# Patient Record
Sex: FEMALE | Race: WHITE | NOT HISPANIC OR LATINO | Employment: OTHER | URBAN - METROPOLITAN AREA
[De-identification: names, ages, dates, MRNs, and addresses within clinical notes are randomized per-mention and may not be internally consistent; named-entity substitution may affect disease eponyms.]

---

## 2017-01-23 ENCOUNTER — HOSPITAL ENCOUNTER (OUTPATIENT)
Dept: RADIOLOGY | Facility: HOSPITAL | Age: 72
Discharge: HOME | End: 2017-01-23
Attending: INTERNAL MEDICINE
Payer: MEDICARE

## 2017-01-23 ENCOUNTER — OFFICE VISIT (OUTPATIENT)
Dept: RHEUMATOLOGY | Facility: CLINIC | Age: 72
End: 2017-01-23
Payer: MEDICARE

## 2017-01-23 VITALS — DIASTOLIC BLOOD PRESSURE: 93 MMHG | SYSTOLIC BLOOD PRESSURE: 155 MMHG | HEART RATE: 106 BPM | HEIGHT: 70 IN

## 2017-01-23 DIAGNOSIS — M79.2 NEUROPATHIC PAIN OF FOOT, UNSPECIFIED LATERALITY: Chronic | ICD-10-CM

## 2017-01-23 DIAGNOSIS — R06.02 SHORTNESS OF BREATH: ICD-10-CM

## 2017-01-23 DIAGNOSIS — Z79.899 HIGH RISK MEDICATION USE: Chronic | ICD-10-CM

## 2017-01-23 DIAGNOSIS — M05.79 RHEUMATOID ARTHRITIS INVOLVING MULTIPLE SITES WITH POSITIVE RHEUMATOID FACTOR: Primary | ICD-10-CM

## 2017-01-23 DIAGNOSIS — L59.0 ERYTHEMA AB IGNE: Chronic | ICD-10-CM

## 2017-01-23 PROCEDURE — 71020 XR CHEST PA AND LATERAL: CPT | Mod: 26,,, | Performed by: RADIOLOGY

## 2017-01-23 PROCEDURE — 71020 XR CHEST PA AND LATERAL: CPT | Mod: TC

## 2017-01-23 PROCEDURE — 99999 PR PBB SHADOW E&M-EST. PATIENT-LVL III: CPT | Mod: PBBFAC,,, | Performed by: INTERNAL MEDICINE

## 2017-01-23 PROCEDURE — 99214 OFFICE O/P EST MOD 30 MIN: CPT | Mod: S$PBB,,, | Performed by: INTERNAL MEDICINE

## 2017-01-23 RX ORDER — OXYCODONE AND ACETAMINOPHEN 5; 325 MG/1; MG/1
1 TABLET ORAL EVERY 6 HOURS PRN
Qty: 60 TABLET | Refills: 0 | Status: SHIPPED | OUTPATIENT
Start: 2017-01-23 | End: 2017-04-17 | Stop reason: SDUPTHER

## 2017-01-23 ASSESSMENT — ROUTINE ASSESSMENT OF PATIENT INDEX DATA (RAPID3)
TOTAL RAPID3 SCORE: 2.67
PATIENT GLOBAL ASSESSMENT SCORE: 3
PSYCHOLOGICAL DISTRESS SCORE: 3.3
PAIN SCORE: 3
AM STIFFNESS SCORE: 0, NO
MDHAQ FUNCTION SCORE: .6
FATIGUE SCORE: 8

## 2017-01-23 NOTE — MR AVS SNAPSHOT
[facility] - Rheumatology  [address]  [facility] [address]  Phone: [phone]  Fax: [phone]                  Yana Orellana   2017 4:00 PM   Office Visit    Description:  Female : 1945   Provider:  Smooth Agrawal MD   Department:  [facility] - Rheumatology           Diagnoses this Visit        Comments    Shortness of breath    -  Primary     Erythema ab igne         High risk medication use         Rheumatoid arthritis involving multiple sites with positive rheumatoid factor         Neuropathic pain of foot, unspecified laterality                To Do List           Goals (5 Years of Data)     None      Follow-Up and Disposition     Return in about 3 months (around 2017).       These Medications        Disp Refills Start End    oxycodone-acetaminophen (PERCOCET) 5-325 mg per tablet 60 tablet 0 2017     Take 1 tablet by mouth every 6 (six) hours as needed for Pain. - Oral    Pharmacy: [facility] [address] [place] [address] [facility] #: [phone]         [facility] On Call     [facility] Nurse Care Line -  Assistance  Registered nurses in the [facility] Call Center provide clinical advisement, health education, appointment booking, and other advisory services.  Call for this free service at [phone].             Medications           Message regarding Medications     Verify the changes and/or additions to your medication regime listed below are the same as discussed with your clinician today.  If any of these changes or additions are incorrect, please notify your healthcare provider.             Verify that the below list of medications is an accurate representation of the medications you are currently taking.  If none reported, the list may be blank. If incorrect, please contact your healthcare provider. Carry this list with you in case of emergency.           Current Medications     calcium  "citrate-vitamin D (CITRACAL+D) 315-200 mg-unit per tablet Take 2 tablets by mouth 2 (two) times daily.     FLUZONE HIGH-DOSE 2015-16, PF, 180 mcg/0.5 mL Syrg     folic acid (FOLVITE) 1 MG tablet Take 1 tablet (1 mg total) by mouth once daily.    levothyroxine (SYNTHROID) 75 MCG tablet Take 1 tablet (75 mcg total) by mouth once daily.    liothyronine (CYTOMEL) 5 MCG Tab TAKE 1 TABLET BY MOUTH TWICE DAILY    meclizine (ANTIVERT) 25 mg tablet Take 1 tablet (25 mg total) by mouth 3 (three) times daily as needed.    methotrexate 2.5 MG Tab TAKE 8 TABLETS BY MOUTH EVERY 7 DAYS    multivitamin (ONE DAILY MULTIVITAMIN) per tablet Take 1 tablet by mouth once daily.    oxycodone-acetaminophen (PERCOCET) 5-325 mg per tablet Take 1 tablet by mouth every 6 (six) hours as needed for Pain.    vitamin D 185 MG Tab Take 185 mg by mouth once daily.      diazePAM (VALIUM) 5 MG tablet Take 1 tablet (5 mg total) by mouth every 8 (eight) hours as needed for Anxiety or Insomnia (and fibromyalgia).           Clinical Reference Information           Vital Signs - Last Recorded  Most recent update: 1/23/2017  4:13 PM by Winnie Craig MA    BP Pulse Ht             (!) 155/93 106 5' 10" (1.778 m)         Blood Pressure          Most Recent Value    BP  (!)  155/93      Allergies as of 1/23/2017     Demerol [Meperidine]    Gabapentin    Mellaril-s    Versed [Midazolam]    Vicodin [Hydrocodone-acetaminophen]    Xanax [Alprazolam]      Immunizations Administered on Date of Encounter - 1/23/2017     None      Orders Placed During Today's Visit     Future Labs/Procedures Expected by Expires    X-Ray Chest PA And Lateral  1/23/2017 1/23/2018      [facility] Sign-Up     Activating your [facility] account is as easy as 1-2-3!     1) Visit [email or web address], select Sign Up Now, enter this activation code and your date of birth, then select Next.  [email or web address]  Expires: 3/9/2017  4:43 PM      2) Create a username and password to use when you visit " [facility] in the future and select a security question in case you lose your password and select Next.    3) Enter your e-mail address and click Sign Up!    Additional Information  If you have questions, please e-mail [email or web address] or call [phone] to talk to our [facility] staff. Remember, [facility] is NOT to be used for urgent needs. For medical emergencies, dial 911.

## 2017-01-23 NOTE — PROGRESS NOTES
"Subjective:       Patient ID: Yana Orellana is a 71 y.o. female.    Chief Complaint: No chief complaint on file.    HPI   Mod pos CCP/RF RA since     MTX  - present      mtx 20 / week  Oxycodone 1 or 2 a day as needed for neuropathy or joint pain  Valium 90 tab every 6 mo ; due in April; uses it occasionally when she needs help managing her fibromyalgia    Last here in July  Her ex   in August    mtx still works ; good hand function  Oxycodone less than daily    Review of Systems   Constitutional: Positive for fatigue. Negative for fever and unexpected weight change.   HENT: Negative for mouth sores and trouble swallowing.         Not Dry mouth   Eyes: Negative for redness.        Dry eyes   Respiratory: Positive for shortness of breath. Negative for cough.    Cardiovascular: Negative for chest pain.   Gastrointestinal: Positive for diarrhea. Negative for abdominal pain and constipation.   Skin: Negative for rash.   Neurological: Negative for headaches.   Hematological: Negative for adenopathy. Does not bruise/bleed easily.   Psychiatric/Behavioral: Negative for sleep disturbance.         Objective:     Visit Vitals    BP (!) 155/93    Pulse 106    Ht 5' 10" (1.778 m)        Physical Exam   Constitutional: She is well-developed, well-nourished, and in no distress.   HENT:   Mouth/Throat: Oropharynx is clear and moist.   Eyes: Conjunctivae are normal.   Cardiovascular: Normal rate and regular rhythm.    No murmur heard.  Pulmonary/Chest: Breath sounds normal. She has no wheezes. She has no rales.   Lymphadenopathy:     She has no cervical adenopathy.   Neurological: She is alert.   Skin: No rash noted.     Musculoskeletal:   No hand or wrist synovitis           Assessment:       1. Rheumatoid arthritis involving multiple sites with positive rheumatoid factor    2. Erythema ab igne    3. Shortness of breath    4. High risk medication use    5. Neuropathic pain of foot, unspecified " laterality        Rheumatoid arthritis appears well controlled on current therapy  Shortness of breath of uncertain etiology and must consider methotrexate pulmonary toxicity  Chronic narcotics but no history to suggest abuse  Plan:     1 chest x-ray today  2.  Laboratory tests for methotrexate monitoring today  3.  If above negative consider cardiac workup  4.  If above negative repeat labs in 3 months with rheumatoid follow-up at that time  5.  Oxycodone No. 60 to last for the next 2-3 months    6.  I advised her to stop using the heating pad that is causing the back erythema ab igne

## 2017-01-24 ENCOUNTER — TELEPHONE (OUTPATIENT)
Dept: RHEUMATOLOGY | Facility: CLINIC | Age: 72
End: 2017-01-24

## 2017-01-24 DIAGNOSIS — M05.79 RHEUMATOID ARTHRITIS INVOLVING MULTIPLE SITES WITH POSITIVE RHEUMATOID FACTOR: Primary | ICD-10-CM

## 2017-01-24 DIAGNOSIS — Z79.899 HIGH RISK MEDICATION USE: Chronic | ICD-10-CM

## 2017-01-31 ENCOUNTER — TELEPHONE (OUTPATIENT)
Dept: RHEUMATOLOGY | Facility: CLINIC | Age: 72
End: 2017-01-31

## 2017-02-03 ENCOUNTER — TELEPHONE (OUTPATIENT)
Dept: RHEUMATOLOGY | Facility: CLINIC | Age: 72
End: 2017-02-03

## 2017-04-17 DIAGNOSIS — F41.9 ANXIETY: ICD-10-CM

## 2017-04-17 DIAGNOSIS — M79.2 NEUROPATHIC PAIN OF FOOT, UNSPECIFIED LATERALITY: Chronic | ICD-10-CM

## 2017-04-17 DIAGNOSIS — M79.7 FIBROMYALGIA: Chronic | ICD-10-CM

## 2017-04-17 DIAGNOSIS — M05.79 RHEUMATOID ARTHRITIS INVOLVING MULTIPLE SITES WITH POSITIVE RHEUMATOID FACTOR: ICD-10-CM

## 2017-04-18 RX ORDER — DIAZEPAM 5 MG/1
5 TABLET ORAL EVERY 8 HOURS PRN
Qty: 90 TABLET | Refills: 0 | Status: SHIPPED | OUTPATIENT
Start: 2017-04-18 | End: 2017-10-25 | Stop reason: SDUPTHER

## 2017-04-18 RX ORDER — OXYCODONE AND ACETAMINOPHEN 5; 325 MG/1; MG/1
1 TABLET ORAL EVERY 6 HOURS PRN
Qty: 60 TABLET | Refills: 0 | Status: SHIPPED | OUTPATIENT
Start: 2017-04-18 | End: 2017-07-24 | Stop reason: SDUPTHER

## 2017-04-24 ENCOUNTER — OFFICE VISIT (OUTPATIENT)
Dept: RHEUMATOLOGY | Facility: CLINIC | Age: 72
End: 2017-04-24
Payer: MEDICARE

## 2017-04-24 ENCOUNTER — LAB VISIT (OUTPATIENT)
Dept: LAB | Facility: HOSPITAL | Age: 72
End: 2017-04-24
Attending: INTERNAL MEDICINE
Payer: MEDICARE

## 2017-04-24 VITALS — HEIGHT: 70 IN

## 2017-04-24 DIAGNOSIS — K11.7 XEROSTOMIA: Chronic | ICD-10-CM

## 2017-04-24 DIAGNOSIS — M05.79 RHEUMATOID ARTHRITIS INVOLVING MULTIPLE SITES WITH POSITIVE RHEUMATOID FACTOR: Primary | ICD-10-CM

## 2017-04-24 DIAGNOSIS — M05.79 RHEUMATOID ARTHRITIS INVOLVING MULTIPLE SITES WITH POSITIVE RHEUMATOID FACTOR: ICD-10-CM

## 2017-04-24 DIAGNOSIS — Z79.899 HIGH RISK MEDICATION USE: Chronic | ICD-10-CM

## 2017-04-24 DIAGNOSIS — G14 POST-POLIO SYNDROME: ICD-10-CM

## 2017-04-24 DIAGNOSIS — M79.2 NEUROPATHIC PAIN OF FOOT, UNSPECIFIED LATERALITY: Chronic | ICD-10-CM

## 2017-04-24 LAB
ALBUMIN SERPL BCP-MCNC: 3.1 G/DL
ALP SERPL-CCNC: 97 U/L
ALT SERPL W/O P-5'-P-CCNC: 30 U/L
ANION GAP SERPL CALC-SCNC: 10 MMOL/L
AST SERPL-CCNC: 46 U/L
BASOPHILS # BLD AUTO: 0.06 K/UL
BASOPHILS NFR BLD: 0.9 %
BILIRUB SERPL-MCNC: 0.7 MG/DL
BUN SERPL-MCNC: 14 MG/DL
CALCIUM SERPL-MCNC: 8.5 MG/DL
CHLORIDE SERPL-SCNC: 109 MMOL/L
CO2 SERPL-SCNC: 20 MMOL/L
CREAT SERPL-MCNC: 0.7 MG/DL
CRP SERPL-MCNC: 10 MG/L
DIFFERENTIAL METHOD: ABNORMAL
EOSINOPHIL # BLD AUTO: 0.3 K/UL
EOSINOPHIL NFR BLD: 4.9 %
ERYTHROCYTE [DISTWIDTH] IN BLOOD BY AUTOMATED COUNT: 13.5 %
EST. GFR  (AFRICAN AMERICAN): >60 ML/MIN/1.73 M^2
EST. GFR  (NON AFRICAN AMERICAN): >60 ML/MIN/1.73 M^2
GLUCOSE SERPL-MCNC: 132 MG/DL
HCT VFR BLD AUTO: 44.3 %
HGB BLD-MCNC: 15.4 G/DL
LYMPHOCYTES # BLD AUTO: 2 K/UL
LYMPHOCYTES NFR BLD: 29.4 %
MCH RBC QN AUTO: 33.3 PG
MCHC RBC AUTO-ENTMCNC: 34.8 %
MCV RBC AUTO: 96 FL
MONOCYTES # BLD AUTO: 0.7 K/UL
MONOCYTES NFR BLD: 10 %
NEUTROPHILS # BLD AUTO: 3.6 K/UL
NEUTROPHILS NFR BLD: 54.4 %
PLATELET # BLD AUTO: 134 K/UL
PMV BLD AUTO: 11 FL
POTASSIUM SERPL-SCNC: 4.1 MMOL/L
PROT SERPL-MCNC: 7.5 G/DL
RBC # BLD AUTO: 4.62 M/UL
SODIUM SERPL-SCNC: 139 MMOL/L
WBC # BLD AUTO: 6.7 K/UL

## 2017-04-24 PROCEDURE — 99999 PR PBB SHADOW E&M-EST. PATIENT-LVL III: CPT | Mod: PBBFAC,,, | Performed by: INTERNAL MEDICINE

## 2017-04-24 PROCEDURE — 99213 OFFICE O/P EST LOW 20 MIN: CPT | Mod: PBBFAC | Performed by: INTERNAL MEDICINE

## 2017-04-24 PROCEDURE — 99214 OFFICE O/P EST MOD 30 MIN: CPT | Mod: S$PBB,,, | Performed by: INTERNAL MEDICINE

## 2017-04-24 RX ORDER — PREDNISONE 5 MG/1
5 TABLET ORAL DAILY PRN
Qty: 20 TABLET | Refills: 0 | Status: SHIPPED | OUTPATIENT
Start: 2017-04-24 | End: 2017-05-24

## 2017-04-24 RX ORDER — METHOTREXATE 2.5 MG/1
20 TABLET ORAL
Qty: 104 TABLET | Refills: 0 | Status: SHIPPED | OUTPATIENT
Start: 2017-04-24 | End: 2017-07-24 | Stop reason: SDUPTHER

## 2017-04-24 RX ORDER — TRAMADOL HYDROCHLORIDE 50 MG/1
50-100 TABLET ORAL EVERY 6 HOURS PRN
Qty: 30 TABLET | Refills: 2 | Status: SHIPPED | OUTPATIENT
Start: 2017-04-24 | End: 2018-02-28

## 2017-04-24 ASSESSMENT — ROUTINE ASSESSMENT OF PATIENT INDEX DATA (RAPID3)
MDHAQ FUNCTION SCORE: .4
PATIENT GLOBAL ASSESSMENT SCORE: 1.5
TOTAL RAPID3 SCORE: 4.28
PAIN SCORE: 10
AM STIFFNESS SCORE: 0, NO
PSYCHOLOGICAL DISTRESS SCORE: 3.3
FATIGUE SCORE: 7.5

## 2017-04-24 NOTE — PROGRESS NOTES
"Subjective:       Patient ID: Yana Orellana is a 71 y.o. female.    Chief Complaint: Disease Management            Associated symptoms include fatigue. Pertinent negatives include no fever, trouble swallowing or headaches.          Mod pos CCP/RF RA since     MTX  - present      Hx lung cancer dx 2009, treated with surgery     Current:  mtx 20 / week  Oxycodone 1 or 2 a day as needed for neuropathy or joint pain; tries to use less  Valium 90 tab every 6 mo ; due in April; uses it occasionally when she needs help managing her fibromyalgia; it helps with muscle pain and with anxiety and sometimes for sleep    Last here in July  Her ex   in August    mtx still works ; good hand function; L thumb sore at times  Took a prednisone for thumb pain     L thigh atrophy; LLE weaker than R; had polio age 18 neck and back; uses cane to compensate for LLE weakness  All over body tingling  Pain alternate shoulders that is very painful and limits her; she calls it bursitis; stretch and ice helps    Review of Systems   Constitutional: Positive for fatigue. Negative for fever and unexpected weight change.   HENT: Negative for mouth sores and trouble swallowing.         Not Dry mouth   Eyes: Negative for redness.        Dry eyes   Respiratory: Positive for shortness of breath. Negative for cough.    Cardiovascular: Positive for chest pain.   Gastrointestinal: Positive for diarrhea. Negative for abdominal pain and constipation.   Skin: Positive for rash.        Rash on groin   Neurological: Negative for headaches.   Hematological: Negative for adenopathy. Does not bruise/bleed easily.   Psychiatric/Behavioral: Negative for sleep disturbance.         Objective:     Ht 5' 10" (1.778 m)     Physical Exam   Constitutional: She is well-developed, well-nourished, and in no distress.   HENT:   Mouth/Throat: Oropharynx is clear and moist.   Eyes: Conjunctivae are normal.   Cardiovascular: Normal rate and regular " rhythm.    No murmur heard.  Pulmonary/Chest: Breath sounds normal. She has no wheezes. She has no rales.   Lymphadenopathy:     She has no cervical adenopathy.   Neurological: She is alert.   Skin: No rash noted.     Musculoskeletal:   No hand or wrist synovitis         cbc nl except platelet 134    Assessment:       No diagnosis found.    Rheumatoid arthritis appears well controlled on current therapy    Chronic narcotics but no history to suggest abuse    Plan:     oxycodone and diazepam were refilled recently    1. Cont mtx  2. Try tramadol as alternative to oxycodone  3. Lab in 3 m (July and Oct)  4. Refer to PT for shoulder pain and LE strengthening and gait  5. RTC Oct

## 2017-04-24 NOTE — MR AVS SNAPSHOT
[facility]  [address]  [facility] [address]  Phone: [phone]  Fax: [phone]                  Yana Orellana   2017 4:00 PM   Office Visit    Description:  Female : 1945   Provider:  Smooth Agrawal MD   Department:  [facility]           Reason for Visit     Disease Management           Diagnoses this Visit        Comments    Rheumatoid arthritis involving multiple sites with positive rheumatoid factor    -  Primary     High risk medication use         Xerostomia         Neuropathic pain of foot, unspecified laterality                To Do List           Goals (5 Years of Data)     None      Follow-Up and Disposition     Return in about 6 months (around 10/24/2017).       These Medications        Disp Refills Start End    methotrexate 2.5 MG Tab 104 tablet 0 2017     Take 8 tablets (20 mg total) by mouth every 7 days. - Oral    Pharmacy: [facility] [place] - [address] Ph #: [phone]       Prior authorization:  Waiting for payer response    tramadol (ULTRAM) 50 mg tablet 30 tablet 2 2017     Take 1-2 tablets ( mg total) by mouth every 6 (six) hours as needed for Pain. - Oral    Pharmacy: [facility] [place] - [address] Ph #: [phone]         [facility]     [facility] Nurse Care Line -  Assistance  Unless otherwise directed by your provider, please contact [facility], our nurse care line that is available for  assistance.     Registered nurses in the [facility] Center provide: appointment scheduling, clinical advisement, health education, and other advisory services.  Call: [phone] (toll free)               Medications           Message regarding Medications     Verify the changes and/or additions to your medication regime listed below are the same as discussed with your clinician today.  If any of these  changes or additions are incorrect, please notify your healthcare provider.        START taking these NEW medications        Refills    tramadol (ULTRAM) 50 mg tablet 2    Sig: Take 1-2 tablets ( mg total) by mouth every 6 (six) hours as needed for Pain.    Class: Print    Route: Oral      CHANGE how you are taking these medications     Start Taking Instead of    methotrexate 2.5 MG Tab methotrexate 2.5 MG Tab    Dosage:  Take 8 tablets (20 mg total) by mouth every 7 days. Dosage:  TAKE 8 TABLETS BY MOUTH EVERY 7 DAYS    Reason for Change:  Reorder            Verify that the below list of medications is an accurate representation of the medications you are currently taking.  If none reported, the list may be blank. If incorrect, please contact your healthcare provider. Carry this list with you in case of emergency.           Current Medications     calcium citrate-vitamin D (CITRACAL+D) 315-200 mg-unit per tablet Take 2 tablets by mouth 2 (two) times daily.     diazePAM (VALIUM) 5 MG tablet Take 1 tablet (5 mg total) by mouth every 8 (eight) hours as needed for Anxiety or Insomnia (and fibromyalgia).    FLUZONE HIGH-DOSE 2015-16, PF, 180 mcg/0.5 mL Syrg     folic acid (FOLVITE) 1 MG tablet Take 1 tablet (1 mg total) by mouth once daily.    levothyroxine (SYNTHROID) 75 MCG tablet Take 1 tablet (75 mcg total) by mouth once daily.    liothyronine (CYTOMEL) 5 MCG Tab TAKE 1 TABLET BY MOUTH TWICE DAILY    meclizine (ANTIVERT) 25 mg tablet Take 1 tablet (25 mg total) by mouth 3 (three) times daily as needed.    methotrexate 2.5 MG Tab Take 8 tablets (20 mg total) by mouth every 7 days.    multivitamin (ONE DAILY MULTIVITAMIN) per tablet Take 1 tablet by mouth once daily.    oxycodone-acetaminophen (PERCOCET) 5-325 mg per tablet Take 1 tablet by mouth every 6 (six) hours as needed for Pain.    vitamin D 185 MG Tab Take 185 mg by mouth once daily.      tramadol (ULTRAM) 50 mg tablet Take 1-2 tablets ( mg total)  "by mouth every 6 (six) hours as needed for Pain.           Clinical Reference Information           Your Vitals Were     Height                   5' 10" (1.778 m)           Allergies as of 4/24/2017     Demerol [Meperidine]    Gabapentin    Mellaril-s    Versed [Midazolam]    Vicodin [Hydrocodone-acetaminophen]    Xanax [Alprazolam]      Immunizations Administered on Date of Encounter - 4/24/2017     None      [facility] Sign-Up     Activating your [facility] account is as easy as 1-2-3!     1) Visit [email or web address], select Sign Up Now, enter this activation code and your date of birth, then select Next.  [email or web address]  Expires: 6/8/2017  5:05 PM      2) Create a username and password to use when you visit [facility] in the future and select a security question in case you lose your password and select Next.    3) Enter your e-mail address and click Sign Up!    Additional Information  If you have questions, please e-mail [email or web address] or call [phone] to talk to our [facility] staff. Remember, [facility] is NOT to be used for urgent needs. For medical emergencies, dial 911.         Language Assistance Services     ATTENTION: Language assistance services are available, free of charge. Please call [phone].      ATENCIÓN: Si habla español, tiene a mcgee disposición servicios gratuitos de asistencia lingüística. Llame al [phone].     CHÚ Ý: N?u b?n nói Ti?ng Vi?t, có các d?ch v? h? tr? ngôn ng? mi?n phí dành cho b?n. G?i s? [phone].         [facility] complies with applicable Federal civil rights laws and does not discriminate on the basis of race, color, national origin, age, disability, or sex.        "

## 2017-04-25 ENCOUNTER — TELEPHONE (OUTPATIENT)
Dept: PHARMACY | Facility: CLINIC | Age: 72
End: 2017-04-25

## 2017-07-24 DIAGNOSIS — M79.2 NEUROPATHIC PAIN OF FOOT, UNSPECIFIED LATERALITY: Chronic | ICD-10-CM

## 2017-07-24 DIAGNOSIS — M05.79 RHEUMATOID ARTHRITIS INVOLVING MULTIPLE SITES WITH POSITIVE RHEUMATOID FACTOR: ICD-10-CM

## 2017-07-25 ENCOUNTER — LAB VISIT (OUTPATIENT)
Dept: LAB | Facility: HOSPITAL | Age: 72
End: 2017-07-25
Attending: INTERNAL MEDICINE
Payer: MEDICARE

## 2017-07-25 DIAGNOSIS — Z79.899 HIGH RISK MEDICATION USE: Chronic | ICD-10-CM

## 2017-07-25 DIAGNOSIS — M05.79 RHEUMATOID ARTHRITIS INVOLVING MULTIPLE SITES WITH POSITIVE RHEUMATOID FACTOR: ICD-10-CM

## 2017-07-25 LAB
ALBUMIN SERPL BCP-MCNC: 3.1 G/DL
ALP SERPL-CCNC: 103 U/L
ALT SERPL W/O P-5'-P-CCNC: 27 U/L
ANION GAP SERPL CALC-SCNC: 8 MMOL/L
AST SERPL-CCNC: 54 U/L
BASOPHILS # BLD AUTO: 0.1 K/UL
BASOPHILS NFR BLD: 1.8 %
BILIRUB SERPL-MCNC: 0.7 MG/DL
BUN SERPL-MCNC: 11 MG/DL
CALCIUM SERPL-MCNC: 8.9 MG/DL
CHLORIDE SERPL-SCNC: 107 MMOL/L
CO2 SERPL-SCNC: 24 MMOL/L
CREAT SERPL-MCNC: 0.8 MG/DL
CRP SERPL-MCNC: 9.4 MG/L
DIFFERENTIAL METHOD: ABNORMAL
EOSINOPHIL # BLD AUTO: 0.3 K/UL
EOSINOPHIL NFR BLD: 5.3 %
ERYTHROCYTE [DISTWIDTH] IN BLOOD BY AUTOMATED COUNT: 13.3 %
EST. GFR  (AFRICAN AMERICAN): >60 ML/MIN/1.73 M^2
EST. GFR  (NON AFRICAN AMERICAN): >60 ML/MIN/1.73 M^2
GLUCOSE SERPL-MCNC: 158 MG/DL
HCT VFR BLD AUTO: 43.5 %
HGB BLD-MCNC: 15.1 G/DL
LYMPHOCYTES # BLD AUTO: 2.1 K/UL
LYMPHOCYTES NFR BLD: 37.2 %
MCH RBC QN AUTO: 32.9 PG
MCHC RBC AUTO-ENTMCNC: 34.7 G/DL
MCV RBC AUTO: 95 FL
MONOCYTES # BLD AUTO: 0.3 K/UL
MONOCYTES NFR BLD: 6 %
NEUTROPHILS # BLD AUTO: 2.7 K/UL
NEUTROPHILS NFR BLD: 49.3 %
PLATELET # BLD AUTO: 152 K/UL
PMV BLD AUTO: 9.9 FL
POTASSIUM SERPL-SCNC: 4.1 MMOL/L
PROT SERPL-MCNC: 7.4 G/DL
RBC # BLD AUTO: 4.59 M/UL
SODIUM SERPL-SCNC: 139 MMOL/L
WBC # BLD AUTO: 5.51 K/UL

## 2017-07-25 PROCEDURE — 36415 COLL VENOUS BLD VENIPUNCTURE: CPT

## 2017-07-25 PROCEDURE — 85025 COMPLETE CBC W/AUTO DIFF WBC: CPT

## 2017-07-25 PROCEDURE — 86140 C-REACTIVE PROTEIN: CPT

## 2017-07-25 PROCEDURE — 80053 COMPREHEN METABOLIC PANEL: CPT

## 2017-07-26 ENCOUNTER — TELEPHONE (OUTPATIENT)
Dept: RHEUMATOLOGY | Facility: CLINIC | Age: 72
End: 2017-07-26

## 2017-07-26 DIAGNOSIS — M79.2 NEUROPATHIC PAIN OF FOOT, UNSPECIFIED LATERALITY: Chronic | ICD-10-CM

## 2017-07-26 DIAGNOSIS — M05.79 RHEUMATOID ARTHRITIS INVOLVING MULTIPLE SITES WITH POSITIVE RHEUMATOID FACTOR: ICD-10-CM

## 2017-07-26 RX ORDER — OXYCODONE AND ACETAMINOPHEN 5; 325 MG/1; MG/1
1 TABLET ORAL EVERY 6 HOURS PRN
Qty: 60 TABLET | Refills: 0 | Status: SHIPPED | OUTPATIENT
Start: 2017-07-26 | End: 2017-07-26 | Stop reason: SDUPTHER

## 2017-07-26 RX ORDER — METHOTREXATE 2.5 MG/1
17.5 TABLET ORAL
Qty: 91 TABLET | Refills: 0 | Status: SHIPPED | OUTPATIENT
Start: 2017-07-26 | End: 2017-10-25 | Stop reason: SDUPTHER

## 2017-07-26 RX ORDER — OXYCODONE AND ACETAMINOPHEN 5; 325 MG/1; MG/1
1 TABLET ORAL EVERY 6 HOURS PRN
Qty: 60 TABLET | Refills: 0 | Status: SHIPPED | OUTPATIENT
Start: 2017-07-26 | End: 2017-10-25 | Stop reason: SDUPTHER

## 2017-07-26 NOTE — TELEPHONE ENCOUNTER
Informed patient to decrease methotrexate from 8 tablets a week to 7 tablets a week.  Verbalizes understanding.  Patient also stated she had been on PCN last week, and  Is asking if that could be the reason for the abnormal lab?

## 2017-07-26 NOTE — TELEPHONE ENCOUNTER
----- Message from Smooth Agrawal MD sent at 7/26/2017  8:41 AM CDT -----  Tell her that one of her liver enzymes is slightly elevated and I would like for her to reduce methotrexate from 8 tablets to 7 tablets every week  WD

## 2017-10-25 ENCOUNTER — OFFICE VISIT (OUTPATIENT)
Dept: RHEUMATOLOGY | Facility: CLINIC | Age: 72
End: 2017-10-25
Payer: MEDICARE

## 2017-10-25 VITALS — HEART RATE: 80 BPM | DIASTOLIC BLOOD PRESSURE: 79 MMHG | SYSTOLIC BLOOD PRESSURE: 135 MMHG | HEIGHT: 70 IN

## 2017-10-25 DIAGNOSIS — M79.2 NEUROPATHIC PAIN OF FOOT, UNSPECIFIED LATERALITY: Chronic | ICD-10-CM

## 2017-10-25 DIAGNOSIS — M05.79 RHEUMATOID ARTHRITIS INVOLVING MULTIPLE SITES WITH POSITIVE RHEUMATOID FACTOR: Primary | ICD-10-CM

## 2017-10-25 DIAGNOSIS — F41.9 ANXIETY: ICD-10-CM

## 2017-10-25 DIAGNOSIS — M79.7 FIBROMYALGIA: Chronic | ICD-10-CM

## 2017-10-25 DIAGNOSIS — Z79.899 HIGH RISK MEDICATION USE: Chronic | ICD-10-CM

## 2017-10-25 PROCEDURE — 99214 OFFICE O/P EST MOD 30 MIN: CPT | Mod: S$PBB,,, | Performed by: INTERNAL MEDICINE

## 2017-10-25 PROCEDURE — 99999 PR PBB SHADOW E&M-EST. PATIENT-LVL III: CPT | Mod: PBBFAC,,, | Performed by: INTERNAL MEDICINE

## 2017-10-25 PROCEDURE — 99213 OFFICE O/P EST LOW 20 MIN: CPT | Mod: PBBFAC | Performed by: INTERNAL MEDICINE

## 2017-10-25 RX ORDER — DIAZEPAM 5 MG/1
5 TABLET ORAL EVERY 8 HOURS PRN
Qty: 90 TABLET | Refills: 0 | Status: ON HOLD | OUTPATIENT
Start: 2017-10-25 | End: 2018-03-09 | Stop reason: HOSPADM

## 2017-10-25 RX ORDER — OXYCODONE AND ACETAMINOPHEN 5; 325 MG/1; MG/1
1 TABLET ORAL EVERY 6 HOURS PRN
Qty: 120 TABLET | Refills: 0 | Status: ON HOLD | OUTPATIENT
Start: 2017-10-25 | End: 2018-03-09 | Stop reason: HOSPADM

## 2017-10-25 RX ORDER — METHOTREXATE 2.5 MG/1
17.5 TABLET ORAL
Qty: 91 TABLET | Refills: 0 | Status: SHIPPED | OUTPATIENT
Start: 2017-10-25 | End: 2018-02-28 | Stop reason: SDUPTHER

## 2017-10-25 ASSESSMENT — ROUTINE ASSESSMENT OF PATIENT INDEX DATA (RAPID3)
TOTAL RAPID3 SCORE: 4.55
MDHAQ FUNCTION SCORE: 1.7
PAIN SCORE: 0
PSYCHOLOGICAL DISTRESS SCORE: .5
FATIGUE SCORE: 9
PATIENT GLOBAL ASSESSMENT SCORE: 8
AM STIFFNESS SCORE: 0, NO

## 2017-10-25 NOTE — PROGRESS NOTES
"Subjective:       Patient ID: Yana Orellana is a 72 y.o. female.    Chief Complaint: No chief complaint on file.    Mod pos CCP/RF RA since 2009    MTX 2011 - present      Hx lung cancer dx Nov 2009, treated with surgery      Current:  mtx 17.5 / week    Oxycodone 1/2- 1 twice a day as needed for neuropathy or joint pain; tries to use less  Valium 90 tab every 6 mo ; due in April; uses it occasionally when she needs help managing her fibromyalgia; it helps with muscle pain and with anxiety and sometimes for sleep      Fell and broke L shoulder; in a sling  Some RA joint pain R hand    Review of Systems   Constitutional: Positive for fever. Negative for fatigue and unexpected weight change.   HENT: Negative for mouth sores and trouble swallowing.         Dry mouth   Eyes: Negative for redness.        Dry eyes   Respiratory: Negative for cough and shortness of breath.    Cardiovascular: Negative for chest pain.   Gastrointestinal: Positive for diarrhea. Negative for abdominal pain and constipation.   Skin: Negative for rash.   Neurological: Negative for headaches.   Hematological: Negative for adenopathy. Does not bruise/bleed easily.   Psychiatric/Behavioral: Negative for sleep disturbance.         Objective:   /79 (BP Location: Right arm, Patient Position: Sitting, BP Method: Large (Automatic))   Pulse 80   Ht 5' 10" (1.778 m)      Physical Exam      Hands no synovitis  Left arm in a sling      Lab Results   Component Value Date    WBC 7.49 10/25/2017    HGB 14.6 10/25/2017    HCT 42.8 10/25/2017    MCV 94 10/25/2017     10/25/2017      Lab Results   Component Value Date    SEDRATE 47 (H) 01/23/2017      Lab Results   Component Value Date    CRP 10.2 (H) 10/25/2017      Lab Results   Component Value Date    ALT 34 10/25/2017      Assessment:       1. Rheumatoid arthritis involving multiple sites with positive rheumatoid factor    2. High risk medication use    3. Insomnia    4. Fibromyalgia  "   5. Neuropathic pain of foot, unspecified laterality        RA still ok on reduced dose of mtx  Chronic narcotic use with no hx of diversion  Fracture from fall ; hx high bone density DXA 2014  Elevated sedimentation rate and CRP likely related to fracture  Plan:     1. Continue mtx 17.5  2. Cont lab January; RTC Feb team patient  3. Plan repeat DXA next year

## 2017-10-30 ENCOUNTER — TELEPHONE (OUTPATIENT)
Dept: RHEUMATOLOGY | Facility: CLINIC | Age: 72
End: 2017-10-30

## 2017-10-30 NOTE — TELEPHONE ENCOUNTER
----- Message from Smooth Agrawal MD sent at 10/25/2017  6:27 PM CDT -----  Tell her that labs are stable and she can continue current dose of methotrexate

## 2018-02-28 ENCOUNTER — LAB VISIT (OUTPATIENT)
Dept: LAB | Facility: HOSPITAL | Age: 73
End: 2018-02-28
Attending: INTERNAL MEDICINE
Payer: MEDICARE

## 2018-02-28 ENCOUNTER — OFFICE VISIT (OUTPATIENT)
Dept: RHEUMATOLOGY | Facility: CLINIC | Age: 73
End: 2018-02-28
Payer: MEDICARE

## 2018-02-28 VITALS — SYSTOLIC BLOOD PRESSURE: 155 MMHG | HEART RATE: 96 BPM | HEIGHT: 70 IN | DIASTOLIC BLOOD PRESSURE: 74 MMHG

## 2018-02-28 DIAGNOSIS — Z79.52 ON CORTICOSTEROID THERAPY: ICD-10-CM

## 2018-02-28 DIAGNOSIS — Z79.899 HIGH RISK MEDICATION USE: Chronic | ICD-10-CM

## 2018-02-28 DIAGNOSIS — M05.79 RHEUMATOID ARTHRITIS INVOLVING MULTIPLE SITES WITH POSITIVE RHEUMATOID FACTOR: ICD-10-CM

## 2018-02-28 DIAGNOSIS — G14 POST-POLIO SYNDROME: ICD-10-CM

## 2018-02-28 DIAGNOSIS — M79.7 FIBROMYALGIA: Chronic | ICD-10-CM

## 2018-02-28 DIAGNOSIS — M05.79 RHEUMATOID ARTHRITIS INVOLVING MULTIPLE SITES WITH POSITIVE RHEUMATOID FACTOR: Primary | ICD-10-CM

## 2018-02-28 DIAGNOSIS — C34.32 MALIGNANT NEOPLASM OF LOWER LOBE OF LEFT LUNG: Chronic | ICD-10-CM

## 2018-02-28 LAB
ALBUMIN SERPL BCP-MCNC: 2.9 G/DL
ALP SERPL-CCNC: 97 U/L
ALT SERPL W/O P-5'-P-CCNC: 43 U/L
ANION GAP SERPL CALC-SCNC: 10 MMOL/L
AST SERPL-CCNC: 81 U/L
BASOPHILS # BLD AUTO: 0.08 K/UL
BASOPHILS NFR BLD: 1.3 %
BILIRUB SERPL-MCNC: 1.3 MG/DL
BUN SERPL-MCNC: 12 MG/DL
CALCIUM SERPL-MCNC: 8.8 MG/DL
CHLORIDE SERPL-SCNC: 108 MMOL/L
CO2 SERPL-SCNC: 21 MMOL/L
CREAT SERPL-MCNC: 0.7 MG/DL
CRP SERPL-MCNC: 15.7 MG/L
DIFFERENTIAL METHOD: ABNORMAL
EOSINOPHIL # BLD AUTO: 0.2 K/UL
EOSINOPHIL NFR BLD: 3.9 %
ERYTHROCYTE [DISTWIDTH] IN BLOOD BY AUTOMATED COUNT: 14 %
ERYTHROCYTE [SEDIMENTATION RATE] IN BLOOD BY WESTERGREN METHOD: 46 MM/HR
EST. GFR  (AFRICAN AMERICAN): >60 ML/MIN/1.73 M^2
EST. GFR  (NON AFRICAN AMERICAN): >60 ML/MIN/1.73 M^2
GLUCOSE SERPL-MCNC: 162 MG/DL
HCT VFR BLD AUTO: 43.2 %
HGB BLD-MCNC: 15.4 G/DL
IMM GRANULOCYTES # BLD AUTO: 0.01 K/UL
IMM GRANULOCYTES NFR BLD AUTO: 0.2 %
LYMPHOCYTES # BLD AUTO: 1.7 K/UL
LYMPHOCYTES NFR BLD: 27.4 %
MCH RBC QN AUTO: 34.5 PG
MCHC RBC AUTO-ENTMCNC: 35.6 G/DL
MCV RBC AUTO: 97 FL
MONOCYTES # BLD AUTO: 0.5 K/UL
MONOCYTES NFR BLD: 8.6 %
NEUTROPHILS # BLD AUTO: 3.6 K/UL
NEUTROPHILS NFR BLD: 58.6 %
NRBC BLD-RTO: 0 /100 WBC
PLATELET # BLD AUTO: 120 K/UL
PMV BLD AUTO: 10.5 FL
POTASSIUM SERPL-SCNC: 3.9 MMOL/L
PROT SERPL-MCNC: 7.5 G/DL
RBC # BLD AUTO: 4.46 M/UL
SODIUM SERPL-SCNC: 139 MMOL/L
WBC # BLD AUTO: 6.13 K/UL

## 2018-02-28 PROCEDURE — 80053 COMPREHEN METABOLIC PANEL: CPT

## 2018-02-28 PROCEDURE — 99213 OFFICE O/P EST LOW 20 MIN: CPT | Mod: PBBFAC | Performed by: INTERNAL MEDICINE

## 2018-02-28 PROCEDURE — 85025 COMPLETE CBC W/AUTO DIFF WBC: CPT

## 2018-02-28 PROCEDURE — 99214 OFFICE O/P EST MOD 30 MIN: CPT | Mod: S$PBB,,, | Performed by: INTERNAL MEDICINE

## 2018-02-28 PROCEDURE — 99999 PR PBB SHADOW E&M-EST. PATIENT-LVL III: CPT | Mod: PBBFAC,,, | Performed by: INTERNAL MEDICINE

## 2018-02-28 PROCEDURE — 86140 C-REACTIVE PROTEIN: CPT

## 2018-02-28 PROCEDURE — 36415 COLL VENOUS BLD VENIPUNCTURE: CPT

## 2018-02-28 PROCEDURE — 85651 RBC SED RATE NONAUTOMATED: CPT

## 2018-02-28 RX ORDER — METHOTREXATE 2.5 MG/1
17.5 TABLET ORAL
Qty: 91 TABLET | Refills: 0 | Status: SHIPPED | OUTPATIENT
Start: 2018-02-28 | End: 2018-06-19 | Stop reason: SDUPTHER

## 2018-02-28 RX ORDER — PREDNISONE 5 MG/1
TABLET ORAL
Qty: 20 TABLET | Refills: 1 | Status: SHIPPED | OUTPATIENT
Start: 2018-02-28 | End: 2018-07-11 | Stop reason: SDUPTHER

## 2018-02-28 ASSESSMENT — ROUTINE ASSESSMENT OF PATIENT INDEX DATA (RAPID3)
PAIN SCORE: 1
AM STIFFNESS SCORE: 0, NO
FATIGUE SCORE: 5
PSYCHOLOGICAL DISTRESS SCORE: 1.1
MDHAQ FUNCTION SCORE: 1
TOTAL RAPID3 SCORE: 1.94
PATIENT GLOBAL ASSESSMENT SCORE: 1.5

## 2018-02-28 NOTE — PROGRESS NOTES
"Subjective:       Patient ID: Yana Orellana is a 72 y.o. female.    Chief Complaint: Disease Management    Mod pos CCP/RF RA since 2009    MTX 2011 - present      Hx lung cancer dx Nov 2009, treated with surgery   Hx polio age 18; "mostly back and neck"     Current:  mtx 17.5 / week    Oxycodone much less now; takes 1/2 tab occasionally; last Rx of 120 was in October and still has pills left  Valium 90 tab every 6 mo ; due in April; uses it occasionally when she needs help managing her fibromyalgia; it helps with muscle pain and with anxiety and sometimes for sleep              Associated symptoms include fever. Pertinent negatives include no fatigue, trouble swallowing or headaches.       Niyah Mandujano had severe L shoulder pain and temp of 100; lasted a week or so; used pain patches, heat, vibrator; took some prednisone    occas R knee pain  Bilateral groin pain last week    Review of Systems   Constitutional: Positive for fever. Negative for fatigue and unexpected weight change.   HENT: Negative for mouth sores and trouble swallowing.         Dry mouth   Eyes: Negative for redness.        Dry eyes   Respiratory: Negative for cough and shortness of breath.    Cardiovascular: Negative for chest pain.   Gastrointestinal: Positive for diarrhea. Negative for abdominal pain and constipation.   Skin: Negative for rash.   Neurological: Negative for headaches.   Hematological: Negative for adenopathy. Does not bruise/bleed easily.   Psychiatric/Behavioral: Negative for sleep disturbance.         Objective:   BP (!) 155/74   Pulse 96   Ht 5' 10" (1.778 m)      Physical Exam   Constitutional: She is well-developed, well-nourished, and in no distress.   HENT:   Mouth/Throat: Oropharynx is clear and moist.   Eyes: Conjunctivae are normal.   Cardiovascular: Normal rate and regular rhythm.    Pulmonary/Chest: She has no wheezes. She has no rales.   Lymphadenopathy:     She has no cervical adenopathy.   Neurological: She " is alert.   Skin: No rash noted.         Physical Exam     VILLAFANA-28 tender joint count: 0  VILLAFANA-28 swollen joint count: 0  ESR (mm/hr): 46  Patient global assessment: 15  VILLAFANA-28 score: 2.89 (Low Disease Activity)          Lab Results   Component Value Date    WBC 6.13 02/28/2018    HGB 15.4 02/28/2018    HCT 43.2 02/28/2018    MCV 97 02/28/2018     (L) 02/28/2018      Lab Results   Component Value Date    SEDRATE 46 (H) 02/28/2018      Lab Results   Component Value Date    CRP 15.7 (H) 02/28/2018      Lab Results   Component Value Date    ALT 43 02/28/2018      Assessment:       1. Rheumatoid arthritis involving multiple sites with positive rheumatoid factor    2. Post-polio syndrome    3. Malignant neoplasm of lower lobe of left lung    4. Fibromyalgia    5. On corticosteroid therapy    6. High risk medication use          RA still ok on  Mtx; needs lab    Fracture from fall ; hx high bone density DXA 2014    Weakness that she attributes to post-polio syndrome    Plan:     1. Lab today  2. Continue mtx 17.5  3. Cont lab May and RTC June  4. DXA for bone density    #20 prednisone in case of rheumatoid emergencies

## 2018-03-06 ENCOUNTER — HOSPITAL ENCOUNTER (INPATIENT)
Facility: OTHER | Age: 73
LOS: 2 days | Discharge: HOME | DRG: 194 | End: 2018-03-09
Attending: EMERGENCY MEDICINE | Admitting: EMERGENCY MEDICINE
Payer: MEDICARE

## 2018-03-06 DIAGNOSIS — R06.09 DOE (DYSPNEA ON EXERTION): ICD-10-CM

## 2018-03-06 DIAGNOSIS — R07.9 CHEST PAIN: ICD-10-CM

## 2018-03-06 DIAGNOSIS — J98.4 PNEUMONITIS: Primary | ICD-10-CM

## 2018-03-06 LAB
BNP SERPL-MCNC: <10 PG/ML
D DIMER PPP IA.FEU-MCNC: 1.7 MG/L FEU
TROPONIN I SERPL DL<=0.01 NG/ML-MCNC: <0.006 NG/ML

## 2018-03-06 PROCEDURE — 83880 ASSAY OF NATRIURETIC PEPTIDE: CPT

## 2018-03-06 PROCEDURE — 93010 ELECTROCARDIOGRAM REPORT: CPT | Mod: ,,, | Performed by: INTERNAL MEDICINE

## 2018-03-06 PROCEDURE — 99285 EMERGENCY DEPT VISIT HI MDM: CPT | Mod: 25

## 2018-03-06 PROCEDURE — 80053 COMPREHEN METABOLIC PANEL: CPT

## 2018-03-06 PROCEDURE — 93005 ELECTROCARDIOGRAM TRACING: CPT

## 2018-03-06 PROCEDURE — 85379 FIBRIN DEGRADATION QUANT: CPT

## 2018-03-06 PROCEDURE — 84484 ASSAY OF TROPONIN QUANT: CPT

## 2018-03-06 NOTE — ED NOTES
For 2 months, states that there is a squeezing type sensation around bra line that causes some shortness of breath.

## 2018-03-06 NOTE — ED PROVIDER NOTES
"Encounter Date: 3/6/2018       History     Chief Complaint   Patient presents with    generalized weakness     with intermittent SOB upon exertion, low-grade fevers x 2-3 months. Pt also c/o pain costochondritis and fibromyaglia somewhat relieved from 1/2 percocet. Denies cough, vomiting.      Patient is 72-year-old female history of anemia, anxiety, thyroid disease, lung cancer, fibromyalgia, rheumatoid arthritis who presents with complaints of exertional chest pain and shortness of breath that has been constant for "a few months" however with worsening just prior to arrival.  She reports that symptoms have been constant for many months with progressive worsening in symptoms.  She also endorses low grade fevers 99.0-99.9 at home. She denies hemoptysis, fever, chills, URI symptoms.  She does report lots of stressors over the past three years and she sees her psychiatrist regularly and admits that she has no SI or HI.  She denies leg pain or swelling.   She was seen by her rheumatologist last week told her that everything was fine she admits that she would normally follow-up with a primary care doctor for her current symptoms however her primary care doctor retired and she has not been set up an appointment for a new doctor. She admits she is tired of her symptoms and that is why she is here today. She is currently unaccompanied in the ER.          Review of patient's allergies indicates:   Allergen Reactions    Demerol [meperidine]      Euphoria    Gabapentin Other (See Comments)     Hallucinations    Mellaril-s Nausea Only    Versed [midazolam]      Excessive talking, hyper    Vicodin [hydrocodone-acetaminophen]      Joint pain, muscle pain    Xanax [alprazolam]      fatigue     Past Medical History:   Diagnosis Date    Anemia     Anxiety     Cataract     Fibromyalgia 10/15/2012    Insomnia 3/28/2014    Lung cancer     Post-polio syndrome     Rheumatoid arthritis(714.0) 7/16/2012 2009 onset with " mod positive CCP and RF MTX 2011 - present     Thyroid disease      Past Surgical History:   Procedure Laterality Date    CHOLECYSTECTOMY      HYSTERECTOMY      LUNG REMOVAL, PARTIAL      left lower lobectomy    OVARIAN CYST REMOVAL      TONSILLECTOMY       Family History   Problem Relation Age of Onset    Lung cancer Father     Lung cancer Maternal Aunt     Diabetes Maternal Grandmother     Colon cancer Maternal Grandfather     Parkinsonism Paternal Grandmother     Arrhythmia Mother     Hypertension Neg Hx     Heart attack Neg Hx      Social History   Substance Use Topics    Smoking status: Former Smoker     Packs/day: 1.50     Years: 40.00     Types: Cigarettes     Quit date: 7/16/2009    Smokeless tobacco: Not on file    Alcohol use No     Review of Systems   Constitutional: Negative for fever.   HENT: Negative for sore throat.    Respiratory: Positive for shortness of breath.    Cardiovascular: Positive for chest pain.   Gastrointestinal: Negative for abdominal pain, diarrhea, nausea and vomiting.   Genitourinary: Negative for dysuria.   Musculoskeletal: Negative for back pain and neck stiffness.   Skin: Negative for rash.   Neurological: Negative for weakness.   Hematological: Does not bruise/bleed easily.       Physical Exam     Initial Vitals [03/06/18 1512]   BP Pulse Resp Temp SpO2   (!) 148/78 99 18 98 °F (36.7 °C) 96 %      MAP       101.33         Physical Exam    Nursing note and vitals reviewed.  Constitutional: She appears well-developed and well-nourished. She is not diaphoretic. No distress.   Morbidly obese female in no acute distress or apparent pain.  She sitting in wheelchair during interview and exam.  She makes good eye contact, speaks in clear full sentences and does not ambulate during my exam.   HENT:   Head: Normocephalic and atraumatic.   Eyes: Conjunctivae and EOM are normal. Pupils are equal, round, and reactive to light. Right eye exhibits no discharge. Left eye  exhibits no discharge. No scleral icterus.   Neck: Normal range of motion.   Cardiovascular: Normal rate, regular rhythm, normal heart sounds and intact distal pulses. Exam reveals no gallop and no friction rub.    No murmur heard.  Pulmonary/Chest: Breath sounds normal. She has no wheezes. She has no rhonchi. She has no rales.   Abdominal: Soft. Bowel sounds are normal. There is no tenderness. There is no rebound.   Musculoskeletal: Normal range of motion. She exhibits no edema or tenderness.   Lymphadenopathy:     She has no cervical adenopathy.   Neurological: She is alert and oriented to person, place, and time. She has normal strength. No cranial nerve deficit or sensory deficit.   Skin: Skin is warm. Capillary refill takes less than 2 seconds. No rash and no abscess noted. No erythema.   Psychiatric: She has a normal mood and affect. Her behavior is normal. Thought content normal.         ED Course   Procedures  Labs Reviewed - No data to display      Labs Reviewed   COMPREHENSIVE METABOLIC PANEL - Abnormal; Notable for the following:        Result Value    CO2 17 (*)     Glucose 112 (*)     Albumin 3.3 (*)     AST 75 (*)     All other components within normal limits   D DIMER, QUANTITATIVE - Abnormal; Notable for the following:     D-Dimer 1.70 (*)     All other components within normal limits   TROPONIN I    Narrative:     Recoll. [id number] by CLAUDETTE at 03/06/2018 18:41, reason: Specimen   hemolyzed NOTIFY Rufino Donald   B-TYPE NATRIURETIC PEPTIDE   CBC W/ AUTO DIFFERENTIAL            Medical Decision Making:   ED Management:  Urgent evaluation of 9-year-old female who presents with complaints of chest pain and shortness of breath that is been present for 3 months prior to arrival.  She is afebrile, nontoxic appearing, hemodynamically stable.  Physical exam outlined above and is unremarkable.  Screening labs pending as well as chest x-ray and EKG.  Reviewed labs from rheumatology visit 7 days prior which  unremarkable CBC and chemistry.  We'll continue to monitor.    9:39 PM delay of care because of hemolyzed blood samples, patient's O2 sat drops to 89% on ambulation with reported SOB and . Will obtain d-dimer, PE study and place patient on cardiac monitoring. Low threshold to admit.   11:00 PM significant delay in care due to multiple hemolyzed specimens, ultimately elevated d-dimer obtained with normal renal function. CT PE study obtained in consideration for PE as culprit for desaturation with LARA and CP. Vitals remain normal and she remains asx at rest.   1:05 AM discussed case with amelia who is amenable to admission given desaturation on ambulation. Once CT PE study results, she recommends that I place admission orders under Dr. Rod. Will turn over care now to CRISTY VELASQUEZ who will follow-up CT study and place admission orders.   Other:   I have discussed this case with another health care provider.       <> Summary of the Discussion: Jaswant                      Clinical Impression:   The primary encounter diagnosis was LARA (dyspnea on exertion). A diagnosis of Chest pain was also pertinent to this visit.                           Rola Carter PA-C  03/07/18 0110

## 2018-03-07 PROBLEM — R07.9 CHEST PAIN: Status: ACTIVE | Noted: 2018-03-07

## 2018-03-07 PROBLEM — J98.4 PNEUMONITIS: Status: ACTIVE | Noted: 2018-03-07

## 2018-03-07 PROBLEM — R06.09 DOE (DYSPNEA ON EXERTION): Status: ACTIVE | Noted: 2018-03-07

## 2018-03-07 LAB
ALBUMIN SERPL BCP-MCNC: 3.3 G/DL
ALP SERPL-CCNC: 104 U/L
ALT SERPL W/O P-5'-P-CCNC: 32 U/L
ANION GAP SERPL CALC-SCNC: 13 MMOL/L
ANION GAP SERPL CALC-SCNC: 9 MMOL/L
AST SERPL-CCNC: 75 U/L
BASOPHILS # BLD AUTO: 0.05 K/UL
BASOPHILS NFR BLD: 0.7 %
BILIRUB SERPL-MCNC: 0.9 MG/DL
BUN SERPL-MCNC: 11 MG/DL
BUN SERPL-MCNC: 13 MG/DL
CALCIUM SERPL-MCNC: 8.9 MG/DL
CALCIUM SERPL-MCNC: 9.4 MG/DL
CHLORIDE SERPL-SCNC: 107 MMOL/L
CHLORIDE SERPL-SCNC: 108 MMOL/L
CHOLEST SERPL-MCNC: 128 MG/DL
CHOLEST/HDLC SERPL: 3.9 {RATIO}
CK MB SERPL-MCNC: 0.8 NG/ML
CK MB SERPL-MCNC: 1 NG/ML
CK MB SERPL-MCNC: 1.2 NG/ML
CK MB SERPL-RTO: 2.3 %
CK MB SERPL-RTO: 2.5 %
CK MB SERPL-RTO: 2.5 %
CK SERPL-CCNC: 35 U/L
CK SERPL-CCNC: 35 U/L
CK SERPL-CCNC: 40 U/L
CK SERPL-CCNC: 40 U/L
CK SERPL-CCNC: 48 U/L
CK SERPL-CCNC: 48 U/L
CO2 SERPL-SCNC: 17 MMOL/L
CO2 SERPL-SCNC: 22 MMOL/L
CREAT SERPL-MCNC: 0.7 MG/DL
CREAT SERPL-MCNC: 0.7 MG/DL
DIFFERENTIAL METHOD: ABNORMAL
EOSINOPHIL # BLD AUTO: 0.3 K/UL
EOSINOPHIL NFR BLD: 4.5 %
ERYTHROCYTE [DISTWIDTH] IN BLOOD BY AUTOMATED COUNT: 14.3 %
EST. GFR  (AFRICAN AMERICAN): >60 ML/MIN/1.73 M^2
EST. GFR  (AFRICAN AMERICAN): >60 ML/MIN/1.73 M^2
EST. GFR  (NON AFRICAN AMERICAN): >60 ML/MIN/1.73 M^2
EST. GFR  (NON AFRICAN AMERICAN): >60 ML/MIN/1.73 M^2
ESTIMATED AVG GLUCOSE: 126 MG/DL
ESTIMATED AVG GLUCOSE: 126 MG/DL
GLUCOSE SERPL-MCNC: 112 MG/DL
GLUCOSE SERPL-MCNC: 154 MG/DL
HBA1C MFR BLD HPLC: 6 %
HBA1C MFR BLD HPLC: 6 %
HCT VFR BLD AUTO: 40.6 %
HDLC SERPL-MCNC: 33 MG/DL
HDLC SERPL: 25.8 %
HGB BLD-MCNC: 14 G/DL
LDLC SERPL CALC-MCNC: 75.8 MG/DL
LYMPHOCYTES # BLD AUTO: 1.9 K/UL
LYMPHOCYTES NFR BLD: 28.1 %
MAGNESIUM SERPL-MCNC: 1.7 MG/DL
MCH RBC QN AUTO: 33.9 PG
MCHC RBC AUTO-ENTMCNC: 34.5 G/DL
MCV RBC AUTO: 98 FL
MONOCYTES # BLD AUTO: 0.5 K/UL
MONOCYTES NFR BLD: 6.9 %
NEUTROPHILS # BLD AUTO: 4 K/UL
NEUTROPHILS NFR BLD: 59.5 %
NONHDLC SERPL-MCNC: 95 MG/DL
PHOSPHATE SERPL-MCNC: 3.3 MG/DL
PLATELET # BLD AUTO: 121 K/UL
PMV BLD AUTO: 9.4 FL
POTASSIUM SERPL-SCNC: 3.8 MMOL/L
POTASSIUM SERPL-SCNC: 4.4 MMOL/L
PROT SERPL-MCNC: 7.6 G/DL
RBC # BLD AUTO: 4.13 M/UL
SODIUM SERPL-SCNC: 138 MMOL/L
SODIUM SERPL-SCNC: 138 MMOL/L
TRIGL SERPL-MCNC: 96 MG/DL
TROPONIN I SERPL DL<=0.01 NG/ML-MCNC: 0.01 NG/ML
TROPONIN I SERPL DL<=0.01 NG/ML-MCNC: 0.01 NG/ML
WBC # BLD AUTO: 6.7 K/UL

## 2018-03-07 PROCEDURE — 85025 COMPLETE CBC W/AUTO DIFF WBC: CPT

## 2018-03-07 PROCEDURE — 25500020 PHARM REV CODE 255: Performed by: EMERGENCY MEDICINE

## 2018-03-07 PROCEDURE — 25000003 PHARM REV CODE 250: Performed by: PHYSICIAN ASSISTANT

## 2018-03-07 PROCEDURE — 99900035 HC TECH TIME PER 15 MIN (STAT)

## 2018-03-07 PROCEDURE — 93005 ELECTROCARDIOGRAM TRACING: CPT

## 2018-03-07 PROCEDURE — 99223 1ST HOSP IP/OBS HIGH 75: CPT | Mod: ,,, | Performed by: INTERNAL MEDICINE

## 2018-03-07 PROCEDURE — 11000001 HC ACUTE MED/SURG PRIVATE ROOM

## 2018-03-07 PROCEDURE — 84100 ASSAY OF PHOSPHORUS: CPT

## 2018-03-07 PROCEDURE — 83036 HEMOGLOBIN GLYCOSYLATED A1C: CPT

## 2018-03-07 PROCEDURE — 84484 ASSAY OF TROPONIN QUANT: CPT | Mod: 91

## 2018-03-07 PROCEDURE — 82553 CREATINE MB FRACTION: CPT | Mod: 91

## 2018-03-07 PROCEDURE — 80048 BASIC METABOLIC PNL TOTAL CA: CPT

## 2018-03-07 PROCEDURE — 63600175 PHARM REV CODE 636 W HCPCS: Performed by: PHYSICIAN ASSISTANT

## 2018-03-07 PROCEDURE — 93010 ELECTROCARDIOGRAM REPORT: CPT | Mod: ,,, | Performed by: INTERNAL MEDICINE

## 2018-03-07 PROCEDURE — 80061 LIPID PANEL: CPT

## 2018-03-07 PROCEDURE — 83735 ASSAY OF MAGNESIUM: CPT

## 2018-03-07 PROCEDURE — 36415 COLL VENOUS BLD VENIPUNCTURE: CPT

## 2018-03-07 RX ORDER — RAMELTEON 8 MG/1
8 TABLET ORAL NIGHTLY PRN
Status: DISCONTINUED | OUTPATIENT
Start: 2018-03-07 | End: 2018-03-09 | Stop reason: HOSPADM

## 2018-03-07 RX ORDER — LEVOTHYROXINE SODIUM 75 UG/1
75 TABLET ORAL
Status: DISCONTINUED | OUTPATIENT
Start: 2018-03-07 | End: 2018-03-09 | Stop reason: HOSPADM

## 2018-03-07 RX ORDER — ACETAMINOPHEN 325 MG/1
650 TABLET ORAL EVERY 8 HOURS PRN
Status: DISCONTINUED | OUTPATIENT
Start: 2018-03-07 | End: 2018-03-09 | Stop reason: HOSPADM

## 2018-03-07 RX ORDER — ENOXAPARIN SODIUM 100 MG/ML
40 INJECTION SUBCUTANEOUS EVERY 24 HOURS
Status: DISCONTINUED | OUTPATIENT
Start: 2018-03-07 | End: 2018-03-09 | Stop reason: HOSPADM

## 2018-03-07 RX ORDER — AZITHROMYCIN 250 MG/1
250 TABLET, FILM COATED ORAL DAILY
Status: DISCONTINUED | OUTPATIENT
Start: 2018-03-08 | End: 2018-03-09 | Stop reason: HOSPADM

## 2018-03-07 RX ORDER — ACETAMINOPHEN 325 MG/1
650 TABLET ORAL EVERY 4 HOURS PRN
Status: DISCONTINUED | OUTPATIENT
Start: 2018-03-07 | End: 2018-03-09 | Stop reason: HOSPADM

## 2018-03-07 RX ORDER — SODIUM CHLORIDE 0.9 % (FLUSH) 0.9 %
5 SYRINGE (ML) INJECTION
Status: DISCONTINUED | OUTPATIENT
Start: 2018-03-07 | End: 2018-03-09 | Stop reason: HOSPADM

## 2018-03-07 RX ORDER — ALBUTEROL SULFATE 0.83 MG/ML
2.5 SOLUTION RESPIRATORY (INHALATION) EVERY 4 HOURS PRN
Status: DISCONTINUED | OUTPATIENT
Start: 2018-03-08 | End: 2018-03-09 | Stop reason: HOSPADM

## 2018-03-07 RX ORDER — LIOTHYRONINE SODIUM 5 UG/1
5 TABLET ORAL 2 TIMES DAILY
Status: DISCONTINUED | OUTPATIENT
Start: 2018-03-07 | End: 2018-03-09 | Stop reason: HOSPADM

## 2018-03-07 RX ORDER — AZITHROMYCIN 250 MG/1
500 TABLET, FILM COATED ORAL DAILY
Status: COMPLETED | OUTPATIENT
Start: 2018-03-07 | End: 2018-03-07

## 2018-03-07 RX ORDER — METHOTREXATE 2.5 MG/1
17.5 TABLET ORAL
Status: DISCONTINUED | OUTPATIENT
Start: 2018-03-11 | End: 2018-03-09 | Stop reason: HOSPADM

## 2018-03-07 RX ORDER — CHOLECALCIFEROL (VITAMIN D3) 25 MCG
1000 TABLET ORAL DAILY
Status: DISCONTINUED | OUTPATIENT
Start: 2018-03-07 | End: 2018-03-09 | Stop reason: HOSPADM

## 2018-03-07 RX ADMIN — VITAMIN D, TAB 1000IU (100/BT) 1000 UNITS: 25 TAB at 08:03

## 2018-03-07 RX ADMIN — AZITHROMYCIN 500 MG: 250 TABLET, FILM COATED ORAL at 05:03

## 2018-03-07 RX ADMIN — LIOTHYRONINE SODIUM 5 MCG: 5 TABLET ORAL at 08:03

## 2018-03-07 RX ADMIN — ENOXAPARIN SODIUM 40 MG: 100 INJECTION SUBCUTANEOUS at 06:03

## 2018-03-07 RX ADMIN — IOHEXOL 100 ML: 350 INJECTION, SOLUTION INTRAVENOUS at 12:03

## 2018-03-07 RX ADMIN — LEVOTHYROXINE SODIUM 75 MCG: 75 TABLET ORAL at 05:03

## 2018-03-07 NOTE — ED NOTES
Lab states they need a recollect on chemistry because specimen is clotted. Aundrea, SYDNEY informed.

## 2018-03-07 NOTE — HPI
Ms. Yana Orellana is a 72 y.o. female, with PMH of RA (on methotrexate), left lung CA (s/p lobectomy), and hypothyroidism, who presented to [facility] ED on 3/6/18 2/2 exertional chest pain and SOB x months, worsening in the past day. Associated symptoms include temps of 99.0-99.9 at home.  She denies hemoptysis, fever, chills, URI symptoms.  She does report lots of stressors over the past three years and she sees her psychiatrist regularly and admits that she has no SI or HI.  She denies leg pain or swelling.      The patient was evaluated in the ED and noted to desaturate with ambulation into the upper 80's. A CTA showed pneumonitis, without pneumonia or other acute process. Labs showed elevated -D-dimer without PE. The patient was admitted for further evaluation and treatment.

## 2018-03-07 NOTE — ED NOTES
Spoke to Rosalino to verify chem recollect as CMP resulted while at bedside attempting to recollect chemistry. Per Rosalino, the lavender top collected by Ion at 2301 was clotted and in need of recollect. Rosalino verified there was no need for a chem recollect as previously indicated by the 2330 phone call from lab. MD and PA made aware, per MD proceed with PE study without CBC.

## 2018-03-07 NOTE — PLAN OF CARE
18 1400   Discharge Assessment   Assessment Type Discharge Planning Assessment   Confirmed/corrected address and phone number on facesheet? Yes   Assessment information obtained from? Patient;Caregiver;Medical Record   Prior to hospitilization cognitive status: Alert/Oriented   Prior to hospitalization functional status: Independent   Current cognitive status: Alert/Oriented   Current Functional Status: Assistive Equipment   Lives With alone  (son  three years ago)   Able to Return to Prior Arrangements yes   Is patient able to care for self after discharge? Yes   Patient currently being followed by outpatient case management? No   Patient currently receives any other outside agency services? No   Do you have any problems affording any of your prescribed medications? No   Is the patient taking medications as prescribed? yes   Does the patient have transportation home? Yes   Transportation Available taxi   Discharge Plan A Home Health   Patient/Family In Agreement With Plan yes

## 2018-03-07 NOTE — H&P
[facility] Hospital Medicine  History & Physical    Patient Name: Yana Orellana  MRN: [id number]  Admission Date: 3/6/2018  Attending Physician: Annalee Mccollum, *   Primary Care Provider: Jyoti Zuñiga MD         Patient information was obtained from patient, past medical records and ER records.     Subjective:     Principal Problem:Pneumonitis    Chief Complaint:   Chief Complaint   Patient presents with    generalized weakness     with intermittent SOB upon exertion, low-grade fevers x 2-3 months. Pt also c/o pain costochondritis and fibromyaglia somewhat relieved from 1/2 percocet. Denies cough, vomiting.         HPI: Ms. Yana Orellana is a 72 y.o. female, with PMH of RA (on methotrexate), left lung CA (s/p lobectomy), and hypothyroidism, who presented to [facility] ED on 3/6/18 2/2 exertional chest pain and SOB x months, worsening in the past day. Associated symptoms include temps of 99.0-99.9 at home.  She denies hemoptysis, fever, chills, URI symptoms.  She does report lots of stressors over the past three years and she sees her psychiatrist regularly and admits that she has no SI or HI.  She denies leg pain or swelling.      The patient was evaluated in the ED and noted to desaturate with ambulation into the upper 80's. A CTA showed pneumonitis, without pneumonia or other acute process. Labs showed elevated -D-dimer without PE. The patient was admitted for further evaluation and treatment.     Past Medical History:   Diagnosis Date    Anemia     Anxiety     Cataract     Fibromyalgia 10/15/2012    Insomnia 3/28/2014    Lung cancer     Post-polio syndrome     Rheumatoid arthritis(714.0) 7/16/2012    2009 onset with mod positive CCP and RF MTX 2011 - present     Thyroid disease        Past Surgical History:   Procedure Laterality Date    CHOLECYSTECTOMY      HYSTERECTOMY      LUNG REMOVAL, PARTIAL      left lower lobectomy    OVARIAN CYST REMOVAL       TONSILLECTOMY         Review of patient's allergies indicates:   Allergen Reactions    Demerol [meperidine]      Euphoria    Gabapentin Other (See Comments)     Hallucinations    Mellaril-s Nausea Only    Versed [midazolam]      Excessive talking, hyper    Vicodin [hydrocodone-acetaminophen]      Joint pain, muscle pain    Xanax [alprazolam]      fatigue       No current facility-administered medications on file prior to encounter.      Current Outpatient Prescriptions on File Prior to Encounter   Medication Sig    folic acid (FOLVITE) 1 MG tablet Take 1 tablet (1 mg total) by mouth once daily.    methotrexate 2.5 MG Tab Take 7 tablets (17.5 mg total) by mouth every 7 days.    multivitamin (ONE DAILY MULTIVITAMIN) per tablet Take 1 tablet by mouth once daily.    calcium citrate-vitamin D (CITRACAL+D) 315-200 mg-unit per tablet Take 2 tablets by mouth 2 (two) times daily.     diazePAM (VALIUM) 5 MG tablet Take 1 tablet (5 mg total) by mouth every 8 (eight) hours as needed for Anxiety or Insomnia (and fibromyalgia).    FLUZONE HIGH-DOSE 2017-18, PF, 180 mcg/0.5 mL vaccine ADM 0.5ML IM UTD    levothyroxine (SYNTHROID) 75 MCG tablet Take 1 tablet (75 mcg total) by mouth once daily.    liothyronine (CYTOMEL) 5 MCG Tab TAKE 1 TABLET BY MOUTH TWICE DAILY    meclizine (ANTIVERT) 25 mg tablet Take 1 tablet (25 mg total) by mouth 3 (three) times daily as needed.    oxyCODONE-acetaminophen (PERCOCET) 5-325 mg per tablet Take 1 tablet by mouth every 6 (six) hours as needed for Pain.    predniSONE (DELTASONE) 5 MG tablet Take as needed for flare of rheumatoid arthritis    vitamin D 185 MG Tab Take 185 mg by mouth once daily.       Family History     Problem Relation (Age of Onset)    Arrhythmia Mother    Colon cancer Maternal Grandfather    Diabetes Maternal Grandmother    Lung cancer Father, Maternal Aunt    Parkinsonism Paternal Grandmother        Social History Main Topics    Smoking status: Former  "Smoker     Packs/day: 1.50     Years: 40.00     Types: Cigarettes     Quit date: 7/16/2009    Smokeless tobacco: Not on file    Alcohol use No    Drug use: No    Sexual activity: Not Currently     Review of Systems   Constitutional: Positive for activity change (decreased), fatigue and fever ("low grade"). Negative for appetite change and chills.   HENT: Negative for congestion, ear pain, postnasal drip, rhinorrhea, sinus pressure, sore throat and trouble swallowing.    Respiratory: Positive for shortness of breath. Negative for cough, chest tightness and wheezing.    Cardiovascular: Positive for chest pain. Negative for palpitations and leg swelling.   Gastrointestinal: Positive for constipation. Negative for abdominal pain, diarrhea, nausea and vomiting.   Genitourinary: Negative for dysuria, flank pain, frequency, hematuria and urgency.   Musculoskeletal: Positive for myalgias. Negative for arthralgias, back pain, gait problem, joint swelling, neck pain and neck stiffness.   Skin: Negative for pallor.   Neurological: Negative for dizziness, syncope, weakness, light-headedness, numbness and headaches.     Objective:     Vital Signs (Most Recent):  Temp: 98.1 °F (36.7 °C) (03/07/18 0242)  Pulse: 84 (03/07/18 0242)  Resp: 18 (03/07/18 0242)  BP: (!) 154/70 (03/07/18 0242)  SpO2: 95 % (03/07/18 0242) Vital Signs (24h Range):  Temp:  [98 °F (36.7 °C)-98.2 °F (36.8 °C)] 98.1 °F (36.7 °C)  Pulse:  [79-99] 84  Resp:  [18] 18  SpO2:  [95 %-96 %] 95 %  BP: (148-154)/(70-81) 154/70     Weight: 129.3 kg (285 lb)  Body mass index is 40.89 kg/m².    Physical Exam   Constitutional: She is oriented to person, place, and time. She appears well-developed and well-nourished. No distress.   Obese female.   HENT:   Head: Normocephalic and atraumatic.   Eyes: Conjunctivae and EOM are normal. Pupils are equal, round, and reactive to light. No scleral icterus.   Neck: Normal range of motion. Neck supple. No JVD present. No tracheal " deviation present.   Cardiovascular: Normal rate, regular rhythm, normal heart sounds and intact distal pulses.  Exam reveals no gallop and no friction rub.    No murmur heard.  2+ distal radial/DT/PT pulses. No carotid bruits.     Pulmonary/Chest: Effort normal and breath sounds normal. No stridor. No respiratory distress. She has no wheezes. She has no rales. She exhibits no tenderness.   Abdominal: Soft. Bowel sounds are normal. She exhibits no distension. There is no tenderness. There is no rebound and no guarding.   Neurological: She is alert and oriented to person, place, and time.   Skin: Skin is warm and dry. Capillary refill takes less than 2 seconds. She is not diaphoretic.   Psychiatric: She has a normal mood and affect. Her behavior is normal. Judgment and thought content normal.   Nursing note and vitals reviewed.        CRANIAL NERVES     CN III, IV, VI   Pupils are equal, round, and reactive to light.  Extraocular motions are normal.        Significant Labs:   BMP:   Recent Labs  Lab 03/07/18  0542   *      K 3.8      CO2 22*   BUN 13   CREATININE 0.7   CALCIUM 8.9   MG 1.7     CBC:   Recent Labs  Lab 03/07/18  0542   WBC 6.70   HGB 14.0   HCT 40.6   *     CMP:   Recent Labs  Lab 03/06/18  2301 03/07/18  0542    138   K 4.4 3.8    107   CO2 17* 22*   * 154*   BUN 11 13   CREATININE 0.7 0.7   CALCIUM 9.4 8.9   PROT 7.6  --    ALBUMIN 3.3*  --    BILITOT 0.9  --    ALKPHOS 104  --    AST 75*  --    ALT 32  --    ANIONGAP 13 9   EGFRNONAA >60 >60     Cardiac Markers:   Recent Labs  Lab 03/06/18  1804   BNP <10     Troponin:   Recent Labs  Lab 03/06/18  1950   TROPONINI <0.006     All pertinent labs within the past 24 hours have been reviewed.    Significant Imaging: I have reviewed all pertinent imaging results/findings within the past 24 hours.   Imaging Results          CTA Chest Non-Coronary (PE Study) (Final result)  Result time 03/07/18 01:24:14    Final  result by Johnny Ugalde MD (03/07/18 01:24:14)                 Impression:       No evidence of pulmonary embolism.    Patchy multifocal groundglass airspace opacities.  The findings represent nonspecific pneumonitis.    Diffuse centrilobular emphysema.    Additional findings as above.              Electronically signed by: JOHNNY UGALDE MD  Date:     03/07/18  Time:    01:24              Narrative:    Exam: [id number]  03/07/18  00:06:00 [id number] ([facility]) : CTA CHEST NON CORONARY    Technique:     CT images of the chest obtained during pulmonary arterial phase after injection of 100 cc Omnipaque IV contrast. Coronal and Sagittal Reformats were obtained.     Comparison:    CT pulmonary embolism study dated 01/16/2016    Findings:      CTPA study:  There are no filling defects within the pulmonary tree to suggest pulmonary embolism.  The pulmonary trunk is nondilated.    CT chest examination:  The thyroid gland is unremarkable.  The base of the neck is within normal limits.  The supraclavicular regions are unremarkable.    The trachea and the central airways are within normal limits.  No endobronchial lesion is identified.  There is bronchiectasis in the bilateral lower lobes.    The heart is unremarkable.  There are no pericardial effusions.  There are scattered coronary artery calcifications.    There is minimal ectasia of the ascending thoracic aorta measuring 3.5 x 3.3 cm.  The descending thoracic aorta is unremarkable.  There is no intimal flap to suggest dissection.  Extensive calcifications of the great vessels arising from the aortic arch.    There is no evidence of lymphadenopathy in the chest.  The axillary regions are within normal limits.    There are no pleural effusions.  There is no evidence of a pneumothorax.  There is no evidence of pneumomediastinum.  There is diffuse centrilobular emphysema.  There are groundglass air space opacities in the right middle lobe and right lower lobe.  There also groundglass  airspace opacities in the inferior segment of the left upper lobe.  There are postoperative changes in the left lower lobe.    The esophagus is unremarkable.  The visualized upper abdominal structures are unremarkable.  There is no evidence of free air in the upper abdomen.    The chest wall is unremarkable.  There are degenerative changes in the osseous structures.                             X-Ray Chest PA And Lateral (Final result)  Result time 03/06/18 18:34:59    Final result by Johnny Ugalde MD (03/06/18 18:34:59)                 Impression:       No acute process.              Electronically signed by: JOHNNY UGALDE MD  Date:     03/06/18  Time:    18:34              Narrative:    Exam: [id number]  03/06/18  17:32:58 IMG36 ([facility]) : XR CHEST PA AND LATERAL    Technique:    Frontal and lateral chest x-ray.    Comparison:    01/23/2017    Findings:      The trachea is unremarkable.  There are calcifications of the aortic knob.  The cardiomediastinal silhouette is within normal limits.  The hemidiaphragms are unremarkable.  There is no evidence of free air beneath the hemidiaphragms.  There are no pleural effusions.  There is no evidence of a pneumothorax.  There is no evidence of pneumomediastinum.  No airspace opacities are identified.  Previous nodule in the right lung apex is poorly visualized on the current examination.  There are degenerative changes in the osseous structures. The subcutaneous tissues are within normal limits.                               Assessment/Plan:     * Pneumonitis    - started azithromycin  - symptomatic treatments PRN           LARA (dyspnea on exertion)    - likely 2/2 pneumonitis   - monitor after initiation of azithromycin           Chest pain    - troponin negative, trend   - trend cardiac enzymes  - EKG with troponin draws           Hx Lung cancer, lower lobe    - s/p partial lower left lobectomy & chemo         Hypothyroidism    - continue home meds          VTE Risk Mitigation          Ordered     enoxaparin injection 40 mg  Daily     Route:  Subcutaneous        03/07/18 0414     Medium Risk of VTE  Once      03/07/18 0414     Place DARBY hose  Until discontinued      03/07/18 0414     Place sequential compression device  Until discontinued      03/07/18 0414     Place DARBY hose  Until discontinued      03/07/18 0255     Place sequential compression device  Until discontinued      03/07/18 0255             Kayla Arzola PA-C  Department of Hospital Medicine   [facility]

## 2018-03-07 NOTE — ED NOTES
Upon ambulation patient became tachycardiac with heart rising to 119. She also c/o SOB while ambulating with oxygen sats at 89-90%. SYDNEY John notified of findings. Will continue to monitor for any changes.

## 2018-03-07 NOTE — ED NOTES
Pt has IV established, will attempt to draw back pending CBC, CMP, d- dimer on IV. Pt updated on POC with PE rule out.

## 2018-03-07 NOTE — SUBJECTIVE & OBJECTIVE
Past Medical History:   Diagnosis Date    Anemia     Anxiety     Cataract     Fibromyalgia 10/15/2012    Insomnia 3/28/2014    Lung cancer     Post-polio syndrome     Rheumatoid arthritis(714.0) 7/16/2012    2009 onset with mod positive CCP and RF MTX 2011 - present     Thyroid disease        Past Surgical History:   Procedure Laterality Date    CHOLECYSTECTOMY      HYSTERECTOMY      LUNG REMOVAL, PARTIAL      left lower lobectomy    OVARIAN CYST REMOVAL      TONSILLECTOMY         Review of patient's allergies indicates:   Allergen Reactions    Demerol [meperidine]      Euphoria    Gabapentin Other (See Comments)     Hallucinations    Mellaril-s Nausea Only    Versed [midazolam]      Excessive talking, hyper    Vicodin [hydrocodone-acetaminophen]      Joint pain, muscle pain    Xanax [alprazolam]      fatigue       No current facility-administered medications on file prior to encounter.      Current Outpatient Prescriptions on File Prior to Encounter   Medication Sig    folic acid (FOLVITE) 1 MG tablet Take 1 tablet (1 mg total) by mouth once daily.    methotrexate 2.5 MG Tab Take 7 tablets (17.5 mg total) by mouth every 7 days.    multivitamin (ONE DAILY MULTIVITAMIN) per tablet Take 1 tablet by mouth once daily.    calcium citrate-vitamin D (CITRACAL+D) 315-200 mg-unit per tablet Take 2 tablets by mouth 2 (two) times daily.     diazePAM (VALIUM) 5 MG tablet Take 1 tablet (5 mg total) by mouth every 8 (eight) hours as needed for Anxiety or Insomnia (and fibromyalgia).    FLUZONE HIGH-DOSE 2017-18, PF, 180 mcg/0.5 mL vaccine ADM 0.5ML IM UTD    levothyroxine (SYNTHROID) 75 MCG tablet Take 1 tablet (75 mcg total) by mouth once daily.    liothyronine (CYTOMEL) 5 MCG Tab TAKE 1 TABLET BY MOUTH TWICE DAILY    meclizine (ANTIVERT) 25 mg tablet Take 1 tablet (25 mg total) by mouth 3 (three) times daily as needed.    oxyCODONE-acetaminophen (PERCOCET) 5-325 mg per tablet Take 1 tablet by mouth  "every 6 (six) hours as needed for Pain.    predniSONE (DELTASONE) 5 MG tablet Take as needed for flare of rheumatoid arthritis    vitamin D 185 MG Tab Take 185 mg by mouth once daily.       Family History     Problem Relation (Age of Onset)    Arrhythmia Mother    Colon cancer Maternal Grandfather    Diabetes Maternal Grandmother    Lung cancer Father, Maternal Aunt    Parkinsonism Paternal Grandmother        Social History Main Topics    Smoking status: Former Smoker     Packs/day: 1.50     Years: 40.00     Types: Cigarettes     Quit date: 7/16/2009    Smokeless tobacco: Not on file    Alcohol use No    Drug use: No    Sexual activity: Not Currently     Review of Systems   Constitutional: Positive for activity change (decreased), fatigue and fever ("low grade"). Negative for appetite change and chills.   HENT: Negative for congestion, ear pain, postnasal drip, rhinorrhea, sinus pressure, sore throat and trouble swallowing.    Respiratory: Positive for shortness of breath. Negative for cough, chest tightness and wheezing.    Cardiovascular: Positive for chest pain. Negative for palpitations and leg swelling.   Gastrointestinal: Positive for constipation. Negative for abdominal pain, diarrhea, nausea and vomiting.   Genitourinary: Negative for dysuria, flank pain, frequency, hematuria and urgency.   Musculoskeletal: Positive for myalgias. Negative for arthralgias, back pain, gait problem, joint swelling, neck pain and neck stiffness.   Skin: Negative for pallor.   Neurological: Negative for dizziness, syncope, weakness, light-headedness, numbness and headaches.     Objective:     Vital Signs (Most Recent):  Temp: 98.1 °F (36.7 °C) (03/07/18 0242)  Pulse: 84 (03/07/18 0242)  Resp: 18 (03/07/18 0242)  BP: (!) 154/70 (03/07/18 0242)  SpO2: 95 % (03/07/18 0242) Vital Signs (24h Range):  Temp:  [98 °F (36.7 °C)-98.2 °F (36.8 °C)] 98.1 °F (36.7 °C)  Pulse:  [79-99] 84  Resp:  [18] 18  SpO2:  [95 %-96 %] 95 %  BP: " (148-154)/(70-81) 154/70     Weight: 129.3 kg (285 lb)  Body mass index is 40.89 kg/m².    Physical Exam   Constitutional: She is oriented to person, place, and time. She appears well-developed and well-nourished. No distress.   Obese female.   HENT:   Head: Normocephalic and atraumatic.   Eyes: Conjunctivae and EOM are normal. Pupils are equal, round, and reactive to light. No scleral icterus.   Neck: Normal range of motion. Neck supple. No JVD present. No tracheal deviation present.   Cardiovascular: Normal rate, regular rhythm, normal heart sounds and intact distal pulses.  Exam reveals no gallop and no friction rub.    No murmur heard.  2+ distal radial/DT/PT pulses. No carotid bruits.     Pulmonary/Chest: Effort normal and breath sounds normal. No stridor. No respiratory distress. She has no wheezes. She has no rales. She exhibits no tenderness.   Abdominal: Soft. Bowel sounds are normal. She exhibits no distension. There is no tenderness. There is no rebound and no guarding.   Neurological: She is alert and oriented to person, place, and time.   Skin: Skin is warm and dry. Capillary refill takes less than 2 seconds. She is not diaphoretic.   Psychiatric: She has a normal mood and affect. Her behavior is normal. Judgment and thought content normal.   Nursing note and vitals reviewed.        CRANIAL NERVES     CN III, IV, VI   Pupils are equal, round, and reactive to light.  Extraocular motions are normal.        Significant Labs:   BMP:   Recent Labs  Lab 03/07/18  0542   *      K 3.8      CO2 22*   BUN 13   CREATININE 0.7   CALCIUM 8.9   MG 1.7     CBC:   Recent Labs  Lab 03/07/18  0542   WBC 6.70   HGB 14.0   HCT 40.6   *     CMP:   Recent Labs  Lab 03/06/18  2301 03/07/18  0542    138   K 4.4 3.8    107   CO2 17* 22*   * 154*   BUN 11 13   CREATININE 0.7 0.7   CALCIUM 9.4 8.9   PROT 7.6  --    ALBUMIN 3.3*  --    BILITOT 0.9  --    ALKPHOS 104  --    AST 75*  --     ALT 32  --    ANIONGAP 13 9   EGFRNONAA >60 >60     Cardiac Markers:   Recent Labs  Lab 03/06/18  1804   BNP <10     Troponin:   Recent Labs  Lab 03/06/18  1950   TROPONINI <0.006     All pertinent labs within the past 24 hours have been reviewed.    Significant Imaging: I have reviewed all pertinent imaging results/findings within the past 24 hours.   Imaging Results          CTA Chest Non-Coronary (PE Study) (Final result)  Result time 03/07/18 01:24:14    Final result by Johnny Ugalde MD (03/07/18 01:24:14)                 Impression:       No evidence of pulmonary embolism.    Patchy multifocal groundglass airspace opacities.  The findings represent nonspecific pneumonitis.    Diffuse centrilobular emphysema.    Additional findings as above.              Electronically signed by: JOHNNY UGALDE MD  Date:     03/07/18  Time:    01:24              Narrative:    Exam: [id number]  03/07/18  00:06:00 CND359 ([facility]) : CTA CHEST NON CORONARY    Technique:     CT images of the chest obtained during pulmonary arterial phase after injection of 100 cc Omnipaque IV contrast. Coronal and Sagittal Reformats were obtained.     Comparison:    CT pulmonary embolism study dated 01/16/2016    Findings:      CTPA study:  There are no filling defects within the pulmonary tree to suggest pulmonary embolism.  The pulmonary trunk is nondilated.    CT chest examination:  The thyroid gland is unremarkable.  The base of the neck is within normal limits.  The supraclavicular regions are unremarkable.    The trachea and the central airways are within normal limits.  No endobronchial lesion is identified.  There is bronchiectasis in the bilateral lower lobes.    The heart is unremarkable.  There are no pericardial effusions.  There are scattered coronary artery calcifications.    There is minimal ectasia of the ascending thoracic aorta measuring 3.5 x 3.3 cm.  The descending thoracic aorta is unremarkable.  There is no intimal flap to suggest  dissection.  Extensive calcifications of the great vessels arising from the aortic arch.    There is no evidence of lymphadenopathy in the chest.  The axillary regions are within normal limits.    There are no pleural effusions.  There is no evidence of a pneumothorax.  There is no evidence of pneumomediastinum.  There is diffuse centrilobular emphysema.  There are groundglass air space opacities in the right middle lobe and right lower lobe.  There also groundglass airspace opacities in the inferior segment of the left upper lobe.  There are postoperative changes in the left lower lobe.    The esophagus is unremarkable.  The visualized upper abdominal structures are unremarkable.  There is no evidence of free air in the upper abdomen.    The chest wall is unremarkable.  There are degenerative changes in the osseous structures.                             X-Ray Chest PA And Lateral (Final result)  Result time 03/06/18 18:34:59    Final result by Johnny Ugalde MD (03/06/18 18:34:59)                 Impression:       No acute process.              Electronically signed by: JOHNNY UGALDE MD  Date:     03/06/18  Time:    18:34              Narrative:    Exam: [id number]  03/06/18  17:32:58 IMG36 ([facility]) : XR CHEST PA AND LATERAL    Technique:    Frontal and lateral chest x-ray.    Comparison:    01/23/2017    Findings:      The trachea is unremarkable.  There are calcifications of the aortic knob.  The cardiomediastinal silhouette is within normal limits.  The hemidiaphragms are unremarkable.  There is no evidence of free air beneath the hemidiaphragms.  There are no pleural effusions.  There is no evidence of a pneumothorax.  There is no evidence of pneumomediastinum.  No airspace opacities are identified.  Previous nodule in the right lung apex is poorly visualized on the current examination.  There are degenerative changes in the osseous structures. The subcutaneous tissues are within normal limits.

## 2018-03-07 NOTE — PLAN OF CARE
Problem: Patient Care Overview  Goal: Plan of Care Review  Outcome: Ongoing (interventions implemented as appropriate)   03/07/18 0630   Coping/Psychosocial   Plan Of Care Reviewed With patient       Problem: Pneumonia (Adult)  Goal: Signs and Symptoms of Listed Potential Problems Will be Absent, Minimized or Managed (Pneumonia)  Signs and symptoms of listed potential problems will be absent, minimized or managed by discharge/transition of care (reference Pneumonia (Adult) CPG).  Outcome: Ongoing (interventions implemented as appropriate)   03/07/18 0630   Pneumonia   Problems Assessed (Pneumonia) all;infection progression;respiratory compromise   Problems Present (Pneumonia) infection progression;respiratory compromise

## 2018-03-08 LAB
ANION GAP SERPL CALC-SCNC: 9 MMOL/L
BASOPHILS # BLD AUTO: 0.05 K/UL
BASOPHILS NFR BLD: 0.9 %
BUN SERPL-MCNC: 12 MG/DL
CALCIUM SERPL-MCNC: 8.4 MG/DL
CHLORIDE SERPL-SCNC: 107 MMOL/L
CK MB SERPL-MCNC: 1.1 NG/ML
CK MB SERPL-MCNC: 1.2 NG/ML
CK MB SERPL-MCNC: 1.3 NG/ML
CK MB SERPL-RTO: 1.9 %
CK MB SERPL-RTO: 2.3 %
CK MB SERPL-RTO: 2.4 %
CK SERPL-CCNC: 52 U/L
CK SERPL-CCNC: 52 U/L
CK SERPL-CCNC: 55 U/L
CK SERPL-CCNC: 55 U/L
CK SERPL-CCNC: 57 U/L
CK SERPL-CCNC: 57 U/L
CO2 SERPL-SCNC: 22 MMOL/L
CREAT SERPL-MCNC: 0.7 MG/DL
DIFFERENTIAL METHOD: ABNORMAL
EOSINOPHIL # BLD AUTO: 0.3 K/UL
EOSINOPHIL NFR BLD: 6.3 %
ERYTHROCYTE [DISTWIDTH] IN BLOOD BY AUTOMATED COUNT: 14 %
EST. GFR  (AFRICAN AMERICAN): >60 ML/MIN/1.73 M^2
EST. GFR  (NON AFRICAN AMERICAN): >60 ML/MIN/1.73 M^2
GLUCOSE SERPL-MCNC: 149 MG/DL
HCT VFR BLD AUTO: 40.5 %
HGB BLD-MCNC: 13.9 G/DL
LYMPHOCYTES # BLD AUTO: 1.8 K/UL
LYMPHOCYTES NFR BLD: 33.9 %
MAGNESIUM SERPL-MCNC: 1.9 MG/DL
MCH RBC QN AUTO: 34 PG
MCHC RBC AUTO-ENTMCNC: 34.3 G/DL
MCV RBC AUTO: 99 FL
MONOCYTES # BLD AUTO: 0.5 K/UL
MONOCYTES NFR BLD: 9.6 %
NEUTROPHILS # BLD AUTO: 2.7 K/UL
NEUTROPHILS NFR BLD: 49.1 %
PHOSPHATE SERPL-MCNC: 3.5 MG/DL
PLATELET # BLD AUTO: 119 K/UL
PMV BLD AUTO: 10.3 FL
POTASSIUM SERPL-SCNC: 4.1 MMOL/L
RBC # BLD AUTO: 4.09 M/UL
SODIUM SERPL-SCNC: 138 MMOL/L
WBC # BLD AUTO: 5.43 K/UL

## 2018-03-08 PROCEDURE — 99900035 HC TECH TIME PER 15 MIN (STAT)

## 2018-03-08 PROCEDURE — 94761 N-INVAS EAR/PLS OXIMETRY MLT: CPT

## 2018-03-08 PROCEDURE — 63600175 PHARM REV CODE 636 W HCPCS: Performed by: PHYSICIAN ASSISTANT

## 2018-03-08 PROCEDURE — 82553 CREATINE MB FRACTION: CPT | Mod: 91

## 2018-03-08 PROCEDURE — 84100 ASSAY OF PHOSPHORUS: CPT

## 2018-03-08 PROCEDURE — 85025 COMPLETE CBC W/AUTO DIFF WBC: CPT

## 2018-03-08 PROCEDURE — 80048 BASIC METABOLIC PNL TOTAL CA: CPT

## 2018-03-08 PROCEDURE — 25000003 PHARM REV CODE 250: Performed by: PHYSICIAN ASSISTANT

## 2018-03-08 PROCEDURE — 11000001 HC ACUTE MED/SURG PRIVATE ROOM

## 2018-03-08 PROCEDURE — 99233 SBSQ HOSP IP/OBS HIGH 50: CPT | Mod: ,,, | Performed by: INTERNAL MEDICINE

## 2018-03-08 PROCEDURE — 63600175 PHARM REV CODE 636 W HCPCS: Performed by: INTERNAL MEDICINE

## 2018-03-08 PROCEDURE — 36415 COLL VENOUS BLD VENIPUNCTURE: CPT

## 2018-03-08 PROCEDURE — 83735 ASSAY OF MAGNESIUM: CPT

## 2018-03-08 RX ORDER — METHYLPREDNISOLONE SOD SUCC 125 MG
125 VIAL (EA) INJECTION EVERY 6 HOURS
Status: DISCONTINUED | OUTPATIENT
Start: 2018-03-08 | End: 2018-03-09 | Stop reason: HOSPADM

## 2018-03-08 RX ORDER — METHYLPREDNISOLONE SOD SUCC 125 MG
VIAL (EA) INJECTION
Status: DISPENSED
Start: 2018-03-08 | End: 2018-03-09

## 2018-03-08 RX ADMIN — LEVOTHYROXINE SODIUM 75 MCG: 75 TABLET ORAL at 05:03

## 2018-03-08 RX ADMIN — AZITHROMYCIN 250 MG: 250 TABLET, FILM COATED ORAL at 08:03

## 2018-03-08 RX ADMIN — VITAMIN D, TAB 1000IU (100/BT) 1000 UNITS: 25 TAB at 08:03

## 2018-03-08 RX ADMIN — ENOXAPARIN SODIUM 40 MG: 100 INJECTION SUBCUTANEOUS at 05:03

## 2018-03-08 RX ADMIN — LIOTHYRONINE SODIUM 5 MCG: 5 TABLET ORAL at 08:03

## 2018-03-08 RX ADMIN — METHYLPREDNISOLONE SODIUM SUCCINATE 125 MG: 125 INJECTION, POWDER, FOR SOLUTION INTRAMUSCULAR; INTRAVENOUS at 01:03

## 2018-03-08 RX ADMIN — LIOTHYRONINE SODIUM 5 MCG: 5 TABLET ORAL at 10:03

## 2018-03-08 NOTE — PLAN OF CARE
Problem: Patient Care Overview  Goal: Individualization & Mutuality  Pt lying in bed. AAOx3. Denies pain. Able to make needs known. 1 person assist required due to weakness and SOB. POC followed and pt agrees with POC at this time. Nurse will continue to monitor pt and follow POC.

## 2018-03-08 NOTE — PLAN OF CARE
Problem: Patient Care Overview  Goal: Plan of Care Review  Outcome: Ongoing (interventions implemented as appropriate)  Plan of care reviewed with Pt. Purposeful hourly rounding performed. VSS. NAD during shift. Pt ambulates with stand-by assist. No c/o at this time. Bed locked and lowered. Call light in reach. Will continue to monitor.

## 2018-03-09 VITALS
HEART RATE: 84 BPM | TEMPERATURE: 98 F | RESPIRATION RATE: 16 BRPM | OXYGEN SATURATION: 94 % | HEIGHT: 70 IN | SYSTOLIC BLOOD PRESSURE: 133 MMHG | DIASTOLIC BLOOD PRESSURE: 71 MMHG | BODY MASS INDEX: 41 KG/M2 | WEIGHT: 286.38 LBS

## 2018-03-09 PROBLEM — R07.9 CHEST PAIN: Status: RESOLVED | Noted: 2018-03-07 | Resolved: 2018-03-09

## 2018-03-09 PROBLEM — R06.09 DOE (DYSPNEA ON EXERTION): Status: RESOLVED | Noted: 2018-03-07 | Resolved: 2018-03-09

## 2018-03-09 PROBLEM — J43.2 CENTRILOBULAR EMPHYSEMA: Status: ACTIVE | Noted: 2018-03-09

## 2018-03-09 LAB
ANION GAP SERPL CALC-SCNC: 12 MMOL/L
BASOPHILS # BLD AUTO: 0 K/UL
BASOPHILS NFR BLD: 0 %
BUN SERPL-MCNC: 13 MG/DL
CALCIUM SERPL-MCNC: 8.5 MG/DL
CHLORIDE SERPL-SCNC: 107 MMOL/L
CO2 SERPL-SCNC: 19 MMOL/L
CREAT SERPL-MCNC: 0.8 MG/DL
DIFFERENTIAL METHOD: ABNORMAL
EOSINOPHIL # BLD AUTO: 0 K/UL
EOSINOPHIL NFR BLD: 0 %
ERYTHROCYTE [DISTWIDTH] IN BLOOD BY AUTOMATED COUNT: 13.7 %
EST. GFR  (AFRICAN AMERICAN): >60 ML/MIN/1.73 M^2
EST. GFR  (NON AFRICAN AMERICAN): >60 ML/MIN/1.73 M^2
GLUCOSE SERPL-MCNC: 380 MG/DL
HCT VFR BLD AUTO: 40.8 %
HGB BLD-MCNC: 13.9 G/DL
LYMPHOCYTES # BLD AUTO: 0.5 K/UL
LYMPHOCYTES NFR BLD: 6.2 %
MAGNESIUM SERPL-MCNC: 1.9 MG/DL
MCH RBC QN AUTO: 33.7 PG
MCHC RBC AUTO-ENTMCNC: 34.1 G/DL
MCV RBC AUTO: 99 FL
MONOCYTES # BLD AUTO: 0.2 K/UL
MONOCYTES NFR BLD: 2.8 %
NEUTROPHILS # BLD AUTO: 7.1 K/UL
NEUTROPHILS NFR BLD: 90.9 %
PHOSPHATE SERPL-MCNC: 2.6 MG/DL
PLATELET # BLD AUTO: 105 K/UL
PMV BLD AUTO: 10.2 FL
POTASSIUM SERPL-SCNC: 4.4 MMOL/L
RBC # BLD AUTO: 4.12 M/UL
SODIUM SERPL-SCNC: 138 MMOL/L
WBC # BLD AUTO: 7.85 K/UL

## 2018-03-09 PROCEDURE — 63600175 PHARM REV CODE 636 W HCPCS: Performed by: INTERNAL MEDICINE

## 2018-03-09 PROCEDURE — 85025 COMPLETE CBC W/AUTO DIFF WBC: CPT

## 2018-03-09 PROCEDURE — 63600175 PHARM REV CODE 636 W HCPCS

## 2018-03-09 PROCEDURE — 80048 BASIC METABOLIC PNL TOTAL CA: CPT

## 2018-03-09 PROCEDURE — 84100 ASSAY OF PHOSPHORUS: CPT

## 2018-03-09 PROCEDURE — 83735 ASSAY OF MAGNESIUM: CPT

## 2018-03-09 PROCEDURE — 25000003 PHARM REV CODE 250: Performed by: PHYSICIAN ASSISTANT

## 2018-03-09 PROCEDURE — 99239 HOSP IP/OBS DSCHRG MGMT >30: CPT | Mod: ,,, | Performed by: INTERNAL MEDICINE

## 2018-03-09 PROCEDURE — 36415 COLL VENOUS BLD VENIPUNCTURE: CPT

## 2018-03-09 RX ORDER — METHYLPREDNISOLONE SOD SUCC 125 MG
VIAL (EA) INJECTION
Status: COMPLETED
Start: 2018-03-09 | End: 2018-03-09

## 2018-03-09 RX ORDER — METHYLPREDNISOLONE SOD SUCC 125 MG
VIAL (EA) INJECTION
Status: DISPENSED
Start: 2018-03-09 | End: 2018-03-09

## 2018-03-09 RX ORDER — FLUTICASONE PROPIONATE 44 UG/1
1 AEROSOL, METERED RESPIRATORY (INHALATION) EVERY 12 HOURS
Status: DISCONTINUED | OUTPATIENT
Start: 2018-03-09 | End: 2018-03-09

## 2018-03-09 RX ORDER — AZITHROMYCIN 250 MG/1
500 TABLET, FILM COATED ORAL DAILY
Qty: 4 TABLET | Refills: 0 | Status: SHIPPED | OUTPATIENT
Start: 2018-03-10 | End: 2018-03-20

## 2018-03-09 RX ORDER — FLUTICASONE PROPIONATE 44 UG/1
1 AEROSOL, METERED RESPIRATORY (INHALATION) EVERY 12 HOURS
Qty: 10.6 G | Refills: 0 | Status: SHIPPED | OUTPATIENT
Start: 2018-03-09 | End: 2018-04-20

## 2018-03-09 RX ORDER — FLUTICASONE PROPIONATE 44 UG/1
1 AEROSOL, METERED RESPIRATORY (INHALATION) EVERY 12 HOURS
Status: DISCONTINUED | OUTPATIENT
Start: 2018-03-09 | End: 2018-03-09 | Stop reason: HOSPADM

## 2018-03-09 RX ADMIN — METHYLPREDNISOLONE SODIUM SUCCINATE 125 MG: 125 INJECTION, POWDER, FOR SOLUTION INTRAMUSCULAR; INTRAVENOUS at 12:03

## 2018-03-09 RX ADMIN — METHYLPREDNISOLONE SODIUM SUCCINATE 125 MG: 125 INJECTION, POWDER, FOR SOLUTION INTRAMUSCULAR; INTRAVENOUS at 06:03

## 2018-03-09 RX ADMIN — VITAMIN D, TAB 1000IU (100/BT) 1000 UNITS: 25 TAB at 11:03

## 2018-03-09 RX ADMIN — AZITHROMYCIN 250 MG: 250 TABLET, FILM COATED ORAL at 11:03

## 2018-03-09 RX ADMIN — LEVOTHYROXINE SODIUM 75 MCG: 75 TABLET ORAL at 06:03

## 2018-03-09 NOTE — PLAN OF CARE
03/09/18 1118   Medicare Message   Important Message from Medicare regarding Discharge Appeal Rights Given to patient/caregiver;Explained to patient/caregiver;Signed/date by patient/caregiver   Date IMM was signed 03/09/18   Time IMM was signed 111

## 2018-03-09 NOTE — PLAN OF CARE
"Discharge Planning:  Patient admitted on 3-6-18  LOS-day 3  Chart reviewed  Care plan discussed  Discussed care plan with treatment team  Discussed care plan with the attending Dr Buckley  Current dispo - home today  IMM discussed and signed. She "waived" the four hour d/c window.  She is in agreement with the care plan and discharge  Oxygen to be delivered to room at 6:10 P today  All clinicals faxed via [facility]  Case management  to follow  Consults following are: case mgt    Mrs Orellana will discharge after delivery of the portable o-2 tank.    "

## 2018-03-09 NOTE — SUBJECTIVE & OBJECTIVE
"Interval History: Symptoms unchanged from previous.  Shortness of breath is mild.  Discussed diagnosis of emphysema and pneumonitis.  Possible discharge on tomorrow.  Patient in agreement.     Review of Systems   Constitutional: Positive for activity change (decreased) and fatigue. Negative for appetite change, chills and fever ("low grade").   HENT: Negative for congestion, ear pain, postnasal drip, rhinorrhea, sinus pressure, sore throat and trouble swallowing.    Respiratory: Positive for shortness of breath. Negative for cough, chest tightness and wheezing.    Cardiovascular: Negative for chest pain, palpitations and leg swelling.   Gastrointestinal: Negative for abdominal pain, constipation, diarrhea, nausea and vomiting.   Genitourinary: Negative for dysuria, flank pain, frequency, hematuria and urgency.   Musculoskeletal: Positive for myalgias. Negative for arthralgias, back pain, gait problem, joint swelling, neck pain and neck stiffness.   Skin: Negative for pallor.   Neurological: Negative for dizziness, syncope, weakness, light-headedness, numbness and headaches.     Objective:     Vital Signs (Most Recent):  Temp: 97 °F (36.1 °C) (03/08/18 2018)  Pulse: 88 (03/08/18 2018)  Resp: 16 (03/08/18 2018)  BP: 134/65 (03/08/18 2018)  SpO2: 98 % (03/08/18 2018) Vital Signs (24h Range):  Temp:  [97 °F (36.1 °C)-98.4 °F (36.9 °C)] 97 °F (36.1 °C)  Pulse:  [79-92] 88  Resp:  [16-20] 16  SpO2:  [93 %-98 %] 98 %  BP: (107-141)/(52-65) 134/65     Weight: 129.9 kg (286 lb 6 oz)  Body mass index is 41.09 kg/m².  No intake or output data in the 24 hours ending 03/08/18 5873   Physical Exam   Constitutional: She is oriented to person, place, and time. She appears well-developed and well-nourished. No distress.   Obese female.   HENT:   Head: Normocephalic and atraumatic.   Eyes: Conjunctivae and EOM are normal. Pupils are equal, round, and reactive to light. No scleral icterus.   Neck: Normal range of motion. Neck supple. " No JVD present. No tracheal deviation present.   Cardiovascular: Normal rate, regular rhythm, normal heart sounds and intact distal pulses.  Exam reveals no gallop and no friction rub.    No murmur heard.  2+ distal radial/DT/PT pulses. No carotid bruits.     Pulmonary/Chest: Effort normal and breath sounds normal. No stridor. No respiratory distress. She has no wheezes. She has no rales. She exhibits no tenderness.   Abdominal: Soft. Bowel sounds are normal. She exhibits no distension. There is no tenderness. There is no rebound and no guarding.   Neurological: She is alert and oriented to person, place, and time.   Skin: Skin is warm and dry. Capillary refill takes less than 2 seconds. She is not diaphoretic.   Psychiatric: She has a normal mood and affect. Her behavior is normal. Judgment and thought content normal.   Nursing note and vitals reviewed.      Significant Labs:   CBC:   Recent Labs  Lab 03/07/18  0542 03/08/18  0506   WBC 6.70 5.43   HGB 14.0 13.9   HCT 40.6 40.5   * 119*     CMP:   Recent Labs  Lab 03/07/18  0542 03/08/18  0506    138   K 3.8 4.1    107   CO2 22* 22*   * 149*   BUN 13 12   CREATININE 0.7 0.7   CALCIUM 8.9 8.4*   ANIONGAP 9 9   EGFRNONAA >60 >60     All pertinent labs within the past 24 hours have been reviewed.    Significant Imaging: I have reviewed all pertinent imaging results/findings within the past 24 hours.

## 2018-03-09 NOTE — PLAN OF CARE
ATTN: TEAM DC PLANNING   PER MD TEAM NEW  DX: EMPHYSEMA     PENDING HOME O2 WALKING TEST     NEEDS APPT PULM MD AND OR PULM REHAB APPT ALSO  PRIOR TO DC      IF ANY OTHER NEEDS AROSE PLEASE CONTACT RNCM SSW TEAM   THANK YOU     MAI ROSA ON CASE ALSO #[id number]   Nona Elliott RN  Case management 3/9/74116:50 AM  # [phone] (FAX) [phone]     03/09/18 1154   Discharge Assessment   Assessment Type Discharge Planning Reassessment

## 2018-03-09 NOTE — PROGRESS NOTES
"[facility] Hospital Medicine  Progress Note    Patient Name: Yana Orellana  MRN: [id number]  Patient Class: IP- Inpatient   Admission Date: 3/6/2018  Length of Stay: 1 days  Attending Physician: Annalee Mccollum, *  Primary Care Provider: Jyoti Zuñiga MD        Subjective:     Principal Problem:Pneumonitis    HPI:  Ms. Yana Orellana is a 72 y.o. female, with PMH of RA (on methotrexate), left lung CA (s/p lobectomy), and hypothyroidism, who presented to [facility] ED on 3/6/18 2/2 exertional chest pain and SOB x months, worsening in the past day. Associated symptoms include temps of 99.0-99.9 at home.  She denies hemoptysis, fever, chills, URI symptoms.  She does report lots of stressors over the past three years and she sees her psychiatrist regularly and admits that she has no SI or HI.  She denies leg pain or swelling.      The patient was evaluated in the ED and noted to desaturate with ambulation into the upper 80's. A CTA showed pneumonitis, without pneumonia or other acute process. Labs showed elevated -D-dimer without PE. The patient was admitted for further evaluation and treatment.     Hospital Course:  No notes on file    Interval History: Symptoms unchanged from previous.  Shortness of breath is mild.  Discussed diagnosis of emphysema and pneumonitis.  Possible discharge on tomorrow.  Patient in agreement.     Review of Systems   Constitutional: Positive for activity change (decreased) and fatigue. Negative for appetite change, chills and fever ("low grade").   HENT: Negative for congestion, ear pain, postnasal drip, rhinorrhea, sinus pressure, sore throat and trouble swallowing.    Respiratory: Positive for shortness of breath. Negative for cough, chest tightness and wheezing.    Cardiovascular: Negative for chest pain, palpitations and leg swelling.   Gastrointestinal: Negative for abdominal pain, constipation, diarrhea, nausea and vomiting. "   Genitourinary: Negative for dysuria, flank pain, frequency, hematuria and urgency.   Musculoskeletal: Positive for myalgias. Negative for arthralgias, back pain, gait problem, joint swelling, neck pain and neck stiffness.   Skin: Negative for pallor.   Neurological: Negative for dizziness, syncope, weakness, light-headedness, numbness and headaches.     Objective:     Vital Signs (Most Recent):  Temp: 97 °F (36.1 °C) (03/08/18 2018)  Pulse: 88 (03/08/18 2018)  Resp: 16 (03/08/18 2018)  BP: 134/65 (03/08/18 2018)  SpO2: 98 % (03/08/18 2018) Vital Signs (24h Range):  Temp:  [97 °F (36.1 °C)-98.4 °F (36.9 °C)] 97 °F (36.1 °C)  Pulse:  [79-92] 88  Resp:  [16-20] 16  SpO2:  [93 %-98 %] 98 %  BP: (107-141)/(52-65) 134/65     Weight: 129.9 kg (286 lb 6 oz)  Body mass index is 41.09 kg/m².  No intake or output data in the 24 hours ending 03/08/18 2317   Physical Exam   Constitutional: She is oriented to person, place, and time. She appears well-developed and well-nourished. No distress.   Obese female.   HENT:   Head: Normocephalic and atraumatic.   Eyes: Conjunctivae and EOM are normal. Pupils are equal, round, and reactive to light. No scleral icterus.   Neck: Normal range of motion. Neck supple. No JVD present. No tracheal deviation present.   Cardiovascular: Normal rate, regular rhythm, normal heart sounds and intact distal pulses.  Exam reveals no gallop and no friction rub.    No murmur heard.  2+ distal radial/DT/PT pulses. No carotid bruits.     Pulmonary/Chest: Effort normal and breath sounds normal. No stridor. No respiratory distress. She has no wheezes. She has no rales. She exhibits no tenderness.   Abdominal: Soft. Bowel sounds are normal. She exhibits no distension. There is no tenderness. There is no rebound and no guarding.   Neurological: She is alert and oriented to person, place, and time.   Skin: Skin is warm and dry. Capillary refill takes less than 2 seconds. She is not diaphoretic.   Psychiatric:  She has a normal mood and affect. Her behavior is normal. Judgment and thought content normal.   Nursing note and vitals reviewed.      Significant Labs:   CBC:   Recent Labs  Lab 03/07/18  0542 03/08/18  0506   WBC 6.70 5.43   HGB 14.0 13.9   HCT 40.6 40.5   * 119*     CMP:   Recent Labs  Lab 03/07/18  0542 03/08/18  0506    138   K 3.8 4.1    107   CO2 22* 22*   * 149*   BUN 13 12   CREATININE 0.7 0.7   CALCIUM 8.9 8.4*   ANIONGAP 9 9   EGFRNONAA >60 >60     All pertinent labs within the past 24 hours have been reviewed.    Significant Imaging: I have reviewed all pertinent imaging results/findings within the past 24 hours.    Assessment/Plan:      * Pneumonitis    - continue azithromycin  - duonebs prn  - clinically resolving  - Started solumedrol  - Will perform 6 minute walk in AM in preparation for discharge            Chest pain    - troponins negative  - no further intervention required          LARA (dyspnea on exertion)    - likely 2/2 pneumonitis   - monitor after initiation of azithromycin           Hypothyroidism    - continue home meds        Hx Lung cancer, lower lobe    - s/p partial lower left lobectomy & chemo           VTE Risk Mitigation         Ordered     enoxaparin injection 40 mg  Daily     Route:  Subcutaneous        03/07/18 0414     Medium Risk of VTE  Once      03/07/18 0414     Place DARBY hose  Until discontinued      03/07/18 0255     Place sequential compression device  Until discontinued      03/07/18 0255              Annalee Mccollum MD  Department of Hospital Medicine   [facility]

## 2018-03-09 NOTE — PLAN OF CARE
03/09/18 1638   Final Note   Assessment Type Final Discharge Note   Discharge Disposition Home   Hospital Follow Up  Appt(s) scheduled? Yes   Discharge plans and expectations educations in teach back method with documentation complete? Yes   Right Care Referral Info   Post Acute Recommendation No Care   Referral Type see AVS

## 2018-03-09 NOTE — PLAN OF CARE
03/09/18 1727   Medicare Message   Important Message from Medicare regarding Discharge Appeal Rights Given to patient/caregiver;Explained to patient/caregiver;Signed/date by patient/caregiver   Date IMM was signed 03/09/18   Time IMM was signed [id number]

## 2018-03-09 NOTE — PLAN OF CARE
Problem: Patient Care Overview  Goal: Plan of Care Review  Outcome: Ongoing (interventions implemented as appropriate)  O2 not in use, no requests noted for PRN rx.

## 2018-03-09 NOTE — ASSESSMENT & PLAN NOTE
- continue azithromycin  - duonebs prn  - clinically resolving  - Started solumedrol  - Will perform 6 minute walk in AM in preparation for discharge

## 2018-03-09 NOTE — PLAN OF CARE
03/09/18 1119   Discharge Reassessment   Assessment Type Discharge Planning Reassessment   Provided patient/caregiver education on the expected discharge date and the discharge plan Yes   Discharge Plan A Home   Discharge Plan B Home Health

## 2018-03-10 NOTE — NURSING
Vitals checked, WNL. Patient transported to  point assisted by PCT Malcolm, belongings and equipments with the patient.

## 2018-03-12 ENCOUNTER — PATIENT OUTREACH (OUTPATIENT)
Dept: ADMINISTRATIVE | Facility: CLINIC | Age: 73
End: 2018-03-12

## 2018-03-15 NOTE — PLAN OF CARE
Late entry  Addendum complete  Per Renan Gao,  and Darcy Laird RN        3/9/18 SpO2 RA @ rest 95%     3/9/18 SpO2  RA  with ambulation 87%     3/9/18 SpO2 2L NC 94% with ambulation      Electronically signed by Renan Gao and Darcy Laird RN.

## 2018-03-19 NOTE — PROGRESS NOTES
Subjective:       Patient ID: Yana Orellana is a 72 y.o. female with a PMH significant for RA, Post-polio Syndrome, Anxiety/Depression, Pneumonitis, PVCs, Anemia, History of Lung Cancer, Hypothyroidism, Obesity, Fibromyalgia who presents today to follow up a recent hospital discharge (3/6/2018-3/9/2018) for Pneumonitis - treated with Azithromycin, Duonebs, Solumedrol.    Chief Complaint: Establish Care    HPI Patient is overall feeling better since discharge.  No SOB at rest, but still fatigue and LARA.  Has been off thyroid medications for the past year - was restarted in the hospital, but not discharged on them.  Patient denies f/c, n/v/d.  No chest pain.  No abdominal pain or dysuria.  No headaches or change in vision.  No dizziness.  Remaining ROS negative.    Review of Systems   Constitutional: Positive for fatigue. Negative for appetite change, chills, diaphoresis, fever and unexpected weight change.   HENT: Negative for ear pain, hearing loss, sinus pain, tinnitus, trouble swallowing and voice change.    Eyes: Negative for photophobia, pain and visual disturbance.   Respiratory: Positive for shortness of breath (mostly LARA - improved since discharge). Negative for chest tightness and wheezing.    Cardiovascular: Negative for chest pain, palpitations and leg swelling.   Gastrointestinal: Negative for abdominal pain, blood in stool, constipation, diarrhea, nausea and vomiting.   Endocrine: Negative for cold intolerance, heat intolerance, polydipsia, polyphagia and polyuria.   Genitourinary: Negative for decreased urine volume, difficulty urinating, dysuria, flank pain, hematuria, pelvic pain, vaginal bleeding, vaginal discharge and vaginal pain.   Musculoskeletal: Negative for arthralgias, gait problem, joint swelling, myalgias and neck pain.   Skin: Negative for rash.   Neurological: Negative for dizziness, tremors, syncope, weakness, numbness and headaches.   Hematological: Does not bruise/bleed easily.    Psychiatric/Behavioral: Negative for agitation, confusion and sleep disturbance. The patient is not nervous/anxious.        Objective:      Physical Exam   Constitutional: She is oriented to person, place, and time. She appears well-nourished. No distress.   obese   HENT:   Head: Normocephalic and atraumatic.   Nose: Nose normal.   Mouth/Throat: Oropharynx is clear and moist.   Eyes: Conjunctivae and EOM are normal. Pupils are equal, round, and reactive to light. No scleral icterus.   Neck: Normal range of motion. Neck supple. No JVD present. No thyromegaly present.   Cardiovascular: Normal rate, regular rhythm and intact distal pulses.  Exam reveals no gallop and no friction rub.    No murmur heard.  Pulmonary/Chest: Effort normal and breath sounds normal. No respiratory distress. She has no wheezes. She has no rales.   Abdominal: Soft. Bowel sounds are normal. She exhibits no distension. There is no tenderness. There is no rebound and no guarding.   Musculoskeletal: Normal range of motion. She exhibits no edema.   Lymphadenopathy:     She has no cervical adenopathy.   Neurological: She is alert and oriented to person, place, and time. No cranial nerve deficit or sensory deficit.   Skin: Skin is warm and dry. No rash noted. No erythema.   Psychiatric: She has a normal mood and affect. Her behavior is normal. Thought content normal.       Assessment:       1. Hospital discharge follow-up    2. Rheumatoid arthritis involving multiple sites with positive rheumatoid factor    3. Hypothyroidism, unspecified type    4. Malignant neoplasm of lower lobe of left lung    5. Pneumonitis    6. Depression, unspecified depression type    7. Post-polio syndrome    8. Thrombocytopenia    9. Elevated AST (SGOT)    10. Elevated hemoglobin A1c    11. Hyperlipidemia, unspecified hyperlipidemia type        Plan:   -Hospital Discharge Follow up - completed antibiotics and feeling better overall.  Still with fatigue.  Has been off  thyroid medications - she has been dealing with a lot of illness and death in her family (stopped taking care of herself).  -Pulmonary - Pneumonitis and history of Lung Cancer - CTA Chest showed no Pe, patchy multifocal GGO and diffuse centrolobular emphysema.  S/p Azithromycin and Duonebs.  S/p Solumdrol.  For Lung Cancer (11/2009 - Y4lCdNs Stage IIA), S/p LLL lobectomy and completed 3/4 planned cycles of chemo.  Last seen by Oncology in 2/2015 (was to return in a year).  On Flovent.  Follow up with Pulmonary - has appointment 3/22/2018 with Dr. Ari Dumont.  -Rheum - RA (2009 - moderate positive CCP/RF) and Fibromyalgia - On Methotrexate since 2011.  On prn  Prednisone.  On Folic Acid.  Followed by Rheum and last seen 2/28/2018.  -Ortho - Broke Left Elbow in October 2017 and healed now.  -Cards - HLD - Lipids in 3/2018 with  (HDL low at 33).  Follow for now.  BP today stable.  -Endocrine - Hyothyroidism - off Levothyroxine and Liothyronine for about a year as above.  Check TSH, fT4 and T3.  Will resume medications today (was given the meds in the hospital, but did not discharge on them).  On Vitamin D - follow up level.  A1C was 6.0 in 3/2018.  On chronic low-dose prednisone which may be contributing, as well as off thyroid medications.  Will repeat once back on meds for 3 months.  -Heme - Thrombocytopenia - Plts 105 on 3/9/2018.  Was 134 in 4/2017. Will follow closely for now.  -Neuro - Post-polio Syndrome (polio at age 18) -  On Oxycodone (tapering down - 1/2 pill every few days)) and Valium prn.  Filled by Rheumatologist as well.  -GYN - S/p Hysterectomy.  S/p Ovarian Cyst Removal.  Last Mammo was.  We discussed DXA screening for osteoporosis - DXA in 12/2013 was normal.  Consider repeat.  -GI - Colon Polyps - last colonoscopy in 8/2012 - polyps in sigmoid, proximal ascending colon, ascending colon, and cecum.  Recommended repeat in 3 years - she does not want follow up at this time.  Elevated AST  of 75 in 3/6/2018 (normal ALT, Alk phos, and Bili).  Has been elevated since at least 1/2017.  Repeat CMP and check GGT and CPK.  -HCM - We discussed Flu (10/2017) and Tdap (last was 2004 and at this time doesn't want to get one now - has had whelps in the past) vaccinations.  We discussed Shingles (zostavax 1/2016) and Pneumococcal (?23 in 1/2016) vaccinations.    -Follow up in 4 weeks.    Add:  TSH, fT4, T3 normal despite being off Levo/Liothyronine for a year.  Called patient and advised she hold both for now until she sees Endocrine.

## 2018-03-20 ENCOUNTER — OFFICE VISIT (OUTPATIENT)
Dept: INTERNAL MEDICINE | Facility: CLINIC | Age: 73
End: 2018-03-20
Payer: MEDICARE

## 2018-03-20 ENCOUNTER — LAB VISIT (OUTPATIENT)
Dept: LAB | Facility: OTHER | Age: 73
End: 2018-03-20
Attending: INTERNAL MEDICINE
Payer: MEDICARE

## 2018-03-20 VITALS
HEIGHT: 70 IN | OXYGEN SATURATION: 97 % | HEART RATE: 80 BPM | SYSTOLIC BLOOD PRESSURE: 110 MMHG | DIASTOLIC BLOOD PRESSURE: 70 MMHG

## 2018-03-20 DIAGNOSIS — D69.6 THROMBOCYTOPENIA: ICD-10-CM

## 2018-03-20 DIAGNOSIS — J98.4 PNEUMONITIS: ICD-10-CM

## 2018-03-20 DIAGNOSIS — R74.01 ELEVATED AST (SGOT): ICD-10-CM

## 2018-03-20 DIAGNOSIS — R73.09 ELEVATED HEMOGLOBIN A1C: ICD-10-CM

## 2018-03-20 DIAGNOSIS — C34.32 MALIGNANT NEOPLASM OF LOWER LOBE OF LEFT LUNG: Chronic | ICD-10-CM

## 2018-03-20 DIAGNOSIS — Z09 HOSPITAL DISCHARGE FOLLOW-UP: Primary | ICD-10-CM

## 2018-03-20 DIAGNOSIS — E03.9 HYPOTHYROIDISM, UNSPECIFIED TYPE: ICD-10-CM

## 2018-03-20 DIAGNOSIS — E78.5 HYPERLIPIDEMIA, UNSPECIFIED HYPERLIPIDEMIA TYPE: ICD-10-CM

## 2018-03-20 DIAGNOSIS — F32.A DEPRESSION, UNSPECIFIED DEPRESSION TYPE: ICD-10-CM

## 2018-03-20 DIAGNOSIS — G14 POST-POLIO SYNDROME: ICD-10-CM

## 2018-03-20 DIAGNOSIS — M05.79 RHEUMATOID ARTHRITIS INVOLVING MULTIPLE SITES WITH POSITIVE RHEUMATOID FACTOR: ICD-10-CM

## 2018-03-20 LAB
ALBUMIN SERPL BCP-MCNC: 4.1 G/DL
ALP SERPL-CCNC: 68 U/L
ALT SERPL W/O P-5'-P-CCNC: 9 U/L
ANION GAP SERPL CALC-SCNC: 10 MMOL/L
AST SERPL-CCNC: 17 U/L
BASOPHILS # BLD AUTO: 0.02 K/UL
BASOPHILS NFR BLD: 0.3 %
BILIRUB SERPL-MCNC: 0.7 MG/DL
BUN SERPL-MCNC: 7 MG/DL
CALCIUM SERPL-MCNC: 9.5 MG/DL
CHLORIDE SERPL-SCNC: 102 MMOL/L
CK SERPL-CCNC: 51 U/L
CO2 SERPL-SCNC: 27 MMOL/L
CREAT SERPL-MCNC: 0.8 MG/DL
DIFFERENTIAL METHOD: ABNORMAL
EOSINOPHIL # BLD AUTO: 0.1 K/UL
EOSINOPHIL NFR BLD: 1.7 %
ERYTHROCYTE [DISTWIDTH] IN BLOOD BY AUTOMATED COUNT: 12.6 %
EST. GFR  (AFRICAN AMERICAN): >60 ML/MIN/1.73 M^2
EST. GFR  (NON AFRICAN AMERICAN): >60 ML/MIN/1.73 M^2
GGT SERPL-CCNC: 16 U/L
GLUCOSE SERPL-MCNC: 86 MG/DL
HCT VFR BLD AUTO: 39.5 %
HGB BLD-MCNC: 12.8 G/DL
LYMPHOCYTES # BLD AUTO: 2.3 K/UL
LYMPHOCYTES NFR BLD: 39.4 %
MCH RBC QN AUTO: 32.1 PG
MCHC RBC AUTO-ENTMCNC: 32.4 G/DL
MCV RBC AUTO: 99 FL
MONOCYTES # BLD AUTO: 0.4 K/UL
MONOCYTES NFR BLD: 7.3 %
NEUTROPHILS # BLD AUTO: 2.9 K/UL
NEUTROPHILS NFR BLD: 51.3 %
PLATELET # BLD AUTO: 234 K/UL
PMV BLD AUTO: 10.7 FL
POTASSIUM SERPL-SCNC: 4.1 MMOL/L
PROT SERPL-MCNC: 7.1 G/DL
RBC # BLD AUTO: 3.99 M/UL
SODIUM SERPL-SCNC: 139 MMOL/L
T3 SERPL-MCNC: 92 NG/DL
T4 FREE SERPL-MCNC: 0.82 NG/DL
TSH SERPL DL<=0.005 MIU/L-ACNC: 1.13 UIU/ML
WBC # BLD AUTO: 5.74 K/UL

## 2018-03-20 PROCEDURE — 99213 OFFICE O/P EST LOW 20 MIN: CPT | Mod: PBBFAC | Performed by: INTERNAL MEDICINE

## 2018-03-20 PROCEDURE — 36415 COLL VENOUS BLD VENIPUNCTURE: CPT

## 2018-03-20 PROCEDURE — 85025 COMPLETE CBC W/AUTO DIFF WBC: CPT

## 2018-03-20 PROCEDURE — 84480 ASSAY TRIIODOTHYRONINE (T3): CPT

## 2018-03-20 PROCEDURE — 84439 ASSAY OF FREE THYROXINE: CPT

## 2018-03-20 PROCEDURE — 80053 COMPREHEN METABOLIC PANEL: CPT

## 2018-03-20 PROCEDURE — 84443 ASSAY THYROID STIM HORMONE: CPT

## 2018-03-20 PROCEDURE — 99213 OFFICE O/P EST LOW 20 MIN: CPT | Mod: S$PBB,,, | Performed by: INTERNAL MEDICINE

## 2018-03-20 PROCEDURE — 82550 ASSAY OF CK (CPK): CPT

## 2018-03-20 PROCEDURE — 82977 ASSAY OF GGT: CPT

## 2018-03-20 PROCEDURE — 99999 PR PBB SHADOW E&M-EST. PATIENT-LVL III: CPT | Mod: PBBFAC,,, | Performed by: INTERNAL MEDICINE

## 2018-03-20 RX ORDER — LEVOTHYROXINE SODIUM 75 UG/1
75 TABLET ORAL DAILY
Qty: 90 TABLET | Refills: 1 | Status: SHIPPED | OUTPATIENT
Start: 2018-03-20 | End: 2018-04-20

## 2018-03-20 RX ORDER — LIOTHYRONINE SODIUM 5 UG/1
5 TABLET ORAL DAILY
Qty: 90 TABLET | Refills: 1 | Status: SHIPPED | OUTPATIENT
Start: 2018-03-20 | End: 2018-04-20

## 2018-03-20 NOTE — PATIENT INSTRUCTIONS
Your exam was overall normal today.  Your weight is elevated.  Try your best to change diet and start exercise.  Your blood pressure was good today.  I will restart your Thyroid medications today.  We will check your thyroid labs today.  Keep your follow up with Pulmonary on Thursday.  We will discuss your vaccinations more on follow up.  Return in 4 weeks - sooner if needed.  Please come at least 15-20 minutes before your scheduled appointment time.

## 2018-03-21 ENCOUNTER — TELEPHONE (OUTPATIENT)
Dept: INTERNAL MEDICINE | Facility: CLINIC | Age: 73
End: 2018-03-21

## 2018-03-21 DIAGNOSIS — E03.9 HYPOTHYROIDISM, UNSPECIFIED TYPE: Primary | ICD-10-CM

## 2018-03-22 ENCOUNTER — TELEPHONE (OUTPATIENT)
Dept: INTERNAL MEDICINE | Facility: CLINIC | Age: 73
End: 2018-03-22

## 2018-03-22 NOTE — TELEPHONE ENCOUNTER
----- Message from Jethro Ortiz MD sent at 3/21/2018  3:53 PM CDT -----  Patient with normal TSH/fT4/T3 despite being off thyroid medications for over a year.  I called patient and recommended her to hold both the Levo and Liothyroixine until she sees Endocrine.  Please schedule patient and Endocrine appointment.  Thank you.

## 2018-03-23 ENCOUNTER — TELEPHONE (OUTPATIENT)
Dept: INTERNAL MEDICINE | Facility: CLINIC | Age: 73
End: 2018-03-23

## 2018-03-27 NOTE — TELEPHONE ENCOUNTER
Endocrine appt. Scheduled, pt. Notified. Number given to pt to call if she has any questions, or would like to try to get an appt. Later in the day.

## 2018-04-12 NOTE — DISCHARGE SUMMARY
[facility] Hospital Medicine  Discharge Summary      Patient Name: Yana Orellana  MRN: [id number]  Admission Date: 3/6/2018  Hospital Length of Stay: 2 days  Discharge Date and Time: 3/9/2018  8:55 PM  Attending Physician: No att. providers found   Discharging Provider: Annalee Mccollum MD  Primary Care Provider: Jethro Ortiz MD      HPI:   Ms. Yana Orellana is a 72 y.o. female, with PMH of RA (on methotrexate), left lung CA (s/p lobectomy), and hypothyroidism, who presented to [facility] ED on 3/6/18 2/2 exertional chest pain and SOB x months, worsening in the past day. Associated symptoms include temps of 99.0-99.9 at home.  She denies hemoptysis, fever, chills, URI symptoms.  She does report lots of stressors over the past three years and she sees her psychiatrist regularly and admits that she has no SI or HI.  She denies leg pain or swelling.      The patient was evaluated in the ED and noted to desaturate with ambulation into the upper 80's. A CTA showed pneumonitis, without pneumonia or other acute process. Labs showed elevated -D-dimer without PE. The patient was admitted for further evaluation and treatment.       Hospital Course:   Patient treated with zithromycin and solumedrol for pneumonitis during hospital course.  Clinically improved over the course of her hospital stay but was still found to be hypoxic at 87% during ambulation.  Pneumonitis complicated by emphysema.  Patient reports that she was unaware that she had COPD but admitted that she smoke for a few decades. Home oxygen and pulmonary rehab arranged.  Patient will have close follow up with PCP and Pulmonary specialist as outpatient.  She is in agreement with plan at time of discharge.     Consults:     No new Assessment & Plan notes have been filed under this hospital service since the last note was generated.  Service: Hospital Medicine    Final Active Diagnoses:    Diagnosis Date Noted POA     "PRINCIPAL PROBLEM:  Pneumonitis [J18.9] 03/07/2018 Yes    Centrilobular emphysema [J43.2] 03/09/2018 Yes    Hypothyroidism [E03.9] 08/02/2013 Yes    Hx Lung cancer, lower lobe [C34.30] 07/16/2012 Yes     Chronic      Problems Resolved During this Admission:    Diagnosis Date Noted Date Resolved POA    LARA (dyspnea on exertion) [R06.09] 03/07/2018 03/09/2018 Yes    Chest pain [R07.9] 03/07/2018 03/09/2018 Yes       Discharged Condition: stable    Disposition: Home or Self Care    Follow Up:  Follow-up Information     [facility].    Specialty:  DME Provider  Why:  DME  Contact information:  [address]  [facility] [address]  [phone]             PULMONARY REHAB, [facility] In 3 days.    Why:  Outpatient Services-           Ari Dumont MD In 2 weeks.    Specialty:  Pulmonary Disease  Why:  See in 2 weeks  Contact information:  [address]  [address]  [facility] [address]  [phone]             Jethro Ortiz MD In 1 week.    Specialty:  Internal Medicine  Why:  See in 1 week  Contact information:  [address]  [address]  [facility] [id number] [phone]                 Patient Instructions:     OXYGEN FOR HOME USE   Order Specific Question Answer Comments   Liter Flow 2    Duration Continuous    Qualifying SpO2: 87%    Testing done at: Exercise/Activity    Device home concentrator with portable unit    Length of need (in months): 99 mos    Patient condition with qualifying saturation  EMPHYSEMA    Height: 5' 10" (1.778 m)    Weight: 129.9 kg (286 lb 6 oz)    Alternative treatment measures have been tried or considered and deemed clinically ineffective. Yes      Diet Adult Regular     Activity as tolerated     Notify your health care provider if you experience any of the following:  difficulty breathing or increased cough         Significant Diagnostic Studies: Labs: All labs within the past 24 hours have been reviewed    Pending Diagnostic Studies:  "    None         Medications:  Reconciled Home Medications:      Medication List      START taking these medications    fluticasone 44 mcg/actuation inhaler  Commonly known as:  FLOVENT HFA  Inhale 1 puff into the lungs every 12 (twelve) hours. Controller        CONTINUE taking these medications    calcium citrate-vitamin D3 315-200 mg 315-200 mg-unit per tablet  Commonly known as:  CITRACAL+D  Take 2 tablets by mouth 2 (two) times daily.     folic acid 1 MG tablet  Commonly known as:  FOLVITE  Take 1 tablet (1 mg total) by mouth once daily.     methotrexate 2.5 MG Tab  Take 7 tablets (17.5 mg total) by mouth every 7 days.     multivitamin per tablet  Commonly known as:  ONE DAILY MULTIVITAMIN  Take 1 tablet by mouth once daily.     predniSONE 5 MG tablet  Commonly known as:  DELTASONE  Take as needed for flare of rheumatoid arthritis     vitamin D 1000 units Tab  Take 185 mg by mouth once daily.        STOP taking these medications    diazePAM 5 MG tablet  Commonly known as:  VALIUM     meclizine 25 mg tablet  Commonly known as:  ANTIVERT     oxyCODONE-acetaminophen 5-325 mg per tablet  Commonly known as:  PERCOCET            Indwelling Lines/Drains at time of discharge:   Lines/Drains/Airways          No matching active lines, drains, or airways          Time spent on the discharge of patient: >30 minutes  Patient was seen and examined on the date of discharge and determined to be suitable for discharge.         Annalee Mccollum MD  Department of Hospital Medicine  [facility]

## 2018-04-18 NOTE — PROGRESS NOTES
Subjective:       Patient ID: Yana Orellana is a 72 y.o. female with a PMH significant for RA, Post-polio Syndrome, Anxiety/Depression, Pneumonitis, PVCs, Anemia, History of Lung Cancer, Hypothyroidism, Obesity, Fibromyalgia who presents today to follow up a recent hospital discharge (3/6/2018-3/9/2018) for Pneumonitis - treated with Azithromycin, Duonebs, Solumedrol.    Chief Complaint: Follow-up    HPI  Patient is still having fatigue.  Has not started PT - has many excuses as to why (needs dental implants, etc).  Breathing is better and off oxygen.  Patient denies f/c, n/v/d.  No chest pain.  No abdominal pain or dysuria.  No headaches or change in vision.  No dizziness.  Remaining ROS negative.    Review of Systems   Constitutional: Positive for fatigue. Negative for appetite change, chills, diaphoresis, fever and unexpected weight change.   HENT: Negative for ear pain, hearing loss, sinus pain, tinnitus, trouble swallowing and voice change.    Eyes: Negative for photophobia, pain and visual disturbance.   Respiratory: Positive for shortness of breath (mostly LARA - improved since discharge). Negative for chest tightness and wheezing.    Cardiovascular: Negative for chest pain, palpitations and leg swelling.   Gastrointestinal: Negative for abdominal pain, blood in stool, constipation, diarrhea, nausea and vomiting.   Endocrine: Negative for cold intolerance, heat intolerance, polydipsia, polyphagia and polyuria.   Genitourinary: Negative for decreased urine volume, difficulty urinating, dysuria, flank pain, hematuria, pelvic pain, vaginal bleeding, vaginal discharge and vaginal pain.   Musculoskeletal: Negative for arthralgias, gait problem, joint swelling, myalgias and neck pain.   Skin: Negative for rash.   Neurological: Negative for dizziness, tremors, syncope, weakness, numbness and headaches.   Hematological: Does not bruise/bleed easily.   Psychiatric/Behavioral: Negative for agitation, confusion and  sleep disturbance. The patient is not nervous/anxious.        Objective:      Physical Exam   Constitutional: She is oriented to person, place, and time. She appears well-nourished. No distress.   obese   HENT:   Head: Normocephalic and atraumatic.   Nose: Nose normal.   Mouth/Throat: Oropharynx is clear and moist.   Eyes: Conjunctivae and EOM are normal. Pupils are equal, round, and reactive to light. No scleral icterus.   Neck: Normal range of motion. Neck supple. No JVD present. No thyromegaly present.   Cardiovascular: Normal rate, regular rhythm and intact distal pulses.  Exam reveals no gallop and no friction rub.    No murmur heard.  Pulmonary/Chest: Effort normal and breath sounds normal. No respiratory distress. She has no wheezes. She has no rales.   Abdominal: Soft. Bowel sounds are normal. She exhibits no distension. There is no tenderness. There is no rebound and no guarding.   Musculoskeletal: Normal range of motion. She exhibits no edema.   Lymphadenopathy:     She has no cervical adenopathy.   Neurological: She is alert and oriented to person, place, and time. No cranial nerve deficit or sensory deficit.   Skin: Skin is warm and dry. No rash noted. No erythema.   Psychiatric: She has a normal mood and affect. Her behavior is normal. Thought content normal.       Assessment:       1. Depression, unspecified depression type    2. Hypothyroidism, unspecified type    3. Morbid obesity    4. Fibromyalgia    5. Rheumatoid arthritis involving multiple sites with positive rheumatoid factor    6. Fatigue, unspecified type    7. Malignant neoplasm of lower lobe of left lung        Plan:   -Today's Visit - BMI 41.76.  Discussed diet modification and exercise.  She thinks when her dentures/teeth are fixed, she can change her diet to help support weight loss.  We discussed Bariatric Medicine and she declines.   -Recent Hospital Admission - (3/6/2018-3/9/2018) for Pneumonitis - treated with Azithromycin, Duonebs,  Solumedrol.  -Pulmonary - Pneumonitis and history of Lung Cancer - CTA Chest showed no PE, patchy multifocal GGO and diffuse centrolobular emphysema.  S/p Azithromycin and Duonebs.  S/p Solumdrol.  For Lung Cancer (11/2009 - [id number] Stage IIA), S/p LLL lobectomy and completed 3/4 planned cycles of chemo.  Last seen by Oncology in 2/2015 (was to return in a year).   Followed up with Pulmonary - had appointment 3/22/2018 with Dr. Ari Dumont ([facility]).  Now off oxygen and Flovent stopped.  -Rheum - RA (2009 - moderate positive CCP/RF) and Fibromyalgia - On Methotrexate since 2011.  On prn  Prednisone.  On Folic Acid.  Followed by Rheum and last seen 2/28/2018.  Follow up recommended in 3 months.  -Endocrine - Hyothyroidism - off Levothyroxine and Liothyronine for about a year. TSH, fT4 and T3 all normal in 3/2018 - recommended to follow off medication for now.   She thinks maybe she was treated for subclinical hypothyroidism in past.  On Vitamin D - check vitamin D level.  A1C was 6.0 in 3/2018 - consider Metformin.  On chronic low-dose prednisone which may be contributing.  Has Endocrine follow up on 4/30/2018.  -Heme - Thrombocytopenia - Plts 105 on 3/9/2018.  Was 134 in 4/2017. Will follow closely for now.  -Neuro - Post-polio Syndrome (polio at age 18) -  On Oxycodone (tapering down - 1/2 pill every few days) and Valium prn.  Filled by Rheumatologist as well.  -Ortho - Broke Left Elbow in October 2017 and healed now.  -Cards - HLD - Lipids in 3/2018 with  (HDL low at 33).  Follow for now.  BP today stable.  -GYN - S/p Hysterectomy.  S/p Ovarian Cyst Removal.  Last Mammo was.  We discussed DXA screening for osteoporosis - DXA in 12/2013 was normal.  Consider repeat.  -GI - Colon Polyps - last colonoscopy in 8/2012 - polyps in sigmoid, proximal ascending colon, ascending colon, and cecum.  Recommended repeat in 3 years - she still does not want follow up at this time.  Elevated AST of 75 in 3/6/2018  (normal ALT, Alk phos, and Bili).  Has been elevated since at least 1/2017.  Repeat CMP with GGT and CPK all normal in 3/2018.  -HCM - We discussed Flu (10/2017) and Tdap (last was 2004 and at this time doesn't want to get one now - has had whelps in the past) vaccinations.  We discussed Shingles (did not have due to cost) and Pneumococcal (25 had twice and had Prevnar 13 in 2016) vaccinations.    -Follow up in 4 months

## 2018-04-20 ENCOUNTER — LAB VISIT (OUTPATIENT)
Dept: LAB | Facility: OTHER | Age: 73
End: 2018-04-20
Attending: INTERNAL MEDICINE
Payer: MEDICARE

## 2018-04-20 ENCOUNTER — OFFICE VISIT (OUTPATIENT)
Dept: INTERNAL MEDICINE | Facility: CLINIC | Age: 73
End: 2018-04-20
Payer: MEDICARE

## 2018-04-20 VITALS
HEART RATE: 80 BPM | WEIGHT: 291 LBS | HEIGHT: 70 IN | DIASTOLIC BLOOD PRESSURE: 78 MMHG | BODY MASS INDEX: 41.66 KG/M2 | TEMPERATURE: 99 F | OXYGEN SATURATION: 98 % | SYSTOLIC BLOOD PRESSURE: 120 MMHG

## 2018-04-20 DIAGNOSIS — E55.9 VITAMIN D DEFICIENCY: ICD-10-CM

## 2018-04-20 DIAGNOSIS — F32.A DEPRESSION, UNSPECIFIED DEPRESSION TYPE: Primary | ICD-10-CM

## 2018-04-20 DIAGNOSIS — R53.83 FATIGUE, UNSPECIFIED TYPE: ICD-10-CM

## 2018-04-20 DIAGNOSIS — M79.7 FIBROMYALGIA: Chronic | ICD-10-CM

## 2018-04-20 DIAGNOSIS — C34.32 MALIGNANT NEOPLASM OF LOWER LOBE OF LEFT LUNG: Chronic | ICD-10-CM

## 2018-04-20 DIAGNOSIS — M05.79 RHEUMATOID ARTHRITIS INVOLVING MULTIPLE SITES WITH POSITIVE RHEUMATOID FACTOR: ICD-10-CM

## 2018-04-20 DIAGNOSIS — E03.9 HYPOTHYROIDISM, UNSPECIFIED TYPE: ICD-10-CM

## 2018-04-20 DIAGNOSIS — E66.01 MORBID OBESITY: ICD-10-CM

## 2018-04-20 LAB — 25(OH)D3+25(OH)D2 SERPL-MCNC: 15 NG/ML

## 2018-04-20 PROCEDURE — 99214 OFFICE O/P EST MOD 30 MIN: CPT | Mod: S$PBB,,, | Performed by: INTERNAL MEDICINE

## 2018-04-20 PROCEDURE — 99213 OFFICE O/P EST LOW 20 MIN: CPT | Mod: PBBFAC | Performed by: INTERNAL MEDICINE

## 2018-04-20 PROCEDURE — 82306 VITAMIN D 25 HYDROXY: CPT

## 2018-04-20 PROCEDURE — 36415 COLL VENOUS BLD VENIPUNCTURE: CPT

## 2018-04-20 PROCEDURE — 99999 PR PBB SHADOW E&M-EST. PATIENT-LVL III: CPT | Mod: PBBFAC,,, | Performed by: INTERNAL MEDICINE

## 2018-04-20 NOTE — PATIENT INSTRUCTIONS
Your exam was overall normal today.  Continue with plans to change diet with goal of losing weight.  Your blood pressure was good.  I would like to check a vitamin D level.  Return in 4 months  - sooner if needed.  Please come at least 15-20 minutes before your scheduled appointment time.

## 2018-04-23 ENCOUNTER — TELEPHONE (OUTPATIENT)
Dept: INTERNAL MEDICINE | Facility: CLINIC | Age: 73
End: 2018-04-23

## 2018-04-23 NOTE — TELEPHONE ENCOUNTER
----- Message from Jethro Ortiz MD sent at 4/23/2018  6:36 AM CDT -----  Please let patient know the following:  Your vitamin D level was low.  I recommend taking over the counter Vitamin D2 or D3 at 2000 units once a day.  We will follow the levels periodically.  Thank you.

## 2018-04-23 NOTE — TELEPHONE ENCOUNTER
Spoke with pt and she had a verbal understanding to take OTC supplement as recommended by Dr. Ortiz. 4/23 BHUMI

## 2018-04-26 NOTE — PROGRESS NOTES
Subjective:      Patient ID: Yana Wise is a 72 y.o. female.    Chief Complaint:  Hypothyroidism    History of Present Illness  Yana Wise presents today for Evaluation & management of hypothyroidism. This is her first visit with me.     Diagnosed in June 1995. Always has been on lt4 or tirosant since that time. Had life stressors over the past year and stopped taking her medication and has had normal TFTs since.      Was told that she had hashimoto's disease.     With regards to her hypothyroidism:    Current medication:  She has been off of levothyroxine and Cytoml for about a year now.    Results for YANA WISE (MRN [id number]) as of 4/30/2018 08:18   Ref. Range 3/20/2018 14:50   TSH Latest Ref Range: 0.400 - 4.000 uIU/mL 1.130   T3, Total Latest Ref Range: 60 - 180 ng/dL 92   Free T4 Latest Ref Range: 0.71 - 1.51 ng/dL 0.82     current symptoms:   + weight gain  + Fatigue   Constipation   + Hair loss  Denies Brittle nails  Denies Mental fog   No cp, palpations or sob  Denies heat/cold intolerance    Does not take selenium     Last BMD dated 12/13/2013  Impression   Normal spine and hip BMD. Total hip and lumbar spine BMD unchanged since 2010. Glucocorticoids adversely affect both the quantity and quality of bone.   Recommendations:  1) Adequate calcium and Vitamin D therapy  2) Appropriate exercise  3) Consider repeat BMD in 2- 4 years.     Review of Systems   Constitutional: Positive for fatigue and unexpected weight change (gain).   Eyes: Negative for visual disturbance.   Respiratory: Negative for cough and shortness of breath.    Cardiovascular: Negative for chest pain.   Gastrointestinal: Negative for abdominal pain.   Endocrine: Negative for cold intolerance, heat intolerance, polydipsia, polyphagia and polyuria.   Musculoskeletal: Positive for gait problem (using a cane today). Negative for arthralgias.   Skin: Negative for wound.   Neurological: Negative for headaches.    Hematological: Does not bruise/bleed easily.   Psychiatric/Behavioral: Negative for sleep disturbance.     Objective:   Physical Exam   Constitutional: She appears well-developed.   HENT:   Right Ear: External ear normal.   Left Ear: External ear normal.   Nose: Nose normal.   Hearing Normal     Neck: No tracheal deviation present. No thyromegaly present.   Cardiovascular: Normal rate.    No murmur heard.  No edema present   Pulmonary/Chest: Effort normal and breath sounds normal.   Abdominal: Soft. She exhibits no mass. No hernia.   Neurological: She is alert. No cranial nerve deficit or sensory deficit.   Skin: No rash noted.   No nodules.   Psychiatric: She has a normal mood and affect. Judgment normal.   Vitals reviewed.    Body mass index is 41.48 kg/m².    Lab Review:   Lab Results   Component Value Date    HGBA1C 6.0 (H) 03/07/2018    HGBA1C 6.0 (H) 03/07/2018     Lab Results   Component Value Date    CHOL 128 03/07/2018    HDL 33 (L) 03/07/2018    LDLCALC 75.8 03/07/2018    TRIG 96 03/07/2018    CHOLHDL 25.8 03/07/2018     Lab Results   Component Value Date     03/20/2018    K 4.1 03/20/2018     03/20/2018    CO2 27 03/20/2018    GLU 86 03/20/2018    BUN 7 (L) 03/20/2018    CREATININE 0.8 03/20/2018    CALCIUM 9.5 03/20/2018    PROT 7.1 03/20/2018    ALBUMIN 4.1 03/20/2018    BILITOT 0.7 03/20/2018    ALKPHOS 68 03/20/2018    AST 17 03/20/2018    ALT 9 (L) 03/20/2018    ANIONGAP 10 03/20/2018    ESTGFRAFRICA >60 03/20/2018    EGFRNONAA >60 03/20/2018    TSH 1.130 03/20/2018       Assessment and Plan     1. Hypothyroidism, unspecified type     2. Rheumatoid arthritis involving multiple sites with positive rheumatoid factor         Hypothyroidism  -- Clinically hypothyroid and biochemically euthyroid  -- Goal is a normal TSH  -- Avoid exogenous hyperthyroidism as this can accelerate bone loss and increase risk of CV complications.  -- Reviewed that symptoms of hypothyroidism may not correlate  with tsh, and a normal TSH is the goal of therapy.....  symptoms are not a justification for over treatment   -- Discussed at length patient should not take lt4 to treat her fatigue.    Ok to take selenium 100 mcg bid to try and decrease thyroid antibodies         Rheumatoid arthritis involving multiple sites with positive rheumatoid factor  -- Continue following with rheumatologist  -- per patient he will order her BMD that is due this year.       Follow-up if symptoms worsen or fail to improve.  To alert me if her TSH rises and we can try lt4 again    Case discussed with Dr. Stacy  Recommendations were discussed with the patient in detail  The patient verbalized understanding and agrees with the plan outlined as above.

## 2018-04-30 ENCOUNTER — OFFICE VISIT (OUTPATIENT)
Dept: ENDOCRINOLOGY | Facility: CLINIC | Age: 73
End: 2018-04-30
Payer: MEDICARE

## 2018-04-30 ENCOUNTER — PATIENT OUTREACH (OUTPATIENT)
Dept: ADMINISTRATIVE | Facility: HOSPITAL | Age: 73
End: 2018-04-30

## 2018-04-30 VITALS
DIASTOLIC BLOOD PRESSURE: 68 MMHG | HEIGHT: 70 IN | HEART RATE: 62 BPM | WEIGHT: 289.13 LBS | BODY MASS INDEX: 41.39 KG/M2 | SYSTOLIC BLOOD PRESSURE: 128 MMHG

## 2018-04-30 DIAGNOSIS — M05.79 RHEUMATOID ARTHRITIS INVOLVING MULTIPLE SITES WITH POSITIVE RHEUMATOID FACTOR: ICD-10-CM

## 2018-04-30 DIAGNOSIS — E03.9 HYPOTHYROIDISM, UNSPECIFIED TYPE: Primary | ICD-10-CM

## 2018-04-30 DIAGNOSIS — Z12.39 BREAST CANCER SCREENING: ICD-10-CM

## 2018-04-30 PROCEDURE — 99999 PR PBB SHADOW E&M-EST. PATIENT-LVL III: CPT | Mod: PBBFAC,,, | Performed by: NURSE PRACTITIONER

## 2018-04-30 PROCEDURE — 99213 OFFICE O/P EST LOW 20 MIN: CPT | Mod: PBBFAC | Performed by: NURSE PRACTITIONER

## 2018-04-30 PROCEDURE — 99203 OFFICE O/P NEW LOW 30 MIN: CPT | Mod: S$PBB,,, | Performed by: NURSE PRACTITIONER

## 2018-04-30 NOTE — LETTER
April 30, 2018      Jethro Ortiz MD  [address]  [address]  [facility] [address]           [facility] - Endocrinology  [address] [facility] [address]  Phone: [phone]          Patient: Yana Orellana   MR Number: [id number]   YOB: 1945   Date of Visit: 4/30/2018       Dear Dr. Jethro Ortiz:    Thank you for referring Yana Orellana to me for evaluation. Attached you will find relevant portions of my assessment and plan of care.    If you have questions, please do not hesitate to call me. I look forward to following Yana Orellana along with you.    Sincerely,    Jil Hidalgo, NP    Enclosure  CC:  No Recipients    If you would like to receive this communication electronically, please contact [email or web address] or [phone] to request more information on [facility] [employer] access.    For providers and/or their staff who would like to refer a patient to [facility], please contact us through our one-stop-shop provider referral line, [facility], at [phone].    If you feel you have received this communication in error or would no longer like to receive these types of communications, please e-mail [email or web address]

## 2018-04-30 NOTE — ASSESSMENT & PLAN NOTE
-- Continue following with rheumatologist  -- per patient he will order her BMD that is due this year.

## 2018-04-30 NOTE — ASSESSMENT & PLAN NOTE
-- Clinically hypothyroid and biochemically euthyroid  -- Goal is a normal TSH  -- Avoid exogenous hyperthyroidism as this can accelerate bone loss and increase risk of CV complications.  -- Reviewed that symptoms of hypothyroidism may not correlate with tsh, and a normal TSH is the goal of therapy.....  symptoms are not a justification for over treatment   -- Discussed at length patient should not take lt4 to treat her fatigue.    Ok to take selenium 100 mcg bid to try and decrease thyroid antibodies

## 2018-05-11 ENCOUNTER — NURSE TRIAGE (OUTPATIENT)
Dept: ADMINISTRATIVE | Facility: CLINIC | Age: 73
End: 2018-05-11

## 2018-05-12 NOTE — TELEPHONE ENCOUNTER
"  Reason for Disposition   [1] Fever AND [2] no signs of serious infection or localizing symptoms (all other triage questions negative)    Answer Assessment - Initial Assessment Questions  1. TEMPERATURE: "What is the most recent temperature?"  "How was it measured?"       100.2  2. ONSET: "When did the fever start?"       Early evening  3. SYMPTOMS: "Do you have any other symptoms besides the fever?"  (e.g., colds, headache, sore throat, earache, cough, rash, diarrhea, vomiting, abdominal pain)      no  4. CAUSE: If there are no symptoms, ask: "What do you think is causing the fever?"       unsure  5. CONTACTS: "Does anyone else in the family have an infection?"      n/a  6. TREATMENT: "What have you done so far to treat this fever?" (e.g., medications)      no  7. IMMUNOCOMPROMISE: "Do you have of the following: diabetes, HIV positive, splenectomy, cancer chemotherapy, chronic steroid treatment, transplant patient, etc."      On  mexotrexate  8. PREGNANCY: "Is there any chance you are pregnant?" "When was your last menstrual period?"      hyst  9. TRAVEL: "Have you traveled out of the country in the last month?" (e.g., travel history, exposures)      no    Protocols used: ST FEVER-A-AH    "

## 2018-05-14 NOTE — TELEPHONE ENCOUNTER
Spoke with pt and she said she is now feeling better and didn't need to come. Pt also stated that something bit her, left a make and then it disappeared. 5/14 BHUMI

## 2018-05-19 ENCOUNTER — NURSE TRIAGE (OUTPATIENT)
Dept: ADMINISTRATIVE | Facility: CLINIC | Age: 73
End: 2018-05-19

## 2018-05-19 NOTE — TELEPHONE ENCOUNTER
"  Reason for Disposition   [1] Fever > 100.5 F (38.1 C) AND [2] diabetes mellitus or weak immune system (e.g., HIV positive, splenectomy, chronic steroids)    Answer Assessment - Initial Assessment Questions  1. TEMPERATURE: "What is the most recent temperature?"  "How was it measured?"       100.5  2. ONSET: "When did the fever start?"       tonight  3. SYMPTOMS: "Do you have any other symptoms besides the fever?"  (e.g., colds, headache, sore throat, earache, cough, rash, diarrhea, vomiting, abdominal pain)      Not really,sore and still  4. CAUSE: If there are no symptoms, ask: "What do you think is causing the fever?"       unsure  5. CONTACTS: "Does anyone else in the family have an infection?"      n/a  6. TREATMENT: "What have you done so far to treat this fever?" (e.g., medications)      Drinking   7. IMMUNOCOMPROMISE: "Do you have of the following: diabetes, HIV positive, splenectomy, cancer chemotherapy, chronic steroid treatment, transplant patient, etc."      Yes ,mexotrexate  8. PREGNANCY: "Is there any chance you are pregnant?" "When was your last menstrual period?"      hyst  9. TRAVEL: "Have you traveled out of the country in the last month?" (e.g., travel history, exposures)      no    Protocols used: ST FEVER-A-AH    "

## 2018-05-21 NOTE — TELEPHONE ENCOUNTER
Spoke with MsKadeem Reyna and she said she keeps getting a fever on Friday evenings and they go away on Monday mornings. She don't need a doctor's appointment just need to know why this keep happening. 5/21 BHUMI

## 2018-05-21 NOTE — TELEPHONE ENCOUNTER
Spoke to Ms. Orellana and she states that she's not having any of these symptoms, but she will measure her vital this week and staff will call pt Friday before leaving the office to check on her. 5/21 BHUMI

## 2018-05-28 ENCOUNTER — TELEPHONE (OUTPATIENT)
Dept: INTERNAL MEDICINE | Facility: CLINIC | Age: 73
End: 2018-05-28

## 2018-05-28 NOTE — TELEPHONE ENCOUNTER
----- Message from Jethro Ortiz MD sent at 5/28/2018  9:34 AM CDT -----  I would advise next time she has a fever to go into Urgent Care to have it documented and have cultures drawn.  ----- Message -----  From: Tanvir Padilla MA  Sent: 5/28/2018   9:32 AM  To: Jethro Ortiz MD    Spoke with pt and she had a fever of 101.9 and felt sick all weekend but she's feeling fine now.

## 2018-06-15 ENCOUNTER — TELEPHONE (OUTPATIENT)
Dept: RHEUMATOLOGY | Facility: CLINIC | Age: 73
End: 2018-06-15

## 2018-06-15 DIAGNOSIS — M06.9 RHEUMATOID ARTHRITIS, INVOLVING UNSPECIFIED SITE, UNSPECIFIED RHEUMATOID FACTOR PRESENCE: Primary | ICD-10-CM

## 2018-06-15 NOTE — TELEPHONE ENCOUNTER
----- Message from Laura Aquino MA sent at 6/15/2018  1:56 PM CDT -----  Contact: Pt      ----- Message -----  From: Thea Gonzalez  Sent: 6/15/2018   1:49 PM  To: Nghia [facility]ILLER Staff    Pt is needing a refill on her medication says she had a prescription for it but misplaced it    methotrexate 2.5 MG Tab    Pt can be reached at [phone]

## 2018-06-18 ENCOUNTER — LAB VISIT (OUTPATIENT)
Dept: LAB | Facility: OTHER | Age: 73
End: 2018-06-18
Payer: MEDICARE

## 2018-06-18 DIAGNOSIS — M06.9 RHEUMATOID ARTHRITIS, INVOLVING UNSPECIFIED SITE, UNSPECIFIED RHEUMATOID FACTOR PRESENCE: ICD-10-CM

## 2018-06-18 DIAGNOSIS — M05.79 RHEUMATOID ARTHRITIS INVOLVING MULTIPLE SITES WITH POSITIVE RHEUMATOID FACTOR: ICD-10-CM

## 2018-06-18 LAB
ALBUMIN SERPL BCP-MCNC: 2.9 G/DL
ALP SERPL-CCNC: 93 U/L
ALT SERPL W/O P-5'-P-CCNC: 24 U/L
ANION GAP SERPL CALC-SCNC: 10 MMOL/L
AST SERPL-CCNC: 44 U/L
BASOPHILS # BLD AUTO: 0.05 K/UL
BASOPHILS NFR BLD: 0.9 %
BILIRUB SERPL-MCNC: 1 MG/DL
BUN SERPL-MCNC: 9 MG/DL
CALCIUM SERPL-MCNC: 8.5 MG/DL
CHLORIDE SERPL-SCNC: 111 MMOL/L
CO2 SERPL-SCNC: 19 MMOL/L
CREAT SERPL-MCNC: 0.7 MG/DL
DIFFERENTIAL METHOD: ABNORMAL
EOSINOPHIL # BLD AUTO: 0.3 K/UL
EOSINOPHIL NFR BLD: 4.8 %
ERYTHROCYTE [DISTWIDTH] IN BLOOD BY AUTOMATED COUNT: 14.8 %
ERYTHROCYTE [SEDIMENTATION RATE] IN BLOOD: 31 MM/HR
EST. GFR  (AFRICAN AMERICAN): >60 ML/MIN/1.73 M^2
EST. GFR  (NON AFRICAN AMERICAN): >60 ML/MIN/1.73 M^2
GLUCOSE SERPL-MCNC: 242 MG/DL
HCT VFR BLD AUTO: 43.3 %
HGB BLD-MCNC: 14.8 G/DL
LYMPHOCYTES # BLD AUTO: 1.3 K/UL
LYMPHOCYTES NFR BLD: 23.3 %
MCH RBC QN AUTO: 33.7 PG
MCHC RBC AUTO-ENTMCNC: 34.2 G/DL
MCV RBC AUTO: 99 FL
MONOCYTES # BLD AUTO: 0.6 K/UL
MONOCYTES NFR BLD: 11.3 %
NEUTROPHILS # BLD AUTO: 3.3 K/UL
NEUTROPHILS NFR BLD: 59.5 %
PLATELET # BLD AUTO: 103 K/UL
PMV BLD AUTO: 9.9 FL
POTASSIUM SERPL-SCNC: 3.8 MMOL/L
PROT SERPL-MCNC: 6.9 G/DL
RBC # BLD AUTO: 4.39 M/UL
SODIUM SERPL-SCNC: 140 MMOL/L
WBC # BLD AUTO: 5.59 K/UL

## 2018-06-18 PROCEDURE — 85025 COMPLETE CBC W/AUTO DIFF WBC: CPT

## 2018-06-18 PROCEDURE — 80053 COMPREHEN METABOLIC PANEL: CPT

## 2018-06-18 PROCEDURE — 85651 RBC SED RATE NONAUTOMATED: CPT

## 2018-06-18 PROCEDURE — 36415 COLL VENOUS BLD VENIPUNCTURE: CPT

## 2018-06-19 ENCOUNTER — TELEPHONE (OUTPATIENT)
Dept: RHEUMATOLOGY | Facility: CLINIC | Age: 73
End: 2018-06-19

## 2018-06-19 DIAGNOSIS — M05.79 RHEUMATOID ARTHRITIS INVOLVING MULTIPLE SITES WITH POSITIVE RHEUMATOID FACTOR: ICD-10-CM

## 2018-06-19 RX ORDER — METHOTREXATE 2.5 MG/1
17.5 TABLET ORAL
Qty: 91 TABLET | Refills: 0 | Status: SHIPPED | OUTPATIENT
Start: 2018-06-19 | End: 2018-07-11 | Stop reason: SDUPTHER

## 2018-06-19 NOTE — TELEPHONE ENCOUNTER
Rx faxed to [facility] ([phone]). As ordered by Dr. Sharif: methotrexate 2.5 mg. 7 tabs by mouth every 7 days. Dispense 91.

## 2018-06-19 NOTE — TELEPHONE ENCOUNTER
Methotrexate refilled.  Patient has low platelets but has had this in the past.  Will give a 1 month supply. Will need to follow with Dr. Agrawal for additional medication.

## 2018-07-11 ENCOUNTER — OFFICE VISIT (OUTPATIENT)
Dept: RHEUMATOLOGY | Facility: CLINIC | Age: 73
End: 2018-07-11
Payer: MEDICARE

## 2018-07-11 VITALS — HEIGHT: 70 IN | BODY MASS INDEX: 41.37 KG/M2 | WEIGHT: 289 LBS

## 2018-07-11 DIAGNOSIS — F41.9 ANXIETY: ICD-10-CM

## 2018-07-11 DIAGNOSIS — Z79.899 HIGH RISK MEDICATION USE: Chronic | ICD-10-CM

## 2018-07-11 DIAGNOSIS — M05.79 RHEUMATOID ARTHRITIS INVOLVING MULTIPLE SITES WITH POSITIVE RHEUMATOID FACTOR: Primary | ICD-10-CM

## 2018-07-11 PROCEDURE — 99212 OFFICE O/P EST SF 10 MIN: CPT | Mod: PBBFAC | Performed by: INTERNAL MEDICINE

## 2018-07-11 PROCEDURE — 99214 OFFICE O/P EST MOD 30 MIN: CPT | Mod: S$PBB,,, | Performed by: INTERNAL MEDICINE

## 2018-07-11 PROCEDURE — 99999 PR PBB SHADOW E&M-EST. PATIENT-LVL II: CPT | Mod: PBBFAC,,, | Performed by: INTERNAL MEDICINE

## 2018-07-11 RX ORDER — METHOTREXATE 2.5 MG/1
15 TABLET ORAL
Qty: 30 TABLET | Refills: 2 | Status: SHIPPED | OUTPATIENT
Start: 2018-07-11 | End: 2018-10-17

## 2018-07-11 RX ORDER — DIAZEPAM 5 MG/1
5 TABLET ORAL EVERY 6 HOURS PRN
Qty: 30 TABLET | Refills: 0 | Status: SHIPPED | OUTPATIENT
Start: 2018-07-11 | End: 2019-03-18 | Stop reason: SDUPTHER

## 2018-07-11 RX ORDER — PREDNISONE 5 MG/1
TABLET ORAL
Qty: 20 TABLET | Refills: 1 | Status: SHIPPED | OUTPATIENT
Start: 2018-07-11 | End: 2019-04-17 | Stop reason: SDUPTHER

## 2018-07-11 ASSESSMENT — ROUTINE ASSESSMENT OF PATIENT INDEX DATA (RAPID3)
TOTAL RAPID3 SCORE: 2.28
MDHAQ FUNCTION SCORE: 1
PATIENT GLOBAL ASSESSMENT SCORE: 2
FATIGUE SCORE: 6
PAIN SCORE: 1.5
AM STIFFNESS SCORE: 0, NO

## 2018-07-11 NOTE — ASSESSMENT & PLAN NOTE
RA with only one swollen tender joint today and recent ESR 31  AST sl elevated  Reduce mtx to 15 mg/week  Plan repeat lab 3 mo and RTC 6m

## 2018-07-11 NOTE — PROGRESS NOTES
"Subjective:       Patient ID: Yana Orellana is a 73 y.o. female.    Chief Complaint: Disease Management    HPI   Mod pos CCP/RF RA since 2009    MTX 2011 - present      Hx lung cancer dx Nov 2009, treated with surgery   Hx polio age 18; "mostly back and neck"     Current:  mtx 17.5 / week    Joints ok; no recent flareups  Some L index MCP pain that is better after wrapping    Hospitalized March with pneumonia  Off of oxycodone since hospitalization  2 weeks ago fell; at home in bedroom, stood up from a stoop and turned and then lost balance and fell and pulled muscle in R calf ; no bruising; rested and now better though not back to baseline  Took some tylenol    Review of Systems   Constitutional: Positive for fever. Negative for unexpected weight change.        Gets fever every Friday   HENT: Negative for mouth sores and trouble swallowing.         No Dry mouth   Eyes: Negative for redness.        No Dry eyes   Respiratory: Negative for cough and shortness of breath.    Cardiovascular: Negative for chest pain.   Gastrointestinal: Positive for diarrhea. Negative for abdominal pain and constipation.   Genitourinary: Negative for dysuria and genital sores.   Skin: Negative for rash.   Neurological: Negative for headaches.   Hematological: Negative for adenopathy. Does not bruise/bleed easily.   Psychiatric/Behavioral: Negative for sleep disturbance.         Objective:   Ht 5' 10" (1.778 m)   Wt 131.1 kg (289 lb)   BMI 41.47 kg/m²      Physical Exam     Physical Exam     Tenderness:   Left hand: 2nd MCP    Swelling:   Left hand: 2nd MCP    VILLAFANA-28 tender joint count: 1  VILLAFANA-28 swollen joint count: 1  ESR (mm/hr): 31  Patient global assessment: 20  VILLAFANA-28 score: 3.52 (Moderate Disease Activity)       Lab Results   Component Value Date    SEDRATE 31 (H) 06/18/2018          Assessment:       1. Rheumatoid arthritis involving multiple sites with positive rheumatoid factor    2. High risk medication use      "       Plan:       Problem List Items Addressed This Visit     High risk medication use (Chronic)     AST sl increase and RA ok so will reduce to 15 mg/week  Cont lab q3m         Rheumatoid arthritis involving multiple sites with positive rheumatoid factor     RA with only one swollen tender joint today and recent ESR 31  AST sl elevated  Reduce mtx to 15 mg/week  Plan repeat lab 3 mo and RTC 6m

## 2018-08-15 NOTE — PROGRESS NOTES
Subjective:       Patient ID: Yana Orellana is a 73 y.o. female with a PMH significant for RA, Post-polio Syndrome, Anxiety/Depression, Pneumonitis, PVCs, Anemia, History of Lung Cancer, Hypothyroidism, Obesity, Fibromyalgia who presents who was seen initially by me on 3/20/2018 for a hospital discharge (3/6/2018-3/9/2018) for Pneumonitis - treated with Azithromycin, Duonebs, Solumedrol.  She was here for follow up on 4/20/2018.    Chief Complaint: Follow-up and Fever (patient states she gets fever every Friday evening)    HPI  Patient complains of weekly - every Friday nights (only) and been ongoing for months.  Sometimes gets chills. No associated cough, SOB, Has, body aches.  Tmax around 100.5.     Patient denies f/c, n/v/d.  No chest pain.  No abdominal pain or dysuria.  No headaches or change in vision.  No dizziness.  Remaining ROS negative.    Review of Systems   Constitutional: Negative for appetite change, chills, diaphoresis, fatigue, fever and unexpected weight change.   HENT: Negative for ear pain, hearing loss, sinus pain, tinnitus, trouble swallowing and voice change.    Eyes: Negative for photophobia, pain and visual disturbance.   Respiratory: Negative for chest tightness, shortness of breath and wheezing.    Cardiovascular: Negative for chest pain, palpitations and leg swelling.   Gastrointestinal: Negative for abdominal pain, blood in stool, constipation, diarrhea, nausea and vomiting.   Endocrine: Negative for cold intolerance, heat intolerance, polydipsia, polyphagia and polyuria.   Genitourinary: Negative for decreased urine volume, difficulty urinating, dysuria, flank pain, hematuria, pelvic pain, vaginal bleeding, vaginal discharge and vaginal pain.   Musculoskeletal: Negative for arthralgias, gait problem, joint swelling, myalgias and neck pain.   Skin: Negative for rash.   Neurological: Negative for dizziness, tremors, syncope, weakness, numbness and headaches.   Hematological: Does  not bruise/bleed easily.   Psychiatric/Behavioral: Negative for agitation, confusion and sleep disturbance. The patient is not nervous/anxious.        Objective:      Physical Exam   Constitutional: She is oriented to person, place, and time. She appears well-nourished. No distress.   obese   HENT:   Head: Normocephalic and atraumatic.   Nose: Nose normal.   Mouth/Throat: Oropharynx is clear and moist.   Eyes: Conjunctivae and EOM are normal. Pupils are equal, round, and reactive to light. No scleral icterus.   Neck: Normal range of motion. Neck supple. No JVD present. No thyromegaly present.   Cardiovascular: Normal rate, regular rhythm and intact distal pulses. Exam reveals no gallop and no friction rub.   No murmur heard.  Pulmonary/Chest: Effort normal and breath sounds normal. No respiratory distress. She has no wheezes. She has no rales.   Abdominal: Soft. Bowel sounds are normal. She exhibits no distension. There is no tenderness. There is no rebound and no guarding.   Musculoskeletal: Normal range of motion. She exhibits no edema.   Lymphadenopathy:     She has no cervical adenopathy.   Neurological: She is alert and oriented to person, place, and time. No cranial nerve deficit or sensory deficit.   Skin: Skin is warm and dry. No rash noted. No erythema.   Psychiatric: She has a normal mood and affect. Her behavior is normal. Thought content normal.       Assessment:       1. Fever, unspecified fever cause    2. Elevated liver enzymes    3. Morbid obesity    4. Hypothyroidism, unspecified type    5. Malignant neoplasm of lower lobe of left lung    6. Fibromyalgia    7. Rheumatoid arthritis involving multiple sites with positive rheumatoid factor        Plan:   -Today's Visit - BMI 41.76 in 4/2018.  Discussed diet modification and exercise.  She thinks when her dentures/teeth are fixed, she can change her diet to help support weight loss.  We discussed Bariatric Medicine and she declined. BMI today is  41.33.    -Recent Hospital Admission - (3/6/2018-3/9/2018) for Pneumonitis - treated with Azithromycin, Duonebs, Solumedrol.    -ID - Weekly Low-Grade Fevers - last on prednisone about 6 months ago.  No recent travels in the past year.  As above, no symptoms.  On MTX with reduced dose lately.  I will send to ID for evaluation given her immunosuppresion.    -Pulmonary - Pneumonitis and history of Lung Cancer - CTA Chest showed no PE, patchy multifocal GGO and diffuse centrolobular emphysema.  S/p Azithromycin and Duonebs.  S/p Solumdrol.  For Lung Cancer (11/2009 - [id number] Stage IIA), S/p LLL lobectomy and completed 3/4 planned cycles of chemo.  Last seen by Oncology in 2/2015 (was to return in a year).   Followed up with Pulmonary - had appointment 3/22/2018 with Dr. Ari Dumont ([facility]).  Now off oxygen and Flovent stopped.  Breathing stable.    -Rheum - RA (2009 - moderate positive CCP/RF) and Fibromyalgia - On Methotrexate since 2011.  On prn  Prednisone.  On Folic Acid.  Followed by Rheum and last seen 7/11/2018 - MTX reduced to 15mg/week given increased AST.  Follow up recommended in 3 months for labs and 6 month follow up visit.    -Endocrine - Hyothyroidism - off Levothyroxine and Liothyronine for about a year. TSH, fT4 and T3 all normal in 3/2018 - recommended to follow off medication for now.   Repeat TFTs today.  She thinks maybe she was treated for subclinical hypothyroidism in past.  Vitamin D Insufficient - Level was 15 in 4/2018 and recommended D3 at 2000 units daily.  Repeat level.  A1C was 6.0 in 3/2018 - consider Metformin.  Repeat A1C today.  On chronic low-dose prednisone which may be contributing.  Had Endocrine follow up on 4/30/2018 - recommended Selenium to try to decrease thyroid antibodies.    -Heme - Thrombocytopenia - Plts 105 on 3/9/2018.  Was 134 in 4/2017. Will follow closely for now.  Repeat CBC.    -Neuro - Post-polio Syndrome (polio at age 18) -  On Oxycodone (tapering down -  1/2 pill every few days) and Valium prn.  Followed by Rheumatologist as well.  She is concerned abouther walking, but wants to wait until after her dental work is done.    -Ortho - Broke Left Elbow in October 2017 and healed now.    -Cards - HLD - Lipids in 3/2018 with  (HDL low at 33).  Follow for now.  BP today stable today.  Repeat lipids.    -GYN - S/p Hysterectomy.  S/p Ovarian Cyst Removal.  Last Mammo negative in 9/2015 and was rescheduled in 4/2018, but not yet done - wishes to wait until after her dental work is done.  We discussed DXA screening for osteoporosis - DXA in 12/2013 was normal.  Consider repeat.    -GI - Colon Polyps - last colonoscopy in 8/2012 - polyps in sigmoid, proximal ascending colon, ascending colon, and cecum.  Recommended repeat in 3 years - she still does not want follow up at this time.  Elevated AST of 75 in 3/6/2018 (normal ALT, Alk phos, and Bili).  Has been elevated since at least 1/2017.  Check Hepatitis serologies - HCV Ab negative in 2009.  Repeat CMP with GGT and CPK all normal in 3/2018.    -Dental - plans to have full dental implants - going for consultation next week.    -HCM - We discussed Flu (10/2017) and Tdap (last was 2004 and at this time doesn't want to get one now - has had whelps in the past) vaccinations.  We discussed Shingles (did not have due to cost) and Pneumococcal (25 had twice and had Prevnar 13 in 2016) vaccinations.      -Follow up in 4 months

## 2018-08-17 ENCOUNTER — LAB VISIT (OUTPATIENT)
Dept: LAB | Facility: HOSPITAL | Age: 73
End: 2018-08-17
Attending: INTERNAL MEDICINE
Payer: MEDICARE

## 2018-08-17 ENCOUNTER — OFFICE VISIT (OUTPATIENT)
Dept: PRIMARY CARE CLINIC | Facility: CLINIC | Age: 73
End: 2018-08-17
Payer: MEDICARE

## 2018-08-17 VITALS
HEIGHT: 70 IN | SYSTOLIC BLOOD PRESSURE: 130 MMHG | BODY MASS INDEX: 41.24 KG/M2 | DIASTOLIC BLOOD PRESSURE: 60 MMHG | HEART RATE: 80 BPM | TEMPERATURE: 99 F | OXYGEN SATURATION: 96 % | WEIGHT: 288.06 LBS

## 2018-08-17 DIAGNOSIS — R73.03 PRE-DIABETES: ICD-10-CM

## 2018-08-17 DIAGNOSIS — M79.7 FIBROMYALGIA: Chronic | ICD-10-CM

## 2018-08-17 DIAGNOSIS — R50.9 FEVER, UNSPECIFIED FEVER CAUSE: Primary | ICD-10-CM

## 2018-08-17 DIAGNOSIS — E55.9 VITAMIN D INSUFFICIENCY: ICD-10-CM

## 2018-08-17 DIAGNOSIS — E66.01 MORBID OBESITY: ICD-10-CM

## 2018-08-17 DIAGNOSIS — E78.5 HYPERLIPIDEMIA, UNSPECIFIED HYPERLIPIDEMIA TYPE: ICD-10-CM

## 2018-08-17 DIAGNOSIS — R74.8 ELEVATED LIVER ENZYMES: ICD-10-CM

## 2018-08-17 DIAGNOSIS — C34.32 MALIGNANT NEOPLASM OF LOWER LOBE OF LEFT LUNG: Chronic | ICD-10-CM

## 2018-08-17 DIAGNOSIS — E03.9 HYPOTHYROIDISM, UNSPECIFIED TYPE: ICD-10-CM

## 2018-08-17 DIAGNOSIS — M05.79 RHEUMATOID ARTHRITIS INVOLVING MULTIPLE SITES WITH POSITIVE RHEUMATOID FACTOR: ICD-10-CM

## 2018-08-17 LAB
25(OH)D3+25(OH)D2 SERPL-MCNC: 12 NG/ML
CHOLEST SERPL-MCNC: 141 MG/DL
CHOLEST/HDLC SERPL: 3.8 {RATIO}
ESTIMATED AVG GLUCOSE: 117 MG/DL
HBA1C MFR BLD HPLC: 5.7 %
HDLC SERPL-MCNC: 37 MG/DL
HDLC SERPL: 26.2 %
LDLC SERPL CALC-MCNC: 83.4 MG/DL
NONHDLC SERPL-MCNC: 104 MG/DL
T3 SERPL-MCNC: 86 NG/DL
T4 FREE SERPL-MCNC: 0.85 NG/DL
TRIGL SERPL-MCNC: 103 MG/DL
TSH SERPL DL<=0.005 MIU/L-ACNC: 3.32 UIU/ML

## 2018-08-17 PROCEDURE — 99214 OFFICE O/P EST MOD 30 MIN: CPT | Mod: PBBFAC,PN | Performed by: INTERNAL MEDICINE

## 2018-08-17 PROCEDURE — 80061 LIPID PANEL: CPT

## 2018-08-17 PROCEDURE — 36415 COLL VENOUS BLD VENIPUNCTURE: CPT | Mod: PN

## 2018-08-17 PROCEDURE — 86706 HEP B SURFACE ANTIBODY: CPT

## 2018-08-17 PROCEDURE — 84443 ASSAY THYROID STIM HORMONE: CPT

## 2018-08-17 PROCEDURE — 84439 ASSAY OF FREE THYROXINE: CPT

## 2018-08-17 PROCEDURE — 84480 ASSAY TRIIODOTHYRONINE (T3): CPT

## 2018-08-17 PROCEDURE — 86704 HEP B CORE ANTIBODY TOTAL: CPT

## 2018-08-17 PROCEDURE — 87340 HEPATITIS B SURFACE AG IA: CPT

## 2018-08-17 PROCEDURE — 82306 VITAMIN D 25 HYDROXY: CPT

## 2018-08-17 PROCEDURE — 99214 OFFICE O/P EST MOD 30 MIN: CPT | Mod: S$PBB,,, | Performed by: INTERNAL MEDICINE

## 2018-08-17 PROCEDURE — 99999 PR PBB SHADOW E&M-EST. PATIENT-LVL IV: CPT | Mod: PBBFAC,,, | Performed by: INTERNAL MEDICINE

## 2018-08-17 PROCEDURE — 83036 HEMOGLOBIN GLYCOSYLATED A1C: CPT

## 2018-08-17 NOTE — PATIENT INSTRUCTIONS
Your exam was overall normal today.  Your blood pressure was good.  I will order routine fasting labs today - at least 6-8 hours of fasting.  For your recurrent fevers, I will refer you to Infectious Disease for further evaluation.  Return in 4 months - sooner if needed.  Please come at least 15-20 minutes before your scheduled appointment time.

## 2018-08-19 RX ORDER — ERGOCALCIFEROL 1.25 MG/1
50000 CAPSULE ORAL
Qty: 12 CAPSULE | Refills: 0 | Status: SHIPPED | OUTPATIENT
Start: 2018-08-19 | End: 2018-11-17

## 2018-08-20 ENCOUNTER — TELEPHONE (OUTPATIENT)
Dept: PRIMARY CARE CLINIC | Facility: CLINIC | Age: 73
End: 2018-08-20

## 2018-08-20 LAB
HBV CORE AB SERPL QL IA: NEGATIVE
HBV SURFACE AB SER-ACNC: NEGATIVE M[IU]/ML
HBV SURFACE AG SERPL QL IA: NEGATIVE

## 2018-08-20 NOTE — TELEPHONE ENCOUNTER
Spoke with patient and she had a verbal understanding her vitamin d level was low and that she is to start taking vitamin d2 at 50,000 units once a week for 12 weeks and to start vitamin d3 daily when that is finished. Pt had verbal understanding the rest of her labs looks ok.    ----- Message from Jehtro Ortiz MD sent at 8/19/2018  8:00 AM CDT -----  Please let patient know the following:  Your vitamin D level was low.  I will prescribe Vitamin D2 at 50,000 units to take once a week for 12 weeks, then change to over the counter Vitamin D3 at 2000 units once a day.  We will follow the levels periodically.  Your thyroid function was normal.  Your Hgb A1C (diabetic test) was better.  Your cholesterol panel was overall good.

## 2018-10-11 ENCOUNTER — LAB VISIT (OUTPATIENT)
Dept: LAB | Facility: OTHER | Age: 73
End: 2018-10-11
Payer: MEDICARE

## 2018-10-11 DIAGNOSIS — M05.79 RHEUMATOID ARTHRITIS INVOLVING MULTIPLE SITES WITH POSITIVE RHEUMATOID FACTOR: ICD-10-CM

## 2018-10-11 DIAGNOSIS — Z79.899 HIGH RISK MEDICATION USE: Chronic | ICD-10-CM

## 2018-10-11 LAB
ALBUMIN SERPL BCP-MCNC: 2.8 G/DL
ALP SERPL-CCNC: 92 U/L
ALT SERPL W/O P-5'-P-CCNC: 22 U/L
ANION GAP SERPL CALC-SCNC: 8 MMOL/L
AST SERPL-CCNC: 46 U/L
BASOPHILS # BLD AUTO: 0.08 K/UL
BASOPHILS NFR BLD: 1.8 %
BILIRUB SERPL-MCNC: 1.4 MG/DL
BUN SERPL-MCNC: 13 MG/DL
CALCIUM SERPL-MCNC: 8.5 MG/DL
CHLORIDE SERPL-SCNC: 109 MMOL/L
CO2 SERPL-SCNC: 23 MMOL/L
CREAT SERPL-MCNC: 0.6 MG/DL
DIFFERENTIAL METHOD: ABNORMAL
EOSINOPHIL # BLD AUTO: 0.2 K/UL
EOSINOPHIL NFR BLD: 4.4 %
ERYTHROCYTE [DISTWIDTH] IN BLOOD BY AUTOMATED COUNT: 15 %
ERYTHROCYTE [SEDIMENTATION RATE] IN BLOOD: 30 MM/HR
EST. GFR  (AFRICAN AMERICAN): >60 ML/MIN/1.73 M^2
EST. GFR  (NON AFRICAN AMERICAN): >60 ML/MIN/1.73 M^2
GLUCOSE SERPL-MCNC: 106 MG/DL
HCT VFR BLD AUTO: 38.5 %
HGB BLD-MCNC: 12.9 G/DL
LYMPHOCYTES # BLD AUTO: 1.7 K/UL
LYMPHOCYTES NFR BLD: 38.9 %
MCH RBC QN AUTO: 33.9 PG
MCHC RBC AUTO-ENTMCNC: 33.5 G/DL
MCV RBC AUTO: 101 FL
MONOCYTES # BLD AUTO: 0.5 K/UL
MONOCYTES NFR BLD: 11.1 %
NEUTROPHILS # BLD AUTO: 1.9 K/UL
NEUTROPHILS NFR BLD: 43.6 %
PLATELET # BLD AUTO: 107 K/UL
PMV BLD AUTO: 10.3 FL
POTASSIUM SERPL-SCNC: 4.1 MMOL/L
PROT SERPL-MCNC: 6.6 G/DL
RBC # BLD AUTO: 3.81 M/UL
SODIUM SERPL-SCNC: 140 MMOL/L
WBC # BLD AUTO: 4.34 K/UL

## 2018-10-11 PROCEDURE — 80053 COMPREHEN METABOLIC PANEL: CPT

## 2018-10-11 PROCEDURE — 85651 RBC SED RATE NONAUTOMATED: CPT

## 2018-10-11 PROCEDURE — 36415 COLL VENOUS BLD VENIPUNCTURE: CPT

## 2018-10-11 PROCEDURE — 85025 COMPLETE CBC W/AUTO DIFF WBC: CPT

## 2018-10-17 ENCOUNTER — OFFICE VISIT (OUTPATIENT)
Dept: RHEUMATOLOGY | Facility: CLINIC | Age: 73
End: 2018-10-17
Payer: MEDICARE

## 2018-10-17 VITALS — HEIGHT: 70 IN | BODY MASS INDEX: 41.33 KG/M2

## 2018-10-17 DIAGNOSIS — G14 POST-POLIO SYNDROME: ICD-10-CM

## 2018-10-17 DIAGNOSIS — E66.01 MORBID OBESITY: ICD-10-CM

## 2018-10-17 DIAGNOSIS — Z79.899 HIGH RISK MEDICATION USE: Chronic | ICD-10-CM

## 2018-10-17 DIAGNOSIS — M05.79 RHEUMATOID ARTHRITIS INVOLVING MULTIPLE SITES WITH POSITIVE RHEUMATOID FACTOR: Primary | ICD-10-CM

## 2018-10-17 PROCEDURE — 99213 OFFICE O/P EST LOW 20 MIN: CPT | Mod: PBBFAC | Performed by: INTERNAL MEDICINE

## 2018-10-17 PROCEDURE — 99999 PR PBB SHADOW E&M-EST. PATIENT-LVL III: CPT | Mod: PBBFAC,,, | Performed by: INTERNAL MEDICINE

## 2018-10-17 PROCEDURE — 99214 OFFICE O/P EST MOD 30 MIN: CPT | Mod: S$PBB,,, | Performed by: INTERNAL MEDICINE

## 2018-10-17 RX ORDER — OXYCODONE AND ACETAMINOPHEN 5; 325 MG/1; MG/1
TABLET ORAL
Refills: 0 | COMMUNITY
Start: 2018-10-02 | End: 2019-03-18

## 2018-10-17 RX ORDER — METHOTREXATE 2.5 MG/1
12.5 TABLET ORAL
Qty: 65 TABLET | Refills: 0 | Status: SHIPPED | OUTPATIENT
Start: 2018-10-17 | End: 2019-05-02 | Stop reason: SDUPTHER

## 2018-10-17 RX ORDER — AMOXICILLIN AND CLAVULANATE POTASSIUM 500; 125 MG/1; MG/1
TABLET, FILM COATED ORAL
Refills: 0 | COMMUNITY
Start: 2018-10-02 | End: 2019-03-18

## 2018-10-17 ASSESSMENT — ROUTINE ASSESSMENT OF PATIENT INDEX DATA (RAPID3)
PATIENT GLOBAL ASSESSMENT SCORE: 3
MDHAQ FUNCTION SCORE: .9
FATIGUE SCORE: 1
PAIN SCORE: 1
AM STIFFNESS SCORE: 0, NO
TOTAL RAPID3 SCORE: 2.33
PSYCHOLOGICAL DISTRESS SCORE: 0

## 2018-10-17 NOTE — PROGRESS NOTES
"Subjective:       Patient ID: Yana Orellana is a 73 y.o. female.    Chief Complaint: Disease Management    Mod pos CCP/RF RA since 2009    MTX 2011 - present      Hx lung cancer dx Nov 2009, treated with surgery   Hx polio age 18; "mostly back and neck"     Current:  mtx 15/week      Having dental work  Has dental infection  A lot of dental pain    RA has been fine  No interval prednisone    Review of Systems   Constitutional: Positive for fever. Negative for fatigue and unexpected weight change.   HENT: Negative for mouth sores and trouble swallowing.         Dry mouth   Eyes: Negative for redness.        Dry eyes   Respiratory: Negative for cough and shortness of breath.    Cardiovascular: Negative for chest pain.   Gastrointestinal: Negative for abdominal pain, constipation and diarrhea.   Skin: Negative for rash.   Neurological: Negative for headaches.   Hematological: Negative for adenopathy. Bruises/bleeds easily.   Psychiatric/Behavioral: Negative for sleep disturbance.         Objective:   Ht 5' 10" (1.778 m)   BMI 41.33 kg/m²      Physical Exam   HENT:   Has dental implants in progress   Neurological:   Walks with wide based gait and unsteady   Musculoskeletal:   No swelling or tenderness            Physical Exam     VILLAFANA-28 tender joint count: 0  VILLAFANA-28 swollen joint count: 0  ESR (mm/hr): 30  Patient global assessment: 30  VILLAFANA-28 score: 2.8 (Low Disease Activity)    Lab Results   Component Value Date    SEDRATE 30 (H) 10/11/2018     Lab Results   Component Value Date    WBC 4.34 10/11/2018    HGB 12.9 10/11/2018    HCT 38.5 10/11/2018     (H) 10/11/2018     (L) 10/11/2018      Lab Results   Component Value Date    ALT 22 10/11/2018       Assessment:       1. Rheumatoid arthritis involving multiple sites with positive rheumatoid factor    2. Morbid obesity    3. Post-polio syndrome    4. High risk medication use            Plan:       Problem List Items Addressed This Visit        " Active Problems    High risk medication use (Chronic)     Low platelets  Sl elevated AST but normal ALT  Will repeat lab after decreasing mtx dose         Morbid obesity    Post-polio syndrome     Has gait imbalance  Will order PT         Relevant Orders    Ambulatory Referral to Physical/Occupational Therapy    Rheumatoid arthritis involving multiple sites with positive rheumatoid factor - Primary     RA is in remission  Some thrombocytopenia on mtx  Will lower mtx          Relevant Medications    methotrexate 2.5 MG Tab

## 2018-11-02 ENCOUNTER — CLINICAL SUPPORT (OUTPATIENT)
Dept: REHABILITATION | Facility: OTHER | Age: 73
End: 2018-11-02
Payer: MEDICARE

## 2018-11-02 DIAGNOSIS — R29.898 WEAKNESS OF BOTH HIPS: ICD-10-CM

## 2018-11-02 DIAGNOSIS — R26.81 GAIT INSTABILITY: ICD-10-CM

## 2018-11-02 DIAGNOSIS — Z74.09 IMPAIRED FUNCTIONAL MOBILITY, BALANCE, GAIT, AND ENDURANCE: ICD-10-CM

## 2018-11-02 PROCEDURE — G8978 MOBILITY CURRENT STATUS: HCPCS | Mod: CK,PN

## 2018-11-02 PROCEDURE — 97162 PT EVAL MOD COMPLEX 30 MIN: CPT | Mod: PN

## 2018-11-02 PROCEDURE — G8979 MOBILITY GOAL STATUS: HCPCS | Mod: CK,PN

## 2018-11-05 PROBLEM — Z74.09 IMPAIRED FUNCTIONAL MOBILITY, BALANCE, GAIT, AND ENDURANCE: Status: ACTIVE | Noted: 2018-11-05

## 2018-11-05 PROBLEM — R26.81 GAIT INSTABILITY: Status: ACTIVE | Noted: 2018-11-05

## 2018-11-05 PROBLEM — R29.898 WEAKNESS OF BOTH HIPS: Status: ACTIVE | Noted: 2018-11-05

## 2018-11-05 NOTE — PLAN OF CARE
[facility]  Physical Therapy Initial Evaluation    Name: Yana Orellana  Clinic Number: [id number]    Therapy Diagnosis:   Encounter Diagnoses   Name Primary?    Gait instability     Impaired functional mobility, balance, gait, and endurance     Weakness of both hips      Physician: Smooth Agrawal, *    Physician Orders: PT Evaluate and treat gait imbalance  Medical Diagnosis: G14 (ICD-10-CM) - Post-polio syndrome  Evaluation Date: 11/2/2018  Authorization Period Expiration: 12/31/18  Plan of Care Certification Period: 12/28/18  Visit # / Visits authorized: 1/ 20    Time In: 10:17 am  Time Out: 10:56 am  Total Billable Time: 39 minutes    Precautions: BPPV, Hx of lung CA    Subjective   States she is having oral surgery the week on November 13th and not sure how much therapy she will be able to do that week.    History of current condition - Yana is a 74 yo F referred to OP PT secondary gait imbalance.       Past Medical History:   Diagnosis Date    Anemia     Anxiety     Cataract     Fibromyalgia 10/15/2012    Insomnia 3/28/2014    Lung cancer     Post-polio syndrome     Rheumatoid arthritis(714.0) 7/16/2012    2009 onset with mod positive CCP and RF MTX 2011 - present     Thyroid disease      Yana Orellana  has a past surgical history that includes Cholecystectomy; Tonsillectomy; Ovarian cyst removal; Hysterectomy; and Lung removal, partial.    Yana has a current medication list which includes the following prescription(s): amoxicillin-clavulanate 500-125mg, diazepam, ergocalciferol, methotrexate, oxycodone-acetaminophen, prednisone, and vitamin d.    Review of patient's allergies indicates:   Allergen Reactions    Demerol [meperidine]      Euphoria    Gabapentin Other (See Comments)     Hallucinations    Mellaril-s Nausea Only    Versed [midazolam]      Excessive talking, hyper    Vicodin [hydrocodone-acetaminophen]      Joint pain, muscle pain     Xanax [alprazolam]      fatigue        Prior Therapy: OP PT aquatic therapy  Social History:  lives alone. 5 steps to enter home. Uses handrails. Ramp out of kitchen area.  Occupation: retired  Prior Level of Function: I with amb and ADL  Current Level of Function: amb with quad cane in community. Takes showers    Pain:  Current 0/10, worst 0/10, best 0/10   Aggravating Factors: standing, bending, walking, getting out of chair and picking up object off the floor, and getting something out of low cabinet.      Pts goals: to get out of the chair better. Walk without leaning to one side. Walk more upright. Patient wishes to travel and needs to be able to I/mod I get on and off of a bus.    Objective     Functional assessment:   - walking: amb with quad cane.  - sit to stand: mod I with use of B UEs     Dynamic Gait Index: 15/24    AROM  LE MMT  R  L    Hip flexion  3-/5  3-/5    Hip abduction  3-/5  3-/5    Hip extension  3-/5  3-/5    Hip ER  4/5  4/5    Hip IR  4/5  4/5    Knee extension  4/5  4/5    Knee flexion  4/5  4/5    Ankle dorsiflexion  5/5  5/5    Ankle plantar flexion  5/5  5/5        Flexibility testing:  - hamstrings:        R: WNL, L: tight, L: -40 deg  - gastrocnemius:  B: tight, R: neutral, L: neutral  - piriformis:            B: tight, decreased 50%, L decreased 25%  - quadriceps:         B: WFL  - hip adductors:    B: WNL  - IT bands:            B: WNL      CMS Impairment/Limitation/Restriction for FOTO Survey    Therapist reviewed FOTO scores for Yana Orellana on 11/2/2018.   FOTO documents entered into [employer] - see Media section.    Limitation Score: 54%  Category: Mobility    Current : CK = at least 40% but < 60% impaired, limited or restricted  Goal: CK = at least 40% but < 60% impaired, limited or restricted       Home Exercises Provided and Patient Education Provided     Education provided:   - Discussed the role of the PTA on the Rehab Team. Also discussed the use of the [facility]  Portal for communication.      Assessment   Yana is a 73 y.o. female referred to outpatient Physical Therapy with a medical diagnosis of G14 (ICD-10-CM) - Post-polio syndrome. Pt presents with decreased B LE/hip strength contributing to impaired gait safety and performance. Will benefit from OP PT for B LE strengthening and balance training to maximize patient's [erformance and safety with ambulation.    Pt prognosis is Good.   Pt will benefit from skilled outpatient Physical Therapy to address the deficits stated above and in the chart below, provide pt/family education, and to maximize pt's level of independence.     Plan of care discussed with patient: Yes  Pt's spiritual, cultural and educational needs considered and patient is agreeable to the plan of care and goals as stated below:     Anticipated Barriers for therapy: Post-Polio Syndrome    Medical Necessity is demonstrated by the following  History  Co-morbidities and personal factors that may impact the plan of care Co-morbidities:   Post Polio, Hx of CA, BPPV    Personal Factors:   no deficits     moderate   Examination  Body Structures and Functions, activity limitations and participation restrictions that may impact the plan of care Body Regions:   lower extremities    Body Systems:    strength  gross coordinated movement  balance  gait  transfers    Participation Restrictions:   none    Activity limitations:   Learning and applying knowledge  no deficits    General Tasks and Commands  no deficits    Communication  no deficits    Mobility  walking    Self care  washing oneself (bathing, drying, washing hands)    Domestic Life  no deficits    Interactions/Relationships  no deficits    Life Areas  no deficits    Community and Social Life  no deficits         moderate   Clinical Presentation evolving clinical presentation with changing clinical characteristics moderate   Decision Making/ Complexity Score: moderate     Goals:  Short Term Goals: 4 weeks  "  1. Independent with HEP.  2. Increased MMT for B LE by 1 muscle grade for patient to perform tasks with mod I such as picking up object off the floor, and getting something out of low cabinet.     Long Term Goals: 8 weeks   3. Increased MMT for B LE to 5/5 muscle grade for patient to I perform tasks such as getting out of chair, picking up object off the floor, and getting something out of low cabinet.  4. Increased flexibility in L hamstrings to -20 deg with 90/90 test.  5. Mod I with negotiating 6" to 8" steps to enter a bus.   6. Increase B ankle plantarflexion to 10 deg to promote proper heel to toe gait.  7. Increased flexibility in B piriformis to promote proper pelvic stability to I perform tasks such as getting out of chair, picking up object off the floor, and getting something out of low cabinet.  8. Patient to achieve CK (at least 40% < 60% impaired, limited or restricted) level at 40% functional limitation on the FOTO Outcomes Measurement System.    Plan   Certification Period/Plan of care expiration: 11/2/2018 to 12/28/18.    Outpatient Physical Therapy 2 times weekly for 8 weeks to include the following interventions: Gait Training, Neuromuscular Re-ed, Patient Education and Therapeutic Exercise.     Felipe Rizo, PT      "

## 2018-11-20 ENCOUNTER — CLINICAL SUPPORT (OUTPATIENT)
Dept: REHABILITATION | Facility: OTHER | Age: 73
End: 2018-11-20
Payer: MEDICARE

## 2018-11-20 DIAGNOSIS — Z74.09 IMPAIRED FUNCTIONAL MOBILITY, BALANCE, GAIT, AND ENDURANCE: ICD-10-CM

## 2018-11-20 DIAGNOSIS — R29.898 WEAKNESS OF BOTH HIPS: ICD-10-CM

## 2018-11-20 DIAGNOSIS — R26.81 GAIT INSTABILITY: ICD-10-CM

## 2018-11-20 PROCEDURE — 97110 THERAPEUTIC EXERCISES: CPT | Mod: PN

## 2018-11-20 NOTE — PATIENT INSTRUCTIONS
Short Arc Quad        Place a large can or rolled towel under left leg. Straighten leg. Hold _5_ seconds.  Repeat _20_ times. Do _1-2__ sessions per day.     https://[employer].us/299     Copyright © I. All rights reserved.   Piriformis Stretch - Supine        Pull uninvolved knee across body toward opposite shoulder. Hold slight stretch for _30__ seconds. Repeat with involved leg. Repeat _3__ times. Do _2__ times per day.    Copyright © I. All rights reserved.     SUPINE HIP ABDUCTION - ELASTIC BAND CLAMS - [place]        Lie down on your back with your knees bent. Place an elastic band around your knees and then draw your knees apart. Perform one leg at a time. Perform  20  Reps, hold for  5  Seconds.    Copyright © 4750-1649 [employer]      Seated Hip Flexion/Marches        While seated in a chair,  your knee, set it down and then alternate to your other side. Perform  20  Reps, hold for  5  Seconds.    Copyright © 8897-4483 [employer]    KNEE: Extension, Long Arc Quads - Sitting        Raise leg until knee is straight. Perform  20  Reps, hold for  5  Seconds.    Copyright © [employer]. All rights reserved.

## 2018-11-20 NOTE — PROGRESS NOTES
"                            Physical Therapy Daily Treatment Note     Name: Yana Orellana  Clinic Number: [id number]    Therapy Diagnosis:   Encounter Diagnoses   Name Primary?    Gait instability     Impaired functional mobility, balance, gait, and endurance     Weakness of both hips      Physician: Smooth Agrawal, *    Visit Date: 11/20/2018  Physician Orders: PT Evaluate and treat gait imbalance  Medical Diagnosis: G14 (ICD-10-CM) - Post-polio syndrome  Evaluation Date: 11/2/2018  Authorization Period Expiration: 12/31/18  Plan of Care Certification Period: 12/28/18  Visit # / Visits authorized: 2/ 20      Time In: 1:01 pm  Time Out: 2:07 pm  Total Billable Time: 28 minutes    Precautions: BPPV, Hx of lung CA    Subjective   Pt reports: "I am goofy as heck." pt attributes this to medication. Also reports dizziness due to the medication    Response to previous treatment: no adverse effects  Functional change: has been limited in what she does due to oral surgery and the medication related to that. Hasn't functionally noted a change.    Pain: 0/10      Objective     Yana received therapeutic exercises to develop strength and flexibility for 66 minutes including:  Adductor ball squeeze 20 x 5"  bridging 2 x 5 reps  Piriformis stretch 3 x 30"  Calf stretch with strap 3 x 30"  Knee fallouts with orange band 2 x 10 reps  SAQ 20 x 5"  LAQ 20 x 5"  Seated marching x 20 reps    Home Exercises Provided and Patient Education Provided     Education provided:   SAQ  Piriformis stretch  LAQ  Seated hip flex  Knee fall out with band    Written Home Exercises Provided: yes.  Exercises were reviewed and Yana was able to demonstrate them prior to the end of the session.  Yana demonstrated good  understanding of the education provided.     See EMR under Patient Instructions for exercises provided 11/20/2018.    Assessment     Minimal clearance with seated marches. Initiated mat exercises today secondary pt " "reported being affected by medication following her oral surgery.    Yana is progressing well towards her goals.   Pt prognosis is Guarded.     Pt will continue to benefit from skilled outpatient physical therapy to address the deficits listed in the problem list box on initial evaluation, provide pt/family education and to maximize pt's level of independence in the home and community environment.     Pt's spiritual, cultural and educational needs considered and pt agreeable to plan of care and goals.    Anticipated barriers to physical therapy: Post-Polio Syndrome    Goals:   Short Term Goals: 4 weeks   1. Independent with HEP. (initiated today)  2. Increased MMT for B LE by 1 muscle grade for patient to perform tasks with mod I such as picking up object off the floor, and getting something out of low cabinet. (in progress)     Long Term Goals: 8 weeks         3.   Increased MMT for B LE to 5/5 muscle grade for patient to I perform tasks such as getting out of chair, picking up object off the floor, and getting something out of low cabinet.  4.   Increased flexibility in L hamstrings to -20 deg with 90/90 test.(in progress)  5.   Mod I with negotiating 6" to 8" steps to enter a bus. (in progress)  6.   Increase B ankle plantarflexion to 10 deg to promote proper heel to toe gait.(in progress)  7.   Increased flexibility in B piriformis to promote proper pelvic stability to I perform tasks such as getting out of chair, picking up object off the floor, and getting something out of       low cabinet. (in progress)  8. Patient to achieve CK (at least 40% < 60% impaired, limited or restricted) level at 40% functional limitation on the FOTO Outcomes Measurement System. (in progress)        Plan     Continue with ther ex and progress to standing exercises and gait/balance exercises as tolerated by patient.    Felipe Rizo, PT     "

## 2018-11-28 ENCOUNTER — TELEPHONE (OUTPATIENT)
Dept: RHEUMATOLOGY | Facility: CLINIC | Age: 73
End: 2018-11-28

## 2018-11-28 NOTE — TELEPHONE ENCOUNTER
Spoke to patient and confirmed that she can call PT to schedule after the first of the year, when she decides to go back. I told her that the referral is in, and she is fine to wait on it right now-as she states she has had dental sx and is feeling ill.  ----- Message from Elly Yeung sent at 11/28/2018  9:56 AM CST -----  Contact: Self/ [phone]  Patient would like a call back to speak with Dr. Agrawal or his nurse about her physical therapy.

## 2019-01-17 ENCOUNTER — LAB VISIT (OUTPATIENT)
Dept: LAB | Facility: OTHER | Age: 74
End: 2019-01-17
Attending: INTERNAL MEDICINE
Payer: MEDICARE

## 2019-01-17 DIAGNOSIS — M05.79 RHEUMATOID ARTHRITIS INVOLVING MULTIPLE SITES WITH POSITIVE RHEUMATOID FACTOR: ICD-10-CM

## 2019-01-17 DIAGNOSIS — Z79.899 HIGH RISK MEDICATION USE: Chronic | ICD-10-CM

## 2019-01-17 LAB
ALBUMIN SERPL BCP-MCNC: 2.9 G/DL
ALP SERPL-CCNC: 89 U/L
ALT SERPL W/O P-5'-P-CCNC: 20 U/L
ANION GAP SERPL CALC-SCNC: 7 MMOL/L
AST SERPL-CCNC: 41 U/L
BASOPHILS # BLD AUTO: 0.05 K/UL
BASOPHILS NFR BLD: 0.9 %
BILIRUB SERPL-MCNC: 2.2 MG/DL
BUN SERPL-MCNC: 11 MG/DL
CALCIUM SERPL-MCNC: 8.5 MG/DL
CHLORIDE SERPL-SCNC: 108 MMOL/L
CO2 SERPL-SCNC: 23 MMOL/L
CREAT SERPL-MCNC: 0.7 MG/DL
DIFFERENTIAL METHOD: ABNORMAL
EOSINOPHIL # BLD AUTO: 0.3 K/UL
EOSINOPHIL NFR BLD: 4.6 %
ERYTHROCYTE [DISTWIDTH] IN BLOOD BY AUTOMATED COUNT: 15.5 %
ERYTHROCYTE [SEDIMENTATION RATE] IN BLOOD: 27 MM/HR
EST. GFR  (AFRICAN AMERICAN): >60 ML/MIN/1.73 M^2
EST. GFR  (NON AFRICAN AMERICAN): >60 ML/MIN/1.73 M^2
GLUCOSE SERPL-MCNC: 173 MG/DL
HCT VFR BLD AUTO: 41.4 %
HGB BLD-MCNC: 14.1 G/DL
LYMPHOCYTES # BLD AUTO: 1.5 K/UL
LYMPHOCYTES NFR BLD: 27.3 %
MCH RBC QN AUTO: 33.7 PG
MCHC RBC AUTO-ENTMCNC: 34.1 G/DL
MCV RBC AUTO: 99 FL
MONOCYTES # BLD AUTO: 0.6 K/UL
MONOCYTES NFR BLD: 11.5 %
NEUTROPHILS # BLD AUTO: 3 K/UL
NEUTROPHILS NFR BLD: 55.5 %
PLATELET # BLD AUTO: 103 K/UL
PMV BLD AUTO: 10 FL
POTASSIUM SERPL-SCNC: 4.1 MMOL/L
PROT SERPL-MCNC: 6.9 G/DL
RBC # BLD AUTO: 4.18 M/UL
SODIUM SERPL-SCNC: 138 MMOL/L
WBC # BLD AUTO: 5.39 K/UL

## 2019-01-17 PROCEDURE — 36415 COLL VENOUS BLD VENIPUNCTURE: CPT

## 2019-01-17 PROCEDURE — 85651 RBC SED RATE NONAUTOMATED: CPT

## 2019-01-17 PROCEDURE — 80053 COMPREHEN METABOLIC PANEL: CPT

## 2019-01-17 PROCEDURE — 85025 COMPLETE CBC W/AUTO DIFF WBC: CPT

## 2019-03-18 ENCOUNTER — OFFICE VISIT (OUTPATIENT)
Dept: RHEUMATOLOGY | Facility: CLINIC | Age: 74
End: 2019-03-18
Payer: MEDICARE

## 2019-03-18 VITALS — HEIGHT: 70 IN | WEIGHT: 281.5 LBS | BODY MASS INDEX: 40.3 KG/M2

## 2019-03-18 DIAGNOSIS — F41.9 ANXIETY: ICD-10-CM

## 2019-03-18 DIAGNOSIS — M05.79 RHEUMATOID ARTHRITIS INVOLVING MULTIPLE SITES WITH POSITIVE RHEUMATOID FACTOR: Primary | ICD-10-CM

## 2019-03-18 DIAGNOSIS — Z79.899 HIGH RISK MEDICATION USE: Chronic | ICD-10-CM

## 2019-03-18 DIAGNOSIS — G14 POST-POLIO SYNDROME: ICD-10-CM

## 2019-03-18 DIAGNOSIS — M79.7 FIBROMYALGIA: Chronic | ICD-10-CM

## 2019-03-18 PROBLEM — J98.4 PNEUMONITIS: Status: RESOLVED | Noted: 2018-03-07 | Resolved: 2019-03-18

## 2019-03-18 PROCEDURE — 99213 OFFICE O/P EST LOW 20 MIN: CPT | Mod: PBBFAC | Performed by: INTERNAL MEDICINE

## 2019-03-18 PROCEDURE — 99999 PR PBB SHADOW E&M-EST. PATIENT-LVL III: ICD-10-PCS | Mod: PBBFAC,,, | Performed by: INTERNAL MEDICINE

## 2019-03-18 PROCEDURE — 99214 PR OFFICE/OUTPT VISIT, EST, LEVL IV, 30-39 MIN: ICD-10-PCS | Mod: S$PBB,,, | Performed by: INTERNAL MEDICINE

## 2019-03-18 PROCEDURE — 99999 PR PBB SHADOW E&M-EST. PATIENT-LVL III: CPT | Mod: PBBFAC,,, | Performed by: INTERNAL MEDICINE

## 2019-03-18 PROCEDURE — 99214 OFFICE O/P EST MOD 30 MIN: CPT | Mod: S$PBB,,, | Performed by: INTERNAL MEDICINE

## 2019-03-18 RX ORDER — DIAZEPAM 5 MG/1
5 TABLET ORAL EVERY 6 HOURS PRN
Qty: 30 TABLET | Refills: 0 | Status: SHIPPED | OUTPATIENT
Start: 2019-03-18 | End: 2020-02-03 | Stop reason: SDUPTHER

## 2019-03-18 RX ORDER — FLUCONAZOLE 100 MG/1
TABLET ORAL
Refills: 0 | COMMUNITY
Start: 2019-01-11 | End: 2019-07-19

## 2019-03-18 RX ORDER — FOLIC ACID 1 MG/1
2 TABLET ORAL DAILY
Qty: 180 TABLET | Refills: 3 | Status: SHIPPED | OUTPATIENT
Start: 2019-03-18 | End: 2019-10-04 | Stop reason: SDUPTHER

## 2019-03-18 ASSESSMENT — ROUTINE ASSESSMENT OF PATIENT INDEX DATA (RAPID3)
MDHAQ FUNCTION SCORE: 1.1
FATIGUE SCORE: 2
PATIENT GLOBAL ASSESSMENT SCORE: 1
PAIN SCORE: .5
TOTAL RAPID3 SCORE: 1.72
PSYCHOLOGICAL DISTRESS SCORE: 0
AM STIFFNESS SCORE: 0, NO

## 2019-03-18 NOTE — ASSESSMENT & PLAN NOTE
Still thrombopenic with elevated MCV on MTX  Says she is taking folic acid  Will cont lab monitoring and folic acid

## 2019-03-18 NOTE — PROGRESS NOTES
"Subjective:       Patient ID: Yana Orellana is a 73 y.o. female.    Chief Complaint: Disease Management    Mod pos CCP/RF RA since 2009    MTX 2011 - present      Hx lung cancer dx Nov 2009, treated with surgery   Hx polio age 18; "mostly back and neck"     Current:  mtx 15/week       C/O weakness thighs; hard to get on bus  Unable to complete PT due to conflicting appts; it helped a little  PT noted LLE was weaker than R  Had polio in neck and back as a child    RA doing well; no flare ups ; has used no prednisone  No problems after lowering mtx to 5 tab/week    No FMS flareups    Review of Systems   Constitutional: Positive for unexpected weight change. Negative for fatigue and fever.   HENT: Negative for mouth sores and trouble swallowing.         Dry mouth   Eyes: Negative for redness.        Dry eyes   Respiratory: Negative for cough and shortness of breath.    Cardiovascular: Negative for chest pain.   Gastrointestinal: Negative for abdominal pain, constipation and diarrhea.   Skin: Negative for rash.   Neurological: Negative for headaches.   Hematological: Negative for adenopathy. Bruises/bleeds easily.   Psychiatric/Behavioral: Negative for sleep disturbance.         Objective:   Ht 5' 10" (1.778 m)   Wt 127.7 kg (281 lb 8.4 oz)   BMI 40.39 kg/m²      Physical Exam     Physical Exam     VILLAFANA-28 tender joint count: 0  VILLAFANA-28 swollen joint count: 0  ESR (mm/hr): 27  Patient global assessment: 10  VILLAFANA-28 score: 2.45 (Remission)       Lab Results   Component Value Date    SEDRATE 27 (H) 01/17/2019     Lab Results   Component Value Date    CRP 15.7 (H) 02/28/2018      Assessment:       1. Rheumatoid arthritis involving multiple sites with positive rheumatoid factor    2. High risk medication use    3. Fibromyalgia    4. Post-polio syndrome            Plan:       Problem List Items Addressed This Visit        Active Problems    Rheumatoid arthritis involving multiple sites with positive rheumatoid factor - " Primary     Symptomatically improved  Stable on MTX monotherapy  Cont same         Relevant Orders    Ambulatory Referral to Physiatry    High risk medication use (Chronic)     Still thrombopenic with elevated MCV on MTX  Says she is taking folic acid  Will cont lab monitoring and folic acid         Relevant Medications    folic acid (FOLVITE) 1 MG tablet    Fibromyalgia (Chronic)     Currently not symptomatic         Post-polio syndrome     This may contributue to gait disorder  I will refer to PMR for help with gait disorder         Relevant Orders    Ambulatory Referral to Physiatry      cont mtx  Increase folic acid to 2 mg/d  Cont lab q3m  RTC 6 mo

## 2019-04-17 ENCOUNTER — TELEPHONE (OUTPATIENT)
Dept: RHEUMATOLOGY | Facility: CLINIC | Age: 74
End: 2019-04-17

## 2019-04-17 DIAGNOSIS — M05.79 RHEUMATOID ARTHRITIS INVOLVING MULTIPLE SITES WITH POSITIVE RHEUMATOID FACTOR: ICD-10-CM

## 2019-04-17 RX ORDER — PREDNISONE 5 MG/1
TABLET ORAL
Qty: 20 TABLET | Refills: 1 | Status: SHIPPED | OUTPATIENT
Start: 2019-04-17 | End: 2019-09-26 | Stop reason: SDUPTHER

## 2019-04-17 NOTE — TELEPHONE ENCOUNTER
----- Message from Nancy Pruitt sent at 4/17/2019  3:46 PM CDT -----  Contact: Self  Pt is having a flare up and is wanting to know if she can get some medication some Prednisone   Pt is also wanting to speak to mrs wong pt can be reached @[phone]

## 2019-04-17 NOTE — TELEPHONE ENCOUNTER
Patient of Dr. Elizondo-stating she is having a flare and is requesting prednisone.  Please send prescription to pharmacy in chart.

## 2019-04-29 DIAGNOSIS — M05.79 RHEUMATOID ARTHRITIS INVOLVING MULTIPLE SITES WITH POSITIVE RHEUMATOID FACTOR: ICD-10-CM

## 2019-04-29 RX ORDER — METHOTREXATE 2.5 MG/1
12.5 TABLET ORAL
Qty: 65 TABLET | Refills: 0 | OUTPATIENT
Start: 2019-04-29

## 2019-04-30 ENCOUNTER — TELEPHONE (OUTPATIENT)
Dept: PHYSICAL MEDICINE AND REHAB | Facility: CLINIC | Age: 74
End: 2019-04-30

## 2019-04-30 NOTE — TELEPHONE ENCOUNTER
Called pt to ask if she would be interested in a sooner appt with a ata VELASQUEZ, pt said yes, scheduled with Dr Alvarez for 05/02/2019 at 2pm.  Gave pt check in instructions.

## 2019-05-01 ENCOUNTER — LAB VISIT (OUTPATIENT)
Dept: LAB | Facility: OTHER | Age: 74
End: 2019-05-01
Payer: MEDICARE

## 2019-05-01 DIAGNOSIS — Z79.899 HIGH RISK MEDICATION USE: Chronic | ICD-10-CM

## 2019-05-01 DIAGNOSIS — M05.79 RHEUMATOID ARTHRITIS INVOLVING MULTIPLE SITES WITH POSITIVE RHEUMATOID FACTOR: ICD-10-CM

## 2019-05-01 LAB
ALBUMIN SERPL BCP-MCNC: 2.7 G/DL (ref 3.5–5.2)
ALP SERPL-CCNC: 89 U/L (ref 55–135)
ALT SERPL W/O P-5'-P-CCNC: 26 U/L (ref 10–44)
ANION GAP SERPL CALC-SCNC: 8 MMOL/L (ref 8–16)
AST SERPL-CCNC: 51 U/L (ref 10–40)
BASOPHILS # BLD AUTO: 0.04 K/UL (ref 0–0.2)
BASOPHILS NFR BLD: 0.9 % (ref 0–1.9)
BILIRUB SERPL-MCNC: 2.3 MG/DL (ref 0.1–1)
BUN SERPL-MCNC: 10 MG/DL (ref 8–23)
CALCIUM SERPL-MCNC: 8.5 MG/DL (ref 8.7–10.5)
CHLORIDE SERPL-SCNC: 109 MMOL/L (ref 95–110)
CO2 SERPL-SCNC: 22 MMOL/L (ref 23–29)
CREAT SERPL-MCNC: 0.7 MG/DL (ref 0.5–1.4)
DIFFERENTIAL METHOD: ABNORMAL
EOSINOPHIL # BLD AUTO: 0.2 K/UL (ref 0–0.5)
EOSINOPHIL NFR BLD: 3.8 % (ref 0–8)
ERYTHROCYTE [DISTWIDTH] IN BLOOD BY AUTOMATED COUNT: 15.2 % (ref 11.5–14.5)
ERYTHROCYTE [SEDIMENTATION RATE] IN BLOOD: 30 MM/HR (ref 0–20)
EST. GFR  (AFRICAN AMERICAN): >60 ML/MIN/1.73 M^2
EST. GFR  (NON AFRICAN AMERICAN): >60 ML/MIN/1.73 M^2
GLUCOSE SERPL-MCNC: 149 MG/DL (ref 70–110)
HCT VFR BLD AUTO: 40 % (ref 37–48.5)
HGB BLD-MCNC: 14 G/DL (ref 12–16)
LYMPHOCYTES # BLD AUTO: 1.4 K/UL (ref 1–4.8)
LYMPHOCYTES NFR BLD: 33.2 % (ref 18–48)
MCH RBC QN AUTO: 34.1 PG (ref 27–31)
MCHC RBC AUTO-ENTMCNC: 35 G/DL (ref 32–36)
MCV RBC AUTO: 97 FL (ref 82–98)
MONOCYTES # BLD AUTO: 0.5 K/UL (ref 0.3–1)
MONOCYTES NFR BLD: 11.8 % (ref 4–15)
NEUTROPHILS # BLD AUTO: 2.1 K/UL (ref 1.8–7.7)
NEUTROPHILS NFR BLD: 50.1 % (ref 38–73)
PLATELET # BLD AUTO: 91 K/UL (ref 150–350)
PMV BLD AUTO: 10.3 FL (ref 9.2–12.9)
POTASSIUM SERPL-SCNC: 3.8 MMOL/L (ref 3.5–5.1)
PROT SERPL-MCNC: 6.9 G/DL (ref 6–8.4)
RBC # BLD AUTO: 4.11 M/UL (ref 4–5.4)
SODIUM SERPL-SCNC: 139 MMOL/L (ref 136–145)
WBC # BLD AUTO: 4.25 K/UL (ref 3.9–12.7)

## 2019-05-01 PROCEDURE — 36415 COLL VENOUS BLD VENIPUNCTURE: CPT

## 2019-05-01 PROCEDURE — 85025 COMPLETE CBC W/AUTO DIFF WBC: CPT

## 2019-05-01 PROCEDURE — 85651 RBC SED RATE NONAUTOMATED: CPT

## 2019-05-01 PROCEDURE — 80053 COMPREHEN METABOLIC PANEL: CPT

## 2019-05-02 ENCOUNTER — TELEPHONE (OUTPATIENT)
Dept: RHEUMATOLOGY | Facility: CLINIC | Age: 74
End: 2019-05-02

## 2019-05-02 ENCOUNTER — HOSPITAL ENCOUNTER (OUTPATIENT)
Dept: RADIOLOGY | Facility: HOSPITAL | Age: 74
Discharge: HOME | End: 2019-05-02
Attending: PHYSICAL MEDICINE & REHABILITATION
Payer: MEDICARE

## 2019-05-02 ENCOUNTER — INITIAL CONSULT (OUTPATIENT)
Dept: PHYSICAL MEDICINE AND REHAB | Facility: CLINIC | Age: 74
End: 2019-05-02
Payer: MEDICARE

## 2019-05-02 VITALS — WEIGHT: 273 LBS | BODY MASS INDEX: 39.08 KG/M2 | HEIGHT: 70 IN

## 2019-05-02 DIAGNOSIS — M05.79 RHEUMATOID ARTHRITIS INVOLVING MULTIPLE SITES WITH POSITIVE RHEUMATOID FACTOR: ICD-10-CM

## 2019-05-02 DIAGNOSIS — G14 POST-POLIO SYNDROME: ICD-10-CM

## 2019-05-02 DIAGNOSIS — R29.898 WEAKNESS OF LEFT HIP: ICD-10-CM

## 2019-05-02 DIAGNOSIS — R26.9 GAIT ABNORMALITY: ICD-10-CM

## 2019-05-02 DIAGNOSIS — R29.898 WEAKNESS OF LEFT HIP: Primary | ICD-10-CM

## 2019-05-02 PROCEDURE — 99999 PR PBB SHADOW E&M-EST. PATIENT-LVL III: CPT | Mod: PBBFAC,,, | Performed by: PHYSICAL MEDICINE & REHABILITATION

## 2019-05-02 PROCEDURE — 73502 X-RAY EXAM HIP UNI 2-3 VIEWS: CPT | Mod: 26,LT,, | Performed by: RADIOLOGY

## 2019-05-02 PROCEDURE — 99213 OFFICE O/P EST LOW 20 MIN: CPT | Mod: PBBFAC,25,PO | Performed by: PHYSICAL MEDICINE & REHABILITATION

## 2019-05-02 PROCEDURE — 99999 PR PBB SHADOW E&M-EST. PATIENT-LVL III: ICD-10-PCS | Mod: PBBFAC,,, | Performed by: PHYSICAL MEDICINE & REHABILITATION

## 2019-05-02 PROCEDURE — 73502 XR HIP 2 VIEW LEFT: ICD-10-PCS | Mod: 26,LT,, | Performed by: RADIOLOGY

## 2019-05-02 PROCEDURE — 73502 X-RAY EXAM HIP UNI 2-3 VIEWS: CPT | Mod: TC,FY,PO,LT

## 2019-05-02 PROCEDURE — 99204 PR OFFICE/OUTPT VISIT, NEW, LEVL IV, 45-59 MIN: ICD-10-PCS | Mod: S$PBB,,, | Performed by: PHYSICAL MEDICINE & REHABILITATION

## 2019-05-02 PROCEDURE — 99204 OFFICE O/P NEW MOD 45 MIN: CPT | Mod: S$PBB,,, | Performed by: PHYSICAL MEDICINE & REHABILITATION

## 2019-05-02 NOTE — TELEPHONE ENCOUNTER
I called the patient and told her to Reduce methotrexate to 10 mg/d on account of low platelet of 91K

## 2019-05-02 NOTE — LETTER
May 2, 2019      Smooth Agrawal MD  [address]  [facility] [address]           [facility]  [address]  [facility] [address]  Phone: [phone]          Patient: Yana Orellana   MR Number: [id number]   YOB: 1945   Date of Visit: 5/2/2019       Dear Dr. Smooth Agrawal:    Thank you for referring Yana Orellana to me for evaluation. Attached you will find relevant portions of my assessment and plan of care.    If you have questions, please do not hesitate to call me. I look forward to following Yana Orellana along with you.    Sincerely,    Jose Alvarez, DO    Enclosure  CC:  No Recipients    If you would like to receive this communication electronically, please contact [email or web address] or [phone] to request more information on [employer] access.    For providers and/or their staff who would like to refer a patient to [facility], please contact us through our one-stop-shop provider referral line, [facility], at [phone].    If you feel you have received this communication in error or would no longer like to receive these types of communications, please e-mail [email or web address]

## 2019-05-02 NOTE — PROGRESS NOTES
[facility] Initial Visit Note    Chief Complaint: Left leg weakness    HPI: Yana Orellana is a 73 y.o. female who presents for evaluation of left leg weakness.  This is proximal weakness, mostly in the hip.  She denies any weakness distally in the foot or ankle.  She denies any significant low back pain.  She states she has the most difficulty with getting up from a seated position.  She uses a quad cane for ambulation in the community, no assistive device at home.  She also has grab bars in her bathroom to help safely rise from the toilet seat.  She denies any numbness or tingling in the legs.  She states she did have post-chemotherapy neuropathy (chemotherapy for lung Ca), but that resolved.  This weakness has been present for 20-25 years, and is negatively impacting her quality of life and her ability to travel.  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  In terms of workup, she has had a few EMGs (last in 2010).  She also had an MRI of her L-spine done in Feb 2015, which showed mild spondylosis with no significant foraminal or central canal stenosis.  In terms of treatment, she had 2 sessions of PT, but was unable to attend further sessions due to scheduling conflicts in the midst of other medical issues at the time.  She does not currently do HEP for hips.                 PMH:   Past Medical History:   Diagnosis Date    Anemia     Anxiety     Cataract     Fibromyalgia 10/15/2012    Insomnia 3/28/2014    Lung cancer     Post-polio syndrome     Rheumatoid arthritis(714.0) 7/16/2012 2009 onset with mod positive CCP and RF MTX 2011 - present     Thyroid disease                  Past Surgical History:   Procedure Laterality Date    CHOLECYSTECTOMY      HYSTERECTOMY      LUNG REMOVAL, PARTIAL      left lower lobectomy    OVARIAN CYST REMOVAL       TONSILLECTOMY         Family Hx:   Family History   Problem Relation Age of Onset    Lung cancer Father     Lung cancer Maternal Aunt     Diabetes Maternal Grandmother     Colon cancer Maternal Grandfather     Parkinsonism Paternal Grandmother     Arrhythmia Mother     Hypertension Neg Hx     Heart attack Neg Hx         Social History:   Social History     Socioeconomic History    Marital status:      Spouse name: Not on file    Number of children: Not on file    Years of education: Not on file    Highest education level: Not on file   Occupational History    Occupation: Writer and    Social Needs    Financial resource strain: Not on file    Food insecurity:     Worry: Not on file     Inability: Not on file    Transportation needs:     Medical: Not on file     Non-medical: Not on file   Tobacco Use    Smoking status: Former Smoker     Packs/day: 1.50     Years: 40.00     Pack years: 60.00     Types: Cigarettes     Last attempt to quit: 2009     Years since quittin.8    Smokeless tobacco: Never Used   Substance and Sexual Activity    Alcohol use: No    Drug use: No    Sexual activity: Not Currently   Lifestyle    Physical activity:     Days per week: Not on file     Minutes per session: Not on file    Stress: Not on file   Relationships    Social connections:     Talks on phone: Not on file     Gets together: Not on file     Attends Anabaptist service: Not on file     Active member of club or organization: Not on file     Attends meetings of clubs or organizations: Not on file     Relationship status: Not on file   Other Topics Concern    Not on file   Social History Narrative    Not on file       Allergies:   Review of patient's allergies indicates:   Allergen Reactions    Demerol [meperidine]      Euphoria    Gabapentin Other (See Comments)     Hallucinations    Mellaril-s Nausea Only    Versed [midazolam]      Excessive talking, hyper    Vicodin  [hydrocodone-acetaminophen]      Joint pain, muscle pain    Xanax [alprazolam]      fatigue       Current Meds:   Current Outpatient Medications   Medication Sig Dispense Refill    folic acid (FOLVITE) 1 MG tablet Take 2 tablets (2 mg total) by mouth once daily. 180 tablet 3    methotrexate 2.5 MG Tab Take 5 tablets (12.5 mg total) by mouth every 7 days. 65 tablet 0    predniSONE (DELTASONE) 5 MG tablet Take as needed for flare of rheumatoid arthritis 20 tablet 1    vitamin D 185 MG Tab Take 185 mg by mouth once daily.        diazePAM (VALIUM) 5 MG tablet Take 1 tablet (5 mg total) by mouth every 6 (six) hours as needed for Anxiety. 30 tablet 0    fluconazole (DIFLUCAN) 100 MG tablet TK 1 T PO TODAY THEN TK PRN  0     No current facility-administered medications for this visit.        Review of Systems: Review of Systems   Constitutional: Negative.    HENT: Negative.    Eyes: Negative.    Respiratory: Negative.    Cardiovascular: Negative.    Gastrointestinal: Negative.    Genitourinary: Negative.    Musculoskeletal: Negative.  Negative for falls.   Skin: Negative.    Neurological: Positive for weakness. Negative for tingling.   Endo/Heme/Allergies: Negative.    Psychiatric/Behavioral: Negative.    All other systems reviewed and are negative.         Physical Exam:  General: Awake, in no acute distress  Psych: Normal affect  CV: Warm, well perfused extremities.  DP pulses present and symmetrical  Resp: Normal rate, unlabored breathing  Skin: No erythema or rash on exposed skin  Lymph: No lympedema  MSK: The left proximal anterior thigh muscles (quads) are smaller on the left than the right.  There is no hip pain on palpation.  Hip ROM is excellent bilaterally.  SLR is negative.  FABERs negative.  Gaenslen's is negative.  The left hip flexors are weak as below.    Neuro exam:  DTRs: symmetrical and 2+ patellar  Motor: Motor exam showed weakness of left hip flexors.  Otherwise, strength was full in the  bilateral lower extremities.    Sensation: Sensation to light touch is abnormal in the following areas:  stocking pattern in the bilateral lower extremities  Gait: Wide based gait, with left leg externally rotated throughout stance and swing phases.          Impression: Yana Orellana is a 73 y.o. female who presents with Left hip weakness for the past 20-25 years.  On examination this appears that the hip flexor and hip internal rotator weakness primarily.  On sensory examination she has a stocking pattern sensory abnormality that is symmetrical, however she does not have any subjective symptoms of a peripheral polyneuropathy.  This hip weakness is likely multifactorial, due to post-polio syndrome, rheumatoid arthritis, chemotherapy associated peripheral neuropathy, and deconditioning.    Plan:   1.  Physical therapy-start physical therapy program with a focus on strengthening both the hip flexors and hip internal rotators, gait training, and balance training.  She prefers to start with aqua therapy, which is certainly reasonable.  Goal will be to progress to land-based therapy, and ultimately home exercise program (HEP).  Because of her post-polio syndrome and rheumatoid arthritis, anticipated that her therapy needs will be longer than usual.  She has requested to go to [facility] for physical therapy.  2.  X-ray of the left hip to evaluate for underlying hip osteoarthritis  3.  We discussed ordering an updated nerve conduction study/EMG if her strength is not begin to improve with physical therapy.  4.  Follow-up with me in 3 months    Jose Alvarez DO

## 2019-05-03 ENCOUNTER — TELEPHONE (OUTPATIENT)
Dept: PHYSICAL MEDICINE AND REHAB | Facility: CLINIC | Age: 74
End: 2019-05-03

## 2019-05-03 RX ORDER — METHOTREXATE 2.5 MG/1
12.5 TABLET ORAL
Qty: 65 TABLET | Refills: 0 | Status: SHIPPED | OUTPATIENT
Start: 2019-05-03 | End: 2019-10-04

## 2019-05-03 NOTE — TELEPHONE ENCOUNTER
It looks like her Rheumatologist ordered those labs on 5/1, so I am not certain.  Please advise her to reach out to her Rheumatologist.  Thanks

## 2019-05-03 NOTE — TELEPHONE ENCOUNTER
Called and informed pt that Dr Agrawal reordered her labs, pt verbalized understanding and said she knew the reason then.

## 2019-05-03 NOTE — TELEPHONE ENCOUNTER
Called and informed pt that her Xray was normal.    Pt is asking about her lab orders for 06/18/2019, wondering why her CBC, CMP and Sedimentation rate are going to be redone when they were completed on 05/01/2019.  No information in MD's note, informed pt I would find out and get back to her.    ----- Message from Jose Alvarez, DO sent at 5/3/2019  8:58 AM CDT -----  Please call patient and let her know that her left hip Xray was normal.  Thank you

## 2019-06-14 ENCOUNTER — HOSPITAL ENCOUNTER (EMERGENCY)
Facility: OTHER | Age: 74
Discharge: HOME | End: 2019-06-14
Attending: EMERGENCY MEDICINE
Payer: MEDICARE

## 2019-06-14 VITALS
HEART RATE: 80 BPM | TEMPERATURE: 97 F | RESPIRATION RATE: 18 BRPM | DIASTOLIC BLOOD PRESSURE: 70 MMHG | SYSTOLIC BLOOD PRESSURE: 148 MMHG | BODY MASS INDEX: 39.17 KG/M2 | HEIGHT: 70 IN | OXYGEN SATURATION: 95 %

## 2019-06-14 DIAGNOSIS — R73.9 HYPERGLYCEMIA: ICD-10-CM

## 2019-06-14 DIAGNOSIS — R07.9 CHEST PAIN: ICD-10-CM

## 2019-06-14 DIAGNOSIS — R06.02 SHORTNESS OF BREATH: ICD-10-CM

## 2019-06-14 DIAGNOSIS — R91.1 PULMONARY NODULE: ICD-10-CM

## 2019-06-14 DIAGNOSIS — J43.2 CENTRILOBULAR EMPHYSEMA: Primary | ICD-10-CM

## 2019-06-14 LAB
ALBUMIN SERPL BCP-MCNC: 3 G/DL (ref 3.5–5.2)
ALP SERPL-CCNC: 100 U/L (ref 55–135)
ALT SERPL W/O P-5'-P-CCNC: 23 U/L (ref 10–44)
ANION GAP SERPL CALC-SCNC: 9 MMOL/L (ref 8–16)
AST SERPL-CCNC: 48 U/L (ref 10–40)
BASOPHILS # BLD AUTO: 0.07 K/UL (ref 0–0.2)
BASOPHILS NFR BLD: 1.5 % (ref 0–1.9)
BILIRUB SERPL-MCNC: 1.6 MG/DL (ref 0.1–1)
BILIRUB UR QL STRIP: NEGATIVE
BNP SERPL-MCNC: 23 PG/ML (ref 0–99)
BUN SERPL-MCNC: 11 MG/DL (ref 8–23)
CALCIUM SERPL-MCNC: 8.2 MG/DL (ref 8.7–10.5)
CHLORIDE SERPL-SCNC: 109 MMOL/L (ref 95–110)
CLARITY UR: CLEAR
CO2 SERPL-SCNC: 21 MMOL/L (ref 23–29)
COLOR UR: YELLOW
CREAT SERPL-MCNC: 0.7 MG/DL (ref 0.5–1.4)
DIFFERENTIAL METHOD: ABNORMAL
EOSINOPHIL # BLD AUTO: 0.3 K/UL (ref 0–0.5)
EOSINOPHIL NFR BLD: 5.8 % (ref 0–8)
ERYTHROCYTE [DISTWIDTH] IN BLOOD BY AUTOMATED COUNT: 15.1 % (ref 11.5–14.5)
EST. GFR  (AFRICAN AMERICAN): >60 ML/MIN/1.73 M^2
EST. GFR  (NON AFRICAN AMERICAN): >60 ML/MIN/1.73 M^2
GLUCOSE SERPL-MCNC: 216 MG/DL (ref 70–110)
GLUCOSE UR QL STRIP: ABNORMAL
HCT VFR BLD AUTO: 41.8 % (ref 37–48.5)
HGB BLD-MCNC: 14.6 G/DL (ref 12–16)
HGB UR QL STRIP: NEGATIVE
KETONES UR QL STRIP: NEGATIVE
LEUKOCYTE ESTERASE UR QL STRIP: NEGATIVE
LYMPHOCYTES # BLD AUTO: 1.5 K/UL (ref 1–4.8)
LYMPHOCYTES NFR BLD: 32 % (ref 18–48)
MCH RBC QN AUTO: 34.1 PG (ref 27–31)
MCHC RBC AUTO-ENTMCNC: 34.9 G/DL (ref 32–36)
MCV RBC AUTO: 98 FL (ref 82–98)
MONOCYTES # BLD AUTO: 0.6 K/UL (ref 0.3–1)
MONOCYTES NFR BLD: 12.2 % (ref 4–15)
NEUTROPHILS # BLD AUTO: 2.3 K/UL (ref 1.8–7.7)
NEUTROPHILS NFR BLD: 48.1 % (ref 38–73)
NITRITE UR QL STRIP: NEGATIVE
PH UR STRIP: 6 [PH] (ref 5–8)
PLATELET # BLD AUTO: 89 K/UL (ref 150–350)
PMV BLD AUTO: 10.5 FL (ref 9.2–12.9)
POTASSIUM SERPL-SCNC: 4.3 MMOL/L (ref 3.5–5.1)
PROT SERPL-MCNC: 7.4 G/DL (ref 6–8.4)
PROT UR QL STRIP: NEGATIVE
RBC # BLD AUTO: 4.28 M/UL (ref 4–5.4)
SODIUM SERPL-SCNC: 139 MMOL/L (ref 136–145)
SP GR UR STRIP: 1.01 (ref 1–1.03)
TROPONIN I SERPL DL<=0.01 NG/ML-MCNC: <0.006 NG/ML (ref 0–0.03)
TSH SERPL DL<=0.005 MIU/L-ACNC: 3.38 UIU/ML (ref 0.4–4)
URN SPEC COLLECT METH UR: ABNORMAL
UROBILINOGEN UR STRIP-ACNC: 1 EU/DL
WBC # BLD AUTO: 4.69 K/UL (ref 3.9–12.7)

## 2019-06-14 PROCEDURE — 84484 ASSAY OF TROPONIN QUANT: CPT

## 2019-06-14 PROCEDURE — 84443 ASSAY THYROID STIM HORMONE: CPT

## 2019-06-14 PROCEDURE — 25500020 PHARM REV CODE 255: Performed by: EMERGENCY MEDICINE

## 2019-06-14 PROCEDURE — 93010 EKG 12-LEAD: ICD-10-PCS | Mod: ,,, | Performed by: INTERNAL MEDICINE

## 2019-06-14 PROCEDURE — 93005 ELECTROCARDIOGRAM TRACING: CPT

## 2019-06-14 PROCEDURE — 25000242 PHARM REV CODE 250 ALT 637 W/ HCPCS: Performed by: PHYSICIAN ASSISTANT

## 2019-06-14 PROCEDURE — 94640 AIRWAY INHALATION TREATMENT: CPT

## 2019-06-14 PROCEDURE — 83880 ASSAY OF NATRIURETIC PEPTIDE: CPT

## 2019-06-14 PROCEDURE — 93010 ELECTROCARDIOGRAM REPORT: CPT | Mod: ,,, | Performed by: INTERNAL MEDICINE

## 2019-06-14 PROCEDURE — 85025 COMPLETE CBC W/AUTO DIFF WBC: CPT

## 2019-06-14 PROCEDURE — 99284 EMERGENCY DEPT VISIT MOD MDM: CPT | Mod: 25

## 2019-06-14 PROCEDURE — 80053 COMPREHEN METABOLIC PANEL: CPT

## 2019-06-14 PROCEDURE — 81003 URINALYSIS AUTO W/O SCOPE: CPT

## 2019-06-14 PROCEDURE — 94761 N-INVAS EAR/PLS OXIMETRY MLT: CPT

## 2019-06-14 RX ORDER — PREDNISONE 20 MG/1
60 TABLET ORAL
Status: DISCONTINUED | OUTPATIENT
Start: 2019-06-14 | End: 2019-06-14 | Stop reason: HOSPADM

## 2019-06-14 RX ORDER — PREDNISONE 20 MG/1
40 TABLET ORAL DAILY
Qty: 10 TABLET | Refills: 0 | Status: SHIPPED | OUTPATIENT
Start: 2019-06-14 | End: 2019-06-19

## 2019-06-14 RX ORDER — IPRATROPIUM BROMIDE AND ALBUTEROL SULFATE 2.5; .5 MG/3ML; MG/3ML
3 SOLUTION RESPIRATORY (INHALATION)
Status: COMPLETED | OUTPATIENT
Start: 2019-06-14 | End: 2019-06-14

## 2019-06-14 RX ADMIN — IPRATROPIUM BROMIDE AND ALBUTEROL SULFATE 3 ML: .5; 3 SOLUTION RESPIRATORY (INHALATION) at 02:06

## 2019-06-14 RX ADMIN — IOHEXOL 100 ML: 350 INJECTION, SOLUTION INTRAVENOUS at 03:06

## 2019-06-14 NOTE — ED NOTES
"Ambulated pt with portable spo2 monitoring, pt spo2 at 95% on RA with ambulation. Highest HR noted was 102 bpm. Pt reporting she "kind of feels SOB" with ambulation. Ambulated with slow, steady gait. SYDNEY notified.   "

## 2019-06-14 NOTE — ED NOTES
Pt to ED reporting SOB x 1 month, with mid sternal CP at nighttime. Pt also reporting low grade fever over the last week. Denies productive cough. Pt AAOx4 and appropriate at this time. Respirations even and unlabored. No acute distress noted.Awaiting further orders. Pt updated on POC. Bed is locked and in lowest position with side rails up x2. Call bell within reach and pt oriented to use of call bell. Pt placed on continuous cardiac monitoring, continuous pulse ox, and continuous BP cuff. Will continue to monitor.

## 2019-06-14 NOTE — ED NOTES
Assuming care of patient, received report from CARMELO Gupta. Patient not in room at this time. Awaiting return.

## 2019-06-14 NOTE — ED NOTES
Appearance: Pt awake, alert & oriented to person, place & time. Pt in no acute distress at present time. Pt is clean and well groomed with clothes appropriately fastened.   Skin: Skin warm, dry & intact. Color consistent with ethnicity. Mucous membranes moist. No breakdown or brusing noted.   Musculoskeletal: Patient moving all extremities well, no obvious swelling or deformities noted.   Respiratory: Respirations spontaneous, even, and non-labored. Visible chest rise noted. Airway is open and patent. No accessory muscle use noted. Anterior and posterior lung sounds are clear.   Neurologic: Sensation is intact. Speech is clear and appropriate. Eyes open spontaneously, behavior appropriate to situation, follows commands,  purposeful motor response noted.   Cardiac:  No Bilateral lower extremity edema. Cap refill is <3 seconds. Pt denies dizziness, blurred vision, weakness or fatigue at this time. + reports of SOB x 1 month with nonproductive cough and mid sternal cp that is worse at night.   Abdomen: Pt denies active abd pains, cramping or discomfort, No N/V/D at this time.   : Pt reports no dysuria or hematuria.

## 2019-06-14 NOTE — ED NOTES
Pt aware of the need for urine sample. Pt call light within reach. Pt to notify ed staff when needing to urinate. Pt verbalized understanding.

## 2019-06-14 NOTE — ED NOTES
Informed by Thad in lab that D-Dimer test has been sent to [facility] due to lab being unable to run D-Dimer at this time. Informed by Thad in Lab that lab should result within next 15-25 minutes. Gamal GRIMES, made aware.

## 2019-06-14 NOTE — ED PROVIDER NOTES
Encounter Date: 6/14/2019       History     Chief Complaint   Patient presents with    Shortness of Breath     +SOB, dizziness and chest pain x 1 month. pt denies fever, chills, n/v     Patient is a 74-year-old female with RA (on methotrexate), left lung CA (in remission greater than 10 years, s/p lobectomy), hypothyroidism, fibromyalgia and history of polio syndrome presents to the ED with dizziness and shortness of breath.  Patient main chief complaint is that she has been dizzy for the past month.  She states this is worse with movement, sitting to standing and performing daily ADLs (loading , ironing, etc.).  Patient states at rest, the symptoms improved.  She states she feels like she is spinning.  She states yesterday she had 3 episodes where she felt like she was going to pass out.  She denies syncope.  She states this feels different than BPPV.  She also reports 1 month history of exertional shortness of breath and associated chest tightness.  She states she has history of costochondritis and the pain feels similar.  She denies cough.  She reports subjective fever over the past month.    The history is provided by the patient.     Review of patient's allergies indicates:   Allergen Reactions    Demerol [meperidine]      Euphoria    Gabapentin Other (See Comments)     Hallucinations    Mellaril-s Nausea Only    Versed [midazolam]      Excessive talking, hyper    Vicodin [hydrocodone-acetaminophen]      Joint pain, muscle pain    Xanax [alprazolam]      fatigue     Past Medical History:   Diagnosis Date    Anemia     Anxiety     Cataract     Fibromyalgia 10/15/2012    Insomnia 3/28/2014    Lung cancer     Post-polio syndrome     Rheumatoid arthritis(714.0) 7/16/2012 2009 onset with mod positive CCP and RF MTX 2011 - present     Thyroid disease      Past Surgical History:   Procedure Laterality Date    CHOLECYSTECTOMY      HYSTERECTOMY      LUNG REMOVAL, PARTIAL      left lower  lobectomy    OVARIAN CYST REMOVAL      TONSILLECTOMY       Family History   Problem Relation Age of Onset    Lung cancer Father     Lung cancer Maternal Aunt     Diabetes Maternal Grandmother     Colon cancer Maternal Grandfather     Parkinsonism Paternal Grandmother     Arrhythmia Mother     Hypertension Neg Hx     Heart attack Neg Hx      Social History     Tobacco Use    Smoking status: Former Smoker     Packs/day: 1.50     Years: 40.00     Pack years: 60.00     Types: Cigarettes     Last attempt to quit: 2009     Years since quittin.9    Smokeless tobacco: Never Used   Substance Use Topics    Alcohol use: No    Drug use: No     Review of Systems   Constitutional: Positive for fever (Subjective). Negative for appetite change and chills.   HENT: Negative for congestion and sore throat.    Eyes: Negative for pain.   Respiratory: Positive for chest tightness and shortness of breath. Negative for cough.    Cardiovascular: Positive for chest pain. Negative for leg swelling.   Gastrointestinal: Negative for abdominal pain, diarrhea, nausea and vomiting.   Genitourinary: Negative for dysuria.   Musculoskeletal: Negative for arthralgias.   Skin: Negative for rash.   Neurological: Positive for dizziness. Negative for headaches.       Physical Exam     Initial Vitals [19 1304]   BP Pulse Resp Temp SpO2   137/73 (!) 113 (!) 22 97.9 °F (36.6 °C) 97 %      MAP       --         Physical Exam    Vitals reviewed.  Constitutional: She is Obese . She is cooperative.   HENT:   Head: Normocephalic and atraumatic.   Eyes: EOM are normal. Pupils are equal, round, and reactive to light.   No nystagmus.   Neck: Normal range of motion. Neck supple.   Cardiovascular: Normal rate, regular rhythm and intact distal pulses.   Pulmonary/Chest: No respiratory distress. She has no wheezes. She has no rhonchi. She has no rales.   Mildly tachypneic.  Mild decreased movement throughout all lung bases   Abdominal: Soft.  Bowel sounds are normal. There is no tenderness.   Musculoskeletal: Normal range of motion. She exhibits no edema.   Neurological: GCS eye subscore is 4. GCS verbal subscore is 5. GCS motor subscore is 6.   AAOx4. CN II-XII were intact. Good finger-to-nose task ability. Yanick intact. Strength 5/5 in all extremities. Negative pronator drift.   Skin: Skin is warm and dry. No rash noted.         ED Course   Procedures  Labs Reviewed   COMPREHENSIVE METABOLIC PANEL   CBC W/ AUTO DIFFERENTIAL   B-TYPE NATRIURETIC PEPTIDE   TSH   TROPONIN I   D DIMER, QUANTITATIVE     EKG Readings: (Independently Interpreted)   Normal sinus rhythm at a rate of 72 beats per minute. No STEMI.  No ischemic changes.  Possible left axis deviation.  No change from EKG from March 2018.       Imaging Results          CTA Chest Non-Coronary (PE Study) (Final result)  Result time 06/14/19 18:14:31    Final result by Bere Lara MD (06/14/19 18:14:31)                 Impression:      No pulmonary embolism found.    Emphysema.    4 mm new nodule right lower lobe, optional follow-up CT scan at 12 months recommended per Fleischner 2017.    Three-vessel coronary disease.      Electronically signed by: Bere Lara  Date:    06/14/2019  Time:    18:14             Narrative:    EXAMINATION:  CTA CHEST NON CORONARY    CLINICAL HISTORY:  Chest pain, acute, PE suspected, high pretest prob;    TECHNIQUE:  Low dose axial images, sagittal and coronal reformations were obtained from the thoracic inlet to the lung bases following the IV administration of 100 mL of Omnipaque 350.  Contrast timing was optimized to evaluate the pulmonary arteries.  MIP images were performed.    COMPARISON:  02/03/2015    FINDINGS:  Mediastinum: The heart is normal size.  No pericardial effusion.  The thoracic aorta the visualized abdominal aorta normal in caliber.  The esophagus appears grossly normal.  No hilar mediastinal or axillary or supraclavicular adenopathy  found.    Pulmonary arteries: Main pulmonary artery measures 2.5 cm and appears normal.  The bilateral pulmonary artery trunks and lobar pulmonary arteries appear normal.  The segmental pulmonary arteries are probably normal.  The subsegmental pulmonary arteries are not well demonstrated.    Abdomen: The visualized liver, spleen, pancreas, adrenal glands and kidneys appear normal.  No abdominal ascites.    Lungs: There is centrilobular and paraseptal emphysema with upper lung predominance.  No honeycombing or bronchiectasis or interstitial edema.  No gross endobronchial debris or fluid.  MIP images are reviewed.  There is a 3 mm nodule right mid lung axial image 183 unchanged from 02/03/2015.  There is a 4 mm subpleural nodule right lower lobe axial image 208 which appears to be new from 02/03/2015.  Follow-up CT scan is optional at 12 months per Nat 2017.    Skeleton: No acute rib fracture found.  No compression deformity or subluxation of the visualized spine.  Sternum appears intact.                               X-Ray Chest PA And Lateral (Final result)  Result time 06/14/19 14:01:54    Final result by Alejandro Davis MD (06/14/19 14:01:54)                 Impression:      Mild interval increase in the degree of accentuation of the interstitial markings bilaterally.      Electronically signed by: Alejandro Davis MD  Date:    06/14/2019  Time:    14:01             Narrative:    EXAMINATION:  XR CHEST PA AND LATERAL    CLINICAL HISTORY:  Shortness of breath    TECHNIQUE:  PA and lateral views of the chest were performed.    COMPARISON:  03/08/2018    FINDINGS:  Heart size and pulmonary vascularity are within normal limits.  Lungs are satisfactorily expanded.  Interstitial markings appear somewhat more accentuated on today's study is compared to the previous examination of 2018.  This may be partly due to technique.  No definite airspace consolidation or pleural fluid.  No pneumothorax.  Mild degenerative changes  involving the thoracic spine.                                 Medical Decision Making:   History:   Old Medical Records: I decided to obtain old medical records.  Initial Assessment:   Urgent evaluation of a 74 y.o. female presenting to the emergency department complaining of dizziness and exertional shortness of breath with associated chest tightness x1 month. Patient is afebrile, nontoxic appearing and hemodynamically stable.  Triage vital signs reveal tachycardia with mild tachypnea.  My exam, patient is not tachycardic but she is mildly tachypneic.  Auscultation of lungs reveal no wheezes, rales or rhonchi with slightly decreased air movement throughout.  Medical records reviewed, pt's recent admission on March 2018 for similar complaints. Admitted for pneumonitis and hypoxia with ambulation.   ED Management:  CBC reveals no leukocytosis.  Chemistry reveals hyperglycemia of 216.  BNP is normal. TSH is normal. Troponin is less than 0.006.  Urinalysis reveals trace sugar.  Patient educated on this finding and states she just ate a high carbohydrate, sugary male prior to arrival.  CTA obtain reveals no PE.  Emphysema noted. There is a new, 4 mm right lower lobe nodule.  Repeat scan recommended at 12 months.  Patient educated on this finding.  Patient was given breathing treatment with some improvement here in the ED.  When she ambulated, her oxygen ranged from 95-97%.  She is asymptomatic at this time.  She told the nurse I do not feel short of breath, people just tell me I look short of breath.  Patient will be sent home with short steroid burst.  She was offered an albuterol inhaler but states that this does not work for her.  She is encouraged to follow up with pulmonology or return here for new worsening symptoms.  Other:   I have discussed this case with another health care provider.                      Clinical Impression:     1. Centrilobular emphysema    2. Chest pain    3. Shortness of breath    4.  Hyperglycemia    5. Pulmonary nodule                               Norman Yeung PA-C  06/14/19 2002

## 2019-06-14 NOTE — ED NOTES
Handout printed and given to patient explaining use of Prednisone because pt hesitant to take, states she has never taken more than 10mg in a day. She is not in any pain and takes methotrexate to control her RA. Told patient I will give her a few minutes to review handout and will come back to answer any more questions she may have. VSS. Call light within reach. Will continue to monitor.

## 2019-06-14 NOTE — ED NOTES
Pt transferred to restroom via wheelchair to provide urine sample. Pt questing RN why she was prescribed such a high dose of prednisone. Pt concerned that she will have to be running to restroom every hour. Educated patient in reason for prescribed prednisone. Gamal GRIMES aware, okay to hold prednisone until CT result.

## 2019-06-17 ENCOUNTER — TELEPHONE (OUTPATIENT)
Dept: RHEUMATOLOGY | Facility: CLINIC | Age: 74
End: 2019-06-17

## 2019-06-17 RX ORDER — CHLORHEXIDINE GLUCONATE ORAL RINSE 1.2 MG/ML
SOLUTION DENTAL
Refills: 4 | COMMUNITY
Start: 2019-05-29 | End: 2021-03-26

## 2019-06-17 NOTE — TELEPHONE ENCOUNTER
----- Message from Elly Yeung sent at 6/17/2019 10:20 AM CDT -----  Contact: Self/ [phone]  Needs Advice    Reason for call: Patient would like a call back to speak with a nurse about her going to the ER on Friday 6/14/19 and also the medication they gave her in the hospital. Patient medication is  predniSONE (DELTASONE) 20 MG.         Communication Preference: [phone].

## 2019-06-17 NOTE — TELEPHONE ENCOUNTER
Called and informed patient, as per Dr. Agrawal, she does not need to taper, continue med as prescribed by ER,  And resume regular prednisone dose as previous, if needed, and to fu with her pcp concerning ER visit.  Patient verbalizes understanding.

## 2019-06-17 NOTE — TELEPHONE ENCOUNTER
Tell her that I reviewed ER record and I do not see a need for her to taper, can take prednisone as prescribed and resume previous predniosne when she finishes that    Tell her that She should contact her PCP about the ER visit to arrange f/u

## 2019-06-17 NOTE — TELEPHONE ENCOUNTER
Went to [facility] ER on last Friday for problems breathing and vertigo.  Was started on prednisone 40mg/day x 5 days.   Asking if she needs to taper before stopping?.  Asking if you can look at the ER notes.

## 2019-06-18 ENCOUNTER — LAB VISIT (OUTPATIENT)
Dept: LAB | Facility: OTHER | Age: 74
End: 2019-06-18
Attending: INTERNAL MEDICINE
Payer: MEDICARE

## 2019-06-18 DIAGNOSIS — M05.79 RHEUMATOID ARTHRITIS INVOLVING MULTIPLE SITES WITH POSITIVE RHEUMATOID FACTOR: ICD-10-CM

## 2019-06-18 DIAGNOSIS — Z79.899 HIGH RISK MEDICATION USE: Chronic | ICD-10-CM

## 2019-06-18 LAB
ALBUMIN SERPL BCP-MCNC: 3.1 G/DL (ref 3.5–5.2)
ALP SERPL-CCNC: 97 U/L (ref 55–135)
ALT SERPL W/O P-5'-P-CCNC: 23 U/L (ref 10–44)
ANION GAP SERPL CALC-SCNC: 10 MMOL/L (ref 8–16)
AST SERPL-CCNC: 44 U/L (ref 10–40)
BASOPHILS # BLD AUTO: 0.04 K/UL (ref 0–0.2)
BASOPHILS NFR BLD: 0.7 % (ref 0–1.9)
BILIRUB SERPL-MCNC: 1.7 MG/DL (ref 0.1–1)
BUN SERPL-MCNC: 18 MG/DL (ref 8–23)
CALCIUM SERPL-MCNC: 8.8 MG/DL (ref 8.7–10.5)
CHLORIDE SERPL-SCNC: 108 MMOL/L (ref 95–110)
CO2 SERPL-SCNC: 22 MMOL/L (ref 23–29)
CREAT SERPL-MCNC: 0.7 MG/DL (ref 0.5–1.4)
DIFFERENTIAL METHOD: ABNORMAL
EOSINOPHIL # BLD AUTO: 0.1 K/UL (ref 0–0.5)
EOSINOPHIL NFR BLD: 2.1 % (ref 0–8)
ERYTHROCYTE [DISTWIDTH] IN BLOOD BY AUTOMATED COUNT: 15.4 % (ref 11.5–14.5)
ERYTHROCYTE [SEDIMENTATION RATE] IN BLOOD: 33 MM/HR (ref 0–20)
EST. GFR  (AFRICAN AMERICAN): >60 ML/MIN/1.73 M^2
EST. GFR  (NON AFRICAN AMERICAN): >60 ML/MIN/1.73 M^2
GLUCOSE SERPL-MCNC: 171 MG/DL (ref 70–110)
HCT VFR BLD AUTO: 41.5 % (ref 37–48.5)
HGB BLD-MCNC: 14.4 G/DL (ref 12–16)
LYMPHOCYTES # BLD AUTO: 1 K/UL (ref 1–4.8)
LYMPHOCYTES NFR BLD: 17.5 % (ref 18–48)
MCH RBC QN AUTO: 34 PG (ref 27–31)
MCHC RBC AUTO-ENTMCNC: 34.7 G/DL (ref 32–36)
MCV RBC AUTO: 98 FL (ref 82–98)
MONOCYTES # BLD AUTO: 0.3 K/UL (ref 0.3–1)
MONOCYTES NFR BLD: 4.5 % (ref 4–15)
NEUTROPHILS # BLD AUTO: 4.2 K/UL (ref 1.8–7.7)
NEUTROPHILS NFR BLD: 75 % (ref 38–73)
PLATELET # BLD AUTO: 102 K/UL (ref 150–350)
PMV BLD AUTO: 10.4 FL (ref 9.2–12.9)
POTASSIUM SERPL-SCNC: 4.1 MMOL/L (ref 3.5–5.1)
PROT SERPL-MCNC: 7.3 G/DL (ref 6–8.4)
RBC # BLD AUTO: 4.23 M/UL (ref 4–5.4)
SODIUM SERPL-SCNC: 140 MMOL/L (ref 136–145)
WBC # BLD AUTO: 5.61 K/UL (ref 3.9–12.7)

## 2019-06-18 PROCEDURE — 85651 RBC SED RATE NONAUTOMATED: CPT

## 2019-06-18 PROCEDURE — 85025 COMPLETE CBC W/AUTO DIFF WBC: CPT

## 2019-06-18 PROCEDURE — 36415 COLL VENOUS BLD VENIPUNCTURE: CPT

## 2019-06-18 PROCEDURE — 80053 COMPREHEN METABOLIC PANEL: CPT

## 2019-06-21 ENCOUNTER — NURSE TRIAGE (OUTPATIENT)
Dept: ADMINISTRATIVE | Facility: CLINIC | Age: 74
End: 2019-06-21

## 2019-06-22 NOTE — TELEPHONE ENCOUNTER
Reason for Disposition   Patient sounds very sick or weak to the triager  (Exception: mild weakness and hasn't taken fever medicine)    Protocols used: FEVER-A-  ED 6/14  Fever this evening. T100.5 or. pt with CP due to COPD. No pain with deep breath. pt has a nerve damage on chest wall. Hx post polio syndrome. 'Wobbly all week. Pt crazy dizzy'. Pt fine when laying down. CP rated 4-5- high pain tolerance. +Nausea, loose stools 4-6 in 24 hours. no blood stool, no abd pain. rec ED due to multiple complaints. Call back with questions.

## 2019-06-24 ENCOUNTER — TELEPHONE (OUTPATIENT)
Dept: PRIMARY CARE CLINIC | Facility: CLINIC | Age: 74
End: 2019-06-24

## 2019-06-24 NOTE — TELEPHONE ENCOUNTER
Patient sounds very sick or weak to the triager  (Exception: mild weakness and hasn't taken fever medicine)    Protocols used: FEVER-A-  ED 6/14  Fever this evening. T100.5 or. pt with CP due to COPD. No pain with deep breath. pt has a nerve damage on chest wall. Hx post polio syndrome. 'Wobbly all week. Pt crazy dizzy'. Pt fine when laying down. CP rated 4-5- high pain tolerance. +Nausea, loose stools 4-6 in 24 hours. no blood stool, no abd pain. rec ED due to multiple complaints. Call back with questions.     Please advise!    Lashae Copeland MA

## 2019-07-05 DIAGNOSIS — Z12.39 BREAST CANCER SCREENING: ICD-10-CM

## 2019-07-18 ENCOUNTER — HOSPITAL ENCOUNTER (OUTPATIENT)
Facility: HOSPITAL | Age: 74
Discharge: HOME | End: 2019-07-21
Attending: EMERGENCY MEDICINE | Admitting: EMERGENCY MEDICINE
Payer: MEDICARE

## 2019-07-18 DIAGNOSIS — R29.898 WEAKNESS OF BOTH HIPS: ICD-10-CM

## 2019-07-18 DIAGNOSIS — J18.9 PNEUMONIA DUE TO INFECTIOUS ORGANISM, UNSPECIFIED LATERALITY, UNSPECIFIED PART OF LUNG: ICD-10-CM

## 2019-07-18 DIAGNOSIS — R26.81 GAIT INSTABILITY: ICD-10-CM

## 2019-07-18 DIAGNOSIS — R06.02 SOB (SHORTNESS OF BREATH): ICD-10-CM

## 2019-07-18 DIAGNOSIS — N30.00 ACUTE CYSTITIS WITHOUT HEMATURIA: Primary | ICD-10-CM

## 2019-07-18 PROCEDURE — 96366 THER/PROPH/DIAG IV INF ADDON: CPT

## 2019-07-18 PROCEDURE — 99285 EMERGENCY DEPT VISIT HI MDM: CPT

## 2019-07-18 PROCEDURE — 99285 PR EMERGENCY DEPT VISIT,LEVEL V: ICD-10-PCS | Mod: ,,, | Performed by: EMERGENCY MEDICINE

## 2019-07-18 PROCEDURE — 99285 EMERGENCY DEPT VISIT HI MDM: CPT | Mod: ,,, | Performed by: EMERGENCY MEDICINE

## 2019-07-18 PROCEDURE — 96365 THER/PROPH/DIAG IV INF INIT: CPT

## 2019-07-18 PROCEDURE — 96375 TX/PRO/DX INJ NEW DRUG ADDON: CPT

## 2019-07-19 PROBLEM — N30.00 ACUTE CYSTITIS WITHOUT HEMATURIA: Status: ACTIVE | Noted: 2019-07-19

## 2019-07-19 PROBLEM — J18.9 PNEUMONIA DUE TO INFECTIOUS ORGANISM: Status: ACTIVE | Noted: 2019-07-19

## 2019-07-19 PROBLEM — E86.0 DEHYDRATION: Status: ACTIVE | Noted: 2019-07-19

## 2019-07-19 PROBLEM — R51.9 LEFT TEMPORAL HEADACHE: Status: ACTIVE | Noted: 2019-07-19

## 2019-07-19 LAB
ALBUMIN SERPL BCP-MCNC: 2.7 G/DL (ref 3.5–5.2)
ALP SERPL-CCNC: 97 U/L (ref 55–135)
ALT SERPL W/O P-5'-P-CCNC: 24 U/L (ref 10–44)
ANION GAP SERPL CALC-SCNC: 9 MMOL/L (ref 8–16)
AST SERPL-CCNC: 64 U/L (ref 10–40)
BACTERIA #/AREA URNS AUTO: ABNORMAL /HPF
BASOPHILS # BLD AUTO: 0.1 K/UL (ref 0–0.2)
BASOPHILS NFR BLD: 1.7 % (ref 0–1.9)
BILIRUB SERPL-MCNC: 1.5 MG/DL (ref 0.1–1)
BILIRUB UR QL STRIP: NEGATIVE
BNP SERPL-MCNC: 18 PG/ML (ref 0–99)
BUN SERPL-MCNC: 11 MG/DL (ref 8–23)
CALCIUM SERPL-MCNC: 8.2 MG/DL (ref 8.7–10.5)
CHLORIDE SERPL-SCNC: 111 MMOL/L (ref 95–110)
CLARITY UR REFRACT.AUTO: ABNORMAL
CO2 SERPL-SCNC: 20 MMOL/L (ref 23–29)
COLOR UR AUTO: ABNORMAL
CREAT SERPL-MCNC: 0.7 MG/DL (ref 0.5–1.4)
CRP SERPL-MCNC: 7.8 MG/L (ref 0–8.2)
DIFFERENTIAL METHOD: ABNORMAL
EOSINOPHIL # BLD AUTO: 0.3 K/UL (ref 0–0.5)
EOSINOPHIL NFR BLD: 5.9 % (ref 0–8)
ERYTHROCYTE [DISTWIDTH] IN BLOOD BY AUTOMATED COUNT: 14.8 % (ref 11.5–14.5)
ERYTHROCYTE [SEDIMENTATION RATE] IN BLOOD BY WESTERGREN METHOD: 31 MM/HR (ref 0–36)
EST. GFR  (AFRICAN AMERICAN): >60 ML/MIN/1.73 M^2
EST. GFR  (NON AFRICAN AMERICAN): >60 ML/MIN/1.73 M^2
GLUCOSE SERPL-MCNC: 128 MG/DL (ref 70–110)
GLUCOSE UR QL STRIP: NEGATIVE
HCT VFR BLD AUTO: 42 % (ref 37–48.5)
HGB BLD-MCNC: 14.5 G/DL (ref 12–16)
HGB UR QL STRIP: NEGATIVE
IMM GRANULOCYTES # BLD AUTO: 0.03 K/UL (ref 0–0.04)
IMM GRANULOCYTES NFR BLD AUTO: 0.5 % (ref 0–0.5)
INR PPP: 1.2 (ref 0.8–1.2)
KETONES UR QL STRIP: NEGATIVE
LEUKOCYTE ESTERASE UR QL STRIP: ABNORMAL
LYMPHOCYTES # BLD AUTO: 1.8 K/UL (ref 1–4.8)
LYMPHOCYTES NFR BLD: 30.7 % (ref 18–48)
MCH RBC QN AUTO: 34.9 PG (ref 27–31)
MCHC RBC AUTO-ENTMCNC: 34.5 G/DL (ref 32–36)
MCV RBC AUTO: 101 FL (ref 82–98)
MICROSCOPIC COMMENT: ABNORMAL
MONOCYTES # BLD AUTO: 0.8 K/UL (ref 0.3–1)
MONOCYTES NFR BLD: 14.1 % (ref 4–15)
NEUTROPHILS # BLD AUTO: 2.7 K/UL (ref 1.8–7.7)
NEUTROPHILS NFR BLD: 47.1 % (ref 38–73)
NITRITE UR QL STRIP: NEGATIVE
NON-SQ EPI CELLS #/AREA URNS AUTO: 1 /HPF
NRBC BLD-RTO: 0 /100 WBC
PH UR STRIP: 5 [PH] (ref 5–8)
PLATELET # BLD AUTO: 77 K/UL (ref 150–350)
PMV BLD AUTO: 10.6 FL (ref 9.2–12.9)
POTASSIUM SERPL-SCNC: 4.4 MMOL/L (ref 3.5–5.1)
PROT SERPL-MCNC: 6.9 G/DL (ref 6–8.4)
PROT UR QL STRIP: NEGATIVE
PROTHROMBIN TIME: 12.4 SEC (ref 9–12.5)
RBC # BLD AUTO: 4.15 M/UL (ref 4–5.4)
RBC #/AREA URNS AUTO: 2 /HPF (ref 0–4)
SODIUM SERPL-SCNC: 140 MMOL/L (ref 136–145)
SP GR UR STRIP: 1.02 (ref 1–1.03)
SQUAMOUS #/AREA URNS AUTO: 6 /HPF
TROPONIN I SERPL DL<=0.01 NG/ML-MCNC: 0.02 NG/ML (ref 0–0.03)
TSH SERPL DL<=0.005 MIU/L-ACNC: 2.76 UIU/ML (ref 0.4–4)
URN SPEC COLLECT METH UR: ABNORMAL
WBC # BLD AUTO: 5.73 K/UL (ref 3.9–12.7)
WBC #/AREA URNS AUTO: 50 /HPF (ref 0–5)

## 2019-07-19 PROCEDURE — 80053 COMPREHEN METABOLIC PANEL: CPT

## 2019-07-19 PROCEDURE — 25000003 PHARM REV CODE 250: Performed by: EMERGENCY MEDICINE

## 2019-07-19 PROCEDURE — 85025 COMPLETE CBC W/AUTO DIFF WBC: CPT

## 2019-07-19 PROCEDURE — G0378 HOSPITAL OBSERVATION PER HR: HCPCS

## 2019-07-19 PROCEDURE — 81001 URINALYSIS AUTO W/SCOPE: CPT

## 2019-07-19 PROCEDURE — 87086 URINE CULTURE/COLONY COUNT: CPT

## 2019-07-19 PROCEDURE — 99220 PR INITIAL OBSERVATION CARE,LEVL III: ICD-10-PCS | Mod: ,,, | Performed by: HOSPITALIST

## 2019-07-19 PROCEDURE — 63600175 PHARM REV CODE 636 W HCPCS: Performed by: HOSPITALIST

## 2019-07-19 PROCEDURE — 83880 ASSAY OF NATRIURETIC PEPTIDE: CPT

## 2019-07-19 PROCEDURE — 63600175 PHARM REV CODE 636 W HCPCS: Performed by: EMERGENCY MEDICINE

## 2019-07-19 PROCEDURE — 84443 ASSAY THYROID STIM HORMONE: CPT

## 2019-07-19 PROCEDURE — 25000003 PHARM REV CODE 250: Performed by: HOSPITALIST

## 2019-07-19 PROCEDURE — 99220 PR INITIAL OBSERVATION CARE,LEVL III: CPT | Mod: ,,, | Performed by: HOSPITALIST

## 2019-07-19 PROCEDURE — 85610 PROTHROMBIN TIME: CPT

## 2019-07-19 PROCEDURE — 86140 C-REACTIVE PROTEIN: CPT

## 2019-07-19 PROCEDURE — 84484 ASSAY OF TROPONIN QUANT: CPT

## 2019-07-19 PROCEDURE — 85652 RBC SED RATE AUTOMATED: CPT

## 2019-07-19 RX ORDER — FOLIC ACID 1 MG/1
2 TABLET ORAL DAILY
Status: DISCONTINUED | OUTPATIENT
Start: 2019-07-19 | End: 2019-07-21 | Stop reason: HOSPADM

## 2019-07-19 RX ORDER — SODIUM CHLORIDE 0.9 % (FLUSH) 0.9 %
10 SYRINGE (ML) INJECTION
Status: DISCONTINUED | OUTPATIENT
Start: 2019-07-19 | End: 2019-07-21 | Stop reason: HOSPADM

## 2019-07-19 RX ORDER — ACETAMINOPHEN 325 MG/1
650 TABLET ORAL EVERY 6 HOURS PRN
Status: DISCONTINUED | OUTPATIENT
Start: 2019-07-19 | End: 2019-07-21 | Stop reason: HOSPADM

## 2019-07-19 RX ORDER — CHOLECALCIFEROL (VITAMIN D3) 25 MCG
1000 TABLET ORAL DAILY
Status: DISCONTINUED | OUTPATIENT
Start: 2019-07-19 | End: 2019-07-21 | Stop reason: HOSPADM

## 2019-07-19 RX ORDER — ENOXAPARIN SODIUM 100 MG/ML
40 INJECTION SUBCUTANEOUS EVERY 12 HOURS
Status: DISCONTINUED | OUTPATIENT
Start: 2019-07-19 | End: 2019-07-19

## 2019-07-19 RX ORDER — CEFTRIAXONE 1 G/1
1 INJECTION, POWDER, FOR SOLUTION INTRAMUSCULAR; INTRAVENOUS
Status: DISCONTINUED | OUTPATIENT
Start: 2019-07-20 | End: 2019-07-21

## 2019-07-19 RX ORDER — SODIUM CHLORIDE, SODIUM LACTATE, POTASSIUM CHLORIDE, CALCIUM CHLORIDE 600; 310; 30; 20 MG/100ML; MG/100ML; MG/100ML; MG/100ML
INJECTION, SOLUTION INTRAVENOUS CONTINUOUS
Status: DISCONTINUED | OUTPATIENT
Start: 2019-07-19 | End: 2019-07-20

## 2019-07-19 RX ADMIN — SODIUM CHLORIDE, SODIUM LACTATE, POTASSIUM CHLORIDE, AND CALCIUM CHLORIDE: .6; .31; .03; .02 INJECTION, SOLUTION INTRAVENOUS at 10:07

## 2019-07-19 RX ADMIN — SODIUM CHLORIDE 1000 ML: 0.9 INJECTION, SOLUTION INTRAVENOUS at 04:07

## 2019-07-19 RX ADMIN — CEFTRIAXONE 2 G: 2 INJECTION, POWDER, FOR SOLUTION INTRAMUSCULAR; INTRAVENOUS at 04:07

## 2019-07-19 RX ADMIN — SODIUM CHLORIDE 1000 ML: 0.9 INJECTION, SOLUTION INTRAVENOUS at 08:07

## 2019-07-19 RX ADMIN — AZITHROMYCIN MONOHYDRATE 500 MG: 500 INJECTION, POWDER, LYOPHILIZED, FOR SOLUTION INTRAVENOUS at 02:07

## 2019-07-19 RX ADMIN — ENOXAPARIN SODIUM 40 MG: 100 INJECTION SUBCUTANEOUS at 10:07

## 2019-07-19 RX ADMIN — ACETAMINOPHEN 325 MG: 325 TABLET ORAL at 06:07

## 2019-07-19 RX ADMIN — SODIUM CHLORIDE, SODIUM LACTATE, POTASSIUM CHLORIDE, AND CALCIUM CHLORIDE: .6; .31; .03; .02 INJECTION, SOLUTION INTRAVENOUS at 05:07

## 2019-07-19 RX ADMIN — FOLIC ACID 2 MG: 1 TABLET ORAL at 10:07

## 2019-07-19 RX ADMIN — VITAMIN D, TAB 1000IU (100/BT) 1000 UNITS: 25 TAB at 10:07

## 2019-07-19 NOTE — SUBJECTIVE & OBJECTIVE
Interval History: No reported events from overnight. Pt seen at bedside this AM in NAD. Zeyad, showing photos of her family album. She says that she was given lovenox once while hospitalized and it made her bleed and it had to be stopped. Will d/c lovenox and encourage early ambulation. PTOT consulted. Await UCx. Will follow optho recs.     Review of Systems   Constitutional: Positive for activity change, appetite change, chills and fever (subjective).   HENT: Positive for ear pain (left). Negative for congestion.    Eyes: Positive for pain and visual disturbance (blurred). Negative for photophobia.   Respiratory: Negative for shortness of breath.    Cardiovascular: Negative for chest pain.   Gastrointestinal: Positive for diarrhea (chronic). Negative for nausea and vomiting.   Endocrine: Negative for polydipsia and polyuria.   Genitourinary: Negative for dysuria.   Musculoskeletal: Positive for gait problem and neck pain. Negative for myalgias.   Skin: Negative for color change and rash.   Neurological: Positive for dizziness (improvingh), light-headedness and headaches.   Psychiatric/Behavioral: Negative for confusion.     Objective:     Vital Signs (Most Recent):  Temp: 98.5 °F (36.9 °C) (07/19/19 1114)  Pulse: 84 (07/19/19 1114)  Resp: 17 (07/19/19 1114)  BP: (!) 147/62 (07/19/19 1114)  SpO2: 95 % (07/19/19 1114) Vital Signs (24h Range):  Temp:  [97.8 °F (36.6 °C)-98.5 °F (36.9 °C)] 98.5 °F (36.9 °C)  Pulse:  [] 84  Resp:  [17-18] 17  SpO2:  [95 %-97 %] 95 %  BP: (131-158)/(61-77) 147/62     Weight: 125.7 kg (277 lb 1.9 oz)  Body mass index is 39.76 kg/m².    Intake/Output Summary (Last 24 hours) at 7/19/2019 1330  Last data filed at 7/19/2019 0600  Gross per 24 hour   Intake 1300 ml   Output --   Net 1300 ml      Physical Exam   Constitutional: She is oriented to person, place, and time. No distress.   HENT:   Head: Normocephalic and atraumatic.   Temporal arteries symmetric with palpable pulses; no  appreciable enlargement or thickening.  No scalp tenderness   Eyes: Conjunctivae and EOM are normal.   Neck: Normal range of motion. Neck supple.   Cardiovascular: Normal rate, regular rhythm and normal heart sounds.   Pulmonary/Chest: Effort normal. No respiratory distress.   Abdominal: Soft. Bowel sounds are normal.   Musculoskeletal: She exhibits no edema or tenderness.   Neurological: She is alert and oriented to person, place, and time. No cranial nerve deficit.   Skin: Skin is warm and dry. She is not diaphoretic.   Fingers cold to the touch, but not cyanotic   Psychiatric: She has a normal mood and affect. Her behavior is normal.   Nursing note and vitals reviewed.      Significant Labs: All pertinent labs within the past 24 hours have been reviewed.    Significant Imaging: I have reviewed all pertinent imaging results/findings within the past 24 hours.

## 2019-07-19 NOTE — ASSESSMENT & PLAN NOTE
Patient trying to lose weight and requests nutritionist consult to discuss dietary supplementation products since her appetite has been poor; will place order.

## 2019-07-19 NOTE — ASSESSMENT & PLAN NOTE
Patient has no symptoms suggestive of pneumonia and her history of left lower lobectomy can account for CXR findings.  Her symptoms can be better attributed to UTI with the objective findings on UA.  Stop azithromycin.

## 2019-07-19 NOTE — ED TRIAGE NOTES
"Yana Orellana, a 74 y.o. female presents to the ED w/ complaint of lightheadedness. Pt states "when I bend over I get lightheaded and it seems like strobe lights are going off". Pt states symptoms presented several weeks ago. States symptoms get worse later in the day. Pt states she has a hx of vertigo but "this feels different". Pt states she is afraid of being at home alone, she does not feel safe at home due to falling.     Triage note:  Chief Complaint   Patient presents with    Lightheadness     pt complains of falling 2 weeks ago from bending over and is having some visual changes. "hx of vertigo but it feels different". Denies weakness/numbness. Denies bloodthinners. Denies cp     Review of patient's allergies indicates:   Allergen Reactions    Demerol [meperidine]      Euphoria    Gabapentin Other (See Comments)     Hallucinations    Mellaril-s Nausea Only    Versed [midazolam]      Excessive talking, hyper    Vicodin [hydrocodone-acetaminophen]      Joint pain, muscle pain    Xanax [alprazolam]      fatigue     Past Medical History:   Diagnosis Date    Anemia     Anxiety     Cataract     Fibromyalgia 10/15/2012    Insomnia 3/28/2014    Lung cancer     Post-polio syndrome     Rheumatoid arthritis(714.0) 7/16/2012 2009 onset with mod positive CCP and RF MTX 2011 - present     Thyroid disease          Adult Physical Assessment  LOC: Yana Orellana, 74 y.o. female verified via two identifiers.  The patient is awake, alert, oriented and speaking appropriately at this time.  APPEARANCE: Patient resting comfortably and appears to be in no acute distress at this time. Patient is clean and well groomed, patient's clothing is properly fastened.  SKIN:The skin is warm and dry, color consistent with ethnicity, patient has normal skin turgor and moist mucus membranes, skin intact, no breakdown or brusing noted.  MUSCULOSKELETAL: Patient moving all extremities well, no obvious swelling or " "deformities noted.  RESPIRATORY: Airway is open and patent, respirations are spontaneous, patient has a normal effort and rate, no accessory muscle use noted.   CARDIAC: No periphreal edema noted in any extremity, capillary refill < 3 seconds in all extremities. No c/o CP.  ABDOMEN: Soft and non tender to palpation, no abdominal distention noted. Bowel sounds present in all four quadrants.  NEUROLOGIC: Eyes open spontaneously, behavior appropriate to situation, follows commands, facial expression symmetrical, bilateral hand grasp equal and even, purposeful motor response noted, normal sensation in all extremities when touched with a finger. Pt reports being lightheaded when bending over and getting a "dizzy sensation". States she has hx of vertigo and this is not it. Reports vision changes states "my eyes just hurt and they might be blurry". Intermittent L ear congestion.   "

## 2019-07-19 NOTE — H&P
"[facility] Hospital Medicine  History & Physical    Patient Name: Yana Orellana  MRN: [id number]  Admission Date: 7/18/2019  Attending Physician: Melissa Osei MD   Primary Care Provider: Jethro Ortiz MD    [facility] Medicine Team: Networked reference to record PCT  All Elias MD     Patient information was obtained from patient, past medical records and ER records.     Subjective:     Principal Problem:Acute cystitis without hematuria    Chief Complaint:   Chief Complaint   Patient presents with    Lightheadness     pt complains of falling 2 weeks ago from bending over and is having some visual changes. "hx of vertigo but it feels different". Denies weakness/numbness. Denies bloodthinners. Denies cp        HPI: 74F presents with c/o increased gait instability and loss of balance, having fallen 2 weeks ago, and recently getting lightheaded when bending over or standing up, along with her vision partially blacking out.  She has been afraid of falling more because of this and has primarily confined herself to her bedroom and not cooked, living off of dietary supplement shakes like Slim Fast.  She has noted some neck soreness lately but this is not unusual for her as she has fibromyalgia.  She has had a left-sided temporal headache which has been unusual, along with jaw pain most pronounced with using the jaw like with chewing.  She reports some tenderness over the temple but not to the extent that she is avoiding using a comb or brush.  She also has some pain in both eyes and feels that her vision has decreased/gotten blurrier.  She denies photophobia.  She denies any respiratory symptoms such as shortness of breath or cough.  She has felt some fevers at night, and always feels cold.  She has not noticed any dysuria or frequency.    Past Medical History:   Diagnosis Date    Anemia     Anxiety     Cataract     Fibromyalgia 10/15/2012    Insomnia 3/28/2014    Lung cancer  "    Pneumonia due to infectious organism 7/19/2019    Post-polio syndrome     Rheumatoid arthritis(714.0) 7/16/2012    2009 onset with mod positive CCP and RF MTX 2011 - present     Thyroid disease        Past Surgical History:   Procedure Laterality Date    CHOLECYSTECTOMY      HYSTERECTOMY      LUNG REMOVAL, PARTIAL      left lower lobectomy    OVARIAN CYST REMOVAL      TONSILLECTOMY         Review of patient's allergies indicates:   Allergen Reactions    Demerol [meperidine]      Euphoria    Gabapentin Other (See Comments)     Hallucinations    Mellaril-s Nausea Only    Versed [midazolam]      Excessive talking, hyper    Vicodin [hydrocodone-acetaminophen]      Joint pain, muscle pain    Xanax [alprazolam]      fatigue       No current facility-administered medications on file prior to encounter.      Current Outpatient Medications on File Prior to Encounter   Medication Sig    chlorhexidine (PERIDEX) 0.12 % solution RINSE WITH 15 ML PO BID FOR 7 DAYS    methotrexate 2.5 MG Tab Take 5 tablets (12.5 mg total) by mouth every 7 days.    predniSONE (DELTASONE) 5 MG tablet Take as needed for flare of rheumatoid arthritis    vitamin D 185 MG Tab Take 185 mg by mouth once daily.      diazePAM (VALIUM) 5 MG tablet Take 1 tablet (5 mg total) by mouth every 6 (six) hours as needed for Anxiety.    folic acid (FOLVITE) 1 MG tablet Take 2 tablets (2 mg total) by mouth once daily.    [DISCONTINUED] fluconazole (DIFLUCAN) 100 MG tablet TK 1 T PO TODAY THEN TK PRN     Family History     Problem Relation (Age of Onset)    Arrhythmia Mother    Colon cancer Maternal Grandfather    Diabetes Maternal Grandmother    Lung cancer Father, Maternal Aunt    Parkinsonism Paternal Grandmother        Tobacco Use    Smoking status: Former Smoker     Packs/day: 1.50     Years: 40.00     Pack years: 60.00     Types: Cigarettes     Last attempt to quit: 7/16/2009     Years since quitting: 10.0    Smokeless tobacco: Never  Used   Substance and Sexual Activity    Alcohol use: No    Drug use: No    Sexual activity: Not Currently     Review of Systems   Constitutional: Positive for activity change, appetite change, chills and fever.   HENT: Positive for ear pain (left). Negative for congestion.    Eyes: Positive for pain and visual disturbance. Negative for photophobia and redness.   Respiratory: Negative for cough and shortness of breath.    Cardiovascular: Negative for chest pain and leg swelling.   Gastrointestinal: Positive for diarrhea (chronic). Negative for abdominal pain, nausea and vomiting.   Endocrine: Negative for polydipsia and polyuria.   Genitourinary: Negative for dysuria, frequency, hematuria and urgency.   Musculoskeletal: Positive for gait problem and neck pain. Negative for arthralgias and myalgias.   Skin: Negative for color change and rash.   Neurological: Positive for dizziness, light-headedness and headaches.     Objective:     Vital Signs (Most Recent):  Temp: 97.8 °F (36.6 °C) (07/19/19 0748)  Pulse: 70 (07/19/19 0748)  Resp: 18 (07/19/19 0748)  BP: 131/61 (07/19/19 0748)  SpO2: 97 % (07/19/19 0748) Vital Signs (24h Range):  Temp:  [97.8 °F (36.6 °C)-98.3 °F (36.8 °C)] 97.8 °F (36.6 °C)  Pulse:  [] 70  Resp:  [18] 18  SpO2:  [96 %-97 %] 97 %  BP: (131-158)/(61-77) 131/61     Weight: 125.7 kg (277 lb 1.9 oz)  Body mass index is 39.76 kg/m².    Physical Exam   Constitutional: She is oriented to person, place, and time. She appears well-developed. No distress.   HENT:   Head: Normocephalic and atraumatic.   Mouth/Throat: Oropharynx is clear and moist.   Temporal arteries symmetric with palpable pulses; no appreciable enlargement or thickening.  No scalp tenderness   Eyes: Pupils are equal, round, and reactive to light. Conjunctivae and EOM are normal. No scleral icterus.   Neck: Normal range of motion. Neck supple. No JVD present.   Cardiovascular: Normal rate, regular rhythm, normal heart sounds and  intact distal pulses.   Pulmonary/Chest: Effort normal and breath sounds normal.   Abdominal: Soft. Bowel sounds are normal. She exhibits no distension. There is no tenderness. There is no guarding.   Musculoskeletal: Normal range of motion. She exhibits no edema or tenderness.   Lymphadenopathy:     She has no cervical adenopathy.   Neurological: She is alert and oriented to person, place, and time. No cranial nerve deficit.   Skin: Skin is dry. No rash noted.   Fingers cold to the touch, but not cyanotic   Nursing note and vitals reviewed.        CRANIAL NERVES     CN III, IV, VI   Pupils are equal, round, and reactive to light.  Extraocular motions are normal.        Significant Labs:   CBC:   Recent Labs   Lab 07/19/19  0110   WBC 5.73   HGB 14.5   HCT 42.0   PLT 77*     CMP:   Recent Labs   Lab 07/19/19  0110      K 4.4   *   CO2 20*   *   BUN 11   CREATININE 0.7   CALCIUM 8.2*   PROT 6.9   ALBUMIN 2.7*   BILITOT 1.5*   ALKPHOS 97   AST 64*   ALT 24   ANIONGAP 9   EGFRNONAA >60.0     Urine Studies:   Recent Labs   Lab 07/19/19  0243   COLORU Yue   APPEARANCEUA Hazy*   PHUR 5.0   SPECGRAV 1.025   PROTEINUA Negative   GLUCUA Negative   KETONESU Negative   BILIRUBINUA Negative   OCCULTUA Negative   NITRITE Negative   LEUKOCYTESUR 2+*   RBCUA 2   WBCUA 50*   BACTERIA Few*   SQUAMEPITHEL 6       Significant Imaging: CXR: I have reviewed all pertinent results/findings within the past 24 hours and my personal findings are:  fibrotic changes consistent with history of left lower lobectomy present    Assessment/Plan:     * Acute cystitis without hematuria  UA with sufficient inflammatory markers to suggest UTI as underlying cause of her present ailment.  Continue ceftriaxone for now, and once feeling sufficiently well enough to go home can transition to oral antibiotics; or if she has gotten 3 doses by then she can likely be considered completed.  F/U Cx.      Left temporal headache  The unilateral  headache with possible jaw claudication raises suspicion for Giant Cell Arteritis, but her normal CRP makes this less likely.  However, her report of decreased visual acuity and eye pain along with this warrants additional evaluation.  Consult Ophthalmology.      Dehydration  Likely as a result of increased insensible losses in context of UTI and decreased mobility.  Will give another 1L and then MIVF afterwards; can repeat orthostatics to allay her fears of continued orthostatic symptoms.      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  She is likely deconditioned some with her acute illness and would likely benefit from PT evaluation.      Morbid obesity  Patient trying to lose weight and requests nutritionist consult to discuss dietary supplementation products since her appetite has been poor; will place order.      Hx Lung cancer, lower lobe  Patient has no symptoms suggestive of pneumonia and her history of left lower lobectomy can account for CXR findings.  Her symptoms can be better attributed to UTI with the objective findings on UA.  Stop azithromycin.          VTE Risk Mitigation (From admission, onward)        Ordered     enoxaparin injection 40 mg  Every 12 hours      07/19/19 0728     IP VTE HIGH RISK PATIENT  Once      07/19/19 0728     Place DARBY hose  Until discontinued      07/19/19 0728     IP VTE HIGH RISK PATIENT  Once      07/19/19 0728             All Elias MD  Department of Hospital Medicine   [facility]

## 2019-07-19 NOTE — ASSESSMENT & PLAN NOTE
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  She is likely deconditioned some with her acute illness and would likely benefit from PT evaluation.

## 2019-07-19 NOTE — HPI
Yana Orellana is a 74 F w/ pmh of RA, obesity, fibromyalgia, admitted with UTI/cystitis. Ophtho consulted for blurry vision in setting of temporal headache and possible jaw claudication, concern for GCA. Pt states that she has been having increasing blurry vision in both eyes for the past 1-2 months, L>R. Also been having occasional episodes of stabbing eye pain during this time. Has spots in her vision and loss of vision in periphery sometimes when she leans very far forward, and feels a little faint during these times. Complains of vertical diplopia when laying on her left side. Diplopia not associated with any particular time of day. She has also been having a L temporal headache for the past 3-4 weeks which comes and goes; no pain with touch or with head against pillow.    PMH - RA, obesity, polio, fibromyalgia  POH - cataract sx both eyes 2012; astigmatism; wears reading glasses  Family - no history of loss of vision

## 2019-07-19 NOTE — ED PROVIDER NOTES
"Encounter Date: 7/18/2019       History     Chief Complaint   Patient presents with    Lightheadness     pt complains of falling 2 weeks ago from bending over and is having some visual changes. "hx of vertigo but it feels different". Denies weakness/numbness. Denies bloodthinners. Denies cp     Pt with h/o RA, chronic LE weakness 2/2 remote polio, c/o lightheadedness x 1-2 months, progressive, with standing/exertion, not at rest.  Feels like she sees flashes of light when she stands and that she's going to pass out, limits activity.  Also with SOB/LARA.  In addition she has had worsening left sided temporal HA, now radiating into jaw for past week.  No vision loss.  Fevers in the evening 2-3 weeks ago but not now    The history is provided by the patient.     Review of patient's allergies indicates:   Allergen Reactions    Demerol [meperidine]      Euphoria    Gabapentin Other (See Comments)     Hallucinations    Mellaril-s Nausea Only    Versed [midazolam]      Excessive talking, hyper    Vicodin [hydrocodone-acetaminophen]      Joint pain, muscle pain    Xanax [alprazolam]      fatigue     Past Medical History:   Diagnosis Date    Anemia     Anxiety     Cataract     Fibromyalgia 10/15/2012    Insomnia 3/28/2014    Lung cancer     Pneumonia due to infectious organism 7/19/2019    Post-polio syndrome     Rheumatoid arthritis(714.0) 7/16/2012 2009 onset with mod positive CCP and RF MTX 2011 - present     Thyroid disease      Past Surgical History:   Procedure Laterality Date    CHOLECYSTECTOMY      HYSTERECTOMY      LUNG REMOVAL, PARTIAL      left lower lobectomy    OVARIAN CYST REMOVAL      TONSILLECTOMY       Family History   Problem Relation Age of Onset    Lung cancer Father     Lung cancer Maternal Aunt     Diabetes Maternal Grandmother     Colon cancer Maternal Grandfather     Parkinsonism Paternal Grandmother     Arrhythmia Mother     Hypertension Neg Hx     Heart attack Neg Hx  "     Social History     Tobacco Use    Smoking status: Former Smoker     Packs/day: 1.50     Years: 40.00     Pack years: 60.00     Types: Cigarettes     Last attempt to quit: 7/16/2009     Years since quitting: 10.0    Smokeless tobacco: Never Used   Substance Use Topics    Alcohol use: No    Drug use: No     Review of Systems   Constitutional: Positive for fatigue and fever.   Respiratory: Positive for shortness of breath. Negative for cough.    Cardiovascular: Negative for chest pain and leg swelling.   Genitourinary: Negative for difficulty urinating.   Neurological: Positive for dizziness, light-headedness and headaches. Negative for seizures, syncope and numbness.   All other systems reviewed and are negative.      Physical Exam     Initial Vitals [07/18/19 2232]   BP Pulse Resp Temp SpO2   133/61 86 18 98.3 °F (36.8 °C) 96 %      MAP       --         Physical Exam    Nursing note and vitals reviewed.  Constitutional: She is not diaphoretic. No distress.   HENT:   Head: Normocephalic and atraumatic.   Mouth/Throat: Oropharynx is clear and moist.   Eyes: Conjunctivae and EOM are normal. Pupils are equal, round, and reactive to light.   Neck: Neck supple.   Cardiovascular: Normal rate and regular rhythm.   Abdominal: She exhibits no distension. There is no tenderness.   Musculoskeletal: She exhibits no edema.   Neurological: She is alert and oriented to person, place, and time. She has normal strength. She displays no atrophy and no tremor. No cranial nerve deficit or sensory deficit. Coordination normal. GCS eye subscore is 4. GCS verbal subscore is 5. GCS motor subscore is 6.   Unable ambulate pt 2/2 weakness/dizziness   Skin: Skin is warm. There is pallor.   Psychiatric: She has a normal mood and affect. Thought content normal.         ED Course   Procedures  Labs Reviewed   CBC W/ AUTO DIFFERENTIAL - Abnormal; Notable for the following components:       Result Value    Mean Corpuscular Volume 101 (*)      Mean Corpuscular Hemoglobin 34.9 (*)     RDW 14.8 (*)     Platelets 77 (*)     All other components within normal limits   COMPREHENSIVE METABOLIC PANEL - Abnormal; Notable for the following components:    Chloride 111 (*)     CO2 20 (*)     Glucose 128 (*)     Calcium 8.2 (*)     Albumin 2.7 (*)     Total Bilirubin 1.5 (*)     AST 64 (*)     All other components within normal limits   URINALYSIS, REFLEX TO URINE CULTURE - Abnormal; Notable for the following components:    Appearance, UA Hazy (*)     Leukocytes, UA 2+ (*)     All other components within normal limits    Narrative:     Preferred Collection Type->Urine, Clean Catch   URINALYSIS MICROSCOPIC - Abnormal; Notable for the following components:    WBC, UA 50 (*)     Bacteria Few (*)     Non-Squam Epith 1 (*)     All other components within normal limits    Narrative:     Preferred Collection Type->Urine, Clean Catch   B-TYPE NATRIURETIC PEPTIDE   PROTIME-INR   TROPONIN I   TSH   SEDIMENTATION RATE   C-REACTIVE PROTEIN          Imaging Results          CT Head Without Contrast (Final result)  Result time 07/19/19 02:23:28    Final result by All Sepulveda MD (07/19/19 02:23:28)                 Impression:      No CT evidence of acute intracranial abnormality. Clinical correlation and further evaluation as warranted.      Electronically signed by: All Sepulveda MD  Date:    07/19/2019  Time:    02:23             Narrative:    EXAMINATION:  CT HEAD WITHOUT CONTRAST    CLINICAL HISTORY:  Headache, acute, norm neuro exam;    TECHNIQUE:  Low dose axial images were obtained through the head.  Coronal and sagittal reformations were also performed. Contrast was not administered.    COMPARISON:  None    FINDINGS:  There is mild age-appropriate generalized cerebral volume loss.  There is no evidence of acute intracranial hemorrhage, midline shift, or hydrocephalus.  No extra-axial fluid collections identified.  Gray-white matter differentiation appears  maintained. The basal cisterns are patent. The mastoid air cells and paranasal sinuses are clear of acute process. The visualized bones of the calvarium demonstrate no acute osseous abnormality.                                X-Ray Chest PA And Lateral (Final result)  Result time 07/19/19 01:37:39    Final result by Jerad Nava MD (07/19/19 01:37:39)                 Impression:      Interval development of rounded masslike consolidation at the left base posteriorly since the June 14, 2019 examination suspicious for left lower lobe posterior segmental pneumonia.  Given the rounded masslike appearance, recommend follow-up to ensure complete resolution.    This report was flagged in Epic as abnormal.      Electronically signed by: Jerad Nava MD  Date:    07/19/2019  Time:    01:37             Narrative:    EXAMINATION:  XR CHEST PA AND LATERAL    CLINICAL HISTORY:  Shortness of breath    TECHNIQUE:  PA and lateral views of the chest were performed.    COMPARISON:  June 14, 2019.    FINDINGS:  Interval development of rounded masslike consolidation at the left base posteriorly.    Right lung is clear.  No pleural effusion or pneumothorax.    Cardiomediastinal silhouette is unchanged.    Regional osseous structures are unchanged.                                 Medical Decision Making:   History:   Old Medical Records: I decided to obtain old medical records.  Old Records Summarized: records from clinic visits and records from previous admission(s).  Initial Assessment:   75 yo f, here with multiple complaints, primarily feeling lightheaded and SOB w walking.  Similar presentation 1 month ago at [facility]  - had labs which were stable, CTA to r/o PE which showed emphysema but no PE.  Sx have progressed since that time, now with new left sided HA    On exam, obese, VSS  Neuro exam CN intact, UE strength 5/5, no limb ataxia, no abnormal nystagmus  Lungs CTA  Differential Diagnosis:   Temporal  arteritis  Anemia  CHF  Pneumonia  ACS    Clinical Tests:   Lab Tests: Ordered and Reviewed  Radiological Study: Ordered and Reviewed  Medical Tests: Ordered and Reviewed  ED Management:  Labs, including ESR/CRP  CXR  CT head  Likely admit    1:51 AM  New LLL pneumonia on CXR.  Ceftriaxone/axithro ordered for CAP    3:48 AM  Labs largely unremarkable  + orthostatics - 1 L NS bolus ordered  Pt states that she cannot complete her ADLs 2/2 sx, does not feel safe at home  Will admit for antibiotics, PT/OT, likely SNF                        Clinical Impression:       ICD-10-CM ICD-9-CM   1. Pneumonia due to infectious organism, unspecified laterality, unspecified part of lung J18.9 136.9     484.8   2. SOB (shortness of breath) R06.02 786.05                                Melia Quintanilla MD  07/20/19 0719

## 2019-07-19 NOTE — CONSULTS
Food & Nutrition  Education    Diet Education: Wt loss  Learners: Pt      Nutrition Education provided with handouts: Weight Loss Tips      Comments: Pt reports that she is no longer interested in food or cooking. States that she has no appetite. Reports eating foods including chicken, salmon, vegetables, fruits, oatmeal, grits, cereal, protein drinks. She states that she does not like fast food, does not cook with butter, and does not use salt. She states that she likes more vegetarian foods.     Dicussed wt loss tips including healthy food options, protein sources, bulk cooking, and mindful eating. Encouraged pt to eat fresh, unprocessed foods. Encouraged pt eat small, frequent meals 2/2 no appetite and getting nauseated if she does not eat. Pt verbalized understanding.      All questions and concerns answered.      Follow-Up: Yes    Please re-consult as needed.

## 2019-07-19 NOTE — PLAN OF CARE
07/19/19 1220   Discharge Assessment   Assessment Type Discharge Planning Assessment   Confirmed/corrected address and phone number on facesheet? Yes   Assessment information obtained from? Patient   Expected Length of Stay (days) 2   Communicated expected length of stay with patient/caregiver yes   Prior to hospitilization cognitive status: Alert/Oriented   Prior to hospitalization functional status: Assistive Equipment   Current cognitive status: Alert/Oriented   Current Functional Status: Needs Assistance   Lives With alone   Able to Return to Prior Arrangements other (see comments)  (unsure)   Is patient able to care for self after discharge? Unable to determine at this time (comments)   Patient's perception of discharge disposition home health  (pt received [facility] in the past)   Readmission Within the Last 30 Days no previous admission in last 30 days   Patient currently being followed by outpatient case management? No   Patient currently receives any other outside agency services? No   Equipment Currently Used at Home cane, quad   Do you have any problems affording any of your prescribed medications? No   Is the patient taking medications as prescribed? yes   Does the patient have transportation home? Yes   Transportation Anticipated public transportation  (pt will pay for a cab)   Does the patient receive services at the [facility]? No   Discharge Plan A Home Health   Discharge Plan B Skilled Nursing Facility   DME Needed Upon Discharge    (TBD)   Patient/Family in Agreement with Plan yes     Dx: acute cystitis without hematuria  Consult: ophth, dty, & PT/OT  Pharm: [facility] f/u appt: Dr. Jethro Ortiz (PCP) on 8/8/19 at 1300

## 2019-07-19 NOTE — ASSESSMENT & PLAN NOTE
UA with sufficient inflammatory markers to suggest UTI as underlying cause of her present ailment.  Continue ceftriaxone for now, and once feeling sufficiently well enough to go home can transition to oral antibiotics; or if she has gotten 3 doses by then she can likely be considered completed.  F/U Cx.

## 2019-07-19 NOTE — SUBJECTIVE & OBJECTIVE
Past Medical History:   Diagnosis Date    Anemia     Anxiety     Cataract     Fibromyalgia 10/15/2012    Insomnia 3/28/2014    Lung cancer     Pneumonia due to infectious organism 7/19/2019    Post-polio syndrome     Rheumatoid arthritis(714.0) 7/16/2012    2009 onset with mod positive CCP and RF MTX 2011 - present     Thyroid disease        Past Surgical History:   Procedure Laterality Date    CHOLECYSTECTOMY      HYSTERECTOMY      LUNG REMOVAL, PARTIAL      left lower lobectomy    OVARIAN CYST REMOVAL      TONSILLECTOMY         Review of patient's allergies indicates:   Allergen Reactions    Demerol [meperidine]      Euphoria    Gabapentin Other (See Comments)     Hallucinations    Mellaril-s Nausea Only    Versed [midazolam]      Excessive talking, hyper    Vicodin [hydrocodone-acetaminophen]      Joint pain, muscle pain    Xanax [alprazolam]      fatigue       No current facility-administered medications on file prior to encounter.      Current Outpatient Medications on File Prior to Encounter   Medication Sig    chlorhexidine (PERIDEX) 0.12 % solution RINSE WITH 15 ML PO BID FOR 7 DAYS    methotrexate 2.5 MG Tab Take 5 tablets (12.5 mg total) by mouth every 7 days.    predniSONE (DELTASONE) 5 MG tablet Take as needed for flare of rheumatoid arthritis    vitamin D 185 MG Tab Take 185 mg by mouth once daily.      diazePAM (VALIUM) 5 MG tablet Take 1 tablet (5 mg total) by mouth every 6 (six) hours as needed for Anxiety.    folic acid (FOLVITE) 1 MG tablet Take 2 tablets (2 mg total) by mouth once daily.    [DISCONTINUED] fluconazole (DIFLUCAN) 100 MG tablet TK 1 T PO TODAY THEN TK PRN     Family History     Problem Relation (Age of Onset)    Arrhythmia Mother    Colon cancer Maternal Grandfather    Diabetes Maternal Grandmother    Lung cancer Father, Maternal Aunt    Parkinsonism Paternal Grandmother        Tobacco Use    Smoking status: Former Smoker     Packs/day: 1.50     Years:  40.00     Pack years: 60.00     Types: Cigarettes     Last attempt to quit: 7/16/2009     Years since quitting: 10.0    Smokeless tobacco: Never Used   Substance and Sexual Activity    Alcohol use: No    Drug use: No    Sexual activity: Not Currently     Review of Systems   Constitutional: Positive for activity change, appetite change, chills and fever.   HENT: Positive for ear pain (left). Negative for congestion.    Eyes: Positive for pain and visual disturbance. Negative for photophobia and redness.   Respiratory: Negative for cough and shortness of breath.    Cardiovascular: Negative for chest pain and leg swelling.   Gastrointestinal: Positive for diarrhea (chronic). Negative for abdominal pain, nausea and vomiting.   Endocrine: Negative for polydipsia and polyuria.   Genitourinary: Negative for dysuria, frequency, hematuria and urgency.   Musculoskeletal: Positive for gait problem and neck pain. Negative for arthralgias and myalgias.   Skin: Negative for color change and rash.   Neurological: Positive for dizziness, light-headedness and headaches.     Objective:     Vital Signs (Most Recent):  Temp: 97.8 °F (36.6 °C) (07/19/19 0748)  Pulse: 70 (07/19/19 0748)  Resp: 18 (07/19/19 0748)  BP: 131/61 (07/19/19 0748)  SpO2: 97 % (07/19/19 0748) Vital Signs (24h Range):  Temp:  [97.8 °F (36.6 °C)-98.3 °F (36.8 °C)] 97.8 °F (36.6 °C)  Pulse:  [] 70  Resp:  [18] 18  SpO2:  [96 %-97 %] 97 %  BP: (131-158)/(61-77) 131/61     Weight: 125.7 kg (277 lb 1.9 oz)  Body mass index is 39.76 kg/m².    Physical Exam   Constitutional: She is oriented to person, place, and time. She appears well-developed. No distress.   HENT:   Head: Normocephalic and atraumatic.   Mouth/Throat: Oropharynx is clear and moist.   Temporal arteries symmetric with palpable pulses; no appreciable enlargement or thickening.  No scalp tenderness   Eyes: Pupils are equal, round, and reactive to light. Conjunctivae and EOM are normal. No scleral  icterus.   Neck: Normal range of motion. Neck supple. No JVD present.   Cardiovascular: Normal rate, regular rhythm, normal heart sounds and intact distal pulses.   Pulmonary/Chest: Effort normal and breath sounds normal.   Abdominal: Soft. Bowel sounds are normal. She exhibits no distension. There is no tenderness. There is no guarding.   Musculoskeletal: Normal range of motion. She exhibits no edema or tenderness.   Lymphadenopathy:     She has no cervical adenopathy.   Neurological: She is alert and oriented to person, place, and time. No cranial nerve deficit.   Skin: Skin is dry. No rash noted.   Fingers cold to the touch, but not cyanotic   Nursing note and vitals reviewed.        CRANIAL NERVES     CN III, IV, VI   Pupils are equal, round, and reactive to light.  Extraocular motions are normal.        Significant Labs:   CBC:   Recent Labs   Lab 07/19/19  0110   WBC 5.73   HGB 14.5   HCT 42.0   PLT 77*     CMP:   Recent Labs   Lab 07/19/19  0110      K 4.4   *   CO2 20*   *   BUN 11   CREATININE 0.7   CALCIUM 8.2*   PROT 6.9   ALBUMIN 2.7*   BILITOT 1.5*   ALKPHOS 97   AST 64*   ALT 24   ANIONGAP 9   EGFRNONAA >60.0     Urine Studies:   Recent Labs   Lab 07/19/19  0243   COLORU Yue   APPEARANCEUA Hazy*   PHUR 5.0   SPECGRAV 1.025   PROTEINUA Negative   GLUCUA Negative   KETONESU Negative   BILIRUBINUA Negative   OCCULTUA Negative   NITRITE Negative   LEUKOCYTESUR 2+*   RBCUA 2   WBCUA 50*   BACTERIA Few*   SQUAMEPITHEL 6       Significant Imaging: CXR: I have reviewed all pertinent results/findings within the past 24 hours and my personal findings are:  fibrotic changes consistent with history of left lower lobectomy present

## 2019-07-19 NOTE — ASSESSMENT & PLAN NOTE
Likely as a result of increased insensible losses in context of UTI and decreased mobility.  Will give another 1L and then MIVF afterwards; can repeat orthostatics to allay her fears of continued orthostatic symptoms.

## 2019-07-19 NOTE — PLAN OF CARE
07/19/19 1301   Post-Acute Status   Post-Acute Authorization Placement   Post-Acute Placement Status Awaiting Therapy Documentation

## 2019-07-19 NOTE — ASSESSMENT & PLAN NOTE
The unilateral headache with possible jaw claudication raises suspicion for Giant Cell Arteritis, but her normal CRP makes this less likely.  However, her report of decreased visual acuity and eye pain along with this warrants additional evaluation.  Consult Ophthalmology.

## 2019-07-19 NOTE — PROGRESS NOTES
[facility] Hospital Medicine  Progress Note    Patient Name: Yana Orellana  MRN: [id number]  Patient Class: OP- Observation   Admission Date: 7/18/2019  Length of Stay: 0 days  Attending Physician: Melissa Osei MD  Primary Care Provider: Jethro Ortiz MD    [facility] Medicine Team: Networked reference to record PCT  Clarice Diaz PA-C    Subjective:     Principal Problem:Acute cystitis without hematuria      HPI:  74F presents with c/o increased gait instability and loss of balance, having fallen 2 weeks ago, and recently getting lightheaded when bending over or standing up, along with her vision partially blacking out.  She has been afraid of falling more because of this and has primarily confined herself to her bedroom and not cooked, living off of dietary supplement shakes like Slim Fast.  She has noted some neck soreness lately but this is not unusual for her as she has fibromyalgia.  She has had a left-sided temporal headache which has been unusual, along with jaw pain most pronounced with using the jaw like with chewing.  She reports some tenderness over the temple but not to the extent that she is avoiding using a comb or brush.  She also has some pain in both eyes and feels that her vision has decreased/gotten blurrier.  She denies photophobia.  She denies any respiratory symptoms such as shortness of breath or cough.  She has felt some fevers at night, and always feels cold.  She has not noticed any dysuria or frequency.    Overview/Hospital Course:  Patient admitted for lightheadedness/generalized weakness. Found to have UTI. 0/4SIRS. UCx in process. Continue CTX for now. Additionally, she complained of L sided temporal headache associated with some jaw pain and eye pain. ESR/CRP WNL, optho was consulted for further assistance. PTOT consulted.     Interval History: No reported events from overnight. Pt seen at bedside this AM in NAD. Pleasant, showing photos of her  family album. She says that she was given lovenox once while hospitalized and it made her bleed and it had to be stopped. Will d/c lovenox and encourage early ambulation. PTOT consulted. Await UCx. Will follow optho recs.     Review of Systems   Constitutional: Positive for activity change, appetite change, chills and fever (subjective).   HENT: Positive for ear pain (left). Negative for congestion.    Eyes: Positive for pain and visual disturbance (blurred). Negative for photophobia.   Respiratory: Negative for shortness of breath.    Cardiovascular: Negative for chest pain.   Gastrointestinal: Positive for diarrhea (chronic). Negative for nausea and vomiting.   Endocrine: Negative for polydipsia and polyuria.   Genitourinary: Negative for dysuria.   Musculoskeletal: Positive for gait problem and neck pain. Negative for myalgias.   Skin: Negative for color change and rash.   Neurological: Positive for dizziness (improvingh), light-headedness and headaches.   Psychiatric/Behavioral: Negative for confusion.     Objective:     Vital Signs (Most Recent):  Temp: 98.5 °F (36.9 °C) (07/19/19 1114)  Pulse: 84 (07/19/19 1114)  Resp: 17 (07/19/19 1114)  BP: (!) 147/62 (07/19/19 1114)  SpO2: 95 % (07/19/19 1114) Vital Signs (24h Range):  Temp:  [97.8 °F (36.6 °C)-98.5 °F (36.9 °C)] 98.5 °F (36.9 °C)  Pulse:  [] 84  Resp:  [17-18] 17  SpO2:  [95 %-97 %] 95 %  BP: (131-158)/(61-77) 147/62     Weight: 125.7 kg (277 lb 1.9 oz)  Body mass index is 39.76 kg/m².    Intake/Output Summary (Last 24 hours) at 7/19/2019 1330  Last data filed at 7/19/2019 0600  Gross per 24 hour   Intake 1300 ml   Output --   Net 1300 ml      Physical Exam   Constitutional: She is oriented to person, place, and time. No distress.   HENT:   Head: Normocephalic and atraumatic.   Temporal arteries symmetric with palpable pulses; no appreciable enlargement or thickening.  No scalp tenderness   Eyes: Conjunctivae and EOM are normal.   Neck: Normal range  of motion. Neck supple.   Cardiovascular: Normal rate, regular rhythm and normal heart sounds.   Pulmonary/Chest: Effort normal. No respiratory distress.   Abdominal: Soft. Bowel sounds are normal.   Musculoskeletal: She exhibits no edema or tenderness.   Neurological: She is alert and oriented to person, place, and time. No cranial nerve deficit.   Skin: Skin is warm and dry. She is not diaphoretic.   Fingers cold to the touch, but not cyanotic   Psychiatric: She has a normal mood and affect. Her behavior is normal.   Nursing note and vitals reviewed.      Significant Labs: All pertinent labs within the past 24 hours have been reviewed.    Significant Imaging: I have reviewed all pertinent imaging results/findings within the past 24 hours.      Assessment/Plan:      * Acute cystitis without hematuria  UA with sufficient inflammatory markers to suggest UTI as underlying cause of her present ailment. 0/4 SIRS. Continue ceftriaxone for now, and once feeling sufficiently well enough to go home can transition to oral antibiotics; or if she has gotten 3 doses by then she can likely be considered completed.  F/U Cx.      Left temporal headache  The unilateral headache with possible jaw claudication raises suspicion for Giant Cell Arteritis, but her normal CRP makes this less likely.  However, her report of decreased visual acuity and eye pain along with this warrants additional evaluation.  Consult Ophthalmology.      Dehydration  Likely as a result of increased insensible losses in context of UTI and decreased mobility.  Will give another 1L and then MIVF afterwards; can repeat orthostatics to allay her fears of continued orthostatic symptoms.      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  She is likely deconditioned some with her acute illness and would likely benefit from PT evaluation.      Morbid  obesity  Patient trying to lose weight and requests nutritionist consult to discuss dietary supplementation products since her appetite has been poor; will place order.      Hx Lung cancer, lower lobe  Patient has no symptoms suggestive of pneumonia and her history of left lower lobectomy can account for CXR findings.  Her symptoms can be better attributed to UTI with the objective findings on UA.  Stop azithromycin.          VTE Risk Mitigation (From admission, onward)        Ordered     IP VTE HIGH RISK PATIENT  Once      07/19/19 0728     Place DARBY hose  Until discontinued      07/19/19 0728     IP VTE HIGH RISK PATIENT  Once      07/19/19 0728              Discussed with staff  Clarice Diaz PA-C  Department of Hospital Medicine   [facility]

## 2019-07-19 NOTE — HOSPITAL COURSE
Patient admitted for lightheadedness/generalized weakness. Found to have UTI. 0/4SIRS. UCx shows multiple organisms, none in predominance. Completed 3day course CTX in house. Additionally, she complained of L sided temporal headache associated with some jaw pain and eye pain. ESR/CRP WNL, optho was consulted for further assistance. Optho evaluated patient, no signs of temporal arteritis, follow up outpatient. PTOT consulted, recommend HHC. Stable for d/c with PCP follow up. Return precautions discussed.

## 2019-07-19 NOTE — HPI
74F presents with c/o increased gait instability and loss of balance, having fallen 2 weeks ago, and recently getting lightheaded when bending over or standing up, along with her vision partially blacking out.  She has been afraid of falling more because of this and has primarily confined herself to her bedroom and not cooked, living off of dietary supplement shakes like Slim Fast.  She has noted some neck soreness lately but this is not unusual for her as she has fibromyalgia.  She has had a left-sided temporal headache which has been unusual, along with jaw pain most pronounced with using the jaw like with chewing.  She reports some tenderness over the temple but not to the extent that she is avoiding using a comb or brush.  She also has some pain in both eyes and feels that her vision has decreased/gotten blurrier.  She denies photophobia.  She denies any respiratory symptoms such as shortness of breath or cough.  She has felt some fevers at night, and always feels cold.  She has not noticed any dysuria or frequency.

## 2019-07-19 NOTE — ASSESSMENT & PLAN NOTE
UA with sufficient inflammatory markers to suggest UTI as underlying cause of her present ailment. 0/4 SIRS. Continue ceftriaxone for now, and once feeling sufficiently well enough to go home can transition to oral antibiotics; or if she has gotten 3 doses by then she can likely be considered completed.  F/U Cx.

## 2019-07-20 LAB
ANION GAP SERPL CALC-SCNC: 7 MMOL/L (ref 8–16)
BACTERIA UR CULT: NORMAL
BACTERIA UR CULT: NORMAL
BASOPHILS # BLD AUTO: 0.05 K/UL (ref 0–0.2)
BASOPHILS NFR BLD: 1.4 % (ref 0–1.9)
BUN SERPL-MCNC: 8 MG/DL (ref 8–23)
CALCIUM SERPL-MCNC: 7.6 MG/DL (ref 8.7–10.5)
CHLORIDE SERPL-SCNC: 114 MMOL/L (ref 95–110)
CO2 SERPL-SCNC: 20 MMOL/L (ref 23–29)
CREAT SERPL-MCNC: 0.6 MG/DL (ref 0.5–1.4)
DIFFERENTIAL METHOD: ABNORMAL
EOSINOPHIL # BLD AUTO: 0.3 K/UL (ref 0–0.5)
EOSINOPHIL NFR BLD: 7.7 % (ref 0–8)
ERYTHROCYTE [DISTWIDTH] IN BLOOD BY AUTOMATED COUNT: 15.2 % (ref 11.5–14.5)
EST. GFR  (AFRICAN AMERICAN): >60 ML/MIN/1.73 M^2
EST. GFR  (NON AFRICAN AMERICAN): >60 ML/MIN/1.73 M^2
GLUCOSE SERPL-MCNC: 101 MG/DL (ref 70–110)
HCT VFR BLD AUTO: 35.3 % (ref 37–48.5)
HGB BLD-MCNC: 12 G/DL (ref 12–16)
IMM GRANULOCYTES # BLD AUTO: 0.01 K/UL (ref 0–0.04)
IMM GRANULOCYTES NFR BLD AUTO: 0.3 % (ref 0–0.5)
LYMPHOCYTES # BLD AUTO: 1.2 K/UL (ref 1–4.8)
LYMPHOCYTES NFR BLD: 33.7 % (ref 18–48)
MCH RBC QN AUTO: 34.1 PG (ref 27–31)
MCHC RBC AUTO-ENTMCNC: 34 G/DL (ref 32–36)
MCV RBC AUTO: 100 FL (ref 82–98)
MONOCYTES # BLD AUTO: 0.5 K/UL (ref 0.3–1)
MONOCYTES NFR BLD: 14.6 % (ref 4–15)
NEUTROPHILS # BLD AUTO: 1.5 K/UL (ref 1.8–7.7)
NEUTROPHILS NFR BLD: 42.3 % (ref 38–73)
NRBC BLD-RTO: 0 /100 WBC
PLATELET # BLD AUTO: 57 K/UL (ref 150–350)
PMV BLD AUTO: 10.5 FL (ref 9.2–12.9)
POTASSIUM SERPL-SCNC: 3.8 MMOL/L (ref 3.5–5.1)
RBC # BLD AUTO: 3.52 M/UL (ref 4–5.4)
SODIUM SERPL-SCNC: 141 MMOL/L (ref 136–145)
WBC # BLD AUTO: 3.5 K/UL (ref 3.9–12.7)

## 2019-07-20 PROCEDURE — G0378 HOSPITAL OBSERVATION PER HR: HCPCS

## 2019-07-20 PROCEDURE — 63600175 PHARM REV CODE 636 W HCPCS: Performed by: HOSPITALIST

## 2019-07-20 PROCEDURE — 85025 COMPLETE CBC W/AUTO DIFF WBC: CPT

## 2019-07-20 PROCEDURE — 25000003 PHARM REV CODE 250: Performed by: HOSPITALIST

## 2019-07-20 PROCEDURE — 99224 PR SUBSEQUENT OBSERVATION CARE,LEVEL I: CPT | Mod: ,,, | Performed by: PHYSICIAN ASSISTANT

## 2019-07-20 PROCEDURE — 36415 COLL VENOUS BLD VENIPUNCTURE: CPT

## 2019-07-20 PROCEDURE — 99224 PR SUBSEQUENT OBSERVATION CARE,LEVEL I: ICD-10-PCS | Mod: ,,, | Performed by: PHYSICIAN ASSISTANT

## 2019-07-20 PROCEDURE — 80048 BASIC METABOLIC PNL TOTAL CA: CPT

## 2019-07-20 RX ADMIN — ACETAMINOPHEN 650 MG: 325 TABLET ORAL at 04:07

## 2019-07-20 RX ADMIN — VITAMIN D, TAB 1000IU (100/BT) 1000 UNITS: 25 TAB at 09:07

## 2019-07-20 RX ADMIN — FOLIC ACID 2 MG: 1 TABLET ORAL at 09:07

## 2019-07-20 RX ADMIN — CEFTRIAXONE SODIUM 1 G: 1 INJECTION, POWDER, FOR SOLUTION INTRAMUSCULAR; INTRAVENOUS at 02:07

## 2019-07-20 NOTE — CONSULTS
[facility]  Ophthalmology  Consult Note    Patient Name: Yana Orellana  MRN: [id number]  Admission Date: 7/18/2019  Hospital Length of Stay: 0 days  Attending Provider: Melissa Osei MD   Primary Care Physician: Jethro Ortiz MD  Principal Problem:Acute cystitis without hematuria    Inpatient consult to Ophthalmology  Consult performed by: Litzy Ortega MD  Consult ordered by: All Elias MD  Reason for consult: chronic worsening blurry vision        Subjective:     Chief Complaint:  Chronic worsening blurry vision    HPI:   Yana Orellana is a 74 F w/ pmh of RA, obesity, fibromyalgia, admitted with UTI/cystitis. Ophtho consulted for blurry vision in setting of temporal headache and possible jaw claudication, concern for GCA. Pt states that she has been having increasing blurry vision in both eyes for the past 1-2 months, L>R. Also been having occasional episodes of stabbing eye pain during this time. Has spots in her vision and loss of vision in periphery sometimes when she leans very far forward, and feels a little faint during these times. Complains of vertical diplopia when laying on her left side. Diplopia not associated with any particular time of day. She has also been having a L temporal headache for the past 3-4 weeks which comes and goes; no pain with touch or with head against pillow.    PMH - RA, obesity, polio, fibromyalgia  POH - cataract sx both eyes 2012; astigmatism; wears reading glasses  Family - no history of loss of vision    No new subjective & objective note has been filed under this hospital service since the last note was generated.      Base Eye Exam     Visual Acuity (near w/ pt's readers)       Right Left    Near cc 20/25 20/25          Tonometry (Tonopen, 3:35 PM)       Right Left    Pressure 6 6          Pupils       Pupils Dark Light Shape React APD    Right PERRL 5 2.5 Round Brisk RAPD    Left PERRL 5 2 Round Brisk None          Visual Peng        Right Left     Full Full          Extraocular Movement       Right Left     Full Full          Neuro/Psych     Oriented x3:  Yes            Slit Lamp and Fundus Exam     External Exam       Right Left    External Normal           Pen Light Exam       Right Left    Lids/Lashes Normal Normal    Conjunctiva/Sclera White and quiet White and quiet    Cornea Clear Clear    Anterior Chamber Deep and quiet Deep and quiet    Iris Round and reactive Round and reactive          Fundus Exam       Right Left    Disc Normal Normal    Macula pigmented lesion near superior arcade Normal    Vessels Normal Normal    Periphery dark pigment superonasal dark pigment superonasal              Assessment and Plan:     #Chronic worsening blurry vision in both eyes, still with good visual acuity, likely related to changing refractive error, possibly dry eye given occasional pain symptoms  -highly unlikely GCA given normal ESR and CRP; also no scalp tenderness and +jaw tenderness make it less characteristic; also fundus exam does not correlate  -f/u with optometrist/ophtho as outpatient    #Pigmented lesion along superior arcade in R eye  -likely benign finding, but would recommend f/u with primary ophthalmologist for surveillance    #RAPD od    Litzy Ortega MD, PGY2  Ophthalmology  [facility]

## 2019-07-20 NOTE — SUBJECTIVE & OBJECTIVE
Interval History: No reported events overnight. Patient seen at bedside today, resting in NAD. Discussed awaiting PTOT evaluation.     Review of Systems   Constitutional: Positive for activity change, chills and fever (subjective).   HENT: Positive for ear pain (left). Negative for congestion.    Eyes: Positive for pain and visual disturbance (blurred).   Respiratory: Negative for shortness of breath.    Cardiovascular: Negative for chest pain.   Gastrointestinal: Positive for diarrhea (chronic). Negative for vomiting.   Genitourinary: Negative for dysuria and flank pain.   Musculoskeletal: Positive for gait problem and neck pain. Negative for myalgias.   Skin: Negative for color change and rash.   Neurological: Positive for dizziness (improvingh), light-headedness and headaches.   Psychiatric/Behavioral: Negative for confusion.     Objective:     Vital Signs (Most Recent):  Temp: 98.1 °F (36.7 °C) (07/20/19 1222)  Pulse: 77 (07/20/19 1222)  Resp: 18 (07/20/19 1222)  BP: 129/79 (07/20/19 1222)  SpO2: (!) 94 % (07/20/19 1222) Vital Signs (24h Range):  Temp:  [98.1 °F (36.7 °C)-98.7 °F (37.1 °C)] 98.1 °F (36.7 °C)  Pulse:  [74-87] 77  Resp:  [17-20] 18  SpO2:  [94 %-96 %] 94 %  BP: (129-164)/(62-79) 129/79     Weight: 125.7 kg (277 lb 1.9 oz)  Body mass index is 39.76 kg/m².    Intake/Output Summary (Last 24 hours) at 7/20/2019 1503  Last data filed at 7/19/2019 1901  Gross per 24 hour   Intake 480 ml   Output --   Net 480 ml      Physical Exam   Constitutional: She is oriented to person, place, and time. No distress.   HENT:   Head: Normocephalic and atraumatic.   Temporal arteries symmetric with palpable pulses; no appreciable enlargement or thickening.  No scalp tenderness   Eyes: EOM are normal. Right eye exhibits no discharge. Left eye exhibits no discharge.   Neck: Normal range of motion.   Cardiovascular: Normal rate and regular rhythm.   Pulmonary/Chest: Effort normal. No respiratory distress.   Abdominal:  Soft. She exhibits no distension.   Obese   Musculoskeletal: She exhibits no tenderness.   Neurological: She is alert and oriented to person, place, and time.   Skin: Skin is warm and dry. She is not diaphoretic.   Psychiatric: She has a normal mood and affect. Her behavior is normal. Thought content normal.   Nursing note and vitals reviewed.      Significant Labs: All pertinent labs within the past 24 hours have been reviewed.    Significant Imaging: I have reviewed all pertinent imaging results/findings within the past 24 hours.

## 2019-07-20 NOTE — PLAN OF CARE
Problem: Adult Inpatient Plan of Care  Goal: Plan of Care Review  Outcome: Ongoing (interventions implemented as appropriate)  Pt safety maintained. NADN. C/o pain 3/10 to back. IV ABX continues, no adverse reactions noted. Encouraged to call for assistance as needed. Bed in low position. Call bell in reach. Side rails up x2. Will continue to monitor.

## 2019-07-20 NOTE — PLAN OF CARE
Problem: Adult Inpatient Plan of Care  Goal: Plan of Care Review  Outcome: Ongoing (interventions implemented as appropriate)  Pt with no falls or injuries this shift. Pt reports pain level 3/10. Pt continues on iv antibiotics for UTI.

## 2019-07-20 NOTE — NURSING
Change in care r/t census.  Patient awake in bed w/easy resp effort.  No needs verbalized at this time

## 2019-07-20 NOTE — ASSESSMENT & PLAN NOTE
The unilateral headache with possible jaw claudication raises suspicion for Giant Cell Arteritis, but her normal CRP makes this less likely.  However, her report of decreased visual acuity and eye pain along with this warrants additional evaluation.  Consult Ophthalmology, who agrees highly unlikely for giant cell arteritis. Follow up outpatient with ophto. Possibly dry eyes causing sxs

## 2019-07-20 NOTE — PROGRESS NOTES
[facility] Hospital Medicine  Progress Note    Patient Name: Yana Orellana  MRN: [id number]  Patient Class: OP- Observation   Admission Date: 7/18/2019  Length of Stay: 0 days  Attending Physician: Melissa Osei MD  Primary Care Provider: Jethro Ortiz MD    [facility] Team: [facility] MED E Clarice Diaz PA-C    Subjective:     Principal Problem:Acute cystitis without hematuria      HPI:  74F presents with c/o increased gait instability and loss of balance, having fallen 2 weeks ago, and recently getting lightheaded when bending over or standing up, along with her vision partially blacking out.  She has been afraid of falling more because of this and has primarily confined herself to her bedroom and not cooked, living off of dietary supplement shakes like Slim Fast.  She has noted some neck soreness lately but this is not unusual for her as she has fibromyalgia.  She has had a left-sided temporal headache which has been unusual, along with jaw pain most pronounced with using the jaw like with chewing.  She reports some tenderness over the temple but not to the extent that she is avoiding using a comb or brush.  She also has some pain in both eyes and feels that her vision has decreased/gotten blurrier.  She denies photophobia.  She denies any respiratory symptoms such as shortness of breath or cough.  She has felt some fevers at night, and always feels cold.  She has not noticed any dysuria or frequency.    Overview/Hospital Course:  Patient admitted for lightheadedness/generalized weakness. Found to have UTI. 0/4SIRS. UCx shows multiple organisms, none in predominance. Continue CTX for now to complete 3 day course. Additionally, she complained of L sided temporal headache associated with some jaw pain and eye pain. ESR/CRP WNL, optho was consulted for further assistance. Optho evaluated patient, recommending follow up outpatient. No signs of temporal arteritis. PTOT  consulted.     Interval History: No reported events overnight. Patient seen at bedside today, resting in NAD. Discussed awaiting PTOT evaluation.     Review of Systems   Constitutional: Positive for activity change, chills and fever (subjective).   HENT: Positive for ear pain (left). Negative for congestion.    Eyes: Positive for pain and visual disturbance (blurred).   Respiratory: Negative for shortness of breath.    Cardiovascular: Negative for chest pain.   Gastrointestinal: Positive for diarrhea (chronic). Negative for vomiting.   Genitourinary: Negative for dysuria and flank pain.   Musculoskeletal: Positive for gait problem and neck pain. Negative for myalgias.   Skin: Negative for color change and rash.   Neurological: Positive for dizziness (improvingh), light-headedness and headaches.   Psychiatric/Behavioral: Negative for confusion.     Objective:     Vital Signs (Most Recent):  Temp: 98.1 °F (36.7 °C) (07/20/19 1222)  Pulse: 77 (07/20/19 1222)  Resp: 18 (07/20/19 1222)  BP: 129/79 (07/20/19 1222)  SpO2: (!) 94 % (07/20/19 1222) Vital Signs (24h Range):  Temp:  [98.1 °F (36.7 °C)-98.7 °F (37.1 °C)] 98.1 °F (36.7 °C)  Pulse:  [74-87] 77  Resp:  [17-20] 18  SpO2:  [94 %-96 %] 94 %  BP: (129-164)/(62-79) 129/79     Weight: 125.7 kg (277 lb 1.9 oz)  Body mass index is 39.76 kg/m².    Intake/Output Summary (Last 24 hours) at 7/20/2019 1503  Last data filed at 7/19/2019 1901  Gross per 24 hour   Intake 480 ml   Output --   Net 480 ml      Physical Exam   Constitutional: She is oriented to person, place, and time. No distress.   HENT:   Head: Normocephalic and atraumatic.   Temporal arteries symmetric with palpable pulses; no appreciable enlargement or thickening.  No scalp tenderness   Eyes: EOM are normal. Right eye exhibits no discharge. Left eye exhibits no discharge.   Neck: Normal range of motion.   Cardiovascular: Normal rate and regular rhythm.   Pulmonary/Chest: Effort normal. No respiratory distress.    Abdominal: Soft. She exhibits no distension.   Obese   Musculoskeletal: She exhibits no tenderness.   Neurological: She is alert and oriented to person, place, and time.   Skin: Skin is warm and dry. She is not diaphoretic.   Psychiatric: She has a normal mood and affect. Her behavior is normal. Thought content normal.   Nursing note and vitals reviewed.      Significant Labs: All pertinent labs within the past 24 hours have been reviewed.    Significant Imaging: I have reviewed all pertinent imaging results/findings within the past 24 hours.      Assessment/Plan:      * Acute cystitis without hematuria  UA with sufficient inflammatory markers to suggest UTI as underlying cause of her present ailment. 0/4 SIRS. Continue ceftriaxone for now, and once feeling sufficiently well enough to go home can transition to oral antibiotics; or if she has gotten 3 doses by then she can likely be considered completed.   - UCx shows multiple organisms, none in predominance       Left temporal headache  The unilateral headache with possible jaw claudication raises suspicion for Giant Cell Arteritis, but her normal CRP makes this less likely.  However, her report of decreased visual acuity and eye pain along with this warrants additional evaluation.  Consult Ophthalmology, who agrees highly unlikely for giant cell arteritis. Follow up outpatient with ophto. Possibly dry eyes causing sxs      Dehydration  Likely as a result of increased insensible losses in context of UTI and decreased mobility.  Will give another 1L and then MIVF afterwards; can repeat orthostatics to allay her fears of continued orthostatic symptoms.      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  She is likely deconditioned some with her acute illness and would likely benefit from PT evaluation.      Morbid obesity  Patient trying to lose weight and requests  nutritionist consult to discuss dietary supplementation products since her appetite has been poor; will place order.      Hx Lung cancer, lower lobe  Patient has no symptoms suggestive of pneumonia and her history of left lower lobectomy can account for CXR findings.  Her symptoms can be better attributed to UTI with the objective findings on UA.  Stop azithromycin.          VTE Risk Mitigation (From admission, onward)        Ordered     IP VTE HIGH RISK PATIENT  Once      07/19/19 0728     Place DARBY hose  Until discontinued      07/19/19 0728     IP VTE HIGH RISK PATIENT  Once      07/19/19 0728              Discussed with staff  Clarice Diaz PA-C  Department of Hospital Medicine   [facility]

## 2019-07-20 NOTE — ASSESSMENT & PLAN NOTE
UA with sufficient inflammatory markers to suggest UTI as underlying cause of her present ailment. 0/4 SIRS. Continue ceftriaxone for now, and once feeling sufficiently well enough to go home can transition to oral antibiotics; or if she has gotten 3 doses by then she can likely be considered completed.   - UCx shows multiple organisms, none in predominance

## 2019-07-21 VITALS
TEMPERATURE: 98 F | SYSTOLIC BLOOD PRESSURE: 143 MMHG | OXYGEN SATURATION: 94 % | BODY MASS INDEX: 39.67 KG/M2 | WEIGHT: 277.13 LBS | RESPIRATION RATE: 20 BRPM | HEART RATE: 87 BPM | DIASTOLIC BLOOD PRESSURE: 65 MMHG | HEIGHT: 70 IN

## 2019-07-21 LAB
ANION GAP SERPL CALC-SCNC: 6 MMOL/L (ref 8–16)
BASOPHILS # BLD AUTO: 0.05 K/UL (ref 0–0.2)
BASOPHILS NFR BLD: 1.2 % (ref 0–1.9)
BUN SERPL-MCNC: 8 MG/DL (ref 8–23)
CALCIUM SERPL-MCNC: 8.2 MG/DL (ref 8.7–10.5)
CHLORIDE SERPL-SCNC: 111 MMOL/L (ref 95–110)
CO2 SERPL-SCNC: 22 MMOL/L (ref 23–29)
CREAT SERPL-MCNC: 0.5 MG/DL (ref 0.5–1.4)
DIFFERENTIAL METHOD: ABNORMAL
EOSINOPHIL # BLD AUTO: 0.3 K/UL (ref 0–0.5)
EOSINOPHIL NFR BLD: 8.5 % (ref 0–8)
ERYTHROCYTE [DISTWIDTH] IN BLOOD BY AUTOMATED COUNT: 14.9 % (ref 11.5–14.5)
EST. GFR  (AFRICAN AMERICAN): >60 ML/MIN/1.73 M^2
EST. GFR  (NON AFRICAN AMERICAN): >60 ML/MIN/1.73 M^2
GLUCOSE SERPL-MCNC: 101 MG/DL (ref 70–110)
HCT VFR BLD AUTO: 37.2 % (ref 37–48.5)
HGB BLD-MCNC: 12.7 G/DL (ref 12–16)
IMM GRANULOCYTES # BLD AUTO: 0.02 K/UL (ref 0–0.04)
IMM GRANULOCYTES NFR BLD AUTO: 0.5 % (ref 0–0.5)
LYMPHOCYTES # BLD AUTO: 1.3 K/UL (ref 1–4.8)
LYMPHOCYTES NFR BLD: 32.1 % (ref 18–48)
MCH RBC QN AUTO: 34.6 PG (ref 27–31)
MCHC RBC AUTO-ENTMCNC: 34.1 G/DL (ref 32–36)
MCV RBC AUTO: 101 FL (ref 82–98)
MONOCYTES # BLD AUTO: 0.6 K/UL (ref 0.3–1)
MONOCYTES NFR BLD: 13.7 % (ref 4–15)
NEUTROPHILS # BLD AUTO: 1.8 K/UL (ref 1.8–7.7)
NEUTROPHILS NFR BLD: 44 % (ref 38–73)
NRBC BLD-RTO: 0 /100 WBC
PLATELET # BLD AUTO: 62 K/UL (ref 150–350)
PMV BLD AUTO: 10.7 FL (ref 9.2–12.9)
POTASSIUM SERPL-SCNC: 3.7 MMOL/L (ref 3.5–5.1)
RBC # BLD AUTO: 3.67 M/UL (ref 4–5.4)
SODIUM SERPL-SCNC: 139 MMOL/L (ref 136–145)
WBC # BLD AUTO: 4.02 K/UL (ref 3.9–12.7)

## 2019-07-21 PROCEDURE — 63600175 PHARM REV CODE 636 W HCPCS: Performed by: HOSPITALIST

## 2019-07-21 PROCEDURE — 99217 PR OBSERVATION CARE DISCHARGE: ICD-10-PCS | Mod: ,,, | Performed by: PHYSICIAN ASSISTANT

## 2019-07-21 PROCEDURE — 36415 COLL VENOUS BLD VENIPUNCTURE: CPT

## 2019-07-21 PROCEDURE — 99217 PR OBSERVATION CARE DISCHARGE: CPT | Mod: ,,, | Performed by: PHYSICIAN ASSISTANT

## 2019-07-21 PROCEDURE — G0378 HOSPITAL OBSERVATION PER HR: HCPCS

## 2019-07-21 PROCEDURE — 97162 PT EVAL MOD COMPLEX 30 MIN: CPT

## 2019-07-21 PROCEDURE — 97530 THERAPEUTIC ACTIVITIES: CPT

## 2019-07-21 PROCEDURE — 25000003 PHARM REV CODE 250: Performed by: HOSPITALIST

## 2019-07-21 PROCEDURE — 25000003 PHARM REV CODE 250: Performed by: PHYSICIAN ASSISTANT

## 2019-07-21 PROCEDURE — 85025 COMPLETE CBC W/AUTO DIFF WBC: CPT

## 2019-07-21 PROCEDURE — 80048 BASIC METABOLIC PNL TOTAL CA: CPT

## 2019-07-21 RX ORDER — HYDRALAZINE HYDROCHLORIDE 25 MG/1
50 TABLET, FILM COATED ORAL EVERY 6 HOURS PRN
Status: DISCONTINUED | OUTPATIENT
Start: 2019-07-21 | End: 2019-07-21 | Stop reason: HOSPADM

## 2019-07-21 RX ORDER — HYDROXYZINE HYDROCHLORIDE 25 MG/1
50 TABLET, FILM COATED ORAL ONCE AS NEEDED
Status: COMPLETED | OUTPATIENT
Start: 2019-07-21 | End: 2019-07-21

## 2019-07-21 RX ORDER — LIDOCAINE 50 MG/G
1 PATCH TOPICAL
Status: DISCONTINUED | OUTPATIENT
Start: 2019-07-21 | End: 2019-07-21 | Stop reason: HOSPADM

## 2019-07-21 RX ORDER — CYCLOBENZAPRINE HCL 5 MG
5 TABLET ORAL 3 TIMES DAILY PRN
Status: DISCONTINUED | OUTPATIENT
Start: 2019-07-21 | End: 2019-07-21 | Stop reason: HOSPADM

## 2019-07-21 RX ORDER — DIAZEPAM 5 MG/1
5 TABLET ORAL EVERY 6 HOURS PRN
Status: DISCONTINUED | OUTPATIENT
Start: 2019-07-21 | End: 2019-07-21 | Stop reason: HOSPADM

## 2019-07-21 RX ORDER — KETOROLAC TROMETHAMINE 30 MG/ML
15 INJECTION, SOLUTION INTRAMUSCULAR; INTRAVENOUS ONCE AS NEEDED
Status: DISCONTINUED | OUTPATIENT
Start: 2019-07-21 | End: 2019-07-21 | Stop reason: HOSPADM

## 2019-07-21 RX ORDER — HYDROXYZINE HYDROCHLORIDE 25 MG/1
25 TABLET, FILM COATED ORAL ONCE AS NEEDED
Status: DISCONTINUED | OUTPATIENT
Start: 2019-07-21 | End: 2019-07-21

## 2019-07-21 RX ORDER — ACETAMINOPHEN 500 MG
1000 TABLET ORAL ONCE AS NEEDED
Status: DISCONTINUED | OUTPATIENT
Start: 2019-07-21 | End: 2019-07-21 | Stop reason: HOSPADM

## 2019-07-21 RX ADMIN — HYDRALAZINE HYDROCHLORIDE 50 MG: 25 TABLET, FILM COATED ORAL at 05:07

## 2019-07-21 RX ADMIN — LIDOCAINE 1 PATCH: 50 PATCH TOPICAL at 02:07

## 2019-07-21 RX ADMIN — VITAMIN D, TAB 1000IU (100/BT) 1000 UNITS: 25 TAB at 10:07

## 2019-07-21 RX ADMIN — CYCLOBENZAPRINE HYDROCHLORIDE 5 MG: 5 TABLET, FILM COATED ORAL at 05:07

## 2019-07-21 RX ADMIN — CEFTRIAXONE SODIUM 1 G: 1 INJECTION, POWDER, FOR SOLUTION INTRAMUSCULAR; INTRAVENOUS at 02:07

## 2019-07-21 RX ADMIN — FOLIC ACID 2 MG: 1 TABLET ORAL at 10:07

## 2019-07-21 RX ADMIN — HYDROXYZINE HYDROCHLORIDE 50 MG: 25 TABLET, FILM COATED ORAL at 06:07

## 2019-07-21 RX ADMIN — ACETAMINOPHEN 650 MG: 325 TABLET ORAL at 02:07

## 2019-07-21 NOTE — PROGRESS NOTES
[facility] Hospital Medicine  Progress Note    Patient Name: Yana Orellana  MRN: [id number]  Patient Class: OP- Observation   Admission Date: 7/18/2019  Length of Stay: 0 days  Attending Physician: Melissa Osei MD  Primary Care Provider: Jethro Ortiz MD    [facility] Medicine Team: [facility] E Clarice Diaz PA-C    Subjective:     Principal Problem:Acute cystitis without hematuria      HPI:  74F presents with c/o increased gait instability and loss of balance, having fallen 2 weeks ago, and recently getting lightheaded when bending over or standing up, along with her vision partially blacking out.  She has been afraid of falling more because of this and has primarily confined herself to her bedroom and not cooked, living off of dietary supplement shakes like [employer].  She has noted some neck soreness lately but this is not unusual for her as she has fibromyalgia.  She has had a left-sided temporal headache which has been unusual, along with jaw pain most pronounced with using the jaw like with chewing.  She reports some tenderness over the temple but not to the extent that she is avoiding using a comb or brush.  She also has some pain in both eyes and feels that her vision has decreased/gotten blurrier.  She denies photophobia.  She denies any respiratory symptoms such as shortness of breath or cough.  She has felt some fevers at night, and always feels cold.  She has not noticed any dysuria or frequency.    Overview/Hospital Course:  Patient admitted for lightheadedness/generalized weakness. Found to have UTI. 0/4SIRS. UCx shows multiple organisms, none in predominance. Continue CTX for now to complete 3 day course. Additionally, she complained of L sided temporal headache associated with some jaw pain and eye pain. ESR/CRP WNL, optho was consulted for further assistance. Optho evaluated patient, recommending follow up outpatient. No signs of temporal arteritis. PTOT  consulted.     Interval History: Patient reportedly was anxious and SOB last night. VSS. Pt seen at bedside this AM, informed her that I put in her valium PRN, but she says she has not taken this in some time and does not want to take it now. She denies anxiety/sob at this time. She is anxiously awaiting PTOT recs.     Review of Systems   Constitutional: Positive for activity change, chills and fever (subjective).   HENT: Positive for ear pain (left).    Eyes: Positive for pain and visual disturbance (blurred).   Respiratory: Positive for shortness of breath (resolved).    Cardiovascular: Negative for chest pain.   Gastrointestinal: Positive for diarrhea (chronic). Negative for vomiting.   Genitourinary: Negative for dysuria.   Musculoskeletal: Positive for gait problem and neck pain. Negative for myalgias.   Skin: Negative for color change and rash.   Neurological: Positive for dizziness (improvingh), light-headedness and headaches.   Psychiatric/Behavioral: Positive for sleep disturbance. Negative for confusion. The patient is nervous/anxious.      Objective:     Vital Signs (Most Recent):  Temp: 98 °F (36.7 °C) (07/20/19 2051)  Pulse: 93 (07/21/19 0756)  Resp: 20 (07/21/19 0535)  BP: (!) 118/56 (07/21/19 0756)  SpO2: (!) 94 % (07/21/19 0756) Vital Signs (24h Range):  Temp:  [98 °F (36.7 °C)-98.6 °F (37 °C)] 98 °F (36.7 °C)  Pulse:  [55-96] 93  Resp:  [16-20] 20  SpO2:  [93 %-98 %] 94 %  BP: ()/(49-81) 118/56     Weight: 125.7 kg (277 lb 1.9 oz)  Body mass index is 39.76 kg/m².  No intake or output data in the 24 hours ending 07/21/19 1130   Physical Exam   Constitutional: She is oriented to person, place, and time. No distress.   HENT:   Head: Normocephalic and atraumatic.   Eyes: EOM are normal. Right eye exhibits no discharge. Left eye exhibits no discharge.   Neck: Normal range of motion.   Cardiovascular: Normal rate, regular rhythm and normal heart sounds.   Pulmonary/Chest: Effort normal. No  respiratory distress. She has no wheezes.   Abdominal: Soft. She exhibits no distension.   Obese   Musculoskeletal: She exhibits no tenderness.   Neurological: She is alert and oriented to person, place, and time.   Skin: Skin is warm and dry. She is not diaphoretic.   Psychiatric: She has a normal mood and affect. Her behavior is normal.   Nursing note and vitals reviewed.      Significant Labs: All pertinent labs within the past 24 hours have been reviewed.    Significant Imaging: I have reviewed all pertinent imaging results/findings within the past 24 hours.      Assessment/Plan:      * Acute cystitis without hematuria  UA with sufficient inflammatory markers to suggest UTI as underlying cause of her present ailment. 0/4 SIRS. Continue ceftriaxone for now, and once feeling sufficiently well enough to go home can transition to oral antibiotics; or if she has gotten 3 doses by then she can likely be considered completed.   - UCx shows multiple organisms, none in predominance   -completed 3 day course CTX      Left temporal headache  The unilateral headache with possible jaw claudication raises suspicion for Giant Cell Arteritis, but her normal CRP makes this less likely.  However, her report of decreased visual acuity and eye pain along with this warrants additional evaluation.  Consult Ophthalmology, who agrees highly unlikely for giant cell arteritis. Follow up outpatient with ophto. Possibly dry eyes causing sxs      Dehydration  Likely as a result of increased insensible losses in context of UTI and decreased mobility.  Will give another 1L and then MIVF afterwards; can repeat orthostatics to allay her fears of continued orthostatic symptoms.      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  She is likely deconditioned some with her acute illness and would likely benefit from PT evaluation.      Morbid  obesity  Patient trying to lose weight and requests nutritionist consult to discuss dietary supplementation products since her appetite has been poor; will place order.      Hx Lung cancer, lower lobe  Patient has no symptoms suggestive of pneumonia and her history of left lower lobectomy can account for CXR findings.  Her symptoms can be better attributed to UTI with the objective findings on UA.  Stop azithromycin.          VTE Risk Mitigation (From admission, onward)        Ordered     IP VTE HIGH RISK PATIENT  Once      07/19/19 0728     Place DARBY hose  Until discontinued      07/19/19 0728     IP VTE HIGH RISK PATIENT  Once      07/19/19 0728              Discussed with staff  Clarice Diaz PA-C  Department of Hospital Medicine   [facility]

## 2019-07-21 NOTE — PLAN OF CARE
Problem: UTI (Urinary Tract Infection)  Goal: Improved Infection Symptoms  Outcome: Outcome(s) achieved Date Met: 07/21/19  Review of Plan Of Care in preparation for discharge to home with outcomes noted managed

## 2019-07-21 NOTE — NURSING
Assumed care of patient at this time.  Patient noted asleep yet easily aroused.  Will continue to monitor

## 2019-07-21 NOTE — PLAN OF CARE
Problem: Adult Inpatient Plan of Care  Goal: Plan of Care Review  Outcome: Ongoing (interventions implemented as appropriate)  I have reviewed the plan of care with the patient. Patient has been AAOx4 throughout the shift. She has complained of pain all shift, but refused offered medications until now. She stated that she has neck pain that is a six on a scale of one to ten, but refused the ordered toradol and decided to take tylenol. I have educated her on the pain scale and the importance of calling for medication before her pain becomes intolerable. Bed down in lowest position, call light within reach, side rails up x2.

## 2019-07-21 NOTE — ASSESSMENT & PLAN NOTE
UA with sufficient inflammatory markers to suggest UTI as underlying cause of her present ailment. 0/4 SIRS. Continue ceftriaxone for now, and once feeling sufficiently well enough to go home can transition to oral antibiotics; or if she has gotten 3 doses by then she can likely be considered completed.   - UCx shows multiple organisms, none in predominance   -completed 3 day course CTX

## 2019-07-21 NOTE — PLAN OF CARE
Problem: Physical Therapy Goal  Goal: Physical Therapy Goal  Outcome: Ongoing (interventions implemented as appropriate)  Initial eval completed.  Results, POC, and therapy recommendations discussed with patient.   Complete evaluation documentation to follow.    Mobility Recommendations: walk with 1 person assist  D/c recommendations: HH PT and HH OT  DME needs: bariatric rollator hever Pa, PT  7/21/2019  [phone] (pager)

## 2019-07-21 NOTE — PLAN OF CARE
[facility]    HOME HEALTH ORDERS  FACE TO FACE ENCOUNTER    Patient Name: Yana Orellana  YOB: 1945    PCP: Jethro Ortiz MD   PCP Address: [address] [place] [address] / [facility] [address]  PCP Phone Number: [phone]  PCP Fax: [phone]    Encounter Date: 07/21/2019    Admit to Home Health    Diagnoses:  Active Hospital Problems    Diagnosis  POA    *Acute cystitis without hematuria [N30.00]  Yes     Priority: 1 - High    Left temporal headache [R51]  Yes     Priority: 2     Dehydration [E86.0]  Yes     Priority: 3     Gait instability [R26.81]  Yes     Priority: 4     Morbid obesity [E66.01]  Yes     Priority: 5     Hx Lung cancer, lower lobe [C34.30]  Yes     Priority: 6      Chronic     2009; lower L lobectomy , chemo        Resolved Hospital Problems   No resolved problems to display.       Future Appointments   Date Time Provider Department Center   8/8/2019  1:00 PM Jethro Ortiz MD [facility]     Follow-up Information     Jethro Ortiz MD On 8/8/2019.    Specialty:  Internal Medicine  Why:  at 1:00pm; hospital follow up appointment  Contact information:  [address] [place] [address] [id number] [phone]                     I have seen and examined this patient face to face today. My clinical findings that support the need for the home health skilled services and home bound status are the following:  Weakness/numbness causing balance and gait disturbance due to Weakness/Debility and Dehydration making it taxing to leave home.    Allergies:  Review of patient's allergies indicates:   Allergen Reactions    Demerol [meperidine]      Euphoria    Gabapentin Other (See Comments)     Hallucinations    Mellaril-s Nausea Only    Versed [midazolam]      Excessive talking, hyper    Vicodin [hydrocodone-acetaminophen]      Joint pain, muscle pain    Xanax [alprazolam]      fatigue       Diet: regular diet    Activities:  activity as tolerated    Nursing:   SN to complete comprehensive assessment including routine vital signs. Instruct on disease process and s/s of complications to report to MD. Review/verify medication list sent home with the patient at time of discharge  and instruct patient/caregiver as needed. Frequency may be adjusted depending on start of care date.    Notify MD if SBP > 160 or < 90; DBP > 90 or < 50; HR > 120 or < 50; Temp > 101      CONSULTS:    Physical Therapy to evaluate and treat. Evaluate for home safety and equipment needs; Establish/upgrade home exercise program. Perform / instruct on therapeutic exercises, gait training, transfer training, and Range of Motion.  Occupational Therapy to evaluate and treat. Evaluate home environment for safety and equipment needs. Perform/Instruct on transfers, ADL training, ROM, and therapeutic exercises.    MISCELLANEOUS CARE:  N/A    WOUND CARE ORDERS  n/a      Medications: Review discharge medications with patient and family and provide education.      Current Discharge Medication List      CONTINUE these medications which have NOT CHANGED    Details   chlorhexidine (PERIDEX) 0.12 % solution RINSE WITH 15 ML PO BID FOR 7 DAYS  Refills: 4      methotrexate 2.5 MG Tab Take 5 tablets (12.5 mg total) by mouth every 7 days.  Qty: 65 tablet, Refills: 0    Associated Diagnoses: Rheumatoid arthritis involving multiple sites with positive rheumatoid factor      predniSONE (DELTASONE) 5 MG tablet Take as needed for flare of rheumatoid arthritis  Qty: 20 tablet, Refills: 1    Associated Diagnoses: Rheumatoid arthritis involving multiple sites with positive rheumatoid factor      vitamin D 185 MG Tab Take 185 mg by mouth once daily.        diazePAM (VALIUM) 5 MG tablet Take 1 tablet (5 mg total) by mouth every 6 (six) hours as needed for Anxiety.  Qty: 30 tablet, Refills: 0    Associated Diagnoses: Anxiety      folic acid (FOLVITE) 1 MG tablet Take 2 tablets (2 mg total) by  mouth once daily.  Qty: 180 tablet, Refills: 3    Associated Diagnoses: High risk medication use             I certify that this patient is confined to her home and needs physical therapy and occupational therapy.

## 2019-07-21 NOTE — PT/OT/SLP EVAL
Physical Therapy Evaluation     Patient Name: Yana Orellana  MRN: [id number]   Diagnosis: Acute cystitis without hematuria    Recommendations:   Discharge Recommendations:  home health OT, home health PT   Discharge Equipment Recommendations: (bariatric rollator (4 wheel) walker)   Barriers to Discharge: none    Assessment:   Yana Orellana is a 74 y.o. female admitted with a medical diagnosis of Acute cystitis without hematuria.  Prior to admit she was a household ambulator using a quad cane.  she now presents with the following impairments/functional limitations: weakness, impaired functional mobilty, impaired endurance, gait instability, impaired balance, decreased upper extremity function, decreased lower extremity function, impaired self care skills.  Her history was concerning for orthostatic hypotension given her description of s/s preceding falls.  Vitals were monitored during mobility with mild orthostatic hypotension in standing which did not reach clinical significance.  PT educated patient on s/s orthostatic hypotension, LE active exercises to facilitate venous return, and increasing time between position changes.  She is deconditioned but is close to her prior level of function.  Recommend rollator walker for home use (bariatric) to improve her ability to access her entire home.    She is safe to return home when medically appropriate. Recommend follow up with  PT and HH OT.      Problem List:  weakness, impaired functional mobilty, impaired endurance, gait instability, impaired balance, decreased upper extremity function, decreased lower extremity function, impaired self care skills  Rehab Prognosis:  good.  The patient would benefit from acute skilled PT services to address these deficits and maximize their functional independence.    History:     Past Medical History:   Diagnosis Date    Anemia     Anxiety     Cataract     Fibromyalgia 10/15/2012    Insomnia 3/28/2014    Lung cancer   "   Pneumonia due to infectious organism 7/19/2019    Post-polio syndrome     Rheumatoid arthritis(714.0) 7/16/2012    2009 onset with mod positive CCP and RF MTX 2011 - present     Thyroid disease        Past Surgical History:   Procedure Laterality Date    CHOLECYSTECTOMY      HYSTERECTOMY      LUNG REMOVAL, PARTIAL      left lower lobectomy    OVARIAN CYST REMOVAL      TONSILLECTOMY         Subjective   Patient comments/goals: "I can't get my laundry from the dryer, do my dishes, or cook because I can't stand that long, or lean over without feeling I am going to fall. "  Pain/Comfort:  ·  Pain Rating 1: 0/10  · Pain Rating Post-Intervention 1: 0/10     Recent Vital Signs: (Last documentation)  Pulse: 92 (07/21/19 1159)  BP: (!) 130/58 (07/21/19 1159)  SpO2: (!) 94 % (07/21/19 1159)     Living Environment:  Home: The patient lives in a 1 story home with 4 steps and bilateral rails to enter; elevated toilet seat with grab bars; walk in shower and tub/shower combo   PLOF:  Independent to modified independent with household mobility using a quad cane as needed; hx of falls preceded by complaints of dizziness and "seeing black spots/spoches all of a sudden"; does not drive, uses cab for transport; R handed, reading glasses; bed baths  DME owned: (quad cane, elevated toilet seat, grab bars)    Assistance Available: Upon discharge, patient will not have assistance    Objective:   General Precautions: fall  Recent Surgery: * No surgery found *    Recent Vital Signs:   Pulse: 92 (07/21/19 1159)  BP: (!) 130/58 (07/21/19 1159)  SpO2: (!) 94 % (07/21/19 1159)    Physical Examination:   The patient was found supine in bed. She agreed to therapy.      Cognitive Function:  - Oriented to: person, place, time and situation  - Level of Alertness: awake and alert  - Follows Commands/attention: Follows multistep  commands  - Communication: clear/fluent  - Safety awareness/insight to disability: intact  Musculoskeletal " System  Lower Extremities:  PROM: WFL; limited by body habitus  Strength: reports post-polio in LLE  Muscle Group R LE L LE Comments   Hip ext NT/5 NT/5    Hip flexion  5/5 4/5       Knee flexion  5/5 4/5       Knee ext. 5/5 4/5    Ankle DF 5/5 4/5    Ankle PF 5/5 4/5    Cardiopulmonary System:   Orthostatics   BP MAP HR Position Comments   132/62 (89) 87 supine    131/68 (93) 96 sitting    116/66 (86) 107 standing C/o nausea   124/66 (90) 117 Standing x 2 minutes    *not clinically significant for orthostatic hypotension. Good HR response/compensation to decline in BP with standing.   *SPO2 93% on room air    Neuromuscular System:  · Sensation: decreased LT in RLE (different than the L)  · Coordination:    Finger to thumb opposition: intact    Finger to nose: intact    Heel to shin: NT  Posture and gross symmetry: morbid obesity     BALANCE:  Sitting: independent   Standing: supervision with quad cane x 2 minutes with complaints of nausea    FUNCTIONAL MOBILITY ASSESSMENT:  Bed Mobility: performed with HOB flat  · Rolling/Turning R: NT  · Rolling/Turning L: NT  · Supine > sit: SBA using bed rail  · Sit > supine: modified independent  · Scooting EOB: independent    Transfers:  · Sit <> stand transfer: supervision using quad cane    · Bed <> chair transfer: supervision using quad cane    Gait:   Gait x 60 feet with supervision using quad cane; SOB and endurance limiting gait distance; SPO2 93% on room air after gait  · Patient demonstrated excessive lateral weight shifting consistent with body habitus  · PT facilitated: supervision for hypotension/dizziness    Therapeutic Activities, Education, or Exercises:  Therapist educated the patient on   · role of PT, POC, progress with mobility, goals, discharge planning, and level of assistance with transfers;   · Importance of progressive mobilization, out of bed positioning, and participation in therapy.    · S/s orthostatic hypotension   · DME  recommendations  · Compensatory strategies for orthostatic hypotension   · Mobility goals to improve overall health    The therapist discussed the patients current mobility status, deficits, and level of assistance with RN. Time was also provided for active listening,  therapeutic counseling and discussion of health disposition. The therapist answered questions to patient/familys satisfaction within scope of practice.   White board updated to reflect current level of assistance.     FUNCTIONAL OUTCOME MEASURES:  [facility]  Turning over in bed (including adjusting bedclothes, sheets and blankets)?: 4  Sitting down on and standing up from a chair with arms (e.g., wheelchair, bedside commode, etc.): 4  Moving from lying on back to sitting on the side of the bed?: 4  Moving to and from a bed to a chair (including a wheelchair)?: 4  Need to walk in hospital room?: 3  Climbing 3-5 steps with a railing?: 3  Basic Mobility Total Score: 22    Goals:     Multidisciplinary Problems     Physical Therapy Goals        Problem: Physical Therapy Goal    Goal Priority Disciplines Outcome Goal Variances Interventions   Physical Therapy Goal     PT, PT/OT Ongoing (interventions implemented as appropriate)                     Plan:   During this hospitalization, patient will be seen 3 x/week for gait training, therapeutic activities, therapeutic exercises, neuromuscular re-education to address impairments and functional mobility deficits.   · Plan of Care Expires: 08/19/19   Plan of Care Reviewed with: patient    This plan of care has been discussed with the patient and/or family who were involved in its development and are in agreement with the identified goals and treatment plan.     Clinical Decision Making:   COMPLEXITY OF PT EXAMINATION:  HISTORY  - Comorbidities that affect the PT plan of care or the patient's ability to participate in/progress with therapy (see above)  - Personal Factors: Time since onset of injury, illness,  diagnosis, or exacerbation, Multiple medical problems, Patient's age, Prior level of function and Home environment and family support  EXAMINATION  - Body Systems: Cognition: a gross assessment of communication ability, orientation, level of consciousness, awareness of deficits, language, assessment of ability to make needs known, expected emotional or behavioral responses, learning style or preferences, Neuromuscular System: a general assessment of gross coordinated movement (ie. coordination, gait, balance, transitions, transfers), motor control, motor learning, or visual-perceptual skills , Musculoskeletal System: the assessment of gross symmetry/posture, ROM, strength, spasticity, height, or weight and Cardiovascular System: the assessment of heart rate, blood pressure, respiratory rate, SPO2, endurance, or edema  - Activity or participation limitations: Status of current condition   CLINICAL PRESENTATION: Evolving/changing characteristics  - Vital sign response to activity or stimulation  LEVEL OF COMPLEXITY: Moderate Complexity: (1 or more apply) the patient has at least 1-2 personal factors or comorbidities that impact the plan of care; the examination addressed at least 3 body structures, functions, activity limitations, and/or participation restrictions; and the clinical presentation had evolving or changing characteristics    Time Tracking:   PT Received On:  07/21/19  PT Start Time:   1431    PT Stop Time:  1510  PT Total Time (min): 39 min      Billable Minutes: Evaluation 20 and Therapeutic Activity 19    Eduarda Pa, PT  7/21/2019  [phone] (pager)

## 2019-07-21 NOTE — PLAN OF CARE
Pt being discharged home, with [facility].  Referral sent thru [facility].  Pt also, will need a rollator before discharge, which has been ordered thru [facility].  Pt will be ready to discharge home when rollator is delivered to her room.

## 2019-07-21 NOTE — SUBJECTIVE & OBJECTIVE
Interval History: Patient reportedly was anxious and SOB last night. VSS. Pt seen at bedside this AM, informed her that I put in her valium PRN, but she says she has not taken this in some time and does not want to take it now. She denies anxiety/sob at this time. She is anxiously awaiting PTOT recs.     Review of Systems   Constitutional: Positive for activity change, chills and fever (subjective).   HENT: Positive for ear pain (left).    Eyes: Positive for pain and visual disturbance (blurred).   Respiratory: Positive for shortness of breath (resolved).    Cardiovascular: Negative for chest pain.   Gastrointestinal: Positive for diarrhea (chronic). Negative for vomiting.   Genitourinary: Negative for dysuria.   Musculoskeletal: Positive for gait problem and neck pain. Negative for myalgias.   Skin: Negative for color change and rash.   Neurological: Positive for dizziness (improvingh), light-headedness and headaches.   Psychiatric/Behavioral: Positive for sleep disturbance. Negative for confusion. The patient is nervous/anxious.      Objective:     Vital Signs (Most Recent):  Temp: 98 °F (36.7 °C) (07/20/19 2051)  Pulse: 93 (07/21/19 0756)  Resp: 20 (07/21/19 0535)  BP: (!) 118/56 (07/21/19 0756)  SpO2: (!) 94 % (07/21/19 0756) Vital Signs (24h Range):  Temp:  [98 °F (36.7 °C)-98.6 °F (37 °C)] 98 °F (36.7 °C)  Pulse:  [55-96] 93  Resp:  [16-20] 20  SpO2:  [93 %-98 %] 94 %  BP: ()/(49-81) 118/56     Weight: 125.7 kg (277 lb 1.9 oz)  Body mass index is 39.76 kg/m².  No intake or output data in the 24 hours ending 07/21/19 1130   Physical Exam   Constitutional: She is oriented to person, place, and time. No distress.   HENT:   Head: Normocephalic and atraumatic.   Eyes: EOM are normal. Right eye exhibits no discharge. Left eye exhibits no discharge.   Neck: Normal range of motion.   Cardiovascular: Normal rate, regular rhythm and normal heart sounds.   Pulmonary/Chest: Effort normal. No respiratory distress.  She has no wheezes.   Abdominal: Soft. She exhibits no distension.   Obese   Musculoskeletal: She exhibits no tenderness.   Neurological: She is alert and oriented to person, place, and time.   Skin: Skin is warm and dry. She is not diaphoretic.   Psychiatric: She has a normal mood and affect. Her behavior is normal.   Nursing note and vitals reviewed.      Significant Labs: All pertinent labs within the past 24 hours have been reviewed.    Significant Imaging: I have reviewed all pertinent imaging results/findings within the past 24 hours.

## 2019-07-21 NOTE — NURSING
DME delivered to bedside.  Discharge instructions, appointments and medications reviewed with patient with understanding verbalized.  Home Health arranged per Case Management.  Awaiting patient to arrange family

## 2019-07-21 NOTE — PLAN OF CARE
Problem: Adult Inpatient Plan of Care  Goal: Optimal Comfort and Wellbeing  Outcome: Ongoing (interventions implemented as appropriate)  Reviewof Plan of Care with patient at this time inclusive of Fall Prevention Plan and Pain Management with understanding verbalized.  Reinforced teaching planned

## 2019-07-21 NOTE — NURSING
"It was reported to me that the patient felt short of breath and that she stated (per [Nunu]) that she "couldn't breathe." I went in to check on the patient and she was lying in bed, in no distress. The thermostat is set on 90 degrees and it is very stuffy in the patient's room. Respirations were 20/minute, and SpO2 95% on room air. Patient is anxious, and has been so all shift. I asked her when was the last time she took her valium, and she stated, "I took myself off of that three months ago." Will continue to monitor the patient's condition.  "

## 2019-07-22 PROCEDURE — G0180 PR HOME HEALTH MD CERTIFICATION: ICD-10-PCS | Mod: ,,, | Performed by: INTERNAL MEDICINE

## 2019-07-22 PROCEDURE — G0180 MD CERTIFICATION HHA PATIENT: HCPCS | Mod: ,,, | Performed by: INTERNAL MEDICINE

## 2019-07-22 NOTE — PLAN OF CARE
07/22/19 0734   Final Note   Assessment Type Final Discharge Note     Patient discharged home with [facility]  on 7/21/19.

## 2019-07-22 NOTE — NURSING
Patient remains at bedside awaiting transport.  No needs verbalized / identified.  Report to charge RN

## 2019-07-23 NOTE — ASSESSMENT & PLAN NOTE
Likely as a result of increased insensible losses in context of UTI and decreased mobility.  Will give another 1L and then MIVF afterward

## 2019-07-23 NOTE — ASSESSMENT & PLAN NOTE
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  She is likely deconditioned some with her acute illness and would likely benefit from PT evaluation. PT recommedning [facility]. Orders done.  Did offer NH placement for patient if she felt she could no longer take care of herself, she declined at this time.

## 2019-07-23 NOTE — DISCHARGE SUMMARY
[facility] Hospital Medicine  Discharge Summary      Patient Name: Yana Orellana  MRN: [id number]  Admission Date: 7/18/2019  Hospital Length of Stay: 0 days  Discharge Date and Time: 7/21/2019  8:07 PM  Attending Physician: Elida att. providers found   Discharging Provider: Clarice Diaz PA-C  Primary Care Provider: Jethro Ortiz MD  [facility] Medicine Team: [facility] HOSP MED E Clarice Diaz PA-C    HPI:   74F presents with c/o increased gait instability and loss of balance, having fallen 2 weeks ago, and recently getting lightheaded when bending over or standing up, along with her vision partially blacking out.  She has been afraid of falling more because of this and has primarily confined herself to her bedroom and not cooked, living off of dietary supplement shakes like [employer].  She has noted some neck soreness lately but this is not unusual for her as she has fibromyalgia.  She has had a left-sided temporal headache which has been unusual, along with jaw pain most pronounced with using the jaw like with chewing.  She reports some tenderness over the temple but not to the extent that she is avoiding using a comb or brush.  She also has some pain in both eyes and feels that her vision has decreased/gotten blurrier.  She denies photophobia.  She denies any respiratory symptoms such as shortness of breath or cough.  She has felt some fevers at night, and always feels cold.  She has not noticed any dysuria or frequency.    * No surgery found *      Hospital Course:   Patient admitted for lightheadedness/generalized weakness. Found to have UTI. 0/4SIRS. UCx shows multiple organisms, none in predominance. Completed 3day course CTX in house. Additionally, she complained of L sided temporal headache associated with some jaw pain and eye pain. ESR/CRP WNL, optho was consulted for further assistance. Optho evaluated patient, no signs of temporal arteritis, follow up outpatient. PTOT consulted,  recommend [facility]. Stable for d/c with PCP follow up. Return precautions discussed.     Consults:   Consults (From admission, onward)        Status Ordering Provider     Inpatient consult to Ophthalmology  Once     Provider:  (Not yet assigned)    Completed CONOR VOGT     Inpatient consult to Registered Dietitian/Nutritionist  Once     Provider:  (Not yet assigned)    Completed CONOR VOGT          * Acute cystitis without hematuria  UA with sufficient inflammatory markers to suggest UTI as underlying cause of her present ailment. 0/4 SIRS. Continue ceftriaxone for now, and once feeling sufficiently well enough to go home can transition to oral antibiotics; or if she has gotten 3 doses by then she can likely be considered completed.   - UCx shows multiple organisms, none in predominance   -completed 3 day course CTX      Left temporal headache  The unilateral headache with possible jaw claudication raises suspicion for Giant Cell Arteritis, but her normal CRP makes this less likely.  However, her report of decreased visual acuity and eye pain along with this warrants additional evaluation.  Consult Ophthalmology, who agrees highly unlikely for giant cell arteritis. Follow up outpatient with ophto. Possibly dry eyes causing sxs      Dehydration  Likely as a result of increased insensible losses in context of UTI and decreased mobility.  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  She is likely deconditioned some with her acute illness and would likely benefit from PT evaluation. PT recommedning [facility]. Orders done.  Did offer NH placement for patient if she felt she could no longer take care of herself, she declined at this time.      Morbid obesity  Patient trying to lose weight and requests nutritionist consult to discuss dietary supplementation  "products since her appetite has been poor; dietician met with patient in house      Hx Lung cancer, lower lobe  Patient has no symptoms suggestive of pneumonia and her history of left lower lobectomy can account for CXR findings.  Her symptoms can be better attributed to UTI with the objective findings on UA.  Stop azithromycin.          Final Active Diagnoses:    Diagnosis Date Noted POA    PRINCIPAL PROBLEM:  Acute cystitis without hematuria [N30.00] 07/19/2019 Yes    Left temporal headache [R51] 07/19/2019 Yes    Dehydration [E86.0] 07/19/2019 Yes    Gait instability [R26.81] 11/05/2018 Yes    Morbid obesity [E66.01] 07/16/2012 Yes    Hx Lung cancer, lower lobe [C34.30] 07/16/2012 Yes     Chronic      Problems Resolved During this Admission:       Discharged Condition: stable    Disposition: Home or Self Care    Follow Up:  Follow-up Information     Jethro Ortiz MD On 8/8/2019.    Specialty:  Internal Medicine  Why:  at 1:00pm; hospital follow up appointment  Contact information:  [address] [id number] [phone]             [facility].    Specialties:  Home Health Services, Physical Therapy, Occupational Therapy  Why:  HH will be out to see patient in am  Contact information:  [address] [id number] [phone]                 Patient Instructions:      WALKER FOR HOME USE     Order Specific Question Answer Comments   Type of Walker: Rollator    With wheels? Yes    Height: 5' 10" (1.778 m)    Weight: 125.7 kg (277 lb 1.9 oz)    Length of need (1-99 months): 99    Please check all that apply: Patient's condition impairs ambulation.    Vendor: [facility]    Expected Date of Delivery: 7/21/2019      Notify your health care provider if you experience any of the following:  temperature >100.4     Notify your health care provider if you experience any of the following:  redness, tenderness, or signs of infection (pain, " swelling, redness, odor or green/yellow discharge around incision site)     Notify your health care provider if you experience any of the following:  worsening rash     Notify your health care provider if you experience any of the following:  increased confusion or weakness     Activity as tolerated       Significant Diagnostic Studies: CT head: no acute intracranial abnormality    Pending Diagnostic Studies:     None         Medications:  Reconciled Home Medications:      Medication List      CONTINUE taking these medications    chlorhexidine 0.12 % solution  Commonly known as:  PERIDEX  RINSE WITH 15 ML PO BID FOR 7 DAYS     diazePAM 5 MG tablet  Commonly known as:  VALIUM  Take 1 tablet (5 mg total) by mouth every 6 (six) hours as needed for Anxiety.     folic acid 1 MG tablet  Commonly known as:  FOLVITE  Take 2 tablets (2 mg total) by mouth once daily.     methotrexate 2.5 MG Tab  Take 5 tablets (12.5 mg total) by mouth every 7 days.     predniSONE 5 MG tablet  Commonly known as:  DELTASONE  Take as needed for flare of rheumatoid arthritis     vitamin D 1000 units Tab  Commonly known as:  VITAMIN D3  Take 185 mg by mouth once daily.            Indwelling Lines/Drains at time of discharge:   Lines/Drains/Airways          None          Time spent on the discharge of patient: >35 minutes  Patient was seen and examined on the date of discharge and determined to be suitable for discharge.       Discussed with staff  Clarice Diaz PA-C  Department of Hospital Medicine  [facility]

## 2019-07-23 NOTE — ASSESSMENT & PLAN NOTE
Patient trying to lose weight and requests nutritionist consult to discuss dietary supplementation products since her appetite has been poor; dietician met with patient in house

## 2019-07-25 ENCOUNTER — PATIENT OUTREACH (OUTPATIENT)
Dept: ADMINISTRATIVE | Facility: HOSPITAL | Age: 74
End: 2019-07-25

## 2019-07-25 DIAGNOSIS — M89.9 DISORDER OF BONE AND CARTILAGE: Primary | ICD-10-CM

## 2019-07-25 DIAGNOSIS — M94.9 DISORDER OF BONE AND CARTILAGE: Primary | ICD-10-CM

## 2019-08-06 NOTE — PROGRESS NOTES
"Subjective:       Patient ID: Yana Orellana is a 74 y.o. female with a PMH significant for RA, Post-polio Syndrome, Anxiety/Depression, Pneumonitis, PVCs, Anemia, History of Lung Cancer, Hypothyroidism, Obesity, Fibromyalgia who presents who was seen initially by me on 3/20/2018 for a hospital discharge (3/6/2018-3/9/2018) for Pneumonitis - treated with Azithromycin, Duonebs, Solumedrol.  She was here for follow up on 4/20/2018 and 8/17/2018.    Chief Complaint: Hospital Follow Up and Dizziness    HPI  Patient overall improved.  Having dizziness - not vertigo symptoms - if she moves or bends down, she gets a weird sensation that she is going to fall.  Gets "strobe lights".  Was walking around like a drunk.  CTH was negative for acute intracranial abnormality.  Fevers now resolved from past complaints.  Had a lot of dental work since her last visit.    Patient denies f/c, n/v/d.  No chest pain.  No abdominal pain or dysuria.  No headaches or change in vision.   Remaining ROS negative.    Review of Systems   Constitutional: Negative for appetite change, chills, diaphoresis, fatigue, fever and unexpected weight change.   HENT: Negative for ear pain, hearing loss, sinus pain, tinnitus, trouble swallowing and voice change.    Eyes: Negative for photophobia, pain and visual disturbance.   Respiratory: Negative for chest tightness, shortness of breath and wheezing.    Cardiovascular: Negative for chest pain, palpitations and leg swelling.   Gastrointestinal: Negative for abdominal pain, blood in stool, constipation, diarrhea, nausea and vomiting.   Endocrine: Negative for cold intolerance, heat intolerance, polydipsia, polyphagia and polyuria.   Genitourinary: Negative for decreased urine volume, difficulty urinating, dysuria, flank pain, hematuria, pelvic pain, vaginal bleeding, vaginal discharge and vaginal pain.   Musculoskeletal: Negative for arthralgias, gait problem, joint swelling, myalgias and neck pain. " "  Skin: Negative for rash.   Neurological: Positive for dizziness. Negative for tremors, syncope, weakness, numbness and headaches.   Hematological: Does not bruise/bleed easily.   Psychiatric/Behavioral: Negative for agitation, confusion and sleep disturbance. The patient is not nervous/anxious.        Objective:      Physical Exam   Constitutional: She is oriented to person, place, and time. She appears well-nourished. No distress.   obese   HENT:   Head: Normocephalic and atraumatic.   Nose: Nose normal.   Mouth/Throat: Oropharynx is clear and moist.   Eyes: Pupils are equal, round, and reactive to light. Conjunctivae and EOM are normal. No scleral icterus.   Neck: Normal range of motion. Neck supple. No JVD present. No thyromegaly present.   Cardiovascular: Normal rate, regular rhythm and intact distal pulses. Exam reveals no gallop and no friction rub.   No murmur heard.  Pulmonary/Chest: Effort normal and breath sounds normal. No respiratory distress. She has no wheezes. She has no rales.   Abdominal: Soft. Bowel sounds are normal. She exhibits no distension. There is no tenderness. There is no rebound and no guarding.   Musculoskeletal: Normal range of motion. She exhibits no edema.   Lymphadenopathy:     She has no cervical adenopathy.   Neurological: She is alert and oriented to person, place, and time. No cranial nerve deficit or sensory deficit.   Skin: Skin is warm and dry. No rash noted. No erythema.   Psychiatric: She has a normal mood and affect. Her behavior is normal. Thought content normal.       Assessment:       1. Vertigo    2. Dizziness        Plan:   -Hospital Discharge Follow up - [place] [facility] [place]7/21/2019 (Observation) - "Patient admitted for lightheadedness/generalized weakness. Found to have UTI. 0/4SIRS. UCx shows multiple organisms, none in predominance. Completed 3day course CTX in house. Additionally, she complained of L sided temporal headache associated " "with some jaw pain and eye pain. ESR/CRP WNL, optho was consulted for further assistance. Optho evaluated patient, no signs of temporal arteritis, follow up outpatient. PTOT consulted, recommend HHC. Stable for d/c with PCP follow up. Return precautions discussed".     No urinary symptoms today.      -Neuro - Having dizziness - she does not vertigo symptoms - if she moves or bends down, she gets a weird sensation that she is going to fall.   Gets "strobe lights".  This seems more vertigo to me.   Unable to perform Todd-Hallpike maneuver in the office.  Orthostatic negative.  Will refer for vestibular rehab and if does not improve, the Neuro.  As above, recent CTH negative.    -Follow up in 4 weeks for follow up  ----------------------------------------------------------  -Recent Hospital ED Visit - Patient was seen in the ED on 6/14/2019 at [facility] - "Urgent evaluation of a 74 y.o. female presenting to the emergency department complaining of dizziness and exertional shortness of breath with associated chest tightness x1 month. Patient is afebrile, nontoxic appearing and hemodynamically stable.  Triage vital signs reveal tachycardia with mild tachypnea.  My exam, patient is not tachycardic but she is mildly tachypneic.  Auscultation of lungs reveal no wheezes, rales or rhonchi with slightly decreased air movement throughout.  Medical records reviewed, pt's recent admission on March 2018 for similar complaints. Admitted for pneumonitis and hypoxia with ambulation.   ED Management:  CBC reveals no leukocytosis.  Chemistry reveals hyperglycemia of 216.  BNP is normal. TSH is normal. Troponin is less than 0.006.  Urinalysis reveals trace sugar.  Patient educated on this finding and states she just ate a high carbohydrate, sugary male prior to arrival.  CTA obtain reveals no PE.  Emphysema noted. There is a new, 4 mm right lower lobe nodule.  Repeat scan recommended at 12 months.  Patient educated on this finding.  Patient " "was given breathing treatment with some improvement here in the ED.  When she ambulated, her oxygen ranged from 95-97%.  She is asymptomatic at this time.  She told the nurse I do not feel short of breath, people just tell me I look short of breath.  Patient will be sent home with short steroid burst.  She was offered an albuterol inhaler but states that this does not work for her.  She is encouraged to follow up with pulmonology or return here for new worsening symptoms".  --------------------------------------------------------  -Recent Hospital Admission - (3/6/2018-3/9/2018) for Pneumonitis - treated with Azithromycin, Duonebs, Solumedrol.  -------------------------------------------------------  -Nutrition - Obesity - BMI 41.76 in 4/2018.  Discussed diet modification and exercise.  She thinks when her dentures/teeth are fixed, she can change her diet to help support weight loss.  We discussed Bariatric Medicine and she declined. BMI in 8/2018 in 41.33.    -Neuro - Post-polio Syndrome (polio at age 18) -  On Oxycodone (tapering down - 1/2 pill every few days) and Valium prn.  Followed by Rheumatologist as well.  She is concerned abouther walking, but wants to wait until after her dental work is done.    -ID - Weekly Low-Grade Fevers - last on prednisone about 6 months ago.  No recent travels in the past year.  As above, no symptoms.  On MTX with reduced dose lately.  I will send to ID for evaluation given her immunosuppression - not seen.  Resolved today.  See above admission.    -Pulmonary - Pneumonitis and history of Lung Cancer - CTA Chest showed no PE, patchy multifocal GGO and diffuse centrolobular emphysema.  S/p Azithromycin and Duonebs.  S/p Solumdrol.  For Lung Cancer (11/2009 - [id number] Stage IIA), S/p LLL lobectomy and completed 3/4 planned cycles of chemo.  Last seen by Oncology in 2/2015 (was to return in a year).   Followed up with Pulmonary - had appointment 3/22/2018 with Dr. Ari Dumont " ([place]).  Now off oxygen and Flovent stopped.  Breathing stable.    -Rheum - RA (2009 - moderate positive CCP/RF) and Fibromyalgia - On Methotrexate since 2011.  On prn  Prednisone.  On Folic Acid.  Followed by Rheum and seen 7/11/2018 - MTX reduced to 15mg/week given increased AST.    Last seen by Rheum on 3/18/2019 and 5/1/2019.    -Endocrine - Hyothyroidism - off Levothyroxine and Liothyronine for about a year. TSH, fT4 and T3 all normal in 3/2018 - recommended to follow off medication for now.   Repeat TFTs in 8/2018 normal.  She thinks maybe she was treated for subclinical hypothyroidism in past.  TSH normal in 7/2019.    Vitamin D Insufficient - Level was 15 in 4/2018 and recommended D3 at 2000 units daily.  Repeat level was 12 in 8/2018.      Pre-Diabetes - A1C was 6.0 in 3/2018 - consider Metformin.  Repeat A1C in 8/2018 was 5.7.  On chronic low-dose prednisone which may be contributing.  Had Endocrine follow up on 4/30/2018 - recommended Selenium to try to decrease thyroid antibodies.    -Heme - Thrombocytopenia - Plts 105 on 3/9/2018.  Was 134 in 4/2017. Will follow closely for now.  Repeat CBC in 10/2018 with Plts of 107.  Last CBC in 7/2019 with Plts of 62.    -Ortho - Broke Left Elbow in October 2017 and healed now.    -Cards - HLD - Lipids in 3/2018 with  (HDL low at 33).  Follow for now.  BP today stable today.  Repeat lipids.    -GYN - S/p Hysterectomy.  S/p Ovarian Cyst Removal.  Last Mammo negative in 9/2015 and was rescheduled in 4/2018, but not yet done - wishes to wait until after her dental work is done.  We discussed DXA screening for osteoporosis - DXA in 12/2013 was normal.  Consider repeat.    -GI - Colon Polyps - last colonoscopy in 8/2012 - polyps in sigmoid, proximal ascending colon, ascending colon, and cecum.  Recommended repeat in 3 years - she still does not want follow up at this time.      Elevated AST of 75 in 3/6/2018 (normal ALT, Alk phos, and Bili).  Has been elevated  since at least 1/2017.  Check Hepatitis serologies - HCV Ab negative in 2009 and Hep B cAb/sAb/sAg negative in 8/2018.  GGT and CPK all normal in 3/2018.  CMP in 7/2019 with AST 64.  Consider imaging.    -Dental - plans to have full dental implants - going for consultation next week.    -HCM - We discussed Flu (10/2017) and Tdap (last was 2004 and at this time doesn't want to get one now - has had whelps in the past) vaccinations.  We discussed Shingles (did not have due to cost) and Pneumococcal (25 had twice and had Prevnar 13 in 2016) vaccinations.

## 2019-08-08 ENCOUNTER — OFFICE VISIT (OUTPATIENT)
Dept: PRIMARY CARE CLINIC | Facility: CLINIC | Age: 74
End: 2019-08-08
Payer: MEDICARE

## 2019-08-08 VITALS
OXYGEN SATURATION: 96 % | BODY MASS INDEX: 38.58 KG/M2 | SYSTOLIC BLOOD PRESSURE: 139 MMHG | HEIGHT: 70 IN | HEART RATE: 111 BPM | WEIGHT: 269.5 LBS | DIASTOLIC BLOOD PRESSURE: 72 MMHG

## 2019-08-08 DIAGNOSIS — R42 DIZZINESS: ICD-10-CM

## 2019-08-08 DIAGNOSIS — R42 VERTIGO: Primary | ICD-10-CM

## 2019-08-08 PROCEDURE — 99214 PR OFFICE/OUTPT VISIT, EST, LEVL IV, 30-39 MIN: ICD-10-PCS | Mod: S$PBB,,, | Performed by: INTERNAL MEDICINE

## 2019-08-08 PROCEDURE — 99999 PR PBB SHADOW E&M-EST. PATIENT-LVL V: ICD-10-PCS | Mod: PBBFAC,,, | Performed by: INTERNAL MEDICINE

## 2019-08-08 PROCEDURE — 99999 PR PBB SHADOW E&M-EST. PATIENT-LVL V: CPT | Mod: PBBFAC,,, | Performed by: INTERNAL MEDICINE

## 2019-08-08 PROCEDURE — 99214 OFFICE O/P EST MOD 30 MIN: CPT | Mod: S$PBB,,, | Performed by: INTERNAL MEDICINE

## 2019-08-08 PROCEDURE — 99215 OFFICE O/P EST HI 40 MIN: CPT | Mod: PBBFAC,PN | Performed by: INTERNAL MEDICINE

## 2019-08-08 NOTE — PATIENT INSTRUCTIONS
Your exam was overall normal today.    Your blood pressure was good.    For your Dizziness, I will refer you to Neurology and Physical Therapy.    Return in 1 months - sooner if needed.  Please come at least 15-20 minutes before your scheduled appointment time.

## 2019-08-12 ENCOUNTER — CLINICAL SUPPORT (OUTPATIENT)
Dept: REHABILITATION | Facility: HOSPITAL | Age: 74
End: 2019-08-12
Attending: INTERNAL MEDICINE
Payer: MEDICARE

## 2019-08-12 ENCOUNTER — EXTERNAL HOME HEALTH (OUTPATIENT)
Dept: HOME HEALTH SERVICES | Facility: HOSPITAL | Age: 74
End: 2019-08-12
Payer: MEDICARE

## 2019-08-12 DIAGNOSIS — R26.81 GAIT INSTABILITY: Primary | ICD-10-CM

## 2019-08-12 DIAGNOSIS — R42 DIZZINESS: ICD-10-CM

## 2019-08-12 DIAGNOSIS — R42 VERTIGO: ICD-10-CM

## 2019-08-12 DIAGNOSIS — R29.898 WEAKNESS OF BOTH HIPS: ICD-10-CM

## 2019-08-12 DIAGNOSIS — Z74.09 IMPAIRED FUNCTIONAL MOBILITY, BALANCE, GAIT, AND ENDURANCE: ICD-10-CM

## 2019-08-12 PROCEDURE — 97530 THERAPEUTIC ACTIVITIES: CPT | Mod: PO

## 2019-08-12 PROCEDURE — 97163 PT EVAL HIGH COMPLEX 45 MIN: CPT | Mod: PO

## 2019-08-12 PROCEDURE — G8979 MOBILITY GOAL STATUS: HCPCS | Mod: CJ,PO

## 2019-08-12 PROCEDURE — 97112 NEUROMUSCULAR REEDUCATION: CPT | Mod: PO

## 2019-08-12 PROCEDURE — G8978 MOBILITY CURRENT STATUS: HCPCS | Mod: CL,PO

## 2019-08-12 NOTE — PLAN OF CARE
Name: Yana Orellana   [facility] Number: [id number]     Yana is a 74 y.o. female evaluated on 08/12/2019     Physician: Jethro Ortiz, Primary Care    Physician Orders: PT Eval and Treat vestibular rehab  Medical Diagnosis from Referral: R42 (ICD-10-CM) - Dizziness and giddiness  Evaluation Date: 8/12/19  Authorization Period Expiration: 8/7/2020  Plan of Care Expiration: 11/4/19  Visit # / Visits authorized: 1/ pending authorization    Time In: 130p  Time Out: 245p  Total Billable Time: 75 minutes    Precautions: Standard, Fall and recent hospitalization with UTI, dehydration, post polio    Diagnosis:    Encounter Diagnoses   Name Primary?    Vertigo     Dizziness     Gait instability Yes    Impaired functional mobility, balance, gait, and endurance     Weakness of both hips       Physician:  Jethro Ortiz MD     Past Medical History:   Diagnosis Date    Anemia     Anxiety     Cataract     Fibromyalgia 10/15/2012    Insomnia 3/28/2014    Lung cancer     Pneumonia due to infectious organism 7/19/2019    Post-polio syndrome     Rheumatoid arthritis(714.0) 7/16/2012 2009 onset with mod positive CCP and RF MTX 2011 - present     Thyroid disease         Current Outpatient Medications   Medication Sig    chlorhexidine (PERIDEX) 0.12 % solution RINSE WITH 15 ML PO BID FOR 7 DAYS    diazePAM (VALIUM) 5 MG tablet Take 1 tablet (5 mg total) by mouth every 6 (six) hours as needed for Anxiety.    folic acid (FOLVITE) 1 MG tablet Take 2 tablets (2 mg total) by mouth once daily.    methotrexate 2.5 MG Tab Take 5 tablets (12.5 mg total) by mouth every 7 days. (Patient taking differently: Take 12.5 mg by mouth every 7 days. )    predniSONE (DELTASONE) 5 MG tablet Take as needed for flare of rheumatoid arthritis    vitamin D 185 MG Tab Take 185 mg by mouth once daily.       No current facility-administered medications for this visit.         Review of patient's allergies indicates:   Allergen  "Reactions    Demerol [meperidine]      Euphoria    Gabapentin Other (See Comments)     Hallucinations    Mellaril-s Nausea Only    Versed [midazolam]      Excessive talking, hyper    Vicodin [hydrocodone-acetaminophen]      Joint pain, muscle pain    Xanax [alprazolam]      fatigue       Precautions :Fall  Occupation:   retired    SocHx:   Patient lives alone  in a single family home Yana has  5 steps at entrance with B railings.      Patient has level home and wide doors  Uses taxi as transportation    Pt has DME  as follows:[employer], 4-wheeled rollator and taking sponge bath - needs equipment and remodel of bathroom - maybe walk in shower  Previous Level of Function:  Limited due to L leg weakness and past polio issues- now dizziness and L sh complaints    SUBJECTIVE:   Onset: Pt reports hospitalization for dizziness ans weakness - could barely walk.  Had fall earlier that month. ED admit 7/18-7/21/19 and found to have dehydration and UTI - antibiotics and lots of fluids.   Also questioned pneumonia and addressed eye issues. Discharged to home alone.   Today arrives via cab and needed physical assistance to assume standing while in waiting room- has quad cane.   Over last year has had a lot of dental work - typical chair and use of neck collar pillow    Dizziness started May of 2019- just came on.  In past BPPV and was treated in past - 'this is not the same thing" - had canalith repositioning    Now NOT spinning, feels different now more of a tunnel focus, strobe lights and waves of motion.     Fell to ground and needed fire dept to get off floor.     Call safety button -wears around neck.     Feels well aware of BPPV problems and confirms this feels different - now has feeling of fullness in L ear - comes and goes.  Eye pain and questions pigment in R eye - addressed some concerns while in hospital. - did not address seeing lights or black/white spots- states not a detached retina    Yana Carmen Orellana's " symptoms are sudden in onset, are still present and are intermittent episodes.      Since onset:  worsening then seems to stabilized  Can't bend over due to symptoms     Type : motion provoked and positional- stand up and bend over but adamantly denies issues with reading and looking as in past with BPPV    Description of symptoms : dizziness - waves - tunnel feeling-   Not spinning  Worse with fatigue and later in the day      Duration: seconds to minutes    Associated symptoms : headaches while in hospital along L side  With check of Orthostatic in clinic did have nausea afterwards  Full feeling of L ear  No recent changes in memory or thinking    Denies prior CVA -   CT scan of head and chest- unremarkable    Prior Episodes: BPPV x 2 episodes maybe 2005 and 2012 - saw ENT and had PT at [facility] plus testing   Epley Maneuvers were successful     CT  Impression       No CT evidence of acute intracranial abnormality. Clinical correlation and further evaluation as warranted.  Electronically signed by: All Sepulveda MD  Date: 07/19/2019         Falls: Yes July 2019-  June 2018 twisted knee  Oct 2017 elbow fx  2011 fall    Most weakness of L LE due to muscle atrophy with polio and post polio syndrome  Uses quad cane mostly - some times not needed in house  States c/o L shoulder bursitis    Intensity of sympotoms: Patient rates symptoms to be  2 on a scale of 1-10.(where 0 = asymptomatic and 10 = extremely severe and disabling symptoms)    Patient Goals:  Want dizziness to stop to allow regular therapy and exercise - wants to get into pool for exercise  Pt relates several medical complaints - shoulder, post polio, back - cueing to remain focused on referral for vestibular rehab    OBJECTIVE:    Mental status: very talkative, clear intact- follows commands  Cueing to remain on task of evaluation    Posture Alignment :forward head, slouched posture, obesity with noted muscle wasting L thigh - large full calf regions  with obesity  Stands with small based quad cane in R UE - leans left    Sensation: Light Touch: Intact           Cervical Screen  Sharp Edin- intact  Alar Ligament- intact    AROM: Cervical limited for ROM in ext, sbing and rotation  Notes symptoms with cervical flex to chest     ROM:  UPPER EXTREMITY--AROM  Not formally assessed - c/o L sh pain         RANGE OF MOTION--LOWER EXTREMITIES  B LE limited as follows: limited evaluation due to dizziness with change in position and bed mobility as well as patient focus      Strength- gross assessment in sitting due to positional complaints    Sit to stand - requires UE support - unable to perform 30 sec test  Sit to stand mod A in waiting room - cueing and instruction in technique and method  Sit to stand from std chair with arms MOD I after instruction    Modified testing in sitting only  Right LE  Left LE    Knee extension: 4+/5 Knee extension: 3+/5   Knee flexion: 4/5 Knee flexion: 3+/5   Hip flexion: 4+/5 Hip flexion: 4/5   Hip extension:  nt Hip extension: nt   Hip abduction: nt Hip abduction: nt   Ankle dorsiflexion:   4+/5 Ankle dorsiflexion:   4/5   Ankle plantarflexion: 4/5 Ankle plantarflexion: 4/5     COORDINATION:   HEEL TO SHIN  B LE:  Able to perform    PEGGY B LE:  Able to perform    PEGGY B UE:  Able to perform    Finger to finger:  Able to perform    Eye Head Coordination:  Ocular Exam:  Smooth Pursuit: does not elicit symptoms  Saccadic eye movements:  does not elicit symptoms  Convergence/divergence: Intact  Vestibular Ocular Reflex: Intact  Alignment, fixation, and ROM (presence of nystagmus):    Tracking with Head movements:    Head movements/object stationary: does not elicit symptoms  Head movement/moving object in phase:does not elicit symptoms  Head movement/moving object out of phase:nt  Head and object stationary, body rotates nt      SENSORY ORGANIZATION (Greater than 30 seconds)   Firm Surface Eyes Open:Yes- pt chooses not to complete standing  balance assessment - fatigued and legs feel week - wants to address dizziness  Notes some fatigue related to lung  Firm Surface Eyes Closed: nt  Foam Surface Eyes Open: nt  Foam Surface Eyes Closed: nt  Romberg FTEO: nt  Romberg FTEC: nt  Tandem Romberg EO: nt  Tandem Romberg EC: nt  Single Limb Support EO: nt  Pt is able to tolerate minimal perturbations:     CORBIN Assessment- not formally completed due to tolerance and BPPV assessment and treatment    1. Sitting to Standing   1 - needs minimal aid to stand or stabilize  2. Standing Unsupported   2 - able to stand 30 seconds unsupported  3. Sitting Unsupported   4 - able to sit safely and securely 2 minutes  4. Standing to Sitting   3 - controls descent by using hands  5. Pivot Transfer   3 - able to transfer safely with definite use of hands  6. Standing with Eyes Closed   nt  7. Standing with Feet Together   nt  8. Reaching Forward with Outstretched Arm   nt  9. Retrieving Object from Floor   nt  10. Turning to Look Behind   nt  11. Turning 360 Degrees   nt  12. Placing Alternate Foot on Step   nt  13. Standing with One Foot in Front   nt  14. Standing on One Foot   nt                             Total:   Partial testing - limited and needs quad cane for mobility and UE support - pt advised of fall prevention and safety       Maximum = 56    Motion Provoked Symptoms  ( Intensity: 0-10 scale  0=no Sx, 10=severe Sx)     Intensity Duration   Baseline symptoms 1-2/10    Sitting - Supine     Supine - L side     Supine - R side     Supine - Sitting     Left Hallpike Todd Hallpike testing with assistance and slow motions + nystagmus and + symptoms- proceeded through Epley maneuver - once nystagmus stopped + 30 sec, head roll R mild complaints, body roll R less complaints, sit up with head down - no continued complalints Advised of Canalith Repositioning and mobility    Pt denied symptoms sitting and with basic mobility.    Pt choose to reschedule 8/16/19 to repeat Todd  Hallpike and Epley as needed.   Left Hallpike - Sitting      Right Hallpike     Right Hallpike - Sitting     Sitting - Nose to Left Knee     Sitting - Erect Left     Sitting - Nose to Right Knee     Sitting - Erect Left     Sitting - Head Rotation     Sitting - Head flx/ext     Standing - Turn to Right     Standing - Turn to Left       Gait on level surfaces: amb with quad cane    Gait Deviations: Yana displays occasional unsteady gait.  Gait with changing speeds: amb slowly with quad cane  Gait with horizontal head turns: head motion is slow and limited pace  Gait with vertical head turns: notes mild complaints looking down  Gait with pivot turns: gait remains stable  and moves slowly and carefully due to LE support  Tandem ambulation: nt    Elevations: nt  Dynamic Gait Index:  Not completed      TREATMENT   Treatment Time In: 200p  Treatment Time Out: 235p  Total Treatment time separate from Evaluation: 35 minutes    Treatment:   Education in Vestibular System and Balance Rehab  Discussed fall prevention and safety recommendations.   PT Evaluation Completed?: Yes- portions of balance and bed mobility to be completed - LE limitations are present and pre existing per report  Discussed plan of care with patient?: Yes    Yana received 20 minutes of neuromuscular re-education.     Including Canalith Repositioning maneuvers for her L posterior canal and education in vestibular rehab and management    Written Home Exercises Provided: information on vestibular rehab and PT for dizziness  Instructed in and performed neuromuscular reeducation and vestibular rehab     There act of transfer training 15 - sit to stand with cueing and performance without assistance  Choose higher surfaces , chairs with arms, foot alignment and weight shift forward - cueing and performance    Gait safety  BPPV and management and balance rehab progression    Pt demonstrated fair understanding of the education provided. Yana zavaleta  return demo of skill of exercise.    CMS Impairment/Limitation/Restriction for FOTO Vertigo Survey  Status Limitation G-Code CMS Severity Modifier  Intake 36% 64% Current Status CL - At least 60 percent but less than 80 percent  Predicted 67% 33% Goal Status+ CJ - At least 20 percent but less than 40 percent  D/C Status CL **only report if this is a one time visi    Therapist reviewed FOTO scores    FOTO documents entered into [employer] - see Media section.     Assessment     This is a 74 y.o. female with a medical diagnosis of   Encounter Diagnoses   Name Primary?    Vertigo     Dizziness     Gait instability Yes    Impaired functional mobility, balance, gait, and endurance     Weakness of both hips    . Patient presents with dizziness and motion provoked symptoms with limited functional mobility related to post polio, lung issues, RA and other preexisting conditions.  She was positive for nystagmus and symptoms with L [place] Hallpike and treated with Epley maneuver- this may need to be repeated and patient may also benefit from vestibular rehab and general conditioning to improve function. Yana will benefit from skilled physical therapy intervention to address the above impairments and functional limitations.   Expect fair response to intervention due to     Medical Necessity is demonstrated by the following  History  Co-morbidities and personal factors that may impact the plan of care Co-morbidities:   advanced age, COPD/asthma, high BMI, history of lung cancer and polio, RA, FM  Past BPPV    Personal Factors:   age  coping style     high   Examination  Body Structures and Functions, activity limitations and participation restrictions that may impact the plan of care Body Regions:   head  neck  lower extremities    Body Systems:    gross symmetry  ROM  strength  gross coordinated movement  balance  gait  transitions  motor control  balance    Participation Restrictions:   Multiple conditions- past therapy,  transporation    Activity limitations:   Learning and applying knowledge  no deficits    General Tasks and Commands  no deficits    Communication  no deficits    Mobility  lifting and carrying objects  walking  moving around using equipment (WC)  using transportation (bus, train, plane, car)  driving (bike, car, motorcycle)    Self care  washing oneself (bathing, drying, washing hands)  dressing  looking after one's health    Domestic Life  shopping  cooking  doing house work (cleaning house, washing dishes, laundry)  assisting others    Interactions/Relationships  no deficits    Life Areas  no deficits    Community and Social Life  recreation and leisure         high   Clinical Presentation evolving clinical presentation with changing clinical characteristics moderate   Decision Making/ Complexity Score: moderate           The following goals were discussed with the patient and patient is in agreement with them as to be addressed in the treatment plan.     STG'S: 6 weeks  1. Patient independent in HEP of balance, habituation, head-eye coordination, gaze stabilization and repositioning.  Exercises to be done Daily.  2. Decrease patient's subjective rating of dizziness to a 1 on 0-10 scale) with bed mobility and transfers.   3.  Decrease patient's subjective rating of dizziness to a  1 (on 0-10 scale) or less as baseline.  4. Pt to amb with quad cane in household MOD I - no significant LOB to allow function within home setting.     LTG'S : 12 weeks  1. Patient able to ambulate in community safely with assistive device, on varied terrainwith head movement, without LOB or c/o dizziness.  DGI improvement by 1-3 points.  2. Patient's subjective rating of dizziness will decreased to 0-2 on 0-10 scale  3. Patient able to perform down to up, sit to stand, reach to ground from sitting without LOB or c/o dizziness  4.  Reduce risk of falls with Devine Balance gains by 3-5 points.  5. Pt to be I with self management of condition  and progression vestibular program for maintenance.      PLAN:    Yana will be seen 1-2 times per weeks for the next 12 weeks for vestibular rehabilitation, neuromuscular education, balance and coordinatoin training, therapeutic exercise, gait training, gaze stabilization exercise, otoligh stimulation, postural education, dynamic standing balance exercises, somato-sensory exercises, sensory organization activities, visual tracking activities, eye head coordination exercises  and and other therapeutic interventions as deemed necessary to address above goals.. Pt may be seen by a PTA as part of the Rehab Team.     I certify the need for these services furnished under this plan of treatment and while under my care.       ____________________________________  Physician/Referring Practitioner    _______________  Date of Signature

## 2019-08-16 ENCOUNTER — CLINICAL SUPPORT (OUTPATIENT)
Dept: REHABILITATION | Facility: HOSPITAL | Age: 74
End: 2019-08-16
Attending: INTERNAL MEDICINE
Payer: MEDICARE

## 2019-08-16 DIAGNOSIS — R26.81 GAIT INSTABILITY: ICD-10-CM

## 2019-08-16 DIAGNOSIS — R42 DIZZINESS: ICD-10-CM

## 2019-08-16 DIAGNOSIS — Z74.09 IMPAIRED FUNCTIONAL MOBILITY, BALANCE, GAIT, AND ENDURANCE: ICD-10-CM

## 2019-08-16 DIAGNOSIS — R29.898 WEAKNESS OF BOTH HIPS: ICD-10-CM

## 2019-08-16 DIAGNOSIS — R42 VERTIGO: ICD-10-CM

## 2019-08-16 PROCEDURE — 97112 NEUROMUSCULAR REEDUCATION: CPT | Mod: PO

## 2019-08-16 NOTE — PROGRESS NOTES
Physical Therapy Daily Treatment Note     Name: Yana Orellana  Clinic Number: [id number]    Therapy Diagnosis:   Encounter Diagnoses   Name Primary?    Vertigo     Dizziness     Gait instability     Impaired functional mobility, balance, gait, and endurance     Weakness of both hips      Physician: Jethro Ortiz MD    Visit Date: 8/16/2019     Physician Orders: PT Eval and Treat vestibular rehab  Medical Diagnosis from Referral: R42 (ICD-10-CM) - Dizziness and giddiness  Evaluation Date: 8/12/19  Authorization Period Expiration:12/31/19  Plan of Care Expiration: 11/4/19  Visit # / Visits authorized: 2/ 19     Time In: 130p  Time Out: 230p  Total Billable Time: 60 minutes     Precautions: Standard, Fall and recent hospitalization with UTI, dehydration, post polio     Subjective     Pt reports: feeling better overall but still will get dizzy feeling with down to up motions..  She was compliant with home exercise program regarding information on BPPV which she is familiar with..  Pt notes double vision while lying on side watching TV - will look away and it is gone- advice on oculomotor and may need consult  Response to previous treatment: less symptomatic on additional testing  Functional change: still has feeling of head fullness    Pt admits she does not want ongoing Vestibular Rehab beyond treatment for BPPV - pt has high cost to get here and wants to address function and return to aquatic exercise and dancing.   Pt was sore after evaluation in general - her L sh feels better, today wearing braces on both wrists.    She has dentist appt Monday- she will have help at office and at home - advised on head position and alignment.     Pain: 2/10  Location: sh, knees, wrists     Objective     Yana received therapeutic exercises to develop posture for 5 minutes including:  Cueing for alignment and upright posture  Pt declines therex for hips / ankles and core as was recommended for balance, hip/ankle  "strategies and transitions    Yana participated in neuromuscular re-education activities to improve: Balance, Coordination, Kinesthetic, Sense, Proprioception, Posture and vestibular rehab and canalith repositioning for 60 minutes. The following activities were included:    Performed L [place] Hallpike - delayed onset symptoms and nystagmus brief ~ 5-8 seconds - notes much less than on eval, head turn, side turn, and with to sit some feeling of "woozy full head and mild dizziness" this settled while in 4th position  Educated on vestibular rehab, performed oculomotor   2nd trial L [place] Hallpike very brief onset ~ 1-3 sec- proceeded through Epley with only complaint of dizziness moving from sidelying to sit  Instructed to move slowly for all bed mobility and transitions    Encouraged to participate in Vestibular rehab- pt chooses follow up in 2-3 weeks regarding BPPV at this time.     Pt was instructed in and performed neuromuscular reeducation for vestibular rehab:    -VOR with object on a plain wall, sitting with UE support - maintain eye focus on object with head shake side to side "no" and up/down "yes" for 30 seconds each  Performed with cueing and discussion on progression    -Saccades with hands in front- keep head still and jump eyes side to side, up/down, and diagonals for 30 seconds each  Performed with cueing and discussion on progression    Sitting pencil push ups for convergence / divergence x 2 min.    Repeat above as HEP.    Pt declined balance progression or ongoing vestibular rehab beyond Epley and education.     Pt should continue as HEP as instructed.  Provided written/illustrated HEP.  Pt voiced understanding of performance.        Yana participated in dynamic functional therapeutic activities to improve functional performance for 5  minutes, including:  Sit to stand technique training.     Home Exercises Provided and Patient Education Provided     Education provided:   - oculomotor of VOR, " saccades, pencil push ups  BPPV info  Need for vestibular rehab and LE / core strengthening and balance training    Written Home Exercises Provided: yes.  Exercises were reviewed and Yana was able to demonstrate them prior to the end of the session.  Yana demonstrated fair  understanding of the education provided.     See EMR under Patient Instructions for exercises provided 8/16/2019.    Assessment     Patient presents with dizziness and motion provoked symptoms with limited functional mobility related to post polio, lung issues, RA and other preexisting conditions.  She was positive for nystagmus and symptoms with L [place] Hallpike and treated with Epley maneuver- this may need to be repeated and patient may also benefit from vestibular rehab and general conditioning to improve function. Yana will benefit from skilled physical therapy intervention to address the above impairments and functional limitations.   Expect fair response to intervention due to    Medical Necessity is demonstrated by the following  History  Co-morbidities and personal factors that may impact the plan of care Co-morbidities:   advanced age, COPD/asthma, high BMI, history of lung cancer and polio, RA, FM  Past BPPV     Pt was positive for L [place] Hallpike but less symptomatic with shorter time frame after 1st and even better after 2nd performance today.  Pt does have mild dizziness with side to sit - more of full head feeling.  Due to general conditioning and overall weakness with balance impairment and difficulty with transitions feel regular vestibular rehab is warranted however pt is choosing to address only  BPPV and may seek other care closer to her home.   She will return in 2- 3weeks for repeat testing and additional training.   Pt needs much cueing and guidance to remain on task - she relates multiple conditions, talkative and pleasant.     Yana is progressing well towards her goals. - regarding dizziness/vertigo from BPPV but  choosing to decline additional rehab.  Pt prognosis is Fair.     Pt will continue to benefit from skilled outpatient physical therapy to address the deficits listed in the problem list box on initial evaluation, provide pt/family education and to maximize pt's level of independence in the home and community environment.     Pt's spiritual, cultural and educational needs considered and pt agreeable to plan of care and goals.     Anticipated barriers to physical therapy: participation- uses taxi for transportation, cost, declines vestibular rehab and balance training, obesity, polio, RA, FM    Goals: The following goals were discussed with the patient and patient is in agreement with them as to be addressed in the treatment plan.      STG'S: 6 weeks  1. Patient independent in HEP of balance, habituation, head-eye coordination, gaze stabilization and repositioning.  Exercises to be done Daily.  2. Decrease patient's subjective rating of dizziness to a 1 on 0-10 scale) with bed mobility and transfers.   3.  Decrease patient's subjective rating of dizziness to a  1 (on 0-10 scale) or less as baseline.  4. Pt to amb with quad cane in household MOD I - no significant LOB to allow function within home setting.      LTG'S  : 12 weeks  1. Patient able to ambulate in community safely with assistive device, on varied terrainwith head movement, without LOB or c/o dizziness.  DGI improvement by 1-3 points.  2. Patient's subjective rating of dizziness will decreased to 0-2 on 0-10 scale  3. Patient able to perform down to up, sit to stand, reach to ground from sitting without LOB or c/o dizziness  4.  Reduce risk of falls with Devine Balance gains by 3-5 points.  5. Pt to be I with self management of condition and progression vestibular program for maintenance.          Plan     Vestibular rehab recommended 1 x weekly - pt choosing to return in 2-3 weeks - repeat Todd Luna, PT

## 2019-09-04 ENCOUNTER — CLINICAL SUPPORT (OUTPATIENT)
Dept: REHABILITATION | Facility: HOSPITAL | Age: 74
End: 2019-09-04
Attending: INTERNAL MEDICINE
Payer: MEDICARE

## 2019-09-04 DIAGNOSIS — R42 DIZZINESS: ICD-10-CM

## 2019-09-04 DIAGNOSIS — R26.81 GAIT INSTABILITY: ICD-10-CM

## 2019-09-04 DIAGNOSIS — Z74.09 IMPAIRED FUNCTIONAL MOBILITY, BALANCE, GAIT, AND ENDURANCE: ICD-10-CM

## 2019-09-04 DIAGNOSIS — R42 VERTIGO: ICD-10-CM

## 2019-09-04 DIAGNOSIS — R29.898 WEAKNESS OF BOTH HIPS: ICD-10-CM

## 2019-09-04 PROCEDURE — 97112 NEUROMUSCULAR REEDUCATION: CPT | Mod: PO

## 2019-09-04 NOTE — PROGRESS NOTES
Physical Therapy Daily Treatment Note     Name: Yana Orellana  Clinic Number: [id number]    Therapy Diagnosis:   Encounter Diagnoses   Name Primary?    Vertigo     Dizziness     Gait instability     Impaired functional mobility, balance, gait, and endurance     Weakness of both hips      Physician: Jethro Ortiz MD    Visit Date: 9/4/2019     Physician Orders: PT Eval and Treat vestibular rehab  Medical Diagnosis from Referral: R42 (ICD-10-CM) - Dizziness and giddiness  Evaluation Date: 8/12/19  Authorization Period Expiration:12/31/19  Plan of Care Expiration: 11/4/19  Visit # / Visits authorized3/ 19     Time In: 345p  Time Out: 445p  Total Billable Time: 60 minutes     Precautions: Standard, Fall and recent hospitalization with UTI, dehydration, post polio     Subjective     Pt reports: being late due to schedule and taxi.  Pt continues to have some issues leaning forward or looking down.  She was not compliant with home exercise program- she did not perform oculomotor program and she is not sure why not.  regarding information on BPPV which she is familiar with..  Pt notes double vision up and down while looking at certain things - may happen regularly- she wants to seek eye exam.    Response to previous treatment: less symptomatic with second trial of repositioning  Functional change: pt has full calendar of visits.  Pt wants to get back in pool for exercise.  Pt reports she is not interested in full vestibular rehab just wants dizziness to stop and repositioning techniques.  This location is difficult for her- she will see her PCP next Thursday and discuss additional consults.  Pt declined PT follow up until after MD visit.   Pt admits she does not want ongoing Vestibular Rehab beyond treatment for BPPV - pt has high cost to get here and wants to address function and return to aquatic exercise and dancing. [place] location would be more convenient.    Pain: 2/10  Location: sh, knees, wrists   Pt  has pain patches on her R hand.     Objective     Yana received therapeutic exercises to develop posture for 5 minutes including:  Cueing for alignment and upright posture  Pt declines therex for hips / ankles and core as was recommended for balance, hip/ankle strategies and transitions    Yana participated in neuromuscular re-education activities to improve: Balance, Coordination, Kinesthetic, Sense, Proprioception, Posture and vestibular rehab and canalith repositioning for 55 minutes. The following activities were included:    Assessment of occular motor function  Pt is talkative and freely moving her head with conversation.  Smooth pursuit - intact and does not provoke symptoms  Saccades - slowed performance - does not provoke symptoms  Eyes stationary with head movement- very slow to perform with cueing-  No nystagmus noted, pt states she doesn't like to move her head    Discussed canalith repositioning and TOM[place] Hallpike to Epley and also may need to assess horizontal canals to BBQ roll- pt does not feel she could physically roll onto stomach assume quad and then move off table  States legs are heavy    Pt wishes to proceed with [place] Hallpike with assistance of mobility and wants to return to MD prior to additional management  Pt is very talkative about past testing and treatment  Pt moves her head and eyes with conversation.    Pt amb to gym section for larger treatment table with quad cane- slow pace    Performed L [place] Hallpike - delayed onset symptoms and rotary nystagmus brief ~ 5 seconds - notes less than prior performance, head turn, side turn, and with return to sitting looking down - c/o dizziness moving to sit- needed mod A to assume sitting- maintained position until symptoms resolved in 4th position  Educated on vestibular rehab, performed oculomotor exercise review  Discussed her choice of Eye exam and may consider  ENT or neuro in vestibular issues continue  2nd trial L [place] Hallpike no onset of  "symptoms or nystagmus proceeded through Epley with only mild complaint of dizziness moving from sidelying to sit with min/mod A to assume  Instructed to move slowly for all bed mobility and transitions    Encouraged to participate in Vestibular rehab- pt chooses follow up with MD and she will contact PT if choosing to return.      Pt was instructed in and performed neuromuscular reeducation for vestibular rehab:  Exercises reviewed and has written / illustrated program.    -VOR with object on a plain wall, sitting with UE support - maintain eye focus on object with head shake side to side "no" and up/down "yes" for 30 seconds each  Performed with cueing and discussion on progression    -Saccades with hands in front- keep head still and jump eyes side to side, up/down, and diagonals for 30 seconds each  Performed with cueing and discussion on progression    Sitting pencil push ups for convergence / divergence x 2 min.    Repeat above as HEP.    Pt declined balance progression or ongoing vestibular rehab beyond Epley and education.     Pt should continue as HEP as instructed.  Provided written/illustrated HEP.  Pt voiced understanding of performance.        Yana participated in dynamic functional therapeutic activities to improve functional performance for 5  minutes, including: bed mobility - rolling technique - supine to side to sit technique.  Sit to stand technique training.     Home Exercises Provided and Patient Education Provided     Education provided:   - oculomotor of VOR, saccades, pencil push ups  BPPV info  Need for vestibular rehab and LE / core strengthening and balance training    Written Home Exercises Provided: yes.  Exercises were reviewed and Yana was able to demonstrate them prior to the end of the session.  Yana demonstrated fair  understanding of the education provided.     See EMR under Patient Instructions for exercises provided 8/16/2019.    Assessment     Patient presents with " dizziness and motion provoked symptoms with limited functional mobility related to post polio, lung issues, RA and other preexisting conditions.  She returns with BPPV of L posterior canal that was positive on first trial today but negative on second trial.  Feel she may need additional assessment for other canals as well as could benefit from vestibular rehab.  She continues to decline follow up for vestibular rehab and has not performed HEP as recommended.   Pt may need ENT consult and formal clinical assessment of her vestibular system function with VNG testing.   Each session nystagmus and symptoms with L [place] Hallpike that lessen as treated with Epley maneuver.  Yana will benefit from skilled physical therapy intervention to address the above impairments and functional limitations.   Expect fair response to intervention due to compliance, transportation, multiple medical   Medical Necessity is demonstrated by the following  History  Co-morbidities and personal factors that may impact the plan of care Co-morbidities:   advanced age, COPD/asthma, high BMI, history of lung cancer and polio, RA, FM  Past BPPV     Pt was positive for L [place] Hallpike but less symptomatic with shorter time frame after 1st and even better after 2nd performance today.  Pt does have mild dizziness with side to sit - more of full head feeling.  Due to general conditioning and overall weakness with balance impairment and difficulty with transitions feel regular vestibular rehab is warranted however pt is choosing to address only  BPPV and may seek other care closer to her home.   She will return in 2- 3weeks for repeat testing and additional training.   Pt needs much cueing and guidance to remain on task - she relates multiple conditions, talkative and pleasant.     Yana is progressing well towards her goals. - regarding dizziness/vertigo from BPPV but choosing to decline additional rehab.  Pt prognosis is Fair.     Pt will continue to  benefit from skilled outpatient physical therapy to address the deficits listed in the problem list box on initial evaluation, provide pt/family education and to maximize pt's level of independence in the home and community environment.     Pt's spiritual, cultural and educational needs considered and pt agreeable to plan of care and goals.     Anticipated barriers to physical therapy: participation- uses taxi for transportation, cost, declines vestibular rehab and balance training, obesity, polio, RA, FM    Goals: The following goals were discussed with the patient and patient is in agreement with them as to be addressed in the treatment plan.      STG'S: 6 weeks  1. Patient independent in HEP of balance, habituation, head-eye coordination, gaze stabilization and repositioning.  Exercises to be done Daily.  2. Decrease patient's subjective rating of dizziness to a 1 on 0-10 scale) with bed mobility and transfers.   3.  Decrease patient's subjective rating of dizziness to a  1 (on 0-10 scale) or less as baseline.  4. Pt to amb with quad cane in household MOD I - no significant LOB to allow function within home setting.      LTG'S  : 12 weeks  1. Patient able to ambulate in community safely with assistive device, on varied terrainwith head movement, without LOB or c/o dizziness.  DGI improvement by 1-3 points.  2. Patient's subjective rating of dizziness will decreased to 0-2 on 0-10 scale  3. Patient able to perform down to up, sit to stand, reach to ground from sitting without LOB or c/o dizziness  4.  Reduce risk of falls with Devine Balance gains by 3-5 points.  5. Pt to be I with self management of condition and progression vestibular program for maintenance.          Plan     Vestibular rehab recommended 1 x weekly - pt choosing to follow with MD and will contact PT if and when to resume   repeat [place] Hallpike L as needed    Eduarda Luna, PT

## 2019-09-06 ENCOUNTER — TELEPHONE (OUTPATIENT)
Dept: RHEUMATOLOGY | Facility: CLINIC | Age: 74
End: 2019-09-06

## 2019-09-06 DIAGNOSIS — Z79.899 HIGH RISK MEDICATION USE: Chronic | ICD-10-CM

## 2019-09-06 DIAGNOSIS — M05.79 RHEUMATOID ARTHRITIS INVOLVING MULTIPLE SITES WITH POSITIVE RHEUMATOID FACTOR: Primary | ICD-10-CM

## 2019-09-06 NOTE — TELEPHONE ENCOUNTER
----- Message from Snow Aldridge MA sent at 9/6/2019  2:28 PM CDT -----  Contact: Self      ----- Message -----  From: Claudia Jackson  Sent: 9/6/2019   2:22 PM  To: Nghia MILLER Staff    Pt stated that she is having a bad flare up and would like a call back on next steps @ [phone]

## 2019-09-06 NOTE — TELEPHONE ENCOUNTER
For 6 weeks more pain  Bursitis in shoulders  Pain in hands  Feet get stiff  Still on MTX 4 tab / week  Hospitalized in July with vertigo; now in PT for Epley maneuver    She will get lab to f/u on low platelets and elevated AST/ALT to determine which way we can go with methotrexate    Meantime she will stay on 10 mg/week MTX and pred 5 mg if needed    Please schedule lab after appt with Dr Ortiz this next Thursday  Also needs a follow up with me scheduled when possible

## 2019-09-09 ENCOUNTER — PATIENT OUTREACH (OUTPATIENT)
Dept: ADMINISTRATIVE | Facility: HOSPITAL | Age: 74
End: 2019-09-09

## 2019-09-10 NOTE — PROGRESS NOTES
Subjective:       Patient ID: Yana Orellana is a 74 y.o. female with a PMH significant for RA, Post-polio Syndrome, Anxiety/Depression, Pneumonitis, PVCs, Anemia, History of Lung Cancer, Hypothyroidism, Obesity, Fibromyalgia who presents who was seen initially by me on 3/20/2018 for a hospital discharge (3/6/2018-3/9/2018) for Pneumonitis - treated with Azithromycin, Duonebs, Solumedrol.  She was here for follow up on 4/20/2018, 8/17/2018, and 8/8/2019.    Chief Complaint: Follow-up (1 month follow up )    HPI  Patient overall feeling a little better today.  She was awakened early by a smoke alarm this morning, but otherwise ok.  Vertigo improving with PT/Vestibular rehab.    Patient denies f/c, n/v/d.  No chest pain.  No abdominal pain or dysuria.  No headaches or change in vision.   Remaining ROS negative.    Review of Systems   Constitutional: Negative for appetite change, chills, diaphoresis, fatigue, fever and unexpected weight change.   HENT: Negative for ear pain, hearing loss, sinus pain, tinnitus, trouble swallowing and voice change.    Eyes: Negative for photophobia, pain and visual disturbance.   Respiratory: Negative for chest tightness, shortness of breath and wheezing.    Cardiovascular: Negative for chest pain, palpitations and leg swelling.   Gastrointestinal: Negative for abdominal pain, blood in stool, constipation, diarrhea, nausea and vomiting.   Endocrine: Negative for cold intolerance, heat intolerance, polydipsia, polyphagia and polyuria.   Genitourinary: Negative for decreased urine volume, difficulty urinating, dysuria, flank pain, hematuria, pelvic pain, vaginal bleeding, vaginal discharge and vaginal pain.   Musculoskeletal: Negative for arthralgias, gait problem, joint swelling, myalgias and neck pain.   Skin: Negative for rash.   Neurological: Negative for dizziness, tremors, syncope, weakness, numbness and headaches.   Hematological: Does not bruise/bleed easily.    Psychiatric/Behavioral: Negative for agitation, confusion and sleep disturbance. The patient is not nervous/anxious.        Objective:      Physical Exam   Constitutional: She is oriented to person, place, and time. She appears well-nourished. No distress.   obese   HENT:   Head: Normocephalic and atraumatic.   Nose: Nose normal.   Mouth/Throat: Oropharynx is clear and moist.   Eyes: Pupils are equal, round, and reactive to light. Conjunctivae and EOM are normal. No scleral icterus.   Neck: Normal range of motion. Neck supple. No JVD present. No thyromegaly present.   Cardiovascular: Normal rate, regular rhythm and intact distal pulses. Exam reveals no gallop and no friction rub.   No murmur heard.  Pulmonary/Chest: Effort normal and breath sounds normal. No respiratory distress. She has no wheezes. She has no rales.   Abdominal: Soft. Bowel sounds are normal. She exhibits no distension. There is no tenderness. There is no rebound and no guarding.   Musculoskeletal: Normal range of motion. She exhibits no edema.   Lymphadenopathy:     She has no cervical adenopathy.   Neurological: She is alert and oriented to person, place, and time. No cranial nerve deficit or sensory deficit.   Skin: Skin is warm and dry. No rash noted. No erythema.   Psychiatric: She has a normal mood and affect. Her behavior is normal. Thought content normal.       Assessment:       1. Rheumatoid arthritis involving multiple sites with positive rheumatoid factor    2. Fibromyalgia    3. Morbid obesity    4. Hypothyroidism, unspecified type    5. Post-polio syndrome    6. Depression, unspecified depression type    7. Centrilobular emphysema    8. Malignant neoplasm of lower lobe of left lung    9. Gait instability    10. Thrombocytopenia    11. Pre-diabetes    12. Vitamin D insufficiency    13. Low HDL (under 40)    14. Elevated LFTs    15. Post-menopausal    16. Breast cancer screening    17. Ear pressure, left    18. Colon cancer screening   "  19. Rectal ulcer    20. Need for influenza vaccination        Plan:   -Today's Visit - Having left ear pressure and wants to see ENT.  Having improvement in Vertigo with PT.  Has a left buttock ulceration - no vesicles.  Clean based.  Will give topical Bactroban and check HSV 1/2 serology for now.  ------------------------------------------  -Hospital Discharge Follow up - Patient was [place] at [facility] [place]18[place]-7/21/2019 (Observation) - "Patient admitted for lightheadedness/generalized weakness. Found to have UTI. 0/4SIRS. UCx shows multiple organisms, none in predominance. Completed 3day course CTX in house. Additionally, she complained of L sided temporal headache associated with some jaw pain and eye pain. ESR/CRP WNL, optho was consulted for further assistance. Optho evaluated patient, no signs of temporal arteritis, follow up outpatient. PTOT consulted, recommend [facility]. Stable for d/c with PCP follow up. Return precautions discussed".     No urinary symptoms today.      -Neuro - Having dizziness - she does not vertigo symptoms - if she moves or bends down, she gets a weird sensation that she is going to fall.   Gets "strobe lights".  This seems more vertigo to me.   Unable to perform Todd-Hallpike maneuver in the office.  Orthostatic negative.  Will refer for vestibular rehab and if does not improve, the Neuro.  As above, recent CTH negative.    -Follow up in 4 weeks for follow up  --------------------------------------------------------  -Recent Hospital ED Visit - Patient was seen in the ED on 6/14/2019 at [facility], [place], operated by [facility] - "Urgent evaluation of a 74 y.o. female presenting to the emergency department complaining of dizziness and exertional shortness of breath with associated chest tightness x1 month. Patient is afebrile, nontoxic appearing and hemodynamically stable.  Triage vital signs reveal tachycardia with mild tachypnea.  My exam, patient is not tachycardic but she is mildly tachypneic.  Auscultation of lungs " "reveal no wheezes, rales or rhonchi with slightly decreased air movement throughout.  Medical records reviewed, pt's recent admission on March 2018 for similar complaints. Admitted for pneumonitis and hypoxia with ambulation.   ED Management:  CBC reveals no leukocytosis.  Chemistry reveals hyperglycemia of 216.  BNP is normal. TSH is normal. Troponin is less than 0.006.  Urinalysis reveals trace sugar.  Patient educated on this finding and states she just ate a high carbohydrate, sugary male prior to arrival.  CTA obtain reveals no PE.  Emphysema noted. There is a new, 4 mm right lower lobe nodule.  Repeat scan recommended at 12 months.  Patient educated on this finding.  Patient was given breathing treatment with some improvement here in the ED.  When she ambulated, her oxygen ranged from 95-97%.  She is asymptomatic at this time.  She told the nurse I do not feel short of breath, people just tell me I look short of breath.  Patient will be sent home with short steroid burst.  She was offered an albuterol inhaler but states that this does not work for her.  She is encouraged to follow up with pulmonology or return here for new worsening symptoms".  --------------------------------------------------------  -Recent Hospital Admission - (3/6/2018-3/9/2018) for Pneumonitis - treated with Azithromycin, Duonebs, Solumedrol.  -------------------------------------------------------  -Nutrition - Obesity - BMI 41.76 in 4/2018.  Discussed diet modification and exercise.  She thinks when her dentures/teeth are fixed, she can change her diet to help support weight loss.  We discussed Bariatric Medicine and she declined. BMI in 8/2018 in 41.33.  BMI was 38.67 in 8/2019. BMI today is 38.67.    -Neuro - Post-polio Syndrome (polio at age 18) -  On Oxycodone (tapering down - 1/2 pill every few days) and Valium prn.  Followed by Rheumatologist as well.  She is concerned about her walking, but wants to wait until after her dental " work is done.    -ID - Weekly Low-Grade Fevers in 8/2018 - last on prednisone about 6 months ago.  No recent travels in the past year.  As above, no symptoms.  On MTX with reduced dose lately.  I will send to ID for evaluation given her immunosuppression - not seen.  Resolved .  See above admission.    -Pulmonary - Pneumonitis in 3/2018 and history of Lung Cancer - CTA Chest in 3/2018 showed no PE, patchy multifocal GGO and diffuse centrolobular emphysema.  S/p Azithromycin and Duonebs.  S/p Solumdrol.      For Lung Cancer (11/2009 - S2rLdRb Stage IIA), S/p LLL lobectomy and completed 3/4 planned cycles of chemo.  Last seen by Oncology in 2/2015 (was to return in a year).       Followed up with Pulmonary - had appointment 3/22/2018 with Dr. Ari Dumont ([facility]).  Now off oxygen and Flovent stopped.  Breathing stable.    -Rheum - RA (2009 - moderate positive CCP/RF) and Fibromyalgia - On Methotrexate since 2011.  On prn  Prednisone.  On Folic Acid.  Followed by Rheum and seen 7/11/2018 - MTX reduced to 15mg/week given increased AST.    Last seen by Rheum on 3/18/2019.  May change off MTX.    Followed by Physical Medicine and last seen in 5/2019.    -Endocrine - Hyothyroidism - off Levothyroxine and Liothyronine for about a year. TSH, fT4 and T3 all normal in 3/2018 - recommended to follow off medication for now.   Repeat TFTs in 8/2018 normal.  She thinks maybe she was treated for subclinical hypothyroidism in past.  TSH normal in 7/2019.    Vitamin D Insufficient - Level was 15 in 4/2018 and recommended D3 at 2000 units daily.  Repeat level was 12 in 8/2018.      Pre-Diabetes - A1C was 6.0 in 3/2018 - consider Metformin.  Repeat A1C in 8/2018 was 5.7.  On chronic low-dose prednisone which may be contributing.     Had Endocrine follow up on 4/30/2018 - recommended Selenium to try to decrease thyroid antibodies.    -Heme - Thrombocytopenia - Plts 105 on 3/9/2018.  Was 134 in 4/2017. Will follow closely for now.   Repeat CBC in 10/2018 with Plts of 107.  Last CBC in 7/2019 with Plts of 62.  Repeat CBC.    -Ortho - Broke Left Elbow in October 2017 and healed now.    -Cards - HLD - Lipids in 3/2018 with  (HDL low at 33).  Follow for now.  BP last visit stable.  Repeat lipids.    BP today is stable at 130/78.    -GYN - S/p Hysterectomy.  S/p Ovarian Cyst Removal.      Last Mammo negative in 9/2015 and was rescheduled in 4/2018, but not yet done - wishes to wait until after her dental work is done.  Order today.     We discussed DXA screening for osteoporosis - DXA in 12/2013 was normal. Will repeat.    -GI - Colon Polyps - last colonoscopy in 8/2012 - polyps in sigmoid, proximal ascending colon, ascending colon, and cecum.  Recommended repeat in 3 years - she still does not want follow up at this time.    Agrees to FIT today.    Elevated AST of 75 in 3/6/2018 (normal ALT, Alk phos, and Bili).  Has been elevated since at least 1/2017.  Check Hepatitis serologies - HCV Ab negative in 2009 and Hep B cAb/sAb/sAg negative in 8/2018.  GGT and CPK all normal in 3/2018.  CMP in 7/2019 with AST 64.  Consider imaging.    -Dental - plans to have full dental implants - impressions done and almost finished.    -HCM - We discussed Flu (HD today) and Tdap (last was 2004 and at this time doesn't want to get one now - has had whelps in the past) vaccinations.  We discussed Shingles (did not have due to cost) and Pneumococcal (25 had twice and had Prevnar 13 in 5/2016) vaccinations.      -Follow up in 3 months

## 2019-09-12 ENCOUNTER — LAB VISIT (OUTPATIENT)
Dept: LAB | Facility: HOSPITAL | Age: 74
End: 2019-09-12
Attending: INTERNAL MEDICINE
Payer: MEDICARE

## 2019-09-12 ENCOUNTER — OFFICE VISIT (OUTPATIENT)
Dept: PRIMARY CARE CLINIC | Facility: CLINIC | Age: 74
End: 2019-09-12
Payer: MEDICARE

## 2019-09-12 VITALS
HEART RATE: 75 BPM | HEIGHT: 70 IN | DIASTOLIC BLOOD PRESSURE: 78 MMHG | SYSTOLIC BLOOD PRESSURE: 130 MMHG | BODY MASS INDEX: 38.67 KG/M2 | OXYGEN SATURATION: 96 %

## 2019-09-12 DIAGNOSIS — D69.6 THROMBOCYTOPENIA: ICD-10-CM

## 2019-09-12 DIAGNOSIS — J43.2 CENTRILOBULAR EMPHYSEMA: ICD-10-CM

## 2019-09-12 DIAGNOSIS — E66.01 MORBID OBESITY: ICD-10-CM

## 2019-09-12 DIAGNOSIS — M05.79 RHEUMATOID ARTHRITIS INVOLVING MULTIPLE SITES WITH POSITIVE RHEUMATOID FACTOR: Primary | ICD-10-CM

## 2019-09-12 DIAGNOSIS — E55.9 VITAMIN D INSUFFICIENCY: ICD-10-CM

## 2019-09-12 DIAGNOSIS — H93.8X2 EAR PRESSURE, LEFT: ICD-10-CM

## 2019-09-12 DIAGNOSIS — Z12.39 BREAST CANCER SCREENING: ICD-10-CM

## 2019-09-12 DIAGNOSIS — G14 POST-POLIO SYNDROME: ICD-10-CM

## 2019-09-12 DIAGNOSIS — Z23 NEED FOR INFLUENZA VACCINATION: ICD-10-CM

## 2019-09-12 DIAGNOSIS — R73.03 PRE-DIABETES: ICD-10-CM

## 2019-09-12 DIAGNOSIS — R79.89 ELEVATED LFTS: ICD-10-CM

## 2019-09-12 DIAGNOSIS — E03.9 HYPOTHYROIDISM, UNSPECIFIED TYPE: ICD-10-CM

## 2019-09-12 DIAGNOSIS — F32.A DEPRESSION, UNSPECIFIED DEPRESSION TYPE: ICD-10-CM

## 2019-09-12 DIAGNOSIS — Z79.899 HIGH RISK MEDICATION USE: Chronic | ICD-10-CM

## 2019-09-12 DIAGNOSIS — R26.81 GAIT INSTABILITY: ICD-10-CM

## 2019-09-12 DIAGNOSIS — C34.32 MALIGNANT NEOPLASM OF LOWER LOBE OF LEFT LUNG: Chronic | ICD-10-CM

## 2019-09-12 DIAGNOSIS — Z12.11 COLON CANCER SCREENING: ICD-10-CM

## 2019-09-12 DIAGNOSIS — Z78.0 POST-MENOPAUSAL: ICD-10-CM

## 2019-09-12 DIAGNOSIS — M79.7 FIBROMYALGIA: Chronic | ICD-10-CM

## 2019-09-12 DIAGNOSIS — K62.6 RECTAL ULCER: ICD-10-CM

## 2019-09-12 DIAGNOSIS — E78.6 LOW HDL (UNDER 40): ICD-10-CM

## 2019-09-12 DIAGNOSIS — M05.79 RHEUMATOID ARTHRITIS INVOLVING MULTIPLE SITES WITH POSITIVE RHEUMATOID FACTOR: ICD-10-CM

## 2019-09-12 LAB
25(OH)D3+25(OH)D2 SERPL-MCNC: 13 NG/ML (ref 30–96)
ALBUMIN SERPL BCP-MCNC: 2.7 G/DL (ref 3.5–5.2)
ALP SERPL-CCNC: 94 U/L (ref 55–135)
ALT SERPL W/O P-5'-P-CCNC: 21 U/L (ref 10–44)
ANION GAP SERPL CALC-SCNC: 9 MMOL/L (ref 8–16)
AST SERPL-CCNC: 49 U/L (ref 10–40)
BASOPHILS # BLD AUTO: 0.06 K/UL (ref 0–0.2)
BASOPHILS NFR BLD: 1.4 % (ref 0–1.9)
BILIRUB SERPL-MCNC: 2.5 MG/DL (ref 0.1–1)
BUN SERPL-MCNC: 12 MG/DL (ref 8–23)
CALCIUM SERPL-MCNC: 8.8 MG/DL (ref 8.7–10.5)
CHLORIDE SERPL-SCNC: 110 MMOL/L (ref 95–110)
CHOLEST SERPL-MCNC: 142 MG/DL (ref 120–199)
CHOLEST/HDLC SERPL: 4.1 {RATIO} (ref 2–5)
CO2 SERPL-SCNC: 20 MMOL/L (ref 23–29)
CREAT SERPL-MCNC: 0.6 MG/DL (ref 0.5–1.4)
CRP SERPL-MCNC: 9.3 MG/L (ref 0–8.2)
DIFFERENTIAL METHOD: ABNORMAL
EOSINOPHIL # BLD AUTO: 0.2 K/UL (ref 0–0.5)
EOSINOPHIL NFR BLD: 5.2 % (ref 0–8)
ERYTHROCYTE [DISTWIDTH] IN BLOOD BY AUTOMATED COUNT: 14.6 % (ref 11.5–14.5)
ERYTHROCYTE [SEDIMENTATION RATE] IN BLOOD BY WESTERGREN METHOD: 37 MM/HR (ref 0–36)
EST. GFR  (AFRICAN AMERICAN): >60 ML/MIN/1.73 M^2
EST. GFR  (NON AFRICAN AMERICAN): >60 ML/MIN/1.73 M^2
ESTIMATED AVG GLUCOSE: 108 MG/DL (ref 68–131)
GLUCOSE SERPL-MCNC: 139 MG/DL (ref 70–110)
HBA1C MFR BLD HPLC: 5.4 % (ref 4–5.6)
HCT VFR BLD AUTO: 40.6 % (ref 37–48.5)
HDLC SERPL-MCNC: 35 MG/DL (ref 40–75)
HDLC SERPL: 24.6 % (ref 20–50)
HGB BLD-MCNC: 14.2 G/DL (ref 12–16)
IMM GRANULOCYTES # BLD AUTO: 0.06 K/UL (ref 0–0.04)
IMM GRANULOCYTES NFR BLD AUTO: 1.4 % (ref 0–0.5)
LDLC SERPL CALC-MCNC: 85.6 MG/DL (ref 63–159)
LYMPHOCYTES # BLD AUTO: 1.2 K/UL (ref 1–4.8)
LYMPHOCYTES NFR BLD: 27.1 % (ref 18–48)
MCH RBC QN AUTO: 35.4 PG (ref 27–31)
MCHC RBC AUTO-ENTMCNC: 35 G/DL (ref 32–36)
MCV RBC AUTO: 101 FL (ref 82–98)
MONOCYTES # BLD AUTO: 0.5 K/UL (ref 0.3–1)
MONOCYTES NFR BLD: 11.8 % (ref 4–15)
NEUTROPHILS # BLD AUTO: 2.3 K/UL (ref 1.8–7.7)
NEUTROPHILS NFR BLD: 53.1 % (ref 38–73)
NONHDLC SERPL-MCNC: 107 MG/DL
NRBC BLD-RTO: 0 /100 WBC
PLATELET # BLD AUTO: 80 K/UL (ref 150–350)
PMV BLD AUTO: 11.6 FL (ref 9.2–12.9)
POTASSIUM SERPL-SCNC: 4 MMOL/L (ref 3.5–5.1)
PROT SERPL-MCNC: 7.1 G/DL (ref 6–8.4)
RBC # BLD AUTO: 4.01 M/UL (ref 4–5.4)
SODIUM SERPL-SCNC: 139 MMOL/L (ref 136–145)
TRIGL SERPL-MCNC: 107 MG/DL (ref 30–150)
WBC # BLD AUTO: 4.24 K/UL (ref 3.9–12.7)

## 2019-09-12 PROCEDURE — 82306 VITAMIN D 25 HYDROXY: CPT

## 2019-09-12 PROCEDURE — 86140 C-REACTIVE PROTEIN: CPT

## 2019-09-12 PROCEDURE — 99999 PR PBB SHADOW E&M-EST. PATIENT-LVL IV: CPT | Mod: PBBFAC,,, | Performed by: INTERNAL MEDICINE

## 2019-09-12 PROCEDURE — 80053 COMPREHEN METABOLIC PANEL: CPT

## 2019-09-12 PROCEDURE — 85025 COMPLETE CBC W/AUTO DIFF WBC: CPT

## 2019-09-12 PROCEDURE — 99214 PR OFFICE/OUTPT VISIT, EST, LEVL IV, 30-39 MIN: ICD-10-PCS | Mod: S$PBB,,, | Performed by: INTERNAL MEDICINE

## 2019-09-12 PROCEDURE — 99999 PR PBB SHADOW E&M-EST. PATIENT-LVL IV: ICD-10-PCS | Mod: PBBFAC,,, | Performed by: INTERNAL MEDICINE

## 2019-09-12 PROCEDURE — 83036 HEMOGLOBIN GLYCOSYLATED A1C: CPT

## 2019-09-12 PROCEDURE — 86696 HERPES SIMPLEX TYPE 2 TEST: CPT

## 2019-09-12 PROCEDURE — 99214 OFFICE O/P EST MOD 30 MIN: CPT | Mod: PBBFAC,PN | Performed by: INTERNAL MEDICINE

## 2019-09-12 PROCEDURE — 90662 IIV NO PRSV INCREASED AG IM: CPT | Mod: PBBFAC,PN

## 2019-09-12 PROCEDURE — 99214 OFFICE O/P EST MOD 30 MIN: CPT | Mod: S$PBB,,, | Performed by: INTERNAL MEDICINE

## 2019-09-12 PROCEDURE — 36415 COLL VENOUS BLD VENIPUNCTURE: CPT | Mod: PN

## 2019-09-12 PROCEDURE — 80061 LIPID PANEL: CPT

## 2019-09-12 PROCEDURE — 85652 RBC SED RATE AUTOMATED: CPT

## 2019-09-12 RX ORDER — ERGOCALCIFEROL 1.25 MG/1
50000 CAPSULE ORAL
Qty: 12 CAPSULE | Refills: 1 | Status: SHIPPED | OUTPATIENT
Start: 2019-09-12 | End: 2020-06-28 | Stop reason: SDUPTHER

## 2019-09-12 RX ORDER — MUPIROCIN 20 MG/G
OINTMENT TOPICAL 3 TIMES DAILY
Qty: 15 G | Refills: 1 | Status: SHIPPED | OUTPATIENT
Start: 2019-09-12 | End: 2021-03-26

## 2019-09-12 NOTE — PATIENT INSTRUCTIONS
Your weight and BMI are elevated today.  Target BMI is less than 25.  Please start or countinue diet changes and exercise.     Your blood pressure was good.    I will order routine fasting labs today - at least 9 hours of fasting.    We will give you the Flu Vaccine today.  Reconsider the Tdap vaccine.  I recommend getting on a waiting list at your local pharmacy for the Shingles vaccine (Shingrix) is interested.    We will send you home with the FIT stool test.     I will give you Bactroban to use three times a day.  I will check Herpes labs.  Let me know if it is not improving.    Return in 3 month- sooner if needed.  Please come at least 15-20 minutes before your scheduled appointment time.

## 2019-09-12 NOTE — PROGRESS NOTES
"Patient was given vaccine information sheet for the Flu Vaccine. The area of injection was palpated using the acromion process as a landmark. This area was cleaned with alcohol. Using a 25g 1" safety needle, 0.5mL of the vaccine was placed into the left muscle. The injection site was dressed with a bandage. Patient experienced no complications and was discharged in stable condition. Fluzone High Dose vaccine Lot: [id number] Exp: 02/18/2020.  Laisha Buckley LPN      "

## 2019-09-13 ENCOUNTER — TELEPHONE (OUTPATIENT)
Dept: PRIMARY CARE CLINIC | Facility: CLINIC | Age: 74
End: 2019-09-13

## 2019-09-13 LAB
HSV1 IGG SERPL QL IA: NEGATIVE
HSV2 IGG SERPL QL IA: POSITIVE

## 2019-09-13 NOTE — TELEPHONE ENCOUNTER
----- Message from Jethro Ortiz MD sent at 9/12/2019  7:28 PM CDT -----  Please let patient know that the following:  I have reviewed your recent labs and have the following recommendations:    Your Metabolic Panel (kidney function and liver function) still shows a very mild elevation in one of you liver numbers.  I would like to discuss further evaluation of this when you return for follow up.    Your Blood Counts show normal white blood count and hemoglobin level.  Your Platelet count is still low, but stable.    Your vitamin D level was low. I will prescribe Vitamin D2 at 50,000 units to take once a week for 8 weeks, then change to over the counter Vitamin D3 at 2000 units once a day. We will follow the levels periodically.    Your Diabetic test (A1C) was normal at 5.4.    Your Cholesterol panel was overall good - your Total cholesterol is normal, your HDL (good cholesterol) is a little too low and LDL (bad cholesterol) are normal.    Please let me know if you have any questions or concerns.

## 2019-09-13 NOTE — TELEPHONE ENCOUNTER
Pt verbalized understanding that her Metabolic Panel (kidney function and liver function) still shows a very mild elevation in one of you liver numbers.  I would like to discuss further evaluation of this when you return for follow up.     Your Blood Counts show normal white blood count and hemoglobin level.  Your Platelet count is still low, but stable.     Your vitamin D level was low. I will prescribe Vitamin D2 at 50,000 units to take once a week for 8 weeks, then change to over the counter Vitamin D3 at 2000 units once a day. We will follow the levels periodically.     Your Diabetic test (A1C) was normal at 5.4.     Your Cholesterol panel was overall good - your Total cholesterol is normal, your HDL (good cholesterol) is a little too low and LDL (bad cholesterol) are normal.     Please let me know if you have any questions or concerns. 9/13/19 [email or web address]

## 2019-09-14 LAB — HSV AB, IGM BY EIA: NEGATIVE

## 2019-09-16 ENCOUNTER — TELEPHONE (OUTPATIENT)
Dept: PRIMARY CARE CLINIC | Facility: CLINIC | Age: 74
End: 2019-09-16

## 2019-09-16 NOTE — TELEPHONE ENCOUNTER
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  If she is still having the ulcer on her buttock area, I would like to treat this with Valtrex.  Please call patient and let me know.  Thanks.

## 2019-09-17 ENCOUNTER — LAB VISIT (OUTPATIENT)
Dept: LAB | Facility: HOSPITAL | Age: 74
End: 2019-09-17
Attending: INTERNAL MEDICINE
Payer: MEDICARE

## 2019-09-17 ENCOUNTER — PATIENT OUTREACH (OUTPATIENT)
Dept: ADMINISTRATIVE | Facility: HOSPITAL | Age: 74
End: 2019-09-17

## 2019-09-17 DIAGNOSIS — Z12.11 COLON CANCER SCREENING: ICD-10-CM

## 2019-09-17 PROCEDURE — 82274 ASSAY TEST FOR BLOOD FECAL: CPT

## 2019-09-18 ENCOUNTER — TELEPHONE (OUTPATIENT)
Dept: PRIMARY CARE CLINIC | Facility: CLINIC | Age: 74
End: 2019-09-18

## 2019-09-18 ENCOUNTER — TELEPHONE (OUTPATIENT)
Dept: RHEUMATOLOGY | Facility: CLINIC | Age: 74
End: 2019-09-18

## 2019-09-18 LAB — HEMOCCULT STL QL IA: NEGATIVE

## 2019-09-18 RX ORDER — VALACYCLOVIR HYDROCHLORIDE 1 G/1
1000 TABLET, FILM COATED ORAL EVERY 12 HOURS
Qty: 14 TABLET | Refills: 0 | Status: SHIPPED | OUTPATIENT
Start: 2019-09-18 | End: 2019-12-12

## 2019-09-18 NOTE — TELEPHONE ENCOUNTER
----- Message from Lizzy Adolfo sent at 9/17/2019  4:35 PM CDT -----  Contact: SARAY WISE   Name of Who is Calling: SARAY WISE       What is the request in detail: Patient is requesting a call back. She states that she would like to discuss her test results.       Can the clinic reply by [facility]: No      What Number to Call Back if not in [facility]:  [phone]

## 2019-09-18 NOTE — TELEPHONE ENCOUNTER
----- Message from Rosy Mike sent at 9/18/2019  4:34 PM CDT -----  Contact: Patient   Patient called in regards to speaking with Dr. Agrawal about lab results ,          Patient call back [phone]

## 2019-09-18 NOTE — TELEPHONE ENCOUNTER
Pt states she haven't had sex in over 20 years, not sure how she can get herpes simplex 2. Please advise 9/18/19 doyle

## 2019-09-18 NOTE — TELEPHONE ENCOUNTER
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  If she is still having the ulcer on her buttock area, I would like to treat this with Valtrex.  Please call patient and let me know.  Thanks. 9/18/19 doyle

## 2019-09-18 NOTE — TELEPHONE ENCOUNTER
----- Message from Jethro Ortiz MD sent at 9/18/2019  9:39 AM CDT -----  Please let patient know that her FIT stool test is negative.

## 2019-09-19 ENCOUNTER — PATIENT OUTREACH (OUTPATIENT)
Dept: ADMINISTRATIVE | Facility: HOSPITAL | Age: 74
End: 2019-09-19

## 2019-09-19 NOTE — TELEPHONE ENCOUNTER
Still a lot of joint pain; hands, elbows, knees    Has not seen me in 6 months  Needs to come in to discuss treatment options  Can come on Friday Oct 4 at 1 pm ; please book appt

## 2019-09-24 ENCOUNTER — TELEPHONE (OUTPATIENT)
Dept: PRIMARY CARE CLINIC | Facility: CLINIC | Age: 74
End: 2019-09-24

## 2019-09-24 ENCOUNTER — PATIENT OUTREACH (OUTPATIENT)
Dept: ADMINISTRATIVE | Facility: HOSPITAL | Age: 74
End: 2019-09-24

## 2019-09-24 NOTE — TELEPHONE ENCOUNTER
----- Message from Kaveh Stein sent at 9/24/2019 10:48 AM CDT -----  Contact: SARAY WISE [[id number]]  Name of Who is Calling: SARAY WISE [[id number]]      What is the request in detail: Would like to inform Dr. Ortiz that she cannot get Valtrex over the counter and would need another prescription. If he things she needs it... Please advise      Can the clinic reply by [facility]: no      What Number to Call Back if not in [facility]: [phone]

## 2019-09-24 NOTE — TELEPHONE ENCOUNTER
Pt prescription was called into  her local pharmacy on 9/18/19.  Pt stated that she will pick it up tomorrow 9/24/19 doyle

## 2019-09-26 DIAGNOSIS — M05.79 RHEUMATOID ARTHRITIS INVOLVING MULTIPLE SITES WITH POSITIVE RHEUMATOID FACTOR: ICD-10-CM

## 2019-09-26 RX ORDER — PREDNISONE 5 MG/1
TABLET ORAL
Qty: 20 TABLET | Refills: 1 | Status: SHIPPED | OUTPATIENT
Start: 2019-09-26 | End: 2020-07-17 | Stop reason: SDUPTHER

## 2019-10-01 ENCOUNTER — PATIENT OUTREACH (OUTPATIENT)
Dept: ADMINISTRATIVE | Facility: OTHER | Age: 74
End: 2019-10-01

## 2019-10-04 ENCOUNTER — OFFICE VISIT (OUTPATIENT)
Dept: RHEUMATOLOGY | Facility: CLINIC | Age: 74
End: 2019-10-04
Payer: MEDICARE

## 2019-10-04 VITALS
SYSTOLIC BLOOD PRESSURE: 150 MMHG | WEIGHT: 281.06 LBS | HEART RATE: 84 BPM | HEIGHT: 70 IN | DIASTOLIC BLOOD PRESSURE: 73 MMHG | BODY MASS INDEX: 40.24 KG/M2

## 2019-10-04 DIAGNOSIS — C34.32 MALIGNANT NEOPLASM OF LOWER LOBE OF LEFT LUNG: Chronic | ICD-10-CM

## 2019-10-04 DIAGNOSIS — M05.79 RHEUMATOID ARTHRITIS INVOLVING MULTIPLE SITES WITH POSITIVE RHEUMATOID FACTOR: Primary | ICD-10-CM

## 2019-10-04 DIAGNOSIS — M79.7 FIBROMYALGIA: Chronic | ICD-10-CM

## 2019-10-04 DIAGNOSIS — Z79.899 HIGH RISK MEDICATION USE: Chronic | ICD-10-CM

## 2019-10-04 PROBLEM — R51.9 LEFT TEMPORAL HEADACHE: Status: RESOLVED | Noted: 2019-07-19 | Resolved: 2019-10-04

## 2019-10-04 PROBLEM — E86.0 DEHYDRATION: Status: RESOLVED | Noted: 2019-07-19 | Resolved: 2019-10-04

## 2019-10-04 PROCEDURE — 99999 PR PBB SHADOW E&M-EST. PATIENT-LVL III: ICD-10-PCS | Mod: PBBFAC,,, | Performed by: INTERNAL MEDICINE

## 2019-10-04 PROCEDURE — 99213 OFFICE O/P EST LOW 20 MIN: CPT | Mod: PBBFAC | Performed by: INTERNAL MEDICINE

## 2019-10-04 PROCEDURE — 99214 OFFICE O/P EST MOD 30 MIN: CPT | Mod: S$PBB,,, | Performed by: INTERNAL MEDICINE

## 2019-10-04 PROCEDURE — 99999 PR PBB SHADOW E&M-EST. PATIENT-LVL III: CPT | Mod: PBBFAC,,, | Performed by: INTERNAL MEDICINE

## 2019-10-04 PROCEDURE — 99214 PR OFFICE/OUTPT VISIT, EST, LEVL IV, 30-39 MIN: ICD-10-PCS | Mod: S$PBB,,, | Performed by: INTERNAL MEDICINE

## 2019-10-04 RX ORDER — FOLIC ACID 1 MG/1
2 TABLET ORAL DAILY
Qty: 180 TABLET | Refills: 3 | Status: SHIPPED | OUTPATIENT
Start: 2019-10-04 | End: 2021-02-02

## 2019-10-04 RX ORDER — METHOTREXATE 2.5 MG/1
12.5 TABLET ORAL
Qty: 65 TABLET | Refills: 0 | Status: SHIPPED | OUTPATIENT
Start: 2019-10-04 | End: 2019-12-31

## 2019-10-04 ASSESSMENT — ROUTINE ASSESSMENT OF PATIENT INDEX DATA (RAPID3)
MDHAQ FUNCTION SCORE: 1.5
PATIENT GLOBAL ASSESSMENT SCORE: 8
AM STIFFNESS SCORE: 0, NO
FATIGUE SCORE: 4
PSYCHOLOGICAL DISTRESS SCORE: 0
PAIN SCORE: 10
TOTAL RAPID3 SCORE: 7.66

## 2019-10-04 ASSESSMENT — CLINICAL DISEASE ACTIVITY INDEX (CDAI)
TOTAL_SCORE: 12
PATIENT_ASSESSMENT: 8
TENDER_JOINTS_COUNT: 2
PHYSICIAN_ASSESSMENT: 2
SWOLLEN_JOINTS_COUNT: 0

## 2019-10-04 NOTE — PROGRESS NOTES
"Subjective:       Patient ID: Yana Orellana is a 74 y.o. female.    Chief Complaint: Disease Management    HPI   Mod pos CCP/RF RA since 2009    MTX 2011 - present      Hx lung cancer dx Nov 2009, treated with surgery   Hx polio age 18; "mostly back and neck"    MTX back at 12.5 mg/d  Prednisone 5 mg many days  Folic acid 1 daily    A lot of pain in hands and shoulders; cannot reach over to night stand  Mostly hands and shoulders  Feet feel stiff in ankles and feet  R index is only finger that swells  Pain across MCP and wrists    Walking worse  Fell in July; problems with dizziness; PT on [facility] did Epley maneuvers that helped a little    Has a new great grandchild      Review of Systems   Constitutional: Negative for fatigue, fever and unexpected weight change.   HENT: Negative for mouth sores and trouble swallowing.         Dry mouth   Eyes: Negative for redness.        Dry eyes   Respiratory: Negative for cough and shortness of breath.    Cardiovascular: Negative for chest pain.   Gastrointestinal: Positive for diarrhea. Negative for abdominal pain and constipation.   Skin: Negative for rash.   Neurological: Negative for headaches.   Hematological: Negative for adenopathy. Does not bruise/bleed easily.   Psychiatric/Behavioral: Negative for sleep disturbance.         Objective:   BP (!) 150/73   Pulse 84   Ht 5' 10" (1.778 m)   Wt 127.5 kg (281 lb 1.4 oz)   LMP  (LMP Unknown)   BMI 40.33 kg/m²      Physical Exam   Constitutional: She is well-developed, well-nourished, and in no distress.   HENT:   Mouth/Throat: Oropharynx is clear and moist.   Eyes: Conjunctivae are normal.   Cardiovascular: Normal rate and regular rhythm.    Pulmonary/Chest: She has no wheezes. She has no rales.   Lymphadenopathy:     She has no cervical adenopathy.   Skin: No rash noted.     Musculoskeletal:   No synovitis  Tender shoulders           10/4/2019   Tender (VILLAFANA-28) 2 / 28    Swollen (VILLAFANA-28) 0 / 28    Provider Global " Assessment 20 (mm)   Patient Global Assessment 80 (mm)   ESR 37 (mm/hr)   CRP 9.3 (mg/L)   VILLAFANA-28 (ESR) 4.44 (Moderate disease activity)   VILLAFANA-28 (CRP) 3.71 (Moderate disease activity)     Lab Results   Component Value Date    SEDRATE 37 (H) 09/12/2019     Assessment:       1. Rheumatoid arthritis involving multiple sites with positive rheumatoid factor    2. High risk medication use    3. Malignant neoplasm of lower lobe of left lung    4. Fibromyalgia            Plan:       Problem List Items Addressed This Visit        Active Problems    Rheumatoid arthritis involving multiple sites with positive rheumatoid factor - Primary     She has moderate disease activity but no objective synovitis on exam on MTX monotherapy    I do not see enough synovitis to justify a biologic or JAKinib    Will cont MTX for now with careful monitoring         Relevant Medications    methotrexate 2.5 MG Tab    High risk medication use (Chronic)     Thrombocytopenia is stable  Trivial AST elevation         Relevant Medications    folic acid (FOLVITE) 1 MG tablet    Fibromyalgia (Chronic)     Intermittent pain          Hx Lung cancer, lower lobe (Chronic)     No evidence of disease           Lab in Dec   RTC me in Jan but not at 8 am

## 2019-10-04 NOTE — ASSESSMENT & PLAN NOTE
She has moderate disease activity but no objective synovitis on exam on MTX monotherapy    I do not see enough synovitis to justify a biologic or JAKinib    Will cont MTX for now with careful monitoring

## 2019-10-15 ENCOUNTER — PATIENT OUTREACH (OUTPATIENT)
Dept: ADMINISTRATIVE | Facility: OTHER | Age: 74
End: 2019-10-15

## 2019-10-15 ENCOUNTER — TELEPHONE (OUTPATIENT)
Dept: RHEUMATOLOGY | Facility: CLINIC | Age: 74
End: 2019-10-15

## 2019-10-15 DIAGNOSIS — M79.7 FIBROMYALGIA: Primary | Chronic | ICD-10-CM

## 2019-10-15 RX ORDER — DULOXETIN HYDROCHLORIDE 30 MG/1
30 CAPSULE, DELAYED RELEASE ORAL DAILY
Qty: 30 CAPSULE | Refills: 2 | Status: SHIPPED | OUTPATIENT
Start: 2019-10-15 | End: 2019-12-12

## 2019-10-15 NOTE — TELEPHONE ENCOUNTER
----- Message from Fatmata Vega sent at 10/15/2019  2:53 PM CDT -----  Contact: Pt. [phone]  The patient would like to speak to someone regarding the pain in her shoulders. Her left hand is also swollen. Please contact the patient to discuss further.

## 2019-10-15 NOTE — TELEPHONE ENCOUNTER
Phone  A lot of pain  Shoulder, mcp, r hand vs L hand  Taking 5 mg prednisone    Discussed option of Celebrex vs Cymbalta  She is afraid of celebrex because ibuprofen bothers her stomach a lot  She is willing to try cymbalta ; will start 30 mg/d

## 2019-10-15 NOTE — TELEPHONE ENCOUNTER
"Patient states she has been having arm pain and swollen pain for a couple of months, states the pain and swelling moves around.  Afraid to take prednisone she has because she feels "in a fog", asking to speak to you concerning this matter.  "

## 2019-10-16 ENCOUNTER — OFFICE VISIT (OUTPATIENT)
Dept: OTOLARYNGOLOGY | Facility: CLINIC | Age: 74
End: 2019-10-16
Payer: MEDICARE

## 2019-10-16 DIAGNOSIS — R42 VERTIGO: Primary | ICD-10-CM

## 2019-10-16 PROCEDURE — 99212 OFFICE O/P EST SF 10 MIN: CPT | Mod: PBBFAC | Performed by: NURSE PRACTITIONER

## 2019-10-16 PROCEDURE — 99213 PR OFFICE/OUTPT VISIT, EST, LEVL III, 20-29 MIN: ICD-10-PCS | Mod: S$PBB,ICN,, | Performed by: NURSE PRACTITIONER

## 2019-10-16 PROCEDURE — 99213 OFFICE O/P EST LOW 20 MIN: CPT | Mod: S$PBB,ICN,, | Performed by: NURSE PRACTITIONER

## 2019-10-16 PROCEDURE — 99999 PR PBB SHADOW E&M-EST. PATIENT-LVL II: CPT | Mod: PBBFAC,,, | Performed by: NURSE PRACTITIONER

## 2019-10-16 PROCEDURE — 99999 PR PBB SHADOW E&M-EST. PATIENT-LVL II: ICD-10-PCS | Mod: PBBFAC,,, | Performed by: NURSE PRACTITIONER

## 2019-10-16 NOTE — LETTER
October 16, 2019      Jethro Ortiz MD  [address] [address]           [facility] - Otorhinolaryngology  [address]  [place] [address]  Phone: [phone]  Fax: [phone]          Patient: Yana Orellana   MR Number: [id number]   YOB: 1945   Date of Visit: 10/16/2019       Dear Dr. Jethro Ortiz:    Thank you for referring Yana Orellana to me for evaluation. Attached you will find relevant portions of my assessment and plan of care.    If you have questions, please do not hesitate to call me. I look forward to following Yana Orellana along with you.    Sincerely,    Meenu Frank, NP    Enclosure  CC:  No Recipients    If you would like to receive this communication electronically, please contact [email or web address] or [phone] to request more information on [employer] access.    For providers and/or their staff who would like to refer a patient to [facility], please contact us through our one-stop-shop provider referral line, [facility], at [phone].    If you feel you have received this communication in error or would no longer like to receive these types of communications, please e-mail [email or web address]

## 2019-10-16 NOTE — PROGRESS NOTES
Subjective:      Yana Orellana is a 74 y.o. female who was referred to me by Dr. Jethro Ortiz in consultation for dizziness.    Ms. Orellana present today with vertigo that she describes as spinning. She states she was told she had BPPV by her PT who recommended she be evaluated. She states she had done epley maneuvers with PT with some benefit.  She states the dizziness is worse when she turns her head to the left.  She denies any change in hearing or vision. She denies tinnitus.    Past Medical History  She has a past medical history of Anemia, Anxiety, Cataract, Colon polyps, Fibromyalgia, Insomnia, Lung cancer, Pneumonia due to infectious organism, Post-polio syndrome, Rheumatoid arthritis(714.0), and Thyroid disease.    Past Surgical History  She has a past surgical history that includes Cholecystectomy; Tonsillectomy; Ovarian cyst removal; Hysterectomy; and Lung removal, partial.    Family History  Her family history includes Arrhythmia in her mother; Colon cancer in her maternal grandfather; Diabetes in her maternal grandmother; Lung cancer in her father and maternal aunt; Parkinsonism in her paternal grandmother.    Social History  She reports that she quit smoking about 10 years ago. Her smoking use included cigarettes. She has a 60.00 pack-year smoking history. She has never used smokeless tobacco. She reports that she does not drink alcohol or use drugs.    Allergies  She is allergic to demerol [meperidine]; gabapentin; mellaril-s; versed [midazolam]; vicodin [hydrocodone-acetaminophen]; and xanax [alprazolam].    Medications  She has a current medication list which includes the following prescription(s): chlorhexidine, duloxetine, ergocalciferol, folic acid, methotrexate, mupirocin, prednisone, diazepam, and valacyclovir.    Review of Systems   Constitutional: Negative for chills, fever and unexpected weight change.   HENT: Positive for hearing loss. Negative for congestion, ear discharge, ear  pain, facial swelling, postnasal drip, sinus pressure, sore throat, tinnitus and trouble swallowing.    Eyes: Negative for pain and visual disturbance.   Respiratory: Negative for apnea and shortness of breath.    Cardiovascular: Negative for chest pain and palpitations.   Gastrointestinal: Negative for abdominal pain and nausea.   Endocrine: Negative for cold intolerance and heat intolerance.   Musculoskeletal: Negative for joint swelling and neck stiffness.   Skin: Negative for color change and rash.   Neurological: Positive for dizziness. Negative for facial asymmetry and headaches.   Hematological: Negative for adenopathy. Does not bruise/bleed easily.   Psychiatric/Behavioral: Negative for agitation. The patient is not nervous/anxious.           Objective:     LMP  (LMP Unknown)      Constitutional:   Vital signs are normal. She appears well-developed and well-nourished.     Head:  Normocephalic and atraumatic.     Ears:    Right Ear: No lacerations. No drainage, swelling or tenderness. No foreign bodies. No mastoid tenderness. Tympanic membrane is not injected, not scarred, not perforated, not erythematous, not retracted and not bulging. Tympanic membrane mobility is normal. No middle ear effusion. No hemotympanum.   Left Ear: No lacerations. No drainage, swelling or tenderness. No foreign bodies. No mastoid tenderness. Tympanic membrane is not injected, not scarred, not perforated, not erythematous, not retracted and not bulging. Tympanic membrane mobility is normal.  No middle ear effusion. No hemotympanum.   Todd-hallpike test left: negative  [place]-hallpike test right: negative      Nose:  Nose normal including turbinates, nasal mucosa, sinuses and nasal septum.     Neck:  Neck normal without thyromegaly masses, asymmetry, normal tracheal structure, crepitus, and tenderness and no adenopathy.     Psychiatric:   She has a normal mood and affect.       Procedure    None        Data Reviewed    WBC (K/uL)   Date  Value   09/12/2019 4.24     Platelets (K/uL)   Date Value   09/12/2019 80 (L)      Creatinine (mg/dL)   Date Value   09/12/2019 0.6     TSH (uIU/mL)   Date Value   07/19/2019 2.764     Glucose (mg/dL)   Date Value   09/12/2019 139 (H)     Hemoglobin A1C (%)   Date Value   09/12/2019 5.4     CT head (7/19/19): negative for any significant findings.     Assessment:     1. Vertigo         Plan:     I had a long discussion with the patient regarding her condition and the further workup and management options.    She refused audiogram and VNG.  [place]-hallpike is negative but considering she has already started the left sided epley maneuvers, she may be improving.  I recommend she continue the left sided epley maneuvers 2-3 times daily for 2 weeks.  I recommend she follow up with me if not improved.    Follow up if symptoms worsen or fail to improve.

## 2019-10-21 ENCOUNTER — DOCUMENTATION ONLY (OUTPATIENT)
Dept: REHABILITATION | Facility: HOSPITAL | Age: 74
End: 2019-10-21

## 2019-10-21 DIAGNOSIS — R42 VERTIGO: ICD-10-CM

## 2019-10-21 DIAGNOSIS — R26.81 GAIT INSTABILITY: ICD-10-CM

## 2019-10-21 DIAGNOSIS — R29.898 WEAKNESS OF BOTH HIPS: ICD-10-CM

## 2019-10-21 DIAGNOSIS — R42 DIZZINESS: ICD-10-CM

## 2019-10-21 DIAGNOSIS — Z74.09 IMPAIRED FUNCTIONAL MOBILITY, BALANCE, GAIT, AND ENDURANCE: ICD-10-CM

## 2019-10-21 NOTE — PROGRESS NOTES
Outpatient Therapy Discharge Summary     Name: Yana Orellana  [facility] Number: [id number]    Therapy Diagnosis:   Encounter Diagnoses   Name Primary?    Vertigo     Dizziness     Gait instability     Impaired functional mobility, balance, gait, and endurance     Weakness of both hips      Physician:Jethro Ortiz MD     Physician Orders: PT Eval and Treat vestibular rehab  Medical Diagnosis from Referral: R42 (ICD-10-CM) - Dizziness and giddiness  Evaluation Date: 8/12/19  Authorization Period Expiration:12/31/19  Plan of Care Expiration: 11/4/19  Visit # / Visits authorized3/ 19    Date of Last visit: 9/4/19  Total Visits Received: 3  Cancelled Visits: choose not to be followed past last visit  No Show Visits: 0    Assessment    Goals: Pt was positive for L Todd Hallpike but less symptomatic with shorter time frame after 1st and even better after 2nd performance today.  Pt does have mild dizziness with side to sit - more of full head feeling.  Due to general conditioning and overall weakness with balance impairment and difficulty with transitions feel regular vestibular rehab is warranted however pt is choosing to address only  BPPV and may seek other care closer to her home    Goals not fully achieved - pt choose not to be followed for additional vestibular rehab and was to seek ENT referral    Discharge reason: Patient has not attended therapy since 9/4/19 and Patient requested discharge    Plan   This patient is discharged from Physical Therapy

## 2019-12-10 ENCOUNTER — PATIENT OUTREACH (OUTPATIENT)
Dept: ADMINISTRATIVE | Facility: HOSPITAL | Age: 74
End: 2019-12-10

## 2019-12-10 NOTE — PROGRESS NOTES
Subjective:       Patient ID: Yana Orellana is a 74 y.o. female with a PMH significant for RA, Post-polio Syndrome, Anxiety/Depression, Pneumonitis, PVCs, Anemia, History of Lung Cancer, Hypothyroidism, Obesity, Fibromyalgia who presents who was seen initially by me on 3/20/2018 for a hospital discharge (3/6/2018-3/9/2018) for Pneumonitis - treated with Azithromycin, Duonebs, Solumedrol.  She was here for follow up on 4/20/2018, 8/17/2018, 8/8/2019, and 9/12/2019.    Chief Complaint: Pre-diabetes and Hypothyroidism    HPI  Patient overall feeling better.  Going to PT and working on her balance.  Had her teeth done.  Still with dizziness.    Patient denies f/c, n/v/d.  No chest pain.  No abdominal pain or dysuria.  No headaches or change in vision.   Remaining ROS negative.    Review of Systems   Constitutional: Negative for appetite change, chills, diaphoresis, fatigue, fever and unexpected weight change.   HENT: Negative for ear pain, hearing loss, sinus pain, tinnitus, trouble swallowing and voice change.    Eyes: Negative for photophobia, pain and visual disturbance.   Respiratory: Negative for chest tightness, shortness of breath and wheezing.    Cardiovascular: Negative for chest pain, palpitations and leg swelling.   Gastrointestinal: Negative for abdominal pain, blood in stool, constipation, diarrhea, nausea and vomiting.   Endocrine: Negative for cold intolerance, heat intolerance, polydipsia, polyphagia and polyuria.   Genitourinary: Negative for decreased urine volume, difficulty urinating, dysuria, flank pain, hematuria, pelvic pain, vaginal bleeding, vaginal discharge and vaginal pain.   Musculoskeletal: Negative for arthralgias, gait problem, joint swelling, myalgias and neck pain.   Skin: Negative for rash.   Neurological: Negative for dizziness, tremors, syncope, weakness, numbness and headaches.   Hematological: Does not bruise/bleed easily.   Psychiatric/Behavioral: Negative for agitation,  confusion and sleep disturbance. The patient is not nervous/anxious.        Objective:      Physical Exam   Constitutional: She is oriented to person, place, and time. She appears well-nourished. No distress.   obese   HENT:   Head: Normocephalic and atraumatic.   Nose: Nose normal.   Mouth/Throat: Oropharynx is clear and moist.   Eyes: Pupils are equal, round, and reactive to light. Conjunctivae and EOM are normal. No scleral icterus.   Neck: Normal range of motion. Neck supple. No JVD present. No thyromegaly present.   Cardiovascular: Normal rate, regular rhythm and intact distal pulses. Exam reveals no gallop and no friction rub.   No murmur heard.  Pulmonary/Chest: Effort normal and breath sounds normal. No respiratory distress. She has no wheezes. She has no rales.   Abdominal: Soft. Bowel sounds are normal. She exhibits no distension. There is no tenderness. There is no rebound and no guarding.   Musculoskeletal: Normal range of motion. She exhibits no edema.   Lymphadenopathy:     She has no cervical adenopathy.   Neurological: She is alert and oriented to person, place, and time. No cranial nerve deficit or sensory deficit.   Skin: Skin is warm and dry. No rash noted. No erythema.   Psychiatric: She has a normal mood and affect. Her behavior is normal. Thought content normal.       Assessment:       1. Hypothyroidism, unspecified type    2. Morbid obesity    3. Pre-diabetes    4. Fibromyalgia    5. Rheumatoid arthritis involving multiple sites with positive rheumatoid factor    6. Xerostomia    7. Insomnia, unspecified type    8. Vitamin D insufficiency    9. Malignant neoplasm of lower lobe of left lung    10. Depression, unspecified depression type    11. Post-polio syndrome    12. Neuropathic pain of foot, unspecified laterality    13. Elevated LFTs        Plan:   -Today's Visit - patient is awake and alert.    ================================  -Last Visit in 9/2019 - Having left ear pressure and wants to  "see ENT.  Having improvement in Vertigo with PT.      Has a left buttock ulceration - no vesicles.  Clean based.  Will give topical Bactroban and check HSV 1/2 serology for now.    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  ------------------------------------------  -Hospital Discharge Follow up - Patient was admitted at [facility] from 7/18/2019-7/21/2019 (Observation) - "Patient admitted for lightheadedness/generalized weakness. Found to have UTI. 0/4SIRS. UCx shows multiple organisms, none in predominance. Completed 3day course CTX in house. Additionally, she complained of L sided temporal headache associated with some jaw pain and eye pain. ESR/CRP WNL, optho was consulted for further assistance. Optho evaluated patient, no signs of temporal arteritis, follow up outpatient. PTOT consulted, recommend [facility]. Stable for d/c with PCP follow up. Return precautions discussed".     No urinary symptoms today.      -Neuro - Having dizziness - she does not vertigo symptoms - if she moves or bends down, she gets a weird sensation that she is going to fall.   Gets "strobe lights".  This seems more vertigo to me.   Unable to perform [place]-Hallpike maneuver in the office.  Orthostatic negative.  Will refer for vestibular rehab and if does not improve, the Neuro.  As above, recent CTH negative.    Has Neuro appointment in 2/2020.    -Follow up in 4 weeks for follow up  --------------------------------------------------------  -Recent Hospital ED Visit - Patient was seen in the ED on 6/14/2019 at [facility] - "Urgent evaluation of a 74 y.o. female presenting to the emergency department complaining of dizziness and exertional shortness of breath with associated chest tightness x1 month. Patient is afebrile, nontoxic appearing and hemodynamically stable.  Triage vital signs reveal tachycardia with mild tachypnea.  My exam, patient is not tachycardic but she is mildly tachypneic.  Auscultation of lungs reveal no wheezes, rales or " "rhonchi with slightly decreased air movement throughout.  Medical records reviewed, pt's recent admission on March 2018 for similar complaints. Admitted for pneumonitis and hypoxia with ambulation.   ED Management:  CBC reveals no leukocytosis.  Chemistry reveals hyperglycemia of 216.  BNP is normal. TSH is normal. Troponin is less than 0.006.  Urinalysis reveals trace sugar.  Patient educated on this finding and states she just ate a high carbohydrate, sugary male prior to arrival.  CTA obtain reveals no PE.  Emphysema noted. There is a new, 4 mm right lower lobe nodule.  Repeat scan recommended at 12 months.  Patient educated on this finding.  Patient was given breathing treatment with some improvement here in the ED.  When she ambulated, her oxygen ranged from 95-97%.  She is asymptomatic at this time.  She told the nurse I do not feel short of breath, people just tell me I look short of breath.  Patient will be sent home with short steroid burst.  She was offered an albuterol inhaler but states that this does not work for her.  She is encouraged to follow up with pulmonology or return here for new worsening symptoms".  --------------------------------------------------------  -Recent Hospital Admission - (3/6/2018-3/9/2018) for Pneumonitis - treated with Azithromycin, Duonebs, Solumedrol.  -------------------------------------------------------  -Nutrition - Obesity - BMI 41.76 in 4/2018.  Discussed diet modification and exercise.  She thinks when her dentures/teeth are fixed, she can change her diet to help support weight loss.  We discussed Bariatric Medicine and she declined. BMI in 8/2018 in 41.33.  BMI was 38.67 in 8/2019. BMI in 9/2019 was 38.67.  BMI is 40.89 today.    -Neuro - Post-polio Syndrome (polio at age 18) -  On Oxycodone (tapering down - 1/2 pill every few days) and Valium prn.  Followed by Rheumatologist as well.  She is concerned about her walking.    Has Neuro appointment on 2/6/2020.    -ID " "- Weekly Low-Grade Fevers in 8/2018 - last on prednisone about 6 months ago.  No recent travels in the past year.  As above, no symptoms.  On MTX with reduced dose lately.  I will send to ID for evaluation given her immunosuppression - not seen.  Resolved .  See above admission.    -Pulmonary - Pneumonitis in 3/2018 and history of Lung Cancer - CTA Chest in 3/2018 showed no PE, patchy multifocal GGO and diffuse centrolobular emphysema.  S/p Azithromycin and Duonebs.  S/p Solumdrol.      For Lung Cancer (11/2009 - [id number] Stage IIA), S/p LLL lobectomy and completed 3/4 planned cycles of chemo.  Last seen by Oncology in 2/2015 (was to return in a year).   I will reschedule.      Followed up with Pulmonary - had appointment 3/22/2018 with Dr. Ari Dumont ([facility]).  Now off oxygen and Flovent stopped.  Breathing stable.    -Rheum - RA (2009 - moderate positive CCP/RF) and Fibromyalgia - On Methotrexate since 2011.  On prn  Prednisone.  On Folic Acid.  Followed by Rheum and seen 7/11/2018 - MTX reduced to 15mg/week given increased AST.      Seen by Rheum on 3/18/2019.  May change off MTX.  Last seen 10/4/2019 - "She has moderate disease activity but no objective synovitis on exam on MTX monotherapy  I do not see enough synovitis to justify a biologic or JAKinib  Will cont MTX for now with careful monitoring".    Has follow up on 2/3/2020.    Followed by Physical Medicine and last seen in 5/2019.    -Endocrine - Hyothyroidism - off Levothyroxine and Liothyronine for about a year. TSH, fT4 and T3 all normal in 3/2018 - recommended to follow off medication for now.   Repeat TFTs in 8/2018 normal.  She thinks maybe she was treated for subclinical hypothyroidism in past.  TSH normal in 7/2019.    Had Endocrine follow up on 4/30/2018 - recommended Selenium to try to decrease thyroid antibodies.    Vitamin D Insufficient - Level was 15 in 4/2018 and recommended D3 at 2000 units daily.  Repeat level was 12 in 8/2018 and 13 " in 9/2019 - prescribed high dose weekly D2.    Pre-Diabetes - A1C was 6.0 in 3/2018 - consider Metformin.  Repeat A1C in 8/2018 was 5.7.  On chronic low-dose prednisone which may be contributing.   A1C in 9/2019 was normal.    -Heme - Thrombocytopenia - Plts 105 on 3/9/2018.  Was 134 in 4/2017. Will follow closely for now.  Repeat CBC in 10/2018 with Plts of 107.  Last CBC in 7/2019 with Plts of 62.  Repeat CBC in 9/2019 with Plts 80.    -Ortho - Fracture of Left Elbow in October 2017 and healed now.    -Cards - HLD - Lipids in 3/2018 with  (HDL low at 33).  Follow for now.  BP last visit stable.  Repeat lipids in 9/2019 with , , HDL 35, LDL 85.6.  10 year CHD risk is 11.8% - discussed statin.    BP in 9/2019 was stable at 130/78.  BP today is normal.    -GYN - S/p Hysterectomy.  S/p Ovarian Cyst Removal.      Last Mammo negative in 9/2015 and was rescheduled in 4/2018, but not yet done - wishes to wait until after her dental work is done.  Order in 9/2019 and not yet done.     We discussed DXA screening for osteoporosis - DXA in 12/2013 was normal. Will repeat - ordered in 9/2019 and not yet done.    -GI - Colon Polyps - last colonoscopy in 8/2012 - polyps in sigmoid, proximal ascending colon, ascending colon, and cecum.  Recommended repeat in 3 years - she still does not want follow up at this time.    Agrees to FIT - negative in 9/2019.    Elevated AST of 75 in 3/6/2018 (normal ALT, Alk phos, and Bili).  Has been elevated since at least 1/2017.  Check Hepatitis serologies - HCV Ab negative in 2009 and Hep B cAb/sAb/sAg negative in 8/2018.  GGT and CPK all normal in 3/2018.  CMP in 7/2019 with AST 64.  Consider imaging - order today.      -Dental - plans to have full dental implants - done    -HCM - We discussed Flu (HD 9/12/2019) and Tdap (last was 2004 and at this time doesn't want to get one now - has had whelps in the past) vaccinations.  We discussed Shingles (did not have due to cost) and  Pneumococcal (25 had twice and had Prevnar 13 in 5/2016) vaccinations.      -Follow up in 6 months

## 2019-12-12 ENCOUNTER — OFFICE VISIT (OUTPATIENT)
Dept: PRIMARY CARE CLINIC | Facility: CLINIC | Age: 74
End: 2019-12-12
Payer: MEDICARE

## 2019-12-12 VITALS
SYSTOLIC BLOOD PRESSURE: 120 MMHG | HEIGHT: 70 IN | DIASTOLIC BLOOD PRESSURE: 80 MMHG | HEART RATE: 77 BPM | BODY MASS INDEX: 40.79 KG/M2 | WEIGHT: 284.94 LBS

## 2019-12-12 DIAGNOSIS — K11.7 XEROSTOMIA: Chronic | ICD-10-CM

## 2019-12-12 DIAGNOSIS — R79.89 ELEVATED LFTS: ICD-10-CM

## 2019-12-12 DIAGNOSIS — F32.A DEPRESSION, UNSPECIFIED DEPRESSION TYPE: ICD-10-CM

## 2019-12-12 DIAGNOSIS — R73.03 PRE-DIABETES: ICD-10-CM

## 2019-12-12 DIAGNOSIS — M05.79 RHEUMATOID ARTHRITIS INVOLVING MULTIPLE SITES WITH POSITIVE RHEUMATOID FACTOR: ICD-10-CM

## 2019-12-12 DIAGNOSIS — G14 POST-POLIO SYNDROME: ICD-10-CM

## 2019-12-12 DIAGNOSIS — G47.00 INSOMNIA, UNSPECIFIED TYPE: ICD-10-CM

## 2019-12-12 DIAGNOSIS — M79.2 NEUROPATHIC PAIN OF FOOT, UNSPECIFIED LATERALITY: Chronic | ICD-10-CM

## 2019-12-12 DIAGNOSIS — E66.01 MORBID OBESITY: ICD-10-CM

## 2019-12-12 DIAGNOSIS — E55.9 VITAMIN D INSUFFICIENCY: ICD-10-CM

## 2019-12-12 DIAGNOSIS — E03.9 HYPOTHYROIDISM, UNSPECIFIED TYPE: Primary | ICD-10-CM

## 2019-12-12 DIAGNOSIS — C34.32 MALIGNANT NEOPLASM OF LOWER LOBE OF LEFT LUNG: Chronic | ICD-10-CM

## 2019-12-12 DIAGNOSIS — M79.7 FIBROMYALGIA: Chronic | ICD-10-CM

## 2019-12-12 PROCEDURE — 99214 OFFICE O/P EST MOD 30 MIN: CPT | Mod: PBBFAC,PN | Performed by: INTERNAL MEDICINE

## 2019-12-12 PROCEDURE — 99999 PR PBB SHADOW E&M-EST. PATIENT-LVL IV: CPT | Mod: PBBFAC,,, | Performed by: INTERNAL MEDICINE

## 2019-12-12 PROCEDURE — 1159F PR MEDICATION LIST DOCUMENTED IN MEDICAL RECORD: ICD-10-PCS | Mod: ,,, | Performed by: INTERNAL MEDICINE

## 2019-12-12 PROCEDURE — 99214 OFFICE O/P EST MOD 30 MIN: CPT | Mod: S$PBB,,, | Performed by: INTERNAL MEDICINE

## 2019-12-12 PROCEDURE — 1159F MED LIST DOCD IN RCRD: CPT | Mod: ,,, | Performed by: INTERNAL MEDICINE

## 2019-12-12 PROCEDURE — 99214 PR OFFICE/OUTPT VISIT, EST, LEVL IV, 30-39 MIN: ICD-10-PCS | Mod: S$PBB,,, | Performed by: INTERNAL MEDICINE

## 2019-12-12 PROCEDURE — 1126F AMNT PAIN NOTED NONE PRSNT: CPT | Mod: ,,, | Performed by: INTERNAL MEDICINE

## 2019-12-12 PROCEDURE — 99999 PR PBB SHADOW E&M-EST. PATIENT-LVL IV: ICD-10-PCS | Mod: PBBFAC,,, | Performed by: INTERNAL MEDICINE

## 2019-12-12 PROCEDURE — 1126F PR PAIN SEVERITY QUANTIFIED, NO PAIN PRESENT: ICD-10-PCS | Mod: ,,, | Performed by: INTERNAL MEDICINE

## 2019-12-12 NOTE — PATIENT INSTRUCTIONS
Your exam was overall normal today.    Your blood pressure was good.    I will order an Ultrasound to evaluate your liver further.    I will refer you to Oncology.    I will order repeat Thyroid and Diabetes labs (TSH and A1C).    Return in 6 months - sooner if needed.  Please come at least 15-20 minutes before your scheduled appointment time.

## 2019-12-13 ENCOUNTER — TELEPHONE (OUTPATIENT)
Dept: HEMATOLOGY/ONCOLOGY | Facility: CLINIC | Age: 74
End: 2019-12-13

## 2019-12-13 DIAGNOSIS — C34.32 MALIGNANT NEOPLASM OF LOWER LOBE OF LEFT LUNG: Primary | Chronic | ICD-10-CM

## 2019-12-13 NOTE — NURSING
Received referral for pt to see Oncologist.  Pt has a history of lung cancer and was previously seen by Dr. Ahuja.  Spoke with pt and would prefer to have appointments scheduled after the first of the year.  Agreeable with scheduling with Dr. Jarrett on 1/6.  Requesting to have scheduled labs for 1/7 moved to 1/6.  Informed pt that additional test/imaging maybe needed before seeing Dr. Jarrett.  Requesting any appointments to be scheduled aft 12noon.

## 2019-12-30 DIAGNOSIS — M05.79 RHEUMATOID ARTHRITIS INVOLVING MULTIPLE SITES WITH POSITIVE RHEUMATOID FACTOR: ICD-10-CM

## 2019-12-31 RX ORDER — METHOTREXATE 2.5 MG/1
TABLET ORAL
Qty: 65 TABLET | Refills: 0 | Status: SHIPPED | OUTPATIENT
Start: 2019-12-31 | End: 2020-03-12 | Stop reason: SDUPTHER

## 2020-01-02 DIAGNOSIS — M05.79 RHEUMATOID ARTHRITIS INVOLVING MULTIPLE SITES WITH POSITIVE RHEUMATOID FACTOR: ICD-10-CM

## 2020-01-02 RX ORDER — METHOTREXATE 2.5 MG/1
TABLET ORAL
Qty: 65 TABLET | Refills: 0 | OUTPATIENT
Start: 2020-01-02

## 2020-01-06 ENCOUNTER — HOSPITAL ENCOUNTER (OUTPATIENT)
Dept: RADIOLOGY | Facility: HOSPITAL | Age: 75
Discharge: HOME | End: 2020-01-06
Attending: STUDENT IN AN ORGANIZED HEALTH CARE EDUCATION/TRAINING PROGRAM
Payer: MEDICARE

## 2020-01-06 ENCOUNTER — INITIAL CONSULT (OUTPATIENT)
Dept: HEMATOLOGY/ONCOLOGY | Facility: CLINIC | Age: 75
End: 2020-01-06
Payer: MEDICARE

## 2020-01-06 VITALS
TEMPERATURE: 98 F | SYSTOLIC BLOOD PRESSURE: 145 MMHG | RESPIRATION RATE: 20 BRPM | HEART RATE: 79 BPM | OXYGEN SATURATION: 95 % | DIASTOLIC BLOOD PRESSURE: 65 MMHG

## 2020-01-06 DIAGNOSIS — C34.32 MALIGNANT NEOPLASM OF LOWER LOBE OF LEFT LUNG: ICD-10-CM

## 2020-01-06 DIAGNOSIS — C34.32 MALIGNANT NEOPLASM OF LOWER LOBE OF LEFT LUNG: Primary | Chronic | ICD-10-CM

## 2020-01-06 PROCEDURE — 99213 OFFICE O/P EST LOW 20 MIN: CPT | Mod: PBBFAC,25 | Performed by: STUDENT IN AN ORGANIZED HEALTH CARE EDUCATION/TRAINING PROGRAM

## 2020-01-06 PROCEDURE — 71250 CT CHEST WITHOUT CONTRAST: ICD-10-PCS | Mod: 26,,, | Performed by: RADIOLOGY

## 2020-01-06 PROCEDURE — 71250 CT THORAX DX C-: CPT | Mod: 26,,, | Performed by: RADIOLOGY

## 2020-01-06 PROCEDURE — 99999 PR PBB SHADOW E&M-EST. PATIENT-LVL III: ICD-10-PCS | Mod: PBBFAC,GC,, | Performed by: STUDENT IN AN ORGANIZED HEALTH CARE EDUCATION/TRAINING PROGRAM

## 2020-01-06 PROCEDURE — 99203 OFFICE O/P NEW LOW 30 MIN: CPT | Mod: S$PBB,GC,, | Performed by: STUDENT IN AN ORGANIZED HEALTH CARE EDUCATION/TRAINING PROGRAM

## 2020-01-06 PROCEDURE — 1125F PR PAIN SEVERITY QUANTIFIED, PAIN PRESENT: ICD-10-PCS | Mod: GC,,, | Performed by: STUDENT IN AN ORGANIZED HEALTH CARE EDUCATION/TRAINING PROGRAM

## 2020-01-06 PROCEDURE — 1125F AMNT PAIN NOTED PAIN PRSNT: CPT | Mod: GC,,, | Performed by: STUDENT IN AN ORGANIZED HEALTH CARE EDUCATION/TRAINING PROGRAM

## 2020-01-06 PROCEDURE — 99999 PR PBB SHADOW E&M-EST. PATIENT-LVL III: CPT | Mod: PBBFAC,GC,, | Performed by: STUDENT IN AN ORGANIZED HEALTH CARE EDUCATION/TRAINING PROGRAM

## 2020-01-06 PROCEDURE — 99203 PR OFFICE/OUTPT VISIT, NEW, LEVL III, 30-44 MIN: ICD-10-PCS | Mod: S$PBB,GC,, | Performed by: STUDENT IN AN ORGANIZED HEALTH CARE EDUCATION/TRAINING PROGRAM

## 2020-01-06 PROCEDURE — 1159F PR MEDICATION LIST DOCUMENTED IN MEDICAL RECORD: ICD-10-PCS | Mod: GC,,, | Performed by: STUDENT IN AN ORGANIZED HEALTH CARE EDUCATION/TRAINING PROGRAM

## 2020-01-06 PROCEDURE — 1159F MED LIST DOCD IN RCRD: CPT | Mod: GC,,, | Performed by: STUDENT IN AN ORGANIZED HEALTH CARE EDUCATION/TRAINING PROGRAM

## 2020-01-06 PROCEDURE — 71250 CT THORAX DX C-: CPT | Mod: TC

## 2020-01-06 NOTE — PROGRESS NOTES
Consult Note    Consults  SUBJECTIVE:     History of Present Illness:  Ms. Orellana is a 68-year-old female with  RA (on methotrexate and breakhrough pred), left lung CA (in remission greater than 10 years, T2b N0 M0 stage IIA, status post lobectomy in December 2009 at [facility] and 3/4 cycles of gem/cis at [facility]), hypothyroidism, fibromyalgia and history of polio syndrome (intially dx at age 18 and mostly in back/neck region) who is being seen today for followup due to prior lung cancer history. She currently denies any dyspnea or coughing; pt states that these symptoms did not occur during her first diagnosis. The pt has quit smoking in 2009.     Pt went to ER in 06/2019 due to dizziness, which pt describes more of as unsteadiness, for the past month that occurring with sitting to standing and perfoming daily ADLs. Denies any headaches or nausea/vomiting. Pt was also having exertional shortenss of breath and chest tightness during that ER visit.  However, the chest pain and dyspnea have resolved; she still has some dizziness but is working with PT/rehab currently. CT scan of brain completed in July did not reveal any abnormality at that time.          Review of patient's allergies indicates:   Allergen Reactions    Ibuprofen Nausea Only    Demerol [meperidine]      Euphoria    Gabapentin Other (See Comments)     Hallucinations    Mellaril-s Nausea Only    Versed [midazolam]      Excessive talking, hyper    Vicodin [hydrocodone-acetaminophen]      Joint pain, muscle pain    Xanax [alprazolam]      fatigue     Past Medical History:   Diagnosis Date    Anemia     Anxiety     Cataract     Colon polyps 2012    Fibromyalgia 10/15/2012    Insomnia 3/28/2014    Lung cancer     Pneumonia due to infectious organism 7/19/2019    Post-polio syndrome     Rheumatoid arthritis(714.0) 7/16/2012    2009 onset with mod positive CCP and RF MTX 2011 - present     Thyroid disease      Past Surgical History:    Procedure Laterality Date    CHOLECYSTECTOMY      HYSTERECTOMY      LUNG REMOVAL, PARTIAL      left lower lobectomy    OVARIAN CYST REMOVAL      TONSILLECTOMY       Family History   Problem Relation Age of Onset    Lung cancer Father     Lung cancer Maternal Aunt     Diabetes Maternal Grandmother     Colon cancer Maternal Grandfather     Parkinsonism Paternal Grandmother     Arrhythmia Mother     Hypertension Neg Hx     Heart attack Neg Hx      Social History     Tobacco Use    Smoking status: Former Smoker     Packs/day: 1.50     Years: 40.00     Pack years: 60.00     Types: Cigarettes     Last attempt to quit: 7/16/2009     Years since quitting: 10.4    Smokeless tobacco: Never Used   Substance Use Topics    Alcohol use: Not Currently    Drug use: No     Review of Systems   Constitutional: Negative for diaphoresis, fever and weight loss.   HENT: Negative for sore throat.    Eyes: Negative for double vision.   Respiratory: Negative for cough and shortness of breath.    Cardiovascular: Negative for chest pain and leg swelling.   Gastrointestinal: Negative for abdominal pain, constipation, diarrhea, nausea and vomiting.   Genitourinary: Negative for dysuria.   Musculoskeletal: Positive for joint pain.   Skin: Negative for rash.   Neurological: Negative for focal weakness, loss of consciousness and headaches.     OBJECTIVE:     Vital Signs:       Physical Exam   Constitutional: She is oriented to person, place, and time. She appears well-developed. No distress.   Obese female   HENT:   Head: Normocephalic and atraumatic.   Mouth/Throat: No oropharyngeal exudate.   Eyes: Pupils are equal, round, and reactive to light. EOM are normal. No scleral icterus.   Neck: Neck supple.   Cardiovascular: Normal rate, regular rhythm and normal heart sounds. Exam reveals no gallop and no friction rub.   No murmur heard.  Pulmonary/Chest: Effort normal and breath sounds normal. No respiratory distress. She has no  wheezes. She has no rales.   Abdominal: Soft. Bowel sounds are normal. She exhibits no distension. There is no tenderness. There is no guarding.   Musculoskeletal: Normal range of motion. She exhibits no edema.   Lymphadenopathy:     She has no cervical adenopathy.   Neurological: She is alert and oriented to person, place, and time. No cranial nerve deficit.   Skin: Skin is warm and dry. No rash noted. She is not diaphoretic.     Laboratory:    No labs completed during this visit       Microbiology Results (last 7 days)     ** No results found for the last 168 hours. **        Specimen (12h ago, onward)    None            Diagnostic Results:    EXAMINATION:  CT CHEST WITHOUT CONTRAST    CLINICAL HISTORY:  f/u of lung nodule; Malignant neoplasm of lower lobe, left bronchus or lung    TECHNIQUE:  Low dose axial images, sagittal and coronal reformations were obtained from the thoracic inlet to the lung bases. Contrast was not administered.    COMPARISON:  Chest radiograph 07/19/2019    CTA chest 06/14/2019    CT chest without contrast 02/03/2015    FINDINGS:  Base of Neck: No significant abnormality.    Thoracic soft tissues: Normal.    Aorta: Left-sided aortic arch with normal three-vessel branching pattern.  Aorta maintains normal caliber, contour, and course.  Calcific atherosclerosis of the aortic arch extending into the arch branch vessels.  Calcifications the aortic annulus.  Calcific atherosclerosis present within the left circumflex and right coronary arteries.    Heart: Normal size. No pericardial fluid.    Pulmonary vasculature: Pulmonary arteries distribute normally.  There are four pulmonary veins.    Bertha/Mediastinum: No pathologic sandra enlargement.    Airways: Central airways are patent.    Lungs/Pleura: Lungs are symmetrically well expanded.  Extensive centrilobular and paraseptal emphysematous changes.  Stable lower lung zone predominant peripheral reticulations without evidence of honeycombing or  bronchiectasis.  Mild peripheral ground-glass attenuation associated with the reticulations.  This findings can be seen in the setting of NSIP, however this could be an unusual presentation of UIP.    Previously identified 3 mm nodule in the right upper lobe and 4 mm nodule in the right lower lobe are not identified on today's examination.    Esophagus: Normal.    Upper Abdomen: No abnormality of the partially imaged upper abdomen.    Bones: No acute fracture. No suspicious lytic or sclerotic lesions.  Degenerative changes of the spine are noted.      Impression       Centrilobular and paraseptal emphysema.    Reticulations are present bilaterally with peripheral and lower zone predominance.  We do not identify honeycombing or bronchiectasis.  Differential diagnosis includes NSIP and an unusual presentation of UIP.    Previously identified subcentimeter nodules are not evident on today's examination.    Electronically signed by resident: Franco Brower  Date: 01/06/2020  Time: 15:03    Electronically signed by: Una Baer MD  Date: 01/06/2020  Time: 15:51               CTA Chest Non-Coronary (PE Study) (Final result)  Result time 06/14/19 18:14:31               Final result by Bere Lara MD (06/14/19 18:14:31)                               Impression:        No pulmonary embolism found.     Emphysema.     4 mm new nodule right lower lobe, optional follow-up CT scan at 12 months recommended per Fleischner 2017.     Three-vessel coronary disease.        Electronically signed by:       Bere Lara  Date:                                                06/14/2019  Time:                                                18:14                         Narrative:     EXAMINATION:  CTA CHEST NON CORONARY     CLINICAL HISTORY:  Chest pain, acute, PE suspected, high pretest prob;     TECHNIQUE:  Low dose axial images, sagittal and coronal reformations were obtained from the thoracic inlet to the lung bases  following the IV administration of 100 mL of Omnipaque 350.  Contrast timing was optimized to evaluate the pulmonary arteries.  MIP images were performed.     COMPARISON:  02/03/2015     FINDINGS:  Mediastinum: The heart is normal size.  No pericardial effusion.  The thoracic aorta the visualized abdominal aorta normal in caliber.  The esophagus appears grossly normal.  No hilar mediastinal or axillary or supraclavicular adenopathy found.     Pulmonary arteries: Main pulmonary artery measures 2.5 cm and appears normal.  The bilateral pulmonary artery trunks and lobar pulmonary arteries appear normal.  The segmental pulmonary arteries are probably normal.  The subsegmental pulmonary arteries are not well demonstrated.     Abdomen: The visualized liver, spleen, pancreas, adrenal glands and kidneys appear normal.  No abdominal ascites.     Lungs: There is centrilobular and paraseptal emphysema with upper lung predominance.  No honeycombing or bronchiectasis or interstitial edema.  No gross endobronchial debris or fluid.  MIP images are reviewed.  There is a 3 mm nodule right mid lung axial image 183 unchanged from 02/03/2015.  There is a 4 mm subpleural nodule right lower lobe axial image 208 which appears to be new from 02/03/2015.  Follow-up CT scan is optional at 12 months per Jonellener 2017.     Skeleton: No acute rib fracture found.  No compression deformity or subluxation of the visualized spine.  Sternum appears intact.                     ASSESSMENT/PLAN:     Plan:     Hx of Lung Cancer   - staging is T2b N0 M0 stage IIA, status post lobectomy in December 2009 at [facility]  - completed 3/4 planned cycles of chemotherapy, gem + cis at [facility]  - repeat CT Chest on 1/6/20 reveals emphysema and reticulation bilaterally with peripheral and lower zone predominance; with differential of NSIP and unusual presentation of UIP  - no clinical sign of PNA and so would not tx at this time  - pt has no signs of lung  cancer and without current nodules, she does not need any CT chest in future unless pulmonary symptoms occur at that time    Discussed with pt to continue following up with PCP at this time.     Discussed with Dr. Ahuja.    Lan Jarrett MD  Hematology/Oncology Fellow, PGY V  [facility]    STAFF NOTE:  I have personally taken the history and examined this patient and agree with Dr. Jarrett's Note as stated above.

## 2020-01-10 ENCOUNTER — HOSPITAL ENCOUNTER (OUTPATIENT)
Dept: RADIOLOGY | Facility: OTHER | Age: 75
Discharge: HOME | End: 2020-01-10
Attending: INTERNAL MEDICINE
Payer: MEDICARE

## 2020-01-10 DIAGNOSIS — R79.89 ELEVATED LFTS: ICD-10-CM

## 2020-01-10 PROCEDURE — 76705 ECHO EXAM OF ABDOMEN: CPT | Mod: TC

## 2020-01-10 PROCEDURE — 76705 US ABDOMEN LIMITED: ICD-10-PCS | Mod: 26,,, | Performed by: INTERNAL MEDICINE

## 2020-01-10 PROCEDURE — 76705 ECHO EXAM OF ABDOMEN: CPT | Mod: 26,,, | Performed by: INTERNAL MEDICINE

## 2020-01-14 ENCOUNTER — TELEPHONE (OUTPATIENT)
Dept: INTERNAL MEDICINE | Facility: CLINIC | Age: 75
End: 2020-01-14

## 2020-01-14 NOTE — TELEPHONE ENCOUNTER
Unable to lvm     Unable to leave voice mail and pt stated on voice mail to do not leave a message because hse will not call back

## 2020-01-14 NOTE — TELEPHONE ENCOUNTER
This is a former Dr. Ortiz patient. Please call patient and inform that her ultrasound shows that she has fatty liver.  Her liver enzymes have been relatively stable and this does not appear to be a new problem.  Fatty liver is mainly treated with weight loss, healthy diet and exercise.  If she drinks alcohol, cutting back on alcohol use can also help.  I recommend that she follow-up with her new primary care doctor for further management.  If she would like she can see Dr. Graf sooner than June.    thanks

## 2020-01-14 NOTE — TELEPHONE ENCOUNTER
----- Message from Laura Pruitt sent at 1/14/2020 11:07 AM CST -----  Contact: SARAY WISE [[id number]]  Name of Who is Calling:SARAY WISE [[id number]]       What is the request in detail: Pt is requesting a call back from clinical team in regard to  US test  Results. Please contact to further discuss and advise.          Can the clinic reply by [facility]:       What Number to Call Back if not in [facility]: [phone]

## 2020-01-15 ENCOUNTER — TELEPHONE (OUTPATIENT)
Dept: PRIMARY CARE CLINIC | Facility: CLINIC | Age: 75
End: 2020-01-15

## 2020-01-15 NOTE — TELEPHONE ENCOUNTER
Ms. Orellana states that she was diagnosed with underactive thyroid disease 19yrs ago then 2 years ago she was in the hospital with pneumonia and Dr. Ortiz told her that she did not need the medication bc she did not have thyroid problems anymore.

## 2020-01-15 NOTE — TELEPHONE ENCOUNTER
----- Message from Jacoby Hennessy, Patient Care Assistant sent at 1/15/2020  9:44 AM CST -----  Contact: SARAY WISE [[id number]]  Type:  Patient Returning Call    Who Called: SARAY WISE [[id number]]    Who Left Message for Patient: Alisha Vega    Does the patient know what this is regarding?:Yes    Best Call Back Number:[phone]    Additional Information: None

## 2020-01-15 NOTE — TELEPHONE ENCOUNTER
Pt states she knows she has a fatty liver  States she has a hx of RA   & takes methotrexate and does not drink alcohol   She knows she has fatty liver  And is dealing with post polio and is in physical therapy  Eats fairly healthy and cholesterol is good   Eat very little meat, eats healthy  Drinks some protein 20g and 1 g sugar     States she does not have thyroid problems and is not taking anything, it went away- hypothyroidism    Pt does not want to come in earlier than already scheduled June appt with Dr. Graf    Pt asked for test results  And U/S copy  Also mailing appt reminder bc she asked for info on future PCP

## 2020-01-15 NOTE — TELEPHONE ENCOUNTER
Please call patient and inform that her thyroid is slightly underactive. Has she ever taken thyroid medication in the past?  We could consider repeating the labs in a few months because many times it resolves or we can consider treatment if she has a history of hypothyroid.    thanks

## 2020-01-16 ENCOUNTER — TELEPHONE (OUTPATIENT)
Dept: INTERNAL MEDICINE | Facility: CLINIC | Age: 75
End: 2020-01-16

## 2020-01-16 NOTE — TELEPHONE ENCOUNTER
This is a prior patient of Dr. Ortiz.  Please let the patient know their lab results from 1/6/20 have been reviewed.  Her blood counts do not show any anemia, and her kidney function is normal.  Her liver and gall bladder test results are mildly abnormal, as well as her thyroid function testing.  I would recommend to discuss her thyroid results in more detail in clinic as a new medication may be necessary.  Otherwise, her diabetes screening test is negative.  Her ultrasound is showing fatty liver changes.  Please help the patient to schedule a visit with a new provider to establish care and to discuss these results in more detail.  Thank you.

## 2020-01-31 ENCOUNTER — PATIENT OUTREACH (OUTPATIENT)
Dept: ADMINISTRATIVE | Facility: OTHER | Age: 75
End: 2020-01-31

## 2020-02-03 ENCOUNTER — OFFICE VISIT (OUTPATIENT)
Dept: RHEUMATOLOGY | Facility: CLINIC | Age: 75
End: 2020-02-03
Payer: MEDICARE

## 2020-02-03 VITALS
HEIGHT: 70 IN | WEIGHT: 283.75 LBS | HEART RATE: 81 BPM | BODY MASS INDEX: 40.62 KG/M2 | SYSTOLIC BLOOD PRESSURE: 159 MMHG | DIASTOLIC BLOOD PRESSURE: 85 MMHG

## 2020-02-03 DIAGNOSIS — F41.9 ANXIETY: ICD-10-CM

## 2020-02-03 DIAGNOSIS — M79.7 FIBROMYALGIA: Chronic | ICD-10-CM

## 2020-02-03 DIAGNOSIS — M05.79 RHEUMATOID ARTHRITIS INVOLVING MULTIPLE SITES WITH POSITIVE RHEUMATOID FACTOR: Primary | ICD-10-CM

## 2020-02-03 DIAGNOSIS — D69.6 THROMBOCYTOPENIA: ICD-10-CM

## 2020-02-03 PROCEDURE — 99214 PR OFFICE/OUTPT VISIT, EST, LEVL IV, 30-39 MIN: ICD-10-PCS | Mod: S$PBB,,, | Performed by: INTERNAL MEDICINE

## 2020-02-03 PROCEDURE — 99999 PR PBB SHADOW E&M-EST. PATIENT-LVL III: CPT | Mod: PBBFAC,,, | Performed by: INTERNAL MEDICINE

## 2020-02-03 PROCEDURE — 99213 OFFICE O/P EST LOW 20 MIN: CPT | Mod: PBBFAC | Performed by: INTERNAL MEDICINE

## 2020-02-03 PROCEDURE — 99214 OFFICE O/P EST MOD 30 MIN: CPT | Mod: S$PBB,,, | Performed by: INTERNAL MEDICINE

## 2020-02-03 PROCEDURE — 99999 PR PBB SHADOW E&M-EST. PATIENT-LVL III: ICD-10-PCS | Mod: PBBFAC,,, | Performed by: INTERNAL MEDICINE

## 2020-02-03 RX ORDER — DIAZEPAM 5 MG/1
5 TABLET ORAL NIGHTLY PRN
Qty: 30 TABLET | Refills: 0 | Status: SHIPPED | OUTPATIENT
Start: 2020-02-03 | End: 2020-08-31 | Stop reason: SDUPTHER

## 2020-02-03 RX ORDER — OXYCODONE AND ACETAMINOPHEN 5; 325 MG/1; MG/1
1 TABLET ORAL
Qty: 20 TABLET | Refills: 0 | Status: SHIPPED | OUTPATIENT
Start: 2020-02-03 | End: 2020-05-03

## 2020-02-03 ASSESSMENT — ROUTINE ASSESSMENT OF PATIENT INDEX DATA (RAPID3)
MDHAQ FUNCTION SCORE: 1.3
PSYCHOLOGICAL DISTRESS SCORE: 0
TOTAL RAPID3 SCORE: 4.11
FATIGUE SCORE: 10
PATIENT GLOBAL ASSESSMENT SCORE: 5
PAIN SCORE: 3
AM STIFFNESS SCORE: 0, NO

## 2020-02-03 NOTE — ASSESSMENT & PLAN NOTE
Only one swollen MCP; she has pain of uncertain etiology and mild elevated ESR and CRP    Thrombocytopenia and fatty liver limits how much methotrexate she can take  Cannot tolerate NSAID    Will cont mtx for now; I would like to consider biologic but she is in-decisive

## 2020-02-03 NOTE — PROGRESS NOTES
"Subjective:       Patient ID: Yana Orellana is a 74 y.o. female.    Chief Complaint: Disease Management    HPI   Mod pos CCP/RF RA since 2009    MTX 2011 - present      Hx lung cancer dx Nov 2009, treated with surgery   Hx polio age 18; "mostly back and neck"     MTX back at 12.5 mg/d  Prednisone 5 mg many days  Folic acid 1 daily    Intermittent joint pain  Swelling hands and feet  L shoulder pain and neck ache    C/o fatigue, sleepiness, and unsteadiness  Hx normal sleep test in past  No fall since July      Review of Systems   Constitutional: Positive for fatigue. Negative for fever and unexpected weight change.   HENT: Negative for mouth sores and trouble swallowing.         Dry mouth   Eyes: Negative for redness.        Dry eyes   Respiratory: Negative for cough and shortness of breath.    Cardiovascular: Negative for chest pain.   Gastrointestinal: Positive for diarrhea. Negative for abdominal pain and constipation.   Skin: Negative for rash.   Neurological: Negative for headaches.   Hematological: Negative for adenopathy. Bruises/bleeds easily.   Psychiatric/Behavioral: Negative for sleep disturbance.         Objective:   BP (!) 159/85   Pulse 81   Ht 5' 10" (1.778 m)   Wt 128.7 kg (283 lb 11.7 oz)   LMP  (LMP Unknown)   BMI 40.71 kg/m²      Physical Exam   Constitutional: She is well-developed, well-nourished, and in no distress.   HENT:   Mouth/Throat: Oropharynx is clear and moist.   Eyes: Conjunctivae are normal.   Cardiovascular: Normal rate and regular rhythm.    Murmur heard.  Pulmonary/Chest: She has no wheezes. She has no rales.   Lymphadenopathy:     She has no cervical adenopathy.   Skin: No rash noted.     Musculoskeletal:   Tender neck            10/4/2019 2/3/2020   Tender (VILLAFANA-28) 2 / 28  0 / 28    Swollen (VILLAFANA-28) 0 / 28 1 / 28    Provider Global Assessment 20 (mm) 25 (mm)   Patient Global Assessment 80 (mm) 50 (mm)   ESR 37 (mm/hr) 43 (mm/hr)   CRP 9.3 (mg/L) 8.2 (mg/L)   VILLAFANA-28 " (ESR) 4.44 (Moderate disease activity) 3.61 (Moderate disease activity)   VILLAFANA-28 (CRP) 3.71 (Moderate disease activity) 2.74 (Low disease activity)     Lab Results   Component Value Date    SEDRATE 43 (H) 01/06/2020     Lab Results   Component Value Date    PLT 78 (L) 01/06/2020          Assessment:       1. Rheumatoid arthritis involving multiple sites with positive rheumatoid factor    2. Fibromyalgia    3. Thrombocytopenia    4. Anxiety            Plan:       Problem List Items Addressed This Visit        Active Problems    Fibromyalgia (Chronic)     Takes rare diazepam for muscle tension; 30 tab lasted 6 mo  Overall stable fibromyalgia         Rheumatoid arthritis involving multiple sites with positive rheumatoid factor - Primary     Only one swollen MCP; she has pain of uncertain etiology and mild elevated ESR and CRP    Thrombocytopenia and fatty liver limits how much methotrexate she can take  Cannot tolerate NSAID    Will cont mtx for now; I would like to consider biologic but she is in-decisive             Relevant Medications    oxyCODONE-acetaminophen (PERCOCET) 5-325 mg per tablet    Thrombocytopenia     Possibly due to MTX; stable level           Other Visit Diagnoses     Anxiety        Relevant Medications    diazePAM (VALIUM) 5 MG tablet

## 2020-02-06 ENCOUNTER — PATIENT OUTREACH (OUTPATIENT)
Dept: ADMINISTRATIVE | Facility: OTHER | Age: 75
End: 2020-02-06

## 2020-02-07 ENCOUNTER — OFFICE VISIT (OUTPATIENT)
Dept: NEUROLOGY | Facility: CLINIC | Age: 75
End: 2020-02-07
Payer: MEDICARE

## 2020-02-07 VITALS
HEART RATE: 68 BPM | HEIGHT: 70 IN | SYSTOLIC BLOOD PRESSURE: 132 MMHG | BODY MASS INDEX: 40.52 KG/M2 | WEIGHT: 283 LBS | DIASTOLIC BLOOD PRESSURE: 78 MMHG

## 2020-02-07 DIAGNOSIS — E66.01 MORBID OBESITY: ICD-10-CM

## 2020-02-07 DIAGNOSIS — M62.89 PROXIMAL LIMB WEAKNESS: Primary | ICD-10-CM

## 2020-02-07 DIAGNOSIS — R42 LIGHTHEADEDNESS: ICD-10-CM

## 2020-02-07 DIAGNOSIS — H53.2 DIPLOPIA: ICD-10-CM

## 2020-02-07 PROCEDURE — 99999 PR PBB SHADOW E&M-EST. PATIENT-LVL IV: ICD-10-PCS | Mod: PBBFAC,,, | Performed by: PSYCHIATRY & NEUROLOGY

## 2020-02-07 PROCEDURE — 99999 PR PBB SHADOW E&M-EST. PATIENT-LVL IV: CPT | Mod: PBBFAC,,, | Performed by: PSYCHIATRY & NEUROLOGY

## 2020-02-07 PROCEDURE — 99214 OFFICE O/P EST MOD 30 MIN: CPT | Mod: PBBFAC | Performed by: PSYCHIATRY & NEUROLOGY

## 2020-02-07 PROCEDURE — 99205 OFFICE O/P NEW HI 60 MIN: CPT | Mod: S$PBB,,, | Performed by: PSYCHIATRY & NEUROLOGY

## 2020-02-07 PROCEDURE — 99205 PR OFFICE/OUTPT VISIT, NEW, LEVL V, 60-74 MIN: ICD-10-PCS | Mod: S$PBB,,, | Performed by: PSYCHIATRY & NEUROLOGY

## 2020-02-07 NOTE — LETTER
February 7, 2020      Jethro Ortiz MD  [address]  [facility] [address]           [facility]  [facility]  [place] [address]  Phone: [phone]          Patient: Yana Orellana   MR Number: [id number]   YOB: 1945   Date of Visit: 2/7/2020       Dear Dr. Jethro Ortiz:    Thank you for referring Yana Orellana to me for evaluation. Attached you will find relevant portions of my assessment and plan of care.    If you have questions, please do not hesitate to call me. I look forward to following Yana Orellana along with you.    Sincerely,    Thee Maza MD    Enclosure  CC:  No Recipients    If you would like to receive this communication electronically, please contact [email or web address] or [phone] to request more information on [facility] Link access.    For providers and/or their staff who would like to refer a patient to [facility], please contact us through our one-stop-shop provider referral line, [facility], at [phone].    If you feel you have received this communication in error or would no longer like to receive these types of communications, please e-mail [email or web address]

## 2020-02-07 NOTE — PROGRESS NOTES
"Outpatient Neurology Consultation    Requesting physician:  Dr. Ortiz    Impression:  1. Proximal weakness: pattern is most c/w myopathy but will image neck to r/o cervical myelopathy given neck pain  2. Lightheadedness of uncertain etiology: anxiety in DDx  3. 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 Fibromyalgia  5. Hx polio    Plan:  1. MRI brain and MRI c-spine  2. EMG/NCV  3. RTC 2 months    Problem List Items Addressed This Visit        1 - High    Proximal limb weakness - Primary    Relevant Orders    MRI Cervical Spine Without Contrast    EMG W/ ULTRASOUND AND NERVE CONDUCTION TEST 3 Extremities       2     Lightheadedness    Relevant Orders    MRI Brain Without Contrast       3     Morbid obesity      Other Visit Diagnoses     Diplopia        Relevant Orders    MRI Brain Without Contrast          CC:  dizziness    HPI: 73 y/o WF referred for evaluation of "dizziness" and weakness.   She has lightheadedness that is constant but worse when bending over or reaching up.  Late in the day symptoms are worse.  No association with medications.  Symptoms started in May '19.  Has remote history of BPPV but these symptoms are different.   Has had negative orthostatics x 2.     She has a "gait" issue diagnosed as post-polio.  Also diagnosed with ataxia.  No brain MRI in over 10 yrs.  Has post-chemo PN.  Hx lung CA '09.    +dry eyes; no perspiration  No constipation; bladder ok; no bloating    CT head 7/19/19 neg    Past Medical History:   Diagnosis Date    Anemia     Anxiety     Cataract     Colon polyps 2012    Fibromyalgia 10/15/2012    Lung cancer     Pneumonia due to infectious organism 7/19/2019    Post-polio syndrome     Rheumatoid arthritis(714.0) 7/16/2012 2009 onset with mod positive CCP and RF MTX 2011 - present     Thyroid disease     Vertigo     postural vertigo      Past Surgical History:   Procedure Laterality Date    BLADDER SURGERY "  1959    CHOLECYSTECTOMY      HYSTERECTOMY  1988    LUNG REMOVAL, PARTIAL  2009    left lower lobectomy    OVARIAN CYST REMOVAL  1970    TONSILLECTOMY  1952      Outpatient Medications Marked as Taking for the 2/7/20 encounter (Office Visit) with Thee Maza MD   Medication Sig Dispense Refill    chlorhexidine (PERIDEX) 0.12 % solution RINSE WITH 15 ML PO BID FOR 7 DAYS  4    diazePAM (VALIUM) 5 MG tablet Take 1 tablet (5 mg total) by mouth nightly as needed (fibromyalgia). 30 tablet 0    ergocalciferol (ERGOCALCIFEROL) 50,000 unit Cap Take 1 capsule (50,000 Units total) by mouth every 7 days. 12 capsule 1    folic acid (FOLVITE) 1 MG tablet Take 2 tablets (2 mg total) by mouth once daily. 180 tablet 3    methotrexate 2.5 MG Tab TAKE 5 TABLETS(12.5 MG) BY MOUTH EVERY 7 DAYS 65 tablet 0    mupirocin (BACTROBAN) 2 % ointment Apply topically 3 (three) times daily. 15 g 1    oxyCODONE-acetaminophen (PERCOCET) 5-325 mg per tablet Take 1 tablet by mouth every 24 hours as needed for Pain. 20 tablet 0    predniSONE (DELTASONE) 5 MG tablet Take as needed for flare of rheumatoid arthritis 20 tablet 1      Review of patient's allergies indicates:   Allergen Reactions    Ibuprofen Nausea Only    Demerol [meperidine]      Euphoria    Gabapentin Other (See Comments)     Hallucinations    Mellaril-s Nausea Only    Versed [midazolam]      Excessive talking, hyper    Vicodin [hydrocodone-acetaminophen]      Joint pain, muscle pain    Xanax [alprazolam]      fatigue      Family History   Problem Relation Age of Onset    Lung cancer Father     Lung cancer Maternal Aunt     Diabetes Maternal Grandmother     Colon cancer Maternal Grandfather     Parkinsonism Paternal Grandmother     Arrhythmia Mother     Lung cancer Maternal Uncle     Hypertension Neg Hx     Heart attack Neg Hx       Social History     Socioeconomic History    Marital status:      Spouse name: Not on file    Number of  "children: Not on file    Years of education: Not on file    Highest education level: Not on file   Occupational History    Occupation: Writer and    Social Needs    Financial resource strain: Not on file    Food insecurity:     Worry: Not on file     Inability: Not on file    Transportation needs:     Medical: Not on file     Non-medical: Not on file   Tobacco Use    Smoking status: Former Smoker     Packs/day: 1.50     Years: 40.00     Pack years: 60.00     Types: Cigarettes     Last attempt to quit: 7/16/2009     Years since quitting: 10.5    Smokeless tobacco: Never Used   Substance and Sexual Activity    Alcohol use: Not Currently    Drug use: No    Sexual activity: Not Currently   Lifestyle    Physical activity:     Days per week: Not on file     Minutes per session: Not on file    Stress: Not on file   Relationships    Social connections:     Talks on phone: Not on file     Gets together: Not on file     Attends Mu-ism service: Not on file     Active member of club or organization: Not on file     Attends meetings of clubs or organizations: Not on file     Relationship status: Not on file   Other Topics Concern    Not on file   Social History Narrative    Not on file     Review Of Systems:  General: Negative for fever   HENT: Negative for tinnitus, nose bleeds, neck stiffness   Cardiac Negative for palpitations   Vascular: Negative for easy bruising   Pulmonary: Negative for cough   Gastrointestinal: Negative for constipation   Urinary: Negative for incomplete bladder emptying   Musculoskeletal: Negative for muscle aches   Neurological: As above. Otherwise negative for abnormal movements.  Intermittent vertical diplopia   Psychiatric:  anxiety     /78   Pulse 68   Ht 5' 10" (1.778 m)   Wt 128.4 kg (283 lb)   LMP  (LMP Unknown)   BMI 40.61 kg/m²    Overweight female  Extremities: no edema    Mental status:   Awake, alert and appropriately oriented   Normal recent " and remote memory   Normal attention and concentration   Normal speech and language   Normal fund of knowledge   No extinction  Cranial nerves:   Normal funduscopic - discs sharp   PERRLA   EOMF without nystagmus   VFF   Normal facial sensation   Normal facial movements   Intact hearing bilaterally   Palate elevates symmetrically   Normal SCM and trapezius strength   Tongue midline  Motor:   No pronator drift   Normal FF movements bilaterally   Normal muscle tone, bulk except mild L IH weakness   Proximal weakness and neck flexor involvement   No abnormal movements  Sensory   Intact to LT, position   Glove-stocking decrease PP, temperature  DTRs   1+ and symmetric in UEs; absent LEs   Plantar responses are flexor bilaterally  Coordination   Intact to FNF and HTS  Gait   Needs hands to stand   Mild wide-based waddle gait    Data Reviewed:  Lab Results   Component Value Date    WBC 4.71 01/06/2020    HGB 13.0 01/06/2020    HCT 39.5 01/06/2020     (H) 01/06/2020    PLT 78 (L) 01/06/2020       CMP  Sodium   Date Value Ref Range Status   01/06/2020 139 136 - 145 mmol/L Final     Potassium   Date Value Ref Range Status   01/06/2020 4.0 3.5 - 5.1 mmol/L Final     Chloride   Date Value Ref Range Status   01/06/2020 109 95 - 110 mmol/L Final     CO2   Date Value Ref Range Status   01/06/2020 24 23 - 29 mmol/L Final     Glucose   Date Value Ref Range Status   01/06/2020 124 (H) 70 - 110 mg/dL Final     BUN, Bld   Date Value Ref Range Status   01/06/2020 10 8 - 23 mg/dL Final     Creatinine   Date Value Ref Range Status   01/06/2020 0.7 0.5 - 1.4 mg/dL Final     Calcium   Date Value Ref Range Status   01/06/2020 8.5 (L) 8.7 - 10.5 mg/dL Final     Total Protein   Date Value Ref Range Status   01/06/2020 6.7 6.0 - 8.4 g/dL Final     Albumin   Date Value Ref Range Status   01/06/2020 2.6 (L) 3.5 - 5.2 g/dL Final     Total Bilirubin   Date Value Ref Range Status   01/06/2020 1.8 (H) 0.1 - 1.0 mg/dL Final     Comment:      For infants and newborns, interpretation of results should be based  on gestational age, weight and in agreement with clinical  observations.  Premature Infant recommended reference ranges:  Up to 24 hours.............<8.0 mg/dL  Up to 48 hours............<12.0 mg/dL  3-5 days..................<15.0 mg/dL  6-29 days.................<15.0 mg/dL       Alkaline Phosphatase   Date Value Ref Range Status   01/06/2020 95 55 - 135 U/L Final     AST   Date Value Ref Range Status   01/06/2020 41 (H) 10 - 40 U/L Final     ALT   Date Value Ref Range Status   01/06/2020 19 10 - 44 U/L Final     Anion Gap   Date Value Ref Range Status   01/06/2020 6 (L) 8 - 16 mmol/L Final     eGFR if    Date Value Ref Range Status   01/06/2020 >60.0 >60 mL/min/1.73 m^2 Final     eGFR if non    Date Value Ref Range Status   01/06/2020 >60.0 >60 mL/min/1.73 m^2 Final     Comment:     Calculation used to obtain the estimated glomerular filtration  rate (eGFR) is the CKD-EPI equation.          Lab Results   Component Value Date    SEDRATE 43 (H) 01/06/2020     Lab Results   Component Value Date    HGBA1C 5.4 01/06/2020     Lab Results   Component Value Date    [id number] 634 05/24/2011     Lab Results   Component Value Date    TSH 5.279 (H) 01/06/2020     Thee Maza MD

## 2020-02-12 NOTE — PATIENT INSTRUCTIONS
Vitamin D3 2000 iu daily    Quality 111:Pneumonia Vaccination Status For Older Adults: Pneumococcal Vaccination Previously Received Quality 130: Documentation Of Current Medications In The Medical Record: Current Medications Documented Quality 47: Advance Care Plan: Advance Care Planning discussed and documented; advance care plan or surrogate decision maker documented in the medical record. Detail Level: Detailed Quality 226: Preventive Care And Screening: Tobacco Use: Screening And Cessation Intervention: Patient screened for tobacco use and is an ex/non-smoker Quality 431: Preventive Care And Screening: Unhealthy Alcohol Use - Screening: Patient screened for unhealthy alcohol use using a single question and scores less than 2 times per year

## 2020-03-03 ENCOUNTER — PROCEDURE VISIT (OUTPATIENT)
Dept: NEUROLOGY | Facility: CLINIC | Age: 75
End: 2020-03-03
Payer: MEDICARE

## 2020-03-03 DIAGNOSIS — M62.89 PROXIMAL LIMB WEAKNESS: ICD-10-CM

## 2020-03-03 PROCEDURE — 95913 NRV CNDJ TEST 13/> STUDIES: CPT | Mod: PBBFAC | Performed by: PSYCHIATRY & NEUROLOGY

## 2020-03-03 PROCEDURE — 95886 PR EMG COMPLETE, W/ NERVE CONDUCTION STUDIES, 5+ MUSCLES: ICD-10-PCS | Mod: 26,S$PBB,, | Performed by: PSYCHIATRY & NEUROLOGY

## 2020-03-03 PROCEDURE — 95913 NRV CNDJ TEST 13/> STUDIES: CPT | Mod: 26,S$PBB,, | Performed by: PSYCHIATRY & NEUROLOGY

## 2020-03-03 PROCEDURE — 95886 MUSC TEST DONE W/N TEST COMP: CPT | Mod: 26,S$PBB,, | Performed by: PSYCHIATRY & NEUROLOGY

## 2020-03-03 PROCEDURE — 95886 MUSC TEST DONE W/N TEST COMP: CPT | Mod: PBBFAC | Performed by: PSYCHIATRY & NEUROLOGY

## 2020-03-03 PROCEDURE — 95913 PR NERVE CONDUCTION STUDY; 13 OR MORE STUDIES: ICD-10-PCS | Mod: 26,S$PBB,, | Performed by: PSYCHIATRY & NEUROLOGY

## 2020-03-05 ENCOUNTER — TELEPHONE (OUTPATIENT)
Dept: NEUROLOGY | Facility: CLINIC | Age: 75
End: 2020-03-05

## 2020-03-05 NOTE — TELEPHONE ENCOUNTER
Christa,  Pls let her know the EMG shows a neuropathy (which we knew) but does not likely explain her weakness.      I don't see that the MRI brain/c-spine have been done or are scheduled.  Pls facilitate and let her know we will be back in touch after I see them.  Thanks,  MILY

## 2020-03-06 ENCOUNTER — HOSPITAL ENCOUNTER (OUTPATIENT)
Dept: RADIOLOGY | Facility: HOSPITAL | Age: 75
Discharge: HOME | End: 2020-03-06
Attending: PSYCHIATRY & NEUROLOGY
Payer: MEDICARE

## 2020-03-06 DIAGNOSIS — R42 LIGHTHEADEDNESS: ICD-10-CM

## 2020-03-06 DIAGNOSIS — M62.89 PROXIMAL LIMB WEAKNESS: ICD-10-CM

## 2020-03-06 DIAGNOSIS — H53.2 DIPLOPIA: ICD-10-CM

## 2020-03-06 PROCEDURE — 72141 MRI NECK SPINE W/O DYE: CPT | Mod: 26,,, | Performed by: RADIOLOGY

## 2020-03-06 PROCEDURE — 70551 MRI BRAIN STEM W/O DYE: CPT | Mod: 26,,, | Performed by: RADIOLOGY

## 2020-03-06 PROCEDURE — 70551 MRI BRAIN STEM W/O DYE: CPT | Mod: TC

## 2020-03-06 PROCEDURE — 72141 MRI NECK SPINE W/O DYE: CPT | Mod: TC

## 2020-03-06 PROCEDURE — 72141 MRI CERVICAL SPINE WITHOUT CONTRAST: ICD-10-PCS | Mod: 26,,, | Performed by: RADIOLOGY

## 2020-03-06 PROCEDURE — 70551 MRI BRAIN WITHOUT CONTRAST: ICD-10-PCS | Mod: 26,,, | Performed by: RADIOLOGY

## 2020-03-09 ENCOUNTER — TELEPHONE (OUTPATIENT)
Dept: NEUROLOGY | Facility: HOSPITAL | Age: 75
End: 2020-03-09

## 2020-03-09 NOTE — TELEPHONE ENCOUNTER
Christa,  Pls let pt know there is nothing on the MRIs (brain and c-spine) to explain her lightheadedness or weakness. If she is still having symptoms, let's schedule a f/u visit.  Thanks,  RCRISTOBAL

## 2020-03-09 NOTE — TELEPHONE ENCOUNTER
Patient advised/Still lightheaded & dizzy     Offered 1st available appt 03/24/2020 @ 8:20 am, but that was too early. Appt booked 04/02/2020 @ 3 pm

## 2020-03-12 DIAGNOSIS — M05.79 RHEUMATOID ARTHRITIS INVOLVING MULTIPLE SITES WITH POSITIVE RHEUMATOID FACTOR: ICD-10-CM

## 2020-03-12 RX ORDER — METHOTREXATE 2.5 MG/1
15 TABLET ORAL
Qty: 65 TABLET | Refills: 0 | Status: SHIPPED | OUTPATIENT
Start: 2020-03-12 | End: 2020-03-12

## 2020-03-12 RX ORDER — METHOTREXATE 2.5 MG/1
12.5 TABLET ORAL
Qty: 65 TABLET | Refills: 0 | Status: SHIPPED | OUTPATIENT
Start: 2020-03-12 | End: 2020-06-05 | Stop reason: SINTOL

## 2020-04-01 ENCOUNTER — PATIENT OUTREACH (OUTPATIENT)
Dept: ADMINISTRATIVE | Facility: OTHER | Age: 75
End: 2020-04-01

## 2020-04-01 ENCOUNTER — TELEPHONE (OUTPATIENT)
Dept: NEUROLOGY | Facility: CLINIC | Age: 75
End: 2020-04-01

## 2020-04-02 ENCOUNTER — OFFICE VISIT (OUTPATIENT)
Dept: NEUROLOGY | Facility: CLINIC | Age: 75
End: 2020-04-02
Payer: MEDICARE

## 2020-04-02 DIAGNOSIS — E66.01 MORBID OBESITY: ICD-10-CM

## 2020-04-02 DIAGNOSIS — M62.89 PROXIMAL LIMB WEAKNESS: Primary | ICD-10-CM

## 2020-04-02 DIAGNOSIS — R73.9 HYPERGLYCEMIA: ICD-10-CM

## 2020-04-02 DIAGNOSIS — R73.03 PRE-DIABETES: ICD-10-CM

## 2020-04-02 DIAGNOSIS — E03.9 HYPOTHYROIDISM, UNSPECIFIED TYPE: ICD-10-CM

## 2020-04-02 DIAGNOSIS — R42 LIGHTHEADEDNESS: ICD-10-CM

## 2020-04-02 DIAGNOSIS — G62.9 POLYNEUROPATHY: ICD-10-CM

## 2020-04-02 DIAGNOSIS — G60.3 IDIOPATHIC PROGRESSIVE NEUROPATHY: ICD-10-CM

## 2020-04-02 PROCEDURE — 99443 PR PHYSICIAN TELEPHONE EVALUATION 21-30 MIN: ICD-10-PCS | Mod: S$PBB,95,, | Performed by: PSYCHIATRY & NEUROLOGY

## 2020-04-02 PROCEDURE — 99443 PR PHYSICIAN TELEPHONE EVALUATION 21-30 MIN: CPT | Mod: S$PBB,95,, | Performed by: PSYCHIATRY & NEUROLOGY

## 2020-04-02 NOTE — PROGRESS NOTES
Neurology Clinic Telephonic Follow-Up Note    Impression:  1. Proximal weakness of uncertain etiology (no EMG evidence of myopathy and no significant cervical stenosis):  Subjectively improved  2. Lightheadedness of uncertain etiology: improved; anxiety in DDx  3. Polyneuropathy (possible chemo-related +/- arthritis) with possible autonomic/small fiber involvement: will look for other etiologies  4. Fibromyalgia  5. Mild-mod cervical spondylosis/stenosis with chronic R C6/7 radiculopathy by EMG, asymptomatic  6. Mild B CTS by EMG  7. Hx polio  8. Vit D deficiency    Plan:  1. TSH, free T4, HgA1c, B12, serum immunofixation, urine immunofixation after pandemic  2. Resume PT after pandemic  3. Encouraged vitamin D supplementation  4. RTC for examination after COVID-19 pandemic      Problem List Items Addressed This Visit        1 - High    Proximal limb weakness - Primary       2     Lightheadedness       3     Morbid obesity          I spoke with patient by phone for f/u visit.  She did not come to the office due to COVID-19 and was unable to set up a video visit.     She was going to PT which was helping some.   Dizziness has improved although she had an episode of vertigo this AM.    Having 1 year of waxing and waning fatigue and daytime somnolence.  Sleeping fine. No snoring.  No change in medications recently.      Denies depression and anxiety controlled    MRI brain - mild WM changes  MRI c-spine - spondylosis with mild stenosis  EMG/NCV:    1. Mild bilateral carpal tunnel syndrome, comparatively worse on the right;    2. A sensory neuropathy in the lower extremities that cannot be better characterised since there were absent responses. Of note, absent sensory responses in the lower extremities is not uncommon in this patients age group and is not necessarily to be considered pathological;    3. Chronic denervation in the right C6 and C7 myotomes suggestive of radiculopathy at those levels.     From prior  "note:  CC:  dizziness    HPI: 73 y/o WF referred for evaluation of "dizziness" and weakness.   She has lightheadedness that is constant but worse when bending over or reaching up.  Late in the day symptoms are worse.  No association with medications.  Symptoms started in May '19.  Has remote history of BPPV but these symptoms are different.   Has had negative orthostatics x 2.     She has a "gait" issue diagnosed as post-polio.  Also diagnosed with ataxia.  No brain MRI in over 10 yrs.  Has post-chemo PN.  Hx lung CA '09.    +dry eyes; no perspiration  No constipation; bladder ok; no bloating    CT head 7/19/19 neg      No outpatient medications have been marked as taking for the 4/2/20 encounter (Appointment) with Thee Maza MD.       LMP  (LMP Unknown)   Well-developed, well nourished.  Awake, alert and oriented.  Language is normal.    22 mins telephonic consultation     Thee Maza MD  "

## 2020-05-06 ENCOUNTER — PATIENT OUTREACH (OUTPATIENT)
Dept: ADMINISTRATIVE | Facility: HOSPITAL | Age: 75
End: 2020-05-06

## 2020-06-05 ENCOUNTER — LAB VISIT (OUTPATIENT)
Dept: LAB | Facility: OTHER | Age: 75
End: 2020-06-05
Attending: INTERNAL MEDICINE
Payer: MEDICARE

## 2020-06-05 ENCOUNTER — TELEPHONE (OUTPATIENT)
Dept: RHEUMATOLOGY | Facility: CLINIC | Age: 75
End: 2020-06-05

## 2020-06-05 DIAGNOSIS — Z79.899 HIGH RISK MEDICATION USE: Chronic | ICD-10-CM

## 2020-06-05 DIAGNOSIS — M05.79 RHEUMATOID ARTHRITIS INVOLVING MULTIPLE SITES WITH POSITIVE RHEUMATOID FACTOR: ICD-10-CM

## 2020-06-05 LAB
ALBUMIN SERPL BCP-MCNC: 2.6 G/DL (ref 3.5–5.2)
ALP SERPL-CCNC: 99 U/L (ref 55–135)
ALT SERPL W/O P-5'-P-CCNC: 23 U/L (ref 10–44)
ANION GAP SERPL CALC-SCNC: 11 MMOL/L (ref 8–16)
AST SERPL-CCNC: 49 U/L (ref 10–40)
BASOPHILS # BLD AUTO: 0.04 K/UL (ref 0–0.2)
BASOPHILS NFR BLD: 1.1 % (ref 0–1.9)
BILIRUB SERPL-MCNC: 2 MG/DL (ref 0.1–1)
BUN SERPL-MCNC: 11 MG/DL (ref 8–23)
CALCIUM SERPL-MCNC: 8.2 MG/DL (ref 8.7–10.5)
CHLORIDE SERPL-SCNC: 110 MMOL/L (ref 95–110)
CO2 SERPL-SCNC: 17 MMOL/L (ref 23–29)
CREAT SERPL-MCNC: 0.8 MG/DL (ref 0.5–1.4)
CRP SERPL-MCNC: 6.3 MG/L (ref 0–8.2)
DIFFERENTIAL METHOD: ABNORMAL
EOSINOPHIL # BLD AUTO: 0.1 K/UL (ref 0–0.5)
EOSINOPHIL NFR BLD: 2.6 % (ref 0–8)
ERYTHROCYTE [DISTWIDTH] IN BLOOD BY AUTOMATED COUNT: 14.6 % (ref 11.5–14.5)
ERYTHROCYTE [SEDIMENTATION RATE] IN BLOOD: 25 MM/HR (ref 0–20)
EST. GFR  (AFRICAN AMERICAN): >60 ML/MIN/1.73 M^2
EST. GFR  (NON AFRICAN AMERICAN): >60 ML/MIN/1.73 M^2
GLUCOSE SERPL-MCNC: 278 MG/DL (ref 70–110)
HCT VFR BLD AUTO: 39 % (ref 37–48.5)
HGB BLD-MCNC: 14.1 G/DL (ref 12–16)
IMM GRANULOCYTES # BLD AUTO: 0.02 K/UL (ref 0–0.04)
IMM GRANULOCYTES NFR BLD AUTO: 0.6 % (ref 0–0.5)
LYMPHOCYTES # BLD AUTO: 0.9 K/UL (ref 1–4.8)
LYMPHOCYTES NFR BLD: 24.9 % (ref 18–48)
MCH RBC QN AUTO: 35.5 PG (ref 27–31)
MCHC RBC AUTO-ENTMCNC: 36.2 G/DL (ref 32–36)
MCV RBC AUTO: 98 FL (ref 82–98)
MONOCYTES # BLD AUTO: 0.3 K/UL (ref 0.3–1)
MONOCYTES NFR BLD: 7.4 % (ref 4–15)
NEUTROPHILS # BLD AUTO: 2.2 K/UL (ref 1.8–7.7)
NEUTROPHILS NFR BLD: 63.4 % (ref 38–73)
NRBC BLD-RTO: 0 /100 WBC
PLATELET # BLD AUTO: 94 K/UL (ref 150–350)
PLATELET BLD QL SMEAR: ABNORMAL
PMV BLD AUTO: 14.4 FL (ref 9.2–12.9)
POTASSIUM SERPL-SCNC: 4.1 MMOL/L (ref 3.5–5.1)
PROT SERPL-MCNC: 7 G/DL (ref 6–8.4)
RBC # BLD AUTO: 3.97 M/UL (ref 4–5.4)
SODIUM SERPL-SCNC: 138 MMOL/L (ref 136–145)
WBC # BLD AUTO: 3.5 K/UL (ref 3.9–12.7)

## 2020-06-05 PROCEDURE — 36415 COLL VENOUS BLD VENIPUNCTURE: CPT

## 2020-06-05 PROCEDURE — 80053 COMPREHEN METABOLIC PANEL: CPT

## 2020-06-05 PROCEDURE — 86140 C-REACTIVE PROTEIN: CPT

## 2020-06-05 PROCEDURE — 85025 COMPLETE CBC W/AUTO DIFF WBC: CPT

## 2020-06-05 PROCEDURE — 85651 RBC SED RATE NONAUTOMATED: CPT

## 2020-06-05 NOTE — TELEPHONE ENCOUNTER
Prelim on CBC shows leukopenia and platelets are pending  I called her to see if anything acute going on but no answer to her phone  She should hold methotrexate due to leukopenia    I will try again later but forward note to Dr Graf who is scheduled to see her next week    WD

## 2020-06-05 NOTE — TELEPHONE ENCOUNTER
I was able to reach her and tell her to stop methotrexate.    She also has elevated glucose and has appt with new PCP Dr Graf    She has unexplained polyneuropathy    Dr Ahuja is her oncologist.     I have advised her to stop methotrexate, continue folic acid  Repeat CBC in 1-2 weeks  Take tylenol as needed    F/u with me after Dr Graf

## 2020-06-12 ENCOUNTER — OFFICE VISIT (OUTPATIENT)
Dept: INTERNAL MEDICINE | Facility: CLINIC | Age: 75
End: 2020-06-12
Payer: MEDICARE

## 2020-06-12 VITALS
HEART RATE: 74 BPM | BODY MASS INDEX: 39.08 KG/M2 | WEIGHT: 273 LBS | HEIGHT: 70 IN | SYSTOLIC BLOOD PRESSURE: 150 MMHG | OXYGEN SATURATION: 95 % | DIASTOLIC BLOOD PRESSURE: 83 MMHG

## 2020-06-12 DIAGNOSIS — D72.819 LEUKOPENIA, UNSPECIFIED TYPE: ICD-10-CM

## 2020-06-12 DIAGNOSIS — Z12.39 BREAST CANCER SCREENING: ICD-10-CM

## 2020-06-12 DIAGNOSIS — M79.2 NEUROPATHIC PAIN OF FOOT, UNSPECIFIED LATERALITY: ICD-10-CM

## 2020-06-12 DIAGNOSIS — R03.0 ELEVATED BLOOD PRESSURE READING WITHOUT DIAGNOSIS OF HYPERTENSION: ICD-10-CM

## 2020-06-12 DIAGNOSIS — E03.9 HYPOTHYROIDISM, UNSPECIFIED TYPE: ICD-10-CM

## 2020-06-12 DIAGNOSIS — R10.2 SUPRAPUBIC PAIN: Primary | ICD-10-CM

## 2020-06-12 DIAGNOSIS — Z12.11 COLON CANCER SCREENING: ICD-10-CM

## 2020-06-12 DIAGNOSIS — E80.6 HYPERBILIRUBINEMIA: ICD-10-CM

## 2020-06-12 DIAGNOSIS — R01.1 HEART MURMUR: ICD-10-CM

## 2020-06-12 DIAGNOSIS — E55.9 VITAMIN D INSUFFICIENCY: ICD-10-CM

## 2020-06-12 DIAGNOSIS — E66.01 MORBID OBESITY: ICD-10-CM

## 2020-06-12 DIAGNOSIS — I70.0 AORTIC ATHEROSCLEROSIS: ICD-10-CM

## 2020-06-12 DIAGNOSIS — R73.03 PRE-DIABETES: ICD-10-CM

## 2020-06-12 PROBLEM — R74.01 TRANSAMINITIS: Status: ACTIVE | Noted: 2020-06-12

## 2020-06-12 PROBLEM — R73.9 HYPERGLYCEMIA: Status: RESOLVED | Noted: 2020-04-02 | Resolved: 2020-06-12

## 2020-06-12 PROBLEM — N30.00 ACUTE CYSTITIS WITHOUT HEMATURIA: Status: RESOLVED | Noted: 2019-07-19 | Resolved: 2020-06-12

## 2020-06-12 PROBLEM — K63.5 COLON POLYP: Status: ACTIVE | Noted: 2020-06-12

## 2020-06-12 PROCEDURE — 99213 OFFICE O/P EST LOW 20 MIN: CPT | Mod: PBBFAC | Performed by: INTERNAL MEDICINE

## 2020-06-12 PROCEDURE — 99215 OFFICE O/P EST HI 40 MIN: CPT | Mod: S$PBB,,, | Performed by: INTERNAL MEDICINE

## 2020-06-12 PROCEDURE — 99999 PR PBB SHADOW E&M-EST. PATIENT-LVL III: CPT | Mod: PBBFAC,,, | Performed by: INTERNAL MEDICINE

## 2020-06-12 PROCEDURE — 99999 PR PBB SHADOW E&M-EST. PATIENT-LVL III: ICD-10-PCS | Mod: PBBFAC,,, | Performed by: INTERNAL MEDICINE

## 2020-06-12 PROCEDURE — 99215 PR OFFICE/OUTPT VISIT, EST, LEVL V, 40-54 MIN: ICD-10-PCS | Mod: S$PBB,,, | Performed by: INTERNAL MEDICINE

## 2020-06-12 NOTE — PROGRESS NOTES
Subjective:       Patient ID: Yana Orellana is a 75 y.o. female who  has a past medical history of Anemia, Anxiety, Cataract, Colon polyps (2012), Fibromyalgia (10/15/2012), Lung cancer, Pneumonia due to infectious organism (7/19/2019), Post-polio syndrome, Rheumatoid arthritis(714.0) (7/16/2012), Thyroid disease, and Vertigo.    Chief Complaint: Urinary Tract Infection     History was obtained from the patient and supplemented through chart review  She was previously seen by Dr. Jethro Ortiz 12/2019 for hypothyroidism.  -discussed her care with Rheumatology.    Has caretaker, Inessa who helps with walking outside the home.  Difficulty walking up stairs, incline.  Is unsteady, but walks at home without assistance.    Formerly [facility], [facility] background.  Has been to [place].  Enjoys traveling.  Goal to travel to each continent but not [place].    HPI    Possible UTI:  Suprapubic pressure.  Dark urine. Urine smells strong.  Denies dysuria, hematuria, cloudy appearing urine, urinary urgency or frequency, fever.  Chronic decreased appetite.  No N/V, abd pain.    Hypothyroidism:    Diagnosed in 1995.  Has been off Synthroid, Liothyronine. Saw endocrine in 2018.  Goal is normal TSH.  Lab Results   Component Value Date    TSH 5.279 (H) 01/06/2020    [id number] 86 08/17/2018    FREET4 0.70 (L) 01/06/2020     Pre diabetes:  Is not on medications.  On prednisone p.r.n., takes 5-6 pills a year.  Doing fairly well on MTX.  Hemoglobin A1C   Date Value Ref Range Status   01/06/2020 5.4 4.0 - 5.6 % Final     Comment:     ADA Screening Guidelines:  5.7-6.4%  Consistent with prediabetes  >or=6.5%  Consistent with diabetes  High levels of fetal hemoglobin interfere with the HbA1C  assay. Heterozygous hemoglobin variants (HbS, HgC, etc)do  not significantly interfere with this assay.   However, presence of multiple variants may affect accuracy.     09/12/2019 5.4 4.0 - 5.6 % Final     Comment:     ADA Screening Guidelines:  5.7-6.4%  Consistent  with prediabetes  >or=6.5%  Consistent with diabetes  High levels of fetal hemoglobin interfere with the HbA1C  assay. Heterozygous hemoglobin variants (HbS, HgC, etc)do  not significantly interfere with this assay.   However, presence of multiple variants may affect accuracy.     08/17/2018 5.7 (H) 4.0 - 5.6 % Final     Comment:     ADA Screening Guidelines:  5.7-6.4%  Consistent with prediabetes  >or=6.5%  Consistent with diabetes  High levels of fetal hemoglobin interfere with the HbA1C  assay. Heterozygous hemoglobin variants (HbS, HgC, etc)do  not significantly interfere with this assay.   However, presence of multiple variants may affect accuracy.       Vitamin D deficiency:   DEXA in 2013 WNL.  Has not repeated DEXA yet.  Is taking supplements, ergocalciferol  Lab Results   Component Value Date    KCNCKOAL58RA 13 (L) 09/12/2019    OGOMEHTL53EH 12 (L) 08/17/2018    JZOANVSC15KM 15 (L) 04/20/2018     Transaminitis, hyperbilirubinemia:  Isolated elevated AST since at least .  Hepatitis panel negative.  GGT, CPK WNL.  Ultrasound with fatty liver.  No CBD dilation; history of cholecystectomy.    Aortic atherosclerosis:  Seen on imaging.  FLP at goal.    Polyneuropathy, proximal weakness, mild to moderate cervical stenosis:    Unclear etiology.  History of chemo for lung cancer. H/o polio (/neck region) at age 18.  Is on folic acid with methotrexate.  Has seen neurology.  EMG without myopathy, but with bilateral carpal tunnel syndrome.  No significant cervical stenosis.  Planning to continue PT after COVID.  Lab Results   Component Value Date    HGBA1C 5.4 01/06/2020     Lab Results   Component Value Date    NDJIHVUY74 634 05/24/2011     Thrombocytopenia, leukopenia:  Thrombocytopenia is chronic, but leukopenia is new. Has chronic ecchymosis on her arms.  Has occasional epistasis after blowing her nose.  Noted on labs by Rheumatology.  Hold MTX until f/u to consider alternatives. Dr. Ahuja is her Onc.   Planning to repeat CBC in 2 wks.    Obesity:  Body mass index is 39.17 kg/m².   Reports decreased appetite.  Drinks Boost shakes.  Was on weight loss med in the past, but caused valvular dysfunction.  She is hesitant to take medications or bariatric sx.  Has decreased mobility as above.    Murmur:  Reports valvular dysfunction after taking weight loss drug in the past. TTE 2013 with mildly sclerotic AV, mild AV., trival regurg.  Denies CP, SOB.    Colon polyp:  C scope in 2012.  Recommending in 3 years, but she declined d/t decreased mobility.  Is now doing fit kit annually.          Not addressed today.  History of lightheadedness:  Thought to be due to anxiety.    History of lung cancer:    Diagnosed . S/p LLL lobectomy.  Completed 3-4 cycles of chemo.  In remission.  Quit smoking in 2009.  CT chest  during admission for pneumonitis with diffuse central lobar emphysema.  Following with oncology, Pulm (Dr. Dumont) at [facility].  Is off O2 and Flovent.  Does not need repeat chest imaging unless pulmonary symptoms recur.    RA, fibromyalgia:  Following with rheumatology.  Has done well on MTX, but held as above.  On folic acid.  On prednisone p.r.n. for flares.  On diazepam sparingly for muscle tension, prescribed by Rheumatology.    Xerostomia:  Has chlorhexidine PRN.    Review of Systems   Constitutional: Negative for fever and unexpected weight change.   HENT: Negative for rhinorrhea and sneezing.    Eyes: Negative for redness and itching.   Respiratory: Negative for shortness of breath and wheezing.    Cardiovascular: Negative for chest pain and palpitations.   Gastrointestinal: Negative for abdominal pain and nausea.   Genitourinary: Negative for dysuria, frequency and hematuria.   Musculoskeletal: Positive for arthralgias and gait problem.   Skin: Negative for color change and rash.   Neurological: Negative for dizziness and light-headedness.   Hematological: Negative for adenopathy.    Psychiatric/Behavioral: Negative for confusion. The patient is not nervous/anxious.          Past Medical History:   Diagnosis Date    Anemia     Anxiety     Cataract     Colon polyps 2012    Fibromyalgia 10/15/2012    Lung cancer     Pneumonia due to infectious organism 7/19/2019    Post-polio syndrome     Rheumatoid arthritis(714.0) 7/16/2012    2009 onset with mod positive CCP and RF MTX 2011 - present     Thyroid disease     Vertigo     postural vertigo     Past Surgical History:   Procedure Laterality Date    BLADDER SURGERY  1959    CHOLECYSTECTOMY      HYSTERECTOMY  1988    LUNG REMOVAL, PARTIAL  2009    left lower lobectomy    OVARIAN CYST REMOVAL  1970    TONSILLECTOMY  1952     Family History   Problem Relation Age of Onset    Lung cancer Father     Lung cancer Maternal Aunt     Diabetes Maternal Grandmother     Colon cancer Maternal Grandfather     Parkinsonism Paternal Grandmother     Arrhythmia Mother     Lung cancer Maternal Uncle     Hypertension Neg Hx     Heart attack Neg Hx      Social History     Socioeconomic History    Marital status:      Spouse name: Not on file    Number of children: Not on file    Years of education: Not on file    Highest education level: Not on file   Occupational History    Occupation: Writer and    Social Needs    Financial resource strain: Not on file    Food insecurity:     Worry: Not on file     Inability: Not on file    Transportation needs:     Medical: Not on file     Non-medical: Not on file   Tobacco Use    Smoking status: Former Smoker     Packs/day: 1.50     Years: 40.00     Pack years: 60.00     Types: Cigarettes     Last attempt to quit: 7/16/2009     Years since quitting: 10.9    Smokeless tobacco: Never Used   Substance and Sexual Activity    Alcohol use: Not Currently    Drug use: No    Sexual activity: Not Currently   Lifestyle    Physical activity:     Days per week: Not on file      "Minutes per session: Not on file    Stress: Not on file   Relationships    Social connections:     Talks on phone: Not on file     Gets together: Not on file     Attends Yazidi service: Not on file     Active member of club or organization: Not on file     Attends meetings of clubs or organizations: Not on file     Relationship status: Not on file   Other Topics Concern    Not on file   Social History Narrative    Not on file     Objective:      Vitals:    06/12/20 1412   BP: (!) 150/83   BP Location: Right arm   Patient Position: Sitting   BP Method: Large (Automatic)   Pulse: 74   SpO2: 95%   Weight: 123.8 kg (273 lb)   Height: 5' 10" (1.778 m)      Physical Exam   Constitutional: She appears well-developed and well-nourished. No distress.   HENT:   Head: Normocephalic and atraumatic.   Nose: Nose normal.   Mouth/Throat: Oropharynx is clear and moist. No oropharyngeal exudate.   Eyes: EOM are normal. Right eye exhibits no discharge. Left eye exhibits no discharge. No scleral icterus.   Neck: Neck supple. No tracheal deviation present. No thyromegaly present.   Cardiovascular: Normal rate, regular rhythm and intact distal pulses.   Murmur heard.  III/VI Holosystolic murmur heard at the base   Pulmonary/Chest: Effort normal and breath sounds normal. No respiratory distress. She has no wheezes.   Abdominal: Soft. Bowel sounds are normal. She exhibits no distension. There is no tenderness. There is no CVA tenderness.   Musculoskeletal: She exhibits no edema or deformity.   Lymphadenopathy:     She has no cervical adenopathy.   Neurological: She is alert. No cranial nerve deficit. Gait abnormal.   In wheelchair   Skin: Skin is warm and dry. She is not diaphoretic. No erythema.   Psychiatric: She has a normal mood and affect. Her behavior is normal.         Lab Results   Component Value Date    WBC 3.50 (L) 06/05/2020    HGB 14.1 06/05/2020    HCT 39.0 06/05/2020    PLT 94 (L) 06/05/2020    CHOL 142 09/12/2019    " TRIG 107 09/12/2019    HDL 35 (L) 09/12/2019    ALT 23 06/05/2020    AST 49 (H) 06/05/2020     06/05/2020    K 4.1 06/05/2020     06/05/2020    CREATININE 0.8 06/05/2020    BUN 11 06/05/2020    CO2 17 (L) 06/05/2020    TSH 5.279 (H) 01/06/2020    INR 1.2 07/19/2019    HGBA1C 5.4 01/06/2020       The 10-year ASCVD risk score (Anjelangelique ROSARIO JrKadeem, et al., 2013) is: 16.9%    Values used to calculate the score:      Age: 75 years      Sex: Female      Is Non- : No      Diabetic: No      Tobacco smoker: No      Systolic Blood Pressure: 150 mmHg      Is BP treated: No      HDL Cholesterol: 35 mg/dL      Total Cholesterol: 142 mg/dL    (Imaging have been independently reviewed)  MRI spine with mild cervical stenosis    Assessment:       1. Suprapubic pain    2. Hypothyroidism, unspecified type    3. Pre-diabetes    4. Vitamin D insufficiency    5. Hyperbilirubinemia    6. Aortic atherosclerosis    7. Neuropathic pain of foot, unspecified laterality    8. Leukopenia, unspecified type    9. Morbid obesity    10. Heart murmur    11. Elevated blood pressure reading without diagnosis of hypertension    12. Breast cancer screening    13. Colon cancer screening          Plan:       Yana was seen today for urinary tract infection.    Diagnoses and all orders for this visit:    Suprapubic pain  Comments:  Check UCx (she elects to do this next week). No s/sx pyleo. If neg, will refer to gyn for pelvic exam.  Orders:  -     Urine culture; Future  -     Urinalysis Microscopic; Future  -     Urinalysis; Future    Hypothyroidism, unspecified type  Comments:  Repeat TFTs. Not on thyroid  Orders:  -     TSH; Future  -     T4, free; Future    Pre-diabetes  Comments:  Diet controlled.  On steroids p.r.n. sparingly.  Check A1c. Consider starting Metformin or Trulicity d/t weight.  Orders:  -     Hemoglobin A1C; Future    Vitamin D insufficiency  Comments:  Low. Check DXA, Vit D. On Ergocalciferol.  Orders:  -      DXA Bone Density Spine And Hip; Future  -     Vitamin D; Future    Hyperbilirubinemia  Comments:  Stable. Check bili. U/S with fatty liver, no CBD dilation. Hep panel NR.  Orders:  -     Bilirubin, direct; Future    Aortic atherosclerosis  Comments:  Seen on imaging.  FLP at goal.  Will monitor.    Neuropathic pain of foot, unspecified laterality  Comments:  Pre diabetes controlled.  Recheck B12, A1C.  Following with Neurology, PT.    Leukopenia, unspecified type  Comments:  Repeat CBC next week off MTX. Following with Rheum, Onc.  Orders:  -     CBC auto differential; Future    Morbid obesity  Comments:  Decreased mobility.  Working with PT.  Refer bariatric med; caution given h/o valvular dysfunction. Check TTE. Consider metformin, Trulicity.  Orders:  -     Echo Color Flow Doppler? Yes; Future  -     Ambulatory referral/consult to Bariatric Medicine; Future    Heart murmur  Comments:  Asymptomatic.  Repeat TTE to monitor and in case she starts weight loss medications.  Orders:  -     Echo Color Flow Doppler? Yes; Future    Elevated blood pressure reading without diagnosis of hypertension  Comments:  BP has varied in the past and difficult to check BP.  Nurse visit for BP check    Breast cancer screening  -     Mammo Digital Screening Bilat w/ Jerrell; Future    Colon cancer screening  Comments:  Decreased mobility, so elected for fit kit annually.  Orders:  -     Fecal Immunochemical Test (iFOBT); Future    Other orders  -     Cancel: TSH; Future  -     Cancel: T4, free; Future  -     Cancel: Vitamin B12; Future         Side effects of medication(s) were discussed in detail and patient voiced understanding.  Patient will call back for any issues or complications.     RTC in 4 month(s) or sooner PRN for elevated BP, obesity, coordination of care.  Needs to be the last visit of the day. Nurse visit for BP check in 1-2 weeks.

## 2020-06-26 ENCOUNTER — HOSPITAL ENCOUNTER (OUTPATIENT)
Dept: CARDIOLOGY | Facility: OTHER | Age: 75
Discharge: HOME | End: 2020-06-26
Attending: INTERNAL MEDICINE
Payer: MEDICARE

## 2020-06-26 ENCOUNTER — HOSPITAL ENCOUNTER (OUTPATIENT)
Dept: RADIOLOGY | Facility: OTHER | Age: 75
Discharge: HOME | End: 2020-06-26
Attending: INTERNAL MEDICINE
Payer: MEDICARE

## 2020-06-26 ENCOUNTER — CLINICAL SUPPORT (OUTPATIENT)
Dept: INTERNAL MEDICINE | Facility: CLINIC | Age: 75
End: 2020-06-26
Payer: MEDICARE

## 2020-06-26 VITALS — SYSTOLIC BLOOD PRESSURE: 130 MMHG | OXYGEN SATURATION: 99 % | DIASTOLIC BLOOD PRESSURE: 80 MMHG | HEART RATE: 80 BPM

## 2020-06-26 VITALS
HEIGHT: 70 IN | WEIGHT: 273 LBS | DIASTOLIC BLOOD PRESSURE: 83 MMHG | BODY MASS INDEX: 39.08 KG/M2 | SYSTOLIC BLOOD PRESSURE: 150 MMHG

## 2020-06-26 VITALS — HEIGHT: 70 IN | WEIGHT: 272.94 LBS | BODY MASS INDEX: 39.07 KG/M2

## 2020-06-26 DIAGNOSIS — R01.1 HEART MURMUR: ICD-10-CM

## 2020-06-26 DIAGNOSIS — E55.9 VITAMIN D INSUFFICIENCY: ICD-10-CM

## 2020-06-26 DIAGNOSIS — E66.01 MORBID OBESITY: ICD-10-CM

## 2020-06-26 DIAGNOSIS — Z12.31 BREAST CANCER SCREENING BY MAMMOGRAM: ICD-10-CM

## 2020-06-26 DIAGNOSIS — Z12.39 BREAST CANCER SCREENING: ICD-10-CM

## 2020-06-26 DIAGNOSIS — M85.80 OSTEOPENIA, UNSPECIFIED LOCATION: Primary | ICD-10-CM

## 2020-06-26 LAB
ASCENDING AORTA: 2.81 CM
AV INDEX (PROSTH): 0.8
AV MEAN GRADIENT: 8 MMHG
AV PEAK GRADIENT: 15 MMHG
AV REGURGITATION PRESSURE HALF TIME: 549 MS
AV VALVE AREA: 2.28 CM2
AV VELOCITY RATIO: 0.67
BSA FOR ECHO PROCEDURE: 2.47 M2
CV ECHO LV RWT: 0.6 CM
DOP CALC AO PEAK VEL: 1.96 M/S
DOP CALC AO VTI: 37.88 CM
DOP CALC LVOT AREA: 2.8 CM2
DOP CALC LVOT DIAMETER: 1.9 CM
DOP CALC LVOT PEAK VEL: 1.32 M/S
DOP CALC LVOT STROKE VOLUME: 86.26 CM3
DOP CALCLVOT PEAK VEL VTI: 30.44 CM
E WAVE DECELERATION TIME: 277.95 MSEC
E/A RATIO: 0.71
E/E' RATIO: 16.83 M/S
ECHO LV POSTERIOR WALL: 1.15 CM (ref 0.6–1.1)
FRACTIONAL SHORTENING: 37 % (ref 28–44)
INTERVENTRICULAR SEPTUM: 1.1 CM (ref 0.6–1.1)
IVRT: 85.03 MSEC
LA MAJOR: 5.66 CM
LA MINOR: 5.13 CM
LA WIDTH: 5.19 CM
LEFT ATRIUM SIZE: 4.29 CM
LEFT ATRIUM VOLUME INDEX MOD: 24.3 ML/M2
LEFT ATRIUM VOLUME INDEX: 42.8 ML/M2
LEFT ATRIUM VOLUME MOD: 58 CM3
LEFT ATRIUM VOLUME: 101.86 CM3
LEFT INTERNAL DIMENSION IN SYSTOLE: 2.41 CM (ref 2.1–4)
LEFT VENTRICLE DIASTOLIC VOLUME INDEX: 26.2 ML/M2
LEFT VENTRICLE DIASTOLIC VOLUME: 62.42 ML
LEFT VENTRICLE MASS INDEX: 59 G/M2
LEFT VENTRICLE SYSTOLIC VOLUME INDEX: 8.5 ML/M2
LEFT VENTRICLE SYSTOLIC VOLUME: 20.35 ML
LEFT VENTRICULAR INTERNAL DIMENSION IN DIASTOLE: 3.81 CM (ref 3.5–6)
LEFT VENTRICULAR MASS: 139.74 G
LV LATERAL E/E' RATIO: 14.43 M/S
LV SEPTAL E/E' RATIO: 20.2 M/S
MV PEAK A VEL: 1.43 M/S
MV PEAK E VEL: 1.01 M/S
MV STENOSIS PRESSURE HALF TIME: 80.61 MS
MV VALVE AREA P 1/2 METHOD: 2.73 CM2
PISA TR MAX VEL: 2.71 M/S
PV PEAK VELOCITY: 1.57 CM/S
RA MAJOR: 4.53 CM
RA PRESSURE: 3 MMHG
RA WIDTH: 3.66 CM
RIGHT VENTRICULAR END-DIASTOLIC DIMENSION: 4.03 CM
RV TISSUE DOPPLER FREE WALL SYSTOLIC VELOCITY 1 (APICAL 4 CHAMBER VIEW): 16.96 CM/S
SINUS: 2.83 CM
STJ: 2.54 CM
TDI LATERAL: 0.07 M/S
TDI SEPTAL: 0.05 M/S
TDI: 0.06 M/S
TR MAX PG: 29 MMHG
TRICUSPID ANNULAR PLANE SYSTOLIC EXCURSION: 3.67 CM
TV REST PULMONARY ARTERY PRESSURE: 32 MMHG

## 2020-06-26 PROCEDURE — 77080 DXA BONE DENSITY AXIAL: CPT | Mod: 26,,, | Performed by: RADIOLOGY

## 2020-06-26 PROCEDURE — 99999 PR PBB SHADOW E&M-EST. PATIENT-LVL III: CPT | Mod: PBBFAC,,,

## 2020-06-26 PROCEDURE — 77063 MAMMO DIGITAL SCREENING BILAT WITH TOMOSYNTHESIS_CAD: ICD-10-PCS | Mod: 26,,, | Performed by: RADIOLOGY

## 2020-06-26 PROCEDURE — 99213 OFFICE O/P EST LOW 20 MIN: CPT | Mod: PBBFAC,25

## 2020-06-26 PROCEDURE — 77080 DXA BONE DENSITY AXIAL: CPT | Mod: TC

## 2020-06-26 PROCEDURE — 77080 DEXA BONE DENSITY SPINE HIP: ICD-10-PCS | Mod: 26,,, | Performed by: RADIOLOGY

## 2020-06-26 PROCEDURE — 93306 ECHO (CUPID ONLY): ICD-10-PCS | Mod: 26,,, | Performed by: INTERNAL MEDICINE

## 2020-06-26 PROCEDURE — 77063 BREAST TOMOSYNTHESIS BI: CPT | Mod: 26,,, | Performed by: RADIOLOGY

## 2020-06-26 PROCEDURE — 99999 PR PBB SHADOW E&M-EST. PATIENT-LVL III: ICD-10-PCS | Mod: PBBFAC,,,

## 2020-06-26 PROCEDURE — 77067 MAMMO DIGITAL SCREENING BILAT WITH TOMOSYNTHESIS_CAD: ICD-10-PCS | Mod: 26,,, | Performed by: RADIOLOGY

## 2020-06-26 PROCEDURE — 77067 SCR MAMMO BI INCL CAD: CPT | Mod: TC

## 2020-06-26 PROCEDURE — 77067 SCR MAMMO BI INCL CAD: CPT | Mod: 26,,, | Performed by: RADIOLOGY

## 2020-06-26 PROCEDURE — 93306 TTE W/DOPPLER COMPLETE: CPT | Mod: 26,,, | Performed by: INTERNAL MEDICINE

## 2020-06-26 PROCEDURE — 93306 TTE W/DOPPLER COMPLETE: CPT

## 2020-06-26 RX ORDER — ALENDRONATE SODIUM 70 MG/1
70 TABLET ORAL
Qty: 12 TABLET | Refills: 3 | Status: SHIPPED | OUTPATIENT
Start: 2020-06-26 | End: 2021-03-26

## 2020-06-26 NOTE — PROGRESS NOTES
Yana Orellana 75 y.o. female is here today for Blood Pressure check.   History of HTN no.    Review of patient's allergies indicates:   Allergen Reactions    Ibuprofen Nausea Only    Demerol [meperidine]      Euphoria    Gabapentin Other (See Comments)     Hallucinations    Mellaril-s Nausea Only    Versed [midazolam]      Excessive talking, hyper    Vicodin [hydrocodone-acetaminophen]      Joint pain, muscle pain    Xanax [alprazolam]      fatigue     Creatinine   Date Value Ref Range Status   06/05/2020 0.8 0.5 - 1.4 mg/dL Final     Sodium   Date Value Ref Range Status   06/05/2020 138 136 - 145 mmol/L Final     Potassium   Date Value Ref Range Status   06/05/2020 4.1 3.5 - 5.1 mmol/L Final   ]  Patient denies taking blood pressure medications on a regular bases at the same time of the day.     Current Outpatient Medications:     chlorhexidine (PERIDEX) 0.12 % solution, RINSE WITH 15 ML PO BID FOR 7 DAYS, Disp: , Rfl: 4    diazePAM (VALIUM) 5 MG tablet, Take 1 tablet (5 mg total) by mouth nightly as needed (fibromyalgia)., Disp: 30 tablet, Rfl: 0    ergocalciferol (ERGOCALCIFEROL) 50,000 unit Cap, Take 1 capsule (50,000 Units total) by mouth every 7 days., Disp: 12 capsule, Rfl: 1    folic acid (FOLVITE) 1 MG tablet, Take 2 tablets (2 mg total) by mouth once daily., Disp: 180 tablet, Rfl: 3    mupirocin (BACTROBAN) 2 % ointment, Apply topically 3 (three) times daily. (Patient not taking: Reported on 6/12/2020), Disp: 15 g, Rfl: 1    predniSONE (DELTASONE) 5 MG tablet, Take as needed for flare of rheumatoid arthritis, Disp: 20 tablet, Rfl: 1  Does patient have record of home blood pressure readings no.   Patient is asymptomatic.     BP: 130/80 , Pulse: 80.    Blood pressure reading after 15 minutes was 130/80, Pulse 80.  Dr. Graf notified.

## 2020-06-26 NOTE — Clinical Note
Does patient have record of home blood pressure readings no.   Patient is asymptomatic.     BP: 130/80 , Pulse: 80.    Blood pressure reading after 15 minutes was 130/80, Pulse 80.  Dr. Graf notified.

## 2020-06-28 DIAGNOSIS — R10.2 SUPRAPUBIC PAIN: Primary | ICD-10-CM

## 2020-06-28 DIAGNOSIS — E55.9 VITAMIN D INSUFFICIENCY: Primary | ICD-10-CM

## 2020-06-28 RX ORDER — ERGOCALCIFEROL 1.25 MG/1
50000 CAPSULE ORAL
Qty: 8 CAPSULE | Refills: 0 | Status: SHIPPED | OUTPATIENT
Start: 2020-06-28 | End: 2020-08-17

## 2020-07-01 ENCOUNTER — TELEPHONE (OUTPATIENT)
Dept: NEUROLOGY | Facility: CLINIC | Age: 75
End: 2020-07-01

## 2020-07-01 NOTE — TELEPHONE ENCOUNTER
----- Message from Sandra Bear sent at 7/1/2020  3:32 PM CDT -----  Regarding: patient advice  Contact: pt @ [phone]  Pt asking to speak with someone in Dr. Maza's office regarding her getting a triangle to put above her bed, says she needs help. Please call.

## 2020-07-01 NOTE — TELEPHONE ENCOUNTER
Christa,  There is no trapeze order in Epic.  Please as her to have a medical supply store (store of her choice) call us for instructions on how to order.    Also, please ask her to schedule a f/u appointment with me for a repeat examination.  MILY

## 2020-07-01 NOTE — TELEPHONE ENCOUNTER
Spoke with Ms. Orellana-    She's requesting a bed trapeze for her bedroom, she states that she's having a hard time getting in and out bed and would like a order sent to [facility]. Ms. Orellana is also asking if Dr. Maza has any recommendations at this time since she's been suffering with leg pain. Not currently in physical therapy because of the COVID 19 pandemic and what like to know what else she can do.

## 2020-07-02 ENCOUNTER — TELEPHONE (OUTPATIENT)
Dept: NEUROLOGY | Facility: CLINIC | Age: 75
End: 2020-07-02

## 2020-07-06 ENCOUNTER — PATIENT OUTREACH (OUTPATIENT)
Dept: ADMINISTRATIVE | Facility: OTHER | Age: 75
End: 2020-07-06

## 2020-07-07 ENCOUNTER — TELEPHONE (OUTPATIENT)
Dept: NEUROLOGY | Facility: CLINIC | Age: 75
End: 2020-07-07

## 2020-07-07 ENCOUNTER — OFFICE VISIT (OUTPATIENT)
Dept: NEUROLOGY | Facility: CLINIC | Age: 75
End: 2020-07-07
Payer: MEDICARE

## 2020-07-07 DIAGNOSIS — R42 LIGHTHEADEDNESS: ICD-10-CM

## 2020-07-07 DIAGNOSIS — R26.9 GAIT DISORDER: ICD-10-CM

## 2020-07-07 DIAGNOSIS — M62.89 PROXIMAL LIMB WEAKNESS: ICD-10-CM

## 2020-07-07 DIAGNOSIS — E66.01 MORBID OBESITY: ICD-10-CM

## 2020-07-07 DIAGNOSIS — R26.9 GAIT ABNORMALITY: ICD-10-CM

## 2020-07-07 DIAGNOSIS — G62.9 POLYNEUROPATHY: Primary | ICD-10-CM

## 2020-07-07 DIAGNOSIS — G25.89 OTHER SPECIFIED EXTRAPYRAMIDAL AND MOVEMENT DISORDERS: ICD-10-CM

## 2020-07-07 PROCEDURE — 99999 PR PBB SHADOW E&M-EST. PATIENT-LVL III: ICD-10-PCS | Mod: PBBFAC,,, | Performed by: PSYCHIATRY & NEUROLOGY

## 2020-07-07 PROCEDURE — 99214 OFFICE O/P EST MOD 30 MIN: CPT | Mod: S$PBB,,, | Performed by: PSYCHIATRY & NEUROLOGY

## 2020-07-07 PROCEDURE — 99999 PR PBB SHADOW E&M-EST. PATIENT-LVL III: CPT | Mod: PBBFAC,,, | Performed by: PSYCHIATRY & NEUROLOGY

## 2020-07-07 PROCEDURE — 99213 OFFICE O/P EST LOW 20 MIN: CPT | Mod: PBBFAC | Performed by: PSYCHIATRY & NEUROLOGY

## 2020-07-07 PROCEDURE — 99214 PR OFFICE/OUTPT VISIT, EST, LEVL IV, 30-39 MIN: ICD-10-PCS | Mod: S$PBB,,, | Performed by: PSYCHIATRY & NEUROLOGY

## 2020-07-07 NOTE — TELEPHONE ENCOUNTER
Spoke with Ms. Orellana, she was informed referring provider wants her to be seen in person, appt was rescheduled for 8/27/2020 at 10 am. Ms. Orellana verbalized understanding accepting appt.

## 2020-07-07 NOTE — PROGRESS NOTES
"Neurology Clinic Telephonic Follow-Up Note    Impression:  1. Gait disorder: this is chronic with recent exacerbation.  I was concerned about proximal weakness on prior exam but today's exam is more suggestive of give-way weakness.  EMG shows no myopathy and c-spine MRI negative for myelopathic etiology.  History and exam are not typical for degenerative ataxia and I am not convinced this is post-polio syndrome.  Functional etiology in DDx.  2. Lightheadedness of uncertain etiology: improved; anxiety in DDx  3. Polyneuropathy (arthritis +/- chemo-related) with probable autonomic/small fiber involvement  4. Fibromyalgia  5. Mild-mod cervical spondylosis/stenosis with chronic R C6/7 radiculopathy by EMG, asymptomatic  6. Mild B CTS by EMG  7. Hx polio  8. Vit D deficiency    Plan:  1. B12, MMA  2. Home PT/OT   3. Encouraged vitamin D supplementation  4. Refer to Movement Disorders for an opinion regarding her gait (per request)      Problem List Items Addressed This Visit        1 - High    RESOLVED: Proximal limb weakness    Gait disorder       2     Lightheadedness       3     Morbid obesity       Unprioritized    Polyneuropathy - Primary    Relevant Orders    Vitamin B12    METHYLMALONIC ACID, SERUM      Other Visit Diagnoses     Other specified extrapyramidal and movement disorders         Relevant Orders    Vitamin B12    Gait abnormality        Relevant Orders    Ambulatory referral/consult to Neurology        Patient returns for f/u of above.  Gait has worsened since our video visit.  She describes feeling "wobbly" when walking.  This has been slowly progressive since the 1980s but worse recently.  She feels the instability is weakness related.     She has not restarted PT due to COVID-19.    Dizziness has improved.    Denies depression and anxiety controlled    MRI brain - mild WM changes  MRI c-spine - spondylosis with mild stenosis  EMG/NCV:    1. Mild bilateral carpal tunnel syndrome, comparatively worse on " "the right;    2. A sensory neuropathy in the lower extremities that cannot be better characterised since there were absent responses. Of note, absent sensory responses in the lower extremities is not uncommon in this patients age group and is not necessarily to be considered pathological;    3. Chronic denervation in the right C6 and C7 myotomes suggestive of radiculopathy at those levels.     From prior note:  CC:  dizziness    HPI: 75 y/o WF referred for evaluation of "dizziness" and weakness.   She has lightheadedness that is constant but worse when bending over or reaching up.  Late in the day symptoms are worse.  No association with medications.  Symptoms started in May '19.  Has remote history of BPPV but these symptoms are different.   Has had negative orthostatics x 2.     She has a "gait" issue diagnosed as post-polio.  Also diagnosed with ataxia.  No brain MRI in over 10 yrs.  Has post-chemo PN.  Hx lung CA '09.    +dry eyes; no perspiration  No constipation; bladder ok; no bloating    CT head 7/19/19 neg      Outpatient Medications Marked as Taking for the 7/7/20 encounter (Office Visit) with Thee Maza MD   Medication Sig Dispense Refill    alendronate (FOSAMAX) 70 MG tablet Take 1 tablet (70 mg total) by mouth every 7 days. 12 tablet 3    chlorhexidine (PERIDEX) 0.12 % solution RINSE WITH 15 ML PO BID FOR 7 DAYS  4    ergocalciferol (ERGOCALCIFEROL) 50,000 unit Cap Take 1 capsule (50,000 Units total) by mouth every 7 days. for 8 doses 8 capsule 0    folic acid (FOLVITE) 1 MG tablet Take 2 tablets (2 mg total) by mouth once daily. 180 tablet 3    mupirocin (BACTROBAN) 2 % ointment Apply topically 3 (three) times daily. 15 g 1    predniSONE (DELTASONE) 5 MG tablet Take as needed for flare of rheumatoid arthritis 20 tablet 1     LMP  (LMP Unknown)     Overweight.  Awake, alert and oriented.  Language is normal. EOMF.  Power is normal with encouragement; complicated by give-way.  FNF " intact.  LE DTRs absent.  Ambulates slowly on her own; mild waddle; good pivot.    Thee Maza MD

## 2020-07-07 NOTE — LETTER
July 7, 2020      Caitlyn Graf MD  [address]  [address]  [facility] [address]           [facility]  [address]  [place] [address]  Phone: [phone]          Patient: Yana Orellana   MR Number: [id number]   YOB: 1945   Date of Visit: 7/7/2020       Dear Dr. Caitlyn Graf:    Thank you for referring Yana Orellana to me for evaluation. Attached you will find relevant portions of my assessment and plan of care.    If you have questions, please do not hesitate to call me. I look forward to following Yana Orellana along with you.    Sincerely,    Thee Maza MD    Enclosure  CC:  No Recipients    If you would like to receive this communication electronically, please contact [email or web address] or [phone] to request more information on [employer] access.    For providers and/or their staff who would like to refer a patient to [facility], please contact us through our one-stop-shop provider referral line, [facility] [place], at [phone].    If you feel you have received this communication in error or would no longer like to receive these types of communications, please e-mail [email or web address]

## 2020-07-07 NOTE — PROGRESS NOTES
Requested updates within Care Everywhere.  Patient's chart was reviewed for overdue RICHARD topics.  Immunizations reconciled.

## 2020-07-17 ENCOUNTER — TELEPHONE (OUTPATIENT)
Dept: INTERNAL MEDICINE | Facility: CLINIC | Age: 75
End: 2020-07-17

## 2020-07-17 ENCOUNTER — TELEPHONE (OUTPATIENT)
Dept: RHEUMATOLOGY | Facility: CLINIC | Age: 75
End: 2020-07-17

## 2020-07-17 DIAGNOSIS — M05.79 RHEUMATOID ARTHRITIS INVOLVING MULTIPLE SITES WITH POSITIVE RHEUMATOID FACTOR: ICD-10-CM

## 2020-07-17 RX ORDER — PREDNISONE 5 MG/1
TABLET ORAL
Qty: 20 TABLET | Refills: 1 | Status: SHIPPED | OUTPATIENT
Start: 2020-07-17 | End: 2021-03-26 | Stop reason: SDUPTHER

## 2020-07-17 NOTE — TELEPHONE ENCOUNTER
Spoke to Ms. Orellana, and informed her that Yes she can try cortisone cream and a good cream like Eucerin or  Aquafor. If it doesn't improve or if they become painful and she feels unwell she should probably be seen.  Patient states understanding with no further questions or concerns

## 2020-07-17 NOTE — TELEPHONE ENCOUNTER
----- Message from Snow Aldridge MA sent at 7/17/2020  3:48 PM CDT -----  Regarding: FW: ADVICE  Contact: Self    ----- Message -----  From: Willie Henderson  Sent: 7/17/2020  11:52 AM CDT  To: [facility] Staff  Subject: ADVICE                                           Pt states she is having pain, swelling and stiffness in both hand worse in thumbs Pt ask for a call      Contact info [phone] (home)

## 2020-07-17 NOTE — TELEPHONE ENCOUNTER
Ms. Orellana, states that she has little red dots on her bilateral legs that is round and crusty.  Not hurting, burning, or itching.  Patient refused to come in for office visit.  Patient would like to know if cortisone cream okay to use

## 2020-07-17 NOTE — TELEPHONE ENCOUNTER
----- Message from Ruma Sandoval sent at 7/17/2020 11:57 AM CDT -----  Contact: Patient [phone]  Type: Patient Call Back    Who called:Patient     What is the request in detail: Pt states that she has sores all over her legs. Have a lot of little red dots all over her legs. Sores look round and crusty. Not hurting, burning, or itching. States it's just there and looks terrible. Would like to speak to nurse to find out what she can put on it. Please call.      Would the patient rather a call back or a response via [facility]? Call back    Best call back number: [phone]

## 2020-07-21 ENCOUNTER — PATIENT OUTREACH (OUTPATIENT)
Dept: ADMINISTRATIVE | Facility: OTHER | Age: 75
End: 2020-07-21

## 2020-07-21 NOTE — PROGRESS NOTES
Care Everywhere:   Immunization: updated  Health Maintenance: updated  Media Review: reviewed for outside colon cancer report  Legacy Review:   Order placed:   Upcoming appts:  Fecal immunochemical test ordered by Dr. Graf

## 2020-07-23 ENCOUNTER — OFFICE VISIT (OUTPATIENT)
Dept: OBSTETRICS AND GYNECOLOGY | Facility: CLINIC | Age: 75
End: 2020-07-23
Attending: OBSTETRICS & GYNECOLOGY
Payer: MEDICARE

## 2020-07-23 VITALS — BODY MASS INDEX: 38.45 KG/M2 | SYSTOLIC BLOOD PRESSURE: 124 MMHG | WEIGHT: 268 LBS | DIASTOLIC BLOOD PRESSURE: 82 MMHG

## 2020-07-23 DIAGNOSIS — Z78.0 POST-MENOPAUSAL: ICD-10-CM

## 2020-07-23 DIAGNOSIS — R10.2 SUPRAPUBIC PAIN: Primary | ICD-10-CM

## 2020-07-23 DIAGNOSIS — N95.2 POSTMENOPAUSAL ATROPHIC VAGINITIS: ICD-10-CM

## 2020-07-23 PROCEDURE — 99999 PR PBB SHADOW E&M-EST. PATIENT-LVL III: CPT | Mod: PBBFAC,,, | Performed by: OBSTETRICS & GYNECOLOGY

## 2020-07-23 PROCEDURE — 99999 PR PBB SHADOW E&M-EST. PATIENT-LVL III: ICD-10-PCS | Mod: PBBFAC,,, | Performed by: OBSTETRICS & GYNECOLOGY

## 2020-07-23 PROCEDURE — 99213 OFFICE O/P EST LOW 20 MIN: CPT | Mod: PBBFAC | Performed by: OBSTETRICS & GYNECOLOGY

## 2020-07-23 PROCEDURE — 99213 PR OFFICE/OUTPT VISIT, EST, LEVL III, 20-29 MIN: ICD-10-PCS | Mod: S$PBB,,, | Performed by: OBSTETRICS & GYNECOLOGY

## 2020-07-23 PROCEDURE — 99213 OFFICE O/P EST LOW 20 MIN: CPT | Mod: S$PBB,,, | Performed by: OBSTETRICS & GYNECOLOGY

## 2020-07-23 NOTE — LETTER
July 23, 2020      Caitlyn Graf MD  [address]  [address]  [facility] [address]           [facility] [address]  [address] [facility] [address]  Phone: [phone]          Patient: Yana Orellana   MR Number: [id number]   YOB: 1945   Date of Visit: 7/23/2020       Dear Dr. Caitlyn Graf:    Thank you for referring Yana Orellana to me for evaluation. Attached you will find relevant portions of my assessment and plan of care.    If you have questions, please do not hesitate to call me. I look forward to following Yana Orellana along with you.    Sincerely,    Neymar Aquino III, MD    Enclosure  CC:  No Recipients    If you would like to receive this communication electronically, please contact [email or web address] or [phone] to request more information on [employer] access.    For providers and/or their staff who would like to refer a patient to [facility], please contact us through our one-stop-shop provider referral line, [facility], at [phone].    If you feel you have received this communication in error or would no longer like to receive these types of communications, please e-mail [email or web address]

## 2020-07-23 NOTE — PROGRESS NOTES
Subjective:     Patient ID: Yana Orellana is a 75 y.o. female.     Chief Complaint: Pelvic Pain     History of Present Illness: This patient is a 75 y.o. female, who presents to the GYN clinic for evaluation of pelvic pain.  Patient states that she had experienced pelvic discomfort few weeks ago but the discomfort is no longer present.    No LMP recorded (lmp unknown). Patient has had a hysterectomy.    Review of Systems    GENERAL: No fever, chills, fatigability or weightchange  SKIN: No rashes, itching or changes in color or texture of skin.  HEAD: No headaches or recent head trauma.  EYES: Visual acuity fine. No photophobia,r diplopia.  EARS: Denies earache or vertigo  NOSE: No loss of smell, no epistaxis or postnasal drip.  MOUTH & THROAT: No hoarseness or change in voice.   NODES: Denies swollen glands.  CHEST: Denies LARA, cyanosis, wheezing, cough and sputum production.  CARDIOVASCULAR: Denies chest pain, PND, orthopnea or reduced exercise tolerance.  ABDOMEN: Appetite fine. No weight loss. bloating, Denies diarrhea, abdominal pain, hematemesis or blood in stool.  URINARY: No flank pain, dysuria or hematuria.  PERIPHERAL VASCULAR: No claudication or cyanosis.Varicosities  MUSCULOSKELETAL: No joint stiffness or swelling. Denies back pain.muscle aches  NEUROLOGIC: No history of seizures, paralysis, alteration of gait or coordination.       Objective:       Physical Exam     APPEARANCE: Well nourished, well developed, in no acute distress.    GENITOURINARY:  Vulva: No lesions. Normal female genital architecture.   Urethral Meatus: Normal size and location, no lesions, no prolapse.  Urethra: No masses, tenderness, prolapse or scarring.  Vagina: thin, atrophic and with decreased rugae, no discharge, no significant cystocele or rectocele.  Cervix: Absent.  Uterus: Absent  Adnexa: No masses, tenderness or CDS nodularity.  Anus Perineum: No lesions, no relaxation, no external hemorrhoids.  Abdomen: No masses,  tenderness, hernia or ascites, no hepatasplenomegaly  Skin: No lesions, ulcers, acne, hirsutism.  Peripheral/lower extremities: No edema, erythema or tenderness.  Rash noted  Lymphatic: no groin nodes palp.  Mental Status: Alert, oriented x 3, normal affect and mood.          @PROCEDURE:@         Assessment:      1. Suprapubic pain    2. Post-menopausal    3. Postmenopausal atrophic vaginitis               Plan:  1.  The patient's pelvic exam was benign there was no evidence of any significant pelvic pathology on exam.  2.  If the pain reoccurs would consider ordering a pelvic ultrasound.  3.  Return to clinic p.r.n..                  No orders of the defined types were placed in this encounter.

## 2020-07-24 ENCOUNTER — NURSE TRIAGE (OUTPATIENT)
Dept: ADMINISTRATIVE | Facility: CLINIC | Age: 75
End: 2020-07-24

## 2020-07-25 NOTE — TELEPHONE ENCOUNTER
Reason for Disposition   Urinating more frequently than usual (i.e., frequency)    Additional Information   Negative: Shock suspected (e.g., cold/pale/clammy skin, too weak to stand, low BP, rapid pulse)   Negative: Sounds like a life-threatening emergency to the triager   Negative: [1] Unable to urinate (or only a few drops) > 4 hours AND     [2] bladder feels very full (e.g., palpable bladder or strong urge to urinate)   Negative: Fever > 100.5 F (38.1 C)   Negative: Side (flank) or lower back pain present   Negative: [1] Can't control passage of urine (i.e., urinary incontinence) AND [2] new onset (< 2 weeks) or worsening    Protocols used: ST URINARY SYMPTOMS-A-AH    Patient reports urinary frequency and burning today. She had a gyn exam yesterday and thinks that may have contributed to it. No fever, urine is clear. She is drinking coca cola and I recommended she drink water instead. Patient offered [employer] and she accepted.     I called [facility] and spoke with the dispatcher Nicole; she states she will get the call out right away.     Mrs. Orellana advised to wait by the phone for their call and she verbalized understanding.       Mrs. Orellana called back to report that she didn't see anyone outside of her home. Called [employer] to make sure they had the correct address and they confirmed the medic is there. Went back to the patient's call and she states she sees someone now donning gloves and ppe and waved at her.

## 2020-07-28 NOTE — TELEPHONE ENCOUNTER
"Called pt, she states she is currently on "pyridium and a sulfa medication" states feeling better today  "

## 2020-08-04 ENCOUNTER — OFFICE VISIT (OUTPATIENT)
Dept: INTERNAL MEDICINE | Facility: CLINIC | Age: 75
End: 2020-08-04
Payer: MEDICARE

## 2020-08-04 DIAGNOSIS — M85.80 OSTEOPENIA, UNSPECIFIED LOCATION: ICD-10-CM

## 2020-08-04 DIAGNOSIS — E03.9 HYPOTHYROIDISM, UNSPECIFIED TYPE: ICD-10-CM

## 2020-08-04 DIAGNOSIS — R73.03 PRE-DIABETES: ICD-10-CM

## 2020-08-04 DIAGNOSIS — L98.9 SKIN LESION: Primary | ICD-10-CM

## 2020-08-04 DIAGNOSIS — R10.2 PELVIC PAIN: ICD-10-CM

## 2020-08-04 PROCEDURE — 99443 PR PHYSICIAN TELEPHONE EVALUATION 21-30 MIN: CPT | Mod: 95,,, | Performed by: INTERNAL MEDICINE

## 2020-08-04 PROCEDURE — 99443 PR PHYSICIAN TELEPHONE EVALUATION 21-30 MIN: ICD-10-PCS | Mod: 95,,, | Performed by: INTERNAL MEDICINE

## 2020-08-26 ENCOUNTER — TELEPHONE (OUTPATIENT)
Dept: NEUROLOGY | Facility: CLINIC | Age: 75
End: 2020-08-26

## 2020-08-26 NOTE — TELEPHONE ENCOUNTER
Spoke with Ms. Orellana, she was informed due to the Inclement weather Thursday 8/27 appt are being converted to virtual visits, there are no in person appts. Ms. Orellana verbalized understanding rescheduling the appt for 11/12/2020 at 1:20. Appt letter mailed.

## 2020-08-28 ENCOUNTER — PATIENT OUTREACH (OUTPATIENT)
Dept: ADMINISTRATIVE | Facility: OTHER | Age: 75
End: 2020-08-28

## 2020-08-28 NOTE — PROGRESS NOTES
Care Everywhere:   Immunization: updated  Health Maintenance: updated  Media Review: review for outside colon cancer report   Legacy Review:   Order placed:   Upcoming appts:

## 2020-08-31 ENCOUNTER — OFFICE VISIT (OUTPATIENT)
Dept: RHEUMATOLOGY | Facility: CLINIC | Age: 75
End: 2020-08-31
Payer: MEDICARE

## 2020-08-31 VITALS
HEIGHT: 70 IN | DIASTOLIC BLOOD PRESSURE: 60 MMHG | HEART RATE: 70 BPM | BODY MASS INDEX: 38.45 KG/M2 | SYSTOLIC BLOOD PRESSURE: 118 MMHG

## 2020-08-31 DIAGNOSIS — D69.6 THROMBOCYTOPENIA: ICD-10-CM

## 2020-08-31 DIAGNOSIS — M85.80 OSTEOPENIA, UNSPECIFIED LOCATION: ICD-10-CM

## 2020-08-31 DIAGNOSIS — M79.7 FIBROMYALGIA: Chronic | ICD-10-CM

## 2020-08-31 DIAGNOSIS — M05.79 RHEUMATOID ARTHRITIS INVOLVING MULTIPLE SITES WITH POSITIVE RHEUMATOID FACTOR: Primary | ICD-10-CM

## 2020-08-31 PROCEDURE — 99999 PR PBB SHADOW E&M-EST. PATIENT-LVL III: CPT | Mod: PBBFAC,,, | Performed by: INTERNAL MEDICINE

## 2020-08-31 PROCEDURE — 99214 OFFICE O/P EST MOD 30 MIN: CPT | Mod: S$PBB,,, | Performed by: INTERNAL MEDICINE

## 2020-08-31 PROCEDURE — 99999 PR PBB SHADOW E&M-EST. PATIENT-LVL III: ICD-10-PCS | Mod: PBBFAC,,, | Performed by: INTERNAL MEDICINE

## 2020-08-31 PROCEDURE — 99214 PR OFFICE/OUTPT VISIT, EST, LEVL IV, 30-39 MIN: ICD-10-PCS | Mod: S$PBB,,, | Performed by: INTERNAL MEDICINE

## 2020-08-31 PROCEDURE — 99213 OFFICE O/P EST LOW 20 MIN: CPT | Mod: PBBFAC | Performed by: INTERNAL MEDICINE

## 2020-08-31 RX ORDER — OXYCODONE AND ACETAMINOPHEN 5; 325 MG/1; MG/1
1 TABLET ORAL EVERY 4 HOURS PRN
Qty: 20 TABLET | Refills: 0 | Status: SHIPPED | OUTPATIENT
Start: 2020-08-31 | End: 2021-03-26

## 2020-08-31 RX ORDER — DIAZEPAM 5 MG/1
5 TABLET ORAL NIGHTLY PRN
Qty: 30 TABLET | Refills: 0 | Status: ON HOLD | OUTPATIENT
Start: 2020-08-31 | End: 2021-06-01 | Stop reason: HOSPADM

## 2020-08-31 ASSESSMENT — ROUTINE ASSESSMENT OF PATIENT INDEX DATA (RAPID3)
MDHAQ FUNCTION SCORE: 1.3
FATIGUE SCORE: 0
PAIN SCORE: 2
WHEN YOU AWAKENED IN THE MORNING OVER THE LAST WEEK, PLEASE INDICATE THE AMOUNT OF TIME IT TAKES UNTIL YOU ARE AS LIMBER AS YOU WILL BE FOR THE DAY: 10 MINUTES
PATIENT GLOBAL ASSESSMENT SCORE: 1.5
AM STIFFNESS SCORE: 1, YES
TOTAL RAPID3 SCORE: 2.61
PSYCHOLOGICAL DISTRESS SCORE: 0

## 2020-08-31 NOTE — ASSESSMENT & PLAN NOTE
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

## 2020-08-31 NOTE — PROGRESS NOTES
"Subjective:       Patient ID: Yana Orellana is a 75 y.o. female.    Chief Complaint: Disease Management    Mod pos CCP/RF RA since 2009    MTX 2011 - March 2020; MTX stopped due to thrombocytopenia      Hx lung cancer dx Nov 2009, treated with surgery   Hx polio age 18; "mostly back and neck"; maybe some post-polio syndrome       Has had very few flareups of RA/palindromic since stopped methotrexate in March  When has used prednisone has been able to stop after 3-4 days    Physically limited at home; walks holding furniture; sits a lot  Limited bed mobility   4 mo of PT did not help her much    Review of Systems   Constitutional: Negative for fatigue, fever and unexpected weight change.   HENT: Negative for mouth sores and trouble swallowing.         Dry mouth   Eyes: Negative for redness.        Dry eyes   Respiratory: Negative for cough and shortness of breath.    Cardiovascular: Negative for chest pain.   Gastrointestinal: Positive for diarrhea. Negative for abdominal pain and constipation.   Genitourinary: Positive for dysuria. Negative for genital sores.   Skin: Positive for rash. Negative for color change.   Neurological: Positive for headaches.   Hematological: Negative for adenopathy. Bruises/bleeds easily.   Psychiatric/Behavioral: Negative for sleep disturbance.         Objective:   /60   Pulse 70   Ht 5' 10" (1.778 m)   LMP  (LMP Unknown)   BMI 38.45 kg/m²      Physical Exam   Musculoskeletal:      Comments: No synovitis of hands or UE  Knees not tender           10/4/2019 2/3/2020 8/31/2020   VILLAFANA-28 (ESR) 4.44 (Moderate disease activity) 3.61 (Moderate disease activity) --   VILLAFANA-28 (CRP) 3.71 (Moderate disease activity) 2.74 (Low disease activity) --   Tender (VILLAFANA-28) 2 / 28  0 / 28  0 / 28    Swollen (VILLAAFNA-28) 0 / 28 1 / 28  0 / 28    Provider Global 20 mm 25 mm --   Patient Global 80 mm 50 mm --   ESR 37 mm/hr 43 mm/hr --   CRP 9.3 mg/L 8.2 mg/L --     Lab Results   Component Value Date "    SEDRATE 25 (H) 06/05/2020     Lab Results   Component Value Date    WBC 4.38 06/26/2020    HGB 13.3 06/26/2020    HCT 38.9 06/26/2020    MCV 99 (H) 06/26/2020    PLT 81 (L) 06/26/2020           Assessment:       1. Rheumatoid arthritis involving multiple sites with positive rheumatoid factor    2. Fibromyalgia    3. Thrombocytopenia    4. Osteopenia, unspecified location          Plan:       Problem List Items Addressed This Visit        Active Problems    Osteopenia    Rheumatoid arthritis involving multiple sites with positive rheumatoid factor - Primary     RA seems to be in remission; No active synovitis since stopped MTX over 3 mo ago    Back to occasional palindromic rheumatism relieved by 3-4 days prednisone    Will leave her off of DMARD and see how she progresses over next 6 months; will consider alternative DMARDs if she develops active RA     RTC me 6 mo         Relevant Medications    oxyCODONE-acetaminophen (PERCOCET) 5-325 mg per tablet    Fibromyalgia (Chronic)     Pain not currently as previously         Relevant Medications    diazePAM (VALIUM) 5 MG tablet    Thrombocytopenia     Etiology uncertain ; now off of methotrexate

## 2020-09-01 ENCOUNTER — TELEPHONE (OUTPATIENT)
Dept: BARIATRICS | Facility: CLINIC | Age: 75
End: 2020-09-01

## 2020-09-01 NOTE — TELEPHONE ENCOUNTER
Phoned pt in regards to questions about upcoming consult. Pt stated has concerns about Inbody scale due to not able to stand without support for a long period of time. Informed pt if she is unable to stand for more than 1 minute we will not suggest her to stand on In body scale. Pt states she uses family support to walk to far places.

## 2020-09-01 NOTE — TELEPHONE ENCOUNTER
----- Message from Felipe Mchugh sent at 9/1/2020 11:38 AM CDT -----  Patient called to speak w/ someone regarding the details of her upcoming appt, requesting callback [phone]

## 2020-09-09 ENCOUNTER — INITIAL CONSULT (OUTPATIENT)
Dept: BARIATRICS | Facility: CLINIC | Age: 75
End: 2020-09-09
Payer: MEDICARE

## 2020-09-09 VITALS
OXYGEN SATURATION: 98 % | HEIGHT: 70 IN | SYSTOLIC BLOOD PRESSURE: 118 MMHG | HEART RATE: 74 BPM | WEIGHT: 278.88 LBS | DIASTOLIC BLOOD PRESSURE: 72 MMHG | BODY MASS INDEX: 39.93 KG/M2

## 2020-09-09 DIAGNOSIS — E66.01 CLASS 3 SEVERE OBESITY DUE TO EXCESS CALORIES WITH SERIOUS COMORBIDITY AND BODY MASS INDEX (BMI) OF 40.0 TO 44.9 IN ADULT: Primary | ICD-10-CM

## 2020-09-09 PROCEDURE — 99999 PR PBB SHADOW E&M-EST. PATIENT-LVL IV: CPT | Mod: PBBFAC,,, | Performed by: STUDENT IN AN ORGANIZED HEALTH CARE EDUCATION/TRAINING PROGRAM

## 2020-09-09 PROCEDURE — 99999 PR PBB SHADOW E&M-EST. PATIENT-LVL IV: ICD-10-PCS | Mod: PBBFAC,,, | Performed by: STUDENT IN AN ORGANIZED HEALTH CARE EDUCATION/TRAINING PROGRAM

## 2020-09-09 PROCEDURE — 99215 OFFICE O/P EST HI 40 MIN: CPT | Mod: S$PBB,,, | Performed by: STUDENT IN AN ORGANIZED HEALTH CARE EDUCATION/TRAINING PROGRAM

## 2020-09-09 PROCEDURE — 99215 PR OFFICE/OUTPT VISIT, EST, LEVL V, 40-54 MIN: ICD-10-PCS | Mod: S$PBB,,, | Performed by: STUDENT IN AN ORGANIZED HEALTH CARE EDUCATION/TRAINING PROGRAM

## 2020-09-09 PROCEDURE — 99214 OFFICE O/P EST MOD 30 MIN: CPT | Mod: PBBFAC | Performed by: STUDENT IN AN ORGANIZED HEALTH CARE EDUCATION/TRAINING PROGRAM

## 2020-09-09 NOTE — PROGRESS NOTES
Subjective:       Patient ID: Yana Orellana is a 75 y.o. female.    Chief Complaint: weight gain    Patient presents for treatment of obesity. Weight has increased with age and increasingly sedentary lifestyle. Problems with mobility, has needed a wheel chair for the past year. Unable to cook for herself.    Co-morbidities:   Firbromyalgia, RA, Polyneuropathy  Vitamin D Insufficiency  Lung Cancer  Diastolic Dysfunction, mitral and tricuspid regurg, PVC    History of Weight Loss Efforts  Used phen phen many years ago    Current Physical Activity  Yoga    Current Eating Habits  Breakfast - malt-o-meal, scrambled eggs  Lunch - often skips, high protein slim fast, yogurt  Dinner - meat and vegetables, fish, has gotten difficult since can no longer cook  Snacks  Beverages - coke, water, sparkling ice    Alcohol: no    Tobacco: no    Sleep: 8-9 hours/night      Review of Systems   Constitutional: Negative for chills and fever.   HENT: Negative for sore throat and trouble swallowing.    Eyes: Negative for visual disturbance.        Last eye exam in June; negative for glaucoma   Respiratory: Negative for cough and shortness of breath.    Cardiovascular: Negative for chest pain, palpitations and leg swelling.   Gastrointestinal: Negative for abdominal pain, constipation, diarrhea, nausea, vomiting and reflux.   Endocrine: Negative for cold intolerance and heat intolerance.        Negative for thyroid cancer   Genitourinary: Negative for difficulty urinating.        Negative for kidney stones   Musculoskeletal: Positive for gait problem. Negative for arthralgias and back pain.   Integumentary:  Negative for rash.   Allergic/Immunologic: Negative for immunocompromised state.   Neurological: Positive for vertigo, disturbances in coordination and coordination difficulties. Negative for dizziness, seizures, light-headedness and headaches.   Hematological: Does not bruise/bleed easily.   Psychiatric/Behavioral: The patient  is not nervous/anxious.          Objective:      Ref. Range 1/6/2020 13:20   Sodium Latest Ref Range: 136 - 145 mmol/L 139   Potassium Latest Ref Range: 3.5 - 5.1 mmol/L 4.0   Chloride Latest Ref Range: 95 - 110 mmol/L 109   CO2 Latest Ref Range: 23 - 29 mmol/L 24   Anion Gap Latest Ref Range: 8 - 16 mmol/L 6 (L)   BUN, Bld Latest Ref Range: 8 - 23 mg/dL 10   Creatinine Latest Ref Range: 0.5 - 1.4 mg/dL 0.7   eGFR if non African American Latest Ref Range: >60 mL/min/1.73 m^2 >60.0   eGFR if African American Latest Ref Range: >60 mL/min/1.73 m^2 >60.0   Glucose Latest Ref Range: 70 - 110 mg/dL 124 (H)   Calcium Latest Ref Range: 8.7 - 10.5 mg/dL 8.5 (L)   Alkaline Phosphatase Latest Ref Range: 55 - 135 U/L 95   PROTEIN TOTAL Latest Ref Range: 6.0 - 8.4 g/dL 6.7   Albumin Latest Ref Range: 3.5 - 5.2 g/dL 2.6 (L)   BILIRUBIN TOTAL Latest Ref Range: 0.1 - 1.0 mg/dL 1.8 (H)   AST Latest Ref Range: 10 - 40 U/L 41 (H)   ALT Latest Ref Range: 10 - 44 U/L 19   CRP Latest Ref Range: 0.0 - 8.2 mg/L 8.2      Ref. Range 9/12/2019 14:06   Cholesterol Latest Ref Range: 120 - 199 mg/dL 142   HDL Latest Ref Range: 40 - 75 mg/dL 35 (L)   Hdl/Cholesterol Ratio Latest Ref Range: 20.0 - 50.0 % 24.6   LDL Cholesterol External Latest Ref Range: 63.0 - 159.0 mg/dL 85.6   Non-HDL Cholesterol Latest Units: mg/dL 107   Total Cholesterol/HDL Ratio Latest Ref Range: 2.0 - 5.0  4.1   Triglycerides Latest Ref Range: 30 - 150 mg/dL 107      Ref. Range 6/26/2020 12:40   Vit D, 25-Hydroxy Latest Ref Range: 30 - 96 ng/mL 12 (L)   Hemoglobin A1C External Latest Ref Range: 4.0 - 5.6 % 5.0   Estimated Avg Glucose Latest Ref Range: 68 - 131 mg/dL 97   TSH Latest Ref Range: 0.400 - 4.000 uIU/mL 3.283   Free T4 Latest Ref Range: 0.71 - 1.51 ng/dL 0.73      Ref. Range 7/7/2020 10:04   Vitamin B-12 Latest Ref Range: 210 - 950 pg/mL 1322 (H)     EKG (6/2019):  Normal sinus rhythm   Left axis deviation   Minimal voltage criteria for LVH, may be normal variant    Abnormal ECG     Echo (6/2020):  · Concentric left ventricular remodeling.  · Normal central venous pressure (3 mmHg).  · The estimated PA systolic pressure is 32 mmHg.  · Normal left ventricular systolic function. The estimated ejection fraction is 67%.  · Grade I (mild) left ventricular diastolic dysfunction consistent with impaired relaxation.  · No wall motion abnormalities.  · Normal right ventricular systolic function.  · Moderate left atrial enlargement.  · Mild aortic regurgitation.  · Mild tricuspid regurgitation.    Unable to stand on InBody  Weight 278 lb 14.1 oz  BMI 40.02    Vitals:    09/09/20 1456   BP: 118/72   Pulse: 74       Physical Exam  Vitals signs reviewed.   Constitutional:       General: She is not in acute distress.     Appearance: Normal appearance. She is obese. She is not ill-appearing, toxic-appearing or diaphoretic.   HENT:      Head: Normocephalic and atraumatic.   Eyes:      Extraocular Movements: Extraocular movements intact.   Neck:      Musculoskeletal: Normal range of motion.   Cardiovascular:      Rate and Rhythm: Normal rate.   Pulmonary:      Effort: Pulmonary effort is normal. No respiratory distress.   Abdominal:      Palpations: Abdomen is soft.      Tenderness: There is no abdominal tenderness.   Musculoskeletal:      Right lower leg: No edema.      Left lower leg: No edema.      Comments: In wheelchair   Skin:     General: Skin is warm and dry.   Neurological:      General: No focal deficit present.      Mental Status: She is alert and oriented to person, place, and time.      Gait: Gait normal.   Psychiatric:         Mood and Affect: Mood normal.         Behavior: Behavior normal.         Thought Content: Thought content normal.         Judgment: Judgment normal.         Assessment:       1. Class 3 severe obesity due to excess calories with serious comorbidity and body mass index (BMI) of 40.0 to 44.9 in adult        Plan:   - Discussed Metformin and GLP1 agonists.  Patient opted against medication at this time.    - Recommended logging all food and beverage intake with a daily calorie goal of 1200 calories    - Seated resistance band exercises given in AVS    - Return to clinic in 3 months

## 2020-09-09 NOTE — PATIENT INSTRUCTIONS
Food and Beverage Log:  Keep a log of all food and beverages that you consume.  Keeping track of calories will help you lose weight, because monitoring will help you become more aware of what you are eating and recognize your eating patterns.  Be mindful of your hunger level before eating and your satiety level after eating.  Just keeping records will help you make healthy changes in your diet and eat fewer calories.    Tips for keeping a calorie count:     ? Each day, aim for the daily calorie goal.     ? Record your food intake immediately after eating. If possible, look up calories before or immediately after eating.     ? Download an shirley like My Fitness Pal, Lose It!, or [employer] (Code: 65035) to keep track of your calories.    ? Measuring foods (using measuring cups or spoons) will help you be more accurate with counting calories.     ? Keep a running calorie count throughout the day.     ? Count daily calories toward a weekly calorie total. If you eat too many calories one day, cut back slightly over the next few days to meet your weekly goal.       Meal Planning & Grocery Shopping    Meal planning builds the foundation for healthy eating. When you have structured ideas for healthy meals and foods available at home to prepare those meals, weight control becomes easier.  If only healthy foods are available at home, then you will be much more likely to eat healthy foods. And you will be less likely to go to a restaurant or  a fast food meal, which tend to be unhealthy and higher in calories than meals prepared at home.      Take 5-10 minutes each week to plan meals for the next 7 days.  Make a grocery list based on the meal plan.    Grocery Shopping Tips:  ? Shop on a full stomach.  ? Schedule your shopping for times when you are most motivated and able to be disciplined about your purchases. For example, after a stressful day at work it may be difficult to make the healthiest choices. Shopping at other  times, such as early in the morning or after dinner, may be easier.  ? Focus your shopping on the outside aisles of the store, which tend to contain more fresh foods and lower calorie foods. The inside aisles tend to have more processed foods.  ? Stick to your list. Avoid buying unhealthy items just because they are on sale.   ? Compare nutrition labels to check the number of calories and percentage of fat.      What to buy:    Vegetables  - Fresh vegetables  - Frozen vegetables with no sauce or added salt  - Canned vegetables with no sauce or added salt    Protein  - Lean meats, such as chicken and turkey  - Limit red meats, such as beef to no more than 1x/week  - Limit processed meats, such as cold cuts, jefferson, sausage, and hot dogs. Look for brands that have no nitrites and are minimally processed. Consider turkey sausage or turkey jefferson.  - Fish and Shellfish  - Eggs  - Dried beans  - Canned beans (reduced sodium)    Fat  - Use healthy oils, such as olive oil or canola oil, for cooking, salad dressings, etc.  - Unflavored nuts and seeds  - Nut butters (no added sugar)    Dairy  - Yogurt (no sugar added)  - Cheese  - Low-fat milk  - Unsweetened nondairy milks (almond milk, soy milk, etc)    Fruit  - Fresh Fruit  - Frozen fruit with no added sugar  - Canned fruit with no added sugar  - Dried fruit with no added sugar  - 100% fruit juice    Whole Grains  - Single ingredient grains, such as oats, quinoa, brown rice  - Whole-wheat pasta  - Sprouted whole-grain bread    What to avoid:  - Avoid fried foods  - Avoid foods with added sugar  - Avoid sugar-sweetened beverages  - Avoid ultra-processed foods      SEATED RESISTANCE BAND EXERCISES    If you do not have a resistance band, or do not feel comfortable using a resistance band, these exercises can also be done holding a light hand weight or water bottle.  If you are just starting to exercise, you may want to go through the motions without any weights or resistance  till you become comfortable with the movements.    Do each of the movements shown 10 times (10 repetitions). You can repeat the exercises a second or  third time as well for greater benefit. The amount of tension on the resistance bands should be adjusted so  you can complete one set of 10 repetitions with effort. Increase the tension every few weeks. Do these  exercises 2 or 3 times a week.    * If an exercise hurts your back or joints, stop doing that particular exercise, but keep doing all the others *      CHEST EXERCISE        Start Position:   Sit tall, with feet shoulder width apart and feet in front of knees.   Belly pulled in.   Place the band around your upper back, grasp band in each hand, knuckles (rings) facing front. Arms  should be bent so that knuckles are in front of elbows   Slide shoulder blades down your back and slightly together (as if making a V).   Relax your neck.    To Perform This Exercise:   Press hands forward to lengthen arms using chest muscles (not arms!).   Dont arch your back!)   Return to start position and repeat 10 times.   Breathe!        BACK EXERCISE        Start Position:   Sit tall, with feet shoulder width apart and feet in front of knees.   Belly pulled in.   Grasp band in each hand and raise overhead. Arms should be slightly wider than shoulder width and no  slack in the band.   Slide shoulder blades down your back and slightly together (as if making a V).   Relax your neck.    To Perform This Exercise:   Open arms pulling down towards chest using upper back muscles (not arms!).   Squeeze through shoulder blades at the bottom of the movement (dont arch your back!).   Return to start position and repeat 10 times.   Breathe!        SHOULDER EXERCISE        Start Position:   Sit tall, with feet shoulder width apart and feet in front of knees.   Belly pulled in.   Sit on band so that you can grasp band in one hand with tension on the band, but not  so much tension that  you cannot straighten the arm. It may take a few tries to find the right amount of tension.   Slide shoulder blades down your back and slightly together (as if making a V).   Relax your neck.    To Perform This Exercise:   Press fist to the ceiling, slightly in front of the body.   SLOWLY return to start position and repeat 10 times.   Switch sides and repeat on the other side.   Breathe!        TRICEPS EXERCISE        Start Position:   Sit tall, with feet shoulder width apart and feet in front of knees.   Belly pulled in.   Sit on one end of the band. Grasp other end of band in one hand.   Point elbow directly toward the ceiling (if this is difficult, you may support the upper arm with the opposite  hand)   Be sure there is no slack in the band in the starting position.   Slide shoulder blades down your back and slightly together (as if making a V).   Relax your neck.    To Perform This Exercise:   Extend your arm up to the ceiling, as shown.   Squeeze through shoulder blades at the bottom of the movement (dont arch your back!).   SLOWLY return to start position and repeat 10 times.   Repeat on the other side.   Breathe!        BICEP EXERCISE        Start Position:   Sit tall, with feet shoulder width apart and feet in front of knees.   Belly pulled in.   Place center of exercise band under one foot and step the end of the band under the other foot.   Grasp each end of the band with one hand. Make sure there is no slack between your foot and the hand  that holds the band.   Hold your elbows to your sides.   Pull abdominals in, lift chest, press shoulders down and back.    To Perform This Exercise:   As you curl up, keep your wrist from changing position in relation to your forearm and your arm stable  from the shoulder to the elbow.   Bend and straighten your elbow in a slow and controlled movement. Repeat this motion 10 times.   Repeat on the other side.    Breathe!

## 2020-09-09 NOTE — LETTER
September 10, 2020      Caitlyn Graf MD  [address]  [address]  [facility] [address]           [facility] - Bariatric Surg 2nd Fl  [address]  [facility] [address]  Phone: [phone]  Fax: [phone]          Patient: Yana Orellana   MR Number: [id number]   YOB: 1945   Date of Visit: 9/9/2020       Dear Dr. Caitlyn Graf:    Thank you for referring Yana Orellana to me for evaluation. Attached you will find relevant portions of my assessment and plan of care.    If you have questions, please do not hesitate to call me. I look forward to following Yana Orellana along with you.    Sincerely,    Jacki Song MD    Enclosure  CC:  No Recipients    If you would like to receive this communication electronically, please contact [email or web address] or [phone] to request more information on [employer] access.    For providers and/or their staff who would like to refer a patient to [facility], please contact us through our one-stop-shop provider referral line, [facility], at [phone].    If you feel you have received this communication in error or would no longer like to receive these types of communications, please e-mail [email or web address]

## 2020-10-12 ENCOUNTER — OFFICE VISIT (OUTPATIENT)
Dept: INTERNAL MEDICINE | Facility: CLINIC | Age: 75
End: 2020-10-12
Payer: MEDICARE

## 2020-10-12 VITALS
DIASTOLIC BLOOD PRESSURE: 78 MMHG | SYSTOLIC BLOOD PRESSURE: 126 MMHG | HEIGHT: 70 IN | BODY MASS INDEX: 38.94 KG/M2 | WEIGHT: 272 LBS | OXYGEN SATURATION: 97 % | HEART RATE: 79 BPM

## 2020-10-12 DIAGNOSIS — M85.80 OSTEOPENIA, UNSPECIFIED LOCATION: ICD-10-CM

## 2020-10-12 DIAGNOSIS — E66.01 CLASS 3 SEVERE OBESITY DUE TO EXCESS CALORIES WITH SERIOUS COMORBIDITY AND BODY MASS INDEX (BMI) OF 40.0 TO 44.9 IN ADULT: ICD-10-CM

## 2020-10-12 DIAGNOSIS — R73.03 PRE-DIABETES: ICD-10-CM

## 2020-10-12 DIAGNOSIS — R03.0 ELEVATED BLOOD PRESSURE READING WITHOUT DIAGNOSIS OF HYPERTENSION: Primary | ICD-10-CM

## 2020-10-12 PROCEDURE — 99999 PR PBB SHADOW E&M-EST. PATIENT-LVL IV: ICD-10-PCS | Mod: PBBFAC,,, | Performed by: INTERNAL MEDICINE

## 2020-10-12 PROCEDURE — 99215 OFFICE O/P EST HI 40 MIN: CPT | Mod: S$PBB,,, | Performed by: INTERNAL MEDICINE

## 2020-10-12 PROCEDURE — 99215 PR OFFICE/OUTPT VISIT, EST, LEVL V, 40-54 MIN: ICD-10-PCS | Mod: S$PBB,,, | Performed by: INTERNAL MEDICINE

## 2020-10-12 PROCEDURE — 99214 OFFICE O/P EST MOD 30 MIN: CPT | Mod: PBBFAC | Performed by: INTERNAL MEDICINE

## 2020-10-12 PROCEDURE — 99999 PR PBB SHADOW E&M-EST. PATIENT-LVL IV: CPT | Mod: PBBFAC,,, | Performed by: INTERNAL MEDICINE

## 2020-10-12 NOTE — PROGRESS NOTES
Subjective:       Patient ID: Yana Orellana is a 75 y.o. female who  has a past medical history of Anemia, Anxiety, Cataract, Colon polyps (2012), Fibromyalgia (10/15/2012), Lung cancer, Pneumonia due to infectious organism (7/19/2019), Post-polio syndrome, Rheumatoid arthritis(714.0) (7/16/2012), Thyroid disease, and Vertigo.    Chief Complaint: Hypertension     History was obtained from the patient and supplemented through chart review  -saw bariatrics for obesity    Has caretaker, Inessa who helps with walking outside the home.  Difficulty walking up stairs, incline.  Is unsteady, but walks at home without assistance.  Uses a scooter at the grocery store. [place] background.  Has been to [place].  Enjoys traveling.  Goal to travel to each continent but not [place].    Hypertension  Pertinent negatives include no chest pain, palpitations or shortness of breath.     Elevated BP:    It is difficult to check her BP since she becomes uncomfortable when the BP cuff is inflated over 100 mmHg.  Is amenable to wrist BP measurement.   Diet, exercise as below.  Avoid salt.  Doesn't eat deli meat. Eats canned soup not often.    -Nurse /80, Pulse 80    Obesity: BMI 39.   Was on Phen Phen in the past, but caused valvular dysfunction.  Repeat TTE wnl. She is hesitant to take medications or bariatric sx.  Saw Bariatric Medicine, but she decided against medications such as metformin, GLP1.     Exercise: Has decreased mobility limited d/t FMA. Will see movement specialist. Did PT, but she doesn't want to continue at this time due to fear of COVID. Enjoys swimming, but none d/t COVID.  Uses a scooter at the grocery store. Walks at home.  Bariatrics provided home exercises.    Diet: Eats fruits, veggies. Not much meat, carbs.  Hard to cook. Doesn't eat regularly d/t decreased appetite.  Drinks Boost/Slim Fast. Denies depression.  No fast food.  Breakfast: cereal  Lunch: yogurt  Snacks: not grazing    Pre diabetes:     Is not on medications.  On prednisone p.r.n., takes 5-6 pills a year.  Initially high sugar during COVID; improved.  Hemoglobin A1C   Date Value Ref Range Status   06/26/2020 5.0 4.0 - 5.6 % Final     Comment:     ADA Screening Guidelines:  5.7-6.4%  Consistent with prediabetes  >or=6.5%  Consistent with diabetes  High levels of fetal hemoglobin interfere with the HbA1C  assay. Heterozygous hemoglobin variants (HbS, HgC, etc)do  not significantly interfere with this assay.   However, presence of multiple variants may affect accuracy.     01/06/2020 5.4 4.0 - 5.6 % Final     Comment:     ADA Screening Guidelines:  5.7-6.4%  Consistent with prediabetes  >or=6.5%  Consistent with diabetes  High levels of fetal hemoglobin interfere with the HbA1C  assay. Heterozygous hemoglobin variants (HbS, HgC, etc)do  not significantly interfere with this assay.   However, presence of multiple variants may affect accuracy.     09/12/2019 5.4 4.0 - 5.6 % Final     Comment:     ADA Screening Guidelines:  5.7-6.4%  Consistent with prediabetes  >or=6.5%  Consistent with diabetes  High levels of fetal hemoglobin interfere with the HbA1C  assay. Heterozygous hemoglobin variants (HbS, HgC, etc)do  not significantly interfere with this assay.   However, presence of multiple variants may affect accuracy.       Osteopenia, Vitamin D deficiency:  DXA 6/2020 with elevated FRAX score. Started alendronate 6/2020, Ca/Vit D supplement.  Exercise:  Limited as above.  Lab Results   Component Value Date    LDQSOFBD98CW 12 (L) 06/26/2020    JBGQNFGN29LH 13 (L) 09/12/2019    BEBAGWFD30EK 12 (L) 08/17/2018               Not addressed today.  Hypothyroidism:    Diagnosed in 1995.  Has been off Synthroid, Liothyronine. Saw endocrine in 2018.  Goal is normal TSH.  Not on thyroid meds.  TFTs WNL.  Monitor annually.  Lab Results   Component Value Date    TSH 3.283 06/26/2020    S5BSVWJ 86 08/17/2018    FREET4 0.73 06/26/2020     Transaminitis,  hyperbilirubinemia:  Isolated elevated AST since at least .  Hepatitis panel negative.  GGT, CPK WNL.  Ultrasound with fatty liver.  No CBD dilation; history of cholecystectomy.   Stable. Normal LFTs, alk-phos.  Direct hyperbilirubinemia, but does not suggest hepatic or biliary origin. 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  Due to benign nature, no further workup needed.     Aortic atherosclerosis:   Seen on imaging.  FLP at goal.  Will monitor.  Lab Results   Component Value Date    LDLCALC 85.6 09/12/2019     The 10-year ASCVD risk score (Anjelangelique ROSARIO Jr., et al., 2013) is: 12.3%    Values used to calculate the score:      Age: 75 years      Sex: Female      Is Non- : No      Diabetic: No      Tobacco smoker: No      Systolic Blood Pressure: 126 mmHg      Is BP treated: No      HDL Cholesterol: 35 mg/dL      Total Cholesterol: 142 mg/dL    Polyneuropathy, proximal weakness, mild to moderate cervical stenosis:    Unclear etiology.  History of chemo for lung cancer. H/o polio (neck region) at age 18.  EMG without myopathy, but with bilateral carpal tunnel syndrome.  No significant cervical stenosis.  Saw Neuro. Likely functional, anxiety. Not c/w post polio syndrome. B12, MMA wnl. Cont Vit D supplement.  Referred to movement d/o per her request.   PT/OT. Planning to continue PT after COVID.  Lab Results   Component Value Date    HGBA1C 5.0 06/26/2020     Lab Results   Component Value Date    FRPFVXOQ60 1322 (H) 07/07/2020     Thrombocytopenia, leukopenia:  Thrombocytopenia is chronic, but leukopenia is new. Has chronic ecchymosis on her arms.  Has occasional epistasis after blowing her nose.  Noted on labs by Rheumatology.  Held MTX with improvement. Dr. Ahuja is her Onc.    Improved off MTX. Following with Rheum, Onc.    Colon polyp:  C scope in 2012.  Recommending in 3 years, but she declined d/t decreased mobility.  Is now doing fit kit annually.    History of lung cancer:   "  Diagnosed . S/p LLL lobectomy.  Completed 3-4 cycles of chemo.  In remission.  Quit smoking in 2009.  CT chest  during admission for pneumonitis with diffuse central lobar emphysema.  Following with oncology, Pulm (Dr. Dumont) at [facility].  Is off O2 and Flovent.  Does not need repeat chest imaging unless pulmonary symptoms recur.    RA, fibromyalgia:  Following with rheumatology.  Stopped MTX d/t leukopenia as above.  On prednisone p.r.n. for flares.  On Percocet, diazepam sparingly for muscle tension, prescribed by Rheumatology.    History of lightheadedness:  Thought to be due to anxiety.    Murmur:  Reports valvular dysfunction after taking weight loss drug in the past.  Asymptomatic.  Denies CP, SOB.  TTE 6/2020 with normal EF.  Grade 1 diastolic dysfunction.  No WMA.  Mild aortic and tricuspid regurg.   Asymptomatic.  No further workup needed at this time.    Xerostomia:  Has chlorhexidine PRN.    Review of Systems   Constitutional: Negative for fever and unexpected weight change.   HENT: Negative for rhinorrhea and sneezing.    Eyes: Negative for redness and itching.   Respiratory: Negative for shortness of breath and wheezing.    Cardiovascular: Negative for chest pain and palpitations.   Gastrointestinal: Negative for abdominal pain and nausea.   Genitourinary: Negative for dysuria, frequency and hematuria.   Musculoskeletal: Positive for arthralgias and gait problem.   Skin: Negative for color change and rash.   Neurological: Negative for dizziness and light-headedness.   Hematological: Negative for adenopathy.   Psychiatric/Behavioral: Negative for confusion. The patient is not nervous/anxious.        I personally reviewed Past Medical History, Past Surgical History, Social History, and Family History.    Objective:      Vitals:    10/12/20 1451 10/12/20 1532   BP: (!) 144/80 126/78   Pulse: 79    SpO2: 97%    Weight: 123.4 kg (272 lb)    Height: 5' 10" (1.778 m)       Physical " Exam  Constitutional:       General: She is not in acute distress.     Appearance: She is well-developed. She is not diaphoretic.   HENT:      Head: Normocephalic and atraumatic.      Nose: Nose normal.      Mouth/Throat:      Pharynx: No oropharyngeal exudate.   Eyes:      General: No scleral icterus.        Right eye: No discharge.         Left eye: No discharge.   Neck:      Musculoskeletal: Neck supple.      Thyroid: No thyromegaly.      Trachea: No tracheal deviation.   Cardiovascular:      Rate and Rhythm: Normal rate and regular rhythm.      Heart sounds: Murmur present.   Pulmonary:      Effort: Pulmonary effort is normal. No respiratory distress.      Breath sounds: Normal breath sounds. No wheezing.   Abdominal:      General: Bowel sounds are normal. There is no distension.      Palpations: Abdomen is soft.      Tenderness: There is no abdominal tenderness.   Musculoskeletal:         General: No deformity.      Right lower leg: No edema.      Left lower leg: No edema.   Lymphadenopathy:      Cervical: No cervical adenopathy.   Skin:     General: Skin is warm and dry.      Findings: No erythema.   Neurological:      Mental Status: She is alert.      Cranial Nerves: No cranial nerve deficit.      Gait: Gait abnormal (wheelchair).   Psychiatric:         Behavior: Behavior normal.           Lab Results   Component Value Date    WBC 4.38 06/26/2020    HGB 13.3 06/26/2020    HCT 38.9 06/26/2020    PLT 81 (L) 06/26/2020    CHOL 142 09/12/2019    TRIG 107 09/12/2019    HDL 35 (L) 09/12/2019    ALT 23 06/05/2020    AST 49 (H) 06/05/2020     06/05/2020    K 4.1 06/05/2020     06/05/2020    CREATININE 0.8 06/05/2020    BUN 11 06/05/2020    CO2 17 (L) 06/05/2020    TSH 3.283 06/26/2020    INR 1.2 07/19/2019    HGBA1C 5.0 06/26/2020       The 10-year ASCVD risk score (Anjel ABRAHAM Jr., et al., 2013) is: 12.3%    Values used to calculate the score:      Age: 75 years      Sex: Female      Is Non-   American: No      Diabetic: No      Tobacco smoker: No      Systolic Blood Pressure: 126 mmHg      Is BP treated: No      HDL Cholesterol: 35 mg/dL      Total Cholesterol: 142 mg/dL    (Imaging have been independently reviewed)  6/2020 Mammogram without evidence of malignancy, BI-RADS 1, TC score low.    Assessment:       1. Elevated blood pressure reading without diagnosis of hypertension    2. Class 3 severe obesity due to excess calories with serious comorbidity and body mass index (BMI) of 40.0 to 44.9 in adult    3. Pre-diabetes    4. Osteopenia, unspecified location          Plan:       Yana was seen today for hypertension.    Diagnoses and all orders for this visit:    Elevated blood pressure reading without diagnosis of hypertension  Comments:  Difficulty checking BP due to discomfort, but improved on wrist BP.  Discussed diet, exercise as tolerated.  Continue to monitor.    Class 3 severe obesity due to excess calories with serious comorbidity and body mass index (BMI) of 40.0 to 44.9 in adult  Comments:  Stable.  Decreased mobility; exercise as tolerated. She declined metformin, Trulicity.  F/u with bariatric med.      Pre-diabetes  Comments:  Diet controlled.  On steroids p.r.n. sparingly.  She is hesitant to start meds. Will check A1C annually.    Osteopenia, unspecified location  Comments:  Stable.  At risk due to limited mobility.  Cont alendronate, calcium, vitamin-D supplement. Weight bearing exercises as tolerated.         Side effects of medication(s) were discussed in detail and patient voiced understanding.  Patient will call back for any issues or complications.     RTC in 6 month(s) or sooner PRN for elevated BP, obesity.

## 2020-10-13 ENCOUNTER — PATIENT OUTREACH (OUTPATIENT)
Dept: FAMILY MEDICINE | Facility: CLINIC | Age: 75
End: 2020-10-13

## 2020-10-18 ENCOUNTER — NURSE TRIAGE (OUTPATIENT)
Dept: ADMINISTRATIVE | Facility: CLINIC | Age: 75
End: 2020-10-18

## 2020-10-19 NOTE — TELEPHONE ENCOUNTER
Reason for Disposition   [1] Drinking very little AND [2] dehydration suspected (e.g., no urine > 12 hours, very dry mouth, very lightheaded)    Additional Information   Negative: Shock suspected (e.g., cold/pale/clammy skin, too weak to stand, low BP, rapid pulse)   Negative: Difficult to awaken or acting confused (e.g., disoriented, slurred speech)   Negative: [1] Difficulty breathing AND [2] bluish lips, tongue or face   Negative: New onset rash with multiple purple (or blood-colored) spots or dots   Negative: Sounds like a life-threatening emergency to the triager   Negative: Fever in a cancer patient who is currently (or recently) receiving chemotherapy or radiation therapy, or cancer patient who has metastatic or end-stage cancer and is receiving palliative care   Negative: Pregnant   Negative: Postpartum (from 0 to 6 weeks after delivery)   Negative: Fever onset within 24 hours of receiving vaccine   Negative: [1] Fever AND [2] within 14 days of COVID-19 Exposure   Negative: Other symptom is present, see that guideline  (e.g., symptoms of cough, runny nose, sore throat, earache, abdominal pain, diarrhea, vomiting)   Negative: [1] Headache AND [2] stiff neck (can't touch chin to chest)   Negative: Difficulty breathing   Negative: IV drug abuse    Protocols used: FEVER-A-AH  Pt called re having fever all week low grade  T99.7 max or. Usually 97. Tired. no ST, no cough. No SOB. alittle dizzy and nausea. no CP. no arm or jaw pains. When fever spikes gets dizzy and nausea. Pt admits to being mostly in bed today. hx post polio, hx poly neuropathy , had chemo 11 years ago. Pt states only risk is obesity. Had flu shot and pna shot on 9/25 . loose stools today 6 a day. sticky ad copious. Rec ED. Pt states that she will think about it. call back with questions.

## 2020-11-11 ENCOUNTER — PATIENT OUTREACH (OUTPATIENT)
Dept: ADMINISTRATIVE | Facility: OTHER | Age: 75
End: 2020-11-11

## 2020-11-11 NOTE — PROGRESS NOTES
Care Everywhere:   Immunization: updated, links delay   Health Maintenance: updated  Media Review: review for outside colon cancer report  Legacy Review:   Order placed:   Upcoming appts:   Fecal immunochemical test ordered by Dr. Graf

## 2020-11-12 ENCOUNTER — OFFICE VISIT (OUTPATIENT)
Dept: NEUROLOGY | Facility: CLINIC | Age: 75
End: 2020-11-12
Payer: MEDICARE

## 2020-11-12 VITALS
HEIGHT: 70 IN | SYSTOLIC BLOOD PRESSURE: 169 MMHG | DIASTOLIC BLOOD PRESSURE: 76 MMHG | HEART RATE: 98 BPM | WEIGHT: 272.06 LBS | BODY MASS INDEX: 38.95 KG/M2

## 2020-11-12 DIAGNOSIS — G62.9 POLYNEUROPATHY: ICD-10-CM

## 2020-11-12 DIAGNOSIS — R26.9 GAIT ABNORMALITY: Primary | ICD-10-CM

## 2020-11-12 DIAGNOSIS — R26.9 GAIT DISORDER: ICD-10-CM

## 2020-11-12 PROCEDURE — 99215 OFFICE O/P EST HI 40 MIN: CPT | Mod: PBBFAC | Performed by: PSYCHIATRY & NEUROLOGY

## 2020-11-12 PROCEDURE — 99215 PR OFFICE/OUTPT VISIT, EST, LEVL V, 40-54 MIN: ICD-10-PCS | Mod: S$PBB,,, | Performed by: PSYCHIATRY & NEUROLOGY

## 2020-11-12 PROCEDURE — 99999 PR PBB SHADOW E&M-EST. PATIENT-LVL V: CPT | Mod: PBBFAC,,, | Performed by: PSYCHIATRY & NEUROLOGY

## 2020-11-12 PROCEDURE — 99999 PR PBB SHADOW E&M-EST. PATIENT-LVL V: ICD-10-PCS | Mod: PBBFAC,,, | Performed by: PSYCHIATRY & NEUROLOGY

## 2020-11-12 PROCEDURE — 99215 OFFICE O/P EST HI 40 MIN: CPT | Mod: S$PBB,,, | Performed by: PSYCHIATRY & NEUROLOGY

## 2020-11-12 NOTE — ASSESSMENT & PLAN NOTE
Proximal weakness b/l Legs, subtle. Appears out of proportion to gait abilities. Suggested CK, neuromuscular consult. Lspine MRI due to reported bowl incontinence.

## 2020-11-12 NOTE — ASSESSMENT & PLAN NOTE
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 As she has a hx cisplatin use this may the the causative agent. Will f/u reversible neuropathy labs.  Suggested  PT

## 2020-11-12 NOTE — LETTER
November 12, 2020      Thee Maza MD  [address]  [facility] [address]           [facility]  [address]  [facility] [address]  Phone: [phone]  Fax: [phone]          Patient: Yana Orellana   MR Number: [id number]   YOB: 1945   Date of Visit: 11/12/2020       Dear Dr. Thee Maza:    Thank you for referring Yana Orellana to me for evaluation. Attached you will find relevant portions of my assessment and plan of care.    If you have questions, please do not hesitate to call me. I look forward to following Yana Orellana along with you.    Sincerely,    Maite Block MD    Enclosure  CC:  No Recipients    If you would like to receive this communication electronically, please contact [email or web address] or [phone] to request more information on [employer] access.    For providers and/or their staff who would like to refer a patient to [facility], please contact us through our one-stop-shop provider referral line, [facility], at [phone].    If you feel you have received this communication in error or would no longer like to receive these types of communications, please e-mail [email or web address]

## 2020-11-16 ENCOUNTER — TELEPHONE (OUTPATIENT)
Dept: NEUROLOGY | Facility: CLINIC | Age: 75
End: 2020-11-16

## 2020-11-17 PROCEDURE — G0180 PR HOME HEALTH MD CERTIFICATION: ICD-10-PCS | Mod: ,,, | Performed by: PSYCHIATRY & NEUROLOGY

## 2020-11-17 PROCEDURE — G0180 MD CERTIFICATION HHA PATIENT: HCPCS | Mod: ,,, | Performed by: PSYCHIATRY & NEUROLOGY

## 2020-11-19 ENCOUNTER — HOSPITAL ENCOUNTER (OUTPATIENT)
Dept: RADIOLOGY | Facility: HOSPITAL | Age: 75
Discharge: HOME | End: 2020-11-19
Attending: PSYCHIATRY & NEUROLOGY
Payer: MEDICARE

## 2020-11-19 DIAGNOSIS — R26.9 GAIT ABNORMALITY: ICD-10-CM

## 2020-11-19 PROCEDURE — 72148 MRI LUMBAR SPINE WITHOUT CONTRAST: ICD-10-PCS | Mod: 26,,, | Performed by: RADIOLOGY

## 2020-11-19 PROCEDURE — 72148 MRI LUMBAR SPINE W/O DYE: CPT | Mod: 26,,, | Performed by: RADIOLOGY

## 2020-11-19 PROCEDURE — 72148 MRI LUMBAR SPINE W/O DYE: CPT | Mod: TC

## 2020-11-20 ENCOUNTER — PATIENT OUTREACH (OUTPATIENT)
Dept: FAMILY MEDICINE | Facility: CLINIC | Age: 75
End: 2020-11-20

## 2020-11-20 ENCOUNTER — DOCUMENTATION ONLY (OUTPATIENT)
Dept: NEUROLOGY | Facility: CLINIC | Age: 75
End: 2020-11-20

## 2020-11-20 NOTE — PROGRESS NOTES
MRI Lspine shows mod R foraminal and mild L foraminal stenosis  It appears her gait issues are out of proportion to this imaging

## 2020-11-27 ENCOUNTER — EXTERNAL HOME HEALTH (OUTPATIENT)
Dept: HOME HEALTH SERVICES | Facility: HOSPITAL | Age: 75
End: 2020-11-27
Payer: MEDICARE

## 2021-01-01 ENCOUNTER — PATIENT OUTREACH (OUTPATIENT)
Dept: ADMINISTRATIVE | Facility: OTHER | Age: 76
End: 2021-01-01
Payer: MEDICARE

## 2021-01-01 ENCOUNTER — TELEPHONE (OUTPATIENT)
Dept: RHEUMATOLOGY | Facility: CLINIC | Age: 76
End: 2021-01-01

## 2021-01-01 ENCOUNTER — PES CALL (OUTPATIENT)
Dept: ADMINISTRATIVE | Facility: CLINIC | Age: 76
End: 2021-01-01

## 2021-01-01 ENCOUNTER — LAB VISIT (OUTPATIENT)
Dept: LAB | Facility: OTHER | Age: 76
End: 2021-01-01
Attending: PSYCHIATRY & NEUROLOGY
Payer: MEDICARE

## 2021-01-01 ENCOUNTER — OFFICE VISIT (OUTPATIENT)
Dept: ENDOCRINOLOGY | Facility: CLINIC | Age: 76
End: 2021-01-01
Payer: MEDICARE

## 2021-01-01 ENCOUNTER — OFFICE VISIT (OUTPATIENT)
Dept: NEUROLOGY | Facility: CLINIC | Age: 76
End: 2021-01-01
Payer: MEDICARE

## 2021-01-01 ENCOUNTER — PATIENT MESSAGE (OUTPATIENT)
Dept: NEUROLOGY | Facility: CLINIC | Age: 76
End: 2021-01-01
Payer: MEDICARE

## 2021-01-01 VITALS — DIASTOLIC BLOOD PRESSURE: 80 MMHG | BODY MASS INDEX: 37.59 KG/M2 | HEIGHT: 70 IN | SYSTOLIC BLOOD PRESSURE: 132 MMHG

## 2021-01-01 VITALS
HEART RATE: 84 BPM | HEIGHT: 70 IN | SYSTOLIC BLOOD PRESSURE: 140 MMHG | DIASTOLIC BLOOD PRESSURE: 80 MMHG | WEIGHT: 262 LBS | BODY MASS INDEX: 37.51 KG/M2

## 2021-01-01 DIAGNOSIS — I10 ESSENTIAL HYPERTENSION: ICD-10-CM

## 2021-01-01 DIAGNOSIS — E21.3 HYPERPARATHYROIDISM: Primary | ICD-10-CM

## 2021-01-01 DIAGNOSIS — E55.9 VITAMIN D DEFICIENCY: ICD-10-CM

## 2021-01-01 DIAGNOSIS — M05.79 RHEUMATOID ARTHRITIS INVOLVING MULTIPLE SITES WITH POSITIVE RHEUMATOID FACTOR: Primary | ICD-10-CM

## 2021-01-01 DIAGNOSIS — M85.80 OSTEOPENIA, UNSPECIFIED LOCATION: ICD-10-CM

## 2021-01-01 DIAGNOSIS — R29.898 PROXIMAL LEG WEAKNESS: Primary | ICD-10-CM

## 2021-01-01 DIAGNOSIS — M05.79 RHEUMATOID ARTHRITIS INVOLVING MULTIPLE SITES WITH POSITIVE RHEUMATOID FACTOR: ICD-10-CM

## 2021-01-01 DIAGNOSIS — R29.898 PROXIMAL LEG WEAKNESS: ICD-10-CM

## 2021-01-01 LAB
KAPPA LC SER QL IA: 7.83 MG/DL (ref 0.33–1.94)
KAPPA LC/LAMBDA SER IA: 1.5 (ref 0.26–1.65)
LAMBDA LC SER QL IA: 5.23 MG/DL (ref 0.57–2.63)

## 2021-01-01 PROCEDURE — 99215 PR OFFICE/OUTPT VISIT, EST, LEVL V, 40-54 MIN: ICD-10-PCS | Mod: S$PBB,,, | Performed by: PSYCHIATRY & NEUROLOGY

## 2021-01-01 PROCEDURE — 99215 OFFICE O/P EST HI 40 MIN: CPT | Mod: S$PBB,,, | Performed by: PSYCHIATRY & NEUROLOGY

## 2021-01-01 PROCEDURE — 99213 OFFICE O/P EST LOW 20 MIN: CPT | Mod: PBBFAC | Performed by: STUDENT IN AN ORGANIZED HEALTH CARE EDUCATION/TRAINING PROGRAM

## 2021-01-01 PROCEDURE — 83520 IMMUNOASSAY QUANT NOS NONAB: CPT | Performed by: PSYCHIATRY & NEUROLOGY

## 2021-01-01 PROCEDURE — 99204 PR OFFICE/OUTPT VISIT, NEW, LEVL IV, 45-59 MIN: ICD-10-PCS | Mod: S$PBB,GC,, | Performed by: STUDENT IN AN ORGANIZED HEALTH CARE EDUCATION/TRAINING PROGRAM

## 2021-01-01 PROCEDURE — 99999 PR PBB SHADOW E&M-EST. PATIENT-LVL III: CPT | Mod: PBBFAC,,, | Performed by: PSYCHIATRY & NEUROLOGY

## 2021-01-01 PROCEDURE — 99999 PR PBB SHADOW E&M-EST. PATIENT-LVL III: ICD-10-PCS | Mod: PBBFAC,GC,, | Performed by: STUDENT IN AN ORGANIZED HEALTH CARE EDUCATION/TRAINING PROGRAM

## 2021-01-01 PROCEDURE — 36415 COLL VENOUS BLD VENIPUNCTURE: CPT | Performed by: PSYCHIATRY & NEUROLOGY

## 2021-01-01 PROCEDURE — 99999 PR PBB SHADOW E&M-EST. PATIENT-LVL III: ICD-10-PCS | Mod: PBBFAC,,, | Performed by: PSYCHIATRY & NEUROLOGY

## 2021-01-01 PROCEDURE — 99999 PR PBB SHADOW E&M-EST. PATIENT-LVL III: CPT | Mod: PBBFAC,GC,, | Performed by: STUDENT IN AN ORGANIZED HEALTH CARE EDUCATION/TRAINING PROGRAM

## 2021-01-01 PROCEDURE — 99204 OFFICE O/P NEW MOD 45 MIN: CPT | Mod: S$PBB,GC,, | Performed by: STUDENT IN AN ORGANIZED HEALTH CARE EDUCATION/TRAINING PROGRAM

## 2021-01-01 PROCEDURE — 99213 OFFICE O/P EST LOW 20 MIN: CPT | Mod: PBBFAC | Performed by: PSYCHIATRY & NEUROLOGY

## 2021-01-01 RX ORDER — VIT C/E/ZN/COPPR/LUTEIN/ZEAXAN 250MG-90MG
2000 CAPSULE ORAL DAILY
Qty: 90 CAPSULE | Refills: 6 | Status: SHIPPED | OUTPATIENT
Start: 2021-01-01 | End: 2022-01-01 | Stop reason: SDUPTHER

## 2021-01-01 RX ORDER — ERGOCALCIFEROL 1.25 MG/1
50000 CAPSULE ORAL DAILY
Qty: 7 CAPSULE | Refills: 0 | Status: SHIPPED | OUTPATIENT
Start: 2021-01-01 | End: 2021-01-01

## 2021-01-01 RX ORDER — OXYCODONE AND ACETAMINOPHEN 5; 325 MG/1; MG/1
1 TABLET ORAL EVERY 8 HOURS PRN
Qty: 21 TABLET | Refills: 0 | Status: ON HOLD | OUTPATIENT
Start: 2021-01-01 | End: 2022-01-01 | Stop reason: HOSPADM

## 2021-01-01 RX ORDER — VIT C/E/ZN/COPPR/LUTEIN/ZEAXAN 250MG-90MG
2000 CAPSULE ORAL DAILY
Qty: 90 CAPSULE | Refills: 6 | Status: SHIPPED | OUTPATIENT
Start: 2021-01-01 | End: 2021-01-01 | Stop reason: SDUPTHER

## 2021-01-01 RX ORDER — PREDNISONE 5 MG/1
5-10 TABLET ORAL 2 TIMES DAILY PRN
Qty: 30 TABLET | Refills: 1 | Status: SHIPPED | OUTPATIENT
Start: 2021-01-01 | End: 2021-01-01 | Stop reason: SDUPTHER

## 2021-01-01 RX ORDER — PREDNISONE 5 MG/1
5-10 TABLET ORAL 2 TIMES DAILY PRN
Qty: 30 TABLET | Refills: 1 | Status: ON HOLD | OUTPATIENT
Start: 2021-01-01 | End: 2022-01-01

## 2021-01-25 ENCOUNTER — TELEPHONE (OUTPATIENT)
Dept: NEUROLOGY | Facility: CLINIC | Age: 76
End: 2021-01-25

## 2021-01-27 ENCOUNTER — TELEPHONE (OUTPATIENT)
Dept: RHEUMATOLOGY | Facility: HOSPITAL | Age: 76
End: 2021-01-27

## 2021-01-27 DIAGNOSIS — M65.9 TENOSYNOVITIS OF FINGER: Primary | ICD-10-CM

## 2021-02-01 ENCOUNTER — PATIENT OUTREACH (OUTPATIENT)
Dept: ADMINISTRATIVE | Facility: OTHER | Age: 76
End: 2021-02-01

## 2021-02-02 ENCOUNTER — OFFICE VISIT (OUTPATIENT)
Dept: NEUROLOGY | Facility: CLINIC | Age: 76
End: 2021-02-02
Payer: MEDICARE

## 2021-02-02 ENCOUNTER — LAB VISIT (OUTPATIENT)
Dept: LAB | Facility: OTHER | Age: 76
End: 2021-02-02
Attending: PSYCHIATRY & NEUROLOGY
Payer: MEDICARE

## 2021-02-02 VITALS — HEART RATE: 83 BPM | SYSTOLIC BLOOD PRESSURE: 182 MMHG | DIASTOLIC BLOOD PRESSURE: 77 MMHG

## 2021-02-02 DIAGNOSIS — R29.898 WEAKNESS OF BOTH HIPS: ICD-10-CM

## 2021-02-02 DIAGNOSIS — R29.898 WEAKNESS OF BOTH HIPS: Primary | ICD-10-CM

## 2021-02-02 DIAGNOSIS — R26.9 GAIT ABNORMALITY: ICD-10-CM

## 2021-02-02 PROCEDURE — 86235 NUCLEAR ANTIGEN ANTIBODY: CPT | Mod: 59

## 2021-02-02 PROCEDURE — 99215 OFFICE O/P EST HI 40 MIN: CPT | Mod: S$PBB,,, | Performed by: PSYCHIATRY & NEUROLOGY

## 2021-02-02 PROCEDURE — 36415 COLL VENOUS BLD VENIPUNCTURE: CPT

## 2021-02-02 PROCEDURE — 83516 IMMUNOASSAY NONANTIBODY: CPT | Mod: 59

## 2021-02-02 PROCEDURE — 99215 PR OFFICE/OUTPT VISIT, EST, LEVL V, 40-54 MIN: ICD-10-PCS | Mod: S$PBB,,, | Performed by: PSYCHIATRY & NEUROLOGY

## 2021-02-02 PROCEDURE — 99999 PR PBB SHADOW E&M-EST. PATIENT-LVL II: CPT | Mod: PBBFAC,,, | Performed by: PSYCHIATRY & NEUROLOGY

## 2021-02-02 PROCEDURE — 99999 PR PBB SHADOW E&M-EST. PATIENT-LVL II: ICD-10-PCS | Mod: PBBFAC,,, | Performed by: PSYCHIATRY & NEUROLOGY

## 2021-02-02 PROCEDURE — 99212 OFFICE O/P EST SF 10 MIN: CPT | Mod: PBBFAC | Performed by: PSYCHIATRY & NEUROLOGY

## 2021-02-08 ENCOUNTER — TELEPHONE (OUTPATIENT)
Dept: NEUROLOGY | Facility: CLINIC | Age: 76
End: 2021-02-08

## 2021-02-08 DIAGNOSIS — R26.9 GAIT DISORDER: ICD-10-CM

## 2021-02-08 DIAGNOSIS — E66.01 CLASS 3 SEVERE OBESITY DUE TO EXCESS CALORIES WITH SERIOUS COMORBIDITY AND BODY MASS INDEX (BMI) OF 40.0 TO 44.9 IN ADULT: ICD-10-CM

## 2021-02-08 DIAGNOSIS — M79.2 NEUROPATHIC PAIN OF FOOT, UNSPECIFIED LATERALITY: Primary | ICD-10-CM

## 2021-02-09 ENCOUNTER — HOSPITAL ENCOUNTER (OUTPATIENT)
Dept: RADIOLOGY | Facility: HOSPITAL | Age: 76
Discharge: HOME | End: 2021-02-09
Attending: PSYCHIATRY & NEUROLOGY
Payer: MEDICARE

## 2021-02-09 DIAGNOSIS — R29.898 WEAKNESS OF BOTH HIPS: ICD-10-CM

## 2021-02-09 PROCEDURE — 73718 MRI LOWER EXTREMITY W/O DYE: CPT | Mod: TC,LT

## 2021-02-09 PROCEDURE — 73718 MRI LOWER EXTREMITY W/O DYE: CPT | Mod: 26,LT,, | Performed by: RADIOLOGY

## 2021-02-09 PROCEDURE — 73718 MRI FEMUR WITHOUT CONTRAST LEFT: ICD-10-PCS | Mod: 26,LT,, | Performed by: RADIOLOGY

## 2021-02-10 ENCOUNTER — TELEPHONE (OUTPATIENT)
Dept: ORTHOPEDICS | Facility: CLINIC | Age: 76
End: 2021-02-10

## 2021-02-10 DIAGNOSIS — R52 PAIN: Primary | ICD-10-CM

## 2021-02-11 ENCOUNTER — OFFICE VISIT (OUTPATIENT)
Dept: ORTHOPEDICS | Facility: CLINIC | Age: 76
End: 2021-02-11
Payer: MEDICARE

## 2021-02-11 ENCOUNTER — HOSPITAL ENCOUNTER (OUTPATIENT)
Dept: RADIOLOGY | Facility: OTHER | Age: 76
Discharge: HOME | End: 2021-02-11
Attending: ORTHOPAEDIC SURGERY
Payer: MEDICARE

## 2021-02-11 VITALS — HEIGHT: 70 IN | BODY MASS INDEX: 38.94 KG/M2 | WEIGHT: 272 LBS

## 2021-02-11 DIAGNOSIS — M65.30 TRIGGER FINGER OF LEFT HAND, UNSPECIFIED FINGER: Primary | ICD-10-CM

## 2021-02-11 DIAGNOSIS — M65.9 TENOSYNOVITIS OF FINGER: ICD-10-CM

## 2021-02-11 DIAGNOSIS — R52 PAIN: ICD-10-CM

## 2021-02-11 PROCEDURE — 73130 X-RAY EXAM OF HAND: CPT | Mod: TC,FY,LT

## 2021-02-11 PROCEDURE — 20550 NJX 1 TENDON SHEATH/LIGAMENT: CPT | Mod: PBBFAC,LT | Performed by: ORTHOPAEDIC SURGERY

## 2021-02-11 PROCEDURE — 99999 PR PBB SHADOW E&M-EST. PATIENT-LVL III: ICD-10-PCS | Mod: PBBFAC,,, | Performed by: ORTHOPAEDIC SURGERY

## 2021-02-11 PROCEDURE — 99203 PR OFFICE/OUTPT VISIT, NEW, LEVL III, 30-44 MIN: ICD-10-PCS | Mod: 25,S$PBB,, | Performed by: ORTHOPAEDIC SURGERY

## 2021-02-11 PROCEDURE — 99999 PR PBB SHADOW E&M-EST. PATIENT-LVL III: CPT | Mod: PBBFAC,,, | Performed by: ORTHOPAEDIC SURGERY

## 2021-02-11 PROCEDURE — 20550 TENDON SHEATH: ICD-10-PCS | Mod: S$PBB,F3,, | Performed by: ORTHOPAEDIC SURGERY

## 2021-02-11 PROCEDURE — 99203 OFFICE O/P NEW LOW 30 MIN: CPT | Mod: 25,S$PBB,, | Performed by: ORTHOPAEDIC SURGERY

## 2021-02-11 PROCEDURE — 73130 X-RAY EXAM OF HAND: CPT | Mod: 26,LT,, | Performed by: RADIOLOGY

## 2021-02-11 PROCEDURE — 99213 OFFICE O/P EST LOW 20 MIN: CPT | Mod: PBBFAC,25 | Performed by: ORTHOPAEDIC SURGERY

## 2021-02-11 PROCEDURE — 73130 XR HAND COMPLETE 3 VIEW LEFT: ICD-10-PCS | Mod: 26,LT,, | Performed by: RADIOLOGY

## 2021-02-11 RX ORDER — DEXAMETHASONE SODIUM PHOSPHATE 4 MG/ML
4 INJECTION, SOLUTION INTRA-ARTICULAR; INTRALESIONAL; INTRAMUSCULAR; INTRAVENOUS; SOFT TISSUE
Status: DISCONTINUED | OUTPATIENT
Start: 2021-02-11 | End: 2021-02-11 | Stop reason: HOSPADM

## 2021-02-11 RX ADMIN — DEXAMETHASONE SODIUM PHOSPHATE 4 MG: 4 INJECTION INTRA-ARTICULAR; INTRALESIONAL; INTRAMUSCULAR; INTRAVENOUS; SOFT TISSUE at 11:02

## 2021-02-15 DIAGNOSIS — R29.898 WEAKNESS OF HIP, UNSPECIFIED LATERALITY: Primary | ICD-10-CM

## 2021-02-20 LAB
EJ AB SER QL: NOT DETECTED
ENA JO1 AB SER IA-ACNC: NORMAL AI
KU AB SER QL: NOT DETECTED
MI2 AB SER QL: NOT DETECTED
OJ AB SER QL: NOT DETECTED
PL12 AB SER QL: NOT DETECTED
PL7 AB SER QL: NOT DETECTED
SRP AB SERPL QL: NOT DETECTED

## 2021-02-22 ENCOUNTER — PES CALL (OUTPATIENT)
Dept: ADMINISTRATIVE | Facility: CLINIC | Age: 76
End: 2021-02-22

## 2021-02-23 ENCOUNTER — PATIENT MESSAGE (OUTPATIENT)
Dept: RHEUMATOLOGY | Facility: CLINIC | Age: 76
End: 2021-02-23

## 2021-03-03 ENCOUNTER — TELEPHONE (OUTPATIENT)
Dept: NEUROLOGY | Facility: CLINIC | Age: 76
End: 2021-03-03

## 2021-03-10 ENCOUNTER — TELEPHONE (OUTPATIENT)
Dept: NEUROLOGY | Facility: CLINIC | Age: 76
End: 2021-03-10

## 2021-03-23 ENCOUNTER — TELEPHONE (OUTPATIENT)
Dept: RHEUMATOLOGY | Facility: CLINIC | Age: 76
End: 2021-03-23

## 2021-03-23 DIAGNOSIS — M25.512 LEFT SHOULDER PAIN, UNSPECIFIED CHRONICITY: Primary | ICD-10-CM

## 2021-03-25 ENCOUNTER — TELEPHONE (OUTPATIENT)
Dept: RHEUMATOLOGY | Facility: CLINIC | Age: 76
End: 2021-03-25

## 2021-03-25 DIAGNOSIS — M05.79 RHEUMATOID ARTHRITIS INVOLVING MULTIPLE SITES WITH POSITIVE RHEUMATOID FACTOR: Primary | ICD-10-CM

## 2021-03-25 DIAGNOSIS — D69.6 THROMBOCYTOPENIA: ICD-10-CM

## 2021-03-26 ENCOUNTER — OFFICE VISIT (OUTPATIENT)
Dept: RHEUMATOLOGY | Facility: CLINIC | Age: 76
End: 2021-03-26
Payer: MEDICARE

## 2021-03-26 ENCOUNTER — PATIENT MESSAGE (OUTPATIENT)
Dept: RESEARCH | Facility: HOSPITAL | Age: 76
End: 2021-03-26

## 2021-03-26 VITALS
DIASTOLIC BLOOD PRESSURE: 80 MMHG | BODY MASS INDEX: 39.03 KG/M2 | SYSTOLIC BLOOD PRESSURE: 155 MMHG | HEIGHT: 70 IN | HEART RATE: 82 BPM

## 2021-03-26 DIAGNOSIS — M77.8 SHOULDER TENDINITIS, LEFT: Primary | ICD-10-CM

## 2021-03-26 DIAGNOSIS — M05.79 RHEUMATOID ARTHRITIS INVOLVING MULTIPLE SITES WITH POSITIVE RHEUMATOID FACTOR: ICD-10-CM

## 2021-03-26 PROCEDURE — 99212 OFFICE O/P EST SF 10 MIN: CPT | Mod: PBBFAC | Performed by: INTERNAL MEDICINE

## 2021-03-26 PROCEDURE — 99499 NO LOS: ICD-10-PCS | Mod: S$PBB,,, | Performed by: INTERNAL MEDICINE

## 2021-03-26 PROCEDURE — 99999 PR PBB SHADOW E&M-EST. PATIENT-LVL II: CPT | Mod: PBBFAC,,, | Performed by: INTERNAL MEDICINE

## 2021-03-26 PROCEDURE — 99499 UNLISTED E&M SERVICE: CPT | Mod: S$PBB,,, | Performed by: INTERNAL MEDICINE

## 2021-03-26 PROCEDURE — 20610 LARGE JOINT ASPIRATION/INJECTION: L SUBACROMIAL BURSA: ICD-10-PCS | Mod: S$PBB,LT,, | Performed by: INTERNAL MEDICINE

## 2021-03-26 PROCEDURE — 99999 PR PBB SHADOW E&M-EST. PATIENT-LVL II: ICD-10-PCS | Mod: PBBFAC,,, | Performed by: INTERNAL MEDICINE

## 2021-03-26 PROCEDURE — 20610 DRAIN/INJ JOINT/BURSA W/O US: CPT | Mod: PBBFAC | Performed by: INTERNAL MEDICINE

## 2021-03-26 RX ORDER — LIDOCAINE HYDROCHLORIDE 10 MG/ML
1 INJECTION, SOLUTION EPIDURAL; INFILTRATION; INTRACAUDAL; PERINEURAL
Status: DISCONTINUED | OUTPATIENT
Start: 2021-03-26 | End: 2021-03-26 | Stop reason: HOSPADM

## 2021-03-26 RX ORDER — TRIAMCINOLONE ACETONIDE 40 MG/ML
40 INJECTION, SUSPENSION INTRA-ARTICULAR; INTRAMUSCULAR
Status: DISCONTINUED | OUTPATIENT
Start: 2021-03-26 | End: 2021-03-26 | Stop reason: HOSPADM

## 2021-03-26 RX ORDER — PREDNISONE 5 MG/1
TABLET ORAL
Qty: 20 TABLET | Refills: 1 | Status: ON HOLD | OUTPATIENT
Start: 2021-03-26 | End: 2021-06-01 | Stop reason: HOSPADM

## 2021-03-26 RX ADMIN — LIDOCAINE HYDROCHLORIDE 1 ML: 10 INJECTION, SOLUTION EPIDURAL; INFILTRATION; INTRACAUDAL; PERINEURAL at 02:03

## 2021-03-26 RX ADMIN — TRIAMCINOLONE ACETONIDE 40 MG: 40 INJECTION, SUSPENSION INTRA-ARTICULAR; INTRAMUSCULAR at 02:03

## 2021-03-26 ASSESSMENT — ROUTINE ASSESSMENT OF PATIENT INDEX DATA (RAPID3)
PATIENT GLOBAL ASSESSMENT SCORE: 1.5
PAIN SCORE: 8.5
FATIGUE SCORE: 5
MDHAQ FUNCTION SCORE: 1.3
PSYCHOLOGICAL DISTRESS SCORE: 0
TOTAL RAPID3 SCORE: 4.78

## 2021-04-06 ENCOUNTER — TELEPHONE (OUTPATIENT)
Dept: NEUROLOGY | Facility: CLINIC | Age: 76
End: 2021-04-06

## 2021-04-06 ENCOUNTER — TELEPHONE (OUTPATIENT)
Dept: RHEUMATOLOGY | Facility: CLINIC | Age: 76
End: 2021-04-06

## 2021-04-13 ENCOUNTER — HOSPITAL ENCOUNTER (OUTPATIENT)
Dept: RADIOLOGY | Facility: OTHER | Age: 76
Discharge: HOME | End: 2021-04-13
Attending: INTERNAL MEDICINE
Payer: MEDICARE

## 2021-04-13 DIAGNOSIS — M25.512 LEFT SHOULDER PAIN, UNSPECIFIED CHRONICITY: ICD-10-CM

## 2021-04-13 PROCEDURE — 73030 X-RAY EXAM OF SHOULDER: CPT | Mod: TC,FY,LT

## 2021-04-13 PROCEDURE — 73030 X-RAY EXAM OF SHOULDER: CPT | Mod: 26,LT,, | Performed by: RADIOLOGY

## 2021-04-13 PROCEDURE — 73030 XR SHOULDER COMPLETE 2 OR MORE VIEWS LEFT: ICD-10-PCS | Mod: 26,LT,, | Performed by: RADIOLOGY

## 2021-04-14 ENCOUNTER — TELEPHONE (OUTPATIENT)
Dept: RHEUMATOLOGY | Facility: CLINIC | Age: 76
End: 2021-04-14

## 2021-04-25 ENCOUNTER — NURSE TRIAGE (OUTPATIENT)
Dept: ADMINISTRATIVE | Facility: CLINIC | Age: 76
End: 2021-04-25

## 2021-04-26 ENCOUNTER — TELEPHONE (OUTPATIENT)
Dept: INTERNAL MEDICINE | Facility: CLINIC | Age: 76
End: 2021-04-26

## 2021-04-29 ENCOUNTER — PROCEDURE VISIT (OUTPATIENT)
Dept: NEUROLOGY | Facility: CLINIC | Age: 76
End: 2021-04-29
Payer: MEDICARE

## 2021-04-29 DIAGNOSIS — M79.89 LEG SWELLING: Primary | ICD-10-CM

## 2021-04-29 DIAGNOSIS — R60.9 EDEMA, UNSPECIFIED TYPE: ICD-10-CM

## 2021-04-29 DIAGNOSIS — R29.898 WEAKNESS OF HIP, UNSPECIFIED LATERALITY: ICD-10-CM

## 2021-04-29 PROCEDURE — 95910 PR NERVE CONDUCTION STUDY; 7-8 STUDIES: ICD-10-PCS | Mod: 26,S$PBB,, | Performed by: PSYCHIATRY & NEUROLOGY

## 2021-04-29 PROCEDURE — 95886 MUSC TEST DONE W/N TEST COMP: CPT | Mod: 26,S$PBB,, | Performed by: PSYCHIATRY & NEUROLOGY

## 2021-04-29 PROCEDURE — 95861 NEEDLE EMG 2 EXTREMITIES: CPT | Mod: PBBFAC | Performed by: PSYCHIATRY & NEUROLOGY

## 2021-04-29 PROCEDURE — 95910 NRV CNDJ TEST 7-8 STUDIES: CPT | Mod: 26,S$PBB,, | Performed by: PSYCHIATRY & NEUROLOGY

## 2021-04-29 PROCEDURE — 95910 NRV CNDJ TEST 7-8 STUDIES: CPT | Mod: PBBFAC | Performed by: PSYCHIATRY & NEUROLOGY

## 2021-04-29 PROCEDURE — 95886 PR EMG COMPLETE, W/ NERVE CONDUCTION STUDIES, 5+ MUSCLES: ICD-10-PCS | Mod: 26,S$PBB,, | Performed by: PSYCHIATRY & NEUROLOGY

## 2021-05-03 ENCOUNTER — PES CALL (OUTPATIENT)
Dept: ADMINISTRATIVE | Facility: CLINIC | Age: 76
End: 2021-05-03

## 2021-05-03 ENCOUNTER — NURSE TRIAGE (OUTPATIENT)
Dept: ADMINISTRATIVE | Facility: CLINIC | Age: 76
End: 2021-05-03

## 2021-05-06 ENCOUNTER — PATIENT OUTREACH (OUTPATIENT)
Dept: ADMINISTRATIVE | Facility: OTHER | Age: 76
End: 2021-05-06

## 2021-05-06 ENCOUNTER — OFFICE VISIT (OUTPATIENT)
Dept: NEUROLOGY | Facility: CLINIC | Age: 76
End: 2021-05-06
Payer: MEDICARE

## 2021-05-06 VITALS
BODY MASS INDEX: 39.03 KG/M2 | DIASTOLIC BLOOD PRESSURE: 65 MMHG | SYSTOLIC BLOOD PRESSURE: 145 MMHG | HEIGHT: 70 IN | HEART RATE: 76 BPM

## 2021-05-06 DIAGNOSIS — R06.09 DOE (DYSPNEA ON EXERTION): ICD-10-CM

## 2021-05-06 DIAGNOSIS — R15.9 FULL INCONTINENCE OF FECES: ICD-10-CM

## 2021-05-06 DIAGNOSIS — H53.2 DIPLOPIA: ICD-10-CM

## 2021-05-06 DIAGNOSIS — R13.10 DYSPHAGIA, UNSPECIFIED TYPE: ICD-10-CM

## 2021-05-06 DIAGNOSIS — G62.0 CHEMOTHERAPY-INDUCED PERIPHERAL NEUROPATHY: ICD-10-CM

## 2021-05-06 DIAGNOSIS — T45.1X5A CHEMOTHERAPY-INDUCED PERIPHERAL NEUROPATHY: ICD-10-CM

## 2021-05-06 DIAGNOSIS — G60.3 IDIOPATHIC PROGRESSIVE POLYNEUROPATHY: ICD-10-CM

## 2021-05-06 DIAGNOSIS — E66.01 MORBID OBESITY: ICD-10-CM

## 2021-05-06 DIAGNOSIS — R60.0 PEDAL EDEMA: ICD-10-CM

## 2021-05-06 DIAGNOSIS — Z85.118 H/O: LUNG CANCER: ICD-10-CM

## 2021-05-06 DIAGNOSIS — R29.898 WEAKNESS OF HIP, UNSPECIFIED LATERALITY: Primary | ICD-10-CM

## 2021-05-06 PROCEDURE — 99999 PR PBB SHADOW E&M-EST. PATIENT-LVL III: ICD-10-PCS | Mod: PBBFAC,,, | Performed by: PSYCHIATRY & NEUROLOGY

## 2021-05-06 PROCEDURE — 99999 PR PBB SHADOW E&M-EST. PATIENT-LVL III: CPT | Mod: PBBFAC,,, | Performed by: PSYCHIATRY & NEUROLOGY

## 2021-05-06 PROCEDURE — 99215 PR OFFICE/OUTPT VISIT, EST, LEVL V, 40-54 MIN: ICD-10-PCS | Mod: S$PBB,,, | Performed by: PSYCHIATRY & NEUROLOGY

## 2021-05-06 PROCEDURE — 99215 OFFICE O/P EST HI 40 MIN: CPT | Mod: S$PBB,,, | Performed by: PSYCHIATRY & NEUROLOGY

## 2021-05-06 PROCEDURE — 99213 OFFICE O/P EST LOW 20 MIN: CPT | Mod: PBBFAC | Performed by: PSYCHIATRY & NEUROLOGY

## 2021-05-10 DIAGNOSIS — Z01.818 PRE-OP TESTING: ICD-10-CM

## 2021-05-12 ENCOUNTER — TELEPHONE (OUTPATIENT)
Dept: NEUROLOGY | Facility: CLINIC | Age: 76
End: 2021-05-12

## 2021-05-13 ENCOUNTER — TELEPHONE (OUTPATIENT)
Dept: NEUROLOGY | Facility: CLINIC | Age: 76
End: 2021-05-13

## 2021-05-13 DIAGNOSIS — R15.9 INCONTINENCE OF FECES, UNSPECIFIED FECAL INCONTINENCE TYPE: Primary | ICD-10-CM

## 2021-05-17 ENCOUNTER — PATIENT MESSAGE (OUTPATIENT)
Dept: NEUROLOGY | Facility: CLINIC | Age: 76
End: 2021-05-17

## 2021-05-17 ENCOUNTER — HOSPITAL ENCOUNTER (OUTPATIENT)
Dept: CARDIOLOGY | Facility: HOSPITAL | Age: 76
Discharge: HOME | End: 2021-05-17
Attending: PSYCHIATRY & NEUROLOGY
Payer: MEDICARE

## 2021-05-17 DIAGNOSIS — R60.9 EDEMA, UNSPECIFIED TYPE: ICD-10-CM

## 2021-05-17 DIAGNOSIS — M79.89 LEG SWELLING: ICD-10-CM

## 2021-05-17 PROCEDURE — 93970 CV US DOPPLER VENOUS LEGS BILATERAL (CUPID ONLY): ICD-10-PCS | Mod: 26,,, | Performed by: INTERNAL MEDICINE

## 2021-05-17 PROCEDURE — 93970 EXTREMITY STUDY: CPT | Mod: 26,,, | Performed by: INTERNAL MEDICINE

## 2021-05-17 PROCEDURE — 93970 EXTREMITY STUDY: CPT | Mod: 50

## 2021-05-18 ENCOUNTER — TELEPHONE (OUTPATIENT)
Dept: NEUROLOGY | Facility: CLINIC | Age: 76
End: 2021-05-18

## 2021-05-19 ENCOUNTER — DOCUMENTATION ONLY (OUTPATIENT)
Dept: NEUROLOGY | Facility: CLINIC | Age: 76
End: 2021-05-19
Payer: MEDICARE

## 2021-05-19 ENCOUNTER — TELEPHONE (OUTPATIENT)
Dept: NEUROLOGY | Facility: CLINIC | Age: 76
End: 2021-05-19

## 2021-05-24 ENCOUNTER — TELEPHONE (OUTPATIENT)
Dept: NEUROLOGY | Facility: CLINIC | Age: 76
End: 2021-05-24

## 2021-05-24 DIAGNOSIS — M54.16 LUMBAR RADICULOPATHY: Primary | ICD-10-CM

## 2021-05-26 ENCOUNTER — HOSPITAL ENCOUNTER (INPATIENT)
Facility: OTHER | Age: 76
LOS: 7 days | Discharge: NURSING, REHAB OR LONG-TERM CARE | DRG: 433 | End: 2021-06-03
Attending: EMERGENCY MEDICINE | Admitting: HOSPITALIST
Payer: MEDICARE

## 2021-05-26 DIAGNOSIS — E66.01 MORBID OBESITY: ICD-10-CM

## 2021-05-26 DIAGNOSIS — R06.02 SHORTNESS OF BREATH: ICD-10-CM

## 2021-05-26 DIAGNOSIS — R60.1 ANASARCA: Primary | ICD-10-CM

## 2021-05-26 DIAGNOSIS — R01.1 SYSTOLIC MURMUR: ICD-10-CM

## 2021-05-26 DIAGNOSIS — R18.8 OTHER ASCITES: ICD-10-CM

## 2021-05-26 DIAGNOSIS — M79.89 PAIN AND SWELLING OF LOWER EXTREMITY: ICD-10-CM

## 2021-05-26 DIAGNOSIS — I10 ESSENTIAL HYPERTENSION: ICD-10-CM

## 2021-05-26 DIAGNOSIS — K74.69 OTHER CIRRHOSIS OF LIVER: ICD-10-CM

## 2021-05-26 DIAGNOSIS — G62.9 POLYNEUROPATHY: ICD-10-CM

## 2021-05-26 DIAGNOSIS — R06.02 SOB (SHORTNESS OF BREATH): ICD-10-CM

## 2021-05-26 DIAGNOSIS — M79.606 PAIN AND SWELLING OF LOWER EXTREMITY: ICD-10-CM

## 2021-05-26 DIAGNOSIS — M05.79 RHEUMATOID ARTHRITIS INVOLVING MULTIPLE SITES WITH POSITIVE RHEUMATOID FACTOR: ICD-10-CM

## 2021-05-26 DIAGNOSIS — D69.6 THROMBOCYTOPENIA: ICD-10-CM

## 2021-05-26 DIAGNOSIS — R53.81 PHYSICAL DECONDITIONING: ICD-10-CM

## 2021-05-26 DIAGNOSIS — E80.6 HYPERBILIRUBINEMIA: ICD-10-CM

## 2021-05-26 LAB
ALBUMIN SERPL BCP-MCNC: 2.5 G/DL (ref 3.5–5.2)
ALLENS TEST: NORMAL
ALP SERPL-CCNC: 105 U/L (ref 55–135)
ALT SERPL W/O P-5'-P-CCNC: 26 U/L (ref 10–44)
ANION GAP SERPL CALC-SCNC: 10 MMOL/L (ref 8–16)
AST SERPL-CCNC: 52 U/L (ref 10–40)
BASOPHILS # BLD AUTO: 0.07 K/UL (ref 0–0.2)
BASOPHILS NFR BLD: 1.5 % (ref 0–1.9)
BILIRUB SERPL-MCNC: 3.4 MG/DL (ref 0.1–1)
BNP SERPL-MCNC: 60 PG/ML (ref 0–99)
BUN SERPL-MCNC: 7 MG/DL (ref 8–23)
CALCIUM SERPL-MCNC: 7.8 MG/DL (ref 8.7–10.5)
CHLORIDE SERPL-SCNC: 111 MMOL/L (ref 95–110)
CO2 SERPL-SCNC: 20 MMOL/L (ref 23–29)
CREAT SERPL-MCNC: 0.5 MG/DL (ref 0.5–1.4)
CREAT SERPL-MCNC: 0.7 MG/DL (ref 0.5–1.4)
CTP QC/QA: YES
D DIMER PPP IA.FEU-MCNC: 5.17 MG/L FEU
DELSYS: NORMAL
DIFFERENTIAL METHOD: ABNORMAL
EOSINOPHIL # BLD AUTO: 0.2 K/UL (ref 0–0.5)
EOSINOPHIL NFR BLD: 3.5 % (ref 0–8)
ERYTHROCYTE [DISTWIDTH] IN BLOOD BY AUTOMATED COUNT: 14.7 % (ref 11.5–14.5)
EST. GFR  (AFRICAN AMERICAN): >60 ML/MIN/1.73 M^2
EST. GFR  (NON AFRICAN AMERICAN): >60 ML/MIN/1.73 M^2
GLUCOSE SERPL-MCNC: 156 MG/DL (ref 70–110)
HCT VFR BLD AUTO: 39.7 % (ref 37–48.5)
HCT VFR BLD CALC: 39 %PCV (ref 36–54)
HGB BLD-MCNC: 13 G/DL
HGB BLD-MCNC: 13.2 G/DL (ref 12–16)
IMM GRANULOCYTES # BLD AUTO: 0.01 K/UL (ref 0–0.04)
IMM GRANULOCYTES NFR BLD AUTO: 0.2 % (ref 0–0.5)
LYMPHOCYTES # BLD AUTO: 1.4 K/UL (ref 1–4.8)
LYMPHOCYTES NFR BLD: 30.5 % (ref 18–48)
MCH RBC QN AUTO: 33.7 PG (ref 27–31)
MCHC RBC AUTO-ENTMCNC: 33.2 G/DL (ref 32–36)
MCV RBC AUTO: 101 FL (ref 82–98)
MODE: NORMAL
MONOCYTES # BLD AUTO: 0.6 K/UL (ref 0.3–1)
MONOCYTES NFR BLD: 13.8 % (ref 4–15)
NEUTROPHILS # BLD AUTO: 2.3 K/UL (ref 1.8–7.7)
NEUTROPHILS NFR BLD: 50.5 % (ref 38–73)
NRBC BLD-RTO: 0 /100 WBC
PLATELET # BLD AUTO: 62 K/UL (ref 150–450)
PMV BLD AUTO: 10.1 FL (ref 9.2–12.9)
POC IONIZED CALCIUM: 1.16 MMOL/L (ref 1.06–1.42)
POC PTINR: 1.1 (ref 0.9–1.2)
POC PTWBT: 12.8 SEC (ref 9.7–14.3)
POTASSIUM BLD-SCNC: 3.9 MMOL/L (ref 3.5–5.1)
POTASSIUM SERPL-SCNC: 4 MMOL/L (ref 3.5–5.1)
PROT SERPL-MCNC: 6.7 G/DL (ref 6–8.4)
RBC # BLD AUTO: 3.92 M/UL (ref 4–5.4)
SAMPLE: NORMAL
SARS-COV-2 RDRP RESP QL NAA+PROBE: NEGATIVE
SITE: NORMAL
SODIUM BLD-SCNC: 144 MMOL/L (ref 136–145)
SODIUM SERPL-SCNC: 141 MMOL/L (ref 136–145)
TROPONIN I SERPL DL<=0.01 NG/ML-MCNC: 0.01 NG/ML (ref 0–0.03)
TSH SERPL DL<=0.005 MIU/L-ACNC: 2.52 UIU/ML (ref 0.4–4)
WBC # BLD AUTO: 4.63 K/UL (ref 3.9–12.7)

## 2021-05-26 PROCEDURE — 94761 N-INVAS EAR/PLS OXIMETRY MLT: CPT

## 2021-05-26 PROCEDURE — 99285 EMERGENCY DEPT VISIT HI MDM: CPT | Mod: 25

## 2021-05-26 PROCEDURE — 85610 PROTHROMBIN TIME: CPT

## 2021-05-26 PROCEDURE — 85610 PROTHROMBIN TIME: CPT | Performed by: EMERGENCY MEDICINE

## 2021-05-26 PROCEDURE — U0002 COVID-19 LAB TEST NON-CDC: HCPCS | Performed by: EMERGENCY MEDICINE

## 2021-05-26 PROCEDURE — 84132 ASSAY OF SERUM POTASSIUM: CPT

## 2021-05-26 PROCEDURE — 85025 COMPLETE CBC W/AUTO DIFF WBC: CPT | Performed by: EMERGENCY MEDICINE

## 2021-05-26 PROCEDURE — 93010 ELECTROCARDIOGRAM REPORT: CPT | Mod: ,,, | Performed by: INTERNAL MEDICINE

## 2021-05-26 PROCEDURE — 80053 COMPREHEN METABOLIC PANEL: CPT | Performed by: EMERGENCY MEDICINE

## 2021-05-26 PROCEDURE — 83880 ASSAY OF NATRIURETIC PEPTIDE: CPT | Performed by: EMERGENCY MEDICINE

## 2021-05-26 PROCEDURE — 96376 TX/PRO/DX INJ SAME DRUG ADON: CPT

## 2021-05-26 PROCEDURE — 25500020 PHARM REV CODE 255: Performed by: EMERGENCY MEDICINE

## 2021-05-26 PROCEDURE — 80074 ACUTE HEPATITIS PANEL: CPT | Performed by: EMERGENCY MEDICINE

## 2021-05-26 PROCEDURE — 84484 ASSAY OF TROPONIN QUANT: CPT | Performed by: EMERGENCY MEDICINE

## 2021-05-26 PROCEDURE — 85379 FIBRIN DEGRADATION QUANT: CPT | Performed by: EMERGENCY MEDICINE

## 2021-05-26 PROCEDURE — 82565 ASSAY OF CREATININE: CPT

## 2021-05-26 PROCEDURE — 82248 BILIRUBIN DIRECT: CPT | Performed by: EMERGENCY MEDICINE

## 2021-05-26 PROCEDURE — 96375 TX/PRO/DX INJ NEW DRUG ADDON: CPT

## 2021-05-26 PROCEDURE — 82330 ASSAY OF CALCIUM: CPT

## 2021-05-26 PROCEDURE — 85014 HEMATOCRIT: CPT

## 2021-05-26 PROCEDURE — 84443 ASSAY THYROID STIM HORMONE: CPT | Performed by: EMERGENCY MEDICINE

## 2021-05-26 PROCEDURE — 93010 EKG 12-LEAD: ICD-10-PCS | Mod: ,,, | Performed by: INTERNAL MEDICINE

## 2021-05-26 PROCEDURE — G0378 HOSPITAL OBSERVATION PER HR: HCPCS

## 2021-05-26 PROCEDURE — 94799 UNLISTED PULMONARY SVC/PX: CPT

## 2021-05-26 PROCEDURE — 83605 ASSAY OF LACTIC ACID: CPT

## 2021-05-26 PROCEDURE — 99900035 HC TECH TIME PER 15 MIN (STAT)

## 2021-05-26 PROCEDURE — 93005 ELECTROCARDIOGRAM TRACING: CPT

## 2021-05-26 PROCEDURE — 96374 THER/PROPH/DIAG INJ IV PUSH: CPT

## 2021-05-26 PROCEDURE — 84295 ASSAY OF SERUM SODIUM: CPT

## 2021-05-26 RX ADMIN — IOHEXOL 100 ML: 350 INJECTION, SOLUTION INTRAVENOUS at 09:05

## 2021-05-27 PROBLEM — K74.60 CIRRHOSIS OF LIVER WITH ASCITES: Status: ACTIVE | Noted: 2021-05-27

## 2021-05-27 PROBLEM — R18.8 CIRRHOSIS OF LIVER WITH ASCITES: Status: ACTIVE | Noted: 2021-05-27

## 2021-05-27 PROBLEM — R60.1 ANASARCA: Status: ACTIVE | Noted: 2021-05-27

## 2021-05-27 LAB
ANION GAP SERPL CALC-SCNC: 8 MMOL/L (ref 8–16)
ASCENDING AORTA: 2.47 CM
AV INDEX (PROSTH): 0.73
AV MEAN GRADIENT: 12 MMHG
AV PEAK GRADIENT: 20 MMHG
AV REGURGITATION PRESSURE HALF TIME: 806 MS
AV VALVE AREA: 2.29 CM2
AV VELOCITY RATIO: 0.73
BASOPHILS # BLD AUTO: 0.06 K/UL (ref 0–0.2)
BASOPHILS NFR BLD: 1.4 % (ref 0–1.9)
BILIRUB DIRECT SERPL-MCNC: 1.3 MG/DL (ref 0.1–0.3)
BILIRUB UR QL STRIP: NEGATIVE
BSA FOR ECHO PROCEDURE: 2.47 M2
BUN SERPL-MCNC: 7 MG/DL (ref 8–23)
CALCIUM SERPL-MCNC: 7.9 MG/DL (ref 8.7–10.5)
CHLORIDE SERPL-SCNC: 111 MMOL/L (ref 95–110)
CLARITY UR: CLEAR
CO2 SERPL-SCNC: 21 MMOL/L (ref 23–29)
COLOR UR: YELLOW
CREAT SERPL-MCNC: 0.6 MG/DL (ref 0.5–1.4)
CV ECHO LV RWT: 0.49 CM
DIFFERENTIAL METHOD: ABNORMAL
DOP CALC AO PEAK VEL: 2.26 M/S
DOP CALC AO VTI: 46.87 CM
DOP CALC LVOT AREA: 3.1 CM2
DOP CALC LVOT DIAMETER: 2 CM
DOP CALC LVOT PEAK VEL: 1.65 M/S
DOP CALC LVOT STROKE VOLUME: 107.45 CM3
DOP CALCLVOT PEAK VEL VTI: 34.22 CM
E WAVE DECELERATION TIME: 208.1 MSEC
E/A RATIO: 0.68
E/E' RATIO: 17.29 M/S
ECHO LV POSTERIOR WALL: 1.07 CM (ref 0.6–1.1)
EJECTION FRACTION: 65 %
EOSINOPHIL # BLD AUTO: 0.1 K/UL (ref 0–0.5)
EOSINOPHIL NFR BLD: 3.1 % (ref 0–8)
ERYTHROCYTE [DISTWIDTH] IN BLOOD BY AUTOMATED COUNT: 14.9 % (ref 11.5–14.5)
EST. GFR  (AFRICAN AMERICAN): >60 ML/MIN/1.73 M^2
EST. GFR  (NON AFRICAN AMERICAN): >60 ML/MIN/1.73 M^2
FRACTIONAL SHORTENING: 39 % (ref 28–44)
GLUCOSE SERPL-MCNC: 140 MG/DL (ref 70–110)
GLUCOSE UR QL STRIP: NEGATIVE
HAV IGM SERPL QL IA: NEGATIVE
HBV CORE IGM SERPL QL IA: NEGATIVE
HBV SURFACE AG SERPL QL IA: NEGATIVE
HCT VFR BLD AUTO: 40.6 % (ref 37–48.5)
HCV AB SERPL QL IA: NEGATIVE
HGB BLD-MCNC: 12.9 G/DL (ref 12–16)
HGB UR QL STRIP: NEGATIVE
IMM GRANULOCYTES # BLD AUTO: 0.01 K/UL (ref 0–0.04)
IMM GRANULOCYTES NFR BLD AUTO: 0.2 % (ref 0–0.5)
INR PPP: 1.3 (ref 0.8–1.2)
INTERVENTRICULAR SEPTUM: 1.1 CM (ref 0.6–1.1)
IVRT: 53.28 MSEC
KETONES UR QL STRIP: NEGATIVE
LA MAJOR: 4.67 CM
LA MINOR: 5.45 CM
LA WIDTH: 3.71 CM
LEFT ATRIUM SIZE: 3.71 CM
LEFT ATRIUM VOLUME INDEX MOD: 25.2 ML/M2
LEFT ATRIUM VOLUME INDEX: 24.7 ML/M2
LEFT ATRIUM VOLUME MOD: 60 CM3
LEFT ATRIUM VOLUME: 58.85 CM3
LEFT INTERNAL DIMENSION IN SYSTOLE: 2.69 CM (ref 2.1–4)
LEFT VENTRICLE DIASTOLIC VOLUME INDEX: 36.46 ML/M2
LEFT VENTRICLE DIASTOLIC VOLUME: 86.78 ML
LEFT VENTRICLE MASS INDEX: 69 G/M2
LEFT VENTRICLE SYSTOLIC VOLUME INDEX: 11.2 ML/M2
LEFT VENTRICLE SYSTOLIC VOLUME: 26.7 ML
LEFT VENTRICULAR INTERNAL DIMENSION IN DIASTOLE: 4.38 CM (ref 3.5–6)
LEFT VENTRICULAR MASS: 164.49 G
LEUKOCYTE ESTERASE UR QL STRIP: NEGATIVE
LV LATERAL E/E' RATIO: 20.17 M/S
LV SEPTAL E/E' RATIO: 15.13 M/S
LYMPHOCYTES # BLD AUTO: 1.5 K/UL (ref 1–4.8)
LYMPHOCYTES NFR BLD: 34.7 % (ref 18–48)
MCH RBC QN AUTO: 33.5 PG (ref 27–31)
MCHC RBC AUTO-ENTMCNC: 31.8 G/DL (ref 32–36)
MCV RBC AUTO: 106 FL (ref 82–98)
MONOCYTES # BLD AUTO: 0.6 K/UL (ref 0.3–1)
MONOCYTES NFR BLD: 13.5 % (ref 4–15)
MV A" WAVE DURATION": 12.56 MSEC
MV PEAK A VEL: 1.79 M/S
MV PEAK E VEL: 1.21 M/S
MV STENOSIS PRESSURE HALF TIME: 60.35 MS
MV VALVE AREA P 1/2 METHOD: 3.65 CM2
NEUTROPHILS # BLD AUTO: 2 K/UL (ref 1.8–7.7)
NEUTROPHILS NFR BLD: 47.1 % (ref 38–73)
NITRITE UR QL STRIP: NEGATIVE
NRBC BLD-RTO: 0 /100 WBC
PH UR STRIP: 6 [PH] (ref 5–8)
PISA MRMAX VEL: 0.05 M/S
PISA TR MAX VEL: 3.27 M/S
PLATELET # BLD AUTO: 51 K/UL (ref 150–450)
PMV BLD AUTO: 11.4 FL (ref 9.2–12.9)
POTASSIUM SERPL-SCNC: 4.1 MMOL/L (ref 3.5–5.1)
PROT UR QL STRIP: NEGATIVE
PROTHROMBIN TIME: 14.2 SEC (ref 9–12.5)
PULM VEIN S/D RATIO: 1.68
PV PEAK D VEL: 0.47 M/S
PV PEAK S VEL: 0.79 M/S
PV PEAK VELOCITY: 2.07 CM/S
RA MAJOR: 5.26 CM
RA WIDTH: 3.3 CM
RBC # BLD AUTO: 3.85 M/UL (ref 4–5.4)
RIGHT VENTRICULAR END-DIASTOLIC DIMENSION: 3.47 CM
SINUS: 3 CM
SODIUM SERPL-SCNC: 140 MMOL/L (ref 136–145)
SP GR UR STRIP: 1.01 (ref 1–1.03)
STJ: 2.7 CM
TDI LATERAL: 0.06 M/S
TDI SEPTAL: 0.08 M/S
TDI: 0.07 M/S
TR MAX PG: 43 MMHG
TRICUSPID ANNULAR PLANE SYSTOLIC EXCURSION: 2.39 CM
URN SPEC COLLECT METH UR: NORMAL
UROBILINOGEN UR STRIP-ACNC: 1 EU/DL
WBC # BLD AUTO: 4.21 K/UL (ref 3.9–12.7)

## 2021-05-27 PROCEDURE — 81003 URINALYSIS AUTO W/O SCOPE: CPT | Performed by: PHYSICIAN ASSISTANT

## 2021-05-27 PROCEDURE — 36415 COLL VENOUS BLD VENIPUNCTURE: CPT | Performed by: PHYSICIAN ASSISTANT

## 2021-05-27 PROCEDURE — 94799 UNLISTED PULMONARY SVC/PX: CPT

## 2021-05-27 PROCEDURE — 63600175 PHARM REV CODE 636 W HCPCS: Performed by: EMERGENCY MEDICINE

## 2021-05-27 PROCEDURE — 63600175 PHARM REV CODE 636 W HCPCS: Performed by: PHYSICIAN ASSISTANT

## 2021-05-27 PROCEDURE — 97162 PT EVAL MOD COMPLEX 30 MIN: CPT

## 2021-05-27 PROCEDURE — 63600175 PHARM REV CODE 636 W HCPCS: Performed by: INTERNAL MEDICINE

## 2021-05-27 PROCEDURE — 99220 PR INITIAL OBSERVATION CARE,LEVL III: ICD-10-PCS | Mod: ,,, | Performed by: PHYSICIAN ASSISTANT

## 2021-05-27 PROCEDURE — 97530 THERAPEUTIC ACTIVITIES: CPT

## 2021-05-27 PROCEDURE — 25000003 PHARM REV CODE 250: Performed by: INTERNAL MEDICINE

## 2021-05-27 PROCEDURE — 99900035 HC TECH TIME PER 15 MIN (STAT)

## 2021-05-27 PROCEDURE — 99220 PR INITIAL OBSERVATION CARE,LEVL III: CPT | Mod: ,,, | Performed by: PHYSICIAN ASSISTANT

## 2021-05-27 PROCEDURE — 97535 SELF CARE MNGMENT TRAINING: CPT

## 2021-05-27 PROCEDURE — 11000001 HC ACUTE MED/SURG PRIVATE ROOM

## 2021-05-27 PROCEDURE — 97166 OT EVAL MOD COMPLEX 45 MIN: CPT

## 2021-05-27 PROCEDURE — 25000003 PHARM REV CODE 250: Performed by: PHYSICIAN ASSISTANT

## 2021-05-27 PROCEDURE — 94761 N-INVAS EAR/PLS OXIMETRY MLT: CPT

## 2021-05-27 PROCEDURE — 80048 BASIC METABOLIC PNL TOTAL CA: CPT | Performed by: PHYSICIAN ASSISTANT

## 2021-05-27 PROCEDURE — 85025 COMPLETE CBC W/AUTO DIFF WBC: CPT | Performed by: PHYSICIAN ASSISTANT

## 2021-05-27 RX ORDER — SPIRONOLACTONE 25 MG/1
25 TABLET ORAL DAILY
Status: DISCONTINUED | OUTPATIENT
Start: 2021-05-27 | End: 2021-05-28

## 2021-05-27 RX ORDER — FUROSEMIDE 10 MG/ML
20 INJECTION INTRAMUSCULAR; INTRAVENOUS ONCE
Status: COMPLETED | OUTPATIENT
Start: 2021-05-27 | End: 2021-05-27

## 2021-05-27 RX ORDER — HYDRALAZINE HYDROCHLORIDE 25 MG/1
25 TABLET, FILM COATED ORAL ONCE
Status: DISCONTINUED | OUTPATIENT
Start: 2021-05-27 | End: 2021-05-28

## 2021-05-27 RX ORDER — ONDANSETRON 2 MG/ML
4 INJECTION INTRAMUSCULAR; INTRAVENOUS EVERY 8 HOURS PRN
Status: DISCONTINUED | OUTPATIENT
Start: 2021-05-27 | End: 2021-06-03 | Stop reason: HOSPADM

## 2021-05-27 RX ORDER — IPRATROPIUM BROMIDE AND ALBUTEROL SULFATE 2.5; .5 MG/3ML; MG/3ML
3 SOLUTION RESPIRATORY (INHALATION) EVERY 4 HOURS PRN
Status: DISCONTINUED | OUTPATIENT
Start: 2021-05-27 | End: 2021-05-27

## 2021-05-27 RX ORDER — TALC
6 POWDER (GRAM) TOPICAL NIGHTLY PRN
Status: DISCONTINUED | OUTPATIENT
Start: 2021-05-27 | End: 2021-06-03 | Stop reason: HOSPADM

## 2021-05-27 RX ORDER — SODIUM CHLORIDE 0.9 % (FLUSH) 0.9 %
10 SYRINGE (ML) INJECTION
Status: DISCONTINUED | OUTPATIENT
Start: 2021-05-27 | End: 2021-06-03 | Stop reason: HOSPADM

## 2021-05-27 RX ORDER — SODIUM CHLORIDE 0.9 % (FLUSH) 0.9 %
10 SYRINGE (ML) INJECTION
Status: DISCONTINUED | OUTPATIENT
Start: 2021-05-27 | End: 2021-05-27

## 2021-05-27 RX ORDER — LEVALBUTEROL 1.25 MG/.5ML
1.25 SOLUTION, CONCENTRATE RESPIRATORY (INHALATION) EVERY 8 HOURS PRN
Status: DISCONTINUED | OUTPATIENT
Start: 2021-05-27 | End: 2021-06-03 | Stop reason: HOSPADM

## 2021-05-27 RX ORDER — ACETAMINOPHEN 325 MG/1
650 TABLET ORAL EVERY 4 HOURS PRN
Status: DISCONTINUED | OUTPATIENT
Start: 2021-05-27 | End: 2021-06-03 | Stop reason: HOSPADM

## 2021-05-27 RX ORDER — FUROSEMIDE 10 MG/ML
20 INJECTION INTRAMUSCULAR; INTRAVENOUS
Status: DISCONTINUED | OUTPATIENT
Start: 2021-05-27 | End: 2021-05-28

## 2021-05-27 RX ADMIN — ONDANSETRON 4 MG: 2 INJECTION INTRAMUSCULAR; INTRAVENOUS at 02:05

## 2021-05-27 RX ADMIN — FUROSEMIDE 20 MG: 10 INJECTION, SOLUTION INTRAMUSCULAR; INTRAVENOUS at 04:05

## 2021-05-27 RX ADMIN — SPIRONOLACTONE 25 MG: 25 TABLET, FILM COATED ORAL at 03:05

## 2021-05-27 RX ADMIN — FUROSEMIDE 20 MG: 10 INJECTION, SOLUTION INTRAMUSCULAR; INTRAVENOUS at 03:05

## 2021-05-27 RX ADMIN — ACETAMINOPHEN 650 MG: 325 TABLET, FILM COATED ORAL at 08:05

## 2021-05-27 RX ADMIN — ACETAMINOPHEN 650 MG: 325 TABLET, FILM COATED ORAL at 09:05

## 2021-05-28 PROBLEM — K74.69 OTHER CIRRHOSIS OF LIVER: Status: ACTIVE | Noted: 2021-05-27

## 2021-05-28 PROBLEM — I10 ESSENTIAL HYPERTENSION: Status: ACTIVE | Noted: 2021-05-28

## 2021-05-28 PROBLEM — R53.81 PHYSICAL DECONDITIONING: Status: ACTIVE | Noted: 2021-05-28

## 2021-05-28 PROBLEM — E87.6 HYPOKALEMIA: Status: ACTIVE | Noted: 2021-05-28

## 2021-05-28 PROBLEM — E66.01 MORBID OBESITY: Status: ACTIVE | Noted: 2021-05-28

## 2021-05-28 PROBLEM — E83.42 HYPOMAGNESEMIA: Status: ACTIVE | Noted: 2021-05-28

## 2021-05-28 LAB
A1AT SERPL-MCNC: 142 MG/DL (ref 100–190)
AFP SERPL-MCNC: 9.1 NG/ML (ref 0–8.4)
ALBUMIN SERPL BCP-MCNC: 2.1 G/DL (ref 3.5–5.2)
ALP SERPL-CCNC: 84 U/L (ref 55–135)
ALT SERPL W/O P-5'-P-CCNC: 21 U/L (ref 10–44)
ANION GAP SERPL CALC-SCNC: 9 MMOL/L (ref 8–16)
AST SERPL-CCNC: 42 U/L (ref 10–40)
BASOPHILS # BLD AUTO: 0.04 K/UL (ref 0–0.2)
BASOPHILS NFR BLD: 1.1 % (ref 0–1.9)
BILIRUB SERPL-MCNC: 3.3 MG/DL (ref 0.1–1)
BUN SERPL-MCNC: 8 MG/DL (ref 8–23)
CALCIUM SERPL-MCNC: 7.7 MG/DL (ref 8.7–10.5)
CERULOPLASMIN SERPL-MCNC: 25 MG/DL (ref 15–45)
CHLORIDE SERPL-SCNC: 108 MMOL/L (ref 95–110)
CO2 SERPL-SCNC: 25 MMOL/L (ref 23–29)
CREAT SERPL-MCNC: 0.7 MG/DL (ref 0.5–1.4)
DIFFERENTIAL METHOD: ABNORMAL
EOSINOPHIL # BLD AUTO: 0.1 K/UL (ref 0–0.5)
EOSINOPHIL NFR BLD: 3.6 % (ref 0–8)
ERYTHROCYTE [DISTWIDTH] IN BLOOD BY AUTOMATED COUNT: 14.6 % (ref 11.5–14.5)
EST. GFR  (AFRICAN AMERICAN): >60 ML/MIN/1.73 M^2
EST. GFR  (NON AFRICAN AMERICAN): >60 ML/MIN/1.73 M^2
FERRITIN SERPL-MCNC: 213 NG/ML (ref 20–300)
GLUCOSE SERPL-MCNC: 113 MG/DL (ref 70–110)
HCT VFR BLD AUTO: 36.3 % (ref 37–48.5)
HGB BLD-MCNC: 12.1 G/DL (ref 12–16)
IMM GRANULOCYTES # BLD AUTO: 0.01 K/UL (ref 0–0.04)
IMM GRANULOCYTES NFR BLD AUTO: 0.3 % (ref 0–0.5)
LYMPHOCYTES # BLD AUTO: 1.3 K/UL (ref 1–4.8)
LYMPHOCYTES NFR BLD: 34.8 % (ref 18–48)
MAGNESIUM SERPL-MCNC: 1.4 MG/DL (ref 1.6–2.6)
MCH RBC QN AUTO: 33.2 PG (ref 27–31)
MCHC RBC AUTO-ENTMCNC: 33.3 G/DL (ref 32–36)
MCV RBC AUTO: 100 FL (ref 82–98)
MONOCYTES # BLD AUTO: 0.4 K/UL (ref 0.3–1)
MONOCYTES NFR BLD: 12.1 % (ref 4–15)
NEUTROPHILS # BLD AUTO: 1.8 K/UL (ref 1.8–7.7)
NEUTROPHILS NFR BLD: 48.1 % (ref 38–73)
NRBC BLD-RTO: 0 /100 WBC
PHOSPHATE SERPL-MCNC: 3.4 MG/DL (ref 2.7–4.5)
PLATELET # BLD AUTO: 57 K/UL (ref 150–450)
PMV BLD AUTO: 10.2 FL (ref 9.2–12.9)
POTASSIUM SERPL-SCNC: 3.4 MMOL/L (ref 3.5–5.1)
PROT SERPL-MCNC: 5.7 G/DL (ref 6–8.4)
RBC # BLD AUTO: 3.64 M/UL (ref 4–5.4)
SODIUM SERPL-SCNC: 142 MMOL/L (ref 136–145)
WBC # BLD AUTO: 3.65 K/UL (ref 3.9–12.7)

## 2021-05-28 PROCEDURE — 99233 SBSQ HOSP IP/OBS HIGH 50: CPT | Mod: ,,, | Performed by: INTERNAL MEDICINE

## 2021-05-28 PROCEDURE — 99233 PR SUBSEQUENT HOSPITAL CARE,LEVL III: ICD-10-PCS | Mod: ,,, | Performed by: INTERNAL MEDICINE

## 2021-05-28 PROCEDURE — 82105 ALPHA-FETOPROTEIN SERUM: CPT | Performed by: INTERNAL MEDICINE

## 2021-05-28 PROCEDURE — 63600175 PHARM REV CODE 636 W HCPCS: Performed by: INTERNAL MEDICINE

## 2021-05-28 PROCEDURE — 25000003 PHARM REV CODE 250: Performed by: PHYSICIAN ASSISTANT

## 2021-05-28 PROCEDURE — 99900035 HC TECH TIME PER 15 MIN (STAT)

## 2021-05-28 PROCEDURE — 36415 COLL VENOUS BLD VENIPUNCTURE: CPT | Performed by: INTERNAL MEDICINE

## 2021-05-28 PROCEDURE — 94799 UNLISTED PULMONARY SVC/PX: CPT

## 2021-05-28 PROCEDURE — 82390 ASSAY OF CERULOPLASMIN: CPT | Performed by: INTERNAL MEDICINE

## 2021-05-28 PROCEDURE — 36415 COLL VENOUS BLD VENIPUNCTURE: CPT | Performed by: PHYSICIAN ASSISTANT

## 2021-05-28 PROCEDURE — 83735 ASSAY OF MAGNESIUM: CPT | Performed by: INTERNAL MEDICINE

## 2021-05-28 PROCEDURE — 97110 THERAPEUTIC EXERCISES: CPT | Mod: CQ

## 2021-05-28 PROCEDURE — 82103 ALPHA-1-ANTITRYPSIN TOTAL: CPT | Performed by: INTERNAL MEDICINE

## 2021-05-28 PROCEDURE — 11000001 HC ACUTE MED/SURG PRIVATE ROOM

## 2021-05-28 PROCEDURE — 82728 ASSAY OF FERRITIN: CPT | Performed by: INTERNAL MEDICINE

## 2021-05-28 PROCEDURE — 97116 GAIT TRAINING THERAPY: CPT | Mod: CQ

## 2021-05-28 PROCEDURE — 80053 COMPREHEN METABOLIC PANEL: CPT | Performed by: EMERGENCY MEDICINE

## 2021-05-28 PROCEDURE — 85025 COMPLETE CBC W/AUTO DIFF WBC: CPT | Performed by: PHYSICIAN ASSISTANT

## 2021-05-28 PROCEDURE — 25000003 PHARM REV CODE 250: Performed by: NURSE PRACTITIONER

## 2021-05-28 PROCEDURE — 25000003 PHARM REV CODE 250: Performed by: INTERNAL MEDICINE

## 2021-05-28 PROCEDURE — 94761 N-INVAS EAR/PLS OXIMETRY MLT: CPT

## 2021-05-28 PROCEDURE — 84100 ASSAY OF PHOSPHORUS: CPT | Performed by: INTERNAL MEDICINE

## 2021-05-28 PROCEDURE — 86376 MICROSOMAL ANTIBODY EACH: CPT | Performed by: INTERNAL MEDICINE

## 2021-05-28 RX ORDER — HYDRALAZINE HYDROCHLORIDE 25 MG/1
25 TABLET, FILM COATED ORAL EVERY 8 HOURS PRN
Status: DISCONTINUED | OUTPATIENT
Start: 2021-05-28 | End: 2021-06-03 | Stop reason: HOSPADM

## 2021-05-28 RX ORDER — FUROSEMIDE 40 MG/1
40 TABLET ORAL DAILY
Status: DISCONTINUED | OUTPATIENT
Start: 2021-05-29 | End: 2021-05-28

## 2021-05-28 RX ORDER — FUROSEMIDE 40 MG/1
40 TABLET ORAL DAILY
Status: DISCONTINUED | OUTPATIENT
Start: 2021-05-28 | End: 2021-05-28

## 2021-05-28 RX ORDER — FUROSEMIDE 40 MG/1
40 TABLET ORAL DAILY
Status: DISCONTINUED | OUTPATIENT
Start: 2021-05-29 | End: 2021-06-03 | Stop reason: HOSPADM

## 2021-05-28 RX ORDER — HYDRALAZINE HYDROCHLORIDE 25 MG/1
25 TABLET, FILM COATED ORAL ONCE AS NEEDED
Status: COMPLETED | OUTPATIENT
Start: 2021-05-28 | End: 2021-05-28

## 2021-05-28 RX ORDER — MAGNESIUM SULFATE HEPTAHYDRATE 40 MG/ML
2 INJECTION, SOLUTION INTRAVENOUS ONCE
Status: COMPLETED | OUTPATIENT
Start: 2021-05-28 | End: 2021-05-28

## 2021-05-28 RX ORDER — SPIRONOLACTONE 25 MG/1
100 TABLET ORAL DAILY
Status: DISCONTINUED | OUTPATIENT
Start: 2021-05-29 | End: 2021-06-03 | Stop reason: HOSPADM

## 2021-05-28 RX ORDER — SPIRONOLACTONE 25 MG/1
100 TABLET ORAL DAILY
Status: DISCONTINUED | OUTPATIENT
Start: 2021-05-29 | End: 2021-05-28

## 2021-05-28 RX ORDER — POTASSIUM CHLORIDE 20 MEQ/1
20 TABLET, EXTENDED RELEASE ORAL ONCE
Status: COMPLETED | OUTPATIENT
Start: 2021-05-28 | End: 2021-05-28

## 2021-05-28 RX ORDER — SPIRONOLACTONE 25 MG/1
100 TABLET ORAL DAILY
Status: DISCONTINUED | OUTPATIENT
Start: 2021-05-28 | End: 2021-05-28

## 2021-05-28 RX ADMIN — MAGNESIUM SULFATE 2 G: 2 INJECTION INTRAVENOUS at 11:05

## 2021-05-28 RX ADMIN — ACETAMINOPHEN 650 MG: 325 TABLET, FILM COATED ORAL at 04:05

## 2021-05-28 RX ADMIN — ACETAMINOPHEN 650 MG: 325 TABLET, FILM COATED ORAL at 11:05

## 2021-05-28 RX ADMIN — ACETAMINOPHEN 650 MG: 325 TABLET, FILM COATED ORAL at 02:05

## 2021-05-28 RX ADMIN — POTASSIUM CHLORIDE 20 MEQ: 1500 TABLET, EXTENDED RELEASE ORAL at 11:05

## 2021-05-28 RX ADMIN — FUROSEMIDE 20 MG: 10 INJECTION, SOLUTION INTRAMUSCULAR; INTRAVENOUS at 03:05

## 2021-05-28 RX ADMIN — HYDRALAZINE HYDROCHLORIDE 25 MG: 25 TABLET, FILM COATED ORAL at 08:05

## 2021-05-28 RX ADMIN — HYDRALAZINE HYDROCHLORIDE 25 MG: 25 TABLET, FILM COATED ORAL at 11:05

## 2021-05-28 RX ADMIN — SPIRONOLACTONE 25 MG: 25 TABLET, FILM COATED ORAL at 08:05

## 2021-05-29 PROBLEM — E55.9 VITAMIN D DEFICIENCY: Status: ACTIVE | Noted: 2021-05-29

## 2021-05-29 LAB
25(OH)D3+25(OH)D2 SERPL-MCNC: 9 NG/ML (ref 30–96)
ALBUMIN SERPL BCP-MCNC: 2.2 G/DL (ref 3.5–5.2)
ALP SERPL-CCNC: 90 U/L (ref 55–135)
ALT SERPL W/O P-5'-P-CCNC: 20 U/L (ref 10–44)
ANION GAP SERPL CALC-SCNC: 7 MMOL/L (ref 8–16)
AST SERPL-CCNC: 39 U/L (ref 10–40)
BASOPHILS # BLD AUTO: 0.04 K/UL (ref 0–0.2)
BASOPHILS NFR BLD: 0.8 % (ref 0–1.9)
BILIRUB SERPL-MCNC: 3.3 MG/DL (ref 0.1–1)
BUN SERPL-MCNC: 11 MG/DL (ref 8–23)
CALCIUM SERPL-MCNC: 7.9 MG/DL (ref 8.7–10.5)
CHLORIDE SERPL-SCNC: 107 MMOL/L (ref 95–110)
CO2 SERPL-SCNC: 25 MMOL/L (ref 23–29)
CREAT SERPL-MCNC: 0.7 MG/DL (ref 0.5–1.4)
DIFFERENTIAL METHOD: ABNORMAL
EOSINOPHIL # BLD AUTO: 0.1 K/UL (ref 0–0.5)
EOSINOPHIL NFR BLD: 2.9 % (ref 0–8)
ERYTHROCYTE [DISTWIDTH] IN BLOOD BY AUTOMATED COUNT: 14.6 % (ref 11.5–14.5)
EST. GFR  (AFRICAN AMERICAN): >60 ML/MIN/1.73 M^2
EST. GFR  (NON AFRICAN AMERICAN): >60 ML/MIN/1.73 M^2
GLUCOSE SERPL-MCNC: 117 MG/DL (ref 70–110)
HCT VFR BLD AUTO: 36.8 % (ref 37–48.5)
HGB BLD-MCNC: 12.7 G/DL (ref 12–16)
IMM GRANULOCYTES # BLD AUTO: 0.01 K/UL (ref 0–0.04)
IMM GRANULOCYTES NFR BLD AUTO: 0.2 % (ref 0–0.5)
LYMPHOCYTES # BLD AUTO: 1.5 K/UL (ref 1–4.8)
LYMPHOCYTES NFR BLD: 31 % (ref 18–48)
MAGNESIUM SERPL-MCNC: 1.7 MG/DL (ref 1.6–2.6)
MCH RBC QN AUTO: 33.9 PG (ref 27–31)
MCHC RBC AUTO-ENTMCNC: 34.5 G/DL (ref 32–36)
MCV RBC AUTO: 98 FL (ref 82–98)
MONOCYTES # BLD AUTO: 0.6 K/UL (ref 0.3–1)
MONOCYTES NFR BLD: 11.4 % (ref 4–15)
NEUTROPHILS # BLD AUTO: 2.6 K/UL (ref 1.8–7.7)
NEUTROPHILS NFR BLD: 53.7 % (ref 38–73)
NRBC BLD-RTO: 0 /100 WBC
PHOSPHATE SERPL-MCNC: 3 MG/DL (ref 2.7–4.5)
PLATELET # BLD AUTO: 58 K/UL (ref 150–450)
PMV BLD AUTO: 9.8 FL (ref 9.2–12.9)
POTASSIUM SERPL-SCNC: 3.7 MMOL/L (ref 3.5–5.1)
PROT SERPL-MCNC: 6 G/DL (ref 6–8.4)
RBC # BLD AUTO: 3.75 M/UL (ref 4–5.4)
SODIUM SERPL-SCNC: 139 MMOL/L (ref 136–145)
VIT B12 SERPL-MCNC: 1274 PG/ML (ref 210–950)
WBC # BLD AUTO: 4.81 K/UL (ref 3.9–12.7)

## 2021-05-29 PROCEDURE — 82306 VITAMIN D 25 HYDROXY: CPT | Performed by: INTERNAL MEDICINE

## 2021-05-29 PROCEDURE — 99233 PR SUBSEQUENT HOSPITAL CARE,LEVL III: ICD-10-PCS | Mod: ,,, | Performed by: INTERNAL MEDICINE

## 2021-05-29 PROCEDURE — 86256 FLUORESCENT ANTIBODY TITER: CPT | Performed by: INTERNAL MEDICINE

## 2021-05-29 PROCEDURE — 36415 COLL VENOUS BLD VENIPUNCTURE: CPT | Performed by: INTERNAL MEDICINE

## 2021-05-29 PROCEDURE — 97116 GAIT TRAINING THERAPY: CPT | Mod: CQ

## 2021-05-29 PROCEDURE — 99900035 HC TECH TIME PER 15 MIN (STAT)

## 2021-05-29 PROCEDURE — 25000003 PHARM REV CODE 250: Performed by: PHYSICIAN ASSISTANT

## 2021-05-29 PROCEDURE — 82607 VITAMIN B-12: CPT | Performed by: INTERNAL MEDICINE

## 2021-05-29 PROCEDURE — 63600175 PHARM REV CODE 636 W HCPCS: Performed by: EMERGENCY MEDICINE

## 2021-05-29 PROCEDURE — 94799 UNLISTED PULMONARY SVC/PX: CPT

## 2021-05-29 PROCEDURE — 80053 COMPREHEN METABOLIC PANEL: CPT | Performed by: INTERNAL MEDICINE

## 2021-05-29 PROCEDURE — 99233 SBSQ HOSP IP/OBS HIGH 50: CPT | Mod: ,,, | Performed by: INTERNAL MEDICINE

## 2021-05-29 PROCEDURE — 97535 SELF CARE MNGMENT TRAINING: CPT

## 2021-05-29 PROCEDURE — 94761 N-INVAS EAR/PLS OXIMETRY MLT: CPT

## 2021-05-29 PROCEDURE — 11000001 HC ACUTE MED/SURG PRIVATE ROOM

## 2021-05-29 PROCEDURE — 84100 ASSAY OF PHOSPHORUS: CPT | Performed by: INTERNAL MEDICINE

## 2021-05-29 PROCEDURE — 83735 ASSAY OF MAGNESIUM: CPT | Performed by: INTERNAL MEDICINE

## 2021-05-29 PROCEDURE — 85025 COMPLETE CBC W/AUTO DIFF WBC: CPT | Performed by: PHYSICIAN ASSISTANT

## 2021-05-29 PROCEDURE — 25000003 PHARM REV CODE 250: Performed by: INTERNAL MEDICINE

## 2021-05-29 PROCEDURE — 25000003 PHARM REV CODE 250: Performed by: NURSE PRACTITIONER

## 2021-05-29 PROCEDURE — 97530 THERAPEUTIC ACTIVITIES: CPT | Mod: CQ

## 2021-05-29 RX ORDER — CARVEDILOL 6.25 MG/1
6.25 TABLET ORAL 2 TIMES DAILY
Status: DISCONTINUED | OUTPATIENT
Start: 2021-05-29 | End: 2021-06-03 | Stop reason: HOSPADM

## 2021-05-29 RX ORDER — ERGOCALCIFEROL 1.25 MG/1
50000 CAPSULE ORAL
Status: DISCONTINUED | OUTPATIENT
Start: 2021-05-29 | End: 2021-06-03 | Stop reason: HOSPADM

## 2021-05-29 RX ADMIN — FUROSEMIDE 40 MG: 40 TABLET ORAL at 09:05

## 2021-05-29 RX ADMIN — ACETAMINOPHEN 650 MG: 325 TABLET, FILM COATED ORAL at 09:05

## 2021-05-29 RX ADMIN — SPIRONOLACTONE 100 MG: 25 TABLET, FILM COATED ORAL at 09:05

## 2021-05-29 RX ADMIN — CARVEDILOL 6.25 MG: 6.25 TABLET, FILM COATED ORAL at 09:05

## 2021-05-29 RX ADMIN — ERGOCALCIFEROL 50000 UNITS: 1.25 CAPSULE ORAL at 01:05

## 2021-05-29 RX ADMIN — HYDRALAZINE HYDROCHLORIDE 25 MG: 25 TABLET, FILM COATED ORAL at 01:05

## 2021-05-29 RX ADMIN — ONDANSETRON 4 MG: 2 INJECTION INTRAMUSCULAR; INTRAVENOUS at 04:05

## 2021-05-30 PROCEDURE — 99233 SBSQ HOSP IP/OBS HIGH 50: CPT | Mod: ,,, | Performed by: INTERNAL MEDICINE

## 2021-05-30 PROCEDURE — 25000003 PHARM REV CODE 250: Performed by: INTERNAL MEDICINE

## 2021-05-30 PROCEDURE — 63600175 PHARM REV CODE 636 W HCPCS: Performed by: EMERGENCY MEDICINE

## 2021-05-30 PROCEDURE — 94799 UNLISTED PULMONARY SVC/PX: CPT

## 2021-05-30 PROCEDURE — 11000001 HC ACUTE MED/SURG PRIVATE ROOM

## 2021-05-30 PROCEDURE — 94761 N-INVAS EAR/PLS OXIMETRY MLT: CPT

## 2021-05-30 PROCEDURE — 99900035 HC TECH TIME PER 15 MIN (STAT)

## 2021-05-30 PROCEDURE — 25000003 PHARM REV CODE 250: Performed by: PHYSICIAN ASSISTANT

## 2021-05-30 PROCEDURE — 99233 PR SUBSEQUENT HOSPITAL CARE,LEVL III: ICD-10-PCS | Mod: ,,, | Performed by: INTERNAL MEDICINE

## 2021-05-30 RX ORDER — HEPARIN SODIUM 5000 [USP'U]/ML
5000 INJECTION, SOLUTION INTRAVENOUS; SUBCUTANEOUS EVERY 12 HOURS
Status: DISCONTINUED | OUTPATIENT
Start: 2021-05-30 | End: 2021-05-31

## 2021-05-30 RX ADMIN — ACETAMINOPHEN 650 MG: 325 TABLET, FILM COATED ORAL at 06:05

## 2021-05-30 RX ADMIN — CARVEDILOL 6.25 MG: 6.25 TABLET, FILM COATED ORAL at 09:05

## 2021-05-30 RX ADMIN — FUROSEMIDE 40 MG: 40 TABLET ORAL at 09:05

## 2021-05-30 RX ADMIN — ONDANSETRON 4 MG: 2 INJECTION INTRAMUSCULAR; INTRAVENOUS at 10:05

## 2021-05-30 RX ADMIN — SPIRONOLACTONE 100 MG: 25 TABLET, FILM COATED ORAL at 09:05

## 2021-05-30 RX ADMIN — ACETAMINOPHEN 650 MG: 325 TABLET, FILM COATED ORAL at 10:05

## 2021-05-30 RX ADMIN — ACETAMINOPHEN 650 MG: 325 TABLET, FILM COATED ORAL at 11:05

## 2021-05-30 RX ADMIN — ACETAMINOPHEN 650 MG: 325 TABLET, FILM COATED ORAL at 02:05

## 2021-05-31 PROBLEM — E87.6 HYPOKALEMIA: Status: RESOLVED | Noted: 2021-05-28 | Resolved: 2021-05-31

## 2021-05-31 LAB
ALBUMIN SERPL BCP-MCNC: 2.2 G/DL (ref 3.5–5.2)
ALP SERPL-CCNC: 83 U/L (ref 55–135)
ALT SERPL W/O P-5'-P-CCNC: 20 U/L (ref 10–44)
ANION GAP SERPL CALC-SCNC: 7 MMOL/L (ref 8–16)
AST SERPL-CCNC: 38 U/L (ref 10–40)
BASOPHILS # BLD AUTO: 0.05 K/UL (ref 0–0.2)
BASOPHILS NFR BLD: 1.5 % (ref 0–1.9)
BILIRUB SERPL-MCNC: 2.4 MG/DL (ref 0.1–1)
BUN SERPL-MCNC: 15 MG/DL (ref 8–23)
CALCIUM SERPL-MCNC: 8.1 MG/DL (ref 8.7–10.5)
CHLORIDE SERPL-SCNC: 108 MMOL/L (ref 95–110)
CO2 SERPL-SCNC: 26 MMOL/L (ref 23–29)
CREAT SERPL-MCNC: 0.7 MG/DL (ref 0.5–1.4)
DIFFERENTIAL METHOD: ABNORMAL
EOSINOPHIL # BLD AUTO: 0.1 K/UL (ref 0–0.5)
EOSINOPHIL NFR BLD: 3.5 % (ref 0–8)
ERYTHROCYTE [DISTWIDTH] IN BLOOD BY AUTOMATED COUNT: 14.6 % (ref 11.5–14.5)
EST. GFR  (AFRICAN AMERICAN): >60 ML/MIN/1.73 M^2
EST. GFR  (NON AFRICAN AMERICAN): >60 ML/MIN/1.73 M^2
GLUCOSE SERPL-MCNC: 118 MG/DL (ref 70–110)
HCT VFR BLD AUTO: 34.8 % (ref 37–48.5)
HGB BLD-MCNC: 11.6 G/DL (ref 12–16)
IMM GRANULOCYTES # BLD AUTO: 0 K/UL (ref 0–0.04)
IMM GRANULOCYTES NFR BLD AUTO: 0 % (ref 0–0.5)
LYMPHOCYTES # BLD AUTO: 1.4 K/UL (ref 1–4.8)
LYMPHOCYTES NFR BLD: 40 % (ref 18–48)
MAGNESIUM SERPL-MCNC: 1.6 MG/DL (ref 1.6–2.6)
MCH RBC QN AUTO: 33.3 PG (ref 27–31)
MCHC RBC AUTO-ENTMCNC: 33.3 G/DL (ref 32–36)
MCV RBC AUTO: 100 FL (ref 82–98)
MONOCYTES # BLD AUTO: 0.5 K/UL (ref 0.3–1)
MONOCYTES NFR BLD: 13.2 % (ref 4–15)
NEUTROPHILS # BLD AUTO: 1.4 K/UL (ref 1.8–7.7)
NEUTROPHILS NFR BLD: 41.8 % (ref 38–73)
NRBC BLD-RTO: 0 /100 WBC
PHOSPHATE SERPL-MCNC: 4.2 MG/DL (ref 2.7–4.5)
PLATELET # BLD AUTO: 53 K/UL (ref 150–450)
PMV BLD AUTO: 10 FL (ref 9.2–12.9)
POTASSIUM SERPL-SCNC: 3.8 MMOL/L (ref 3.5–5.1)
PROT SERPL-MCNC: 5.7 G/DL (ref 6–8.4)
RBC # BLD AUTO: 3.48 M/UL (ref 4–5.4)
SODIUM SERPL-SCNC: 141 MMOL/L (ref 136–145)
WBC # BLD AUTO: 3.4 K/UL (ref 3.9–12.7)

## 2021-05-31 PROCEDURE — 80053 COMPREHEN METABOLIC PANEL: CPT | Performed by: INTERNAL MEDICINE

## 2021-05-31 PROCEDURE — 36415 COLL VENOUS BLD VENIPUNCTURE: CPT | Performed by: INTERNAL MEDICINE

## 2021-05-31 PROCEDURE — 85025 COMPLETE CBC W/AUTO DIFF WBC: CPT | Performed by: INTERNAL MEDICINE

## 2021-05-31 PROCEDURE — 97530 THERAPEUTIC ACTIVITIES: CPT | Mod: CQ

## 2021-05-31 PROCEDURE — 25000003 PHARM REV CODE 250: Performed by: INTERNAL MEDICINE

## 2021-05-31 PROCEDURE — 86580 TB INTRADERMAL TEST: CPT | Performed by: INTERNAL MEDICINE

## 2021-05-31 PROCEDURE — 25000003 PHARM REV CODE 250: Performed by: PHYSICIAN ASSISTANT

## 2021-05-31 PROCEDURE — 99233 PR SUBSEQUENT HOSPITAL CARE,LEVL III: ICD-10-PCS | Mod: ,,, | Performed by: INTERNAL MEDICINE

## 2021-05-31 PROCEDURE — 94761 N-INVAS EAR/PLS OXIMETRY MLT: CPT

## 2021-05-31 PROCEDURE — 63600175 PHARM REV CODE 636 W HCPCS: Performed by: EMERGENCY MEDICINE

## 2021-05-31 PROCEDURE — 84100 ASSAY OF PHOSPHORUS: CPT | Performed by: INTERNAL MEDICINE

## 2021-05-31 PROCEDURE — 83735 ASSAY OF MAGNESIUM: CPT | Performed by: INTERNAL MEDICINE

## 2021-05-31 PROCEDURE — 30200315 PPD INTRADERMAL TEST REV CODE 302: Performed by: INTERNAL MEDICINE

## 2021-05-31 PROCEDURE — 99233 SBSQ HOSP IP/OBS HIGH 50: CPT | Mod: ,,, | Performed by: INTERNAL MEDICINE

## 2021-05-31 PROCEDURE — 11000001 HC ACUTE MED/SURG PRIVATE ROOM

## 2021-05-31 PROCEDURE — 97116 GAIT TRAINING THERAPY: CPT | Mod: CQ

## 2021-05-31 RX ADMIN — ACETAMINOPHEN 650 MG: 325 TABLET, FILM COATED ORAL at 09:05

## 2021-05-31 RX ADMIN — ONDANSETRON 4 MG: 2 INJECTION INTRAMUSCULAR; INTRAVENOUS at 09:05

## 2021-05-31 RX ADMIN — SPIRONOLACTONE 100 MG: 25 TABLET, FILM COATED ORAL at 09:05

## 2021-05-31 RX ADMIN — TUBERCULIN PURIFIED PROTEIN DERIVATIVE 5 UNITS: 5 INJECTION, SOLUTION INTRADERMAL at 07:05

## 2021-05-31 RX ADMIN — FUROSEMIDE 40 MG: 40 TABLET ORAL at 09:05

## 2021-05-31 RX ADMIN — CARVEDILOL 6.25 MG: 6.25 TABLET, FILM COATED ORAL at 09:05

## 2021-05-31 RX ADMIN — ACETAMINOPHEN 650 MG: 325 TABLET, FILM COATED ORAL at 11:05

## 2021-06-01 LAB
ALBUMIN SERPL BCP-MCNC: 2.1 G/DL (ref 3.5–5.2)
ALP SERPL-CCNC: 85 U/L (ref 55–135)
ALT SERPL W/O P-5'-P-CCNC: 21 U/L (ref 10–44)
ANION GAP SERPL CALC-SCNC: 8 MMOL/L (ref 8–16)
AST SERPL-CCNC: 40 U/L (ref 10–40)
BASOPHILS # BLD AUTO: 0.03 K/UL (ref 0–0.2)
BASOPHILS NFR BLD: 0.9 % (ref 0–1.9)
BILIRUB SERPL-MCNC: 2.1 MG/DL (ref 0.1–1)
BUN SERPL-MCNC: 16 MG/DL (ref 8–23)
CALCIUM SERPL-MCNC: 7.9 MG/DL (ref 8.7–10.5)
CHLORIDE SERPL-SCNC: 106 MMOL/L (ref 95–110)
CHOLEST SERPL-MCNC: 116 MG/DL (ref 120–199)
CHOLEST/HDLC SERPL: 4.3 {RATIO} (ref 2–5)
CO2 SERPL-SCNC: 25 MMOL/L (ref 23–29)
CREAT SERPL-MCNC: 0.8 MG/DL (ref 0.5–1.4)
DIFFERENTIAL METHOD: ABNORMAL
EOSINOPHIL # BLD AUTO: 0.1 K/UL (ref 0–0.5)
EOSINOPHIL NFR BLD: 3.7 % (ref 0–8)
ERYTHROCYTE [DISTWIDTH] IN BLOOD BY AUTOMATED COUNT: 14.6 % (ref 11.5–14.5)
EST. GFR  (AFRICAN AMERICAN): >60 ML/MIN/1.73 M^2
EST. GFR  (NON AFRICAN AMERICAN): >60 ML/MIN/1.73 M^2
ESTIMATED AVG GLUCOSE: 103 MG/DL (ref 68–131)
GLUCOSE SERPL-MCNC: 173 MG/DL (ref 70–110)
HBA1C MFR BLD: 5.2 % (ref 4–5.6)
HCT VFR BLD AUTO: 36.6 % (ref 37–48.5)
HDLC SERPL-MCNC: 27 MG/DL (ref 40–75)
HDLC SERPL: 23.3 % (ref 20–50)
HGB BLD-MCNC: 12 G/DL (ref 12–16)
IMM GRANULOCYTES # BLD AUTO: 0 K/UL (ref 0–0.04)
IMM GRANULOCYTES NFR BLD AUTO: 0 % (ref 0–0.5)
LDLC SERPL CALC-MCNC: 72.2 MG/DL (ref 63–159)
LKM AB SER-ACNC: 3.3 UNITS
LYMPHOCYTES # BLD AUTO: 1.1 K/UL (ref 1–4.8)
LYMPHOCYTES NFR BLD: 35.2 % (ref 18–48)
MAGNESIUM SERPL-MCNC: 1.5 MG/DL (ref 1.6–2.6)
MCH RBC QN AUTO: 33.1 PG (ref 27–31)
MCHC RBC AUTO-ENTMCNC: 32.8 G/DL (ref 32–36)
MCV RBC AUTO: 101 FL (ref 82–98)
MONOCYTES # BLD AUTO: 0.5 K/UL (ref 0.3–1)
MONOCYTES NFR BLD: 13.9 % (ref 4–15)
NEUTROPHILS # BLD AUTO: 1.5 K/UL (ref 1.8–7.7)
NEUTROPHILS NFR BLD: 46.3 % (ref 38–73)
NONHDLC SERPL-MCNC: 89 MG/DL
NRBC BLD-RTO: 0 /100 WBC
PHOSPHATE SERPL-MCNC: 3.8 MG/DL (ref 2.7–4.5)
PLATELET # BLD AUTO: 58 K/UL (ref 150–450)
PMV BLD AUTO: 11 FL (ref 9.2–12.9)
POTASSIUM SERPL-SCNC: 3.9 MMOL/L (ref 3.5–5.1)
PROT SERPL-MCNC: 5.8 G/DL (ref 6–8.4)
RBC # BLD AUTO: 3.62 M/UL (ref 4–5.4)
SODIUM SERPL-SCNC: 139 MMOL/L (ref 136–145)
TRIGL SERPL-MCNC: 84 MG/DL (ref 30–150)
WBC # BLD AUTO: 3.24 K/UL (ref 3.9–12.7)

## 2021-06-01 PROCEDURE — 80061 LIPID PANEL: CPT | Performed by: INTERNAL MEDICINE

## 2021-06-01 PROCEDURE — 25000003 PHARM REV CODE 250: Performed by: INTERNAL MEDICINE

## 2021-06-01 PROCEDURE — 25000003 PHARM REV CODE 250: Performed by: PHYSICIAN ASSISTANT

## 2021-06-01 PROCEDURE — 94799 UNLISTED PULMONARY SVC/PX: CPT

## 2021-06-01 PROCEDURE — 99233 PR SUBSEQUENT HOSPITAL CARE,LEVL III: ICD-10-PCS | Mod: ,,, | Performed by: HOSPITALIST

## 2021-06-01 PROCEDURE — 97535 SELF CARE MNGMENT TRAINING: CPT

## 2021-06-01 PROCEDURE — 97116 GAIT TRAINING THERAPY: CPT | Mod: CQ

## 2021-06-01 PROCEDURE — 99900035 HC TECH TIME PER 15 MIN (STAT)

## 2021-06-01 PROCEDURE — 84100 ASSAY OF PHOSPHORUS: CPT | Performed by: INTERNAL MEDICINE

## 2021-06-01 PROCEDURE — 83036 HEMOGLOBIN GLYCOSYLATED A1C: CPT | Performed by: INTERNAL MEDICINE

## 2021-06-01 PROCEDURE — 36415 COLL VENOUS BLD VENIPUNCTURE: CPT | Performed by: INTERNAL MEDICINE

## 2021-06-01 PROCEDURE — 97530 THERAPEUTIC ACTIVITIES: CPT | Mod: CQ

## 2021-06-01 PROCEDURE — 99233 SBSQ HOSP IP/OBS HIGH 50: CPT | Mod: ,,, | Performed by: HOSPITALIST

## 2021-06-01 PROCEDURE — 85025 COMPLETE CBC W/AUTO DIFF WBC: CPT | Performed by: INTERNAL MEDICINE

## 2021-06-01 PROCEDURE — 80053 COMPREHEN METABOLIC PANEL: CPT | Performed by: INTERNAL MEDICINE

## 2021-06-01 PROCEDURE — 94761 N-INVAS EAR/PLS OXIMETRY MLT: CPT

## 2021-06-01 PROCEDURE — 83735 ASSAY OF MAGNESIUM: CPT | Performed by: INTERNAL MEDICINE

## 2021-06-01 PROCEDURE — 97530 THERAPEUTIC ACTIVITIES: CPT

## 2021-06-01 PROCEDURE — 11000001 HC ACUTE MED/SURG PRIVATE ROOM

## 2021-06-01 PROCEDURE — 63600175 PHARM REV CODE 636 W HCPCS: Performed by: EMERGENCY MEDICINE

## 2021-06-01 RX ORDER — CARVEDILOL 6.25 MG/1
6.25 TABLET ORAL 2 TIMES DAILY
Status: ON HOLD
Start: 2021-06-01 | End: 2021-06-21 | Stop reason: HOSPADM

## 2021-06-01 RX ORDER — SPIRONOLACTONE 100 MG/1
100 TABLET, FILM COATED ORAL DAILY
Status: ON HOLD
Start: 2021-06-02 | End: 2021-06-21 | Stop reason: HOSPADM

## 2021-06-01 RX ORDER — FUROSEMIDE 40 MG/1
40 TABLET ORAL DAILY
Status: ON HOLD
Start: 2021-06-02 | End: 2021-06-15 | Stop reason: HOSPADM

## 2021-06-01 RX ADMIN — ACETAMINOPHEN 650 MG: 325 TABLET, FILM COATED ORAL at 11:06

## 2021-06-01 RX ADMIN — ONDANSETRON 4 MG: 2 INJECTION INTRAMUSCULAR; INTRAVENOUS at 09:06

## 2021-06-01 RX ADMIN — CARVEDILOL 6.25 MG: 6.25 TABLET, FILM COATED ORAL at 08:06

## 2021-06-01 RX ADMIN — SPIRONOLACTONE 100 MG: 25 TABLET, FILM COATED ORAL at 08:06

## 2021-06-01 RX ADMIN — FUROSEMIDE 40 MG: 40 TABLET ORAL at 08:06

## 2021-06-01 RX ADMIN — ACETAMINOPHEN 650 MG: 325 TABLET, FILM COATED ORAL at 08:06

## 2021-06-02 PROBLEM — R06.02 SOB (SHORTNESS OF BREATH): Status: RESOLVED | Noted: 2021-05-26 | Resolved: 2021-06-02

## 2021-06-02 PROBLEM — E83.42 HYPOMAGNESEMIA: Status: RESOLVED | Noted: 2021-05-28 | Resolved: 2021-06-02

## 2021-06-02 LAB
ALBUMIN SERPL BCP-MCNC: 2.2 G/DL (ref 3.5–5.2)
ALP SERPL-CCNC: 78 U/L (ref 55–135)
ALT SERPL W/O P-5'-P-CCNC: 19 U/L (ref 10–44)
ANION GAP SERPL CALC-SCNC: 7 MMOL/L (ref 8–16)
AST SERPL-CCNC: 37 U/L (ref 10–40)
BASOPHILS # BLD AUTO: 0.04 K/UL (ref 0–0.2)
BASOPHILS NFR BLD: 1.2 % (ref 0–1.9)
BILIRUB SERPL-MCNC: 2.3 MG/DL (ref 0.1–1)
BUN SERPL-MCNC: 16 MG/DL (ref 8–23)
CALCIUM SERPL-MCNC: 8.1 MG/DL (ref 8.7–10.5)
CHLORIDE SERPL-SCNC: 106 MMOL/L (ref 95–110)
CO2 SERPL-SCNC: 26 MMOL/L (ref 23–29)
CREAT SERPL-MCNC: 0.8 MG/DL (ref 0.5–1.4)
DIFFERENTIAL METHOD: ABNORMAL
EOSINOPHIL # BLD AUTO: 0.1 K/UL (ref 0–0.5)
EOSINOPHIL NFR BLD: 4.1 % (ref 0–8)
ERYTHROCYTE [DISTWIDTH] IN BLOOD BY AUTOMATED COUNT: 14.8 % (ref 11.5–14.5)
EST. GFR  (AFRICAN AMERICAN): >60 ML/MIN/1.73 M^2
EST. GFR  (NON AFRICAN AMERICAN): >60 ML/MIN/1.73 M^2
GLUCOSE SERPL-MCNC: 138 MG/DL (ref 70–110)
HCT VFR BLD AUTO: 36.2 % (ref 37–48.5)
HGB BLD-MCNC: 12.2 G/DL (ref 12–16)
IMM GRANULOCYTES # BLD AUTO: 0.01 K/UL (ref 0–0.04)
IMM GRANULOCYTES NFR BLD AUTO: 0.3 % (ref 0–0.5)
LYMPHOCYTES # BLD AUTO: 1.2 K/UL (ref 1–4.8)
LYMPHOCYTES NFR BLD: 35.4 % (ref 18–48)
MAGNESIUM SERPL-MCNC: 1.7 MG/DL (ref 1.6–2.6)
MCH RBC QN AUTO: 34.1 PG (ref 27–31)
MCHC RBC AUTO-ENTMCNC: 33.7 G/DL (ref 32–36)
MCV RBC AUTO: 101 FL (ref 82–98)
MONOCYTES # BLD AUTO: 0.5 K/UL (ref 0.3–1)
MONOCYTES NFR BLD: 14.5 % (ref 4–15)
NEUTROPHILS # BLD AUTO: 1.5 K/UL (ref 1.8–7.7)
NEUTROPHILS NFR BLD: 44.5 % (ref 38–73)
NRBC BLD-RTO: 0 /100 WBC
PHOSPHATE SERPL-MCNC: 3.8 MG/DL (ref 2.7–4.5)
PLATELET # BLD AUTO: 58 K/UL (ref 150–450)
PMV BLD AUTO: 10.8 FL (ref 9.2–12.9)
POCT GLUCOSE: 176 MG/DL (ref 70–110)
POTASSIUM SERPL-SCNC: 3.9 MMOL/L (ref 3.5–5.1)
PROT SERPL-MCNC: 5.8 G/DL (ref 6–8.4)
RBC # BLD AUTO: 3.58 M/UL (ref 4–5.4)
SODIUM SERPL-SCNC: 139 MMOL/L (ref 136–145)
WBC # BLD AUTO: 3.45 K/UL (ref 3.9–12.7)

## 2021-06-02 PROCEDURE — 25000003 PHARM REV CODE 250: Performed by: INTERNAL MEDICINE

## 2021-06-02 PROCEDURE — 83735 ASSAY OF MAGNESIUM: CPT | Performed by: INTERNAL MEDICINE

## 2021-06-02 PROCEDURE — 25000003 PHARM REV CODE 250: Performed by: PHYSICIAN ASSISTANT

## 2021-06-02 PROCEDURE — 84100 ASSAY OF PHOSPHORUS: CPT | Performed by: INTERNAL MEDICINE

## 2021-06-02 PROCEDURE — 99232 SBSQ HOSP IP/OBS MODERATE 35: CPT | Mod: ,,, | Performed by: HOSPITALIST

## 2021-06-02 PROCEDURE — 99232 PR SUBSEQUENT HOSPITAL CARE,LEVL II: ICD-10-PCS | Mod: ,,, | Performed by: HOSPITALIST

## 2021-06-02 PROCEDURE — U0003 INFECTIOUS AGENT DETECTION BY NUCLEIC ACID (DNA OR RNA); SEVERE ACUTE RESPIRATORY SYNDROME CORONAVIRUS 2 (SARS-COV-2) (CORONAVIRUS DISEASE [COVID-19]), AMPLIFIED PROBE TECHNIQUE, MAKING USE OF HIGH THROUGHPUT TECHNOLOGIES AS DESCRIBED BY CMS-2020-01-R: HCPCS | Performed by: HOSPITALIST

## 2021-06-02 PROCEDURE — 80053 COMPREHEN METABOLIC PANEL: CPT | Performed by: INTERNAL MEDICINE

## 2021-06-02 PROCEDURE — 85025 COMPLETE CBC W/AUTO DIFF WBC: CPT | Performed by: INTERNAL MEDICINE

## 2021-06-02 PROCEDURE — 94761 N-INVAS EAR/PLS OXIMETRY MLT: CPT

## 2021-06-02 PROCEDURE — 36415 COLL VENOUS BLD VENIPUNCTURE: CPT | Performed by: INTERNAL MEDICINE

## 2021-06-02 PROCEDURE — 97530 THERAPEUTIC ACTIVITIES: CPT

## 2021-06-02 PROCEDURE — 11000001 HC ACUTE MED/SURG PRIVATE ROOM

## 2021-06-02 PROCEDURE — 97116 GAIT TRAINING THERAPY: CPT

## 2021-06-02 PROCEDURE — U0005 INFEC AGEN DETEC AMPLI PROBE: HCPCS | Performed by: HOSPITALIST

## 2021-06-02 RX ADMIN — FUROSEMIDE 40 MG: 40 TABLET ORAL at 08:06

## 2021-06-02 RX ADMIN — ACETAMINOPHEN 650 MG: 325 TABLET, FILM COATED ORAL at 07:06

## 2021-06-02 RX ADMIN — ACETAMINOPHEN 650 MG: 325 TABLET, FILM COATED ORAL at 10:06

## 2021-06-02 RX ADMIN — CARVEDILOL 6.25 MG: 6.25 TABLET, FILM COATED ORAL at 08:06

## 2021-06-02 RX ADMIN — CARVEDILOL 6.25 MG: 6.25 TABLET, FILM COATED ORAL at 09:06

## 2021-06-02 RX ADMIN — SPIRONOLACTONE 100 MG: 25 TABLET, FILM COATED ORAL at 08:06

## 2021-06-03 ENCOUNTER — HOSPITAL ENCOUNTER (INPATIENT)
Facility: HOSPITAL | Age: 76
LOS: 18 days | Discharge: HOME HEALTH | DRG: 433 | End: 2021-06-21
Attending: HOSPITALIST | Admitting: HOSPITALIST
Payer: MEDICARE

## 2021-06-03 VITALS
DIASTOLIC BLOOD PRESSURE: 60 MMHG | HEIGHT: 70 IN | WEIGHT: 274.25 LBS | OXYGEN SATURATION: 96 % | TEMPERATURE: 98 F | SYSTOLIC BLOOD PRESSURE: 132 MMHG | HEART RATE: 67 BPM | RESPIRATION RATE: 18 BRPM | BODY MASS INDEX: 39.26 KG/M2

## 2021-06-03 DIAGNOSIS — R53.81 PHYSICAL DECONDITIONING: ICD-10-CM

## 2021-06-03 DIAGNOSIS — E66.01 MORBID OBESITY: ICD-10-CM

## 2021-06-03 DIAGNOSIS — R60.1 ANASARCA: ICD-10-CM

## 2021-06-03 DIAGNOSIS — K74.60 CIRRHOSIS, NON-ALCOHOLIC: Primary | ICD-10-CM

## 2021-06-03 LAB
ALBUMIN SERPL BCP-MCNC: 2.3 G/DL (ref 3.5–5.2)
ALP SERPL-CCNC: 83 U/L (ref 55–135)
ALT SERPL W/O P-5'-P-CCNC: 18 U/L (ref 10–44)
ANION GAP SERPL CALC-SCNC: 5 MMOL/L (ref 8–16)
AST SERPL-CCNC: 37 U/L (ref 10–40)
BASOPHILS # BLD AUTO: 0.05 K/UL (ref 0–0.2)
BASOPHILS NFR BLD: 1.3 % (ref 0–1.9)
BILIRUB SERPL-MCNC: 2.4 MG/DL (ref 0.1–1)
BUN SERPL-MCNC: 16 MG/DL (ref 8–23)
CALCIUM SERPL-MCNC: 8.4 MG/DL (ref 8.7–10.5)
CHLORIDE SERPL-SCNC: 106 MMOL/L (ref 95–110)
CO2 SERPL-SCNC: 27 MMOL/L (ref 23–29)
CREAT SERPL-MCNC: 0.8 MG/DL (ref 0.5–1.4)
DIFFERENTIAL METHOD: ABNORMAL
EOSINOPHIL # BLD AUTO: 0.2 K/UL (ref 0–0.5)
EOSINOPHIL NFR BLD: 3.9 % (ref 0–8)
ERYTHROCYTE [DISTWIDTH] IN BLOOD BY AUTOMATED COUNT: 14.6 % (ref 11.5–14.5)
EST. GFR  (AFRICAN AMERICAN): >60 ML/MIN/1.73 M^2
EST. GFR  (NON AFRICAN AMERICAN): >60 ML/MIN/1.73 M^2
GLUCOSE SERPL-MCNC: 128 MG/DL (ref 70–110)
HCT VFR BLD AUTO: 37.2 % (ref 37–48.5)
HGB BLD-MCNC: 12.4 G/DL (ref 12–16)
IMM GRANULOCYTES # BLD AUTO: 0.02 K/UL (ref 0–0.04)
IMM GRANULOCYTES NFR BLD AUTO: 0.5 % (ref 0–0.5)
LYMPHOCYTES # BLD AUTO: 1.4 K/UL (ref 1–4.8)
LYMPHOCYTES NFR BLD: 36.1 % (ref 18–48)
MAGNESIUM SERPL-MCNC: 1.6 MG/DL (ref 1.6–2.6)
MCH RBC QN AUTO: 33.9 PG (ref 27–31)
MCHC RBC AUTO-ENTMCNC: 33.3 G/DL (ref 32–36)
MCV RBC AUTO: 102 FL (ref 82–98)
MONOCYTES # BLD AUTO: 0.6 K/UL (ref 0.3–1)
MONOCYTES NFR BLD: 14.5 % (ref 4–15)
NEUTROPHILS # BLD AUTO: 1.7 K/UL (ref 1.8–7.7)
NEUTROPHILS NFR BLD: 43.7 % (ref 38–73)
NRBC BLD-RTO: 0 /100 WBC
PHOSPHATE SERPL-MCNC: 3.8 MG/DL (ref 2.7–4.5)
PLATELET # BLD AUTO: 59 K/UL (ref 150–450)
PMV BLD AUTO: 11 FL (ref 9.2–12.9)
POTASSIUM SERPL-SCNC: 4 MMOL/L (ref 3.5–5.1)
PROT SERPL-MCNC: 5.9 G/DL (ref 6–8.4)
RBC # BLD AUTO: 3.66 M/UL (ref 4–5.4)
SARS-COV-2 RNA RESP QL NAA+PROBE: NOT DETECTED
SMOOTH MUSCLE AB TITR SER IF: ABNORMAL {TITER}
SODIUM SERPL-SCNC: 138 MMOL/L (ref 136–145)
TB INDURATION 48 - 72 HR READ: 0 MM
WBC # BLD AUTO: 3.85 K/UL (ref 3.9–12.7)

## 2021-06-03 PROCEDURE — 99238 HOSP IP/OBS DSCHRG MGMT 30/<: CPT | Mod: ,,, | Performed by: HOSPITALIST

## 2021-06-03 PROCEDURE — 11000004 HC SNF PRIVATE

## 2021-06-03 PROCEDURE — 85025 COMPLETE CBC W/AUTO DIFF WBC: CPT | Performed by: INTERNAL MEDICINE

## 2021-06-03 PROCEDURE — 25000003 PHARM REV CODE 250: Performed by: HOSPITALIST

## 2021-06-03 PROCEDURE — 97535 SELF CARE MNGMENT TRAINING: CPT

## 2021-06-03 PROCEDURE — 25000003 PHARM REV CODE 250: Performed by: PHYSICIAN ASSISTANT

## 2021-06-03 PROCEDURE — 36415 COLL VENOUS BLD VENIPUNCTURE: CPT | Performed by: INTERNAL MEDICINE

## 2021-06-03 PROCEDURE — 97530 THERAPEUTIC ACTIVITIES: CPT

## 2021-06-03 PROCEDURE — 63600175 PHARM REV CODE 636 W HCPCS: Performed by: EMERGENCY MEDICINE

## 2021-06-03 PROCEDURE — 25000003 PHARM REV CODE 250: Performed by: INTERNAL MEDICINE

## 2021-06-03 PROCEDURE — 99238 PR HOSPITAL DISCHARGE DAY,<30 MIN: ICD-10-PCS | Mod: ,,, | Performed by: HOSPITALIST

## 2021-06-03 PROCEDURE — 80053 COMPREHEN METABOLIC PANEL: CPT | Performed by: INTERNAL MEDICINE

## 2021-06-03 PROCEDURE — 84100 ASSAY OF PHOSPHORUS: CPT | Performed by: INTERNAL MEDICINE

## 2021-06-03 PROCEDURE — 83735 ASSAY OF MAGNESIUM: CPT | Performed by: INTERNAL MEDICINE

## 2021-06-03 RX ORDER — AMOXICILLIN 250 MG
1 CAPSULE ORAL 2 TIMES DAILY
Status: CANCELLED | OUTPATIENT
Start: 2021-06-03

## 2021-06-03 RX ORDER — ONDANSETRON 4 MG/1
4 TABLET, ORALLY DISINTEGRATING ORAL EVERY 8 HOURS PRN
Status: CANCELLED | OUTPATIENT
Start: 2021-06-03

## 2021-06-03 RX ORDER — ACETAMINOPHEN 325 MG/1
650 TABLET ORAL EVERY 6 HOURS PRN
Status: CANCELLED | OUTPATIENT
Start: 2021-06-03

## 2021-06-03 RX ORDER — SPIRONOLACTONE 25 MG/1
100 TABLET ORAL DAILY
Status: CANCELLED | OUTPATIENT
Start: 2021-06-04

## 2021-06-03 RX ORDER — ERGOCALCIFEROL 1.25 MG/1
50000 CAPSULE ORAL
Status: DISCONTINUED | OUTPATIENT
Start: 2021-06-05 | End: 2021-06-21 | Stop reason: HOSPADM

## 2021-06-03 RX ORDER — FUROSEMIDE 40 MG/1
40 TABLET ORAL DAILY
Status: DISCONTINUED | OUTPATIENT
Start: 2021-06-04 | End: 2021-06-14

## 2021-06-03 RX ORDER — ONDANSETRON 4 MG/1
4 TABLET, ORALLY DISINTEGRATING ORAL EVERY 8 HOURS PRN
Status: DISCONTINUED | OUTPATIENT
Start: 2021-06-03 | End: 2021-06-08

## 2021-06-03 RX ORDER — CALCIUM CARBONATE 200(500)MG
500 TABLET,CHEWABLE ORAL 2 TIMES DAILY PRN
Status: DISCONTINUED | OUTPATIENT
Start: 2021-06-03 | End: 2021-06-21 | Stop reason: HOSPADM

## 2021-06-03 RX ORDER — TALC
6 POWDER (GRAM) TOPICAL NIGHTLY PRN
Status: DISCONTINUED | OUTPATIENT
Start: 2021-06-03 | End: 2021-06-21 | Stop reason: HOSPADM

## 2021-06-03 RX ORDER — LEVALBUTEROL 1.25 MG/.5ML
1.25 SOLUTION, CONCENTRATE RESPIRATORY (INHALATION) EVERY 8 HOURS PRN
Status: CANCELLED | OUTPATIENT
Start: 2021-06-03

## 2021-06-03 RX ORDER — TALC
6 POWDER (GRAM) TOPICAL NIGHTLY PRN
Status: CANCELLED | OUTPATIENT
Start: 2021-06-03

## 2021-06-03 RX ORDER — CARVEDILOL 3.12 MG/1
6.25 TABLET ORAL 2 TIMES DAILY
Status: DISCONTINUED | OUTPATIENT
Start: 2021-06-03 | End: 2021-06-21 | Stop reason: HOSPADM

## 2021-06-03 RX ORDER — CALCIUM CARBONATE 200(500)MG
500 TABLET,CHEWABLE ORAL 2 TIMES DAILY PRN
Status: CANCELLED | OUTPATIENT
Start: 2021-06-03

## 2021-06-03 RX ORDER — FUROSEMIDE 40 MG/1
40 TABLET ORAL DAILY
Status: CANCELLED | OUTPATIENT
Start: 2021-06-04

## 2021-06-03 RX ORDER — ACETAMINOPHEN 325 MG/1
650 TABLET ORAL EVERY 6 HOURS PRN
Status: DISCONTINUED | OUTPATIENT
Start: 2021-06-03 | End: 2021-06-05

## 2021-06-03 RX ORDER — SPIRONOLACTONE 25 MG/1
100 TABLET ORAL DAILY
Status: DISCONTINUED | OUTPATIENT
Start: 2021-06-04 | End: 2021-06-17

## 2021-06-03 RX ORDER — TALC
6 POWDER (GRAM) TOPICAL NIGHTLY PRN
Status: DISCONTINUED | OUTPATIENT
Start: 2021-06-03 | End: 2021-06-05

## 2021-06-03 RX ORDER — AMOXICILLIN 250 MG
1 CAPSULE ORAL 2 TIMES DAILY
Status: DISCONTINUED | OUTPATIENT
Start: 2021-06-03 | End: 2021-06-04

## 2021-06-03 RX ORDER — ERGOCALCIFEROL 1.25 MG/1
50000 CAPSULE ORAL
Status: CANCELLED | OUTPATIENT
Start: 2021-06-05

## 2021-06-03 RX ORDER — CARVEDILOL 6.25 MG/1
6.25 TABLET ORAL 2 TIMES DAILY
Status: CANCELLED | OUTPATIENT
Start: 2021-06-03

## 2021-06-03 RX ORDER — LEVALBUTEROL 1.25 MG/.5ML
1.25 SOLUTION, CONCENTRATE RESPIRATORY (INHALATION) EVERY 8 HOURS PRN
Status: DISCONTINUED | OUTPATIENT
Start: 2021-06-03 | End: 2021-06-21 | Stop reason: HOSPADM

## 2021-06-03 RX ADMIN — FUROSEMIDE 40 MG: 40 TABLET ORAL at 09:06

## 2021-06-03 RX ADMIN — CARVEDILOL 6.25 MG: 3.12 TABLET, FILM COATED ORAL at 09:06

## 2021-06-03 RX ADMIN — SPIRONOLACTONE 100 MG: 25 TABLET, FILM COATED ORAL at 09:06

## 2021-06-03 RX ADMIN — ACETAMINOPHEN 650 MG: 325 TABLET, FILM COATED ORAL at 12:06

## 2021-06-03 RX ADMIN — CARVEDILOL 6.25 MG: 6.25 TABLET, FILM COATED ORAL at 09:06

## 2021-06-03 RX ADMIN — ACETAMINOPHEN 650 MG: 325 TABLET, FILM COATED ORAL at 02:06

## 2021-06-03 RX ADMIN — ONDANSETRON 4 MG: 2 INJECTION INTRAMUSCULAR; INTRAVENOUS at 12:06

## 2021-06-04 PROCEDURE — 25000003 PHARM REV CODE 250: Performed by: NURSE PRACTITIONER

## 2021-06-04 PROCEDURE — 97530 THERAPEUTIC ACTIVITIES: CPT

## 2021-06-04 PROCEDURE — 25000003 PHARM REV CODE 250: Performed by: HOSPITALIST

## 2021-06-04 PROCEDURE — 97163 PT EVAL HIGH COMPLEX 45 MIN: CPT

## 2021-06-04 PROCEDURE — 97535 SELF CARE MNGMENT TRAINING: CPT

## 2021-06-04 PROCEDURE — 97165 OT EVAL LOW COMPLEX 30 MIN: CPT

## 2021-06-04 PROCEDURE — 11000004 HC SNF PRIVATE

## 2021-06-04 RX ORDER — DICLOFENAC SODIUM 10 MG/G
2 GEL TOPICAL 3 TIMES DAILY
Status: DISCONTINUED | OUTPATIENT
Start: 2021-06-04 | End: 2021-06-21 | Stop reason: HOSPADM

## 2021-06-04 RX ORDER — ALUMINUM HYDROXIDE, MAGNESIUM HYDROXIDE, AND SIMETHICONE 2400; 240; 2400 MG/30ML; MG/30ML; MG/30ML
30 SUSPENSION ORAL
Status: DISPENSED | OUTPATIENT
Start: 2021-06-04 | End: 2021-06-07

## 2021-06-04 RX ORDER — AMOXICILLIN 250 MG
1 CAPSULE ORAL 2 TIMES DAILY PRN
Status: DISCONTINUED | OUTPATIENT
Start: 2021-06-04 | End: 2021-06-21 | Stop reason: HOSPADM

## 2021-06-04 RX ADMIN — FUROSEMIDE 40 MG: 40 TABLET ORAL at 09:06

## 2021-06-04 RX ADMIN — CARVEDILOL 6.25 MG: 3.12 TABLET, FILM COATED ORAL at 09:06

## 2021-06-04 RX ADMIN — DICLOFENAC SODIUM 2 G: 10 GEL TOPICAL at 09:06

## 2021-06-04 RX ADMIN — ACETAMINOPHEN 650 MG: 325 TABLET ORAL at 09:06

## 2021-06-04 RX ADMIN — SPIRONOLACTONE 100 MG: 25 TABLET, FILM COATED ORAL at 09:06

## 2021-06-04 RX ADMIN — DOCUSATE SODIUM 50MG AND SENNOSIDES 8.6MG 1 TABLET: 8.6; 5 TABLET, FILM COATED ORAL at 09:06

## 2021-06-04 RX ADMIN — ALUMINUM HYDROXIDE, MAGNESIUM HYDROXIDE, AND DIMETHICONE 30 ML: 400; 400; 40 SUSPENSION ORAL at 05:06

## 2021-06-04 RX ADMIN — ALUMINUM HYDROXIDE, MAGNESIUM HYDROXIDE, AND DIMETHICONE 30 ML: 400; 400; 40 SUSPENSION ORAL at 09:06

## 2021-06-05 PROCEDURE — 97530 THERAPEUTIC ACTIVITIES: CPT | Mod: CQ

## 2021-06-05 PROCEDURE — 97110 THERAPEUTIC EXERCISES: CPT | Mod: CQ

## 2021-06-05 PROCEDURE — 97116 GAIT TRAINING THERAPY: CPT | Mod: CQ

## 2021-06-05 PROCEDURE — 25000003 PHARM REV CODE 250: Performed by: HOSPITALIST

## 2021-06-05 PROCEDURE — 25000003 PHARM REV CODE 250: Performed by: NURSE PRACTITIONER

## 2021-06-05 PROCEDURE — 11000004 HC SNF PRIVATE

## 2021-06-05 PROCEDURE — 97535 SELF CARE MNGMENT TRAINING: CPT | Mod: CO

## 2021-06-05 RX ORDER — ACETAMINOPHEN 325 MG/1
650 TABLET ORAL EVERY 6 HOURS PRN
Status: DISCONTINUED | OUTPATIENT
Start: 2021-06-05 | End: 2021-06-21 | Stop reason: HOSPADM

## 2021-06-05 RX ORDER — LOPERAMIDE HYDROCHLORIDE 2 MG/1
2 CAPSULE ORAL 4 TIMES DAILY PRN
Status: DISCONTINUED | OUTPATIENT
Start: 2021-06-06 | End: 2021-06-21 | Stop reason: HOSPADM

## 2021-06-05 RX ADMIN — DICLOFENAC SODIUM 2 G: 10 GEL TOPICAL at 03:06

## 2021-06-05 RX ADMIN — SPIRONOLACTONE 100 MG: 25 TABLET, FILM COATED ORAL at 09:06

## 2021-06-05 RX ADMIN — DICLOFENAC SODIUM 2 G: 10 GEL TOPICAL at 11:06

## 2021-06-05 RX ADMIN — ALUMINUM HYDROXIDE, MAGNESIUM HYDROXIDE, AND DIMETHICONE 30 ML: 400; 400; 40 SUSPENSION ORAL at 10:06

## 2021-06-05 RX ADMIN — DICLOFENAC SODIUM 2 G: 10 GEL TOPICAL at 09:06

## 2021-06-05 RX ADMIN — FUROSEMIDE 40 MG: 40 TABLET ORAL at 09:06

## 2021-06-05 RX ADMIN — LOPERAMIDE HYDROCHLORIDE 2 MG: 2 CAPSULE ORAL at 11:06

## 2021-06-05 RX ADMIN — ALUMINUM HYDROXIDE, MAGNESIUM HYDROXIDE, AND DIMETHICONE 30 ML: 400; 400; 40 SUSPENSION ORAL at 11:06

## 2021-06-05 RX ADMIN — ALUMINUM HYDROXIDE, MAGNESIUM HYDROXIDE, AND DIMETHICONE 30 ML: 400; 400; 40 SUSPENSION ORAL at 06:06

## 2021-06-05 RX ADMIN — ERGOCALCIFEROL 50000 UNITS: 1.25 CAPSULE ORAL at 09:06

## 2021-06-05 RX ADMIN — CARVEDILOL 6.25 MG: 3.12 TABLET, FILM COATED ORAL at 09:06

## 2021-06-05 RX ADMIN — ACETAMINOPHEN 650 MG: 325 TABLET ORAL at 10:06

## 2021-06-05 RX ADMIN — ALUMINUM HYDROXIDE, MAGNESIUM HYDROXIDE, AND DIMETHICONE 30 ML: 400; 400; 40 SUSPENSION ORAL at 04:06

## 2021-06-05 RX ADMIN — Medication 1 CAPSULE: at 09:06

## 2021-06-05 RX ADMIN — CARVEDILOL 6.25 MG: 3.12 TABLET, FILM COATED ORAL at 10:06

## 2021-06-06 PROCEDURE — 25000003 PHARM REV CODE 250: Performed by: HOSPITALIST

## 2021-06-06 PROCEDURE — 25000003 PHARM REV CODE 250: Performed by: NURSE PRACTITIONER

## 2021-06-06 PROCEDURE — 11000004 HC SNF PRIVATE

## 2021-06-06 RX ADMIN — SPIRONOLACTONE 100 MG: 25 TABLET, FILM COATED ORAL at 09:06

## 2021-06-06 RX ADMIN — FUROSEMIDE 40 MG: 40 TABLET ORAL at 09:06

## 2021-06-06 RX ADMIN — ALUMINUM HYDROXIDE, MAGNESIUM HYDROXIDE, AND DIMETHICONE 30 ML: 400; 400; 40 SUSPENSION ORAL at 06:06

## 2021-06-06 RX ADMIN — ALUMINUM HYDROXIDE, MAGNESIUM HYDROXIDE, AND DIMETHICONE 30 ML: 400; 400; 40 SUSPENSION ORAL at 10:06

## 2021-06-06 RX ADMIN — DICLOFENAC SODIUM 2 G: 10 GEL TOPICAL at 02:06

## 2021-06-06 RX ADMIN — CARVEDILOL 6.25 MG: 3.12 TABLET, FILM COATED ORAL at 09:06

## 2021-06-06 RX ADMIN — DICLOFENAC SODIUM 2 G: 10 GEL TOPICAL at 08:06

## 2021-06-06 RX ADMIN — ACETAMINOPHEN 650 MG: 325 TABLET ORAL at 09:06

## 2021-06-06 RX ADMIN — CARVEDILOL 6.25 MG: 3.12 TABLET, FILM COATED ORAL at 08:06

## 2021-06-06 RX ADMIN — ACETAMINOPHEN 650 MG: 325 TABLET ORAL at 08:06

## 2021-06-06 RX ADMIN — LOPERAMIDE HYDROCHLORIDE 2 MG: 2 CAPSULE ORAL at 10:06

## 2021-06-06 RX ADMIN — DICLOFENAC SODIUM 2 G: 10 GEL TOPICAL at 09:06

## 2021-06-06 RX ADMIN — ALUMINUM HYDROXIDE, MAGNESIUM HYDROXIDE, AND DIMETHICONE 30 ML: 400; 400; 40 SUSPENSION ORAL at 05:06

## 2021-06-06 RX ADMIN — ALUMINUM HYDROXIDE, MAGNESIUM HYDROXIDE, AND DIMETHICONE 30 ML: 400; 400; 40 SUSPENSION ORAL at 08:06

## 2021-06-06 RX ADMIN — Medication 1 CAPSULE: at 09:06

## 2021-06-07 ENCOUNTER — PATIENT OUTREACH (OUTPATIENT)
Dept: ADMINISTRATIVE | Facility: CLINIC | Age: 76
End: 2021-06-07

## 2021-06-07 LAB
ANION GAP SERPL CALC-SCNC: 9 MMOL/L (ref 8–16)
BASOPHILS # BLD AUTO: 0.05 K/UL (ref 0–0.2)
BASOPHILS NFR BLD: 1.1 % (ref 0–1.9)
BUN SERPL-MCNC: 20 MG/DL (ref 8–23)
CALCIUM SERPL-MCNC: 8.2 MG/DL (ref 8.7–10.5)
CHLORIDE SERPL-SCNC: 104 MMOL/L (ref 95–110)
CO2 SERPL-SCNC: 24 MMOL/L (ref 23–29)
CREAT SERPL-MCNC: 0.8 MG/DL (ref 0.5–1.4)
DIFFERENTIAL METHOD: ABNORMAL
EOSINOPHIL # BLD AUTO: 0.1 K/UL (ref 0–0.5)
EOSINOPHIL NFR BLD: 3.2 % (ref 0–8)
ERYTHROCYTE [DISTWIDTH] IN BLOOD BY AUTOMATED COUNT: 14.9 % (ref 11.5–14.5)
EST. GFR  (AFRICAN AMERICAN): >60 ML/MIN/1.73 M^2
EST. GFR  (NON AFRICAN AMERICAN): >60 ML/MIN/1.73 M^2
GLUCOSE SERPL-MCNC: 123 MG/DL (ref 70–110)
HCT VFR BLD AUTO: 38.2 % (ref 37–48.5)
HGB BLD-MCNC: 12.5 G/DL (ref 12–16)
IMM GRANULOCYTES # BLD AUTO: 0.01 K/UL (ref 0–0.04)
IMM GRANULOCYTES NFR BLD AUTO: 0.2 % (ref 0–0.5)
LYMPHOCYTES # BLD AUTO: 1.2 K/UL (ref 1–4.8)
LYMPHOCYTES NFR BLD: 28.5 % (ref 18–48)
MAGNESIUM SERPL-MCNC: 2.1 MG/DL (ref 1.6–2.6)
MCH RBC QN AUTO: 33.2 PG (ref 27–31)
MCHC RBC AUTO-ENTMCNC: 32.7 G/DL (ref 32–36)
MCV RBC AUTO: 101 FL (ref 82–98)
MONOCYTES # BLD AUTO: 0.6 K/UL (ref 0.3–1)
MONOCYTES NFR BLD: 13.8 % (ref 4–15)
NEUTROPHILS # BLD AUTO: 2.3 K/UL (ref 1.8–7.7)
NEUTROPHILS NFR BLD: 53.2 % (ref 38–73)
NRBC BLD-RTO: 0 /100 WBC
PHOSPHATE SERPL-MCNC: 3.9 MG/DL (ref 2.7–4.5)
PLATELET # BLD AUTO: 68 K/UL (ref 150–450)
PMV BLD AUTO: 11.5 FL (ref 9.2–12.9)
POTASSIUM SERPL-SCNC: 4.3 MMOL/L (ref 3.5–5.1)
RBC # BLD AUTO: 3.77 M/UL (ref 4–5.4)
SODIUM SERPL-SCNC: 137 MMOL/L (ref 136–145)
WBC # BLD AUTO: 4.35 K/UL (ref 3.9–12.7)

## 2021-06-07 PROCEDURE — 25000003 PHARM REV CODE 250: Performed by: NURSE PRACTITIONER

## 2021-06-07 PROCEDURE — 36415 COLL VENOUS BLD VENIPUNCTURE: CPT | Performed by: HOSPITALIST

## 2021-06-07 PROCEDURE — 97530 THERAPEUTIC ACTIVITIES: CPT | Mod: CQ

## 2021-06-07 PROCEDURE — 25000003 PHARM REV CODE 250: Performed by: HOSPITALIST

## 2021-06-07 PROCEDURE — 87186 SC STD MICRODIL/AGAR DIL: CPT | Performed by: NURSE PRACTITIONER

## 2021-06-07 PROCEDURE — 83735 ASSAY OF MAGNESIUM: CPT | Performed by: HOSPITALIST

## 2021-06-07 PROCEDURE — 87077 CULTURE AEROBIC IDENTIFY: CPT | Performed by: NURSE PRACTITIONER

## 2021-06-07 PROCEDURE — 11000004 HC SNF PRIVATE

## 2021-06-07 PROCEDURE — 81001 URINALYSIS AUTO W/SCOPE: CPT | Performed by: NURSE PRACTITIONER

## 2021-06-07 PROCEDURE — 84100 ASSAY OF PHOSPHORUS: CPT | Performed by: HOSPITALIST

## 2021-06-07 PROCEDURE — 87088 URINE BACTERIA CULTURE: CPT | Performed by: NURSE PRACTITIONER

## 2021-06-07 PROCEDURE — 97116 GAIT TRAINING THERAPY: CPT | Mod: CQ

## 2021-06-07 PROCEDURE — 97110 THERAPEUTIC EXERCISES: CPT | Mod: CO

## 2021-06-07 PROCEDURE — 80048 BASIC METABOLIC PNL TOTAL CA: CPT | Performed by: HOSPITALIST

## 2021-06-07 PROCEDURE — 85025 COMPLETE CBC W/AUTO DIFF WBC: CPT | Performed by: HOSPITALIST

## 2021-06-07 PROCEDURE — 97535 SELF CARE MNGMENT TRAINING: CPT | Mod: CO

## 2021-06-07 PROCEDURE — 97110 THERAPEUTIC EXERCISES: CPT | Mod: CQ

## 2021-06-07 PROCEDURE — 87086 URINE CULTURE/COLONY COUNT: CPT | Performed by: NURSE PRACTITIONER

## 2021-06-07 RX ORDER — ONDANSETRON 4 MG/1
4 TABLET, FILM COATED ORAL
Status: DISCONTINUED | OUTPATIENT
Start: 2021-06-08 | End: 2021-06-21 | Stop reason: HOSPADM

## 2021-06-07 RX ADMIN — DICLOFENAC SODIUM 2 G: 10 GEL TOPICAL at 08:06

## 2021-06-07 RX ADMIN — CARVEDILOL 6.25 MG: 3.12 TABLET, FILM COATED ORAL at 08:06

## 2021-06-07 RX ADMIN — FUROSEMIDE 40 MG: 40 TABLET ORAL at 08:06

## 2021-06-07 RX ADMIN — SPIRONOLACTONE 100 MG: 25 TABLET, FILM COATED ORAL at 08:06

## 2021-06-07 RX ADMIN — ALUMINUM HYDROXIDE, MAGNESIUM HYDROXIDE, AND DIMETHICONE 30 ML: 400; 400; 40 SUSPENSION ORAL at 05:06

## 2021-06-07 RX ADMIN — ONDANSETRON 4 MG: 4 TABLET, ORALLY DISINTEGRATING ORAL at 10:06

## 2021-06-07 RX ADMIN — DICLOFENAC SODIUM 2 G: 10 GEL TOPICAL at 03:06

## 2021-06-07 RX ADMIN — LOPERAMIDE HYDROCHLORIDE 2 MG: 2 CAPSULE ORAL at 10:06

## 2021-06-07 RX ADMIN — ONDANSETRON 4 MG: 4 TABLET, ORALLY DISINTEGRATING ORAL at 01:06

## 2021-06-08 LAB
BACTERIA #/AREA URNS AUTO: ABNORMAL /HPF
BILIRUB UR QL STRIP: NEGATIVE
CLARITY UR REFRACT.AUTO: ABNORMAL
COLOR UR AUTO: ABNORMAL
GLUCOSE UR QL STRIP: NEGATIVE
HGB UR QL STRIP: ABNORMAL
HYALINE CASTS UR QL AUTO: 1 /LPF
KETONES UR QL STRIP: NEGATIVE
LEUKOCYTE ESTERASE UR QL STRIP: ABNORMAL
MICROSCOPIC COMMENT: ABNORMAL
NITRITE UR QL STRIP: POSITIVE
PH UR STRIP: 5 [PH] (ref 5–8)
PROT UR QL STRIP: NEGATIVE
RBC #/AREA URNS AUTO: 3 /HPF (ref 0–4)
SP GR UR STRIP: 1.02 (ref 1–1.03)
SQUAMOUS #/AREA URNS AUTO: 1 /HPF
URN SPEC COLLECT METH UR: ABNORMAL
WBC #/AREA URNS AUTO: >100 /HPF (ref 0–5)
WBC CLUMPS UR QL AUTO: ABNORMAL

## 2021-06-08 PROCEDURE — 25000003 PHARM REV CODE 250: Performed by: NURSE PRACTITIONER

## 2021-06-08 PROCEDURE — 97110 THERAPEUTIC EXERCISES: CPT | Mod: CQ

## 2021-06-08 PROCEDURE — 99306 PR NURSING FACILITY CARE, INIT, HIGH SEVERITY: ICD-10-PCS | Mod: AI,,, | Performed by: HOSPITALIST

## 2021-06-08 PROCEDURE — 97530 THERAPEUTIC ACTIVITIES: CPT | Mod: CO

## 2021-06-08 PROCEDURE — 99306 1ST NF CARE HIGH MDM 50: CPT | Mod: AI,,, | Performed by: HOSPITALIST

## 2021-06-08 PROCEDURE — 97530 THERAPEUTIC ACTIVITIES: CPT | Mod: CQ

## 2021-06-08 PROCEDURE — 11000004 HC SNF PRIVATE

## 2021-06-08 PROCEDURE — 25000003 PHARM REV CODE 250: Performed by: HOSPITALIST

## 2021-06-08 PROCEDURE — 25000242 PHARM REV CODE 250 ALT 637 W/ HCPCS: Performed by: NURSE PRACTITIONER

## 2021-06-08 PROCEDURE — 97116 GAIT TRAINING THERAPY: CPT | Mod: CQ

## 2021-06-08 PROCEDURE — 97535 SELF CARE MNGMENT TRAINING: CPT | Mod: CO

## 2021-06-08 RX ORDER — CEFPODOXIME PROXETIL 100 MG/1
100 TABLET, FILM COATED ORAL EVERY 12 HOURS
Status: DISCONTINUED | OUTPATIENT
Start: 2021-06-08 | End: 2021-06-14

## 2021-06-08 RX ORDER — ONDANSETRON 4 MG/1
4 TABLET, ORALLY DISINTEGRATING ORAL EVERY 6 HOURS PRN
Status: DISCONTINUED | OUTPATIENT
Start: 2021-06-08 | End: 2021-06-14

## 2021-06-08 RX ADMIN — FUROSEMIDE 40 MG: 40 TABLET ORAL at 10:06

## 2021-06-08 RX ADMIN — CEFPODOXIME PROXETIL 100 MG: 100 TABLET, FILM COATED ORAL at 12:06

## 2021-06-08 RX ADMIN — ONDANSETRON HYDROCHLORIDE 4 MG: 4 TABLET, FILM COATED ORAL at 05:06

## 2021-06-08 RX ADMIN — Medication 1 CAPSULE: at 10:06

## 2021-06-08 RX ADMIN — DICLOFENAC SODIUM 2 G: 10 GEL TOPICAL at 10:06

## 2021-06-08 RX ADMIN — DICLOFENAC SODIUM 2 G: 10 GEL TOPICAL at 02:06

## 2021-06-08 RX ADMIN — SPIRONOLACTONE 100 MG: 25 TABLET, FILM COATED ORAL at 10:06

## 2021-06-08 RX ADMIN — CARVEDILOL 6.25 MG: 3.12 TABLET, FILM COATED ORAL at 10:06

## 2021-06-08 RX ADMIN — CARVEDILOL 6.25 MG: 3.12 TABLET, FILM COATED ORAL at 08:06

## 2021-06-08 RX ADMIN — CEFPODOXIME PROXETIL 100 MG: 100 TABLET, FILM COATED ORAL at 08:06

## 2021-06-09 PROCEDURE — 25000003 PHARM REV CODE 250: Performed by: HOSPITALIST

## 2021-06-09 PROCEDURE — 25000242 PHARM REV CODE 250 ALT 637 W/ HCPCS: Performed by: NURSE PRACTITIONER

## 2021-06-09 PROCEDURE — 11000004 HC SNF PRIVATE

## 2021-06-09 PROCEDURE — 97530 THERAPEUTIC ACTIVITIES: CPT

## 2021-06-09 PROCEDURE — 25000003 PHARM REV CODE 250: Performed by: NURSE PRACTITIONER

## 2021-06-09 PROCEDURE — 97116 GAIT TRAINING THERAPY: CPT

## 2021-06-09 PROCEDURE — 97110 THERAPEUTIC EXERCISES: CPT

## 2021-06-09 RX ADMIN — ACETAMINOPHEN 650 MG: 325 TABLET ORAL at 11:06

## 2021-06-09 RX ADMIN — CARVEDILOL 6.25 MG: 3.12 TABLET, FILM COATED ORAL at 09:06

## 2021-06-09 RX ADMIN — DICLOFENAC SODIUM 2 G: 10 GEL TOPICAL at 09:06

## 2021-06-09 RX ADMIN — DICLOFENAC SODIUM 2 G: 10 GEL TOPICAL at 03:06

## 2021-06-09 RX ADMIN — Medication 1 CAPSULE: at 09:06

## 2021-06-09 RX ADMIN — CEFPODOXIME PROXETIL 100 MG: 100 TABLET, FILM COATED ORAL at 10:06

## 2021-06-09 RX ADMIN — ACETAMINOPHEN 650 MG: 325 TABLET ORAL at 05:06

## 2021-06-09 RX ADMIN — FUROSEMIDE 40 MG: 40 TABLET ORAL at 09:06

## 2021-06-09 RX ADMIN — ONDANSETRON HYDROCHLORIDE 4 MG: 4 TABLET, FILM COATED ORAL at 05:06

## 2021-06-09 RX ADMIN — SPIRONOLACTONE 100 MG: 25 TABLET, FILM COATED ORAL at 09:06

## 2021-06-09 RX ADMIN — CEFPODOXIME PROXETIL 100 MG: 100 TABLET, FILM COATED ORAL at 09:06

## 2021-06-09 RX ADMIN — CARVEDILOL 6.25 MG: 3.12 TABLET, FILM COATED ORAL at 10:06

## 2021-06-09 RX ADMIN — DICLOFENAC SODIUM 2 G: 10 GEL TOPICAL at 10:06

## 2021-06-10 LAB
ALBUMIN SERPL BCP-MCNC: 2.3 G/DL (ref 3.5–5.2)
ALP SERPL-CCNC: 86 U/L (ref 55–135)
ALT SERPL W/O P-5'-P-CCNC: 20 U/L (ref 10–44)
ANION GAP SERPL CALC-SCNC: 11 MMOL/L (ref 8–16)
AST SERPL-CCNC: 44 U/L (ref 10–40)
BACTERIA UR CULT: ABNORMAL
BASOPHILS # BLD AUTO: 0.04 K/UL (ref 0–0.2)
BASOPHILS NFR BLD: 0.9 % (ref 0–1.9)
BILIRUB SERPL-MCNC: 2.3 MG/DL (ref 0.1–1)
BUN SERPL-MCNC: 23 MG/DL (ref 8–23)
CALCIUM SERPL-MCNC: 8.2 MG/DL (ref 8.7–10.5)
CHLORIDE SERPL-SCNC: 107 MMOL/L (ref 95–110)
CO2 SERPL-SCNC: 20 MMOL/L (ref 23–29)
CREAT SERPL-MCNC: 1.1 MG/DL (ref 0.5–1.4)
DIFFERENTIAL METHOD: ABNORMAL
EOSINOPHIL # BLD AUTO: 0.2 K/UL (ref 0–0.5)
EOSINOPHIL NFR BLD: 3.4 % (ref 0–8)
ERYTHROCYTE [DISTWIDTH] IN BLOOD BY AUTOMATED COUNT: 15.3 % (ref 11.5–14.5)
EST. GFR  (AFRICAN AMERICAN): 57.2 ML/MIN/1.73 M^2
EST. GFR  (NON AFRICAN AMERICAN): 49.6 ML/MIN/1.73 M^2
GLUCOSE SERPL-MCNC: 94 MG/DL (ref 70–110)
HCT VFR BLD AUTO: 37.7 % (ref 37–48.5)
HGB BLD-MCNC: 12.6 G/DL (ref 12–16)
IMM GRANULOCYTES # BLD AUTO: 0.01 K/UL (ref 0–0.04)
IMM GRANULOCYTES NFR BLD AUTO: 0.2 % (ref 0–0.5)
LYMPHOCYTES # BLD AUTO: 1.7 K/UL (ref 1–4.8)
LYMPHOCYTES NFR BLD: 38.8 % (ref 18–48)
MAGNESIUM SERPL-MCNC: 1.8 MG/DL (ref 1.6–2.6)
MCH RBC QN AUTO: 33.8 PG (ref 27–31)
MCHC RBC AUTO-ENTMCNC: 33.4 G/DL (ref 32–36)
MCV RBC AUTO: 101 FL (ref 82–98)
MONOCYTES # BLD AUTO: 0.6 K/UL (ref 0.3–1)
MONOCYTES NFR BLD: 13.4 % (ref 4–15)
NEUTROPHILS # BLD AUTO: 1.9 K/UL (ref 1.8–7.7)
NEUTROPHILS NFR BLD: 43.3 % (ref 38–73)
NRBC BLD-RTO: 0 /100 WBC
PHOSPHATE SERPL-MCNC: 4.2 MG/DL (ref 2.7–4.5)
PLATELET # BLD AUTO: 57 K/UL (ref 150–450)
PMV BLD AUTO: 12.5 FL (ref 9.2–12.9)
POTASSIUM SERPL-SCNC: 4.2 MMOL/L (ref 3.5–5.1)
PROT SERPL-MCNC: 6.1 G/DL (ref 6–8.4)
RBC # BLD AUTO: 3.73 M/UL (ref 4–5.4)
SODIUM SERPL-SCNC: 138 MMOL/L (ref 136–145)
WBC # BLD AUTO: 4.41 K/UL (ref 3.9–12.7)

## 2021-06-10 PROCEDURE — 11000004 HC SNF PRIVATE

## 2021-06-10 PROCEDURE — 97116 GAIT TRAINING THERAPY: CPT | Mod: CQ

## 2021-06-10 PROCEDURE — 84100 ASSAY OF PHOSPHORUS: CPT | Performed by: HOSPITALIST

## 2021-06-10 PROCEDURE — 36415 COLL VENOUS BLD VENIPUNCTURE: CPT | Performed by: HOSPITALIST

## 2021-06-10 PROCEDURE — 97530 THERAPEUTIC ACTIVITIES: CPT | Mod: CQ

## 2021-06-10 PROCEDURE — 83735 ASSAY OF MAGNESIUM: CPT | Performed by: HOSPITALIST

## 2021-06-10 PROCEDURE — 97535 SELF CARE MNGMENT TRAINING: CPT | Mod: CO

## 2021-06-10 PROCEDURE — 97110 THERAPEUTIC EXERCISES: CPT | Mod: CO

## 2021-06-10 PROCEDURE — 25000003 PHARM REV CODE 250: Performed by: HOSPITALIST

## 2021-06-10 PROCEDURE — 85025 COMPLETE CBC W/AUTO DIFF WBC: CPT | Performed by: HOSPITALIST

## 2021-06-10 PROCEDURE — 97110 THERAPEUTIC EXERCISES: CPT | Mod: CQ

## 2021-06-10 PROCEDURE — 25000242 PHARM REV CODE 250 ALT 637 W/ HCPCS: Performed by: NURSE PRACTITIONER

## 2021-06-10 PROCEDURE — 25000003 PHARM REV CODE 250: Performed by: NURSE PRACTITIONER

## 2021-06-10 PROCEDURE — 80053 COMPREHEN METABOLIC PANEL: CPT | Performed by: NURSE PRACTITIONER

## 2021-06-10 PROCEDURE — 97530 THERAPEUTIC ACTIVITIES: CPT | Mod: CO

## 2021-06-10 RX ORDER — SUCRALFATE 1 G/10ML
1 SUSPENSION ORAL
Status: DISCONTINUED | OUTPATIENT
Start: 2021-06-10 | End: 2021-06-21 | Stop reason: HOSPADM

## 2021-06-10 RX ADMIN — Medication 1 CAPSULE: at 10:06

## 2021-06-10 RX ADMIN — ONDANSETRON HYDROCHLORIDE 4 MG: 4 TABLET, FILM COATED ORAL at 05:06

## 2021-06-10 RX ADMIN — SUCRALFATE 1 G: 1 SUSPENSION ORAL at 04:06

## 2021-06-10 RX ADMIN — DICLOFENAC SODIUM 2 G: 10 GEL TOPICAL at 03:06

## 2021-06-10 RX ADMIN — DICLOFENAC SODIUM 2 G: 10 GEL TOPICAL at 09:06

## 2021-06-10 RX ADMIN — CARVEDILOL 6.25 MG: 3.12 TABLET, FILM COATED ORAL at 10:06

## 2021-06-10 RX ADMIN — SUCRALFATE 1 G: 1 SUSPENSION ORAL at 09:06

## 2021-06-10 RX ADMIN — FUROSEMIDE 40 MG: 40 TABLET ORAL at 10:06

## 2021-06-10 RX ADMIN — CEFPODOXIME PROXETIL 100 MG: 100 TABLET, FILM COATED ORAL at 09:06

## 2021-06-10 RX ADMIN — SPIRONOLACTONE 100 MG: 25 TABLET, FILM COATED ORAL at 10:06

## 2021-06-10 RX ADMIN — CEFPODOXIME PROXETIL 100 MG: 100 TABLET, FILM COATED ORAL at 10:06

## 2021-06-10 RX ADMIN — CARVEDILOL 6.25 MG: 3.12 TABLET, FILM COATED ORAL at 09:06

## 2021-06-11 PROCEDURE — 97110 THERAPEUTIC EXERCISES: CPT | Mod: CQ

## 2021-06-11 PROCEDURE — 97110 THERAPEUTIC EXERCISES: CPT | Mod: CO

## 2021-06-11 PROCEDURE — 11000004 HC SNF PRIVATE

## 2021-06-11 PROCEDURE — 97530 THERAPEUTIC ACTIVITIES: CPT | Mod: CQ

## 2021-06-11 PROCEDURE — 97116 GAIT TRAINING THERAPY: CPT | Mod: CQ

## 2021-06-11 PROCEDURE — 25000003 PHARM REV CODE 250: Performed by: HOSPITALIST

## 2021-06-11 PROCEDURE — 25000003 PHARM REV CODE 250: Performed by: NURSE PRACTITIONER

## 2021-06-11 PROCEDURE — 97530 THERAPEUTIC ACTIVITIES: CPT | Mod: CO

## 2021-06-11 PROCEDURE — 25000242 PHARM REV CODE 250 ALT 637 W/ HCPCS: Performed by: NURSE PRACTITIONER

## 2021-06-11 PROCEDURE — 97535 SELF CARE MNGMENT TRAINING: CPT | Mod: CO

## 2021-06-11 RX ADMIN — SUCRALFATE 1 G: 1 SUSPENSION ORAL at 05:06

## 2021-06-11 RX ADMIN — CARVEDILOL 6.25 MG: 3.12 TABLET, FILM COATED ORAL at 10:06

## 2021-06-11 RX ADMIN — SUCRALFATE 1 G: 1 SUSPENSION ORAL at 10:06

## 2021-06-11 RX ADMIN — SUCRALFATE 1 G: 1 SUSPENSION ORAL at 04:06

## 2021-06-11 RX ADMIN — ONDANSETRON HYDROCHLORIDE 4 MG: 4 TABLET, FILM COATED ORAL at 05:06

## 2021-06-11 RX ADMIN — DICLOFENAC SODIUM 2 G: 10 GEL TOPICAL at 02:06

## 2021-06-11 RX ADMIN — Medication 1 CAPSULE: at 10:06

## 2021-06-11 RX ADMIN — DICLOFENAC SODIUM 2 G: 10 GEL TOPICAL at 10:06

## 2021-06-11 RX ADMIN — FUROSEMIDE 40 MG: 40 TABLET ORAL at 10:06

## 2021-06-11 RX ADMIN — SPIRONOLACTONE 100 MG: 25 TABLET, FILM COATED ORAL at 10:06

## 2021-06-11 RX ADMIN — CEFPODOXIME PROXETIL 100 MG: 100 TABLET, FILM COATED ORAL at 10:06

## 2021-06-12 PROCEDURE — 25000003 PHARM REV CODE 250: Performed by: HOSPITALIST

## 2021-06-12 PROCEDURE — 25000003 PHARM REV CODE 250: Performed by: NURSE PRACTITIONER

## 2021-06-12 PROCEDURE — 25000242 PHARM REV CODE 250 ALT 637 W/ HCPCS: Performed by: NURSE PRACTITIONER

## 2021-06-12 PROCEDURE — 97110 THERAPEUTIC EXERCISES: CPT

## 2021-06-12 PROCEDURE — 97535 SELF CARE MNGMENT TRAINING: CPT

## 2021-06-12 PROCEDURE — 11000004 HC SNF PRIVATE

## 2021-06-12 RX ADMIN — Medication 1 TABLET: at 09:06

## 2021-06-12 RX ADMIN — SPIRONOLACTONE 100 MG: 25 TABLET, FILM COATED ORAL at 09:06

## 2021-06-12 RX ADMIN — SUCRALFATE 1 G: 1 SUSPENSION ORAL at 09:06

## 2021-06-12 RX ADMIN — DICLOFENAC SODIUM 2 G: 10 GEL TOPICAL at 03:06

## 2021-06-12 RX ADMIN — ONDANSETRON HYDROCHLORIDE 4 MG: 4 TABLET, FILM COATED ORAL at 06:06

## 2021-06-12 RX ADMIN — SUCRALFATE 1 G: 1 SUSPENSION ORAL at 05:06

## 2021-06-12 RX ADMIN — CARVEDILOL 6.25 MG: 3.12 TABLET, FILM COATED ORAL at 09:06

## 2021-06-12 RX ADMIN — ERGOCALCIFEROL 50000 UNITS: 1.25 CAPSULE ORAL at 09:06

## 2021-06-12 RX ADMIN — Medication 1 CAPSULE: at 09:06

## 2021-06-12 RX ADMIN — CEFPODOXIME PROXETIL 100 MG: 100 TABLET, FILM COATED ORAL at 09:06

## 2021-06-12 RX ADMIN — FUROSEMIDE 40 MG: 40 TABLET ORAL at 09:06

## 2021-06-12 RX ADMIN — SUCRALFATE 1 G: 1 SUSPENSION ORAL at 06:06

## 2021-06-12 RX ADMIN — DICLOFENAC SODIUM 2 G: 10 GEL TOPICAL at 09:06

## 2021-06-12 RX ADMIN — SUCRALFATE 1 G: 1 SUSPENSION ORAL at 11:06

## 2021-06-13 PROCEDURE — 25000003 PHARM REV CODE 250: Performed by: NURSE PRACTITIONER

## 2021-06-13 PROCEDURE — 25000242 PHARM REV CODE 250 ALT 637 W/ HCPCS: Performed by: NURSE PRACTITIONER

## 2021-06-13 PROCEDURE — 11000004 HC SNF PRIVATE

## 2021-06-13 PROCEDURE — 25000003 PHARM REV CODE 250: Performed by: HOSPITALIST

## 2021-06-13 RX ADMIN — SUCRALFATE 1 G: 1 SUSPENSION ORAL at 06:06

## 2021-06-13 RX ADMIN — CEFPODOXIME PROXETIL 100 MG: 100 TABLET, FILM COATED ORAL at 08:06

## 2021-06-13 RX ADMIN — Medication 1 TABLET: at 08:06

## 2021-06-13 RX ADMIN — CARVEDILOL 6.25 MG: 3.12 TABLET, FILM COATED ORAL at 08:06

## 2021-06-13 RX ADMIN — SUCRALFATE 1 G: 1 SUSPENSION ORAL at 04:06

## 2021-06-13 RX ADMIN — Medication 1 CAPSULE: at 08:06

## 2021-06-13 RX ADMIN — ACETAMINOPHEN 650 MG: 325 TABLET ORAL at 04:06

## 2021-06-13 RX ADMIN — FUROSEMIDE 40 MG: 40 TABLET ORAL at 08:06

## 2021-06-13 RX ADMIN — CARVEDILOL 6.25 MG: 3.12 TABLET, FILM COATED ORAL at 10:06

## 2021-06-13 RX ADMIN — SUCRALFATE 1 G: 1 SUSPENSION ORAL at 11:06

## 2021-06-13 RX ADMIN — SPIRONOLACTONE 100 MG: 25 TABLET, FILM COATED ORAL at 08:06

## 2021-06-13 RX ADMIN — DICLOFENAC SODIUM 2 G: 10 GEL TOPICAL at 08:06

## 2021-06-13 RX ADMIN — DICLOFENAC SODIUM 2 G: 10 GEL TOPICAL at 02:06

## 2021-06-13 RX ADMIN — ONDANSETRON HYDROCHLORIDE 4 MG: 4 TABLET, FILM COATED ORAL at 06:06

## 2021-06-13 RX ADMIN — SUCRALFATE 1 G: 1 SUSPENSION ORAL at 10:06

## 2021-06-13 RX ADMIN — DICLOFENAC SODIUM 2 G: 10 GEL TOPICAL at 09:06

## 2021-06-13 RX ADMIN — CEFPODOXIME PROXETIL 100 MG: 100 TABLET, FILM COATED ORAL at 10:06

## 2021-06-14 LAB
ALBUMIN SERPL BCP-MCNC: 2.2 G/DL (ref 3.5–5.2)
ALP SERPL-CCNC: 82 U/L (ref 55–135)
ALT SERPL W/O P-5'-P-CCNC: 21 U/L (ref 10–44)
ANION GAP SERPL CALC-SCNC: 11 MMOL/L (ref 8–16)
AST SERPL-CCNC: 54 U/L (ref 10–40)
BASOPHILS # BLD AUTO: 0.05 K/UL (ref 0–0.2)
BASOPHILS NFR BLD: 1 % (ref 0–1.9)
BILIRUB SERPL-MCNC: 1.6 MG/DL (ref 0.1–1)
BUN SERPL-MCNC: 35 MG/DL (ref 8–23)
CALCIUM SERPL-MCNC: 8.5 MG/DL (ref 8.7–10.5)
CHLORIDE SERPL-SCNC: 109 MMOL/L (ref 95–110)
CO2 SERPL-SCNC: 20 MMOL/L (ref 23–29)
CREAT SERPL-MCNC: 1.4 MG/DL (ref 0.5–1.4)
DIFFERENTIAL METHOD: ABNORMAL
EOSINOPHIL # BLD AUTO: 0.2 K/UL (ref 0–0.5)
EOSINOPHIL NFR BLD: 4.3 % (ref 0–8)
ERYTHROCYTE [DISTWIDTH] IN BLOOD BY AUTOMATED COUNT: 14.9 % (ref 11.5–14.5)
EST. GFR  (AFRICAN AMERICAN): 42.7 ML/MIN/1.73 M^2
EST. GFR  (NON AFRICAN AMERICAN): 37 ML/MIN/1.73 M^2
GLUCOSE SERPL-MCNC: 86 MG/DL (ref 70–110)
HCT VFR BLD AUTO: 36.6 % (ref 37–48.5)
HGB BLD-MCNC: 12.3 G/DL (ref 12–16)
IMM GRANULOCYTES # BLD AUTO: 0.02 K/UL (ref 0–0.04)
IMM GRANULOCYTES NFR BLD AUTO: 0.4 % (ref 0–0.5)
LYMPHOCYTES # BLD AUTO: 1.7 K/UL (ref 1–4.8)
LYMPHOCYTES NFR BLD: 35.7 % (ref 18–48)
MAGNESIUM SERPL-MCNC: 1.9 MG/DL (ref 1.6–2.6)
MCH RBC QN AUTO: 34.4 PG (ref 27–31)
MCHC RBC AUTO-ENTMCNC: 33.6 G/DL (ref 32–36)
MCV RBC AUTO: 102 FL (ref 82–98)
MONOCYTES # BLD AUTO: 0.6 K/UL (ref 0.3–1)
MONOCYTES NFR BLD: 13.2 % (ref 4–15)
NEUTROPHILS # BLD AUTO: 2.2 K/UL (ref 1.8–7.7)
NEUTROPHILS NFR BLD: 45.4 % (ref 38–73)
NRBC BLD-RTO: 0 /100 WBC
PHOSPHATE SERPL-MCNC: 4.6 MG/DL (ref 2.7–4.5)
PLATELET # BLD AUTO: 63 K/UL (ref 150–450)
PMV BLD AUTO: 12.6 FL (ref 9.2–12.9)
POTASSIUM SERPL-SCNC: 4.4 MMOL/L (ref 3.5–5.1)
PROT SERPL-MCNC: 6.2 G/DL (ref 6–8.4)
RBC # BLD AUTO: 3.58 M/UL (ref 4–5.4)
SODIUM SERPL-SCNC: 140 MMOL/L (ref 136–145)
WBC # BLD AUTO: 4.84 K/UL (ref 3.9–12.7)

## 2021-06-14 PROCEDURE — 25000003 PHARM REV CODE 250: Performed by: HOSPITALIST

## 2021-06-14 PROCEDURE — 63600175 PHARM REV CODE 636 W HCPCS: Performed by: NURSE PRACTITIONER

## 2021-06-14 PROCEDURE — 97535 SELF CARE MNGMENT TRAINING: CPT | Mod: CO

## 2021-06-14 PROCEDURE — 97110 THERAPEUTIC EXERCISES: CPT | Mod: CO

## 2021-06-14 PROCEDURE — 25000242 PHARM REV CODE 250 ALT 637 W/ HCPCS: Performed by: NURSE PRACTITIONER

## 2021-06-14 PROCEDURE — 97116 GAIT TRAINING THERAPY: CPT

## 2021-06-14 PROCEDURE — 25000003 PHARM REV CODE 250: Performed by: NURSE PRACTITIONER

## 2021-06-14 PROCEDURE — 36415 COLL VENOUS BLD VENIPUNCTURE: CPT | Performed by: HOSPITALIST

## 2021-06-14 PROCEDURE — 11000004 HC SNF PRIVATE

## 2021-06-14 PROCEDURE — 84100 ASSAY OF PHOSPHORUS: CPT | Performed by: HOSPITALIST

## 2021-06-14 PROCEDURE — 85025 COMPLETE CBC W/AUTO DIFF WBC: CPT | Performed by: HOSPITALIST

## 2021-06-14 PROCEDURE — 97530 THERAPEUTIC ACTIVITIES: CPT

## 2021-06-14 PROCEDURE — 83735 ASSAY OF MAGNESIUM: CPT | Performed by: HOSPITALIST

## 2021-06-14 PROCEDURE — 97530 THERAPEUTIC ACTIVITIES: CPT | Mod: CO

## 2021-06-14 PROCEDURE — 80053 COMPREHEN METABOLIC PANEL: CPT | Performed by: NURSE PRACTITIONER

## 2021-06-14 RX ORDER — CIPROFLOXACIN 250 MG/1
250 TABLET, FILM COATED ORAL EVERY 12 HOURS
Status: DISPENSED | OUTPATIENT
Start: 2021-06-14 | End: 2021-06-18

## 2021-06-14 RX ORDER — DRONABINOL 2.5 MG/1
2.5 CAPSULE ORAL 2 TIMES DAILY
Status: DISCONTINUED | OUTPATIENT
Start: 2021-06-14 | End: 2021-06-17

## 2021-06-14 RX ORDER — ONDANSETRON 8 MG/1
8 TABLET, ORALLY DISINTEGRATING ORAL EVERY 6 HOURS PRN
Status: DISCONTINUED | OUTPATIENT
Start: 2021-06-14 | End: 2021-06-21 | Stop reason: HOSPADM

## 2021-06-14 RX ORDER — FUROSEMIDE 20 MG/1
20 TABLET ORAL DAILY
Status: DISCONTINUED | OUTPATIENT
Start: 2021-06-15 | End: 2021-06-17

## 2021-06-14 RX ADMIN — DICLOFENAC SODIUM 2 G: 10 GEL TOPICAL at 04:06

## 2021-06-14 RX ADMIN — DICLOFENAC SODIUM 2 G: 10 GEL TOPICAL at 09:06

## 2021-06-14 RX ADMIN — SPIRONOLACTONE 100 MG: 25 TABLET, FILM COATED ORAL at 09:06

## 2021-06-14 RX ADMIN — ONDANSETRON HYDROCHLORIDE 4 MG: 4 TABLET, FILM COATED ORAL at 05:06

## 2021-06-14 RX ADMIN — CEFPODOXIME PROXETIL 100 MG: 100 TABLET, FILM COATED ORAL at 09:06

## 2021-06-14 RX ADMIN — CIPROFLOXACIN HYDROCHLORIDE 250 MG: 250 TABLET, FILM COATED ORAL at 09:06

## 2021-06-14 RX ADMIN — Medication 1 TABLET: at 09:06

## 2021-06-14 RX ADMIN — DRONABINOL 2.5 MG: 2.5 CAPSULE ORAL at 04:06

## 2021-06-14 RX ADMIN — FUROSEMIDE 40 MG: 40 TABLET ORAL at 09:06

## 2021-06-14 RX ADMIN — SUCRALFATE 1 G: 1 SUSPENSION ORAL at 09:06

## 2021-06-14 RX ADMIN — SUCRALFATE 1 G: 1 SUSPENSION ORAL at 05:06

## 2021-06-14 RX ADMIN — SUCRALFATE 1 G: 1 SUSPENSION ORAL at 12:06

## 2021-06-14 RX ADMIN — SUCRALFATE 1 G: 1 SUSPENSION ORAL at 04:06

## 2021-06-14 RX ADMIN — CIPROFLOXACIN HYDROCHLORIDE 250 MG: 250 TABLET, FILM COATED ORAL at 12:06

## 2021-06-14 RX ADMIN — CARVEDILOL 6.25 MG: 3.12 TABLET, FILM COATED ORAL at 09:06

## 2021-06-14 RX ADMIN — Medication 1 CAPSULE: at 09:06

## 2021-06-15 PROCEDURE — 25000003 PHARM REV CODE 250: Performed by: HOSPITALIST

## 2021-06-15 PROCEDURE — 97116 GAIT TRAINING THERAPY: CPT | Mod: CQ

## 2021-06-15 PROCEDURE — 25000003 PHARM REV CODE 250: Performed by: NURSE PRACTITIONER

## 2021-06-15 PROCEDURE — 97530 THERAPEUTIC ACTIVITIES: CPT | Mod: CQ

## 2021-06-15 PROCEDURE — 97110 THERAPEUTIC EXERCISES: CPT | Mod: CO

## 2021-06-15 PROCEDURE — 97535 SELF CARE MNGMENT TRAINING: CPT | Mod: CO

## 2021-06-15 PROCEDURE — 11000004 HC SNF PRIVATE

## 2021-06-15 RX ORDER — FUROSEMIDE 20 MG/1
20 TABLET ORAL DAILY
Qty: 30 TABLET | Refills: 3 | Status: SHIPPED | OUTPATIENT
Start: 2021-06-16 | End: 2022-01-01 | Stop reason: ALTCHOICE

## 2021-06-15 RX ORDER — ERGOCALCIFEROL 1.25 MG/1
50000 CAPSULE ORAL
Qty: 4 CAPSULE | Refills: 0 | Status: SHIPPED | OUTPATIENT
Start: 2021-06-19 | End: 2021-06-16

## 2021-06-15 RX ORDER — ONDANSETRON 4 MG/1
4 TABLET, FILM COATED ORAL
Qty: 30 TABLET | Refills: 1 | Status: SHIPPED | OUTPATIENT
Start: 2021-06-16 | End: 2021-07-16

## 2021-06-15 RX ORDER — SUCRALFATE 1 G/10ML
1 SUSPENSION ORAL
Qty: 1200 ML | Refills: 1 | Status: SHIPPED | OUTPATIENT
Start: 2021-06-15 | End: 2021-06-21 | Stop reason: HOSPADM

## 2021-06-15 RX ORDER — DICLOFENAC SODIUM 10 MG/G
2 GEL TOPICAL 3 TIMES DAILY
Qty: 2 G | Refills: 0 | Status: SHIPPED | OUTPATIENT
Start: 2021-06-15 | End: 2022-01-01 | Stop reason: SDUPTHER

## 2021-06-15 RX ORDER — DRONABINOL 2.5 MG/1
2.5 CAPSULE ORAL 2 TIMES DAILY
Qty: 60 CAPSULE | Refills: 1 | Status: SHIPPED | OUTPATIENT
Start: 2021-06-15 | End: 2021-06-21 | Stop reason: HOSPADM

## 2021-06-15 RX ADMIN — DICLOFENAC SODIUM 2 G: 10 GEL TOPICAL at 05:06

## 2021-06-15 RX ADMIN — SUCRALFATE 1 G: 1 SUSPENSION ORAL at 06:06

## 2021-06-15 RX ADMIN — Medication 1 CAPSULE: at 10:06

## 2021-06-15 RX ADMIN — Medication 1 TABLET: at 10:06

## 2021-06-15 RX ADMIN — DICLOFENAC SODIUM 2 G: 10 GEL TOPICAL at 10:06

## 2021-06-15 RX ADMIN — DICLOFENAC SODIUM 2 G: 10 GEL TOPICAL at 09:06

## 2021-06-15 RX ADMIN — SUCRALFATE 1 G: 1 SUSPENSION ORAL at 09:06

## 2021-06-15 RX ADMIN — SPIRONOLACTONE 100 MG: 25 TABLET, FILM COATED ORAL at 10:06

## 2021-06-15 RX ADMIN — SUCRALFATE 1 G: 1 SUSPENSION ORAL at 05:06

## 2021-06-15 RX ADMIN — CARVEDILOL 6.25 MG: 3.12 TABLET, FILM COATED ORAL at 10:06

## 2021-06-15 RX ADMIN — FUROSEMIDE 20 MG: 20 TABLET ORAL at 10:06

## 2021-06-15 RX ADMIN — SUCRALFATE 1 G: 1 SUSPENSION ORAL at 10:06

## 2021-06-15 RX ADMIN — CIPROFLOXACIN HYDROCHLORIDE 250 MG: 250 TABLET, FILM COATED ORAL at 09:06

## 2021-06-15 RX ADMIN — ONDANSETRON HYDROCHLORIDE 4 MG: 4 TABLET, FILM COATED ORAL at 06:06

## 2021-06-15 RX ADMIN — ACETAMINOPHEN 650 MG: 325 TABLET ORAL at 11:06

## 2021-06-15 RX ADMIN — CARVEDILOL 6.25 MG: 3.12 TABLET, FILM COATED ORAL at 09:06

## 2021-06-15 RX ADMIN — CIPROFLOXACIN HYDROCHLORIDE 250 MG: 250 TABLET, FILM COATED ORAL at 10:06

## 2021-06-16 ENCOUNTER — DOCUMENTATION ONLY (OUTPATIENT)
Dept: TRANSPLANT | Facility: CLINIC | Age: 76
End: 2021-06-16

## 2021-06-16 PROCEDURE — 25000003 PHARM REV CODE 250: Performed by: HOSPITALIST

## 2021-06-16 PROCEDURE — 97530 THERAPEUTIC ACTIVITIES: CPT

## 2021-06-16 PROCEDURE — 97535 SELF CARE MNGMENT TRAINING: CPT | Mod: CO

## 2021-06-16 PROCEDURE — 63600175 PHARM REV CODE 636 W HCPCS: Performed by: NURSE PRACTITIONER

## 2021-06-16 PROCEDURE — 97116 GAIT TRAINING THERAPY: CPT

## 2021-06-16 PROCEDURE — 25000003 PHARM REV CODE 250: Performed by: NURSE PRACTITIONER

## 2021-06-16 PROCEDURE — 11000004 HC SNF PRIVATE

## 2021-06-16 RX ADMIN — DICLOFENAC SODIUM 2 G: 10 GEL TOPICAL at 03:06

## 2021-06-16 RX ADMIN — Medication 1 TABLET: at 09:06

## 2021-06-16 RX ADMIN — CARVEDILOL 6.25 MG: 3.12 TABLET, FILM COATED ORAL at 09:06

## 2021-06-16 RX ADMIN — SUCRALFATE 1 G: 1 SUSPENSION ORAL at 11:06

## 2021-06-16 RX ADMIN — ONDANSETRON HYDROCHLORIDE 4 MG: 4 TABLET, FILM COATED ORAL at 05:06

## 2021-06-16 RX ADMIN — Medication 1 CAPSULE: at 09:06

## 2021-06-16 RX ADMIN — FUROSEMIDE 20 MG: 20 TABLET ORAL at 09:06

## 2021-06-16 RX ADMIN — SUCRALFATE 1 G: 1 SUSPENSION ORAL at 09:06

## 2021-06-16 RX ADMIN — SPIRONOLACTONE 100 MG: 25 TABLET, FILM COATED ORAL at 09:06

## 2021-06-16 RX ADMIN — DICLOFENAC SODIUM 2 G: 10 GEL TOPICAL at 09:06

## 2021-06-16 RX ADMIN — SUCRALFATE 1 G: 1 SUSPENSION ORAL at 05:06

## 2021-06-16 RX ADMIN — DRONABINOL 2.5 MG: 2.5 CAPSULE ORAL at 09:06

## 2021-06-16 RX ADMIN — CIPROFLOXACIN HYDROCHLORIDE 250 MG: 250 TABLET, FILM COATED ORAL at 09:06

## 2021-06-16 RX ADMIN — ONDANSETRON 8 MG: 8 TABLET, ORALLY DISINTEGRATING ORAL at 01:06

## 2021-06-16 RX ADMIN — ACETAMINOPHEN 650 MG: 325 TABLET ORAL at 05:06

## 2021-06-16 RX ADMIN — SUCRALFATE 1 G: 1 SUSPENSION ORAL at 06:06

## 2021-06-17 LAB
ALBUMIN SERPL BCP-MCNC: 2.3 G/DL (ref 3.5–5.2)
ALP SERPL-CCNC: 87 U/L (ref 55–135)
ALT SERPL W/O P-5'-P-CCNC: 19 U/L (ref 10–44)
ANION GAP SERPL CALC-SCNC: 8 MMOL/L (ref 8–16)
AST SERPL-CCNC: 41 U/L (ref 10–40)
BACTERIA #/AREA URNS AUTO: ABNORMAL /HPF
BASOPHILS # BLD AUTO: 0.04 K/UL (ref 0–0.2)
BASOPHILS NFR BLD: 0.9 % (ref 0–1.9)
BILIRUB SERPL-MCNC: 1.8 MG/DL (ref 0.1–1)
BILIRUB UR QL STRIP: NEGATIVE
BUN SERPL-MCNC: 50 MG/DL (ref 8–23)
CALCIUM SERPL-MCNC: 8.6 MG/DL (ref 8.7–10.5)
CHLORIDE SERPL-SCNC: 108 MMOL/L (ref 95–110)
CLARITY UR REFRACT.AUTO: CLEAR
CO2 SERPL-SCNC: 22 MMOL/L (ref 23–29)
COLOR UR AUTO: YELLOW
CREAT SERPL-MCNC: 1.7 MG/DL (ref 0.5–1.4)
DIFFERENTIAL METHOD: ABNORMAL
EOSINOPHIL # BLD AUTO: 0.2 K/UL (ref 0–0.5)
EOSINOPHIL NFR BLD: 4.6 % (ref 0–8)
ERYTHROCYTE [DISTWIDTH] IN BLOOD BY AUTOMATED COUNT: 15 % (ref 11.5–14.5)
EST. GFR  (AFRICAN AMERICAN): 33.8 ML/MIN/1.73 M^2
EST. GFR  (NON AFRICAN AMERICAN): 29.3 ML/MIN/1.73 M^2
GLUCOSE SERPL-MCNC: 148 MG/DL (ref 70–110)
GLUCOSE UR QL STRIP: NEGATIVE
HCT VFR BLD AUTO: 37.8 % (ref 37–48.5)
HGB BLD-MCNC: 12.3 G/DL (ref 12–16)
HGB UR QL STRIP: NEGATIVE
HYALINE CASTS UR QL AUTO: 3 /LPF
IMM GRANULOCYTES # BLD AUTO: 0.01 K/UL (ref 0–0.04)
IMM GRANULOCYTES NFR BLD AUTO: 0.2 % (ref 0–0.5)
KETONES UR QL STRIP: NEGATIVE
LEUKOCYTE ESTERASE UR QL STRIP: NEGATIVE
LYMPHOCYTES # BLD AUTO: 1.6 K/UL (ref 1–4.8)
LYMPHOCYTES NFR BLD: 35.7 % (ref 18–48)
MAGNESIUM SERPL-MCNC: 2.1 MG/DL (ref 1.6–2.6)
MCH RBC QN AUTO: 33.8 PG (ref 27–31)
MCHC RBC AUTO-ENTMCNC: 32.5 G/DL (ref 32–36)
MCV RBC AUTO: 104 FL (ref 82–98)
MICROSCOPIC COMMENT: ABNORMAL
MONOCYTES # BLD AUTO: 0.6 K/UL (ref 0.3–1)
MONOCYTES NFR BLD: 12.8 % (ref 4–15)
NEUTROPHILS # BLD AUTO: 2.1 K/UL (ref 1.8–7.7)
NEUTROPHILS NFR BLD: 45.8 % (ref 38–73)
NITRITE UR QL STRIP: NEGATIVE
NRBC BLD-RTO: 0 /100 WBC
PH UR STRIP: 6 [PH] (ref 5–8)
PHOSPHATE SERPL-MCNC: 4.2 MG/DL (ref 2.7–4.5)
PLATELET # BLD AUTO: 56 K/UL (ref 150–450)
PMV BLD AUTO: 11.8 FL (ref 9.2–12.9)
POTASSIUM SERPL-SCNC: 4.6 MMOL/L (ref 3.5–5.1)
PROT SERPL-MCNC: 6 G/DL (ref 6–8.4)
PROT UR QL STRIP: NEGATIVE
RBC # BLD AUTO: 3.64 M/UL (ref 4–5.4)
RBC #/AREA URNS AUTO: 0 /HPF (ref 0–4)
SODIUM SERPL-SCNC: 138 MMOL/L (ref 136–145)
SP GR UR STRIP: 1.01 (ref 1–1.03)
SQUAMOUS #/AREA URNS AUTO: 2 /HPF
URN SPEC COLLECT METH UR: NORMAL
WBC # BLD AUTO: 4.54 K/UL (ref 3.9–12.7)
WBC #/AREA URNS AUTO: 1 /HPF (ref 0–5)

## 2021-06-17 PROCEDURE — 25000003 PHARM REV CODE 250: Performed by: NURSE PRACTITIONER

## 2021-06-17 PROCEDURE — 81001 URINALYSIS AUTO W/SCOPE: CPT | Performed by: NURSE PRACTITIONER

## 2021-06-17 PROCEDURE — 11000004 HC SNF PRIVATE

## 2021-06-17 PROCEDURE — 84100 ASSAY OF PHOSPHORUS: CPT | Performed by: HOSPITALIST

## 2021-06-17 PROCEDURE — 85025 COMPLETE CBC W/AUTO DIFF WBC: CPT | Performed by: HOSPITALIST

## 2021-06-17 PROCEDURE — 25000003 PHARM REV CODE 250: Performed by: HOSPITALIST

## 2021-06-17 PROCEDURE — 83735 ASSAY OF MAGNESIUM: CPT | Performed by: HOSPITALIST

## 2021-06-17 PROCEDURE — 80053 COMPREHEN METABOLIC PANEL: CPT | Performed by: NURSE PRACTITIONER

## 2021-06-17 PROCEDURE — 36415 COLL VENOUS BLD VENIPUNCTURE: CPT | Performed by: HOSPITALIST

## 2021-06-17 PROCEDURE — 97116 GAIT TRAINING THERAPY: CPT

## 2021-06-17 PROCEDURE — 97110 THERAPEUTIC EXERCISES: CPT

## 2021-06-17 PROCEDURE — 97530 THERAPEUTIC ACTIVITIES: CPT | Mod: CO

## 2021-06-17 PROCEDURE — 97110 THERAPEUTIC EXERCISES: CPT | Mod: CO

## 2021-06-17 PROCEDURE — 97530 THERAPEUTIC ACTIVITIES: CPT

## 2021-06-17 RX ORDER — SPIRONOLACTONE 25 MG/1
50 TABLET ORAL DAILY
Status: DISCONTINUED | OUTPATIENT
Start: 2021-06-18 | End: 2021-06-17

## 2021-06-17 RX ADMIN — CIPROFLOXACIN HYDROCHLORIDE 250 MG: 250 TABLET, FILM COATED ORAL at 08:06

## 2021-06-17 RX ADMIN — DICLOFENAC SODIUM 2 G: 10 GEL TOPICAL at 02:06

## 2021-06-17 RX ADMIN — SUCRALFATE 1 G: 1 SUSPENSION ORAL at 06:06

## 2021-06-17 RX ADMIN — SPIRONOLACTONE 100 MG: 25 TABLET, FILM COATED ORAL at 09:06

## 2021-06-17 RX ADMIN — DICLOFENAC SODIUM 2 G: 10 GEL TOPICAL at 08:06

## 2021-06-17 RX ADMIN — DICLOFENAC SODIUM 2 G: 10 GEL TOPICAL at 09:06

## 2021-06-17 RX ADMIN — FUROSEMIDE 20 MG: 20 TABLET ORAL at 09:06

## 2021-06-17 RX ADMIN — CARVEDILOL 6.25 MG: 3.12 TABLET, FILM COATED ORAL at 08:06

## 2021-06-17 RX ADMIN — Medication 1 TABLET: at 09:06

## 2021-06-17 RX ADMIN — SUCRALFATE 1 G: 1 SUSPENSION ORAL at 12:06

## 2021-06-17 RX ADMIN — Medication 1 CAPSULE: at 09:06

## 2021-06-17 RX ADMIN — CIPROFLOXACIN HYDROCHLORIDE 250 MG: 250 TABLET, FILM COATED ORAL at 09:06

## 2021-06-17 RX ADMIN — SUCRALFATE 1 G: 1 SUSPENSION ORAL at 08:06

## 2021-06-17 RX ADMIN — SUCRALFATE 1 G: 1 SUSPENSION ORAL at 05:06

## 2021-06-17 RX ADMIN — CARVEDILOL 6.25 MG: 3.12 TABLET, FILM COATED ORAL at 09:06

## 2021-06-17 RX ADMIN — ONDANSETRON HYDROCHLORIDE 4 MG: 4 TABLET, FILM COATED ORAL at 06:06

## 2021-06-18 LAB
ALBUMIN SERPL BCP-MCNC: 2.2 G/DL (ref 3.5–5.2)
ALP SERPL-CCNC: 85 U/L (ref 55–135)
ALT SERPL W/O P-5'-P-CCNC: 20 U/L (ref 10–44)
ANION GAP SERPL CALC-SCNC: 9 MMOL/L (ref 8–16)
AST SERPL-CCNC: 41 U/L (ref 10–40)
BILIRUB SERPL-MCNC: 1.7 MG/DL (ref 0.1–1)
BUN SERPL-MCNC: 40 MG/DL (ref 8–23)
CALCIUM SERPL-MCNC: 8.5 MG/DL (ref 8.7–10.5)
CHLORIDE SERPL-SCNC: 110 MMOL/L (ref 95–110)
CO2 SERPL-SCNC: 21 MMOL/L (ref 23–29)
CREAT SERPL-MCNC: 1.2 MG/DL (ref 0.5–1.4)
EST. GFR  (AFRICAN AMERICAN): 51.4 ML/MIN/1.73 M^2
EST. GFR  (NON AFRICAN AMERICAN): 44.6 ML/MIN/1.73 M^2
GLUCOSE SERPL-MCNC: 107 MG/DL (ref 70–110)
POTASSIUM SERPL-SCNC: 4.5 MMOL/L (ref 3.5–5.1)
PROT SERPL-MCNC: 6 G/DL (ref 6–8.4)
SODIUM SERPL-SCNC: 140 MMOL/L (ref 136–145)

## 2021-06-18 PROCEDURE — 97116 GAIT TRAINING THERAPY: CPT

## 2021-06-18 PROCEDURE — 97530 THERAPEUTIC ACTIVITIES: CPT

## 2021-06-18 PROCEDURE — 11000004 HC SNF PRIVATE

## 2021-06-18 PROCEDURE — 25000003 PHARM REV CODE 250: Performed by: NURSE PRACTITIONER

## 2021-06-18 PROCEDURE — 97535 SELF CARE MNGMENT TRAINING: CPT

## 2021-06-18 PROCEDURE — 80053 COMPREHEN METABOLIC PANEL: CPT | Performed by: NURSE PRACTITIONER

## 2021-06-18 PROCEDURE — 25000003 PHARM REV CODE 250: Performed by: HOSPITALIST

## 2021-06-18 PROCEDURE — 97110 THERAPEUTIC EXERCISES: CPT

## 2021-06-18 PROCEDURE — 36415 COLL VENOUS BLD VENIPUNCTURE: CPT | Performed by: NURSE PRACTITIONER

## 2021-06-18 RX ADMIN — SUCRALFATE 1 G: 1 SUSPENSION ORAL at 06:06

## 2021-06-18 RX ADMIN — ACETAMINOPHEN 650 MG: 325 TABLET ORAL at 04:06

## 2021-06-18 RX ADMIN — SUCRALFATE 1 G: 1 SUSPENSION ORAL at 08:06

## 2021-06-18 RX ADMIN — Medication 1 CAPSULE: at 10:06

## 2021-06-18 RX ADMIN — CARVEDILOL 6.25 MG: 3.12 TABLET, FILM COATED ORAL at 08:06

## 2021-06-18 RX ADMIN — CIPROFLOXACIN HYDROCHLORIDE 250 MG: 250 TABLET, FILM COATED ORAL at 10:06

## 2021-06-18 RX ADMIN — ONDANSETRON HYDROCHLORIDE 4 MG: 4 TABLET, FILM COATED ORAL at 06:06

## 2021-06-18 RX ADMIN — SUCRALFATE 1 G: 1 SUSPENSION ORAL at 10:06

## 2021-06-18 RX ADMIN — DICLOFENAC SODIUM 2 G: 10 GEL TOPICAL at 03:06

## 2021-06-18 RX ADMIN — DICLOFENAC SODIUM 2 G: 10 GEL TOPICAL at 08:06

## 2021-06-18 RX ADMIN — CARVEDILOL 6.25 MG: 3.12 TABLET, FILM COATED ORAL at 10:06

## 2021-06-18 RX ADMIN — SUCRALFATE 1 G: 1 SUSPENSION ORAL at 04:06

## 2021-06-18 RX ADMIN — DICLOFENAC SODIUM 2 G: 10 GEL TOPICAL at 10:06

## 2021-06-19 PROCEDURE — 97110 THERAPEUTIC EXERCISES: CPT | Mod: CQ

## 2021-06-19 PROCEDURE — 97116 GAIT TRAINING THERAPY: CPT | Mod: CQ

## 2021-06-19 PROCEDURE — 11000004 HC SNF PRIVATE

## 2021-06-19 PROCEDURE — 25000003 PHARM REV CODE 250: Performed by: HOSPITALIST

## 2021-06-19 PROCEDURE — 97530 THERAPEUTIC ACTIVITIES: CPT | Mod: CQ

## 2021-06-19 PROCEDURE — 25000003 PHARM REV CODE 250: Performed by: NURSE PRACTITIONER

## 2021-06-19 RX ADMIN — DICLOFENAC SODIUM 2 G: 10 GEL TOPICAL at 09:06

## 2021-06-19 RX ADMIN — SUCRALFATE 1 G: 1 SUSPENSION ORAL at 09:06

## 2021-06-19 RX ADMIN — DICLOFENAC SODIUM 2 G: 10 GEL TOPICAL at 03:06

## 2021-06-19 RX ADMIN — SUCRALFATE 1 G: 1 SUSPENSION ORAL at 05:06

## 2021-06-19 RX ADMIN — CARVEDILOL 6.25 MG: 3.12 TABLET, FILM COATED ORAL at 09:06

## 2021-06-19 RX ADMIN — ERGOCALCIFEROL 50000 UNITS: 1.25 CAPSULE ORAL at 09:06

## 2021-06-19 RX ADMIN — Medication 1 CAPSULE: at 09:06

## 2021-06-19 RX ADMIN — SUCRALFATE 1 G: 1 SUSPENSION ORAL at 11:06

## 2021-06-19 RX ADMIN — ONDANSETRON HYDROCHLORIDE 4 MG: 4 TABLET, FILM COATED ORAL at 05:06

## 2021-06-20 PROCEDURE — 11000004 HC SNF PRIVATE

## 2021-06-20 PROCEDURE — 25000003 PHARM REV CODE 250: Performed by: HOSPITALIST

## 2021-06-20 PROCEDURE — 25000003 PHARM REV CODE 250: Performed by: NURSE PRACTITIONER

## 2021-06-20 RX ADMIN — ONDANSETRON HYDROCHLORIDE 4 MG: 4 TABLET, FILM COATED ORAL at 05:06

## 2021-06-20 RX ADMIN — SUCRALFATE 1 G: 1 SUSPENSION ORAL at 05:06

## 2021-06-20 RX ADMIN — CARVEDILOL 6.25 MG: 3.12 TABLET, FILM COATED ORAL at 10:06

## 2021-06-20 RX ADMIN — SUCRALFATE 1 G: 1 SUSPENSION ORAL at 10:06

## 2021-06-20 RX ADMIN — Medication 1 CAPSULE: at 10:06

## 2021-06-20 RX ADMIN — DICLOFENAC SODIUM 2 G: 10 GEL TOPICAL at 10:06

## 2021-06-20 RX ADMIN — DICLOFENAC SODIUM 2 G: 10 GEL TOPICAL at 08:06

## 2021-06-20 RX ADMIN — ACETAMINOPHEN 650 MG: 325 TABLET ORAL at 01:06

## 2021-06-20 RX ADMIN — CARVEDILOL 6.25 MG: 3.12 TABLET, FILM COATED ORAL at 08:06

## 2021-06-20 RX ADMIN — DICLOFENAC SODIUM 2 G: 10 GEL TOPICAL at 04:06

## 2021-06-20 RX ADMIN — SUCRALFATE 1 G: 1 SUSPENSION ORAL at 08:06

## 2021-06-21 ENCOUNTER — TELEPHONE (OUTPATIENT)
Dept: INTERNAL MEDICINE | Facility: CLINIC | Age: 76
End: 2021-06-21

## 2021-06-21 VITALS
SYSTOLIC BLOOD PRESSURE: 115 MMHG | HEART RATE: 61 BPM | HEIGHT: 70 IN | TEMPERATURE: 98 F | WEIGHT: 262.81 LBS | DIASTOLIC BLOOD PRESSURE: 51 MMHG | RESPIRATION RATE: 18 BRPM | OXYGEN SATURATION: 95 % | BODY MASS INDEX: 37.62 KG/M2

## 2021-06-21 LAB
ALBUMIN SERPL BCP-MCNC: 2.2 G/DL (ref 3.5–5.2)
ALP SERPL-CCNC: 80 U/L (ref 55–135)
ALT SERPL W/O P-5'-P-CCNC: 22 U/L (ref 10–44)
ANION GAP SERPL CALC-SCNC: 9 MMOL/L (ref 8–16)
AST SERPL-CCNC: 46 U/L (ref 10–40)
BASOPHILS # BLD AUTO: 0.03 K/UL (ref 0–0.2)
BASOPHILS NFR BLD: 0.7 % (ref 0–1.9)
BILIRUB SERPL-MCNC: 1.8 MG/DL (ref 0.1–1)
BUN SERPL-MCNC: 31 MG/DL (ref 8–23)
CALCIUM SERPL-MCNC: 8.5 MG/DL (ref 8.7–10.5)
CHLORIDE SERPL-SCNC: 108 MMOL/L (ref 95–110)
CO2 SERPL-SCNC: 19 MMOL/L (ref 23–29)
CREAT SERPL-MCNC: 1.1 MG/DL (ref 0.5–1.4)
DIFFERENTIAL METHOD: ABNORMAL
EOSINOPHIL # BLD AUTO: 0.2 K/UL (ref 0–0.5)
EOSINOPHIL NFR BLD: 5.3 % (ref 0–8)
ERYTHROCYTE [DISTWIDTH] IN BLOOD BY AUTOMATED COUNT: 14.5 % (ref 11.5–14.5)
EST. GFR  (AFRICAN AMERICAN): 57.2 ML/MIN/1.73 M^2
EST. GFR  (NON AFRICAN AMERICAN): 49.6 ML/MIN/1.73 M^2
GLUCOSE SERPL-MCNC: 95 MG/DL (ref 70–110)
HCT VFR BLD AUTO: 35.1 % (ref 37–48.5)
HGB BLD-MCNC: 11.8 G/DL (ref 12–16)
IMM GRANULOCYTES # BLD AUTO: 0.01 K/UL (ref 0–0.04)
IMM GRANULOCYTES NFR BLD AUTO: 0.2 % (ref 0–0.5)
LYMPHOCYTES # BLD AUTO: 1.5 K/UL (ref 1–4.8)
LYMPHOCYTES NFR BLD: 36.5 % (ref 18–48)
MAGNESIUM SERPL-MCNC: 1.9 MG/DL (ref 1.6–2.6)
MCH RBC QN AUTO: 33.5 PG (ref 27–31)
MCHC RBC AUTO-ENTMCNC: 33.6 G/DL (ref 32–36)
MCV RBC AUTO: 100 FL (ref 82–98)
MONOCYTES # BLD AUTO: 0.5 K/UL (ref 0.3–1)
MONOCYTES NFR BLD: 11.3 % (ref 4–15)
NEUTROPHILS # BLD AUTO: 1.9 K/UL (ref 1.8–7.7)
NEUTROPHILS NFR BLD: 46 % (ref 38–73)
NRBC BLD-RTO: 0 /100 WBC
PHOSPHATE SERPL-MCNC: 4.4 MG/DL (ref 2.7–4.5)
PLATELET # BLD AUTO: 51 K/UL (ref 150–450)
PMV BLD AUTO: 11.6 FL (ref 9.2–12.9)
POTASSIUM SERPL-SCNC: 4.9 MMOL/L (ref 3.5–5.1)
PROT SERPL-MCNC: 6 G/DL (ref 6–8.4)
RBC # BLD AUTO: 3.52 M/UL (ref 4–5.4)
SODIUM SERPL-SCNC: 136 MMOL/L (ref 136–145)
WBC # BLD AUTO: 4.17 K/UL (ref 3.9–12.7)

## 2021-06-21 PROCEDURE — 25000003 PHARM REV CODE 250: Performed by: HOSPITALIST

## 2021-06-21 PROCEDURE — 85025 COMPLETE CBC W/AUTO DIFF WBC: CPT | Performed by: HOSPITALIST

## 2021-06-21 PROCEDURE — 80053 COMPREHEN METABOLIC PANEL: CPT | Performed by: NURSE PRACTITIONER

## 2021-06-21 PROCEDURE — 83735 ASSAY OF MAGNESIUM: CPT | Performed by: HOSPITALIST

## 2021-06-21 PROCEDURE — 25000003 PHARM REV CODE 250: Performed by: NURSE PRACTITIONER

## 2021-06-21 PROCEDURE — 36415 COLL VENOUS BLD VENIPUNCTURE: CPT | Performed by: HOSPITALIST

## 2021-06-21 PROCEDURE — 84100 ASSAY OF PHOSPHORUS: CPT | Performed by: HOSPITALIST

## 2021-06-21 RX ORDER — SPIRONOLACTONE 50 MG/1
50 TABLET, FILM COATED ORAL DAILY
Qty: 30 TABLET | Refills: 11 | Status: ON HOLD | OUTPATIENT
Start: 2021-06-21 | End: 2022-01-01

## 2021-06-21 RX ADMIN — DICLOFENAC SODIUM 2 G: 10 GEL TOPICAL at 10:06

## 2021-06-21 RX ADMIN — ONDANSETRON HYDROCHLORIDE 4 MG: 4 TABLET, FILM COATED ORAL at 05:06

## 2021-06-21 RX ADMIN — SUCRALFATE 1 G: 1 SUSPENSION ORAL at 10:06

## 2021-06-21 RX ADMIN — Medication 1 CAPSULE: at 09:06

## 2021-06-21 RX ADMIN — CARVEDILOL 6.25 MG: 3.12 TABLET, FILM COATED ORAL at 09:06

## 2021-06-21 RX ADMIN — SUCRALFATE 1 G: 1 SUSPENSION ORAL at 05:06

## 2021-06-22 ENCOUNTER — PATIENT OUTREACH (OUTPATIENT)
Dept: ADMINISTRATIVE | Facility: CLINIC | Age: 76
End: 2021-06-22

## 2021-06-22 ENCOUNTER — TELEPHONE (OUTPATIENT)
Dept: HEPATOLOGY | Facility: CLINIC | Age: 76
End: 2021-06-22

## 2021-06-22 DIAGNOSIS — K74.60 CIRRHOSIS, NON-ALCOHOLIC: ICD-10-CM

## 2021-06-22 DIAGNOSIS — I10 ESSENTIAL HYPERTENSION: Primary | ICD-10-CM

## 2021-06-23 ENCOUNTER — TELEPHONE (OUTPATIENT)
Dept: INTERNAL MEDICINE | Facility: CLINIC | Age: 76
End: 2021-06-23

## 2021-06-24 ENCOUNTER — TELEPHONE (OUTPATIENT)
Dept: INTERNAL MEDICINE | Facility: CLINIC | Age: 76
End: 2021-06-24

## 2021-06-24 RX ORDER — ERGOCALCIFEROL 1.25 MG/1
50000 CAPSULE ORAL
COMMUNITY
End: 2021-01-01

## 2021-06-25 ENCOUNTER — PATIENT OUTREACH (OUTPATIENT)
Dept: ADMINISTRATIVE | Facility: OTHER | Age: 76
End: 2021-06-25

## 2021-06-25 ENCOUNTER — PATIENT MESSAGE (OUTPATIENT)
Dept: ADMINISTRATIVE | Facility: OTHER | Age: 76
End: 2021-06-25

## 2021-06-25 DIAGNOSIS — Z12.31 ENCOUNTER FOR SCREENING MAMMOGRAM FOR MALIGNANT NEOPLASM OF BREAST: Primary | ICD-10-CM

## 2021-06-28 ENCOUNTER — OFFICE VISIT (OUTPATIENT)
Dept: RHEUMATOLOGY | Facility: CLINIC | Age: 76
End: 2021-06-28
Payer: MEDICARE

## 2021-06-28 ENCOUNTER — LAB VISIT (OUTPATIENT)
Dept: LAB | Facility: HOSPITAL | Age: 76
End: 2021-06-28
Attending: INTERNAL MEDICINE
Payer: MEDICARE

## 2021-06-28 VITALS
DIASTOLIC BLOOD PRESSURE: 72 MMHG | SYSTOLIC BLOOD PRESSURE: 137 MMHG | HEART RATE: 74 BPM | BODY MASS INDEX: 37.17 KG/M2 | HEIGHT: 71 IN

## 2021-06-28 DIAGNOSIS — D69.6 THROMBOCYTOPENIA: ICD-10-CM

## 2021-06-28 DIAGNOSIS — M05.79 RHEUMATOID ARTHRITIS INVOLVING MULTIPLE SITES WITH POSITIVE RHEUMATOID FACTOR: Primary | ICD-10-CM

## 2021-06-28 DIAGNOSIS — M05.79 RHEUMATOID ARTHRITIS INVOLVING MULTIPLE SITES WITH POSITIVE RHEUMATOID FACTOR: ICD-10-CM

## 2021-06-28 DIAGNOSIS — E21.3 HYPERPARATHYROIDISM: ICD-10-CM

## 2021-06-28 LAB
CRP SERPL-MCNC: 6.4 MG/L (ref 0–8.2)
ERYTHROCYTE [SEDIMENTATION RATE] IN BLOOD BY WESTERGREN METHOD: 51 MM/HR (ref 0–36)

## 2021-06-28 PROCEDURE — 99214 OFFICE O/P EST MOD 30 MIN: CPT | Mod: S$PBB,,, | Performed by: INTERNAL MEDICINE

## 2021-06-28 PROCEDURE — 86140 C-REACTIVE PROTEIN: CPT | Performed by: INTERNAL MEDICINE

## 2021-06-28 PROCEDURE — 99213 OFFICE O/P EST LOW 20 MIN: CPT | Mod: PBBFAC | Performed by: INTERNAL MEDICINE

## 2021-06-28 PROCEDURE — 99999 PR PBB SHADOW E&M-EST. PATIENT-LVL III: ICD-10-PCS | Mod: PBBFAC,,, | Performed by: INTERNAL MEDICINE

## 2021-06-28 PROCEDURE — 99999 PR PBB SHADOW E&M-EST. PATIENT-LVL III: CPT | Mod: PBBFAC,,, | Performed by: INTERNAL MEDICINE

## 2021-06-28 PROCEDURE — 99214 PR OFFICE/OUTPT VISIT, EST, LEVL IV, 30-39 MIN: ICD-10-PCS | Mod: S$PBB,,, | Performed by: INTERNAL MEDICINE

## 2021-06-28 PROCEDURE — 36415 COLL VENOUS BLD VENIPUNCTURE: CPT | Performed by: INTERNAL MEDICINE

## 2021-06-28 PROCEDURE — 85652 RBC SED RATE AUTOMATED: CPT | Performed by: INTERNAL MEDICINE

## 2021-06-28 ASSESSMENT — ROUTINE ASSESSMENT OF PATIENT INDEX DATA (RAPID3)
PAIN SCORE: 0.5
PSYCHOLOGICAL DISTRESS SCORE: 0
TOTAL RAPID3 SCORE: 3.83
FATIGUE SCORE: 7
MDHAQ FUNCTION SCORE: 1.5
AM STIFFNESS SCORE: 0, NO
PATIENT GLOBAL ASSESSMENT SCORE: 6

## 2021-06-29 ENCOUNTER — OUTPATIENT CASE MANAGEMENT (OUTPATIENT)
Dept: ADMINISTRATIVE | Facility: OTHER | Age: 76
End: 2021-06-29

## 2021-06-29 ENCOUNTER — TELEPHONE (OUTPATIENT)
Dept: INTERNAL MEDICINE | Facility: CLINIC | Age: 76
End: 2021-06-29

## 2021-06-29 DIAGNOSIS — G62.9 POLYNEUROPATHY: ICD-10-CM

## 2021-06-29 DIAGNOSIS — M05.79 RHEUMATOID ARTHRITIS INVOLVING MULTIPLE SITES WITH POSITIVE RHEUMATOID FACTOR: ICD-10-CM

## 2021-06-29 DIAGNOSIS — M79.2 NEUROPATHIC PAIN OF FOOT, UNSPECIFIED LATERALITY: Primary | ICD-10-CM

## 2021-06-29 DIAGNOSIS — M79.7 FIBROMYALGIA: ICD-10-CM

## 2021-06-30 PROCEDURE — G0180 MD CERTIFICATION HHA PATIENT: HCPCS | Mod: ,,, | Performed by: INTERNAL MEDICINE

## 2021-06-30 PROCEDURE — G0180 PR HOME HEALTH MD CERTIFICATION: ICD-10-PCS | Mod: ,,, | Performed by: INTERNAL MEDICINE

## 2021-07-01 ENCOUNTER — TELEPHONE (OUTPATIENT)
Dept: INTERNAL MEDICINE | Facility: CLINIC | Age: 76
End: 2021-07-01

## 2021-07-01 ENCOUNTER — OUTPATIENT CASE MANAGEMENT (OUTPATIENT)
Dept: ADMINISTRATIVE | Facility: OTHER | Age: 76
End: 2021-07-01

## 2021-07-01 ENCOUNTER — PATIENT MESSAGE (OUTPATIENT)
Dept: ADMINISTRATIVE | Facility: OTHER | Age: 76
End: 2021-07-01

## 2021-07-07 ENCOUNTER — PATIENT MESSAGE (OUTPATIENT)
Dept: ADMINISTRATIVE | Facility: HOSPITAL | Age: 76
End: 2021-07-07

## 2021-07-08 ENCOUNTER — OFFICE VISIT (OUTPATIENT)
Dept: HEPATOLOGY | Facility: CLINIC | Age: 76
End: 2021-07-08
Payer: MEDICARE

## 2021-07-08 VITALS
TEMPERATURE: 98 F | HEIGHT: 70 IN | DIASTOLIC BLOOD PRESSURE: 67 MMHG | SYSTOLIC BLOOD PRESSURE: 155 MMHG | HEART RATE: 79 BPM | BODY MASS INDEX: 37.51 KG/M2 | RESPIRATION RATE: 18 BRPM | OXYGEN SATURATION: 97 % | WEIGHT: 262 LBS

## 2021-07-08 DIAGNOSIS — R18.8 OTHER ASCITES: ICD-10-CM

## 2021-07-08 DIAGNOSIS — R18.8 CIRRHOSIS OF LIVER WITH ASCITES, UNSPECIFIED HEPATIC CIRRHOSIS TYPE: ICD-10-CM

## 2021-07-08 DIAGNOSIS — M06.9 RHEUMATOID ARTHRITIS, INVOLVING UNSPECIFIED SITE, UNSPECIFIED WHETHER RHEUMATOID FACTOR PRESENT: ICD-10-CM

## 2021-07-08 DIAGNOSIS — K74.60 CIRRHOSIS OF LIVER WITH ASCITES, UNSPECIFIED HEPATIC CIRRHOSIS TYPE: ICD-10-CM

## 2021-07-08 DIAGNOSIS — D69.6 THROMBOCYTOPENIA: ICD-10-CM

## 2021-07-08 DIAGNOSIS — K74.60 CIRRHOSIS, NON-ALCOHOLIC: ICD-10-CM

## 2021-07-08 DIAGNOSIS — K76.0 FATTY LIVER: ICD-10-CM

## 2021-07-08 DIAGNOSIS — K76.0 NON-ALCOHOLIC FATTY LIVER DISEASE: Primary | ICD-10-CM

## 2021-07-08 PROCEDURE — 99214 OFFICE O/P EST MOD 30 MIN: CPT | Mod: PBBFAC | Performed by: INTERNAL MEDICINE

## 2021-07-08 PROCEDURE — 99999 PR PBB SHADOW E&M-EST. PATIENT-LVL IV: CPT | Mod: PBBFAC,,, | Performed by: INTERNAL MEDICINE

## 2021-07-08 PROCEDURE — 99204 OFFICE O/P NEW MOD 45 MIN: CPT | Mod: S$PBB,,, | Performed by: INTERNAL MEDICINE

## 2021-07-08 PROCEDURE — 99204 PR OFFICE/OUTPT VISIT, NEW, LEVL IV, 45-59 MIN: ICD-10-PCS | Mod: S$PBB,,, | Performed by: INTERNAL MEDICINE

## 2021-07-08 PROCEDURE — 99999 PR PBB SHADOW E&M-EST. PATIENT-LVL IV: ICD-10-PCS | Mod: PBBFAC,,, | Performed by: INTERNAL MEDICINE

## 2021-07-09 ENCOUNTER — OFFICE VISIT (OUTPATIENT)
Dept: INTERNAL MEDICINE | Facility: CLINIC | Age: 76
End: 2021-07-09
Attending: INTERNAL MEDICINE
Payer: MEDICARE

## 2021-07-09 VITALS
SYSTOLIC BLOOD PRESSURE: 130 MMHG | HEART RATE: 74 BPM | OXYGEN SATURATION: 95 % | HEIGHT: 70 IN | BODY MASS INDEX: 37.59 KG/M2 | DIASTOLIC BLOOD PRESSURE: 76 MMHG

## 2021-07-09 DIAGNOSIS — M05.79 RHEUMATOID ARTHRITIS INVOLVING MULTIPLE SITES WITH POSITIVE RHEUMATOID FACTOR: Primary | ICD-10-CM

## 2021-07-09 PROCEDURE — 99999 PR PBB SHADOW E&M-EST. PATIENT-LVL III: ICD-10-PCS | Mod: PBBFAC,,, | Performed by: INTERNAL MEDICINE

## 2021-07-09 PROCEDURE — 99999 PR PBB SHADOW E&M-EST. PATIENT-LVL III: CPT | Mod: PBBFAC,,, | Performed by: INTERNAL MEDICINE

## 2021-07-09 PROCEDURE — 99214 PR OFFICE/OUTPT VISIT, EST, LEVL IV, 30-39 MIN: ICD-10-PCS | Mod: S$PBB,,, | Performed by: INTERNAL MEDICINE

## 2021-07-09 PROCEDURE — 99213 OFFICE O/P EST LOW 20 MIN: CPT | Mod: PBBFAC | Performed by: INTERNAL MEDICINE

## 2021-07-09 PROCEDURE — 99214 OFFICE O/P EST MOD 30 MIN: CPT | Mod: S$PBB,,, | Performed by: INTERNAL MEDICINE

## 2021-07-15 ENCOUNTER — TELEPHONE (OUTPATIENT)
Dept: INTERNAL MEDICINE | Facility: CLINIC | Age: 76
End: 2021-07-15

## 2021-07-15 DIAGNOSIS — M05.79 RHEUMATOID ARTHRITIS INVOLVING MULTIPLE SITES WITH POSITIVE RHEUMATOID FACTOR: Primary | ICD-10-CM

## 2021-07-19 ENCOUNTER — NURSE TRIAGE (OUTPATIENT)
Dept: ADMINISTRATIVE | Facility: CLINIC | Age: 76
End: 2021-07-19

## 2021-07-20 ENCOUNTER — EXTERNAL HOME HEALTH (OUTPATIENT)
Dept: HOME HEALTH SERVICES | Facility: HOSPITAL | Age: 76
End: 2021-07-20
Payer: MEDICARE

## 2021-07-21 ENCOUNTER — NURSE TRIAGE (OUTPATIENT)
Dept: ADMINISTRATIVE | Facility: CLINIC | Age: 76
End: 2021-07-21

## 2021-08-18 ENCOUNTER — PES CALL (OUTPATIENT)
Dept: ADMINISTRATIVE | Facility: CLINIC | Age: 76
End: 2021-08-18

## 2021-08-20 ENCOUNTER — TELEPHONE (OUTPATIENT)
Dept: NEUROLOGY | Facility: CLINIC | Age: 76
End: 2021-08-20

## 2021-10-11 PROBLEM — E21.3 HYPERPARATHYROIDISM: Status: ACTIVE | Noted: 2021-01-01

## 2021-10-11 PROBLEM — E55.9 VITAMIN D INSUFFICIENCY: Status: RESOLVED | Noted: 2019-09-12 | Resolved: 2021-01-01

## 2021-10-12 PROBLEM — E21.5 PARATHYROID ABNORMALITY: Status: ACTIVE | Noted: 2021-01-01

## 2022-01-01 ENCOUNTER — OFFICE VISIT (OUTPATIENT)
Dept: ENDOCRINOLOGY | Facility: CLINIC | Age: 77
End: 2022-01-01
Payer: MEDICARE

## 2022-01-01 ENCOUNTER — TELEPHONE (OUTPATIENT)
Dept: RHEUMATOLOGY | Facility: CLINIC | Age: 77
End: 2022-01-01
Payer: MEDICARE

## 2022-01-01 ENCOUNTER — LAB VISIT (OUTPATIENT)
Dept: LAB | Facility: HOSPITAL | Age: 77
End: 2022-01-01
Payer: MEDICARE

## 2022-01-01 ENCOUNTER — TELEPHONE (OUTPATIENT)
Dept: HEPATOLOGY | Facility: CLINIC | Age: 77
End: 2022-01-01
Payer: MEDICARE

## 2022-01-01 ENCOUNTER — NURSE TRIAGE (OUTPATIENT)
Dept: ADMINISTRATIVE | Facility: CLINIC | Age: 77
End: 2022-01-01
Payer: MEDICARE

## 2022-01-01 ENCOUNTER — TELEPHONE (OUTPATIENT)
Dept: PHARMACY | Facility: CLINIC | Age: 77
End: 2022-01-01
Payer: MEDICARE

## 2022-01-01 ENCOUNTER — PATIENT OUTREACH (OUTPATIENT)
Dept: ADMINISTRATIVE | Facility: HOSPITAL | Age: 77
End: 2022-01-01
Payer: MEDICARE

## 2022-01-01 ENCOUNTER — OFFICE VISIT (OUTPATIENT)
Dept: RHEUMATOLOGY | Facility: CLINIC | Age: 77
End: 2022-01-01
Payer: MEDICARE

## 2022-01-01 ENCOUNTER — HOSPITAL ENCOUNTER (INPATIENT)
Facility: HOSPITAL | Age: 77
LOS: 6 days | Discharge: NURSING, REHAB OR LONG-TERM CARE | DRG: 442 | End: 2022-07-24
Attending: EMERGENCY MEDICINE | Admitting: HOSPITALIST
Payer: MEDICARE

## 2022-01-01 ENCOUNTER — TELEPHONE (OUTPATIENT)
Dept: URGENT CARE | Facility: CLINIC | Age: 77
End: 2022-01-01
Payer: MEDICARE

## 2022-01-01 ENCOUNTER — TELEPHONE (OUTPATIENT)
Dept: INTERNAL MEDICINE | Facility: CLINIC | Age: 77
End: 2022-01-01
Payer: MEDICARE

## 2022-01-01 ENCOUNTER — TELEPHONE (OUTPATIENT)
Dept: HEMATOLOGY/ONCOLOGY | Facility: CLINIC | Age: 77
End: 2022-01-01
Payer: MEDICARE

## 2022-01-01 ENCOUNTER — LAB VISIT (OUTPATIENT)
Dept: LAB | Facility: HOSPITAL | Age: 77
End: 2022-01-01
Attending: INTERNAL MEDICINE
Payer: MEDICARE

## 2022-01-01 ENCOUNTER — HOSPITAL ENCOUNTER (EMERGENCY)
Facility: OTHER | Age: 77
Discharge: HOME | End: 2022-06-20
Attending: EMERGENCY MEDICINE
Payer: MEDICARE

## 2022-01-01 ENCOUNTER — HOSPITAL ENCOUNTER (INPATIENT)
Facility: HOSPITAL | Age: 77
LOS: 3 days | Discharge: NURSING, REHAB OR LONG-TERM CARE | DRG: 872 | End: 2022-06-30
Attending: EMERGENCY MEDICINE | Admitting: HOSPITALIST
Payer: MEDICARE

## 2022-01-01 ENCOUNTER — PES CALL (OUTPATIENT)
Dept: ADMINISTRATIVE | Facility: CLINIC | Age: 77
End: 2022-01-01
Payer: MEDICARE

## 2022-01-01 ENCOUNTER — HOSPITAL ENCOUNTER (INPATIENT)
Facility: HOSPITAL | Age: 77
LOS: 3 days | DRG: 871 | End: 2022-08-23
Attending: EMERGENCY MEDICINE | Admitting: HOSPITALIST
Payer: MEDICARE

## 2022-01-01 ENCOUNTER — OFFICE VISIT (OUTPATIENT)
Dept: URGENT CARE | Facility: CLINIC | Age: 77
End: 2022-01-01
Payer: MEDICARE

## 2022-01-01 ENCOUNTER — OFFICE VISIT (OUTPATIENT)
Dept: SURGERY | Facility: CLINIC | Age: 77
End: 2022-01-01
Payer: MEDICARE

## 2022-01-01 VITALS
HEIGHT: 70 IN | BODY MASS INDEX: 37.8 KG/M2 | HEART RATE: 100 BPM | WEIGHT: 264 LBS | DIASTOLIC BLOOD PRESSURE: 61 MMHG | SYSTOLIC BLOOD PRESSURE: 133 MMHG

## 2022-01-01 VITALS
OXYGEN SATURATION: 87 % | WEIGHT: 293 LBS | DIASTOLIC BLOOD PRESSURE: 60 MMHG | SYSTOLIC BLOOD PRESSURE: 112 MMHG | TEMPERATURE: 97 F | HEIGHT: 70 IN | HEART RATE: 103 BPM | BODY MASS INDEX: 41.95 KG/M2 | RESPIRATION RATE: 15 BRPM

## 2022-01-01 VITALS
SYSTOLIC BLOOD PRESSURE: 136 MMHG | HEART RATE: 75 BPM | DIASTOLIC BLOOD PRESSURE: 70 MMHG | BODY MASS INDEX: 37.5 KG/M2 | OXYGEN SATURATION: 97 % | HEIGHT: 70 IN | WEIGHT: 261.94 LBS

## 2022-01-01 VITALS
HEIGHT: 70 IN | SYSTOLIC BLOOD PRESSURE: 128 MMHG | DIASTOLIC BLOOD PRESSURE: 84 MMHG | BODY MASS INDEX: 37.45 KG/M2 | HEART RATE: 82 BPM

## 2022-01-01 VITALS
RESPIRATION RATE: 18 BRPM | HEART RATE: 74 BPM | BODY MASS INDEX: 37.22 KG/M2 | WEIGHT: 260 LBS | TEMPERATURE: 98 F | HEIGHT: 70 IN | DIASTOLIC BLOOD PRESSURE: 72 MMHG | SYSTOLIC BLOOD PRESSURE: 144 MMHG | OXYGEN SATURATION: 97 %

## 2022-01-01 VITALS
HEIGHT: 70 IN | RESPIRATION RATE: 18 BRPM | HEART RATE: 92 BPM | TEMPERATURE: 98 F | SYSTOLIC BLOOD PRESSURE: 131 MMHG | WEIGHT: 264.56 LBS | DIASTOLIC BLOOD PRESSURE: 65 MMHG | OXYGEN SATURATION: 95 % | BODY MASS INDEX: 37.87 KG/M2

## 2022-01-01 VITALS
HEART RATE: 78 BPM | DIASTOLIC BLOOD PRESSURE: 74 MMHG | HEIGHT: 70 IN | WEIGHT: 261 LBS | RESPIRATION RATE: 16 BRPM | BODY MASS INDEX: 37.37 KG/M2 | SYSTOLIC BLOOD PRESSURE: 151 MMHG | OXYGEN SATURATION: 93 % | TEMPERATURE: 97 F

## 2022-01-01 VITALS
BODY MASS INDEX: 37.78 KG/M2 | RESPIRATION RATE: 18 BRPM | OXYGEN SATURATION: 95 % | HEART RATE: 70 BPM | HEIGHT: 70 IN | DIASTOLIC BLOOD PRESSURE: 48 MMHG | WEIGHT: 263.88 LBS | TEMPERATURE: 98 F | SYSTOLIC BLOOD PRESSURE: 142 MMHG

## 2022-01-01 DIAGNOSIS — M79.2 NEUROPATHIC PAIN OF RIGHT FOOT: Primary | ICD-10-CM

## 2022-01-01 DIAGNOSIS — R35.0 FREQUENCY OF URINATION: Primary | ICD-10-CM

## 2022-01-01 DIAGNOSIS — I10 ESSENTIAL HYPERTENSION: ICD-10-CM

## 2022-01-01 DIAGNOSIS — E87.20 LACTIC ACIDOSIS: ICD-10-CM

## 2022-01-01 DIAGNOSIS — R07.9 CHEST PAIN: ICD-10-CM

## 2022-01-01 DIAGNOSIS — R15.9 INCONTINENCE OF FECES, UNSPECIFIED FECAL INCONTINENCE TYPE: ICD-10-CM

## 2022-01-01 DIAGNOSIS — E55.9 VITAMIN D DEFICIENCY: ICD-10-CM

## 2022-01-01 DIAGNOSIS — G14 POST-POLIO SYNDROME: ICD-10-CM

## 2022-01-01 DIAGNOSIS — G62.9 POLYNEUROPATHY: ICD-10-CM

## 2022-01-01 DIAGNOSIS — R00.0 TACHYCARDIA: ICD-10-CM

## 2022-01-01 DIAGNOSIS — G70.80: ICD-10-CM

## 2022-01-01 DIAGNOSIS — D69.6 THROMBOCYTOPENIA: ICD-10-CM

## 2022-01-01 DIAGNOSIS — N17.9 AKI (ACUTE KIDNEY INJURY): ICD-10-CM

## 2022-01-01 DIAGNOSIS — K59.00 CONSTIPATION, UNSPECIFIED CONSTIPATION TYPE: Primary | ICD-10-CM

## 2022-01-01 DIAGNOSIS — R29.898 WEAKNESS OF BOTH LOWER EXTREMITIES: ICD-10-CM

## 2022-01-01 DIAGNOSIS — I81 PORTAL VEIN THROMBOSIS: ICD-10-CM

## 2022-01-01 DIAGNOSIS — R19.7 DIARRHEA, UNSPECIFIED TYPE: Primary | ICD-10-CM

## 2022-01-01 DIAGNOSIS — Z01.89 ENCOUNTER FOR IMAGING TO SCREEN FOR METAL PRIOR TO MRI: ICD-10-CM

## 2022-01-01 DIAGNOSIS — D84.9 IMMUNOSUPPRESSED STATUS: ICD-10-CM

## 2022-01-01 DIAGNOSIS — M05.79 RHEUMATOID ARTHRITIS INVOLVING MULTIPLE SITES WITH POSITIVE RHEUMATOID FACTOR: Primary | ICD-10-CM

## 2022-01-01 DIAGNOSIS — R79.89 ELEVATED LACTIC ACID LEVEL: ICD-10-CM

## 2022-01-01 DIAGNOSIS — M79.7 FIBROMYALGIA: Primary | ICD-10-CM

## 2022-01-01 DIAGNOSIS — M05.79 RHEUMATOID ARTHRITIS INVOLVING MULTIPLE SITES WITH POSITIVE RHEUMATOID FACTOR: ICD-10-CM

## 2022-01-01 DIAGNOSIS — K76.82 HEPATIC ENCEPHALOPATHY: Primary | ICD-10-CM

## 2022-01-01 DIAGNOSIS — E83.42 HYPOMAGNESEMIA: ICD-10-CM

## 2022-01-01 DIAGNOSIS — R31.9 URINARY TRACT INFECTION WITH HEMATURIA, SITE UNSPECIFIED: ICD-10-CM

## 2022-01-01 DIAGNOSIS — R19.7 DIARRHEA, UNSPECIFIED TYPE: ICD-10-CM

## 2022-01-01 DIAGNOSIS — K74.60 CIRRHOSIS, NON-ALCOHOLIC: ICD-10-CM

## 2022-01-01 DIAGNOSIS — K62.89 DECREASED RECTAL SPHINCTER TONE: Primary | ICD-10-CM

## 2022-01-01 DIAGNOSIS — N39.0 URINARY TRACT INFECTION WITH HEMATURIA, SITE UNSPECIFIED: ICD-10-CM

## 2022-01-01 DIAGNOSIS — M79.7 FIBROMYALGIA: Chronic | ICD-10-CM

## 2022-01-01 DIAGNOSIS — E21.5 PARATHYROID ABNORMALITY: ICD-10-CM

## 2022-01-01 DIAGNOSIS — G93.40 ACUTE ENCEPHALOPATHY: ICD-10-CM

## 2022-01-01 DIAGNOSIS — R15.9 INCONTINENCE OF FECES, UNSPECIFIED FECAL INCONTINENCE TYPE: Primary | ICD-10-CM

## 2022-01-01 DIAGNOSIS — M85.80 OSTEOPENIA, UNSPECIFIED LOCATION: ICD-10-CM

## 2022-01-01 DIAGNOSIS — E21.3 HYPERPARATHYROIDISM: ICD-10-CM

## 2022-01-01 DIAGNOSIS — A41.9 SEPSIS: ICD-10-CM

## 2022-01-01 DIAGNOSIS — E87.5 HYPERKALEMIA: ICD-10-CM

## 2022-01-01 DIAGNOSIS — E87.1 HYPONATREMIA: ICD-10-CM

## 2022-01-01 DIAGNOSIS — A41.9 SEPSIS, DUE TO UNSPECIFIED ORGANISM, UNSPECIFIED WHETHER ACUTE ORGAN DYSFUNCTION PRESENT: Primary | ICD-10-CM

## 2022-01-01 DIAGNOSIS — R41.82 AMS (ALTERED MENTAL STATUS): ICD-10-CM

## 2022-01-01 LAB
25(OH)D3+25(OH)D2 SERPL-MCNC: 13 NG/ML (ref 30–96)
ALBUMIN SERPL BCP-MCNC: 1.1 G/DL (ref 3.5–5.2)
ALBUMIN SERPL BCP-MCNC: 1.2 G/DL (ref 3.5–5.2)
ALBUMIN SERPL BCP-MCNC: 1.2 G/DL (ref 3.5–5.2)
ALBUMIN SERPL BCP-MCNC: 1.5 G/DL (ref 3.5–5.2)
ALBUMIN SERPL BCP-MCNC: 1.5 G/DL (ref 3.5–5.2)
ALBUMIN SERPL BCP-MCNC: 1.7 G/DL (ref 3.5–5.2)
ALBUMIN SERPL BCP-MCNC: 1.8 G/DL (ref 3.5–5.2)
ALBUMIN SERPL BCP-MCNC: 1.9 G/DL (ref 3.5–5.2)
ALBUMIN SERPL BCP-MCNC: 2 G/DL (ref 3.5–5.2)
ALBUMIN SERPL BCP-MCNC: 2.1 G/DL (ref 3.5–5.2)
ALBUMIN SERPL BCP-MCNC: 2.2 G/DL (ref 3.5–5.2)
ALBUMIN SERPL BCP-MCNC: 2.2 G/DL (ref 3.5–5.2)
ALBUMIN SERPL BCP-MCNC: 2.3 G/DL (ref 3.5–5.2)
ALBUMIN SERPL BCP-MCNC: 2.3 G/DL (ref 3.5–5.2)
ALBUMIN SERPL BCP-MCNC: 2.4 G/DL (ref 3.5–5.2)
ALBUMIN SERPL BCP-MCNC: 2.5 G/DL (ref 3.5–5.2)
ALBUMIN SERPL BCP-MCNC: 2.5 G/DL (ref 3.5–5.2)
ALBUMIN SERPL BCP-MCNC: 2.6 G/DL (ref 3.5–5.2)
ALLENS TEST: ABNORMAL
ALP SERPL-CCNC: 101 U/L (ref 55–135)
ALP SERPL-CCNC: 101 U/L (ref 55–135)
ALP SERPL-CCNC: 102 U/L (ref 55–135)
ALP SERPL-CCNC: 102 U/L (ref 55–135)
ALP SERPL-CCNC: 140 U/L (ref 55–135)
ALP SERPL-CCNC: 142 U/L (ref 55–135)
ALP SERPL-CCNC: 143 U/L (ref 55–135)
ALP SERPL-CCNC: 82 U/L (ref 55–135)
ALP SERPL-CCNC: 91 U/L (ref 55–135)
ALP SERPL-CCNC: 94 U/L (ref 55–135)
ALP SERPL-CCNC: 94 U/L (ref 55–135)
ALP SERPL-CCNC: 95 U/L (ref 55–135)
ALP SERPL-CCNC: 95 U/L (ref 55–135)
ALP SERPL-CCNC: 96 U/L (ref 55–135)
ALP SERPL-CCNC: 97 U/L (ref 55–135)
ALP SERPL-CCNC: 98 U/L (ref 55–135)
ALT SERPL W/O P-5'-P-CCNC: 144 U/L (ref 10–44)
ALT SERPL W/O P-5'-P-CCNC: 15 U/L (ref 10–44)
ALT SERPL W/O P-5'-P-CCNC: 16 U/L (ref 10–44)
ALT SERPL W/O P-5'-P-CCNC: 16 U/L (ref 10–44)
ALT SERPL W/O P-5'-P-CCNC: 17 U/L (ref 10–44)
ALT SERPL W/O P-5'-P-CCNC: 17 U/L (ref 10–44)
ALT SERPL W/O P-5'-P-CCNC: 19 U/L (ref 10–44)
ALT SERPL W/O P-5'-P-CCNC: 19 U/L (ref 10–44)
ALT SERPL W/O P-5'-P-CCNC: 23 U/L (ref 10–44)
ALT SERPL W/O P-5'-P-CCNC: 28 U/L (ref 10–44)
ALT SERPL W/O P-5'-P-CCNC: 30 U/L (ref 10–44)
ALT SERPL W/O P-5'-P-CCNC: 31 U/L (ref 10–44)
ALT SERPL W/O P-5'-P-CCNC: 31 U/L (ref 10–44)
ALT SERPL W/O P-5'-P-CCNC: 33 U/L (ref 10–44)
ALT SERPL W/O P-5'-P-CCNC: 35 U/L (ref 10–44)
ALT SERPL W/O P-5'-P-CCNC: 37 U/L (ref 10–44)
AMMONIA PLAS-SCNC: 122 UMOL/L (ref 10–50)
AMMONIA PLAS-SCNC: 80 UMOL/L (ref 10–50)
AMPHET+METHAMPHET UR QL: NEGATIVE
ANION GAP SERPL CALC-SCNC: 10 MMOL/L (ref 8–16)
ANION GAP SERPL CALC-SCNC: 12 MMOL/L (ref 8–16)
ANION GAP SERPL CALC-SCNC: 18 MMOL/L (ref 8–16)
ANION GAP SERPL CALC-SCNC: 23 MMOL/L (ref 8–16)
ANION GAP SERPL CALC-SCNC: 4 MMOL/L (ref 8–16)
ANION GAP SERPL CALC-SCNC: 5 MMOL/L (ref 8–16)
ANION GAP SERPL CALC-SCNC: 5 MMOL/L (ref 8–16)
ANION GAP SERPL CALC-SCNC: 6 MMOL/L (ref 8–16)
ANION GAP SERPL CALC-SCNC: 7 MMOL/L (ref 8–16)
ANION GAP SERPL CALC-SCNC: 7 MMOL/L (ref 8–16)
ANION GAP SERPL CALC-SCNC: 8 MMOL/L (ref 8–16)
ANION GAP SERPL CALC-SCNC: 9 MMOL/L (ref 8–16)
ANISOCYTOSIS BLD QL SMEAR: SLIGHT
ANTI-SSA ANTIBODY: 0.11 RATIO (ref 0–0.99)
ANTI-SSA INTERPRETATION: NEGATIVE
ANTI-SSB ANTIBODY: 0.15 RATIO (ref 0–0.99)
ANTI-SSB INTERPRETATION: NEGATIVE
ASCENDING AORTA: 2.69 CM
AST SERPL-CCNC: 33 U/L (ref 10–40)
AST SERPL-CCNC: 34 U/L (ref 10–40)
AST SERPL-CCNC: 36 U/L (ref 10–40)
AST SERPL-CCNC: 37 U/L (ref 10–40)
AST SERPL-CCNC: 40 U/L (ref 10–40)
AST SERPL-CCNC: 41 U/L (ref 10–40)
AST SERPL-CCNC: 50 U/L (ref 10–40)
AST SERPL-CCNC: 50 U/L (ref 10–40)
AST SERPL-CCNC: 53 U/L (ref 10–40)
AST SERPL-CCNC: 55 U/L (ref 10–40)
AST SERPL-CCNC: 55 U/L (ref 10–40)
AST SERPL-CCNC: 56 U/L (ref 10–40)
AST SERPL-CCNC: 58 U/L (ref 10–40)
AST SERPL-CCNC: 59 U/L (ref 10–40)
AST SERPL-CCNC: 59 U/L (ref 10–40)
AST SERPL-CCNC: 63 U/L (ref 10–40)
AST SERPL-CCNC: 663 U/L (ref 10–40)
AV INDEX (PROSTH): 0.82
AV MEAN GRADIENT: 12 MMHG
AV PEAK GRADIENT: 23 MMHG
AV VALVE AREA: 2.32 CM2
AV VELOCITY RATIO: 0.71
BACTERIA #/AREA URNS AUTO: ABNORMAL /HPF
BACTERIA #/AREA URNS AUTO: ABNORMAL /HPF
BACTERIA #/AREA URNS HPF: ABNORMAL /HPF
BACTERIA BLD CULT: ABNORMAL
BACTERIA BLD CULT: NORMAL
BACTERIA UR CULT: ABNORMAL
BACTERIA UR CULT: ABNORMAL
BARBITURATES UR QL SCN>200 NG/ML: NEGATIVE
BASO STIPL BLD QL SMEAR: ABNORMAL
BASOPHILS # BLD AUTO: 0.03 K/UL (ref 0–0.2)
BASOPHILS # BLD AUTO: 0.03 K/UL (ref 0–0.2)
BASOPHILS # BLD AUTO: 0.04 K/UL (ref 0–0.2)
BASOPHILS # BLD AUTO: 0.05 K/UL (ref 0–0.2)
BASOPHILS # BLD AUTO: 0.06 K/UL (ref 0–0.2)
BASOPHILS # BLD AUTO: 0.07 K/UL (ref 0–0.2)
BASOPHILS # BLD AUTO: 0.08 K/UL (ref 0–0.2)
BASOPHILS # BLD AUTO: 0.08 K/UL (ref 0–0.2)
BASOPHILS # BLD AUTO: ABNORMAL K/UL (ref 0–0.2)
BASOPHILS NFR BLD: 0 % (ref 0–1.9)
BASOPHILS NFR BLD: 0.3 % (ref 0–1.9)
BASOPHILS NFR BLD: 0.3 % (ref 0–1.9)
BASOPHILS NFR BLD: 0.4 % (ref 0–1.9)
BASOPHILS NFR BLD: 0.5 % (ref 0–1.9)
BASOPHILS NFR BLD: 0.5 % (ref 0–1.9)
BASOPHILS NFR BLD: 0.6 % (ref 0–1.9)
BASOPHILS NFR BLD: 0.6 % (ref 0–1.9)
BASOPHILS NFR BLD: 0.7 % (ref 0–1.9)
BASOPHILS NFR BLD: 0.9 % (ref 0–1.9)
BASOPHILS NFR BLD: 1 % (ref 0–1.9)
BASOPHILS NFR BLD: 1.1 % (ref 0–1.9)
BASOPHILS NFR BLD: 1.2 % (ref 0–1.9)
BASOPHILS NFR BLD: 1.3 % (ref 0–1.9)
BENZODIAZ UR QL SCN>200 NG/ML: NEGATIVE
BILIRUB SERPL-MCNC: 2.1 MG/DL (ref 0.1–1)
BILIRUB SERPL-MCNC: 2.7 MG/DL (ref 0.1–1)
BILIRUB SERPL-MCNC: 3.2 MG/DL (ref 0.1–1)
BILIRUB SERPL-MCNC: 3.3 MG/DL (ref 0.1–1)
BILIRUB SERPL-MCNC: 3.4 MG/DL (ref 0.1–1)
BILIRUB SERPL-MCNC: 3.4 MG/DL (ref 0.1–1)
BILIRUB SERPL-MCNC: 3.5 MG/DL (ref 0.1–1)
BILIRUB SERPL-MCNC: 3.6 MG/DL (ref 0.1–1)
BILIRUB SERPL-MCNC: 3.9 MG/DL (ref 0.1–1)
BILIRUB SERPL-MCNC: 4 MG/DL (ref 0.1–1)
BILIRUB SERPL-MCNC: 4.4 MG/DL (ref 0.1–1)
BILIRUB SERPL-MCNC: 4.5 MG/DL (ref 0.1–1)
BILIRUB SERPL-MCNC: 4.9 MG/DL (ref 0.1–1)
BILIRUB SERPL-MCNC: 5.2 MG/DL (ref 0.1–1)
BILIRUB SERPL-MCNC: 7.6 MG/DL (ref 0.1–1)
BILIRUB SERPL-MCNC: 8 MG/DL (ref 0.1–1)
BILIRUB SERPL-MCNC: 8.5 MG/DL (ref 0.1–1)
BILIRUB UR QL STRIP: ABNORMAL
BILIRUB UR QL STRIP: NEGATIVE
BNP SERPL-MCNC: 85 PG/ML (ref 0–99)
BNP SERPL-MCNC: 98 PG/ML (ref 0–99)
BSA FOR ECHO PROCEDURE: 2.43 M2
BUN SERPL-MCNC: 11 MG/DL (ref 8–23)
BUN SERPL-MCNC: 17 MG/DL (ref 8–23)
BUN SERPL-MCNC: 17 MG/DL (ref 8–23)
BUN SERPL-MCNC: 18 MG/DL (ref 8–23)
BUN SERPL-MCNC: 20 MG/DL (ref 8–23)
BUN SERPL-MCNC: 20 MG/DL (ref 8–23)
BUN SERPL-MCNC: 21 MG/DL (ref 8–23)
BUN SERPL-MCNC: 22 MG/DL (ref 8–23)
BUN SERPL-MCNC: 23 MG/DL (ref 6–30)
BUN SERPL-MCNC: 23 MG/DL (ref 8–23)
BUN SERPL-MCNC: 50 MG/DL (ref 8–23)
BUN SERPL-MCNC: 50 MG/DL (ref 8–23)
BUN SERPL-MCNC: 68 MG/DL (ref 8–23)
BUN SERPL-MCNC: 69 MG/DL (ref 8–23)
BUN SERPL-MCNC: 7 MG/DL (ref 8–23)
BUN SERPL-MCNC: 73 MG/DL (ref 8–23)
BUN SERPL-MCNC: 77 MG/DL (ref 8–23)
BUN SERPL-MCNC: 8 MG/DL (ref 8–23)
BUN SERPL-MCNC: 9 MG/DL (ref 8–23)
BURR CELLS BLD QL SMEAR: ABNORMAL
BZE UR QL SCN: NEGATIVE
CALCIUM SERPL-MCNC: 7 MG/DL (ref 8.7–10.5)
CALCIUM SERPL-MCNC: 7 MG/DL (ref 8.7–10.5)
CALCIUM SERPL-MCNC: 7.5 MG/DL (ref 8.7–10.5)
CALCIUM SERPL-MCNC: 7.6 MG/DL (ref 8.7–10.5)
CALCIUM SERPL-MCNC: 7.7 MG/DL (ref 8.7–10.5)
CALCIUM SERPL-MCNC: 7.8 MG/DL (ref 8.7–10.5)
CALCIUM SERPL-MCNC: 7.8 MG/DL (ref 8.7–10.5)
CALCIUM SERPL-MCNC: 8 MG/DL (ref 8.7–10.5)
CALCIUM SERPL-MCNC: 8.1 MG/DL (ref 8.7–10.5)
CALCIUM SERPL-MCNC: 8.1 MG/DL (ref 8.7–10.5)
CALCIUM SERPL-MCNC: 8.2 MG/DL (ref 8.7–10.5)
CALCIUM SERPL-MCNC: 8.3 MG/DL (ref 8.7–10.5)
CALCIUM SERPL-MCNC: 8.5 MG/DL (ref 8.7–10.5)
CALCIUM SERPL-MCNC: 8.5 MG/DL (ref 8.7–10.5)
CALCIUM SERPL-MCNC: 8.6 MG/DL (ref 8.7–10.5)
CALCIUM SERPL-MCNC: 8.7 MG/DL (ref 8.7–10.5)
CALCIUM SERPL-MCNC: 9 MG/DL (ref 8.7–10.5)
CALCIUM SERPL-MCNC: 9 MG/DL (ref 8.7–10.5)
CALCIUM SERPL-MCNC: 9.1 MG/DL (ref 8.7–10.5)
CANNABINOIDS UR QL SCN: NEGATIVE
CHLORIDE SERPL-SCNC: 101 MMOL/L (ref 95–110)
CHLORIDE SERPL-SCNC: 103 MMOL/L (ref 95–110)
CHLORIDE SERPL-SCNC: 107 MMOL/L (ref 95–110)
CHLORIDE SERPL-SCNC: 109 MMOL/L (ref 95–110)
CHLORIDE SERPL-SCNC: 110 MMOL/L (ref 95–110)
CHLORIDE SERPL-SCNC: 111 MMOL/L (ref 95–110)
CHLORIDE SERPL-SCNC: 112 MMOL/L (ref 95–110)
CHLORIDE SERPL-SCNC: 114 MMOL/L (ref 95–110)
CHLORIDE SERPL-SCNC: 114 MMOL/L (ref 95–110)
CHLORIDE SERPL-SCNC: 98 MMOL/L (ref 95–110)
CHLORIDE SERPL-SCNC: 98 MMOL/L (ref 95–110)
CHLORIDE SERPL-SCNC: 99 MMOL/L (ref 95–110)
CHLORIDE UR-SCNC: <20 MMOL/L (ref 25–200)
CK SERPL-CCNC: 108 U/L (ref 20–180)
CK SERPL-CCNC: 44 U/L (ref 20–180)
CLARITY UR REFRACT.AUTO: ABNORMAL
CLARITY UR REFRACT.AUTO: CLEAR
CLARITY UR: CLEAR
CO2 SERPL-SCNC: 13 MMOL/L (ref 23–29)
CO2 SERPL-SCNC: 13 MMOL/L (ref 23–29)
CO2 SERPL-SCNC: 15 MMOL/L (ref 23–29)
CO2 SERPL-SCNC: 16 MMOL/L (ref 23–29)
CO2 SERPL-SCNC: 17 MMOL/L (ref 23–29)
CO2 SERPL-SCNC: 18 MMOL/L (ref 23–29)
CO2 SERPL-SCNC: 19 MMOL/L (ref 23–29)
CO2 SERPL-SCNC: 20 MMOL/L (ref 23–29)
CO2 SERPL-SCNC: 20 MMOL/L (ref 23–29)
CO2 SERPL-SCNC: 21 MMOL/L (ref 23–29)
CO2 SERPL-SCNC: 22 MMOL/L (ref 23–29)
CO2 SERPL-SCNC: 22 MMOL/L (ref 23–29)
CO2 SERPL-SCNC: 23 MMOL/L (ref 23–29)
CO2 SERPL-SCNC: 25 MMOL/L (ref 23–29)
CO2 SERPL-SCNC: 8 MMOL/L (ref 23–29)
CO2 SERPL-SCNC: 9 MMOL/L (ref 23–29)
CO2 SERPL-SCNC: 9 MMOL/L (ref 23–29)
COLOR UR AUTO: ABNORMAL
COLOR UR AUTO: ABNORMAL
COLOR UR: YELLOW
CORTIS SERPL-MCNC: 16.3 UG/DL (ref 4.3–22.4)
CREAT SERPL-MCNC: 0.6 MG/DL (ref 0.5–1.4)
CREAT SERPL-MCNC: 0.7 MG/DL (ref 0.5–1.4)
CREAT SERPL-MCNC: 0.8 MG/DL (ref 0.5–1.4)
CREAT SERPL-MCNC: 1.9 MG/DL (ref 0.5–1.4)
CREAT SERPL-MCNC: 2.4 MG/DL (ref 0.5–1.4)
CREAT SERPL-MCNC: 2.5 MG/DL (ref 0.5–1.4)
CREAT SERPL-MCNC: 2.9 MG/DL (ref 0.5–1.4)
CREAT SERPL-MCNC: 3 MG/DL (ref 0.5–1.4)
CREAT SERPL-MCNC: 3 MG/DL (ref 0.5–1.4)
CREAT UR-MCNC: 120 MG/DL (ref 15–325)
CREAT UR-MCNC: 120 MG/DL (ref 15–325)
CREAT UR-MCNC: 143 MG/DL (ref 15–325)
CRP SERPL-MCNC: 15.2 MG/L (ref 0–8.2)
CRP SERPL-MCNC: 97 MG/L (ref 0–8.2)
CRYPTOSP AG STL QL IA: NEGATIVE
CTP QC/QA: YES
CV ECHO LV RWT: 0.3 CM
DELSYS: ABNORMAL
DELSYS: ABNORMAL
DIFFERENTIAL METHOD: ABNORMAL
DOP CALC AO PEAK VEL: 2.42 M/S
DOP CALC AO VTI: 45.2 CM
DOP CALC LVOT AREA: 2.8 CM2
DOP CALC LVOT DIAMETER: 1.9 CM
DOP CALC LVOT PEAK VEL: 1.73 M/S
DOP CALC LVOT STROKE VOLUME: 105.08 CM3
DOP CALCLVOT PEAK VEL VTI: 37.08 CM
E WAVE DECELERATION TIME: 183.6 MSEC
E/A RATIO: 0.63
E/E' RATIO: 17.67 M/S
ECHO LV POSTERIOR WALL: 0.81 CM (ref 0.6–1.1)
EJECTION FRACTION: 68 %
EOSINOPHIL # BLD AUTO: 0.1 K/UL (ref 0–0.5)
EOSINOPHIL # BLD AUTO: 0.2 K/UL (ref 0–0.5)
EOSINOPHIL # BLD AUTO: 0.3 K/UL (ref 0–0.5)
EOSINOPHIL # BLD AUTO: 0.4 K/UL (ref 0–0.5)
EOSINOPHIL # BLD AUTO: ABNORMAL K/UL (ref 0–0.5)
EOSINOPHIL NFR BLD: 0 % (ref 0–8)
EOSINOPHIL NFR BLD: 0.2 % (ref 0–8)
EOSINOPHIL NFR BLD: 0.3 % (ref 0–8)
EOSINOPHIL NFR BLD: 0.4 % (ref 0–8)
EOSINOPHIL NFR BLD: 0.6 % (ref 0–8)
EOSINOPHIL NFR BLD: 0.7 % (ref 0–8)
EOSINOPHIL NFR BLD: 0.8 % (ref 0–8)
EOSINOPHIL NFR BLD: 2.7 % (ref 0–8)
EOSINOPHIL NFR BLD: 2.7 % (ref 0–8)
EOSINOPHIL NFR BLD: 2.8 % (ref 0–8)
EOSINOPHIL NFR BLD: 3.3 % (ref 0–8)
EOSINOPHIL NFR BLD: 3.4 % (ref 0–8)
EOSINOPHIL NFR BLD: 4.3 % (ref 0–8)
EOSINOPHIL NFR BLD: 4.4 % (ref 0–8)
EOSINOPHIL NFR BLD: 5 % (ref 0–8)
EOSINOPHIL NFR BLD: 5 % (ref 0–8)
EOSINOPHIL NFR BLD: 5.8 % (ref 0–8)
EOSINOPHIL NFR BLD: 7 % (ref 0–8)
EOSINOPHIL NFR BLD: 7.6 % (ref 0–8)
ERYTHROCYTE [DISTWIDTH] IN BLOOD BY AUTOMATED COUNT: 14.3 % (ref 11.5–14.5)
ERYTHROCYTE [DISTWIDTH] IN BLOOD BY AUTOMATED COUNT: 14.7 % (ref 11.5–14.5)
ERYTHROCYTE [DISTWIDTH] IN BLOOD BY AUTOMATED COUNT: 14.7 % (ref 11.5–14.5)
ERYTHROCYTE [DISTWIDTH] IN BLOOD BY AUTOMATED COUNT: 14.9 % (ref 11.5–14.5)
ERYTHROCYTE [DISTWIDTH] IN BLOOD BY AUTOMATED COUNT: 15 % (ref 11.5–14.5)
ERYTHROCYTE [DISTWIDTH] IN BLOOD BY AUTOMATED COUNT: 15.1 % (ref 11.5–14.5)
ERYTHROCYTE [DISTWIDTH] IN BLOOD BY AUTOMATED COUNT: 15.2 % (ref 11.5–14.5)
ERYTHROCYTE [DISTWIDTH] IN BLOOD BY AUTOMATED COUNT: 15.3 % (ref 11.5–14.5)
ERYTHROCYTE [DISTWIDTH] IN BLOOD BY AUTOMATED COUNT: 15.3 % (ref 11.5–14.5)
ERYTHROCYTE [DISTWIDTH] IN BLOOD BY AUTOMATED COUNT: 15.5 % (ref 11.5–14.5)
ERYTHROCYTE [DISTWIDTH] IN BLOOD BY AUTOMATED COUNT: 15.8 % (ref 11.5–14.5)
ERYTHROCYTE [DISTWIDTH] IN BLOOD BY AUTOMATED COUNT: 15.9 % (ref 11.5–14.5)
ERYTHROCYTE [DISTWIDTH] IN BLOOD BY AUTOMATED COUNT: 16.4 % (ref 11.5–14.5)
ERYTHROCYTE [DISTWIDTH] IN BLOOD BY AUTOMATED COUNT: 17.1 % (ref 11.5–14.5)
ERYTHROCYTE [SEDIMENTATION RATE] IN BLOOD BY PHOTOMETRIC METHOD: 32 MM/HR (ref 0–36)
ERYTHROCYTE [SEDIMENTATION RATE] IN BLOOD BY WESTERGREN METHOD: 34 MM/HR (ref 0–36)
EST. GFR  (AFRICAN AMERICAN): >60 ML/MIN/1.73 M^2
EST. GFR  (NO RACE VARIABLE): 15.7 ML/MIN/1.73 M^2
EST. GFR  (NO RACE VARIABLE): 15.7 ML/MIN/1.73 M^2
EST. GFR  (NO RACE VARIABLE): 16.4 ML/MIN/1.73 M^2
EST. GFR  (NO RACE VARIABLE): 19.6 ML/MIN/1.73 M^2
EST. GFR  (NO RACE VARIABLE): 20.5 ML/MIN/1.73 M^2
EST. GFR  (NO RACE VARIABLE): 27.2 ML/MIN/1.73 M^2
EST. GFR  (NON AFRICAN AMERICAN): >60 ML/MIN/1.73 M^2
ESTIMATED AVG GLUCOSE: 91 MG/DL (ref 68–131)
FACT X PPP CHRO-ACNC: <0.1 IU/ML (ref 0.3–0.7)
FACT X PPP CHRO-ACNC: <0.1 IU/ML (ref 0.3–0.7)
FIO2: 36
FIO2: 36
FLOW: 4
FLOW: 4
FOLATE SERPL-MCNC: 8.9 NG/ML (ref 4–24)
FRACTIONAL SHORTENING: 47 % (ref 28–44)
G LAMBLIA AG STL QL IA: NEGATIVE
GLUCOSE SERPL-MCNC: 100 MG/DL (ref 70–110)
GLUCOSE SERPL-MCNC: 107 MG/DL (ref 70–110)
GLUCOSE SERPL-MCNC: 113 MG/DL (ref 70–110)
GLUCOSE SERPL-MCNC: 116 MG/DL (ref 70–110)
GLUCOSE SERPL-MCNC: 118 MG/DL (ref 70–110)
GLUCOSE SERPL-MCNC: 119 MG/DL (ref 70–110)
GLUCOSE SERPL-MCNC: 123 MG/DL (ref 70–110)
GLUCOSE SERPL-MCNC: 124 MG/DL (ref 70–110)
GLUCOSE SERPL-MCNC: 128 MG/DL (ref 70–110)
GLUCOSE SERPL-MCNC: 134 MG/DL (ref 70–110)
GLUCOSE SERPL-MCNC: 137 MG/DL (ref 70–110)
GLUCOSE SERPL-MCNC: 146 MG/DL (ref 70–110)
GLUCOSE SERPL-MCNC: 148 MG/DL (ref 70–110)
GLUCOSE SERPL-MCNC: 149 MG/DL (ref 70–110)
GLUCOSE SERPL-MCNC: 157 MG/DL (ref 70–110)
GLUCOSE SERPL-MCNC: 158 MG/DL (ref 70–110)
GLUCOSE SERPL-MCNC: 158 MG/DL (ref 70–110)
GLUCOSE SERPL-MCNC: 164 MG/DL (ref 70–110)
GLUCOSE SERPL-MCNC: 164 MG/DL (ref 70–110)
GLUCOSE SERPL-MCNC: 166 MG/DL (ref 70–110)
GLUCOSE SERPL-MCNC: 184 MG/DL (ref 70–110)
GLUCOSE SERPL-MCNC: 186 MG/DL (ref 70–110)
GLUCOSE SERPL-MCNC: 82 MG/DL (ref 70–110)
GLUCOSE SERPL-MCNC: 82 MG/DL (ref 70–110)
GLUCOSE SERPL-MCNC: 85 MG/DL (ref 70–110)
GLUCOSE SERPL-MCNC: 99 MG/DL (ref 70–110)
GLUCOSE UR QL STRIP: NEGATIVE
HBA1C MFR BLD: 4.8 % (ref 4–5.6)
HCO3 UR-SCNC: 19.2 MMOL/L (ref 24–28)
HCO3 UR-SCNC: 5.4 MMOL/L (ref 24–28)
HCO3 UR-SCNC: 7 MMOL/L (ref 24–28)
HCT VFR BLD AUTO: 27.6 % (ref 37–48.5)
HCT VFR BLD AUTO: 27.9 % (ref 37–48.5)
HCT VFR BLD AUTO: 30.8 % (ref 37–48.5)
HCT VFR BLD AUTO: 31.1 % (ref 37–48.5)
HCT VFR BLD AUTO: 31.4 % (ref 37–48.5)
HCT VFR BLD AUTO: 31.5 % (ref 37–48.5)
HCT VFR BLD AUTO: 31.6 % (ref 37–48.5)
HCT VFR BLD AUTO: 33.1 % (ref 37–48.5)
HCT VFR BLD AUTO: 33.1 % (ref 37–48.5)
HCT VFR BLD AUTO: 33.9 % (ref 37–48.5)
HCT VFR BLD AUTO: 34 % (ref 37–48.5)
HCT VFR BLD AUTO: 34.1 % (ref 37–48.5)
HCT VFR BLD AUTO: 34.5 % (ref 37–48.5)
HCT VFR BLD AUTO: 34.8 % (ref 37–48.5)
HCT VFR BLD AUTO: 35.1 % (ref 37–48.5)
HCT VFR BLD AUTO: 35.3 % (ref 37–48.5)
HCT VFR BLD AUTO: 36.1 % (ref 37–48.5)
HCT VFR BLD AUTO: 36.1 % (ref 37–48.5)
HCT VFR BLD AUTO: 36.2 % (ref 37–48.5)
HCT VFR BLD CALC: 35 %PCV (ref 36–54)
HCV AB SERPL QL IA: NEGATIVE
HGB BLD-MCNC: 10.3 G/DL (ref 12–16)
HGB BLD-MCNC: 10.3 G/DL (ref 12–16)
HGB BLD-MCNC: 10.4 G/DL (ref 12–16)
HGB BLD-MCNC: 10.4 G/DL (ref 12–16)
HGB BLD-MCNC: 10.8 G/DL (ref 12–16)
HGB BLD-MCNC: 11.1 G/DL (ref 12–16)
HGB BLD-MCNC: 11.3 G/DL (ref 12–16)
HGB BLD-MCNC: 11.4 G/DL (ref 12–16)
HGB BLD-MCNC: 11.4 G/DL (ref 12–16)
HGB BLD-MCNC: 11.5 G/DL (ref 12–16)
HGB BLD-MCNC: 11.7 G/DL (ref 12–16)
HGB BLD-MCNC: 11.8 G/DL (ref 12–16)
HGB BLD-MCNC: 12 G/DL (ref 12–16)
HGB BLD-MCNC: 12 G/DL (ref 12–16)
HGB BLD-MCNC: 12.2 G/DL (ref 12–16)
HGB BLD-MCNC: 12.2 G/DL (ref 12–16)
HGB BLD-MCNC: 12.5 G/DL (ref 12–16)
HGB BLD-MCNC: 9.5 G/DL (ref 12–16)
HGB BLD-MCNC: 9.8 G/DL (ref 12–16)
HGB UR QL STRIP: ABNORMAL
HGB UR QL STRIP: NEGATIVE
HGB UR QL STRIP: NEGATIVE
HYALINE CASTS UR QL AUTO: 0 /LPF
HYALINE CASTS UR QL AUTO: 7 /LPF
IMM GRANULOCYTES # BLD AUTO: 0.01 K/UL (ref 0–0.04)
IMM GRANULOCYTES # BLD AUTO: 0.01 K/UL (ref 0–0.04)
IMM GRANULOCYTES # BLD AUTO: 0.02 K/UL (ref 0–0.04)
IMM GRANULOCYTES # BLD AUTO: 0.03 K/UL (ref 0–0.04)
IMM GRANULOCYTES # BLD AUTO: 0.04 K/UL (ref 0–0.04)
IMM GRANULOCYTES # BLD AUTO: 0.04 K/UL (ref 0–0.04)
IMM GRANULOCYTES # BLD AUTO: 0.46 K/UL (ref 0–0.04)
IMM GRANULOCYTES # BLD AUTO: 0.71 K/UL (ref 0–0.04)
IMM GRANULOCYTES # BLD AUTO: 0.83 K/UL (ref 0–0.04)
IMM GRANULOCYTES # BLD AUTO: ABNORMAL K/UL (ref 0–0.04)
IMM GRANULOCYTES NFR BLD AUTO: 0.2 % (ref 0–0.5)
IMM GRANULOCYTES NFR BLD AUTO: 0.3 % (ref 0–0.5)
IMM GRANULOCYTES NFR BLD AUTO: 0.4 % (ref 0–0.5)
IMM GRANULOCYTES NFR BLD AUTO: 0.5 % (ref 0–0.5)
IMM GRANULOCYTES NFR BLD AUTO: 0.7 % (ref 0–0.5)
IMM GRANULOCYTES NFR BLD AUTO: 2.5 % (ref 0–0.5)
IMM GRANULOCYTES NFR BLD AUTO: 4.1 % (ref 0–0.5)
IMM GRANULOCYTES NFR BLD AUTO: 4.5 % (ref 0–0.5)
IMM GRANULOCYTES NFR BLD AUTO: ABNORMAL % (ref 0–0.5)
INR PPP: 1.8 (ref 0.8–1.2)
INR PPP: 1.9 (ref 0.8–1.2)
INR PPP: 2.8 (ref 0.8–1.2)
INR PPP: 3 (ref 0.8–1.2)
INTERVENTRICULAR SEPTUM: 0.66 CM (ref 0.6–1.1)
KETONES UR QL STRIP: ABNORMAL
KETONES UR QL STRIP: ABNORMAL
KETONES UR QL STRIP: NEGATIVE
KETONES UR QL STRIP: NEGATIVE
LA MAJOR: 4.39 CM
LA MINOR: 5.54 CM
LA WIDTH: 2.77 CM
LACTATE SERPL-SCNC: 2.2 MMOL/L (ref 0.5–2.2)
LACTATE SERPL-SCNC: 2.6 MMOL/L (ref 0.5–2.2)
LACTATE SERPL-SCNC: 3.1 MMOL/L (ref 0.5–2.2)
LACTATE SERPL-SCNC: 3.4 MMOL/L (ref 0.5–2.2)
LACTATE SERPL-SCNC: 4.1 MMOL/L (ref 0.5–2.2)
LACTATE SERPL-SCNC: 4.1 MMOL/L (ref 0.5–2.2)
LACTATE SERPL-SCNC: 4.2 MMOL/L (ref 0.5–2.2)
LACTATE SERPL-SCNC: 4.4 MMOL/L (ref 0.5–2.2)
LACTATE SERPL-SCNC: 4.8 MMOL/L (ref 0.5–2.2)
LACTATE SERPL-SCNC: 5.7 MMOL/L (ref 0.5–2.2)
LACTATE SERPL-SCNC: >12 MMOL/L (ref 0.5–2.2)
LACTATE SERPL-SCNC: >12 MMOL/L (ref 0.5–2.2)
LDH SERPL L TO P-CCNC: 13.84 MMOL/L (ref 0.5–2.2)
LDH SERPL L TO P-CCNC: 15.05 MMOL/L (ref 0.36–1.25)
LEFT ATRIUM SIZE: 3.79 CM
LEFT ATRIUM VOLUME INDEX MOD: 18.7 ML/M2
LEFT ATRIUM VOLUME INDEX: 18.6 ML/M2
LEFT ATRIUM VOLUME MOD: 43.9 CM3
LEFT ATRIUM VOLUME: 43.71 CM3
LEFT INTERNAL DIMENSION IN SYSTOLE: 2.85 CM (ref 2.1–4)
LEFT VENTRICLE DIASTOLIC VOLUME INDEX: 59.6 ML/M2
LEFT VENTRICLE DIASTOLIC VOLUME: 140.06 ML
LEFT VENTRICLE MASS INDEX: 59 G/M2
LEFT VENTRICLE SYSTOLIC VOLUME INDEX: 13.1 ML/M2
LEFT VENTRICLE SYSTOLIC VOLUME: 30.77 ML
LEFT VENTRICULAR INTERNAL DIMENSION IN DIASTOLE: 5.38 CM (ref 3.5–6)
LEFT VENTRICULAR MASS: 138.46 G
LEUKOCYTE ESTERASE UR QL STRIP: ABNORMAL
LEUKOCYTE ESTERASE UR QL STRIP: NEGATIVE
LEUKOCYTE ESTERASE UR QL STRIP: NEGATIVE
LEUKOCYTE ESTERASE UR QL STRIP: POSITIVE
LIPASE SERPL-CCNC: 15 U/L (ref 4–60)
LV LATERAL E/E' RATIO: 13.25 M/S
LV SEPTAL E/E' RATIO: 26.5 M/S
LYMPHOCYTES # BLD AUTO: 0.7 K/UL (ref 1–4.8)
LYMPHOCYTES # BLD AUTO: 0.8 K/UL (ref 1–4.8)
LYMPHOCYTES # BLD AUTO: 0.8 K/UL (ref 1–4.8)
LYMPHOCYTES # BLD AUTO: 0.9 K/UL (ref 1–4.8)
LYMPHOCYTES # BLD AUTO: 1 K/UL (ref 1–4.8)
LYMPHOCYTES # BLD AUTO: 1.1 K/UL (ref 1–4.8)
LYMPHOCYTES # BLD AUTO: 1.2 K/UL (ref 1–4.8)
LYMPHOCYTES # BLD AUTO: 1.3 K/UL (ref 1–4.8)
LYMPHOCYTES # BLD AUTO: 1.3 K/UL (ref 1–4.8)
LYMPHOCYTES # BLD AUTO: ABNORMAL K/UL (ref 1–4.8)
LYMPHOCYTES NFR BLD: 1 % (ref 18–48)
LYMPHOCYTES NFR BLD: 11.5 % (ref 18–48)
LYMPHOCYTES NFR BLD: 14 % (ref 18–48)
LYMPHOCYTES NFR BLD: 15.2 % (ref 18–48)
LYMPHOCYTES NFR BLD: 16.4 % (ref 18–48)
LYMPHOCYTES NFR BLD: 18.6 % (ref 18–48)
LYMPHOCYTES NFR BLD: 18.9 % (ref 18–48)
LYMPHOCYTES NFR BLD: 19.1 % (ref 18–48)
LYMPHOCYTES NFR BLD: 19.4 % (ref 18–48)
LYMPHOCYTES NFR BLD: 21.7 % (ref 18–48)
LYMPHOCYTES NFR BLD: 21.7 % (ref 18–48)
LYMPHOCYTES NFR BLD: 23.8 % (ref 18–48)
LYMPHOCYTES NFR BLD: 24.3 % (ref 18–48)
LYMPHOCYTES NFR BLD: 25.1 % (ref 18–48)
LYMPHOCYTES NFR BLD: 25.5 % (ref 18–48)
LYMPHOCYTES NFR BLD: 3.6 % (ref 18–48)
LYMPHOCYTES NFR BLD: 3.7 % (ref 18–48)
LYMPHOCYTES NFR BLD: 33 % (ref 18–48)
LYMPHOCYTES NFR BLD: 5 % (ref 18–48)
MAGNESIUM SERPL-MCNC: 1.4 MG/DL (ref 1.6–2.6)
MAGNESIUM SERPL-MCNC: 1.4 MG/DL (ref 1.6–2.6)
MAGNESIUM SERPL-MCNC: 1.5 MG/DL (ref 1.6–2.6)
MAGNESIUM SERPL-MCNC: 1.7 MG/DL (ref 1.6–2.6)
MAGNESIUM SERPL-MCNC: 1.8 MG/DL (ref 1.6–2.6)
MAGNESIUM SERPL-MCNC: 1.9 MG/DL (ref 1.6–2.6)
MAGNESIUM SERPL-MCNC: 1.9 MG/DL (ref 1.6–2.6)
MAGNESIUM SERPL-MCNC: 2 MG/DL (ref 1.6–2.6)
MAGNESIUM SERPL-MCNC: 2.1 MG/DL (ref 1.6–2.6)
MAGNESIUM SERPL-MCNC: 2.3 MG/DL (ref 1.6–2.6)
MCH RBC QN AUTO: 33.4 PG (ref 27–31)
MCH RBC QN AUTO: 33.4 PG (ref 27–31)
MCH RBC QN AUTO: 33.7 PG (ref 27–31)
MCH RBC QN AUTO: 33.9 PG (ref 27–31)
MCH RBC QN AUTO: 33.9 PG (ref 27–31)
MCH RBC QN AUTO: 34 PG (ref 27–31)
MCH RBC QN AUTO: 34.2 PG (ref 27–31)
MCH RBC QN AUTO: 34.4 PG (ref 27–31)
MCH RBC QN AUTO: 34.5 PG (ref 27–31)
MCH RBC QN AUTO: 34.7 PG (ref 27–31)
MCH RBC QN AUTO: 34.7 PG (ref 27–31)
MCH RBC QN AUTO: 34.9 PG (ref 27–31)
MCH RBC QN AUTO: 35 PG (ref 27–31)
MCH RBC QN AUTO: 35.4 PG (ref 27–31)
MCH RBC QN AUTO: 35.8 PG (ref 27–31)
MCH RBC QN AUTO: 36.2 PG (ref 27–31)
MCH RBC QN AUTO: 36.8 PG (ref 27–31)
MCHC RBC AUTO-ENTMCNC: 32.6 G/DL (ref 32–36)
MCHC RBC AUTO-ENTMCNC: 33 G/DL (ref 32–36)
MCHC RBC AUTO-ENTMCNC: 33.1 G/DL (ref 32–36)
MCHC RBC AUTO-ENTMCNC: 33.1 G/DL (ref 32–36)
MCHC RBC AUTO-ENTMCNC: 33.4 G/DL (ref 32–36)
MCHC RBC AUTO-ENTMCNC: 33.5 G/DL (ref 32–36)
MCHC RBC AUTO-ENTMCNC: 33.5 G/DL (ref 32–36)
MCHC RBC AUTO-ENTMCNC: 33.7 G/DL (ref 32–36)
MCHC RBC AUTO-ENTMCNC: 33.8 G/DL (ref 32–36)
MCHC RBC AUTO-ENTMCNC: 33.9 G/DL (ref 32–36)
MCHC RBC AUTO-ENTMCNC: 34.2 G/DL (ref 32–36)
MCHC RBC AUTO-ENTMCNC: 34.3 G/DL (ref 32–36)
MCHC RBC AUTO-ENTMCNC: 34.4 G/DL (ref 32–36)
MCHC RBC AUTO-ENTMCNC: 34.4 G/DL (ref 32–36)
MCHC RBC AUTO-ENTMCNC: 34.6 G/DL (ref 32–36)
MCHC RBC AUTO-ENTMCNC: 35.1 G/DL (ref 32–36)
MCHC RBC AUTO-ENTMCNC: 35.4 G/DL (ref 32–36)
MCV RBC AUTO: 100 FL (ref 82–98)
MCV RBC AUTO: 101 FL (ref 82–98)
MCV RBC AUTO: 101 FL (ref 82–98)
MCV RBC AUTO: 102 FL (ref 82–98)
MCV RBC AUTO: 103 FL (ref 82–98)
MCV RBC AUTO: 103 FL (ref 82–98)
MCV RBC AUTO: 104 FL (ref 82–98)
MCV RBC AUTO: 105 FL (ref 82–98)
MCV RBC AUTO: 107 FL (ref 82–98)
MCV RBC AUTO: 97 FL (ref 82–98)
MCV RBC AUTO: 99 FL (ref 82–98)
MCV RBC AUTO: 99 FL (ref 82–98)
METAMYELOCYTES NFR BLD MANUAL: 1.5 %
METHADONE UR QL SCN>300 NG/ML: NEGATIVE
MICROSCOPIC COMMENT: ABNORMAL
MODE: ABNORMAL
MODE: ABNORMAL
MONOCYTES # BLD AUTO: 0.5 K/UL (ref 0.3–1)
MONOCYTES # BLD AUTO: 0.6 K/UL (ref 0.3–1)
MONOCYTES # BLD AUTO: 0.7 K/UL (ref 0.3–1)
MONOCYTES # BLD AUTO: 0.8 K/UL (ref 0.3–1)
MONOCYTES # BLD AUTO: 0.9 K/UL (ref 0.3–1)
MONOCYTES # BLD AUTO: 1 K/UL (ref 0.3–1)
MONOCYTES # BLD AUTO: 1.3 K/UL (ref 0.3–1)
MONOCYTES # BLD AUTO: ABNORMAL K/UL (ref 0.3–1)
MONOCYTES NFR BLD: 10.5 % (ref 4–15)
MONOCYTES NFR BLD: 11 % (ref 4–15)
MONOCYTES NFR BLD: 11.1 % (ref 4–15)
MONOCYTES NFR BLD: 11.5 % (ref 4–15)
MONOCYTES NFR BLD: 11.6 % (ref 4–15)
MONOCYTES NFR BLD: 12.1 % (ref 4–15)
MONOCYTES NFR BLD: 12.2 % (ref 4–15)
MONOCYTES NFR BLD: 12.5 % (ref 4–15)
MONOCYTES NFR BLD: 12.6 % (ref 4–15)
MONOCYTES NFR BLD: 12.6 % (ref 4–15)
MONOCYTES NFR BLD: 13.2 % (ref 4–15)
MONOCYTES NFR BLD: 17.1 % (ref 4–15)
MONOCYTES NFR BLD: 3 % (ref 4–15)
MONOCYTES NFR BLD: 4.9 % (ref 4–15)
MONOCYTES NFR BLD: 5.6 % (ref 4–15)
MONOCYTES NFR BLD: 6.5 % (ref 4–15)
MONOCYTES NFR BLD: 7.9 % (ref 4–15)
MONOCYTES NFR BLD: 8.7 % (ref 4–15)
MONOCYTES NFR BLD: 8.9 % (ref 4–15)
MV PEAK A VEL: 1.68 M/S
MV PEAK E VEL: 1.06 M/S
MV STENOSIS PRESSURE HALF TIME: 53.24 MS
MV VALVE AREA P 1/2 METHOD: 4.13 CM2
MYELOCYTES NFR BLD MANUAL: 0.5 %
NEUTROPHILS # BLD AUTO: 1.4 K/UL (ref 1.8–7.7)
NEUTROPHILS # BLD AUTO: 13.4 K/UL (ref 1.8–7.7)
NEUTROPHILS # BLD AUTO: 16.1 K/UL (ref 1.8–7.7)
NEUTROPHILS # BLD AUTO: 17.4 K/UL (ref 1.8–7.7)
NEUTROPHILS # BLD AUTO: 2.7 K/UL (ref 1.8–7.7)
NEUTROPHILS # BLD AUTO: 2.7 K/UL (ref 1.8–7.7)
NEUTROPHILS # BLD AUTO: 2.8 K/UL (ref 1.8–7.7)
NEUTROPHILS # BLD AUTO: 2.8 K/UL (ref 1.8–7.7)
NEUTROPHILS # BLD AUTO: 2.9 K/UL (ref 1.8–7.7)
NEUTROPHILS # BLD AUTO: 3 K/UL (ref 1.8–7.7)
NEUTROPHILS # BLD AUTO: 3.2 K/UL (ref 1.8–7.7)
NEUTROPHILS # BLD AUTO: 3.5 K/UL (ref 1.8–7.7)
NEUTROPHILS # BLD AUTO: 3.7 K/UL (ref 1.8–7.7)
NEUTROPHILS # BLD AUTO: 3.9 K/UL (ref 1.8–7.7)
NEUTROPHILS # BLD AUTO: 5.2 K/UL (ref 1.8–7.7)
NEUTROPHILS # BLD AUTO: 5.6 K/UL (ref 1.8–7.7)
NEUTROPHILS # BLD AUTO: 6.4 K/UL (ref 1.8–7.7)
NEUTROPHILS # BLD AUTO: 6.6 K/UL (ref 1.8–7.7)
NEUTROPHILS NFR BLD: 43.7 % (ref 38–73)
NEUTROPHILS NFR BLD: 52.9 % (ref 38–73)
NEUTROPHILS NFR BLD: 56.6 % (ref 38–73)
NEUTROPHILS NFR BLD: 57.6 % (ref 38–73)
NEUTROPHILS NFR BLD: 58.7 % (ref 38–73)
NEUTROPHILS NFR BLD: 58.9 % (ref 38–73)
NEUTROPHILS NFR BLD: 61.1 % (ref 38–73)
NEUTROPHILS NFR BLD: 62.6 % (ref 38–73)
NEUTROPHILS NFR BLD: 63.3 % (ref 38–73)
NEUTROPHILS NFR BLD: 64.2 % (ref 38–73)
NEUTROPHILS NFR BLD: 67.9 % (ref 38–73)
NEUTROPHILS NFR BLD: 68.2 % (ref 38–73)
NEUTROPHILS NFR BLD: 74.1 % (ref 38–73)
NEUTROPHILS NFR BLD: 74.4 % (ref 38–73)
NEUTROPHILS NFR BLD: 79 % (ref 38–73)
NEUTROPHILS NFR BLD: 84 % (ref 38–73)
NEUTROPHILS NFR BLD: 85.1 % (ref 38–73)
NEUTROPHILS NFR BLD: 88.4 % (ref 38–73)
NEUTROPHILS NFR BLD: 92.5 % (ref 38–73)
NEUTS BAND NFR BLD MANUAL: 1.5 %
NITRITE UR QL STRIP: NEGATIVE
NITRITE UR QL STRIP: NEGATIVE
NITRITE UR QL STRIP: POSITIVE
NON-SQ EPI CELLS #/AREA URNS AUTO: 1 /HPF
NRBC BLD-RTO: 0 /100 WBC
OB PNL STL: NEGATIVE
OPIATES UR QL SCN: NEGATIVE
OSMOLALITY SERPL: 301 MOSM/KG (ref 275–295)
OSMOLALITY UR: 333 MOSM/KG (ref 50–1200)
OSMOLALITY UR: 333 MOSM/KG (ref 50–1200)
OVALOCYTES BLD QL SMEAR: ABNORMAL
PCO2 BLDA: 18.2 MMHG (ref 35–45)
PCO2 BLDA: 21.8 MMHG (ref 35–45)
PCO2 BLDA: 27.4 MMHG (ref 35–45)
PCP UR QL SCN>25 NG/ML: NEGATIVE
PH SMN: 7.08 [PH] (ref 7.35–7.45)
PH SMN: 7.11 [PH] (ref 7.35–7.45)
PH SMN: 7.45 [PH] (ref 7.35–7.45)
PH UR STRIP: 5 [PH] (ref 5–8)
PH UR STRIP: 6 [PH] (ref 5–8)
PH UR STRIP: 6 [PH] (ref 5–8)
PH, POC UA: 5.5 (ref 5–8)
PHOSPHATE SERPL-MCNC: 2.5 MG/DL (ref 2.7–4.5)
PHOSPHATE SERPL-MCNC: 2.5 MG/DL (ref 2.7–4.5)
PHOSPHATE SERPL-MCNC: 2.7 MG/DL (ref 2.7–4.5)
PHOSPHATE SERPL-MCNC: 2.8 MG/DL (ref 2.7–4.5)
PHOSPHATE SERPL-MCNC: 3 MG/DL (ref 2.7–4.5)
PHOSPHATE SERPL-MCNC: 3.1 MG/DL (ref 2.7–4.5)
PHOSPHATE SERPL-MCNC: 3.2 MG/DL (ref 2.7–4.5)
PHOSPHATE SERPL-MCNC: 3.3 MG/DL (ref 2.7–4.5)
PHOSPHATE SERPL-MCNC: 3.3 MG/DL (ref 2.7–4.5)
PHOSPHATE SERPL-MCNC: 3.5 MG/DL (ref 2.7–4.5)
PHOSPHATE SERPL-MCNC: 4.5 MG/DL (ref 2.7–4.5)
PHOSPHATE SERPL-MCNC: 5.5 MG/DL (ref 2.7–4.5)
PHOSPHATE SERPL-MCNC: 5.5 MG/DL (ref 2.7–4.5)
PHOSPHATE SERPL-MCNC: 5.8 MG/DL (ref 2.7–4.5)
PHOSPHATE SERPL-MCNC: 6.8 MG/DL (ref 2.7–4.5)
PHOSPHATE SERPL-MCNC: 8.8 MG/DL (ref 2.7–4.5)
PISA TR MAX VEL: 2.46 M/S
PLATELET # BLD AUTO: 125 K/UL (ref 150–450)
PLATELET # BLD AUTO: 127 K/UL (ref 150–450)
PLATELET # BLD AUTO: 139 K/UL (ref 150–450)
PLATELET # BLD AUTO: 63 K/UL (ref 150–450)
PLATELET # BLD AUTO: 65 K/UL (ref 150–450)
PLATELET # BLD AUTO: 67 K/UL (ref 150–450)
PLATELET # BLD AUTO: 68 K/UL (ref 150–450)
PLATELET # BLD AUTO: 70 K/UL (ref 150–450)
PLATELET # BLD AUTO: 71 K/UL (ref 150–450)
PLATELET # BLD AUTO: 71 K/UL (ref 150–450)
PLATELET # BLD AUTO: 72 K/UL (ref 150–450)
PLATELET # BLD AUTO: 75 K/UL (ref 150–450)
PLATELET # BLD AUTO: 76 K/UL (ref 150–450)
PLATELET # BLD AUTO: 76 K/UL (ref 150–450)
PLATELET # BLD AUTO: 80 K/UL (ref 150–450)
PLATELET # BLD AUTO: 91 K/UL (ref 150–450)
PLATELET # BLD AUTO: 92 K/UL (ref 150–450)
PLATELET # BLD AUTO: 97 K/UL (ref 150–450)
PLATELET # BLD AUTO: ABNORMAL K/UL (ref 150–450)
PLATELET BLD QL SMEAR: ABNORMAL
PLATELET BLD QL SMEAR: ABNORMAL
PMV BLD AUTO: 10.1 FL (ref 9.2–12.9)
PMV BLD AUTO: 10.2 FL (ref 9.2–12.9)
PMV BLD AUTO: 10.3 FL (ref 9.2–12.9)
PMV BLD AUTO: 10.4 FL (ref 9.2–12.9)
PMV BLD AUTO: 10.4 FL (ref 9.2–12.9)
PMV BLD AUTO: 10.5 FL (ref 9.2–12.9)
PMV BLD AUTO: 10.5 FL (ref 9.2–12.9)
PMV BLD AUTO: 10.6 FL (ref 9.2–12.9)
PMV BLD AUTO: 10.7 FL (ref 9.2–12.9)
PMV BLD AUTO: 10.8 FL (ref 9.2–12.9)
PMV BLD AUTO: 10.9 FL (ref 9.2–12.9)
PMV BLD AUTO: 11 FL (ref 9.2–12.9)
PMV BLD AUTO: 11 FL (ref 9.2–12.9)
PMV BLD AUTO: 11.1 FL (ref 9.2–12.9)
PMV BLD AUTO: 11.2 FL (ref 9.2–12.9)
PMV BLD AUTO: 9.8 FL (ref 9.2–12.9)
PMV BLD AUTO: ABNORMAL FL (ref 9.2–12.9)
PO2 BLDA: 126 MMHG (ref 80–100)
PO2 BLDA: 51 MMHG (ref 40–60)
PO2 BLDA: 61 MMHG (ref 40–60)
POC BE: -22 MMOL/L
POC BE: -24 MMOL/L
POC BE: -5 MMOL/L
POC BLOOD, URINE: POSITIVE
POC IONIZED CALCIUM: 1.13 MMOL/L (ref 1.06–1.42)
POC NITRATES, URINE: NEGATIVE
POC SATURATED O2: 83 % (ref 95–100)
POC SATURATED O2: 88 % (ref 95–100)
POC SATURATED O2: 97 % (ref 95–100)
POC TCO2 (MEASURED): 20 MMOL/L (ref 23–29)
POC TCO2: 20 MMOL/L (ref 24–29)
POC TCO2: 6 MMOL/L (ref 23–27)
POC TCO2: 8 MMOL/L (ref 24–29)
POCT GLUCOSE: 105 MG/DL (ref 70–110)
POCT GLUCOSE: 110 MG/DL (ref 70–110)
POCT GLUCOSE: 112 MG/DL (ref 70–110)
POCT GLUCOSE: 120 MG/DL (ref 70–110)
POCT GLUCOSE: 124 MG/DL (ref 70–110)
POCT GLUCOSE: 132 MG/DL (ref 70–110)
POCT GLUCOSE: 160 MG/DL (ref 70–110)
POCT GLUCOSE: 179 MG/DL (ref 70–110)
POCT GLUCOSE: 231 MG/DL (ref 70–110)
POCT GLUCOSE: 54 MG/DL (ref 70–110)
POCT GLUCOSE: 54 MG/DL (ref 70–110)
POCT GLUCOSE: 84 MG/DL (ref 70–110)
POCT GLUCOSE: 89 MG/DL (ref 70–110)
POIKILOCYTOSIS BLD QL SMEAR: ABNORMAL
POLYCHROMASIA BLD QL SMEAR: ABNORMAL
POTASSIUM BLD-SCNC: 4.6 MMOL/L (ref 3.5–5.1)
POTASSIUM SERPL-SCNC: 3.4 MMOL/L (ref 3.5–5.1)
POTASSIUM SERPL-SCNC: 3.6 MMOL/L (ref 3.5–5.1)
POTASSIUM SERPL-SCNC: 3.7 MMOL/L (ref 3.5–5.1)
POTASSIUM SERPL-SCNC: 3.7 MMOL/L (ref 3.5–5.1)
POTASSIUM SERPL-SCNC: 3.8 MMOL/L (ref 3.5–5.1)
POTASSIUM SERPL-SCNC: 3.9 MMOL/L (ref 3.5–5.1)
POTASSIUM SERPL-SCNC: 4.1 MMOL/L (ref 3.5–5.1)
POTASSIUM SERPL-SCNC: 4.3 MMOL/L (ref 3.5–5.1)
POTASSIUM SERPL-SCNC: 4.3 MMOL/L (ref 3.5–5.1)
POTASSIUM SERPL-SCNC: 4.6 MMOL/L (ref 3.5–5.1)
POTASSIUM SERPL-SCNC: 4.8 MMOL/L (ref 3.5–5.1)
POTASSIUM SERPL-SCNC: 5.2 MMOL/L (ref 3.5–5.1)
POTASSIUM SERPL-SCNC: 5.4 MMOL/L (ref 3.5–5.1)
POTASSIUM SERPL-SCNC: 5.6 MMOL/L (ref 3.5–5.1)
POTASSIUM SERPL-SCNC: 5.7 MMOL/L (ref 3.5–5.1)
POTASSIUM SERPL-SCNC: 5.7 MMOL/L (ref 3.5–5.1)
POTASSIUM SERPL-SCNC: 5.8 MMOL/L (ref 3.5–5.1)
POTASSIUM SERPL-SCNC: 5.8 MMOL/L (ref 3.5–5.1)
POTASSIUM SERPL-SCNC: 6 MMOL/L (ref 3.5–5.1)
POTASSIUM SERPL-SCNC: 6.8 MMOL/L (ref 3.5–5.1)
POTASSIUM UR-SCNC: 30 MMOL/L (ref 15–95)
PROCALCITONIN SERPL IA-MCNC: 0.13 NG/ML
PROCALCITONIN SERPL IA-MCNC: 1.09 NG/ML
PROT SERPL-MCNC: 4.4 G/DL (ref 6–8.4)
PROT SERPL-MCNC: 5.4 G/DL (ref 6–8.4)
PROT SERPL-MCNC: 5.6 G/DL (ref 6–8.4)
PROT SERPL-MCNC: 5.8 G/DL (ref 6–8.4)
PROT SERPL-MCNC: 5.8 G/DL (ref 6–8.4)
PROT SERPL-MCNC: 5.9 G/DL (ref 6–8.4)
PROT SERPL-MCNC: 5.9 G/DL (ref 6–8.4)
PROT SERPL-MCNC: 6.1 G/DL (ref 6–8.4)
PROT SERPL-MCNC: 6.3 G/DL (ref 6–8.4)
PROT SERPL-MCNC: 6.4 G/DL (ref 6–8.4)
PROT SERPL-MCNC: 6.8 G/DL (ref 6–8.4)
PROT SERPL-MCNC: 6.8 G/DL (ref 6–8.4)
PROT SERPL-MCNC: 6.9 G/DL (ref 6–8.4)
PROT SERPL-MCNC: 7.1 G/DL (ref 6–8.4)
PROT SERPL-MCNC: 7.3 G/DL (ref 6–8.4)
PROT UR QL STRIP: ABNORMAL
PROT UR QL STRIP: ABNORMAL
PROT UR QL STRIP: NEGATIVE
PROT UR QL STRIP: POSITIVE
PROT UR-MCNC: 45 MG/DL (ref 0–15)
PROT UR-MCNC: 45 MG/DL (ref 0–15)
PROT/CREAT UR: 0.38 MG/G{CREAT} (ref 0–0.2)
PROT/CREAT UR: 0.38 MG/G{CREAT} (ref 0–0.2)
PROTHROMBIN TIME: 17.9 SEC (ref 9–12.5)
PROTHROMBIN TIME: 19.4 SEC (ref 9–12.5)
PROTHROMBIN TIME: 27.5 SEC (ref 9–12.5)
PROTHROMBIN TIME: 29.5 SEC (ref 9–12.5)
PTH-INTACT SERPL-MCNC: 82.6 PG/ML (ref 9–77)
RA MAJOR: 4.76 CM
RA PRESSURE: 3 MMHG
RA WIDTH: 3.72 CM
RBC # BLD AUTO: 2.65 M/UL (ref 4–5.4)
RBC # BLD AUTO: 2.89 M/UL (ref 4–5.4)
RBC # BLD AUTO: 2.95 M/UL (ref 4–5.4)
RBC # BLD AUTO: 3.02 M/UL (ref 4–5.4)
RBC # BLD AUTO: 3.03 M/UL (ref 4–5.4)
RBC # BLD AUTO: 3.09 M/UL (ref 4–5.4)
RBC # BLD AUTO: 3.15 M/UL (ref 4–5.4)
RBC # BLD AUTO: 3.19 M/UL (ref 4–5.4)
RBC # BLD AUTO: 3.25 M/UL (ref 4–5.4)
RBC # BLD AUTO: 3.26 M/UL (ref 4–5.4)
RBC # BLD AUTO: 3.26 M/UL (ref 4–5.4)
RBC # BLD AUTO: 3.3 M/UL (ref 4–5.4)
RBC # BLD AUTO: 3.34 M/UL (ref 4–5.4)
RBC # BLD AUTO: 3.37 M/UL (ref 4–5.4)
RBC # BLD AUTO: 3.45 M/UL (ref 4–5.4)
RBC # BLD AUTO: 3.49 M/UL (ref 4–5.4)
RBC # BLD AUTO: 3.53 M/UL (ref 4–5.4)
RBC # BLD AUTO: 3.57 M/UL (ref 4–5.4)
RBC # BLD AUTO: 3.65 M/UL (ref 4–5.4)
RBC #/AREA URNS AUTO: 2 /HPF (ref 0–4)
RBC #/AREA URNS AUTO: 29 /HPF (ref 0–4)
RBC #/AREA URNS HPF: 4 /HPF (ref 0–4)
RIGHT VENTRICULAR END-DIASTOLIC DIMENSION: 4.77 CM
RV AG STL QL IA.RAPID: NEGATIVE
RV TISSUE DOPPLER FREE WALL SYSTOLIC VELOCITY 1 (APICAL 4 CHAMBER VIEW): 16.57 CM/S
SAMPLE: ABNORMAL
SARS-COV-2 RDRP RESP QL NAA+PROBE: NEGATIVE
SINUS: 3.02 CM
SITE: ABNORMAL
SODIUM BLD-SCNC: 142 MMOL/L (ref 136–145)
SODIUM SERPL-SCNC: 127 MMOL/L (ref 136–145)
SODIUM SERPL-SCNC: 128 MMOL/L (ref 136–145)
SODIUM SERPL-SCNC: 130 MMOL/L (ref 136–145)
SODIUM SERPL-SCNC: 130 MMOL/L (ref 136–145)
SODIUM SERPL-SCNC: 132 MMOL/L (ref 136–145)
SODIUM SERPL-SCNC: 135 MMOL/L (ref 136–145)
SODIUM SERPL-SCNC: 136 MMOL/L (ref 136–145)
SODIUM SERPL-SCNC: 137 MMOL/L (ref 136–145)
SODIUM SERPL-SCNC: 137 MMOL/L (ref 136–145)
SODIUM SERPL-SCNC: 138 MMOL/L (ref 136–145)
SODIUM SERPL-SCNC: 139 MMOL/L (ref 136–145)
SODIUM SERPL-SCNC: 140 MMOL/L (ref 136–145)
SODIUM SERPL-SCNC: 141 MMOL/L (ref 136–145)
SODIUM SERPL-SCNC: 141 MMOL/L (ref 136–145)
SODIUM UR-SCNC: 16 MMOL/L (ref 20–250)
SODIUM UR-SCNC: 16 MMOL/L (ref 20–250)
SP GR UR STRIP: 1.01 (ref 1–1.03)
SP GR UR STRIP: 1.02 (ref 1–1.03)
SP GR UR STRIP: 1.02 (ref 1–1.03)
SP GR UR STRIP: >=1.03 (ref 1–1.03)
SQUAMOUS #/AREA URNS AUTO: 1 /HPF
STJ: 2.83 CM
T4 FREE SERPL-MCNC: 0.56 NG/DL (ref 0.71–1.51)
TDI LATERAL: 0.08 M/S
TDI SEPTAL: 0.04 M/S
TDI: 0.06 M/S
TOXICOLOGY INFORMATION: NORMAL
TR MAX PG: 24 MMHG
TRICUSPID ANNULAR PLANE SYSTOLIC EXCURSION: 2.4 CM
TROPONIN I SERPL DL<=0.01 NG/ML-MCNC: 0.01 NG/ML (ref 0–0.03)
TROPONIN I SERPL DL<=0.01 NG/ML-MCNC: 0.01 NG/ML (ref 0–0.03)
TSH SERPL DL<=0.005 MIU/L-ACNC: 2.82 UIU/ML (ref 0.4–4)
TSH SERPL DL<=0.005 MIU/L-ACNC: 4.98 UIU/ML (ref 0.4–4)
TTG IGA SER-ACNC: 14 UNITS
TV REST PULMONARY ARTERY PRESSURE: 27 MMHG
URN SPEC COLLECT METH UR: ABNORMAL
UROBILINOGEN UR STRIP-ACNC: >=8 EU/DL
UROBILINOGEN UR STRIP-ACNC: NORMAL (ref 0.1–1.1)
VANCOMYCIN SERPL-MCNC: 10.5 UG/ML
VANCOMYCIN SERPL-MCNC: 21.7 UG/ML
VANCOMYCIN TROUGH SERPL-MCNC: 18.1 UG/ML (ref 10–22)
WBC # BLD AUTO: 15.93 K/UL (ref 3.9–12.7)
WBC # BLD AUTO: 18.17 K/UL (ref 3.9–12.7)
WBC # BLD AUTO: 20.22 K/UL (ref 3.9–12.7)
WBC # BLD AUTO: 20.46 K/UL (ref 3.9–12.7)
WBC # BLD AUTO: 3.21 K/UL (ref 3.9–12.7)
WBC # BLD AUTO: 4.5 K/UL (ref 3.9–12.7)
WBC # BLD AUTO: 4.61 K/UL (ref 3.9–12.7)
WBC # BLD AUTO: 4.7 K/UL (ref 3.9–12.7)
WBC # BLD AUTO: 4.86 K/UL (ref 3.9–12.7)
WBC # BLD AUTO: 4.89 K/UL (ref 3.9–12.7)
WBC # BLD AUTO: 5.01 K/UL (ref 3.9–12.7)
WBC # BLD AUTO: 5.39 K/UL (ref 3.9–12.7)
WBC # BLD AUTO: 5.39 K/UL (ref 3.9–12.7)
WBC # BLD AUTO: 5.5 K/UL (ref 3.9–12.7)
WBC # BLD AUTO: 6.22 K/UL (ref 3.9–12.7)
WBC # BLD AUTO: 7.04 K/UL (ref 3.9–12.7)
WBC # BLD AUTO: 8.16 K/UL (ref 3.9–12.7)
WBC # BLD AUTO: 8.34 K/UL (ref 3.9–12.7)
WBC # BLD AUTO: 8.6 K/UL (ref 3.9–12.7)
WBC #/AREA STL HPF: NORMAL /[HPF]
WBC #/AREA URNS AUTO: 3 /HPF (ref 0–5)
WBC #/AREA URNS AUTO: >100 /HPF (ref 0–5)
WBC #/AREA URNS HPF: 3 /HPF (ref 0–5)
WBC CLUMPS UR QL AUTO: ABNORMAL
WBC TOXIC VACUOLES BLD QL SMEAR: PRESENT

## 2022-01-01 PROCEDURE — 25000003 PHARM REV CODE 250: Performed by: STUDENT IN AN ORGANIZED HEALTH CARE EDUCATION/TRAINING PROGRAM

## 2022-01-01 PROCEDURE — 85025 COMPLETE CBC W/AUTO DIFF WBC: CPT

## 2022-01-01 PROCEDURE — 99214 PR OFFICE/OUTPT VISIT, EST, LEVL IV, 30-39 MIN: ICD-10-PCS | Mod: S$PBB,GC,, | Performed by: STUDENT IN AN ORGANIZED HEALTH CARE EDUCATION/TRAINING PROGRAM

## 2022-01-01 PROCEDURE — 83735 ASSAY OF MAGNESIUM: CPT

## 2022-01-01 PROCEDURE — 27000221 HC OXYGEN, UP TO 24 HOURS

## 2022-01-01 PROCEDURE — 81003 POCT URINALYSIS, DIPSTICK, AUTOMATED, W/O SCOPE: ICD-10-PCS | Mod: QW,S$GLB,, | Performed by: NURSE PRACTITIONER

## 2022-01-01 PROCEDURE — 20600001 HC STEP DOWN PRIVATE ROOM

## 2022-01-01 PROCEDURE — 94150 VITAL CAPACITY TEST: CPT

## 2022-01-01 PROCEDURE — 99232 SBSQ HOSP IP/OBS MODERATE 35: CPT | Mod: GC,,, | Performed by: HOSPITALIST

## 2022-01-01 PROCEDURE — 84484 ASSAY OF TROPONIN QUANT: CPT | Performed by: STUDENT IN AN ORGANIZED HEALTH CARE EDUCATION/TRAINING PROGRAM

## 2022-01-01 PROCEDURE — 92610 EVALUATE SWALLOWING FUNCTION: CPT

## 2022-01-01 PROCEDURE — 25000003 PHARM REV CODE 250

## 2022-01-01 PROCEDURE — 63600175 PHARM REV CODE 636 W HCPCS: Performed by: NURSE PRACTITIONER

## 2022-01-01 PROCEDURE — 99900035 HC TECH TIME PER 15 MIN (STAT)

## 2022-01-01 PROCEDURE — 82550 ASSAY OF CK (CPK): CPT | Performed by: STUDENT IN AN ORGANIZED HEALTH CARE EDUCATION/TRAINING PROGRAM

## 2022-01-01 PROCEDURE — 87040 BLOOD CULTURE FOR BACTERIA: CPT | Performed by: EMERGENCY MEDICINE

## 2022-01-01 PROCEDURE — 94010 BREATHING CAPACITY TEST: CPT

## 2022-01-01 PROCEDURE — 63600175 PHARM REV CODE 636 W HCPCS

## 2022-01-01 PROCEDURE — 83605 ASSAY OF LACTIC ACID: CPT | Performed by: EMERGENCY MEDICINE

## 2022-01-01 PROCEDURE — 84443 ASSAY THYROID STIM HORMONE: CPT | Performed by: HOSPITALIST

## 2022-01-01 PROCEDURE — 36410 VNPNXR 3YR/> PHY/QHP DX/THER: CPT

## 2022-01-01 PROCEDURE — 81003 URINALYSIS AUTO W/O SCOPE: CPT | Mod: QW,S$GLB,, | Performed by: NURSE PRACTITIONER

## 2022-01-01 PROCEDURE — 25000003 PHARM REV CODE 250: Performed by: NURSE PRACTITIONER

## 2022-01-01 PROCEDURE — 25500020 PHARM REV CODE 255: Performed by: INTERNAL MEDICINE

## 2022-01-01 PROCEDURE — 36415 COLL VENOUS BLD VENIPUNCTURE: CPT | Performed by: INTERNAL MEDICINE

## 2022-01-01 PROCEDURE — 25000003 PHARM REV CODE 250: Performed by: HOSPITALIST

## 2022-01-01 PROCEDURE — 84443 ASSAY THYROID STIM HORMONE: CPT | Performed by: STUDENT IN AN ORGANIZED HEALTH CARE EDUCATION/TRAINING PROGRAM

## 2022-01-01 PROCEDURE — 36415 COLL VENOUS BLD VENIPUNCTURE: CPT | Performed by: HOSPITALIST

## 2022-01-01 PROCEDURE — 84100 ASSAY OF PHOSPHORUS: CPT

## 2022-01-01 PROCEDURE — 92507 TX SP LANG VOICE COMM INDIV: CPT

## 2022-01-01 PROCEDURE — 83993 ASSAY FOR CALPROTECTIN FECAL: CPT | Performed by: STUDENT IN AN ORGANIZED HEALTH CARE EDUCATION/TRAINING PROGRAM

## 2022-01-01 PROCEDURE — 97535 SELF CARE MNGMENT TRAINING: CPT

## 2022-01-01 PROCEDURE — 36415 COLL VENOUS BLD VENIPUNCTURE: CPT

## 2022-01-01 PROCEDURE — 80202 ASSAY OF VANCOMYCIN: CPT | Performed by: HOSPITALIST

## 2022-01-01 PROCEDURE — 82550 ASSAY OF CK (CPK): CPT | Performed by: HOSPITALIST

## 2022-01-01 PROCEDURE — 80053 COMPREHEN METABOLIC PANEL: CPT

## 2022-01-01 PROCEDURE — 63600175 PHARM REV CODE 636 W HCPCS: Performed by: STUDENT IN AN ORGANIZED HEALTH CARE EDUCATION/TRAINING PROGRAM

## 2022-01-01 PROCEDURE — 63600175 PHARM REV CODE 636 W HCPCS: Performed by: HOSPITALIST

## 2022-01-01 PROCEDURE — 85007 BL SMEAR W/DIFF WBC COUNT: CPT | Performed by: STUDENT IN AN ORGANIZED HEALTH CARE EDUCATION/TRAINING PROGRAM

## 2022-01-01 PROCEDURE — 12000002 HC ACUTE/MED SURGE SEMI-PRIVATE ROOM

## 2022-01-01 PROCEDURE — 99232 PR SUBSEQUENT HOSPITAL CARE,LEVL II: ICD-10-PCS | Mod: GC,,, | Performed by: HOSPITALIST

## 2022-01-01 PROCEDURE — 83735 ASSAY OF MAGNESIUM: CPT | Performed by: STUDENT IN AN ORGANIZED HEALTH CARE EDUCATION/TRAINING PROGRAM

## 2022-01-01 PROCEDURE — U0002 COVID-19 LAB TEST NON-CDC: HCPCS | Performed by: HOSPITALIST

## 2022-01-01 PROCEDURE — 80047 BASIC METABLC PNL IONIZED CA: CPT

## 2022-01-01 PROCEDURE — 80053 COMPREHEN METABOLIC PANEL: CPT | Performed by: EMERGENCY MEDICINE

## 2022-01-01 PROCEDURE — 99233 PR SUBSEQUENT HOSPITAL CARE,LEVL III: ICD-10-PCS | Mod: ,,, | Performed by: INTERNAL MEDICINE

## 2022-01-01 PROCEDURE — 85025 COMPLETE CBC W/AUTO DIFF WBC: CPT | Performed by: STUDENT IN AN ORGANIZED HEALTH CARE EDUCATION/TRAINING PROGRAM

## 2022-01-01 PROCEDURE — 36620 INSERTION CATHETER ARTERY: CPT | Mod: ,,, | Performed by: STUDENT IN AN ORGANIZED HEALTH CARE EDUCATION/TRAINING PROGRAM

## 2022-01-01 PROCEDURE — 96361 HYDRATE IV INFUSION ADD-ON: CPT

## 2022-01-01 PROCEDURE — 80053 COMPREHEN METABOLIC PANEL: CPT | Performed by: STUDENT IN AN ORGANIZED HEALTH CARE EDUCATION/TRAINING PROGRAM

## 2022-01-01 PROCEDURE — 85520 HEPARIN ASSAY: CPT | Performed by: STUDENT IN AN ORGANIZED HEALTH CARE EDUCATION/TRAINING PROGRAM

## 2022-01-01 PROCEDURE — 94761 N-INVAS EAR/PLS OXIMETRY MLT: CPT

## 2022-01-01 PROCEDURE — 99223 PR INITIAL HOSPITAL CARE,LEVL III: ICD-10-PCS | Mod: ,,, | Performed by: INTERNAL MEDICINE

## 2022-01-01 PROCEDURE — 83930 ASSAY OF BLOOD OSMOLALITY: CPT

## 2022-01-01 PROCEDURE — 97165 OT EVAL LOW COMPLEX 30 MIN: CPT

## 2022-01-01 PROCEDURE — 93005 ELECTROCARDIOGRAM TRACING: CPT

## 2022-01-01 PROCEDURE — 82656 EL-1 FECAL QUAL/SEMIQ: CPT | Performed by: STUDENT IN AN ORGANIZED HEALTH CARE EDUCATION/TRAINING PROGRAM

## 2022-01-01 PROCEDURE — 99285 EMERGENCY DEPT VISIT HI MDM: CPT | Mod: CS,,, | Performed by: EMERGENCY MEDICINE

## 2022-01-01 PROCEDURE — 83735 ASSAY OF MAGNESIUM: CPT | Performed by: EMERGENCY MEDICINE

## 2022-01-01 PROCEDURE — 85652 RBC SED RATE AUTOMATED: CPT | Performed by: INTERNAL MEDICINE

## 2022-01-01 PROCEDURE — 83036 HEMOGLOBIN GLYCOSYLATED A1C: CPT | Performed by: STUDENT IN AN ORGANIZED HEALTH CARE EDUCATION/TRAINING PROGRAM

## 2022-01-01 PROCEDURE — 99233 SBSQ HOSP IP/OBS HIGH 50: CPT | Mod: GC,,, | Performed by: HOSPITALIST

## 2022-01-01 PROCEDURE — 25000242 PHARM REV CODE 250 ALT 637 W/ HCPCS: Performed by: NURSE PRACTITIONER

## 2022-01-01 PROCEDURE — C1751 CATH, INF, PER/CENT/MIDLINE: HCPCS

## 2022-01-01 PROCEDURE — 84100 ASSAY OF PHOSPHORUS: CPT | Performed by: STUDENT IN AN ORGANIZED HEALTH CARE EDUCATION/TRAINING PROGRAM

## 2022-01-01 PROCEDURE — 87425 ROTAVIRUS AG IA: CPT | Performed by: EMERGENCY MEDICINE

## 2022-01-01 PROCEDURE — 82705 FATS/LIPIDS FECES QUAL: CPT | Performed by: STUDENT IN AN ORGANIZED HEALTH CARE EDUCATION/TRAINING PROGRAM

## 2022-01-01 PROCEDURE — 99213 OFFICE O/P EST LOW 20 MIN: CPT | Mod: S$GLB,,, | Performed by: NURSE PRACTITIONER

## 2022-01-01 PROCEDURE — 99999 PR PBB SHADOW E&M-EST. PATIENT-LVL III: ICD-10-PCS | Mod: PBBFAC,GC,, | Performed by: STUDENT IN AN ORGANIZED HEALTH CARE EDUCATION/TRAINING PROGRAM

## 2022-01-01 PROCEDURE — 99222 PR INITIAL HOSPITAL CARE,LEVL II: ICD-10-PCS | Mod: ,,, | Performed by: PSYCHIATRY & NEUROLOGY

## 2022-01-01 PROCEDURE — 83880 ASSAY OF NATRIURETIC PEPTIDE: CPT | Performed by: EMERGENCY MEDICINE

## 2022-01-01 PROCEDURE — 99292 CRITICAL CARE ADDL 30 MIN: CPT | Mod: ,,, | Performed by: NURSE PRACTITIONER

## 2022-01-01 PROCEDURE — 99233 PR SUBSEQUENT HOSPITAL CARE,LEVL III: ICD-10-PCS | Mod: ,,, | Performed by: PHYSICIAN ASSISTANT

## 2022-01-01 PROCEDURE — 99214 OFFICE O/P EST MOD 30 MIN: CPT | Mod: S$PBB,GC,, | Performed by: STUDENT IN AN ORGANIZED HEALTH CARE EDUCATION/TRAINING PROGRAM

## 2022-01-01 PROCEDURE — 97161 PT EVAL LOW COMPLEX 20 MIN: CPT

## 2022-01-01 PROCEDURE — 84484 ASSAY OF TROPONIN QUANT: CPT | Performed by: EMERGENCY MEDICINE

## 2022-01-01 PROCEDURE — 82803 BLOOD GASES ANY COMBINATION: CPT

## 2022-01-01 PROCEDURE — 82746 ASSAY OF FOLIC ACID SERUM: CPT | Performed by: STUDENT IN AN ORGANIZED HEALTH CARE EDUCATION/TRAINING PROGRAM

## 2022-01-01 PROCEDURE — 99231 SBSQ HOSP IP/OBS SF/LOW 25: CPT | Mod: GC,,, | Performed by: HOSPITALIST

## 2022-01-01 PROCEDURE — 85610 PROTHROMBIN TIME: CPT | Mod: 91 | Performed by: STUDENT IN AN ORGANIZED HEALTH CARE EDUCATION/TRAINING PROGRAM

## 2022-01-01 PROCEDURE — 84133 ASSAY OF URINE POTASSIUM: CPT

## 2022-01-01 PROCEDURE — 36415 COLL VENOUS BLD VENIPUNCTURE: CPT | Performed by: STUDENT IN AN ORGANIZED HEALTH CARE EDUCATION/TRAINING PROGRAM

## 2022-01-01 PROCEDURE — 84145 PROCALCITONIN (PCT): CPT | Performed by: EMERGENCY MEDICINE

## 2022-01-01 PROCEDURE — 97116 GAIT TRAINING THERAPY: CPT | Mod: CQ

## 2022-01-01 PROCEDURE — 25000003 PHARM REV CODE 250: Performed by: EMERGENCY MEDICINE

## 2022-01-01 PROCEDURE — 99999 PR PBB SHADOW E&M-EST. PATIENT-LVL IV: ICD-10-PCS | Mod: PBBFAC,,, | Performed by: COLON & RECTAL SURGERY

## 2022-01-01 PROCEDURE — 82533 TOTAL CORTISOL: CPT | Performed by: HOSPITALIST

## 2022-01-01 PROCEDURE — 82306 VITAMIN D 25 HYDROXY: CPT | Performed by: STUDENT IN AN ORGANIZED HEALTH CARE EDUCATION/TRAINING PROGRAM

## 2022-01-01 PROCEDURE — 99233 SBSQ HOSP IP/OBS HIGH 50: CPT | Mod: ,,, | Performed by: PHYSICIAN ASSISTANT

## 2022-01-01 PROCEDURE — 92526 ORAL FUNCTION THERAPY: CPT

## 2022-01-01 PROCEDURE — 99239 PR HOSPITAL DISCHARGE DAY,>30 MIN: ICD-10-PCS | Mod: GC,,, | Performed by: HOSPITALIST

## 2022-01-01 PROCEDURE — 82272 OCCULT BLD FECES 1-3 TESTS: CPT | Performed by: STUDENT IN AN ORGANIZED HEALTH CARE EDUCATION/TRAINING PROGRAM

## 2022-01-01 PROCEDURE — 96374 THER/PROPH/DIAG INJ IV PUSH: CPT

## 2022-01-01 PROCEDURE — 87040 BLOOD CULTURE FOR BACTERIA: CPT | Performed by: STUDENT IN AN ORGANIZED HEALTH CARE EDUCATION/TRAINING PROGRAM

## 2022-01-01 PROCEDURE — 99999 PR PBB SHADOW E&M-EST. PATIENT-LVL III: ICD-10-PCS | Mod: PBBFAC,,, | Performed by: INTERNAL MEDICINE

## 2022-01-01 PROCEDURE — 85025 COMPLETE CBC W/AUTO DIFF WBC: CPT | Performed by: NURSE PRACTITIONER

## 2022-01-01 PROCEDURE — 82140 ASSAY OF AMMONIA: CPT | Performed by: STUDENT IN AN ORGANIZED HEALTH CARE EDUCATION/TRAINING PROGRAM

## 2022-01-01 PROCEDURE — 99291 PR CRITICAL CARE, E/M 30-74 MINUTES: ICD-10-PCS | Mod: ,,, | Performed by: EMERGENCY MEDICINE

## 2022-01-01 PROCEDURE — 86580 TB INTRADERMAL TEST: CPT | Performed by: HOSPITALIST

## 2022-01-01 PROCEDURE — 97530 THERAPEUTIC ACTIVITIES: CPT

## 2022-01-01 PROCEDURE — G0180 MD CERTIFICATION HHA PATIENT: HCPCS | Mod: ,,, | Performed by: INTERNAL MEDICINE

## 2022-01-01 PROCEDURE — 93010 ELECTROCARDIOGRAM REPORT: CPT | Mod: ,,, | Performed by: INTERNAL MEDICINE

## 2022-01-01 PROCEDURE — 99220 PR INITIAL OBSERVATION CARE,LEVL III: ICD-10-PCS | Mod: GC,,, | Performed by: HOSPITALIST

## 2022-01-01 PROCEDURE — 83735 ASSAY OF MAGNESIUM: CPT | Mod: 91 | Performed by: STUDENT IN AN ORGANIZED HEALTH CARE EDUCATION/TRAINING PROGRAM

## 2022-01-01 PROCEDURE — 85025 COMPLETE CBC W/AUTO DIFF WBC: CPT | Performed by: INTERNAL MEDICINE

## 2022-01-01 PROCEDURE — 87209 SMEAR COMPLEX STAIN: CPT | Performed by: EMERGENCY MEDICINE

## 2022-01-01 PROCEDURE — 87186 SC STD MICRODIL/AGAR DIL: CPT | Performed by: HOSPITALIST

## 2022-01-01 PROCEDURE — 87040 BLOOD CULTURE FOR BACTERIA: CPT | Mod: 59 | Performed by: STUDENT IN AN ORGANIZED HEALTH CARE EDUCATION/TRAINING PROGRAM

## 2022-01-01 PROCEDURE — G0378 HOSPITAL OBSERVATION PER HR: HCPCS

## 2022-01-01 PROCEDURE — 99233 PR SUBSEQUENT HOSPITAL CARE,LEVL III: ICD-10-PCS | Mod: GC,,, | Performed by: HOSPITALIST

## 2022-01-01 PROCEDURE — 87186 SC STD MICRODIL/AGAR DIL: CPT | Performed by: EMERGENCY MEDICINE

## 2022-01-01 PROCEDURE — 97530 THERAPEUTIC ACTIVITIES: CPT | Mod: CQ

## 2022-01-01 PROCEDURE — 87088 URINE BACTERIA CULTURE: CPT | Performed by: NURSE PRACTITIONER

## 2022-01-01 PROCEDURE — 99214 PR OFFICE/OUTPT VISIT, EST, LEVL IV, 30-39 MIN: ICD-10-PCS | Mod: S$PBB,,, | Performed by: INTERNAL MEDICINE

## 2022-01-01 PROCEDURE — 99213 PR OFFICE/OUTPT VISIT, EST, LEVL III, 20-29 MIN: ICD-10-PCS | Mod: S$GLB,,, | Performed by: NURSE PRACTITIONER

## 2022-01-01 PROCEDURE — 63600175 PHARM REV CODE 636 W HCPCS: Performed by: EMERGENCY MEDICINE

## 2022-01-01 PROCEDURE — 86235 NUCLEAR ANTIGEN ANTIBODY: CPT | Mod: 59 | Performed by: STUDENT IN AN ORGANIZED HEALTH CARE EDUCATION/TRAINING PROGRAM

## 2022-01-01 PROCEDURE — 83605 ASSAY OF LACTIC ACID: CPT | Performed by: NURSE PRACTITIONER

## 2022-01-01 PROCEDURE — 87177 OVA AND PARASITES SMEARS: CPT | Performed by: EMERGENCY MEDICINE

## 2022-01-01 PROCEDURE — 94660 CPAP INITIATION&MGMT: CPT

## 2022-01-01 PROCEDURE — 99284 EMERGENCY DEPT VISIT MOD MDM: CPT | Mod: 25

## 2022-01-01 PROCEDURE — 80069 RENAL FUNCTION PANEL: CPT | Mod: 91

## 2022-01-01 PROCEDURE — 83935 ASSAY OF URINE OSMOLALITY: CPT

## 2022-01-01 PROCEDURE — 80202 ASSAY OF VANCOMYCIN: CPT

## 2022-01-01 PROCEDURE — 20000000 HC ICU ROOM

## 2022-01-01 PROCEDURE — 99292 PR CRITICAL CARE, ADDL 30 MIN: ICD-10-PCS | Mod: ,,, | Performed by: NURSE PRACTITIONER

## 2022-01-01 PROCEDURE — 99223 1ST HOSP IP/OBS HIGH 75: CPT | Mod: ,,, | Performed by: INTERNAL MEDICINE

## 2022-01-01 PROCEDURE — 84145 PROCALCITONIN (PCT): CPT | Performed by: HOSPITALIST

## 2022-01-01 PROCEDURE — 51702 INSERT TEMP BLADDER CATH: CPT

## 2022-01-01 PROCEDURE — 99239 HOSP IP/OBS DSCHRG MGMT >30: CPT | Mod: GC,,, | Performed by: HOSPITALIST

## 2022-01-01 PROCEDURE — 99285 PR EMERGENCY DEPT VISIT,LEVEL V: ICD-10-PCS | Mod: ,,, | Performed by: EMERGENCY MEDICINE

## 2022-01-01 PROCEDURE — 99285 EMERGENCY DEPT VISIT HI MDM: CPT | Mod: 25

## 2022-01-01 PROCEDURE — 89055 LEUKOCYTE ASSESSMENT FECAL: CPT | Performed by: EMERGENCY MEDICINE

## 2022-01-01 PROCEDURE — 86803 HEPATITIS C AB TEST: CPT | Performed by: EMERGENCY MEDICINE

## 2022-01-01 PROCEDURE — G0180 PR HOME HEALTH MD CERTIFICATION: ICD-10-PCS | Mod: ,,, | Performed by: INTERNAL MEDICINE

## 2022-01-01 PROCEDURE — 83735 ASSAY OF MAGNESIUM: CPT | Performed by: NURSE PRACTITIONER

## 2022-01-01 PROCEDURE — 99223 PR INITIAL HOSPITAL CARE,LEVL III: ICD-10-PCS | Mod: AI,GC,, | Performed by: HOSPITALIST

## 2022-01-01 PROCEDURE — 99222 PR INITIAL HOSPITAL CARE,LEVL II: ICD-10-PCS | Mod: GC,,, | Performed by: NEUROLOGICAL SURGERY

## 2022-01-01 PROCEDURE — 99214 OFFICE O/P EST MOD 30 MIN: CPT | Mod: S$PBB,,, | Performed by: INTERNAL MEDICINE

## 2022-01-01 PROCEDURE — 83605 ASSAY OF LACTIC ACID: CPT | Performed by: STUDENT IN AN ORGANIZED HEALTH CARE EDUCATION/TRAINING PROGRAM

## 2022-01-01 PROCEDURE — U0002 COVID-19 LAB TEST NON-CDC: HCPCS | Performed by: NURSE PRACTITIONER

## 2022-01-01 PROCEDURE — 99291 PR CRITICAL CARE, E/M 30-74 MINUTES: ICD-10-PCS | Mod: ,,, | Performed by: NURSE PRACTITIONER

## 2022-01-01 PROCEDURE — U0002 COVID-19 LAB TEST NON-CDC: HCPCS | Performed by: STUDENT IN AN ORGANIZED HEALTH CARE EDUCATION/TRAINING PROGRAM

## 2022-01-01 PROCEDURE — 83880 ASSAY OF NATRIURETIC PEPTIDE: CPT | Performed by: STUDENT IN AN ORGANIZED HEALTH CARE EDUCATION/TRAINING PROGRAM

## 2022-01-01 PROCEDURE — 97110 THERAPEUTIC EXERCISES: CPT | Mod: CQ

## 2022-01-01 PROCEDURE — 99223 1ST HOSP IP/OBS HIGH 75: CPT | Mod: AI,GC,, | Performed by: HOSPITALIST

## 2022-01-01 PROCEDURE — 80053 COMPREHEN METABOLIC PANEL: CPT | Performed by: NURSE PRACTITIONER

## 2022-01-01 PROCEDURE — 99999 PR PBB SHADOW E&M-EST. PATIENT-LVL IV: CPT | Mod: PBBFAC,,, | Performed by: COLON & RECTAL SURGERY

## 2022-01-01 PROCEDURE — 99291 CRITICAL CARE FIRST HOUR: CPT | Mod: 25

## 2022-01-01 PROCEDURE — 99285 EMERGENCY DEPT VISIT HI MDM: CPT | Mod: ,,, | Performed by: EMERGENCY MEDICINE

## 2022-01-01 PROCEDURE — 99999 PR PBB SHADOW E&M-EST. PATIENT-LVL III: CPT | Mod: PBBFAC,GC,, | Performed by: STUDENT IN AN ORGANIZED HEALTH CARE EDUCATION/TRAINING PROGRAM

## 2022-01-01 PROCEDURE — 80048 BASIC METABOLIC PNL TOTAL CA: CPT

## 2022-01-01 PROCEDURE — 87088 URINE BACTERIA CULTURE: CPT | Performed by: HOSPITALIST

## 2022-01-01 PROCEDURE — 80307 DRUG TEST PRSMV CHEM ANLYZR: CPT | Performed by: STUDENT IN AN ORGANIZED HEALTH CARE EDUCATION/TRAINING PROGRAM

## 2022-01-01 PROCEDURE — 99213 OFFICE O/P EST LOW 20 MIN: CPT | Mod: PBBFAC | Performed by: INTERNAL MEDICINE

## 2022-01-01 PROCEDURE — 82140 ASSAY OF AMMONIA: CPT

## 2022-01-01 PROCEDURE — 83605 ASSAY OF LACTIC ACID: CPT | Mod: 91

## 2022-01-01 PROCEDURE — 99214 OFFICE O/P EST MOD 30 MIN: CPT | Mod: PBBFAC | Performed by: COLON & RECTAL SURGERY

## 2022-01-01 PROCEDURE — 83516 IMMUNOASSAY NONANTIBODY: CPT | Performed by: STUDENT IN AN ORGANIZED HEALTH CARE EDUCATION/TRAINING PROGRAM

## 2022-01-01 PROCEDURE — 99213 OFFICE O/P EST LOW 20 MIN: CPT | Mod: PBBFAC | Performed by: STUDENT IN AN ORGANIZED HEALTH CARE EDUCATION/TRAINING PROGRAM

## 2022-01-01 PROCEDURE — 99291 CRITICAL CARE FIRST HOUR: CPT | Mod: ,,, | Performed by: EMERGENCY MEDICINE

## 2022-01-01 PROCEDURE — 85610 PROTHROMBIN TIME: CPT | Performed by: STUDENT IN AN ORGANIZED HEALTH CARE EDUCATION/TRAINING PROGRAM

## 2022-01-01 PROCEDURE — 87077 CULTURE AEROBIC IDENTIFY: CPT | Performed by: EMERGENCY MEDICINE

## 2022-01-01 PROCEDURE — 81000 URINALYSIS NONAUTO W/SCOPE: CPT | Performed by: NURSE PRACTITIONER

## 2022-01-01 PROCEDURE — 85610 PROTHROMBIN TIME: CPT

## 2022-01-01 PROCEDURE — 81001 URINALYSIS AUTO W/SCOPE: CPT | Performed by: STUDENT IN AN ORGANIZED HEALTH CARE EDUCATION/TRAINING PROGRAM

## 2022-01-01 PROCEDURE — 80069 RENAL FUNCTION PANEL: CPT | Performed by: STUDENT IN AN ORGANIZED HEALTH CARE EDUCATION/TRAINING PROGRAM

## 2022-01-01 PROCEDURE — 99291 CRITICAL CARE FIRST HOUR: CPT | Mod: ,,, | Performed by: NURSE PRACTITIONER

## 2022-01-01 PROCEDURE — 87077 CULTURE AEROBIC IDENTIFY: CPT | Performed by: STUDENT IN AN ORGANIZED HEALTH CARE EDUCATION/TRAINING PROGRAM

## 2022-01-01 PROCEDURE — 99222 1ST HOSP IP/OBS MODERATE 55: CPT | Mod: ,,, | Performed by: PSYCHIATRY & NEUROLOGY

## 2022-01-01 PROCEDURE — 99231 PR SUBSEQUENT HOSPITAL CARE,LEVL I: ICD-10-PCS | Mod: GC,,, | Performed by: HOSPITALIST

## 2022-01-01 PROCEDURE — 84300 ASSAY OF URINE SODIUM: CPT

## 2022-01-01 PROCEDURE — 94760 N-INVAS EAR/PLS OXIMETRY 1: CPT

## 2022-01-01 PROCEDURE — 87186 SC STD MICRODIL/AGAR DIL: CPT | Mod: 59 | Performed by: STUDENT IN AN ORGANIZED HEALTH CARE EDUCATION/TRAINING PROGRAM

## 2022-01-01 PROCEDURE — 82436 ASSAY OF URINE CHLORIDE: CPT

## 2022-01-01 PROCEDURE — 99220 PR INITIAL OBSERVATION CARE,LEVL III: CPT | Mod: GC,,, | Performed by: HOSPITALIST

## 2022-01-01 PROCEDURE — 87186 SC STD MICRODIL/AGAR DIL: CPT | Performed by: NURSE PRACTITIONER

## 2022-01-01 PROCEDURE — 85025 COMPLETE CBC W/AUTO DIFF WBC: CPT | Performed by: EMERGENCY MEDICINE

## 2022-01-01 PROCEDURE — 81001 URINALYSIS AUTO W/SCOPE: CPT | Performed by: HOSPITALIST

## 2022-01-01 PROCEDURE — 94640 AIRWAY INHALATION TREATMENT: CPT

## 2022-01-01 PROCEDURE — 30200315 PPD INTRADERMAL TEST REV CODE 302: Performed by: HOSPITALIST

## 2022-01-01 PROCEDURE — 87329 GIARDIA AG IA: CPT | Performed by: EMERGENCY MEDICINE

## 2022-01-01 PROCEDURE — 99285 PR EMERGENCY DEPT VISIT,LEVEL V: ICD-10-PCS | Mod: CS,,, | Performed by: EMERGENCY MEDICINE

## 2022-01-01 PROCEDURE — 99999 PR PBB SHADOW E&M-EST. PATIENT-LVL III: CPT | Mod: PBBFAC,,, | Performed by: INTERNAL MEDICINE

## 2022-01-01 PROCEDURE — 85652 RBC SED RATE AUTOMATED: CPT | Performed by: HOSPITALIST

## 2022-01-01 PROCEDURE — 85027 COMPLETE CBC AUTOMATED: CPT | Performed by: STUDENT IN AN ORGANIZED HEALTH CARE EDUCATION/TRAINING PROGRAM

## 2022-01-01 PROCEDURE — 93010 EKG 12-LEAD: ICD-10-PCS | Mod: ,,, | Performed by: INTERNAL MEDICINE

## 2022-01-01 PROCEDURE — 83690 ASSAY OF LIPASE: CPT | Performed by: STUDENT IN AN ORGANIZED HEALTH CARE EDUCATION/TRAINING PROGRAM

## 2022-01-01 PROCEDURE — 25000242 PHARM REV CODE 250 ALT 637 W/ HCPCS

## 2022-01-01 PROCEDURE — A4216 STERILE WATER/SALINE, 10 ML: HCPCS | Performed by: HOSPITALIST

## 2022-01-01 PROCEDURE — 83605 ASSAY OF LACTIC ACID: CPT

## 2022-01-01 PROCEDURE — 99222 1ST HOSP IP/OBS MODERATE 55: CPT | Mod: GC,,, | Performed by: NEUROLOGICAL SURGERY

## 2022-01-01 PROCEDURE — 99204 OFFICE O/P NEW MOD 45 MIN: CPT | Mod: S$PBB,,, | Performed by: COLON & RECTAL SURGERY

## 2022-01-01 PROCEDURE — 87077 CULTURE AEROBIC IDENTIFY: CPT | Performed by: NURSE PRACTITIONER

## 2022-01-01 PROCEDURE — 82570 ASSAY OF URINE CREATININE: CPT

## 2022-01-01 PROCEDURE — 90945 DIALYSIS ONE EVALUATION: CPT

## 2022-01-01 PROCEDURE — 83970 ASSAY OF PARATHORMONE: CPT | Performed by: STUDENT IN AN ORGANIZED HEALTH CARE EDUCATION/TRAINING PROGRAM

## 2022-01-01 PROCEDURE — 36620 ARTERIAL LINE: ICD-10-PCS | Mod: ,,, | Performed by: STUDENT IN AN ORGANIZED HEALTH CARE EDUCATION/TRAINING PROGRAM

## 2022-01-01 PROCEDURE — 37799 UNLISTED PX VASCULAR SURGERY: CPT

## 2022-01-01 PROCEDURE — 86235 NUCLEAR ANTIGEN ANTIBODY: CPT | Performed by: STUDENT IN AN ORGANIZED HEALTH CARE EDUCATION/TRAINING PROGRAM

## 2022-01-01 PROCEDURE — 82800 BLOOD PH: CPT

## 2022-01-01 PROCEDURE — 96360 HYDRATION IV INFUSION INIT: CPT

## 2022-01-01 PROCEDURE — 97110 THERAPEUTIC EXERCISES: CPT

## 2022-01-01 PROCEDURE — 27000190 HC CPAP FULL FACE MASK W/VALVE

## 2022-01-01 PROCEDURE — 84100 ASSAY OF PHOSPHORUS: CPT | Performed by: NURSE PRACTITIONER

## 2022-01-01 PROCEDURE — 99233 SBSQ HOSP IP/OBS HIGH 50: CPT | Mod: ,,, | Performed by: INTERNAL MEDICINE

## 2022-01-01 PROCEDURE — 87077 CULTURE AEROBIC IDENTIFY: CPT | Performed by: HOSPITALIST

## 2022-01-01 PROCEDURE — 84439 ASSAY OF FREE THYROXINE: CPT | Performed by: HOSPITALIST

## 2022-01-01 PROCEDURE — 86140 C-REACTIVE PROTEIN: CPT | Performed by: INTERNAL MEDICINE

## 2022-01-01 PROCEDURE — 80053 COMPREHEN METABOLIC PANEL: CPT | Performed by: INTERNAL MEDICINE

## 2022-01-01 PROCEDURE — 87086 URINE CULTURE/COLONY COUNT: CPT | Performed by: HOSPITALIST

## 2022-01-01 PROCEDURE — 99204 PR OFFICE/OUTPT VISIT, NEW, LEVL IV, 45-59 MIN: ICD-10-PCS | Mod: S$PBB,,, | Performed by: COLON & RECTAL SURGERY

## 2022-01-01 PROCEDURE — 86140 C-REACTIVE PROTEIN: CPT | Performed by: HOSPITALIST

## 2022-01-01 PROCEDURE — 87086 URINE CULTURE/COLONY COUNT: CPT | Performed by: NURSE PRACTITIONER

## 2022-01-01 PROCEDURE — 87338 HPYLORI STOOL AG IA: CPT | Performed by: STUDENT IN AN ORGANIZED HEALTH CARE EDUCATION/TRAINING PROGRAM

## 2022-01-01 PROCEDURE — 96372 THER/PROPH/DIAG INJ SC/IM: CPT | Performed by: STUDENT IN AN ORGANIZED HEALTH CARE EDUCATION/TRAINING PROGRAM

## 2022-01-01 RX ORDER — LOPERAMIDE HYDROCHLORIDE 2 MG/1
2 CAPSULE ORAL 4 TIMES DAILY
Status: DISCONTINUED | OUTPATIENT
Start: 2022-01-01 | End: 2022-01-01

## 2022-01-01 RX ORDER — ERGOCALCIFEROL 1.25 MG/1
50000 CAPSULE ORAL
Status: DISCONTINUED | OUTPATIENT
Start: 2022-08-25 | End: 2022-01-01

## 2022-01-01 RX ORDER — DOXYLAMINE SUCCINATE 25 MG
TABLET ORAL 2 TIMES DAILY
Status: DISCONTINUED | OUTPATIENT
Start: 2022-01-01 | End: 2022-01-01

## 2022-01-01 RX ORDER — SPIRONOLACTONE 25 MG/1
50 TABLET ORAL DAILY
Status: DISCONTINUED | OUTPATIENT
Start: 2022-01-01 | End: 2022-01-01 | Stop reason: HOSPADM

## 2022-01-01 RX ORDER — IBUPROFEN 200 MG
24 TABLET ORAL
Status: DISCONTINUED | OUTPATIENT
Start: 2022-01-01 | End: 2022-01-01 | Stop reason: HOSPADM

## 2022-01-01 RX ORDER — SODIUM CHLORIDE 0.9 % (FLUSH) 0.9 %
10 SYRINGE (ML) INJECTION EVERY 12 HOURS PRN
Status: DISCONTINUED | OUTPATIENT
Start: 2022-01-01 | End: 2022-01-01 | Stop reason: HOSPADM

## 2022-01-01 RX ORDER — PREDNISONE 2.5 MG/1
5 TABLET ORAL DAILY PRN
Status: DISCONTINUED | OUTPATIENT
Start: 2022-01-01 | End: 2022-01-01

## 2022-01-01 RX ORDER — MICONAZOLE NITRATE 2 %
POWDER (GRAM) TOPICAL 2 TIMES DAILY
Status: DISCONTINUED | OUTPATIENT
Start: 2022-01-01 | End: 2022-01-01 | Stop reason: HOSPADM

## 2022-01-01 RX ORDER — POTASSIUM CHLORIDE 20 MEQ/1
40 TABLET, EXTENDED RELEASE ORAL ONCE
Status: COMPLETED | OUTPATIENT
Start: 2022-01-01 | End: 2022-01-01

## 2022-01-01 RX ORDER — LORAZEPAM 2 MG/ML
1 INJECTION INTRAMUSCULAR ONCE AS NEEDED
Status: DISCONTINUED | OUTPATIENT
Start: 2022-01-01 | End: 2022-01-01

## 2022-01-01 RX ORDER — SODIUM CHLORIDE 0.9 % (FLUSH) 0.9 %
10 SYRINGE (ML) INJECTION
Status: DISCONTINUED | OUTPATIENT
Start: 2022-01-01 | End: 2022-01-01

## 2022-01-01 RX ORDER — LACTULOSE 10 G/15ML
20 SOLUTION ORAL DAILY
Refills: 1 | Status: DISCONTINUED | OUTPATIENT
Start: 2022-01-01 | End: 2022-01-01

## 2022-01-01 RX ORDER — ENOXAPARIN SODIUM 100 MG/ML
40 INJECTION SUBCUTANEOUS EVERY 24 HOURS
Status: DISCONTINUED | OUTPATIENT
Start: 2022-01-01 | End: 2022-01-01 | Stop reason: HOSPADM

## 2022-01-01 RX ORDER — INDOMETHACIN 25 MG/1
CAPSULE ORAL
Status: COMPLETED
Start: 2022-01-01 | End: 2022-01-01

## 2022-01-01 RX ORDER — CHOLESTYRAMINE 4 G/9G
1 POWDER, FOR SUSPENSION ORAL DAILY
Qty: 90 PACKET | Refills: 3 | Status: ON HOLD | OUTPATIENT
Start: 2022-01-01 | End: 2022-01-01 | Stop reason: HOSPADM

## 2022-01-01 RX ORDER — AMOXICILLIN 250 MG
1 CAPSULE ORAL 2 TIMES DAILY PRN
Status: DISCONTINUED | OUTPATIENT
Start: 2022-01-01 | End: 2022-01-01

## 2022-01-01 RX ORDER — SODIUM BICARBONATE 650 MG/1
650 TABLET ORAL 2 TIMES DAILY
Status: DISCONTINUED | OUTPATIENT
Start: 2022-01-01 | End: 2022-01-01

## 2022-01-01 RX ORDER — ONDANSETRON 2 MG/ML
4 INJECTION INTRAMUSCULAR; INTRAVENOUS ONCE AS NEEDED
Status: COMPLETED | OUTPATIENT
Start: 2022-01-01 | End: 2022-01-01

## 2022-01-01 RX ORDER — LACTULOSE 10 G/15ML
15 SOLUTION ORAL 2 TIMES DAILY
Status: DISCONTINUED | OUTPATIENT
Start: 2022-01-01 | End: 2022-01-01 | Stop reason: HOSPADM

## 2022-01-01 RX ORDER — DIAZEPAM 10 MG/2ML
10 INJECTION INTRAMUSCULAR
Status: COMPLETED | OUTPATIENT
Start: 2022-01-01 | End: 2022-01-01

## 2022-01-01 RX ORDER — SODIUM CHLORIDE, SODIUM LACTATE, POTASSIUM CHLORIDE, CALCIUM CHLORIDE 600; 310; 30; 20 MG/100ML; MG/100ML; MG/100ML; MG/100ML
INJECTION, SOLUTION INTRAVENOUS CONTINUOUS
Status: ACTIVE | OUTPATIENT
Start: 2022-01-01 | End: 2022-01-01

## 2022-01-01 RX ORDER — BISMUTH SUBSALICYLATE 525 MG/30ML
30 LIQUID ORAL ONCE
Status: DISCONTINUED | OUTPATIENT
Start: 2022-01-01 | End: 2022-01-01 | Stop reason: HOSPADM

## 2022-01-01 RX ORDER — SODIUM CHLORIDE 0.9 % (FLUSH) 0.9 %
10 SYRINGE (ML) INJECTION EVERY 12 HOURS PRN
Status: DISCONTINUED | OUTPATIENT
Start: 2022-01-01 | End: 2022-01-01

## 2022-01-01 RX ORDER — PHENYLEPHRINE HCL IN 0.9% NACL 1 MG/10 ML
SYRINGE (ML) INTRAVENOUS
Status: COMPLETED
Start: 2022-01-01 | End: 2022-01-01

## 2022-01-01 RX ORDER — SODIUM CHLORIDE 9 MG/ML
INJECTION, SOLUTION INTRAVENOUS CONTINUOUS
Status: ACTIVE | OUTPATIENT
Start: 2022-01-01 | End: 2022-01-01

## 2022-01-01 RX ORDER — LACTULOSE 10 G/15ML
200 SOLUTION ORAL; RECTAL 3 TIMES DAILY
Status: DISCONTINUED | OUTPATIENT
Start: 2022-01-01 | End: 2022-01-01

## 2022-01-01 RX ORDER — DICLOFENAC SODIUM 10 MG/G
2 GEL TOPICAL 3 TIMES DAILY
Qty: 2 G | Refills: 0 | Status: ON HOLD | OUTPATIENT
Start: 2022-01-01 | End: 2022-01-01

## 2022-01-01 RX ORDER — GLUCAGON 1 MG
1 KIT INJECTION
Status: DISCONTINUED | OUTPATIENT
Start: 2022-01-01 | End: 2022-01-01 | Stop reason: HOSPADM

## 2022-01-01 RX ORDER — ACETAMINOPHEN 325 MG/1
650 TABLET ORAL ONCE
Status: COMPLETED | OUTPATIENT
Start: 2022-01-01 | End: 2022-01-01

## 2022-01-01 RX ORDER — LACTULOSE 10 G/15ML
15 SOLUTION ORAL 3 TIMES DAILY
Status: DISCONTINUED | OUTPATIENT
Start: 2022-01-01 | End: 2022-01-01

## 2022-01-01 RX ORDER — OXYCODONE HCL 5 MG/5 ML
5 SOLUTION, ORAL ORAL EVERY 6 HOURS PRN
Status: DISCONTINUED | OUTPATIENT
Start: 2022-01-01 | End: 2022-01-01

## 2022-01-01 RX ORDER — DICLOFENAC SODIUM 10 MG/G
4 GEL TOPICAL EVERY 8 HOURS PRN
Status: DISCONTINUED | OUTPATIENT
Start: 2022-01-01 | End: 2022-01-01 | Stop reason: HOSPADM

## 2022-01-01 RX ORDER — LACTULOSE 10 G/15ML
10 SOLUTION ORAL
Status: COMPLETED | OUTPATIENT
Start: 2022-01-01 | End: 2022-01-01

## 2022-01-01 RX ORDER — MORPHINE SULFATE 2 MG/ML
2 INJECTION, SOLUTION INTRAMUSCULAR; INTRAVENOUS
Status: DISCONTINUED | OUTPATIENT
Start: 2022-01-01 | End: 2022-01-01

## 2022-01-01 RX ORDER — NOREPINEPHRINE BITARTRATE/D5W 4MG/250ML
0-3 PLASTIC BAG, INJECTION (ML) INTRAVENOUS CONTINUOUS
Status: DISCONTINUED | OUTPATIENT
Start: 2022-01-01 | End: 2022-01-01

## 2022-01-01 RX ORDER — AMLODIPINE BESYLATE 5 MG/1
5 TABLET ORAL DAILY
Status: DISCONTINUED | OUTPATIENT
Start: 2022-01-01 | End: 2022-01-01 | Stop reason: HOSPADM

## 2022-01-01 RX ORDER — CEPHALEXIN 500 MG/1
500 CAPSULE ORAL EVERY 12 HOURS
Qty: 14 CAPSULE | Refills: 0 | Status: SHIPPED | OUTPATIENT
Start: 2022-01-01 | End: 2022-01-01

## 2022-01-01 RX ORDER — LOPERAMIDE HYDROCHLORIDE 2 MG/1
2 CAPSULE ORAL 3 TIMES DAILY
Qty: 90 CAPSULE | Refills: 2 | Status: SHIPPED | OUTPATIENT
Start: 2022-01-01 | End: 2022-01-01 | Stop reason: HOSPADM

## 2022-01-01 RX ORDER — ALBUTEROL SULFATE 2.5 MG/.5ML
10 SOLUTION RESPIRATORY (INHALATION) ONCE
Status: COMPLETED | OUTPATIENT
Start: 2022-01-01 | End: 2022-01-01

## 2022-01-01 RX ORDER — SODIUM,POTASSIUM PHOSPHATES 280-250MG
2 POWDER IN PACKET (EA) ORAL ONCE
Status: COMPLETED | OUTPATIENT
Start: 2022-01-01 | End: 2022-01-01

## 2022-01-01 RX ORDER — DROPERIDOL 2.5 MG/ML
1.25 INJECTION, SOLUTION INTRAMUSCULAR; INTRAVENOUS ONCE
Status: DISCONTINUED | OUTPATIENT
Start: 2022-01-01 | End: 2022-01-01

## 2022-01-01 RX ORDER — MAGNESIUM SULFATE HEPTAHYDRATE 40 MG/ML
2 INJECTION, SOLUTION INTRAVENOUS ONCE
Status: COMPLETED | OUTPATIENT
Start: 2022-01-01 | End: 2022-01-01

## 2022-01-01 RX ORDER — AMOXICILLIN 250 MG
1 CAPSULE ORAL 2 TIMES DAILY
Status: DISCONTINUED | OUTPATIENT
Start: 2022-01-01 | End: 2022-01-01

## 2022-01-01 RX ORDER — HALOPERIDOL 5 MG/ML
2.5 INJECTION INTRAMUSCULAR ONCE
Status: COMPLETED | OUTPATIENT
Start: 2022-01-01 | End: 2022-01-01

## 2022-01-01 RX ORDER — VIT C/E/ZN/COPPR/LUTEIN/ZEAXAN 250MG-90MG
5000 CAPSULE ORAL DAILY
Qty: 450 CAPSULE | Refills: 1 | Status: SHIPPED | OUTPATIENT
Start: 2022-01-01 | End: 2022-01-01

## 2022-01-01 RX ORDER — IBUPROFEN 200 MG
16 TABLET ORAL
Status: DISCONTINUED | OUTPATIENT
Start: 2022-01-01 | End: 2022-01-01

## 2022-01-01 RX ORDER — FUROSEMIDE 10 MG/ML
120 INJECTION INTRAMUSCULAR; INTRAVENOUS ONCE
Status: DISCONTINUED | OUTPATIENT
Start: 2022-01-01 | End: 2022-01-01

## 2022-01-01 RX ORDER — IBUPROFEN 200 MG
24 TABLET ORAL
Status: DISCONTINUED | OUTPATIENT
Start: 2022-01-01 | End: 2022-01-01

## 2022-01-01 RX ORDER — LOPERAMIDE HYDROCHLORIDE 2 MG/1
2 CAPSULE ORAL 4 TIMES DAILY PRN
Status: DISCONTINUED | OUTPATIENT
Start: 2022-01-01 | End: 2022-01-01

## 2022-01-01 RX ORDER — LIDOCAINE 50 MG/G
1 PATCH TOPICAL
Status: ON HOLD | COMMUNITY
End: 2022-01-01

## 2022-01-01 RX ORDER — LACTULOSE 20 G/20G
20 POWDER, FOR SOLUTION ORAL DAILY PRN
Qty: 30 PACKET | Refills: 1 | Status: ON HOLD | OUTPATIENT
Start: 2022-01-01 | End: 2022-01-01

## 2022-01-01 RX ORDER — ACETAMINOPHEN 500 MG
500 TABLET ORAL 2 TIMES DAILY PRN
Status: ON HOLD | COMMUNITY
End: 2022-01-01

## 2022-01-01 RX ORDER — MIDAZOLAM HYDROCHLORIDE 1 MG/ML
INJECTION INTRAMUSCULAR; INTRAVENOUS
Status: DISCONTINUED
Start: 2022-01-01 | End: 2022-01-01 | Stop reason: HOSPADM

## 2022-01-01 RX ORDER — ERGOCALCIFEROL 1.25 MG/1
50000 CAPSULE ORAL
Status: ON HOLD | COMMUNITY
End: 2022-01-01

## 2022-01-01 RX ORDER — MORPHINE SULFATE 2 MG/ML
4 INJECTION, SOLUTION INTRAMUSCULAR; INTRAVENOUS
Status: DISCONTINUED | OUTPATIENT
Start: 2022-01-01 | End: 2022-01-01 | Stop reason: HOSPADM

## 2022-01-01 RX ORDER — GLUCAGON 1 MG
1 KIT INJECTION
Status: DISCONTINUED | OUTPATIENT
Start: 2022-01-01 | End: 2022-01-01

## 2022-01-01 RX ORDER — LORAZEPAM 0.5 MG/1
1 TABLET ORAL EVERY 30 MIN PRN
Status: DISCONTINUED | OUTPATIENT
Start: 2022-01-01 | End: 2022-01-01 | Stop reason: HOSPADM

## 2022-01-01 RX ORDER — MAGNESIUM SULFATE HEPTAHYDRATE 40 MG/ML
2 INJECTION, SOLUTION INTRAVENOUS
Status: DISCONTINUED | OUTPATIENT
Start: 2022-01-01 | End: 2022-01-01

## 2022-01-01 RX ORDER — MICONAZOLE NITRATE 2 %
POWDER (GRAM) TOPICAL 2 TIMES DAILY
Status: DISCONTINUED | OUTPATIENT
Start: 2022-01-01 | End: 2022-01-01

## 2022-01-01 RX ORDER — LOPERAMIDE HYDROCHLORIDE 2 MG/1
2 CAPSULE ORAL 3 TIMES DAILY
Qty: 120 CAPSULE | Refills: 3 | Status: SHIPPED | OUTPATIENT
Start: 2022-01-01 | End: 2022-01-01

## 2022-01-01 RX ORDER — TALC
6 POWDER (GRAM) TOPICAL NIGHTLY PRN
Status: DISCONTINUED | OUTPATIENT
Start: 2022-01-01 | End: 2022-01-01

## 2022-01-01 RX ORDER — NOREPINEPHRINE BITARTRATE/D5W 4MG/250ML
PLASTIC BAG, INJECTION (ML) INTRAVENOUS
Status: COMPLETED
Start: 2022-01-01 | End: 2022-01-01

## 2022-01-01 RX ORDER — DICLOFENAC SODIUM 10 MG/G
2 GEL TOPICAL 2 TIMES DAILY PRN
Status: DISCONTINUED | OUTPATIENT
Start: 2022-01-01 | End: 2022-01-01 | Stop reason: HOSPADM

## 2022-01-01 RX ORDER — ACETAMINOPHEN 325 MG/1
650 TABLET ORAL EVERY 8 HOURS PRN
Status: DISCONTINUED | OUTPATIENT
Start: 2022-01-01 | End: 2022-01-01 | Stop reason: HOSPADM

## 2022-01-01 RX ORDER — CHOLESTYRAMINE 4 G/9G
1 POWDER, FOR SUSPENSION ORAL DAILY
Status: DISCONTINUED | OUTPATIENT
Start: 2022-01-01 | End: 2022-01-01 | Stop reason: HOSPADM

## 2022-01-01 RX ORDER — MICONAZOLE NITRATE 2 %
POWDER (GRAM) TOPICAL 2 TIMES DAILY
Qty: 85 G | Refills: 1 | Status: ON HOLD
Start: 2022-01-01 | End: 2022-01-01

## 2022-01-01 RX ORDER — LACTULOSE 10 G/15ML
30 SOLUTION ORAL; RECTAL 2 TIMES DAILY
Qty: 2700 ML | Refills: 2 | Status: ON HOLD
Start: 2022-01-01 | End: 2022-01-01

## 2022-01-01 RX ORDER — IBUPROFEN 200 MG
16 TABLET ORAL
Status: DISCONTINUED | OUTPATIENT
Start: 2022-01-01 | End: 2022-01-01 | Stop reason: HOSPADM

## 2022-01-01 RX ORDER — DICLOFENAC SODIUM 10 MG/G
2 GEL TOPICAL DAILY
Status: DISCONTINUED | OUTPATIENT
Start: 2022-01-01 | End: 2022-01-01

## 2022-01-01 RX ORDER — AMLODIPINE BESYLATE 5 MG/1
5 TABLET ORAL DAILY
Status: ON HOLD | COMMUNITY
End: 2022-01-01

## 2022-01-01 RX ORDER — MORPHINE SULFATE 2 MG/ML
2 INJECTION, SOLUTION INTRAMUSCULAR; INTRAVENOUS
Status: COMPLETED | OUTPATIENT
Start: 2022-01-01 | End: 2022-01-01

## 2022-01-01 RX ORDER — MORPHINE SULFATE 2 MG/ML
INJECTION, SOLUTION INTRAMUSCULAR; INTRAVENOUS
Status: DISCONTINUED
Start: 2022-01-01 | End: 2022-01-01 | Stop reason: HOSPADM

## 2022-01-01 RX ORDER — NALOXONE HCL 0.4 MG/ML
0.02 VIAL (ML) INJECTION
Status: DISCONTINUED | OUTPATIENT
Start: 2022-01-01 | End: 2022-01-01 | Stop reason: HOSPADM

## 2022-01-01 RX ORDER — HEPARIN SODIUM 5000 [USP'U]/ML
5000 INJECTION, SOLUTION INTRAVENOUS; SUBCUTANEOUS EVERY 8 HOURS
Status: DISCONTINUED | OUTPATIENT
Start: 2022-01-01 | End: 2022-01-01

## 2022-01-01 RX ORDER — LACTULOSE 10 G/15ML
30 SOLUTION ORAL 2 TIMES DAILY
Status: DISCONTINUED | OUTPATIENT
Start: 2022-01-01 | End: 2022-01-01

## 2022-01-01 RX ORDER — LACTULOSE 10 G/15ML
30 SOLUTION ORAL 3 TIMES DAILY
Status: DISCONTINUED | OUTPATIENT
Start: 2022-01-01 | End: 2022-01-01

## 2022-01-01 RX ORDER — SODIUM CHLORIDE 0.9 % (FLUSH) 0.9 %
10 SYRINGE (ML) INJECTION EVERY 6 HOURS
Status: DISCONTINUED | OUTPATIENT
Start: 2022-01-01 | End: 2022-01-01

## 2022-01-01 RX ORDER — LIDOCAINE 50 MG/G
1 PATCH TOPICAL
Status: DISCONTINUED | OUTPATIENT
Start: 2022-01-01 | End: 2022-01-01 | Stop reason: HOSPADM

## 2022-01-01 RX ORDER — HYDROCODONE BITARTRATE AND ACETAMINOPHEN 500; 5 MG/1; MG/1
TABLET ORAL CONTINUOUS
Status: DISCONTINUED | OUTPATIENT
Start: 2022-01-01 | End: 2022-01-01

## 2022-01-01 RX ORDER — CALCIUM CARBONATE 200(500)MG
500 TABLET,CHEWABLE ORAL 3 TIMES DAILY
Status: DISCONTINUED | OUTPATIENT
Start: 2022-01-01 | End: 2022-01-01

## 2022-01-01 RX ORDER — LIDOCAINE 50 MG/G
3 PATCH TOPICAL DAILY PRN
Status: DISCONTINUED | OUTPATIENT
Start: 2022-01-01 | End: 2022-01-01 | Stop reason: HOSPADM

## 2022-01-01 RX ORDER — FLUCONAZOLE 150 MG/1
150 TABLET ORAL DAILY
Qty: 2 TABLET | Refills: 0 | Status: SHIPPED | OUTPATIENT
Start: 2022-01-01 | End: 2022-01-01

## 2022-01-01 RX ORDER — DIPHENOXYLATE HYDROCHLORIDE AND ATROPINE SULFATE 2.5; .025 MG/1; MG/1
1 TABLET ORAL 4 TIMES DAILY PRN
Qty: 12 TABLET | Refills: 0 | Status: CANCELLED | OUTPATIENT
Start: 2022-01-01 | End: 2022-01-01

## 2022-01-01 RX ORDER — LACTULOSE 10 G/15ML
20 SOLUTION ORAL 3 TIMES DAILY
Status: DISCONTINUED | OUTPATIENT
Start: 2022-01-01 | End: 2022-01-01

## 2022-01-01 RX ORDER — LANOLIN ALCOHOL/MO/W.PET/CERES
400 CREAM (GRAM) TOPICAL ONCE
Status: COMPLETED | OUTPATIENT
Start: 2022-01-01 | End: 2022-01-01

## 2022-01-01 RX ORDER — LIDOCAINE 50 MG/G
1 PATCH TOPICAL DAILY
Qty: 30 PATCH | Refills: 11 | Status: SHIPPED | OUTPATIENT
Start: 2022-01-01 | End: 2022-01-01 | Stop reason: HOSPADM

## 2022-01-01 RX ORDER — NALOXONE HCL 0.4 MG/ML
0.02 VIAL (ML) INJECTION
Status: DISCONTINUED | OUTPATIENT
Start: 2022-01-01 | End: 2022-01-01

## 2022-01-01 RX ORDER — HALOPERIDOL 5 MG/ML
INJECTION INTRAMUSCULAR
Status: COMPLETED
Start: 2022-01-01 | End: 2022-01-01

## 2022-01-01 RX ORDER — LOPERAMIDE HYDROCHLORIDE 2 MG/1
2 CAPSULE ORAL 3 TIMES DAILY
Status: DISCONTINUED | OUTPATIENT
Start: 2022-01-01 | End: 2022-01-01 | Stop reason: HOSPADM

## 2022-01-01 RX ORDER — HEPARIN SODIUM,PORCINE/D5W 25000/250
0-40 INTRAVENOUS SOLUTION INTRAVENOUS CONTINUOUS
Status: DISCONTINUED | OUTPATIENT
Start: 2022-01-01 | End: 2022-01-01

## 2022-01-01 RX ORDER — DIPHENOXYLATE HYDROCHLORIDE AND ATROPINE SULFATE 2.5; .025 MG/1; MG/1
1 TABLET ORAL 4 TIMES DAILY PRN
Qty: 12 TABLET | Refills: 0 | Status: SHIPPED | OUTPATIENT
Start: 2022-01-01 | End: 2022-01-01

## 2022-01-01 RX ORDER — LACTULOSE 10 G/15ML
30 SOLUTION ORAL; RECTAL 2 TIMES DAILY
Qty: 2700 ML | Refills: 2 | Status: SHIPPED | OUTPATIENT
Start: 2022-01-01 | End: 2022-01-01 | Stop reason: SDUPTHER

## 2022-01-01 RX ORDER — INDOMETHACIN 25 MG/1
50 CAPSULE ORAL ONCE
Status: DISCONTINUED | OUTPATIENT
Start: 2022-01-01 | End: 2022-01-01

## 2022-01-01 RX ORDER — LIDOCAINE 50 MG/G
1 PATCH TOPICAL
Status: DISCONTINUED | OUTPATIENT
Start: 2022-01-01 | End: 2022-01-01

## 2022-01-01 RX ORDER — MICONAZOLE NITRATE 2 %
POWDER (GRAM) TOPICAL 2 TIMES DAILY
Qty: 85 G | Refills: 1 | Status: SHIPPED | OUTPATIENT
Start: 2022-01-01 | End: 2022-01-01 | Stop reason: SDUPTHER

## 2022-01-01 RX ORDER — CHOLECALCIFEROL (VITAMIN D3) 25 MCG
1000 TABLET ORAL DAILY
Status: DISCONTINUED | OUTPATIENT
Start: 2022-01-01 | End: 2022-01-01 | Stop reason: HOSPADM

## 2022-01-01 RX ADMIN — MAGNESIUM SULFATE HEPTAHYDRATE 2 G: 40 INJECTION, SOLUTION INTRAVENOUS at 09:06

## 2022-01-01 RX ADMIN — RIFAXIMIN 550 MG: 550 TABLET ORAL at 09:08

## 2022-01-01 RX ADMIN — DICLOFENAC SODIUM 2 G: 10 GEL TOPICAL at 09:06

## 2022-01-01 RX ADMIN — ENOXAPARIN SODIUM 40 MG: 100 INJECTION SUBCUTANEOUS at 05:07

## 2022-01-01 RX ADMIN — MAGNESIUM SULFATE HEPTAHYDRATE 2 G: 40 INJECTION, SOLUTION INTRAVENOUS at 03:06

## 2022-01-01 RX ADMIN — SODIUM CHLORIDE, SODIUM LACTATE, POTASSIUM CHLORIDE, AND CALCIUM CHLORIDE 1000 ML: .6; .31; .03; .02 INJECTION, SOLUTION INTRAVENOUS at 11:07

## 2022-01-01 RX ADMIN — MICONAZOLE NITRATE: 20 POWDER TOPICAL at 09:07

## 2022-01-01 RX ADMIN — ACETAMINOPHEN 650 MG: 325 TABLET ORAL at 09:06

## 2022-01-01 RX ADMIN — AMLODIPINE BESYLATE 5 MG: 5 TABLET ORAL at 08:07

## 2022-01-01 RX ADMIN — SPIRONOLACTONE 50 MG: 25 TABLET, FILM COATED ORAL at 08:06

## 2022-01-01 RX ADMIN — ONDANSETRON 4 MG: 2 INJECTION INTRAMUSCULAR; INTRAVENOUS at 05:07

## 2022-01-01 RX ADMIN — NOREPINEPHRINE BITARTRATE 0.02 MCG/KG/MIN: 4 INJECTION, SOLUTION INTRAVENOUS at 11:08

## 2022-01-01 RX ADMIN — MICONAZOLE NITRATE: 20 CREAM TOPICAL at 10:07

## 2022-01-01 RX ADMIN — MORPHINE SULFATE 2 MG: 2 INJECTION, SOLUTION INTRAMUSCULAR; INTRAVENOUS at 02:08

## 2022-01-01 RX ADMIN — SODIUM CHLORIDE 1000 ML: 0.9 INJECTION, SOLUTION INTRAVENOUS at 08:06

## 2022-01-01 RX ADMIN — SODIUM CHLORIDE: 9 INJECTION, SOLUTION INTRAVENOUS at 12:08

## 2022-01-01 RX ADMIN — MICONAZOLE NITRATE: 20 POWDER TOPICAL at 08:07

## 2022-01-01 RX ADMIN — ACETAMINOPHEN 650 MG: 325 TABLET ORAL at 11:07

## 2022-01-01 RX ADMIN — LACTULOSE 200 G: 10 SOLUTION ORAL at 07:07

## 2022-01-01 RX ADMIN — NOREPINEPHRINE BITARTRATE 0.35 MCG/KG/MIN: 1 INJECTION, SOLUTION, CONCENTRATE INTRAVENOUS at 02:08

## 2022-01-01 RX ADMIN — SODIUM BICARBONATE 50 MEQ: 84 INJECTION, SOLUTION INTRAVENOUS at 10:08

## 2022-01-01 RX ADMIN — Medication 10 ML: at 11:08

## 2022-01-01 RX ADMIN — CHOLECALCIFEROL TAB 25 MCG (1000 UNIT) 1000 UNITS: 25 TAB at 08:06

## 2022-01-01 RX ADMIN — LACTULOSE 30 G: 20 SOLUTION ORAL at 08:07

## 2022-01-01 RX ADMIN — RIFAXIMIN 550 MG: 550 TABLET ORAL at 08:07

## 2022-01-01 RX ADMIN — VANCOMYCIN HYDROCHLORIDE 1750 MG: 500 INJECTION, POWDER, LYOPHILIZED, FOR SOLUTION INTRAVENOUS at 09:06

## 2022-01-01 RX ADMIN — SODIUM CHLORIDE 1000 ML: 0.9 INJECTION, SOLUTION INTRAVENOUS at 06:08

## 2022-01-01 RX ADMIN — SPIRONOLACTONE 50 MG: 25 TABLET, FILM COATED ORAL at 08:07

## 2022-01-01 RX ADMIN — MAGNESIUM SULFATE HEPTAHYDRATE 2 G: 40 INJECTION, SOLUTION INTRAVENOUS at 11:07

## 2022-01-01 RX ADMIN — HEPARIN SODIUM 5000 UNITS: 5000 INJECTION INTRAVENOUS; SUBCUTANEOUS at 01:08

## 2022-01-01 RX ADMIN — ALBUTEROL SULFATE 10 MG: 2.5 SOLUTION RESPIRATORY (INHALATION) at 12:08

## 2022-01-01 RX ADMIN — SODIUM BICARBONATE 650 MG TABLET 650 MG: at 09:08

## 2022-01-01 RX ADMIN — RIFAXIMIN 550 MG: 550 TABLET ORAL at 09:07

## 2022-01-01 RX ADMIN — SODIUM BICARBONATE 50 MEQ: 84 INJECTION, SOLUTION INTRAVENOUS at 02:08

## 2022-01-01 RX ADMIN — SODIUM CHLORIDE 250 ML: 0.9 INJECTION, SOLUTION INTRAVENOUS at 09:06

## 2022-01-01 RX ADMIN — LIDOCAINE 1 PATCH: 50 PATCH CUTANEOUS at 10:06

## 2022-01-01 RX ADMIN — LACTULOSE 20 G: 20 SOLUTION ORAL at 09:08

## 2022-01-01 RX ADMIN — PIPERACILLIN SODIUM AND TAZOBACTAM SODIUM 4.5 G: 4; .5 INJECTION, POWDER, LYOPHILIZED, FOR SOLUTION INTRAVENOUS at 02:08

## 2022-01-01 RX ADMIN — CHOLESTYRAMINE 4 G: 4 POWDER, FOR SUSPENSION ORAL at 09:06

## 2022-01-01 RX ADMIN — THERA TABS 1 TABLET: TAB at 08:06

## 2022-01-01 RX ADMIN — PIPERACILLIN SODIUM AND TAZOBACTAM SODIUM 4.5 G: 4; .5 INJECTION, POWDER, LYOPHILIZED, FOR SOLUTION INTRAVENOUS at 06:08

## 2022-01-01 RX ADMIN — RIFAXIMIN 550 MG: 550 TABLET ORAL at 08:08

## 2022-01-01 RX ADMIN — CALCIUM CARBONATE (ANTACID) CHEW TAB 500 MG 500 MG: 500 CHEW TAB at 09:08

## 2022-01-01 RX ADMIN — PIPERACILLIN SODIUM AND TAZOBACTAM SODIUM 4.5 G: 4; .5 INJECTION, POWDER, LYOPHILIZED, FOR SOLUTION INTRAVENOUS at 01:08

## 2022-01-01 RX ADMIN — SODIUM CHLORIDE, SODIUM LACTATE, POTASSIUM CHLORIDE, AND CALCIUM CHLORIDE 500 ML: .6; .31; .03; .02 INJECTION, SOLUTION INTRAVENOUS at 09:08

## 2022-01-01 RX ADMIN — DIAZEPAM 10 MG: 5 INJECTION, SOLUTION INTRAMUSCULAR; INTRAVENOUS at 08:06

## 2022-01-01 RX ADMIN — SPIRONOLACTONE 50 MG: 25 TABLET, FILM COATED ORAL at 09:06

## 2022-01-01 RX ADMIN — THERA TABS 1 TABLET: TAB at 09:06

## 2022-01-01 RX ADMIN — MAGNESIUM SULFATE HEPTAHYDRATE 2 G: 40 INJECTION, SOLUTION INTRAVENOUS at 11:06

## 2022-01-01 RX ADMIN — CHOLESTYRAMINE 4 G: 4 POWDER, FOR SUSPENSION ORAL at 08:06

## 2022-01-01 RX ADMIN — POTASSIUM & SODIUM PHOSPHATES POWDER PACK 280-160-250 MG 2 PACKET: 280-160-250 PACK at 03:06

## 2022-01-01 RX ADMIN — LIDOCAINE 1 PATCH: 50 PATCH CUTANEOUS at 08:07

## 2022-01-01 RX ADMIN — Medication 10 ML: at 06:08

## 2022-01-01 RX ADMIN — HALOPERIDOL LACTATE 2.5 MG: 5 INJECTION, SOLUTION INTRAMUSCULAR at 11:08

## 2022-01-01 RX ADMIN — SODIUM CHLORIDE: 0.9 INJECTION, SOLUTION INTRAVENOUS at 02:07

## 2022-01-01 RX ADMIN — Medication 400 MG: at 04:06

## 2022-01-01 RX ADMIN — VASOPRESSIN 0.04 UNITS/MIN: 20 INJECTION INTRAVENOUS at 01:08

## 2022-01-01 RX ADMIN — SODIUM CHLORIDE: 0.9 INJECTION, SOLUTION INTRAVENOUS at 07:08

## 2022-01-01 RX ADMIN — LOPERAMIDE HYDROCHLORIDE 2 MG: 2 CAPSULE ORAL at 04:06

## 2022-01-01 RX ADMIN — VANCOMYCIN HYDROCHLORIDE 2000 MG: 100 INJECTION, POWDER, LYOPHILIZED, FOR SOLUTION INTRAVENOUS at 07:06

## 2022-01-01 RX ADMIN — SODIUM CHLORIDE 1000 ML: 9 INJECTION, SOLUTION INTRAVENOUS at 09:08

## 2022-01-01 RX ADMIN — PIPERACILLIN SODIUM AND TAZOBACTAM SODIUM 4.5 G: 4; .5 INJECTION, POWDER, LYOPHILIZED, FOR SOLUTION INTRAVENOUS at 05:08

## 2022-01-01 RX ADMIN — HEPARIN SODIUM 5000 UNITS: 5000 INJECTION INTRAVENOUS; SUBCUTANEOUS at 05:08

## 2022-01-01 RX ADMIN — MORPHINE SULFATE 2 MG: 2 INJECTION, SOLUTION INTRAMUSCULAR; INTRAVENOUS at 10:08

## 2022-01-01 RX ADMIN — Medication 0.02 MCG/KG/MIN: at 11:08

## 2022-01-01 RX ADMIN — HEPARIN SODIUM 18 UNITS/KG/HR: 5000 INJECTION INTRAVENOUS; SUBCUTANEOUS at 01:08

## 2022-01-01 RX ADMIN — RIFAXIMIN 550 MG: 550 TABLET ORAL at 10:07

## 2022-01-01 RX ADMIN — LACTULOSE 15 G: 20 SOLUTION ORAL at 08:07

## 2022-01-01 RX ADMIN — DICLOFENAC SODIUM 4 G: 10 GEL TOPICAL at 12:07

## 2022-01-01 RX ADMIN — DEXTROSE 250 ML: 10 SOLUTION INTRAVENOUS at 09:08

## 2022-01-01 RX ADMIN — PIPERACILLIN SODIUM AND TAZOBACTAM SODIUM 4.5 G: 4; .5 INJECTION, POWDER, LYOPHILIZED, FOR SOLUTION INTRAVENOUS at 09:08

## 2022-01-01 RX ADMIN — LACTULOSE 30 G: 20 SOLUTION ORAL at 03:07

## 2022-01-01 RX ADMIN — CHOLESTYRAMINE 4 G: 4 POWDER, FOR SUSPENSION ORAL at 04:06

## 2022-01-01 RX ADMIN — SPIRONOLACTONE 50 MG: 25 TABLET, FILM COATED ORAL at 09:07

## 2022-01-01 RX ADMIN — TUBERCULIN PURIFIED PROTEIN DERIVATIVE 5 UNITS: 5 INJECTION, SOLUTION INTRADERMAL at 10:06

## 2022-01-01 RX ADMIN — MICONAZOLE NITRATE: 20 CREAM TOPICAL at 02:07

## 2022-01-01 RX ADMIN — Medication 10 ML: at 12:08

## 2022-01-01 RX ADMIN — SODIUM CHLORIDE 250 ML: 0.9 INJECTION, SOLUTION INTRAVENOUS at 06:08

## 2022-01-01 RX ADMIN — AMLODIPINE BESYLATE 5 MG: 5 TABLET ORAL at 09:07

## 2022-01-01 RX ADMIN — ENOXAPARIN SODIUM 40 MG: 100 INJECTION SUBCUTANEOUS at 04:07

## 2022-01-01 RX ADMIN — VANCOMYCIN HYDROCHLORIDE 2000 MG: 500 INJECTION, POWDER, LYOPHILIZED, FOR SOLUTION INTRAVENOUS at 11:08

## 2022-01-01 RX ADMIN — IOHEXOL 100 ML: 350 INJECTION, SOLUTION INTRAVENOUS at 12:08

## 2022-01-01 RX ADMIN — CHOLECALCIFEROL TAB 25 MCG (1000 UNIT) 1000 UNITS: 25 TAB at 09:06

## 2022-01-01 RX ADMIN — MORPHINE SULFATE 4 MG: 2 INJECTION, SOLUTION INTRAMUSCULAR; INTRAVENOUS at 02:08

## 2022-01-01 RX ADMIN — HEPARIN SODIUM 5000 UNITS: 5000 INJECTION INTRAVENOUS; SUBCUTANEOUS at 02:08

## 2022-01-01 RX ADMIN — ACETAMINOPHEN 650 MG: 325 TABLET ORAL at 03:07

## 2022-01-01 RX ADMIN — SODIUM CHLORIDE, SODIUM LACTATE, POTASSIUM CHLORIDE, AND CALCIUM CHLORIDE 1000 ML: .6; .31; .03; .02 INJECTION, SOLUTION INTRAVENOUS at 06:06

## 2022-01-01 RX ADMIN — POTASSIUM & SODIUM PHOSPHATES POWDER PACK 280-160-250 MG 2 PACKET: 280-160-250 PACK at 09:06

## 2022-01-01 RX ADMIN — SODIUM BICARBONATE 50 MEQ: 84 INJECTION, SOLUTION INTRAVENOUS at 01:08

## 2022-01-01 RX ADMIN — HALOPERIDOL 2.5 MG: 5 INJECTION INTRAMUSCULAR at 11:08

## 2022-01-01 RX ADMIN — HEPARIN SODIUM 5000 UNITS: 5000 INJECTION INTRAVENOUS; SUBCUTANEOUS at 06:08

## 2022-01-01 RX ADMIN — LOPERAMIDE HYDROCHLORIDE 2 MG: 2 CAPSULE ORAL at 08:06

## 2022-01-01 RX ADMIN — VANCOMYCIN HYDROCHLORIDE 1750 MG: 500 INJECTION, POWDER, LYOPHILIZED, FOR SOLUTION INTRAVENOUS at 10:06

## 2022-01-01 RX ADMIN — LACTULOSE 30 G: 20 SOLUTION ORAL at 11:07

## 2022-01-01 RX ADMIN — SODIUM BICARBONATE 50 MEQ: 84 INJECTION, SOLUTION INTRAVENOUS at 11:08

## 2022-01-01 RX ADMIN — MICONAZOLE NITRATE: 20 POWDER TOPICAL at 11:07

## 2022-01-01 RX ADMIN — HEPARIN SODIUM 5000 UNITS: 5000 INJECTION INTRAVENOUS; SUBCUTANEOUS at 08:08

## 2022-01-01 RX ADMIN — LACTULOSE 20 G: 20 SOLUTION ORAL at 01:08

## 2022-01-01 RX ADMIN — PIPERACILLIN SODIUM AND TAZOBACTAM SODIUM 4.5 G: 4; .5 INJECTION, POWDER, LYOPHILIZED, FOR SOLUTION INTRAVENOUS at 03:08

## 2022-01-01 RX ADMIN — SODIUM CHLORIDE 250 ML: 0.9 INJECTION, SOLUTION INTRAVENOUS at 07:08

## 2022-01-01 RX ADMIN — MAGNESIUM SULFATE HEPTAHYDRATE 2 G: 40 INJECTION, SOLUTION INTRAVENOUS at 08:06

## 2022-01-01 RX ADMIN — SODIUM ZIRCONIUM CYCLOSILICATE 10 G: 10 POWDER, FOR SUSPENSION ORAL at 11:08

## 2022-01-01 RX ADMIN — LACTULOSE 15 G: 20 SOLUTION ORAL at 09:07

## 2022-01-01 RX ADMIN — PIPERACILLIN SODIUM AND TAZOBACTAM SODIUM 4.5 G: 4; .5 INJECTION, POWDER, LYOPHILIZED, FOR SOLUTION INTRAVENOUS at 11:08

## 2022-01-01 RX ADMIN — MORPHINE SULFATE 4 MG: 2 INJECTION, SOLUTION INTRAMUSCULAR; INTRAVENOUS at 03:08

## 2022-01-01 RX ADMIN — POTASSIUM CHLORIDE 40 MEQ: 1500 TABLET, EXTENDED RELEASE ORAL at 03:06

## 2022-01-01 RX ADMIN — DEXTROSE 125 ML: 10 SOLUTION INTRAVENOUS at 08:08

## 2022-01-01 RX ADMIN — SODIUM CHLORIDE: 0.9 INJECTION, SOLUTION INTRAVENOUS at 11:08

## 2022-01-01 RX ADMIN — SODIUM CHLORIDE, SODIUM LACTATE, POTASSIUM CHLORIDE, AND CALCIUM CHLORIDE: .6; .31; .03; .02 INJECTION, SOLUTION INTRAVENOUS at 05:07

## 2022-01-01 RX ADMIN — LOPERAMIDE HYDROCHLORIDE 2 MG: 2 CAPSULE ORAL at 09:06

## 2022-01-01 RX ADMIN — RIFAXIMIN 550 MG: 550 TABLET ORAL at 11:07

## 2022-01-01 RX ADMIN — INSULIN HUMAN 5 UNITS: 100 INJECTION, SOLUTION PARENTERAL at 11:08

## 2022-01-01 RX ADMIN — OXYCODONE HYDROCHLORIDE 5 MG: 5 SOLUTION ORAL at 09:08

## 2022-01-01 RX ADMIN — OXYCODONE HYDROCHLORIDE 5 MG: 5 SOLUTION ORAL at 03:08

## 2022-01-01 RX ADMIN — LIDOCAINE 1 PATCH: 50 PATCH CUTANEOUS at 09:06

## 2022-01-01 RX ADMIN — POTASSIUM CHLORIDE 40 MEQ: 1500 TABLET, EXTENDED RELEASE ORAL at 08:06

## 2022-01-01 RX ADMIN — LACTULOSE 10 G: 20 SOLUTION ORAL at 01:07

## 2022-01-01 RX ADMIN — Medication 1 MG: at 01:08

## 2022-01-01 ASSESSMENT — ROUTINE ASSESSMENT OF PATIENT INDEX DATA (RAPID3)
MDHAQ FUNCTION SCORE: 1.2
PATIENT GLOBAL ASSESSMENT SCORE: 5.5
AM STIFFNESS SCORE: 0, NO
PSYCHOLOGICAL DISTRESS SCORE: 0
TOTAL RAPID3 SCORE: 6.5
FATIGUE SCORE: 5.5
PAIN SCORE: 10

## 2022-01-26 NOTE — ASSESSMENT & PLAN NOTE
Key History and Diagnostic Findings  - Vitamin-D level deficiency of 9 on 05/29/2021  - Found to have a PTH of 93 on 05/17/2021.  There is no associated calcium at that time but calcium levels drawn approximately 9 days later run the low end of normal  - Denies history of nephrolithiasis.    Plan  - Await recheck of PTH, vitamin-D level, and renal panel now

## 2022-01-26 NOTE — ASSESSMENT & PLAN NOTE
- Seen on DEXA scan in 2020 in left hip    - Alendronate was recommended, is patient taking?  Otherwise continue with calcium and vitamin-D supplementation (vitamin-D level very low at 9 in May 2021)  - Repeat DEXA scan in mid 2023

## 2022-01-26 NOTE — ASSESSMENT & PLAN NOTE
- Vitamin-D level of 9 in May 2021  - Severe vitamin-D deficiency may be a contributor to her present symptoms    - Continue ergocalciferol 76012 units daily for 7 days followed by cholecalciferol 2000 units daily thereafter  - Recheck vitamin-D level as above

## 2022-01-26 NOTE — PROGRESS NOTES
Subjective:      Patient ID: Yana Orellana is a 76 y.o. female.    Chief Complaint:  No chief complaint on file.      History of Present Illness  77 yo female with lung cancer diagnosed 2009 (s/p surgery and chemo), chemotherapy induced neuropathy, hip weakness (follows w/ Neuro), fecal incontinence, abnormal gait, rheumatoid arthritis, fibromyalgia, vitamin-D deficiency, and cirrhosis here for follow-up visit regarding high PTH levels.  She was previously undergoing extensive rheumatologic and neurologic evaluation for progressive lower extremity weakness and was found to have a PTH in the mid 90s.  There is no associated calcium at that time but calcium levels drawn approximately 9 days later run the low end of normal. She denies any history of nephrolithiasis.    - Since last visit, we have not had the repeat labs yet drawn for today's visit    Regarding Hypercalcemia and/or Primary Hyperparathyroidism:    - No new labs since last visit. Old labs are below.  Lab Results   Component Value Date    PTH 93.0 (H) 05/17/2021    LBTHACRN95MC 9 (L) 05/29/2021    MABYFXKZ38UL 12 (L) 06/26/2020    FNOQQLZQ79EF 13 (L) 09/12/2019    CALCIUM 8.5 (L) 06/21/2021    CALCIUM 8.5 (L) 06/18/2021    CALCIUM 8.6 (L) 06/17/2021    PHOS 4.4 06/21/2021    PHOS 4.2 06/17/2021    PHOS 4.6 (H) 06/14/2021    ALKPHOS 80 06/21/2021    ALKPHOS 85 06/18/2021    ALKPHOS 87 06/17/2021    TSH 2.518 05/26/2021     - Daily intake of calcium: Poor dietary intake  - Daily intake of vitamin D: Not taking cholecalciferol  - Taking lithium or hydrochlorothiazide: no [if so, patient will need to be off medication for 3 months prior to further biochemical testing]    - DEXA:  06/12/2020 - The L1 to L4 T score of 0.7. Left femoral neck  T score of -1.1. Right femoral neck T score of -1. There is a 25% risk of a major osteoporotic fracture and a 7.2% risk of hip fracture in the next 10 years (FRAX). Impression: Osteopenia of the left femoral  neck    - Patient denies neuro symptoms, depression, mental fog, anxiety, polyuria, polydipsia, nausea/vomiting, constipation, history of pancreatitis, GERD with frequent Tums, muscle weakness, bone pain, fatigue    - Pertinent factors: osteoporosis  - Patient denies greater than 2 inches of height loss or kidney stones    Lab Results   Component Value Date    TSH 2.518 05/26/2021    TSH 3.283 06/26/2020    TSH 5.279 (H) 01/06/2020    FREET4 0.73 06/26/2020    FREET4 0.70 (L) 01/06/2020    FREET4 0.85 08/17/2018     Review of Systems   Constitutional: Negative for fever and weight loss.   Cardiovascular: Negative for chest pain.   Gastrointestinal: Positive for diarrhea.         Social and family history reviewed  Current medications and allergies reviewed    Objective:   LMP  (LMP Unknown)   BP Readings from Last 1 Encounters:   11/11/21 (!) 140/80     Physical Exam  Constitutional:       Appearance: Normal appearance.   Musculoskeletal:      Cervical back: Normal range of motion.   Neurological:      General: No focal deficit present.      Mental Status: She is alert and oriented to person, place, and time.   Psychiatric:         Mood and Affect: Mood normal.         Behavior: Behavior normal.         Wt Readings from Last 1 Encounters:   11/11/21 1329 118.8 kg (262 lb)     There is no height or weight on file to calculate BMI.    Lab Review:   Lab Results   Component Value Date    HGBA1C 5.2 06/01/2021     Lab Results   Component Value Date    CHOL 116 (L) 06/01/2021    HDL 27 (L) 06/01/2021    LDLCALC 72.2 06/01/2021    TRIG 84 06/01/2021    CHOLHDL 23.3 06/01/2021     Lab Results   Component Value Date     06/21/2021    K 4.9 06/21/2021     06/21/2021    CO2 19 (L) 06/21/2021    GLU 95 06/21/2021    BUN 31 (H) 06/21/2021    CREATININE 1.1 06/21/2021    CALCIUM 8.5 (L) 06/21/2021    PROT 6.0 06/21/2021    ALBUMIN 2.2 (L) 06/21/2021    BILITOT 1.8 (H) 06/21/2021    ALKPHOS 80 06/21/2021    AST 46 (H)  06/21/2021    ALT 22 06/21/2021    ANIONGAP 9 06/21/2021    ESTGFRAFRICA 57.2 (A) 06/21/2021    EGFRNONAA 49.6 (A) 06/21/2021    TSH 2.518 05/26/2021       All pertinent labs reviewed    Assessment and Plan     Parathyroid abnormality  - Driven by low vitamin D  - PTH of 93 on 5/17/2021 but without corresponding calcium (though calcium was on lower end of normal 9 days later so it may have been appropriate)  - Vitamin-D level of 9 in May 2021, patient took 1 week of ergo but not the cholecalciferol    - Recheck PTH with renal panel now; do not expect much change as patient is likely still vitamin d deficient    Osteopenia  - Seen on DEXA scan in 2020 in left hip with high FRAX score of 25%; alendronate was recommended but patient never started  - No Osteoporosis-defining fractures, had an elbow fracture in 2016    - Continue with calcium and vitamin-D supplementation (vitamin-D level very low at 9 in May 2021)  - Repeat DEXA scan in mid 2023; since DEXA will be done in the next few months, will await scan and then initiate alendronate if appropriate    Vitamin D deficiency  - Vitamin-D level of 9 in May 2021  - Severe vitamin-D deficiency may be a contributor to her present symptoms    - Start cholecalciferol 2000 units daily  - Recheck vitamin-D level    Essential hypertension  - Controlled, continue spironolactone    Meño Murdock DO  [facility], 6th Floor  [address] [place], [address]    Office: [phone]  Fax: [phone]    Disclaimer: This note has been generated using voice-recognition software. There may be typographical errors that have been missed during proof-reading.    The above history labs imaging impression and plan were discussed with attending physician who is in agreement and also took part in this patient's care.  I personally reviewed all of the patients available medications, labs, imaging, vitals, allergies, medical history.

## 2022-01-27 NOTE — PATIENT INSTRUCTIONS
"- Obtain labs today  - Start taking cholecalciferol 2000 IU daily  - Repeat DEXA scan in 6 months and, if osteoporosis is confirmed, start alendronate 70 mg daily (information included below). Will not start alendronate until after DEXA scan            - Bisphosphonates  - Oral forms (Fosamax and Actonel) are taken once weekly or once monthly  - IV form (Reclast) is given intravenously once yearly     - Long-term use of bisphosphonates is associated with rare side effects:  - There has been concern about use of bisphosphonates in people who require invasive dental work. A problem known as osteonecrosis of the jaw has developed in people who used bisphosphonates. The risk of this problem is very small in people who take bisphosphonates for osteoporosis prevention and treatment (less than 1 in 10,000). Experts do not think that it is necessary to stop the medication before invasive dental work (eg, tooth extraction or implant).  We advise you see the dentist on a regular basis and let them know you are taking this medication, and you should avoid non-emergent dental implants or extractions if possible while you are on this medication  - Taking bisphosphonates for a long time (eg, seven years or longer) can rarely increase the risk of an unusual type of femur (thigh bone) fracture. Taking bisphosphonates for up to five years for osteoporosis (the usual duration of treatment) is usually not associated with these "atypical" fractures  - The benefit of this medication in preventing bone loss far out-weighs the risk of side effects     Start alendronate (Fosamax)   - Take 70 mg (one tablet) once each week  - Take alendronate on an empty stomach with 1 cup of water. After swallowing, do not eat for one hour and stay upright for at least 30 minutes in order to ensure passage of the pill to the intestine, adequate absorption, and prevention of esophageal irritation  - Consider taking supplemental calcium and vitamin D if " dietary intake is inadequate. However, be aware that calcium and other supplements/medications can cause alendronate to be insufficiently absorbed; thus you should wait 30-60 minutes after taking alendronate to take calcium or other medications/supplements  - DXA bone density scan should be performed at baseline and every 1-3 years on treatment (usually at 2 years following initiation of therapy, then more/less frequently depending on stability of bone density)    - Alendronate works by inhibiting bone resorption via actions on osteoclasts or on osteoclast precursors; decreases the rate of bone resorption, leading to an indirect increase in bone mineral density  - Alendronate should not be used if kidney GFR less than 30 (severe kidney disease) or if you are on dialysis  - Esophagus irritation, ulcer, stenosis (rare), and perforation (rare) have been reported. Correct administration (not eating for one hour and staying upright for at least 30 minutes) generally prevents this issue      INTRODUCTION OF OSTEOPOROSIS    - Osteoporosis is a common problem that causes your bones to become abnormally thin and easily broken (fractured). Women are at a higher risk for osteoporosis after menopause due to lower levels of estrogen, a hormone that helps to maintain bone mass. Fortunately, preventive treatments are available that can help to maintain or increase your bone density  - Experts suggest screening for osteoporosis for women 65 years and older and for women under 65 who have gone through menopause and have risk factors (such as previous  fracture, certain medical conditions/medications, or tobacco/alcohol use). Screening involves physical examination, discussion of medical history, and measurement of bone density through imaging tests (DEXA scan)      PREVENTION OF OSTEOPOROSIS    -  Diet -- An optimal diet for bone health involves making sure you get enough protein and calories as well as plenty of calcium and vitamin  D, which are essential in helping to maintain proper bone formation and density    - Calcium intake -- Experts recommend that premenopausal women and men consume at least 1000 mg of calcium per day; this includes calcium in foods and supplements (eg, pills), which you might need if you don't get enough calcium from your diet. Postmenopausal women should consume 1200 mg of calcium per day. However, you should not take more than 2000 mg calcium per day, due to possibility of side effects  - The main dietary sources of calcium include milk and other dairy products, such as cottage cheese, yogurt, and hard cheese, and green vegetables such as kale and broccoli   - If you don't get enough calcium through your diet, your doctor may recommend supplements in the form of calcium carbonate or calcium citrate. Calcium carbonate works best when taken with food, while calcium citrate can be taken on an empty stomach. Supplements are often recommended for women since they are at higher risk of developing osteoporosis and they often don't consume enough in their diet. If you need to take more than 500 to 600 mg/day of calcium in supplement form, you should take it in separate doses (eg, once in the morning and again in the evening)    - Vitamin D intake -- Experts recommend that men over 70 years and postmenopausal women consume 800 international units of vitamin D each day. This reduces bone loss and fracture rate. Although the optimal intake has not been clearly established in premenopausal women or in younger men with osteoporosis, 600 international units of vitamin D daily is generally suggested. Milk supplemented with vitamin D is a primary dietary source of vitamin D; it contains approximately 100 international units per 8 oz. Another good source is salmon, with approximately 800 international units per 3 oz serving. Other foods, such as orange juice, yogurt, and cereal, are also available with added vitamin D    - Exercise  -- Exercise decreases fracture risk by improving bone mass in premenopausal women and helps maintain bone density in women who have been through menopause. Furthermore, exercise can strengthen muscles, improve balance, and make you less likely to have a fall that could lead to fracture or other injury. Most experts recommend exercising for at least 30 minutes three times per week. Many different types of exercise, including resistance training (eg, using free weights or resistance bands), jogging, jumping, and walking, are effective. The benefits of exercise are quickly lost if you stop exercising. Finding a regular exercise regimen that you enjoy improves your chances of keeping the habit over the long term    - Avoiding falls -- Falls significantly increase the risk of osteoporotic fractures in older adults. Measures to prevent falls include:  - Removing loose rugs and electrical cords or any other loose items in the home that could lead to tripping, slipping, and falling  - Providing adequate lighting in all areas inside and around the home, including stairwells and entrance ways and in the bedroom at night  - Avoiding walking on slippery surfaces, such as ice or wet or polished floors.  - Reviewing drug regimens to replace medications that may increase the risk of falls with options less likely to do so  - Visiting an ophthalmologist (eye doctor) regularly to check your vision    - Alcohol -- Drinking alcohol (more than two drinks a day) can increase your risk of fracture    - Smoking -- Avoiding or quitting smoking is strongly recommended for bone health because smoking cigarettes is known to speed bone loss    - Medications that increase osteoporosis risk -- Certain medications can increase bone loss, especially if used at high doses or over a long time. In some cases, you can reduce your risk of osteoporosis by stopping the medication, reducing the dose, or switching to a different medication. Medications  "that may increase bone loss include Glucocorticoid steroids (eg Prednisone), Heparin (an "anticoagulant" medication used to prevent and treat abnormal blood clotting), Certain antiseizure medications (eg phenytoin, carbamazapine, primidone, and phenobarbital) and Aromatase inhibitors for the treatment of breast cancer (eg, letrozole, anastrozole)      SYMPTOMS OF OSTEOPOROSIS    - Osteoporosis is often without symptoms until a fracture occurs      CAUSES OF OSTEOPOROSIS    - Estrogen deficiency (such as after menopause), long-term glucocorticoid (steroids) exposure, malabsorption (not able to absorb vitamin-D or calcium from the digestive system), or hyperparathyroidism (a disorder with the hormones that regulate calcium in your body) can all increase the risk of osteoporosis      DIAGNOSIS OF OSTEOPOROSIS    - A clinical diagnosis of osteoporosis may be made with a T-score less than or equal to -2.5 standard deviations (SDs) at any site based upon bone mineral density (BMD) measurement by dual-energy x-ray absorptiometry (DEXA) or in the presence of a low-velocity impact fragility fracture (particularly at the spine, hip, wrist, humerus, rib, and pelvis) regardless of the T-score      TREATMENT OF OSTEOPOROSIS    - In general, in addition to prescription medications that may be given for treatment of osteoporosis (information on which I will include separately depending on your case), it is important to have adequate calcium and vitamin-D stores in the body for such medications to be effective  - There are many medications that can be used in the setting of osteoporosis to decrease bone loss and even build bone mass. Due to the number of medications available, I will not list them all here but a comprehensive list is available at: https://www.[employer].com/contents/osteoporosis-prevention-and-treatment-beyond-the-basics?search=osteoporosis%20patient%20education&gpjkxFpm=05646&source=see_link  "

## 2022-01-27 NOTE — PROGRESS NOTES
I have seen the patient, reviewed the Fellow's history and physical, assessment, and plan. I have personally interviewed and examined the patient at bedside and agree with the findings.  Pt likely having secondary hyperparathyroidism 2/2 vitamin D deficiency.  Resume vitamin D and re check levels along with PTH and calcium levels.  Pt noted to have osteoporosis based on high FRAX risk on DXA from 2020.  Pt never started Fosamax per PCP recommendations.  Pt is due for repeat DXA in 2022,  will repeat DXA to assess severity of osteoporosis and recommend appropriate osteoporosis treatment. Continue with Calcium + D supplements, fall precautions and weight bearing exercises.  Will monitor and treat accordingly.       MD Augie Cook - [facility]

## 2022-01-28 NOTE — PROGRESS NOTES
PTH returned similarly elevated and vitamin-D results was low as expected.  Continue with plan as discussed during yesterday's visit.  I called the patient and discussed the results with her.

## 2022-01-31 NOTE — TELEPHONE ENCOUNTER
Have a history of  costochondritis and is wanting to know what to do about it. Pain is along bra line under both breast. Chest pain protocol completed and advised. Pt declines advice per protocol.    Reason for Disposition   [1] Chest pain lasts > 5 minutes AND [2] age > 44    Protocols used: CHEST PAIN-A-AH

## 2022-02-01 NOTE — TELEPHONE ENCOUNTER
I recommend stretching exercises.  You can also apply a heating pad on the painful area a few times a day.  Other over-the-counter medicines that you can try for pain relief include applying a pain relieving cream that has capsaicin or salicylates.  You can also try over-the-counter Tylenol or ibuprofen/Advil.    Last seen over a year ago.  Please schedule an in person appointment for elevated BP. Thanks!

## 2022-02-03 NOTE — TELEPHONE ENCOUNTER
Her lung cancer was in 2009, if no recurrence does not need to see me. I saw her last in 2015 so she will be a new patient to the clinic.   We can do CT chest and her see one of the NP's   Obviously any cancer related issues we can see her

## 2022-02-03 NOTE — TELEPHONE ENCOUNTER
Does she need to be re-established with someone? Last saw dr lozano in 2020, and he is not here anymore. Long time back, she saw dr rodarte---before she fell off the radar as well.  ~jassi

## 2022-02-03 NOTE — TELEPHONE ENCOUNTER
"----- Message from Yuan Price sent at 2/3/2022  1:09 PM CST -----  Consult/Advisory:          Name Of Caller: Self      Contact Preference?: [phone]         Provider Name: Seymour      Does patient feel the need to be seen today? No      What is the nature of the call?: Calling to speak w/  about diagnosis          Additional Notes:  "Thank you for all that you do for our patients'"      "

## 2022-02-03 NOTE — TELEPHONE ENCOUNTER
Spoke with patient.  She is calling to let dr rodarte know she is being worked up for myasthenia gravis---and wanted to come in and talk to dr rodarte about it. Nurse explained if she has a recurrence of cancer we would be more than happy to see her. She would need to speak with the MD working her up. She thanked nurse.

## 2022-03-14 PROBLEM — G70.80: Status: ACTIVE | Noted: 2022-01-01

## 2022-03-14 NOTE — TELEPHONE ENCOUNTER
----- Message from Mercedes Ram MA sent at 3/11/2022  4:51 PM CST -----    ----- Message -----  From: Grace Fong  Sent: 3/11/2022   4:43 PM CST  To: Nghia MILLER Staff    patient is requesting to speak to you/Dr. Agrawal, she says regarding a diagnosis she has been trying to get concerning her walking    Please reach out at your earliest convenience - she is asking for a call today    @# [phone]

## 2022-03-14 NOTE — TELEPHONE ENCOUNTER
Returned patient's call-she went to neurologist at [facility]-multi-speciality clinic to see Dr. Mary Padilla who dx her with a rare disease--lambert-eaton myasthenic syndrome

## 2022-04-25 NOTE — PROGRESS NOTES
"Subjective:       Patient ID: Yana Orellana is a 76 y.o. female.    Vitals:  height is 5' 10" (1.778 m) and weight is 118.4 kg (261 lb). Her temporal temperature is 96.7 °F (35.9 °C). Her blood pressure is 151/74 (abnormal) and her pulse is 78. Her respiration is 16 and oxygen saturation is 93% (abnormal).     Chief Complaint: No chief complaint on file.    Pt presents for dysuria and frequency x 3 days. Pt denies F, N/V or back pain. Pt has taken pyridium with some relief.     Urinary Frequency   This is a new problem. The current episode started in the past 7 days. The problem occurs every urination. The problem has been unchanged. Quality: Sharp. The pain is at a severity of 4/10. The pain is mild. There has been no fever. Associated symptoms include frequency. Pertinent negatives include no chills, flank pain, hematuria, nausea, urgency, vomiting or rash. Treatments tried: Pyridum. The treatment provided no relief. There is no history of diabetes mellitus, recurrent UTIs or a urological procedure.       Constitution: Negative for chills, sweating and fever.   HENT: Negative for congestion and sore throat.    Neck: Negative for painful lymph nodes.   Cardiovascular: Negative for palpitations.   Respiratory: Negative for cough and shortness of breath.    Gastrointestinal: Negative for abdominal pain, nausea, vomiting and diarrhea.   Genitourinary: Positive for dysuria and frequency. Negative for urgency, flank pain, hematuria and pelvic pain.   Musculoskeletal: Negative for muscle ache.   Skin: Negative for color change and rash.   Neurological: Negative for dizziness, light-headedness, passing out, headaches and disorientation.   Hematologic/Lymphatic: Negative for swollen lymph nodes.   Psychiatric/Behavioral: Negative for disorientation, nervous/anxious and depression. The patient is not nervous/anxious.        Objective:      Physical Exam   Constitutional: She is oriented to person, place, and time. She " appears well-developed.   HENT:   Head: Normocephalic and atraumatic.   Ears:   Right Ear: External ear normal.   Left Ear: External ear normal.   Nose: Nose normal. No nasal deformity. No epistaxis.   Mouth/Throat: Mucous membranes are normal.   Eyes: Lids are normal.   Neck: Trachea normal and phonation normal. Neck supple.   Cardiovascular: Normal rate and normal pulses.   Pulmonary/Chest: Effort normal.   Abdominal: Normal appearance. There is no abdominal tenderness. There is no left CVA tenderness and no right CVA tenderness.   Neurological: no focal deficit. She is alert and oriented to person, place, and time.   Skin: Skin is warm, dry and intact.   Psychiatric: Her speech is normal and behavior is normal.   Nursing note and vitals reviewed.        Assessment:       1. Frequency of urination    2. Urinary tract infection with hematuria, site unspecified          Plan:         Frequency of urination  -     POCT Urinalysis, Dipstick, Automated, W/O Scope    Urinary tract infection with hematuria, site unspecified  -     Urine culture  -     cephALEXin (KEFLEX) 500 MG capsule; Take 1 capsule (500 mg total) by mouth every 12 (twelve) hours. for 7 days  Dispense: 14 capsule; Refill: 0    Other orders  -     fluconazole (DIFLUCAN) 150 MG Tab; Take 1 tablet (150 mg total) by mouth once daily. for 1 day  Dispense: 2 tablet; Refill: 0    Pt requested Diflucan rx in case of yeast infection d/t antibiotics.         Results for orders placed or performed in visit on 04/25/22   POCT Urinalysis, Dipstick, Automated, W/O Scope   Result Value Ref Range    POC Blood, Urine Positive (A) Negative    POC Bilirubin, Urine Negative Negative    POC Urobilinogen, Urine Normal 0.1 - 1.1    POC Ketones, Urine Negative Negative    POC Protein, Urine Positive (A) Negative    POC Nitrates, Urine Negative Negative    POC Glucose, Urine Negative Negative    pH, UA 5.5 5 - 8    POC Specific Gravity, Urine 1.020 1.003 - 1.029    POC  Leukocytes, Urine Positive (A) Negative       Patient Instructions     Take the antibiotics 2 times a day for 7 days. Make sure to take them all.    We will call you when your urine culture is back. If you need a different antibiotic we will call it in to the pharmacy for you.     Make sure you drink plenty of water daily, at least 8-10 glasses.           You must understand that you've received an Urgent Care treatment only and that you may be released before all your medical problems are known or treated. You, the patient, will arrange for follow up care as instructed.  If your condition worsens we recommend that you receive another evaluation at the emergency room immediately or contact your primary medical clinics after hours call service to discuss your concerns.  Please return here or go to the Emergency Department for any concerns or worsening of condition.

## 2022-04-25 NOTE — PATIENT INSTRUCTIONS
Take the antibiotics 2 times a day for 7 days. Make sure to take them all.    We will call you when your urine culture is back. If you need a different antibiotic we will call it in to the pharmacy for you.     Make sure you drink plenty of water daily, at least 8-10 glasses.           You must understand that you've received an Urgent Care treatment only and that you may be released before all your medical problems are known or treated. You, the patient, will arrange for follow up care as instructed.  If your condition worsens we recommend that you receive another evaluation at the emergency room immediately or contact your primary medical clinics after hours call service to discuss your concerns.  Please return here or go to the Emergency Department for any concerns or worsening of condition.   
English

## 2022-04-28 NOTE — TELEPHONE ENCOUNTER
I spoke with the patient and reviewed her urine culture with her.  She is feeling much better on the antibiotic.  Advised to continue with this medication.  All questions answered.

## 2022-05-02 NOTE — ASSESSMENT & PLAN NOTE
No major flare apparent but does take prednisone approx weekly; discussed possible HCQ but for now will cont prn prednisone as she does not have progressive RA
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
## 2022-05-03 NOTE — LETTER
May 3, 2022    Yana Orellana  [address] [facility] [address]             [facility] [address]  [address]  [place] [address]  Phone: [phone]  Fax: [phone] Dear Ms. Yana Orellana:    Below are the results from your recent visit:    Resulted Orders   CBC Auto Differential   Result Value Ref Range    WBC 3.21 (L) 3.90 - 12.70 K/uL    RBC 3.65 (L) 4.00 - 5.40 M/uL    Hemoglobin 12.2 12.0 - 16.0 g/dL    Hematocrit 36.1 (L) 37.0 - 48.5 %    MCV 99 (H) 82 - 98 fL    MCH 33.4 (H) 27.0 - 31.0 pg    MCHC 33.8 32.0 - 36.0 g/dL    RDW 14.3 11.5 - 14.5 %    Platelets 65 (L) 150 - 450 K/uL    MPV 10.3 9.2 - 12.9 fL    Immature Granulocytes 0.3 0.0 - 0.5 %    Gran # (ANC) 1.4 (L) 1.8 - 7.7 K/uL    Immature Grans (Abs) 0.01 0.00 - 0.04 K/uL      Comment:      Mild elevation in immature granulocytes is non specific and   can be seen in a variety of conditions including stress response,   acute inflammation, trauma and pregnancy. Correlation with other   laboratory and clinical findings is essential.      Lymph # 1.1 1.0 - 4.8 K/uL    Mono # 0.6 0.3 - 1.0 K/uL    Eos # 0.2 0.0 - 0.5 K/uL    Baso # 0.03 0.00 - 0.20 K/uL    nRBC 0 0 /100 WBC    Gran % 43.7 38.0 - 73.0 %    Lymph % 33.0 18.0 - 48.0 %    Mono % 17.1 (H) 4.0 - 15.0 %    Eosinophil % 5.0 0.0 - 8.0 %    Basophil % 0.9 0.0 - 1.9 %    Differential Method Automated    Comprehensive Metabolic Panel   Result Value Ref Range    Sodium 136 136 - 145 mmol/L    Potassium 3.7 3.5 - 5.1 mmol/L    Chloride 103 95 - 110 mmol/L    CO2 25 23 - 29 mmol/L    Glucose 123 (H) 70 - 110 mg/dL    BUN 11 8 - 23 mg/dL    Creatinine 0.6 0.5 - 1.4 mg/dL    Calcium 8.3 (L) 8.7 - 10.5 mg/dL    Total Protein 6.8 6.0 - 8.4 g/dL    Albumin 2.4 (L) 3.5 - 5.2 g/dL    Total Bilirubin 2.1 (H) 0.1 - 1.0 mg/dL      Comment:      For infants and newborns, interpretation of results should be based  on gestational age, weight and in agreement with  clinical  observations.    Premature Infant recommended reference ranges:  Up to 24 hours.............<8.0 mg/dL  Up to 48 hours............<12.0 mg/dL  3-5 days..................<15.0 mg/dL  6-29 days.................<15.0 mg/dL      Alkaline Phosphatase 95 55 - 135 U/L    AST 50 (H) 10 - 40 U/L    ALT 23 10 - 44 U/L    Anion Gap 8 8 - 16 mmol/L    eGFR if African American >60.0 >60 mL/min/1.73 m^2    eGFR if non African American >60.0 >60 mL/min/1.73 m^2      Comment:      Calculation used to obtain the estimated glomerular filtration  rate (eGFR) is the CKD-EPI equation.      C-Reactive Protein   Result Value Ref Range    CRP 15.2 (H) 0.0 - 8.2 mg/L   Sedimentation rate   Result Value Ref Range    Sed Rate 34 0 - 36 mm/Hr     Your results are abnormal , with mildly low white blood cell and platelet count similar to previous. There is also a mild increased liver enzyme (ALT). This does not require a change in medicine for rheumatoid arthritis, but plan on following up with your primary care physician.      Sincerely,    Smooth Agrawal MD  Rheumatology  [facility]

## 2022-06-12 NOTE — TELEPHONE ENCOUNTER
"    Reason for Disposition   [1] MILD-MODERATE pain AND [2] constant AND [3] present > 2 hours   [1] Constant abdominal pain AND [2] present > 2 hours    Additional Information   Negative: Shock suspected (e.g., cold/pale/clammy skin, too weak to stand, low BP, rapid pulse)   Negative: Difficult to awaken or acting confused (e.g., disoriented, slurred speech)   Negative: Passed out (i.e., lost consciousness, collapsed and was not responding)   Negative: Sounds like a life-threatening emergency to the triager   Negative: Chest pain   Negative: Pain is mainly in upper abdomen  (if needed ask: "is it mainly above the belly button?")   Negative: Followed an abdomen (stomach) injury   Negative: [1] Abdominal pain AND [2] pregnant < 20 weeks   Negative: [1] Abdominal pain AND [2] pregnant 20 or more weeks   Negative: [1] Abdominal pain AND [2] postpartum (from 0 to 6 weeks after delivery)   Negative: [1] SEVERE pain (e.g., excruciating) AND [2] present > 1 hour   Negative: [1] SEVERE pain AND [2] age > 60 years   Negative: [1] Vomiting AND [2] contains red blood or black ("coffee ground") material  (Exception: few red streaks in vomit that only happened once)   Negative: Blood in bowel movements (Exception: blood on surface of BM with constipation)   Negative: Black or tarry bowel movements (Exception: chronic-unchanged black-grey bowel movements AND is taking iron pills or Pepto-bismol)   Negative: Patient sounds very sick or weak to the triager   Negative: Shock suspected (e.g., cold/pale/clammy skin, too weak to stand, low BP, rapid pulse)   Negative: Difficult to awaken or acting confused (e.g., disoriented, slurred speech)   Negative: Sounds like a life-threatening emergency to the triager   Negative: Vomiting also present and worse than the diarrhea   Negative: [1] Blood in stool AND [2] without diarrhea   Negative: Diarrhea in a cancer patient who is currently (or recently) receiving " chemotherapy or radiation therapy, or cancer patient who has metastatic or end-stage cancer and is receiving palliative care   Negative: [1] SEVERE abdominal pain (e.g., excruciating) AND [2] present > 1 hour   Negative: [1] SEVERE abdominal pain AND [2] age > 60 years   Negative: [1] Blood in the stool AND [2] moderate or large amount of blood   Negative: Black or tarry bowel movements (Exception: chronic-unchanged black-grey bowel movements AND is taking iron pills or Pepto-bismol)   Negative: [1] Drinking very little AND [2] dehydration suspected (e.g., no urine > 12 hours, very dry mouth, very lightheaded)   Negative: Patient sounds very sick or weak to the triager   Negative: [1] SEVERE diarrhea (e.g., 7 or more times / day more than normal) AND [2] age > 60 years    Protocols used: ABDOMINAL PAIN - FEMALE-A-AH, DIARRHEA-A-AH    Pt stated she has lower abdominal pain and diarrhea since 0600. Advised per triage protocol to see a MD within 4 hrs. Pt verbalized understanding.

## 2022-06-13 NOTE — TELEPHONE ENCOUNTER
Spoke with pt, she declined coming in for a visit due to not being able to control diarrhea. She also declined audio only visit. Advised her to call back if needed. She took imodium and is hydrating.

## 2022-06-13 NOTE — TELEPHONE ENCOUNTER
Patient declined appointment for evaluation and states that she is unable to report anywhere at this time due to loose stools. States that oral temperature is currently 100.0. Routed to Dr. Graf for further instructions. Patient thinks that this is a viral infection. She states that she will take Tylenol for fever but would like recommendations on what to do for loose stools. Please advise.

## 2022-06-13 NOTE — TELEPHONE ENCOUNTER
----- Message from Charles Durand sent at 6/13/2022  4:17 PM CDT -----  Regarding: Urgent Call Back Request  Name of Who is Calling: SARAY WISE [[id number]]           What is the request in detail: Patient is requesting a call back.  She has uncontrolled diarrhea and fever of 100.  Please assist.           Can the clinic reply by [facility]: No           [facility] [phone]

## 2022-06-15 NOTE — TELEPHONE ENCOUNTER
It's important to stay well hydrated. Would not recommend imodium or other anti-diarrheals unless severe and even then would only use very cautiously. Would ideally be evaluated at urgent care if unable to make it into office.

## 2022-06-15 NOTE — TELEPHONE ENCOUNTER
"Called pt and relayed Provider message: " It's important to stay well hydrated. Would not recommend imodium or other anti-diarrheals unless severe and even then would only use very cautiously. Would ideally be evaluated at urgent care if unable to make it into office. "    Pt states she is feeling much better, just still tired and decreased appetite. Diarrhea has resolved. Offered pt audio appt or in-person if preferred. Pt declined at this time.    Encouraged pt to call back for any needs.    Pt verbalized understanding and had no further questions/all questions answered.      "

## 2022-06-20 NOTE — ED TRIAGE NOTES
C/o diarrhea starting last Sunday. Reports intermittent fevers. Reports fatigue and increased thirst.

## 2022-06-20 NOTE — FIRST PROVIDER EVALUATION
Emergency Department TeleTriage Encounter Note      CHIEF COMPLAINT    Chief Complaint   Patient presents with    Diarrhea     Pt c.o diarrhea onset 1 week ago. Pt states she has had fever off and on. Pt denies abd pain at this time. AAO x 3 nadn skin w.d  Diarrhea x 1 today.  Pt c.o generalized weakness.         VITAL SIGNS   Initial Vitals [06/20/22 1403]   BP Pulse Resp Temp SpO2   (!) 184/82 102 18 98 °F (36.7 °C) 97 %      MAP       --            ALLERGIES    Review of patient's allergies indicates:   Allergen Reactions    Ibuprofen Nausea Only    Demerol [meperidine]      Euphoria    Gabapentin Other (See Comments)     Hallucinations    Mellaril-s Nausea Only    Versed [midazolam]      Excessive talking, hyper    Vicodin [hydrocodone-acetaminophen]      Joint pain, muscle pain    Xanax [alprazolam]      fatigue       PROVIDER TRIAGE NOTE  This is a teletriage evaluation of a 77 y.o. female presenting to the ED complaining of diarrhea for one week. Reports generalized weakness. Denies vomiting and abd pain. Has had one episodes of diarrhea today. Has been taking immodium. Denies rectal bleeding.     Pt is well-appearing, no distress.     Initial orders will be placed and care will be transferred to an alternate provider when patient is roomed for a full evaluation. Any additional orders and the final disposition will be determined by that provider.           ORDERS  Labs Reviewed   CBC W/ AUTO DIFFERENTIAL   COMPREHENSIVE METABOLIC PANEL   LIPASE   URINALYSIS, REFLEX TO URINE CULTURE       ED Orders (720h ago, onward)    Start Ordered     Status Ordering Provider    06/20/22 1430 06/20/22 1429  Vital signs  Every 2 hours         Ordered ELDER DELEON N.    06/20/22 1430 06/20/22 1429  Diet NPO  Diet effective now         Ordered ELDER DELEON N.    06/20/22 1430 06/20/22 1429  Insert peripheral IV  Once         Ordered ELDER DELEON N.    06/20/22 1430 06/20/22 1429  CBC W/  AUTO DIFFERENTIAL  STAT         Ordered CAITLINJAZMINE ELDER N.    06/20/22 1430 06/20/22 1429  Comp. Metabolic Panel  STAT         Ordered PHILIPPENETTACHANELJAZMINE ELDER N.    06/20/22 1430 06/20/22 1429  POCT Venous Blood Gas Once  Once        Comments: This test should be used for VBGs.  If using this order for other tests (K, creatinine, HCT, PT/INR, lactate etc)  ONLY do so in the case of an emergency or rapid response.Notify Physician if: see parameters below.      Ordered PHILIPPENEEMACHAJAZMINE ELDER N.    06/20/22 1430 06/20/22 1429  Lipase  STAT         Ordered CAITLINJAZMINE ELDER N.    06/20/22 1430 06/20/22 1429  Urinalysis, Reflex to Urine Culture Urine, Clean Catch  STAT         Ordered ELDER DELEON.            Virtual Visit Note: The provider triage portion of this emergency department evaluation and documentation was performed via [employer], a HIPAA-compliant telemedicine application, in concert with a tele-presenter in the room. A face to face patient evaluation with one of my colleagues will occur once the patient is placed in an emergency department room.      DISCLAIMER: This note was prepared with [employer] voice recognition transcription software. Garbled syntax, mangled pronouns, and other bizarre constructions may be attributed to that software system.

## 2022-06-20 NOTE — ED PROVIDER NOTES
Source of History:  Patient    Chief complaint:  Diarrhea (Pt c.o diarrhea onset 1 week ago. Pt states she has had fever off and on. Pt denies abd pain at this time. AAO x 3 nadn skin w.d  Diarrhea x 1 today.  Pt c.o generalized weakness.  )      HPI:  Yana Orellana is a 77 y.o. female with past medical history lung cancer diagnosed 2009 (s/p surgery and chemo), chemotherapy induced neuropathy, CHF, lower extremity weakness (being followed by Neurology), rheumatoid arthritis, fibromyalgia, and hyperparathyroidism presenting with a day history of diarrhea.  Patient states that she cannot go anywhere or complete any of the tasks in her house because she has uncontrollable diarrhea.  She states last Sunday she felt a few cramps and then had diarrhea.  Since the onset she has had no further abdominal cramps and denies abdominal pain currently.  She also denies associated nausea, vomiting, melena and hematochezia.  She takes Imodium and has relief for about a day but then the diarrhea returns.  She states she just states the diarrhea to stop.  She denies recent travel or ingestion of strange foods.  No recent antibiotic use.  She states that she was on antibiotics for UTI about 6 months ago.  No ingestion of strange foods.  In fact she states that she has had decreased appetite and has not been eating much the last week or so.  She took Imodium just prior to arrival because she states that she did not take it she would not have made it to the hospital without having an accident.    This is the extent to the patients complaints today here in the emergency department.    ROS: As per HPI and below:  General: No fever.  No chills. + fatigue + decreased appetite  Eyes: No visual changes.  ENT: No sore throat. No ear pain  Head: No headache.    Chest: No shortness of breath.  Cardiovascular: No chest pain.  Abdomen: No abdominal pain.  No nausea or vomiting. + diarrhea -melena/hematochezia  Genito-Urinary: No  "abnormal urination.  Neurologic: No focal weakness.  No numbness.  MSK: no back pain.  Integument: No rashes or lesions.  Hematologic: No easy bruising.  Endocrine: No excessive thirst or urination.    Review of patient's allergies indicates:   Allergen Reactions    Ibuprofen Nausea Only    Demerol [meperidine]      Euphoria    Gabapentin Other (See Comments)     Hallucinations    Mellaril-s Nausea Only    Versed [midazolam]      Excessive talking, hyper    Vicodin [hydrocodone-acetaminophen]      Joint pain, muscle pain    Xanax [alprazolam]      fatigue       PMH:  As per HPI and below:  Past Medical History:   Diagnosis Date    Anemia     Anxiety     Cataract     Colon polyps     Fibromyalgia 10/15/2012    Lung cancer     Pneumonia due to infectious organism 2019    Post-polio syndrome     Rheumatoid arthritis(714.0) 2012 onset with mod positive CCP and RF MTX  - present     Thyroid disease     Vertigo     postural vertigo     Past Surgical History:   Procedure Laterality Date    BLADDER SURGERY      CHOLECYSTECTOMY      HYSTERECTOMY  1988    LUNG REMOVAL, PARTIAL  2009    left lower lobectomy    OVARIAN CYST REMOVAL  1970    TONSILLECTOMY         Social History     Tobacco Use    Smoking status: Former Smoker     Packs/day: 1.50     Years: 40.00     Pack years: 60.00     Types: Cigarettes     Quit date: 2009     Years since quittin.9    Smokeless tobacco: Never Used   Substance Use Topics    Alcohol use: Not Currently    Drug use: No       Physical Exam:    BP (!) 140/64 (BP Location: Right arm, Patient Position: Lying)   Pulse 71   Temp 97.7 °F (36.5 °C) (Oral)   Resp 18   Ht 5' 10" (1.778 m)   Wt 117.9 kg (260 lb)   LMP  (LMP Unknown)   SpO2 96%   Breastfeeding No   BMI 37.31 kg/m²   Nursing note and vital signs reviewed.  Appearance: No acute distress.  Obese  Eyes: No conjunctival injection.  ENT: Oropharynx clear.    Chest/ " Respiratory: Clear to auscultation bilaterally.  Good air movement.  No wheezes.  No rhonchi. No rales. No accessory muscle use.  Cardiovascular: Regular rate and rhythm.  No murmurs. No gallops. No rubs.  Abdomen: Soft.  Obese abdomen. Not distended.  Nontender.  No guarding.  No rebound. Non-peritoneal.  Musculoskeletal: Good range of motion all joints.  No deformities.  Neck supple.  No meningismus.  Skin: No rashes seen.  Good turgor.  No abrasions.  No ecchymoses.  Neurologic: Motor intact.  Sensation intact.  Cerebellar intact.  Cranial nerves intact.  Mental Status:  Alert and oriented x 3.  Appropriate, conversant    Labs that have been ordered have been independently reviewed and interpreted by myself.        Labs Reviewed   CBC W/ AUTO DIFFERENTIAL - Abnormal; Notable for the following components:       Result Value    RBC 3.57 (*)     Hematocrit 36.1 (*)      (*)     MCH 35.0 (*)     RDW 14.7 (*)     Platelets 91 (*)     Lymph # 0.9 (*)     All other components within normal limits   COMPREHENSIVE METABOLIC PANEL - Abnormal; Notable for the following components:    CO2 22 (*)     Glucose 186 (*)     BUN 7 (*)     Calcium 8.2 (*)     Albumin 2.4 (*)     Total Bilirubin 4.0 (*)     All other components within normal limits   URINALYSIS, REFLEX TO URINE CULTURE - Abnormal; Notable for the following components:    Specific Gravity, UA >=1.030 (*)     Protein, UA Trace (*)     Ketones, UA Trace (*)     Bilirubin (UA) 1+ (*)     Nitrite, UA Positive (*)     Urobilinogen, UA >=8.0 (*)     All other components within normal limits    Narrative:     Specimen Source->Urine   MAGNESIUM - Abnormal; Notable for the following components:    Magnesium 1.4 (*)     All other components within normal limits   URINALYSIS MICROSCOPIC - Abnormal; Notable for the following components:    Bacteria Few (*)     All other components within normal limits    Narrative:     Specimen Source->Urine   MAGNESIUM   SARS-COV-2 RDRP  GENE       Imaging Results          CT Abdomen Pelvis  Without Contrast (Final result)  Result time 06/20/22 17:16:04    Final result by Logan Vasquez MD (06/20/22 17:16:04)                 Impression:      1. Findings suggestive for cirrhosis and portal hypertension with small nodular liver, splenomegaly, and moderate volume ascites.  2. No additional acute intra-abdominal abnormalities identified.  3. Postsurgical changes and additional findings as detailed above.      Electronically signed by: Logan Vasquez MD  Date:    06/20/2022  Time:    17:16             Narrative:    EXAMINATION:  CT ABDOMEN PELVIS WITHOUT CONTRAST    CLINICAL HISTORY:  LLQ abdominal pain;    TECHNIQUE:  Low dose axial images, sagittal and coronal reformations were obtained from the lung bases to the pubic symphysis.  Oral contrast was not administered.    COMPARISON:  None    FINDINGS:  The visualized portion of the heart is unremarkable.  There is mild left basilar atelectasis or scarring.  Visualized lung bases otherwise show no focal consolidation or pleural effusion.    Evaluation of the abdomen and pelvis is limited by lack of oral and intravenous contrast.    Liver is small in size and diffusely nodular/lobular in contour suggestive for cirrhosis.  Spleen is enlarged measuring 15 cm.  There is moderate volume ascites seen predominantly in perihepatic location and extending into the pelvis.  Gallbladder has been removed.  There is prominence of the common bile duct measuring 1.6 cm.  No evidence of intrahepatic biliary ductal dilatation.  The stomach, pancreas, and adrenal glands are unremarkable.    Kidneys show no evidence of stones or hydronephrosis. Ureters are difficult to track.  Urinary bladder is nondistended.  Uterus has been removed.    No evidence of bowel obstruction.  Appendix is not clearly visualized.  No free air.    Aorta tapers normally with mild atherosclerosis.  There is appearance of mild diffuse mesenteric  edema.    No acute osseous abnormality identified.  Multilevel degenerative changes are seen throughout the spine.  Mild subcutaneous soft tissue edema is seen.                                Initial Impression/ Differential Dx:  Urgent evaluation of 77 y.o. female presenting with diarrhea and decreased appetite. Patient is afebrile, not toxic appearing and hemodynamically stable.  Abdominal exam benign.  Labs have resulted at time of initial assessment.  CBC without leukocytosis or left shift.  Stable H&H..  CMP with hyperglycemia.  Patient denies history of diabetes, last A1c 5.2 a year ago however hyperglycemic on multiple recent labs.  Normal kidney function.  Elevated T bili.  Patient has chronically elevated T bili although value today higher than normal.  Chart review shows that patient has a history of fecal incontinence.  Upon further discussion patient said that she has had fecal incontinence for the last 2 years, however recent bout of diarrhea is worse than her normal.  Plan for stool sample, however as patient states she took Imodium just prior to arrival may not be possible to obtain.  Patient unable to give a UA sample yet.  Patient given water and will re-attempt to obtain sample. Will obtain CT abdomen pelvis to assess for acute intra-abdominal abnormalities, however lower suspicion given abdominal exam benign.  Specifically no left lower quadrant tenderness to suggest diverticulitis.    Differential Diagnosis includes, but is not limited to:  Viral gastroenteritis, food-borne illness, bacterial (Shigella, Salmonella, E coli, campylobacter, Yersinia enterocolitica, vibrio cholerae, C diff) parasitic source, appendicitis, diverticulitis, GI bleed, mesenteric ischemia, adrenal crisis, thyroid storm, toxicology exposure, antibiotic and/or drug associated, dehydration, SONYA    MDM:        ED Course as of 06/20/22 1817 Mon Jun 20, 2022   1640 SARS-CoV-2 RNA, Amplification, Qual: Negative [CU]   2055  Magnesium(!): 1.4  Will replace orally. [CU]   1732 CT Abdomen Pelvis  Without Contrast  Findings suggestive for cirrhosis and portal hypertension with small nodular liver, splenomegaly, and moderate volume ascites. Pt with no additional acute intra-abdominal abnormalities identified. [CU]   1748 Urinalysis Microscopic(!)  Although nitrite positive on UA, no convincing evidence of infection on micro.  Patient has elevated urobilinogen which makes the urine darker and can trigger nitrites on UA dipstick.  Patient has no urinary symptoms.  No indication to treat asymptomatic bacteriuria.  Furthermore, patient may be colonized given history of fecal incontinence.   [CU]   1754 At bedside to reassess patient and inform her of imaging and lab results.  Counseled patient that I will discharge her home with Lomotil for her diarrhea.  Counseled her that I have a low suspicion that this is infectious diarrhea requiring antibiotics.  Given her history of progressive lower extremity weakness, likely that she is losing tone in her rectal sphincter causing fecal incontinence.  Will have patient follow-up with GI, referral placed.  Counseled patient that she may ultimately need to follow-up with Colorectal.  Patient is amenable to this plan and discharged home in good condition. Patient educated on on signs and symptoms to monitor for and when to return to ED. Patient verbalized understanding agrees with treatment plan. All questions and concerns addressed.        [CU]      ED Course User Index  [CU] Shelly Fuentes NP                   Diagnostic Impression:    1. Incontinence of feces, unspecified fecal incontinence type    2. Diarrhea, unspecified type         ED Disposition Condition    Discharge Good          ED Prescriptions     Medication Sig Dispense Start Date End Date Auth. Provider    diphenoxylate-atropine 2.5-0.025 mg (LOMOTIL) 2.5-0.025 mg per tablet Take 1 tablet by mouth 4 (four) times daily as needed for  Diarrhea. 12 tablet 6/20/2022 6/23/2022 Shelly Fuentes NP        Follow-up Information    None          Shelly Fuentes NP  06/20/22 8522

## 2022-06-24 NOTE — TELEPHONE ENCOUNTER
Pt reports pain with urination, temp is 97, Pt advised to see a MD within 4 hours per protocol, Pt wanting to wait until in the morning as she doesn't have a ride until then. Pt encouraged to call back with any worsening symptoms or questions and verbalized understanding.    Reason for Disposition   Diabetes mellitus or weak immune system (e.g., HIV positive, cancer chemotherapy, transplant patient)    Additional Information   Negative: Shock suspected (e.g., cold/pale/clammy skin, too weak to stand, low BP, rapid pulse)   Negative: Sounds like a life-threatening emergency to the triager   Negative: [1] Unable to urinate (or only a few drops) > 4 hours AND     [2] bladder feels very full (e.g., palpable bladder or strong urge to urinate)   Negative: Patient sounds very sick or weak to the triager   Negative: [1] SEVERE pain with urination  (e.g., excruciating) AND [2] not improved after 2 hours of pain medicine and Sitz bath   Negative: Fever > 100.5 F (38.1 C)   Negative: Side (flank) or lower back pain present    Protocols used: ST URINATION PAIN - FEMALE-A-AH

## 2022-06-24 NOTE — TELEPHONE ENCOUNTER
----- Message from Tanisha Robert sent at 6/24/2022  3:36 PM CDT -----  Who Called: PT        Refill or New Rx:REFILL        RX Name and Strength:diphenoxylate-atropine 2.5-0.025 mg (LOMOTIL) 2.5-0.025 mg per tablet        How is the patient currently taking it? (ex. 1XDay):4x        Is this a 30 day or 90 day RX:30 day        Preferred Pharmacy with phone number:[facility] #[id number] [address] [facility] [address] [facility]   Phone:  [phone]  Fax:  [phone]      Local or Mail Order:Local        Ordering Provider: [facility] EMERGENCY RO[facility]        Best Call Back Number:[phone]        Additional Information:

## 2022-06-25 PROBLEM — R15.9 BOWEL INCONTINENCE: Status: ACTIVE | Noted: 2022-01-01

## 2022-06-25 PROBLEM — L59.0 ERYTHEMA AB IGNE: Chronic | Status: RESOLVED | Noted: 2017-01-23 | Resolved: 2022-01-01

## 2022-06-25 PROBLEM — E87.20 LACTIC ACIDOSIS: Status: ACTIVE | Noted: 2022-01-01

## 2022-06-25 NOTE — ED PROVIDER NOTES
Encounter Date: 6/25/2022       History     Chief Complaint   Patient presents with    Diarrhea     X2wks. Denies abdominal pain and vomiting     76 yo W with extensive pmhx including anxiety, anemia, lung CA, postpolio syndrome, lung cancer diagnosed 2009 (s/p surgery and chemo), chemotherapy induced neuropathy, Grade 1 dCHF, lower extremity weakness (being followed by Neurology), rheumatoid arthritis, fibromyalgia, hyperparathyroidism presents via EMS with a chief complaint of diarrhea.  Patient has been suffering from diarrhea for the past 2 weeks.  She reports 3-4 episodes per day.  They are nonbloody.  They occasionally have mucus in them.  No abdominal pain, vomiting, fever.  Patient has tried Imodium and Lomotil without relief in symptoms.  She had an ED encounter on June 20th at which time CT was negative for any acute intra-abdominal infectious process.  She had labs reveal hypomagnesemia that was replaced.  Patient has no history of similar episodes.  She has had no recent antibiotic use.  No recent travel, camping, drinking from streams.  She reports that sometimes diarrhea occurs without her knowing and she wakes up in bed in stool. She feels lightheaded.        Review of patient's allergies indicates:   Allergen Reactions    Ibuprofen Nausea Only    Demerol [meperidine]      Euphoria    Gabapentin Other (See Comments)     Hallucinations    Mellaril-s Nausea Only    Versed [midazolam]      Excessive talking, hyper    Vicodin [hydrocodone-acetaminophen]      Joint pain, muscle pain    Xanax [alprazolam]      fatigue     Past Medical History:   Diagnosis Date    Anemia     Anxiety     Cataract     Colon polyps 2012    Fibromyalgia 10/15/2012    Lung cancer     Pneumonia due to infectious organism 7/19/2019    Post-polio syndrome     Rheumatoid arthritis(714.0) 7/16/2012 2009 onset with mod positive CCP and RF MTX 2011 - present     Thyroid disease     Vertigo     postural vertigo      Past Surgical History:   Procedure Laterality Date    BLADDER SURGERY      CHOLECYSTECTOMY      HYSTERECTOMY  1988    LUNG REMOVAL, PARTIAL  2009    left lower lobectomy    OVARIAN CYST REMOVAL  1970    TONSILLECTOMY  89     Family History   Problem Relation Age of Onset    Lung cancer Father     Lung cancer Maternal Aunt     Diabetes Maternal Grandmother     Colon cancer Maternal Grandfather     Parkinsonism Paternal Grandmother     Arrhythmia Mother     Lung cancer Maternal Uncle     Heart attack Son 45    Hypertension Neg Hx      Social History     Tobacco Use    Smoking status: Former Smoker     Packs/day: 1.50     Years: 40.00     Pack years: 60.00     Types: Cigarettes     Quit date: 2009     Years since quittin.9    Smokeless tobacco: Never Used   Substance Use Topics    Alcohol use: Not Currently    Drug use: No     Review of Systems   Constitutional: Negative for fever.   HENT: Negative for sore throat.    Respiratory: Negative for shortness of breath.    Cardiovascular: Negative for chest pain.   Gastrointestinal: Positive for diarrhea. Negative for abdominal pain, nausea and vomiting.   Genitourinary: Negative for dysuria.   Musculoskeletal: Negative for back pain.   Skin: Negative for rash.   Neurological: Positive for light-headedness. Negative for weakness.   Hematological: Does not bruise/bleed easily.       Physical Exam     Initial Vitals [22 1637]   BP Pulse Resp Temp SpO2   (!) 156/53 109 16 98.3 °F (36.8 °C) 99 %      MAP       --         Physical Exam    Nursing note and vitals reviewed.  Constitutional: She appears well-developed and well-nourished. She is not diaphoretic. No distress.   HENT:   Head: Normocephalic and atraumatic.   Dry mucus membranes   Eyes: Right eye exhibits no discharge. Left eye exhibits no discharge. No scleral icterus.   Neck: Neck supple. No JVD present.   Normal range of motion.  Cardiovascular: Regular rhythm and intact  distal pulses. Tachycardia present.  Exam reveals no gallop and no friction rub.    Murmur heard.  Pulmonary/Chest: Breath sounds normal. No respiratory distress. She has no wheezes. She has no rhonchi. She has no rales. She exhibits no tenderness.   Abdominal: Abdomen is soft. Bowel sounds are normal. She exhibits no distension and no mass. There is no abdominal tenderness. There is no rebound and no guarding.   Genitourinary:    Genitourinary Comments: No rectal tone     Musculoskeletal:         General: No tenderness or edema. Normal range of motion.      Cervical back: Normal range of motion and neck supple.     Neurological: She is alert and oriented to person, place, and time.   Skin: Skin is warm and dry. Capillary refill takes more than 3 seconds.   Psychiatric: Thought content normal.   anxious         ED Course   Procedures  Labs Reviewed   CBC W/ AUTO DIFFERENTIAL - Abnormal; Notable for the following components:       Result Value    RBC 3.30 (*)     Hemoglobin 11.4 (*)     Hematocrit 34.0 (*)      (*)     MCH 34.5 (*)     RDW 14.9 (*)     Platelets 68 (*)     Gran % 74.4 (*)     Lymph % 15.2 (*)     All other components within normal limits   COMPREHENSIVE METABOLIC PANEL - Abnormal; Notable for the following components:    CO2 18 (*)     Glucose 118 (*)     Calcium 8.2 (*)     Albumin 2.3 (*)     Total Bilirubin 3.9 (*)     AST 41 (*)     All other components within normal limits   MAGNESIUM - Abnormal; Notable for the following components:    Magnesium 1.4 (*)     All other components within normal limits   LACTIC ACID, PLASMA - Abnormal; Notable for the following components:    Lactate (Lactic Acid) 3.1 (*)     All other components within normal limits   CLOSTRIDIUM DIFFICILE   CULTURE, STOOL   HEPATITIS C ANTIBODY   WBC, STOOL   ROTAVIRUS ANTIGEN, STOOL   GIARDIA/CRYPTOSPORIDIUM (EIA)   STOOL EXAM-OVA,CYSTS,PARASITES   LACTIC ACID, PLASMA          Imaging Results    None          Medications    sodium chloride 0.9% bolus 1,000 mL (1,000 mLs Intravenous New Bag 6/25/22 2030)   magnesium sulfate 2g in water 50mL IVPB (premix) (has no administration in time range)   lactated ringers bolus 1,000 mL (0 mLs Intravenous Stopped 6/25/22 1951)   diazePAM injection 10 mg (10 mg Intravenous Given 6/25/22 2025)     Medical Decision Making:   History:   I obtained history from: EMS provider.  Old Medical Records: I decided to obtain old medical records.  Initial Assessment:   76 yo W with extensive pmhx including anxiety, anemia, lung CA, postpolio syndrome, lung cancer diagnosed 2009 (s/p surgery and chemo), chemotherapy induced neuropathy, Grade 1 dCHF, lower extremity weakness (being followed by Neurology), rheumatoid arthritis, fibromyalgia, hyperparathyroidism presents via EMS with a chief complaint of diarrhea.  Differential Diagnosis:   Dehydration, SONYA, infectious diarrhea, C diff  Clinical Tests:   Lab Tests: Ordered  Radiological Study: Ordered  ED Management:  Patient overall well-appearing but anxious and somewhat dry.  Will administer IV fluids.  Will obtain serum labs and stool labs.  I do not suspect any acute intra-abdominal etiology given soft abdomen with no tenderness.  Unfortunately, she has failed both Lomotil and Imodium.    Reassessment:  Patient is currently receiving IV fluids.  Nurse was unsuccessful at obtaining IV access to send blood and she deferred any blood work at this time.  She has not provided any stool samples.  Rectal exam revealed absent rectal tone.  I feel the lack of rectal tone is the likely etiology to the loose bowel movements, 3-4 times daily without realizing she has to have a bowel movement.  I discussed this at length with her.  She reports that she has had difficulties ambulating for years.  She has previously followed with 2 neurologists at [facility] and now follows with Neurology at [facility].  Her most recent lumbar MRI was in 2020. She reports she had an extensive  outpatient evaluation with Neurology [facility] for the leg weakness without a clear diagnosis.  Diagnoses have included post-polio syndrome, neuropathy, and others.  She does not feel that there is a structural cause of the bowel incontinence but given lower extremity weakness, and lack of rectal tone, I am unable to rule out cauda equina without an MRI.  MRI ordered with Valium for anxiety prior.    Reassessment: CBC without leukocytosis.  Hemoglobin 11.4 which is slightly decreased from baseline.  CMP without emergent abnormalities.  Mild acidosis with a bicarb of 18. There is persistent hypomagnesemia.  Patient had no improvement despite oral replacement so will administer IV. Bilirubin is elevated at 3.9 but down from previous.  AST mildly increased to 41 from 40. Lactic acid is elevated at 3.1.  Will administer additional IV hydration and obtain repeat lactic acid.      Reassessment:  After 10 mg of Valium (despite pt's insistence that she requires 15 mg), she became acutely encephalopathic and confused.  She is not drowsy but was unable to answer questions about metal in her body.  Chest x-ray and KUB were ordered to investigate for metal.  She was moved to the main ER.  Will place in observation for her to metabolize her benzos and then she will likely require sedation for MRI. Obs per case management.                            Clinical Impression:   Final diagnoses:  [K62.89] Decreased rectal sphincter tone (Primary)  [R19.7] Diarrhea, unspecified type  [E83.42] Hypomagnesemia  [R79.89] Elevated lactic acid level  [Z13.89] Encounter for imaging to screen for metal prior to MRI  [G93.40] Acute encephalopathy          ED Disposition Condition    Observation               Sarabjit Wood MD  06/25/22 2027       Sarabjit Wood MD  06/25/22 2119

## 2022-06-25 NOTE — TELEPHONE ENCOUNTER
Reason for Disposition   Shock suspected (e.g., cold/pale/clammy skin, too weak to stand, low BP, rapid pulse)    Protocols used: DIARRHEA-A-AH  pt called re diarrhea. cant get it to stop. started on 6/11. in [place] last Monday. immodium wasnt working. explosive and kept coming. gave meds at hosp that worked but med ran out. no blood, pt admits to being too weak to stand. rec EMS. Pt states friend coming over and may be able to give her a ride. Pt states her hands not working right. Cant touch finger to nose.  Reiterate EMS. Pt agrees. Call back with questions

## 2022-06-26 NOTE — H&P
[facility] - Emergency Dept  [facility] Medicine  History & Physical    Patient Name: Yana Orellana  MRN: [id number]  Patient Class: OP- Observation  Admission Date: 6/25/2022  Attending Physician: Tri Forrest MD   Primary Care Provider: Caitlyn Graf MD         Patient information was obtained from patient, past medical records and ER records.     Subjective:     Principal Problem:Bowel incontinence    Chief Complaint:   Chief Complaint   Patient presents with    Diarrhea     X2wks. Denies abdominal pain and vomiting        HPI: Ms. Orellana is a 78 yo W with HTN, Non- alcoholic cirrhosis, obesity, anxiety, macrocytic anemia, hx of lung CA in 2009 s/p surgery and chemotherapy with subsequent chemotherapy induced neuropathy, post-polio syndrome, lower extremity weakness (being followed by Neurology), rheumatoid arthritis, fibromyalgia, presents to the ED for acute on chronic diarrhea with associated bowel incontinence. Per pt, she has been having diarrhea for the past 2 years now. Watery stool with some mixed mucus. Denies any melena, bloody stool. She has about 3-4 bouts per day. In the past 2-3 weeks, she has been having more problems with controlling her bowel movement. Would wake up soaking in stool or unable to sense that she have had a bowel movement. Denies any chills, abdominal pain, nausea, or vomiting, chest pain, or SOB. Denies any recent fall. Denies any recent seafood consumption. States she mostly eats TV dinners. She is very distressed that she can no longer move around and perform her own ADLs.     Of note, pt also has been having problems with progressive bilateral lower legs weakness for many years. Per pt, she has been seeing neurology both here and at [facility] with extensive workup. 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 She is now requiring a walker to ambulate, unable to perform ADLs by herself. Would have a friend  to come over to help out. Had 2 children but both are . Currently lives alone.     While in the ED, patient is afebrile, , RR 16, /53, sat 99% on room air.  Lab is significant for hgb 11.4, , platelets 68, bicarb 18, Mg 1.4, lactate acid 3.4.  Patient was given 1 L of LR, 2 g of IV Mg sulfate, 1 L of NS.  ED physician performed digital rectal exam, noticed patient's without rectal tone.  MRI lumbar ordered to further evaluate for cauda equina.  Patient has some reservation and asked to be given Valium prior to MRI.  IV Valium 10 mg was given by the ED. Patient became acutely encephalopathic, unable to answer if there is metals in her body.  Chest and abdominal x-rays were ordered, MRI postponed.  Patient is admitted to hospital medicine for further evaluation and management.       Past Medical History:   Diagnosis Date    Anemia     Anxiety     Cataract     Colon polyps     Fibromyalgia 10/15/2012    Lung cancer     Pneumonia due to infectious organism 2019    Post-polio syndrome     Rheumatoid arthritis(714.0) 2012    2009 onset with mod positive CCP and RF MTX 2011 - present     Thyroid disease     Vertigo     postural vertigo       Past Surgical History:   Procedure Laterality Date    BLADDER SURGERY      CHOLECYSTECTOMY      HYSTERECTOMY      LUNG REMOVAL, PARTIAL  2009    left lower lobectomy    OVARIAN CYST REMOVAL      TONSILLECTOMY         Review of patient's allergies indicates:   Allergen Reactions    Ibuprofen Nausea Only    Demerol [meperidine]      Euphoria    Gabapentin Other (See Comments)     Hallucinations    Mellaril-s Nausea Only    Versed [midazolam]      Excessive talking, hyper    Vicodin [hydrocodone-acetaminophen]      Joint pain, muscle pain    Xanax [alprazolam]      fatigue       No current facility-administered medications on file prior to encounter.     Current Outpatient Medications on File Prior to  Encounter   Medication Sig    cholecalciferol, vitamin D3, (VITAMIN D3) 25 mcg (1,000 unit) capsule Take 5 capsules (5,000 Units total) by mouth once daily.    multivitamin with minerals tablet Take 1 tablet by mouth once daily.    oxyCODONE-acetaminophen (PERCOCET) 5-325 mg per tablet Take 1 tablet by mouth every 8 (eight) hours as needed for Pain.    predniSONE (DELTASONE) 5 MG tablet Take 1-2 tablets (5-10 mg total) by mouth 2 (two) times daily as needed (arthritis).    diclofenac sodium (VOLTAREN) 1 % Gel Apply 2 g topically 3 (three) times daily.    spironolactone (ALDACTONE) 50 MG tablet Take 1 tablet (50 mg total) by mouth once daily.     Family History       Problem Relation (Age of Onset)    Arrhythmia Mother    Colon cancer Maternal Grandfather    Diabetes Maternal Grandmother    Heart attack Son (45)    Lung cancer Father, Maternal Aunt, Maternal Uncle    Parkinsonism Paternal Grandmother          Tobacco Use    Smoking status: Former Smoker     Packs/day: 1.50     Years: 40.00     Pack years: 60.00     Types: Cigarettes     Quit date: 2009     Years since quittin.9    Smokeless tobacco: Never Used   Substance and Sexual Activity    Alcohol use: Not Currently    Drug use: No    Sexual activity: Not Currently     Review of Systems   Constitutional:  Negative for appetite change, chills and fever.   HENT:  Negative for sore throat.    Eyes:  Negative for discharge.   Respiratory:  Negative for shortness of breath.    Cardiovascular:  Negative for chest pain and palpitations.   Gastrointestinal:  Positive for diarrhea. Negative for abdominal distention, abdominal pain, constipation, nausea and vomiting.   Endocrine: Negative for polydipsia and polyuria.   Genitourinary:  Negative for dysuria, hematuria and urgency.   Musculoskeletal:  Negative for back pain and gait problem.   Skin:  Negative for color change and rash.   Neurological:  Positive for weakness. Negative for numbness.    Hematological:  Does not bruise/bleed easily.   Psychiatric/Behavioral:  Negative for self-injury.    Objective:     Vital Signs (Most Recent):  Temp: 98.3 °F (36.8 °C) (06/25/22 1637)  Pulse: 81 (06/25/22 2059)  Resp: 14 (06/25/22 2059)  BP: (!) 156/53 (06/25/22 1637)  SpO2: 96 % (06/25/22 2059)   Vital Signs (24h Range):  Temp:  [98.3 °F (36.8 °C)] 98.3 °F (36.8 °C)  Pulse:  [] 81  Resp:  [14-16] 14  SpO2:  [96 %-99 %] 96 %  BP: (156)/(53) 156/53     Weight: 120 kg (264 lb 8.8 oz)  Body mass index is 37.96 kg/m².    Physical Exam  Vitals and nursing note reviewed.   Constitutional:       Appearance: Normal appearance. She is obese.   HENT:      Head: Normocephalic and atraumatic.      Right Ear: External ear normal.      Left Ear: External ear normal.      Nose: Nose normal.      Mouth/Throat:      Mouth: Mucous membranes are moist.   Eyes:      General:         Right eye: No discharge.         Left eye: No discharge.      Conjunctiva/sclera: Conjunctivae normal.   Cardiovascular:      Rate and Rhythm: Normal rate and regular rhythm.      Heart sounds: No murmur heard.  Pulmonary:      Effort: Pulmonary effort is normal.      Breath sounds: Normal breath sounds. No wheezing or rales.   Abdominal:      General: Abdomen is flat. There is no distension.      Palpations: Abdomen is soft.      Tenderness: There is no abdominal tenderness.   Musculoskeletal:         General: No swelling. Normal range of motion.      Cervical back: Normal range of motion.      Right lower leg: Edema (without pitting) present.      Left lower leg: Edema (without pitting) present.   Skin:     General: Skin is warm and dry.      Capillary Refill: Capillary refill takes less than 2 seconds.   Neurological:      Mental Status: She is alert and oriented to person, place, and time.      Sensory: No sensory deficit.      Motor: Weakness present.      Comments: Bilateral Lower extremities strength 2/5   Psychiatric:         Mood and  Affect: Mood normal.           Significant Labs: All pertinent labs within the past 24 hours have been reviewed.  Recent Lab Results         06/25/22 2154   06/25/22  1942        Albumin   2.3       Alkaline Phosphatase   91       ALT   15       Anion Gap   12       AST   41       Baso #   0.04       Basophil %   0.6       BILIRUBIN TOTAL   3.9  Comment: For infants and newborns, interpretation of results should be based  on gestational age, weight and in agreement with clinical  observations.    Premature Infant recommended reference ranges:  Up to 24 hours.............<8.0 mg/dL  Up to 48 hours............<12.0 mg/dL  3-5 days..................<15.0 mg/dL  6-29 days.................<15.0 mg/dL         BUN   9       Calcium   8.2       Chloride   110       CO2   18       Creatinine   0.7       Differential Method   Automated       eGFR if    >60.0       eGFR if non    >60.0  Comment: Calculation used to obtain the estimated glomerular filtration  rate (eGFR) is the CKD-EPI equation.          Eos #   0.1       Eosinophil %   0.7       Glucose   118       Gran # (ANC)   5.2       Gran %   74.4       Hematocrit   34.0       Hemoglobin   11.4       Immature Grans (Abs)   0.03  Comment: Mild elevation in immature granulocytes is non specific and   can be seen in a variety of conditions including stress response,   acute inflammation, trauma and pregnancy. Correlation with other   laboratory and clinical findings is essential.         Immature Granulocytes   0.4       Lactate, Renato 3.4  Comment: Falsely low lactic acid results can be found in samples   containing >=13.0 mg/dL total bilirubin and/or >=3.5 mg/dL   direct bilirubin.     3.1  Comment: Falsely low lactic acid results can be found in samples   containing >=13.0 mg/dL total bilirubin and/or >=3.5 mg/dL   direct bilirubin.         Lymph #   1.1       Lymph %   15.2       Magnesium   1.4       MCH   34.5       MCHC   33.5       MCV    103       Mono #   0.6       Mono %   8.7       MPV   10.9       nRBC   0       Platelets   68       Potassium   3.9       PROTEIN TOTAL   6.4       RBC   3.30       RDW   14.9       Sodium   140       WBC   7.04               Significant Imaging:   No results found in the last 24 hours.  X-Ray Chest AP Portable    Result Date: 6/25/2022  As above. Electronically signed by: All Sepulveda MD Date:    06/25/2022 Time:    22:07    No results found in the last 24 hours.  X-Ray Chest AP Portable    Result Date: 6/25/2022  As above. Electronically signed by: All Sepulveda MD Date:    06/25/2022 Time:    22:07    No results found in the last 24 hours.        Assessment/Plan:     * Bowel incontinence  Ms. Orellana is a 76 yo W with HTN, Non- alcoholic cirrhosis, obesity, anxiety, macrocytic anemia, hx of lung CA in 2009 s/p surgery and chemotherapy with subsequent chemotherapy induced neuropathy, post-polio syndrome, lower extremity weakness (being followed by Neurology), rheumatoid arthritis, fibromyalgia, presents to the ED for acute on chronic diarrhea with associated bowel incontinence.    Differential includes but not limited to: neuromuscular weakness vs cauda equina vs myopathies     Pt had extensive workup with Neurology here. Last seen with Dr. Myers on 11/2019. She was then referred to [facility]lloa [facility] for a second opinion. She ended up following up with Dr. Dr. Mary Padilla who dx her with a rare disease--lambert-eaton myasthenic syndrome. Per pt, physician there changed her in regards to this dx.     Plan:  - Neurology consult   - NIF QD  - MRI Lumbar Without Contrast pending   - Workup etiology of diarrhea; see diarrhea   - Serology workup: SSA, SSB, vitamin b12, vitamin B1, folate, CK, AntiSm/RNP, SPEP, UPEP; MG/Lamber-Eaton, TSH   - PT/OT       Lactic acidosis  Likely secondary to copious diarrhea. S/p fluid resuscitation in the ED. Low suspicion for infection given pt is without fever or white  count     Plan:  - Trend lactic   - Hold abx for now as suspicion for infection is low and pt is currently HD stable   - Blood Cultures pending         Chronic diarrhea  Unlikely to be infectious given chronicity, and pt is without abdominal pain, bloody stool or nausea or vomitting. Osmotic vs secretory vs motility. Motility is most likely given the lacks of rectal tone      Plan:  - stool panel ordered   - Stool electrolytes to calculate for stool osmotic gap   - Once infectious etiology is ruled out, can consider anti-motility agent such as lomotil       Weakness of both lower extremities  See bowel incontinence       Vitamin D deficiency  - Continue home vitamin D       Essential hypertension  Home regimen: aldactone 50 mg QD     Plan:   - Resume home regimen           Morbid obesity  Body mass index is 37.96 kg/m². Morbid obesity complicates all aspects of disease management from diagnostic modalities to treatment. Weight loss encouraged and health benefits explained to patient.         Hypomagnesemia  S/p replacement in the ED     Plan:  - Repeat Mg in the AM       Physical deconditioning  Debility  - PT/OT consult  - Appreciate CM/SW assistance          Thrombocytopenia  Likely secondary to history of cirrhosis     Plan:  - Daily CBC  - Transfuse if plt <10,000 or <50,000 with bleeding   - Thrombocytopenia precautions         Rheumatoid arthritis involving multiple sites with positive rheumatoid factor  F/u with Dr. Agrawal outpatient. No longer on MTX due to thrombocytopenia         VTE Risk Mitigation (From admission, onward)         Ordered     enoxaparin injection 40 mg  Daily         06/25/22 2134     IP VTE HIGH RISK PATIENT  Once         06/25/22 2134     Place sequential compression device  Until discontinued         06/25/22 2134                   Tanisha Gage DO  Department of Hospital Medicine   [facility] - Emergency Dept

## 2022-06-26 NOTE — ASSESSMENT & PLAN NOTE
Ms. Orellana is a 78 yo W with HTN, Non- alcoholic cirrhosis, obesity, anxiety, macrocytic anemia, hx of lung CA in 2009 s/p surgery and chemotherapy with subsequent chemotherapy induced neuropathy, post-polio syndrome, lower extremity weakness (being followed by Neurology), rheumatoid arthritis, fibromyalgia, presents to the ED for acute on chronic diarrhea with associated bowel incontinence.    Differential includes but not limited to: neuromuscular weakness vs cauda equina vs myopathies     Pt had extensive workup with Neurology here. Last seen with Dr. Myers on 11/2019. She was then referred to [facility] Rosendo Ulloa [facility] for a second opinion. She ended up following up with Dr. Dr. Mary Padilla who dx her with a rare disease--lambert-eaton myasthenic syndrome. Per pt, physician there changed her in regards to this dx.     Plan:  - Neurology consult   - NIF QD  - MRI Lumbar Without Contrast pending   - Workup etiology of diarrhea; see diarrhea   - Serology workup: SSA, SSB, vitamin b12, vitamin B1, folate, CK, AntiSm/RNP, SPEP, UPEP; MG/Lamber-Eaton, TSH   - PT/OT

## 2022-06-26 NOTE — SUBJECTIVE & OBJECTIVE
Past Medical History:   Diagnosis Date    Anemia     Anxiety     Cataract     Colon polyps 2012    Fibromyalgia 10/15/2012    Lung cancer     Pneumonia due to infectious organism 7/19/2019    Post-polio syndrome     Rheumatoid arthritis(714.0) 7/16/2012 2009 onset with mod positive CCP and RF MTX 2011 - present     Thyroid disease     Vertigo     postural vertigo       Past Surgical History:   Procedure Laterality Date    BLADDER SURGERY  1959    CHOLECYSTECTOMY      HYSTERECTOMY  1988    LUNG REMOVAL, PARTIAL  2009    left lower lobectomy    OVARIAN CYST REMOVAL  1970    TONSILLECTOMY  1952       Review of patient's allergies indicates:   Allergen Reactions    Ibuprofen Nausea Only    Demerol [meperidine]      Euphoria    Gabapentin Other (See Comments)     Hallucinations    Mellaril-s Nausea Only    Versed [midazolam]      Excessive talking, hyper    Vicodin [hydrocodone-acetaminophen]      Joint pain, muscle pain    Xanax [alprazolam]      fatigue       Current Neurological Medications: as detailed below     No current facility-administered medications on file prior to encounter.     Current Outpatient Medications on File Prior to Encounter   Medication Sig    cholecalciferol, vitamin D3, (VITAMIN D3) 25 mcg (1,000 unit) capsule Take 5 capsules (5,000 Units total) by mouth once daily.    multivitamin with minerals tablet Take 1 tablet by mouth once daily.    oxyCODONE-acetaminophen (PERCOCET) 5-325 mg per tablet Take 1 tablet by mouth every 8 (eight) hours as needed for Pain.    predniSONE (DELTASONE) 5 MG tablet Take 1-2 tablets (5-10 mg total) by mouth 2 (two) times daily as needed (arthritis).    diclofenac sodium (VOLTAREN) 1 % Gel Apply 2 g topically 3 (three) times daily.    spironolactone (ALDACTONE) 50 MG tablet Take 1 tablet (50 mg total) by mouth once daily.     Family History       Problem Relation (Age of Onset)    Arrhythmia Mother    Colon cancer Maternal Grandfather    Diabetes Maternal  Grandmother    Heart attack Son (45)    Lung cancer Father, Maternal Aunt, Maternal Uncle    Parkinsonism Paternal Grandmother          Tobacco Use    Smoking status: Former Smoker     Packs/day: 1.50     Years: 40.00     Pack years: 60.00     Types: Cigarettes     Quit date: 2009     Years since quittin.9    Smokeless tobacco: Never Used   Substance and Sexual Activity    Alcohol use: Not Currently    Drug use: No    Sexual activity: Not Currently     Review of Systems   Constitutional:  Positive for appetite change, fatigue and unexpected weight change. Negative for chills and fever.   HENT:  Negative for congestion, sore throat, trouble swallowing and voice change.    Eyes: Negative.    Respiratory:  Negative for cough and shortness of breath.    Cardiovascular:  Negative for chest pain and leg swelling.   Gastrointestinal:  Positive for diarrhea. Negative for abdominal distention, abdominal pain, blood in stool, constipation, nausea, rectal pain and vomiting.   Endocrine: Negative.    Genitourinary: Negative.  Negative for difficulty urinating and dyspareunia.   Musculoskeletal:  Positive for arthralgias and gait problem. Negative for back pain.   Skin: Negative.    Neurological:  Positive for weakness. Negative for headaches.   Hematological:  Bruises/bleeds easily.   Psychiatric/Behavioral: Negative.     Objective:     Vital Signs (Most Recent):  Temp: 98.8 °F (37.1 °C) (22)  Pulse: 91 (22)  Resp: 18 (22)  BP: (!) 153/69 (22)  SpO2: 95 % (22)   Vital Signs (24h Range):  Temp:  [97.8 °F (36.6 °C)-98.8 °F (37.1 °C)] 98.8 °F (37.1 °C)  Pulse:  [] 91  Resp:  [14-20] 18  SpO2:  [95 %-100 %] 95 %  BP: (146-162)/(53-71) 153/69     Weight: 120 kg (264 lb 8.8 oz)  Body mass index is 37.96 kg/m².    Physical Exam  Constitutional:       Appearance: Normal appearance. She is obese.   HENT:      Head: Normocephalic and atraumatic.      Nose: Nose  normal.      Mouth/Throat:      Mouth: Mucous membranes are moist.   Eyes:      Extraocular Movements: Extraocular movements intact.      Pupils: Pupils are equal, round, and reactive to light.   Cardiovascular:      Rate and Rhythm: Normal rate and regular rhythm.   Pulmonary:      Effort: No respiratory distress.   Abdominal:      General: There is no distension.      Palpations: Abdomen is soft.      Tenderness: There is no abdominal tenderness.   Musculoskeletal:         General: No swelling or deformity. Normal range of motion.      Cervical back: Normal range of motion.   Skin:     General: Skin is warm.      Capillary Refill: Capillary refill takes less than 2 seconds.      Findings: Bruising present.   Neurological:      General: No focal deficit present.      Mental Status: She is alert and oriented to person, place, and time.      Cranial Nerves: Cranial nerves 2-12 are intact.      Sensory: Sensory deficit present.      Motor: Weakness present.      Coordination: Coordination abnormal.      Deep Tendon Reflexes: Reflexes abnormal.   Psychiatric:         Speech: Speech normal.       NEUROLOGICAL EXAMINATION:     MENTAL STATUS   Oriented to person, place, and time.   Attention: normal. Concentration: normal.   Speech: speech is normal   Level of consciousness: alert    CRANIAL NERVES   Cranial nerves II through XII intact.     CN III, IV, VI   Pupils are equal, round, and reactive to light.    MOTOR EXAM   Muscle bulk: normal  Overall muscle tone: normal  Right arm tone: normal  Left arm tone: normal  Right arm pronator drift: absent  Left arm pronator drift: absent  Right leg tone: normal  Left leg tone: normal    Strength   Strength 5/5 except as noted.   Right iliopsoas: 3/5  Left iliopsoas: 3/5  Right quadriceps: 4/5  Left quadriceps: 4/5  Right hamstrin/5  Left hamstrin/5  Right anterior tibial: 5/5  Left anterior tibial: 5/5  Right posterior tibial: 5/5  Left posterior tibial: 5/5  Right  peroneal: 5/5  Left peroneal: 5/5       During interview patient was spontaneously moving her bl upper extremities without any difficulty, completely anti gravity and able to hold her juice.     On entry her lower extremities were crossed, LLE over her RLE and during interview patient was spontaneously bending her legs to get comfortable without any effort issues.     During thorough exam upper extremities strength intact   Lower extremity exam was effort dependent but there is more prominent proximal weakness that is also affected by patient's body habitus.        Significant Labs: CBC:   Recent Labs   Lab 06/25/22 1942 06/26/22 0822   WBC 7.04 5.39   HGB 11.4* 11.5*   HCT 34.0* 34.8*   PLT 68* 71*     CMP:   Recent Labs   Lab 06/25/22 1942 06/26/22 0822   * 128*    140   K 3.9 3.4*    112*   CO2 18* 19*   BUN 9 9   CREATININE 0.7 0.6   CALCIUM 8.2* 8.0*   MG 1.4* 1.7   PROT 6.4 6.1   ALBUMIN 2.3* 2.2*   BILITOT 3.9* 3.4*   ALKPHOS 91 94   AST 41* 37   ALT 15 16   ANIONGAP 12 9   EGFRNONAA >60.0 >60.0     All pertinent lab results from the past 24 hours have been reviewed.    Significant Imaging: I have reviewed and interpreted all pertinent imaging results/findings within the past 24 hours.        EXAMINATION:  MRI LUMBAR SPINE WITHOUT CONTRAST     CLINICAL HISTORY:  Low back pain, cauda equina syndrome suspected;     TECHNIQUE:  Multiplanar, multisequence MR images were acquired from the thoracolumbar junction to the sacrum without the administration of contrast.     COMPARISON:  MRI lumbar spine 11/19/2020     FINDINGS:  There is mild grade 1 anterolisthesis of L4 on L5, similar to prior examination.  The vertebral body heights are well maintained, with no fracture.  There is no marrow signal abnormality suspicious for infiltrative process.  There is disc desiccation at L4-L5 and L5-S1.  There are endplate degenerative changes at L4-L5 with Schmorl node formation.  There is mild  degenerative endplate edema present.  Additionally, at the level of T11-T12, there are remote endplate degenerative changes with intervertebral disc space height loss.  The conus is normal in appearance and terminates at the L1 level.     L1-L2: There is no focal disk herniation.  No significant spinal canal or neural foraminal narrowing.     L2-L3: There is no focal disk herniation.  There is mild bilateral facet arthropathy.  No significant spinal canal or neural foraminal narrowing.     L3-L4: There is a mild circumferential disc bulge, ligamentum flavum thickening, and bilateral facet arthropathy.  No significant spinal canal or neural foraminal narrowing.     L4-L5: There is grade 1 anterolisthesis with uncovering of the disc.  There is a circumferential disc bulge and ligamentum flavum thickening.  There is advanced bilateral facet arthropathy with degenerative cystic change and small synovial facet cyst formation.  Prominent fluid is noted within the bilateral facet articulations with mild adjacent soft tissue edema.  There is mild to moderate spinal canal stenosis.  There is moderate right-sided and mild left-sided neural foraminal narrowing.     L5-S1: There is a circumferential disc bulge which is asymmetric to the right.  There is moderate bilateral facet arthropathy.  There is mild effacement of the anterior thecal sac without significant spinal canal stenosis.  There is mild bilateral neural foraminal narrowing.     Limited intra-abdominal views demonstrate no acute abnormalities.  There is prominent dependent subcutaneous edema within the paraspinal soft tissues.     Impression:     Degenerative changes of the lumbar spine which remain most pronounced at L4-L5 demonstrating grade I anterolisthesis, circumferential disc bulge, and ligamentum flavum thickening.  There is advanced bilateral facet arthropathy with prominent fluid noted in the bilateral facet articulations as well as adjacent soft tissue  edema.  This contributes to mild-to-moderate spinal canal stenosis as well as moderate right-sided and mild left-sided neural foraminal narrowing.  Please see above for additional level by level details.

## 2022-06-26 NOTE — ASSESSMENT & PLAN NOTE
Likely secondary to copious diarrhea. S/p fluid resuscitation in the ED. Low suspicion for infection given pt is without fever or white count     Plan:  - Trend lactic   - Hold abx for now as suspicion for infection is low and pt is currently HD stable   - Blood Cultures pending

## 2022-06-26 NOTE — SUBJECTIVE & OBJECTIVE
Past Medical History:   Diagnosis Date    Anemia     Anxiety     Cataract     Colon polyps 2012    Fibromyalgia 10/15/2012    Lung cancer     Pneumonia due to infectious organism 7/19/2019    Post-polio syndrome     Rheumatoid arthritis(714.0) 7/16/2012 2009 onset with mod positive CCP and RF MTX 2011 - present     Thyroid disease     Vertigo     postural vertigo       Past Surgical History:   Procedure Laterality Date    BLADDER SURGERY  1959    CHOLECYSTECTOMY      HYSTERECTOMY  1988    LUNG REMOVAL, PARTIAL  2009    left lower lobectomy    OVARIAN CYST REMOVAL  1970    TONSILLECTOMY  1952       Review of patient's allergies indicates:   Allergen Reactions    Ibuprofen Nausea Only    Demerol [meperidine]      Euphoria    Gabapentin Other (See Comments)     Hallucinations    Mellaril-s Nausea Only    Versed [midazolam]      Excessive talking, hyper    Vicodin [hydrocodone-acetaminophen]      Joint pain, muscle pain    Xanax [alprazolam]      fatigue       No current facility-administered medications on file prior to encounter.     Current Outpatient Medications on File Prior to Encounter   Medication Sig    cholecalciferol, vitamin D3, (VITAMIN D3) 25 mcg (1,000 unit) capsule Take 5 capsules (5,000 Units total) by mouth once daily.    multivitamin with minerals tablet Take 1 tablet by mouth once daily.    oxyCODONE-acetaminophen (PERCOCET) 5-325 mg per tablet Take 1 tablet by mouth every 8 (eight) hours as needed for Pain.    predniSONE (DELTASONE) 5 MG tablet Take 1-2 tablets (5-10 mg total) by mouth 2 (two) times daily as needed (arthritis).    diclofenac sodium (VOLTAREN) 1 % Gel Apply 2 g topically 3 (three) times daily.    spironolactone (ALDACTONE) 50 MG tablet Take 1 tablet (50 mg total) by mouth once daily.     Family History       Problem Relation (Age of Onset)    Arrhythmia Mother    Colon cancer Maternal Grandfather    Diabetes Maternal Grandmother    Heart attack Son (45)    Lung cancer Father,  Maternal Aunt, Maternal Uncle    Parkinsonism Paternal Grandmother          Tobacco Use    Smoking status: Former Smoker     Packs/day: 1.50     Years: 40.00     Pack years: 60.00     Types: Cigarettes     Quit date: 2009     Years since quittin.9    Smokeless tobacco: Never Used   Substance and Sexual Activity    Alcohol use: Not Currently    Drug use: No    Sexual activity: Not Currently     Review of Systems   Constitutional:  Negative for appetite change, chills and fever.   HENT:  Negative for sore throat.    Eyes:  Negative for discharge.   Respiratory:  Negative for shortness of breath.    Cardiovascular:  Negative for chest pain and palpitations.   Gastrointestinal:  Positive for diarrhea. Negative for abdominal distention, abdominal pain, constipation, nausea and vomiting.   Endocrine: Negative for polydipsia and polyuria.   Genitourinary:  Negative for dysuria, hematuria and urgency.   Musculoskeletal:  Negative for back pain and gait problem.   Skin:  Negative for color change and rash.   Neurological:  Positive for weakness. Negative for numbness.   Hematological:  Does not bruise/bleed easily.   Psychiatric/Behavioral:  Negative for self-injury.    Objective:     Vital Signs (Most Recent):  Temp: 98.3 °F (36.8 °C) (22 163)  Pulse: 81 (22)  Resp: 14 (22)  BP: (!) 156/53 (22 163)  SpO2: 96 % (22)   Vital Signs (24h Range):  Temp:  [98.3 °F (36.8 °C)] 98.3 °F (36.8 °C)  Pulse:  [] 81  Resp:  [14-16] 14  SpO2:  [96 %-99 %] 96 %  BP: (156)/(53) 156/53     Weight: 120 kg (264 lb 8.8 oz)  Body mass index is 37.96 kg/m².    Physical Exam  Vitals and nursing note reviewed.   Constitutional:       Appearance: Normal appearance. She is obese.   HENT:      Head: Normocephalic and atraumatic.      Right Ear: External ear normal.      Left Ear: External ear normal.      Nose: Nose normal.      Mouth/Throat:      Mouth: Mucous membranes are moist.   Eyes:       General:         Right eye: No discharge.         Left eye: No discharge.      Conjunctiva/sclera: Conjunctivae normal.   Cardiovascular:      Rate and Rhythm: Normal rate and regular rhythm.      Heart sounds: No murmur heard.  Pulmonary:      Effort: Pulmonary effort is normal.      Breath sounds: Normal breath sounds. No wheezing or rales.   Abdominal:      General: Abdomen is flat. There is no distension.      Palpations: Abdomen is soft.      Tenderness: There is no abdominal tenderness.   Musculoskeletal:         General: No swelling. Normal range of motion.      Cervical back: Normal range of motion.      Right lower leg: Edema (without pitting) present.      Left lower leg: Edema (without pitting) present.   Skin:     General: Skin is warm and dry.      Capillary Refill: Capillary refill takes less than 2 seconds.   Neurological:      Mental Status: She is alert and oriented to person, place, and time.      Sensory: No sensory deficit.      Motor: Weakness present.      Comments: Bilateral Lower extremities strength 2/5   Psychiatric:         Mood and Affect: Mood normal.           Significant Labs: All pertinent labs within the past 24 hours have been reviewed.  Recent Lab Results         06/25/22 2154 06/25/22  1942        Albumin   2.3       Alkaline Phosphatase   91       ALT   15       Anion Gap   12       AST   41       Baso #   0.04       Basophil %   0.6       BILIRUBIN TOTAL   3.9  Comment: For infants and newborns, interpretation of results should be based  on gestational age, weight and in agreement with clinical  observations.    Premature Infant recommended reference ranges:  Up to 24 hours.............<8.0 mg/dL  Up to 48 hours............<12.0 mg/dL  3-5 days..................<15.0 mg/dL  6-29 days.................<15.0 mg/dL         BUN   9       Calcium   8.2       Chloride   110       CO2   18       Creatinine   0.7       Differential Method   Automated       eGFR if   American   >60.0       eGFR if non    >60.0  Comment: Calculation used to obtain the estimated glomerular filtration  rate (eGFR) is the CKD-EPI equation.          Eos #   0.1       Eosinophil %   0.7       Glucose   118       Gran # (ANC)   5.2       Gran %   74.4       Hematocrit   34.0       Hemoglobin   11.4       Immature Grans (Abs)   0.03  Comment: Mild elevation in immature granulocytes is non specific and   can be seen in a variety of conditions including stress response,   acute inflammation, trauma and pregnancy. Correlation with other   laboratory and clinical findings is essential.         Immature Granulocytes   0.4       Lactate, Renato 3.4  Comment: Falsely low lactic acid results can be found in samples   containing >=13.0 mg/dL total bilirubin and/or >=3.5 mg/dL   direct bilirubin.     3.1  Comment: Falsely low lactic acid results can be found in samples   containing >=13.0 mg/dL total bilirubin and/or >=3.5 mg/dL   direct bilirubin.         Lymph #   1.1       Lymph %   15.2       Magnesium   1.4       MCH   34.5       MCHC   33.5       MCV   103       Mono #   0.6       Mono %   8.7       MPV   10.9       nRBC   0       Platelets   68       Potassium   3.9       PROTEIN TOTAL   6.4       RBC   3.30       RDW   14.9       Sodium   140       WBC   7.04               Significant Imaging:   No results found in the last 24 hours.  X-Ray Chest AP Portable    Result Date: 6/25/2022  As above. Electronically signed by: All Sepulveda MD Date:    06/25/2022 Time:    22:07    No results found in the last 24 hours.  X-Ray Chest AP Portable    Result Date: 6/25/2022  As above. Electronically signed by: All Sepulveda MD Date:    06/25/2022 Time:    22:07    No results found in the last 24 hours.

## 2022-06-26 NOTE — ASSESSMENT & PLAN NOTE
76 yo F w PMH HTN, Non- alcoholic cirrhosis, obesity, anxiety, macrocytic anemia, hx of lung CA in 2009 s/p surgery and chemotherapy with subsequent chemotherapy induced neuropathy, hx of polio, RA, fibromyalgia and chronic lower extremity weakness and bowel incontinence admitted to  for worsening incontinence and diarrhea for which neurology consulted due to known history of lower extremity weakness. Patient has had issues with her incontinence for years but now is worse to the extend of complete loss of control of stool and per ED patient had a decreased rectal tone. She denies any saddle anesthesia. She has also bee suffering with a progressive lower extremity weakness with no clear diagnosis or positive workup to explain her symptoms. Patient refers being significantly debilitated refers severe weakness, unable to turn in bed and get in and out of chairs but she is still ambulating with a walker. But on her physical exam she has preserved spontaneous anti gravity movement at her upper and lower extremities. Exam is some what effort dependent in her lowers, but there is objective 3/5 strength at iliopsoas bl and 4-/5 with knee flexion/extension that can also be 2/2 to severe deconditioning.     She has had extensive workup in the past to r/o demyelinating diease, myopathy, autoimmune neuromuscular dx such as MG/LES and workup negative except for cervical spondylosis and extensive neurodegenerative changes at L5-S1.   She denies any rectal trauma or during her deliveries. She has prior colonoscopy from 2012 that showed 5-10 polyps warranting surveillance but patient has not been able to get a colonoscopy because of her mobility limitation plus incontinence. She does refer a loss of appetite and non purposeful weight loss.     Overall presentation is unclear but unlikely to be purely neurological. She had repeat lumbar MRI that personally reviewed and it seems as there is worsening disc bulging at L5-S1 as well  as the overall degenerative changes which could explain patient's rectal incontinence. For her lower extremity weakness there could be some underlying pathology amybe 2/2 to lumbar disease but there is high evidence that patient's morbid obesity and deconditioning can also explain her gate difficulty and weakness. If this were a neurodegenerative disease or autoimmune process patient would be declining more quickly with lack of treatment and she has extensive workup in the past to deny this. Finally given patient's known history of malignancy, chronic bowel incontinence and weight loss/loss of appetite there could also be a possible underlying GI issue that could explain her worsening incontinence, specially in setting of previously abnormal colonoscopy.     Recommendations   -- There is no indication for IP EMG/NCS given previous normal ones or other neurological workup.    -- Recommend NSGY consult to evaluate patient's imaging and clinical exam to see if symptoms could be explained by this and whether or not there could be something done    -- Consider working up other non neurological causes for incontinence, suggested possible GI or CRS evaluation as patient may benefit from IP colonoscospy to help with her prep. Given patient's lack of mobility at home and incontinence she could prep adequately, could benefit from IP colonoscopy or evaluation.   -- Patient denies any pain or spasticity   -- PT/OT for home needs, patient will benefit from HH given lack of support at home   -- Nutrition/dietary consult, patient may benefit from OP bariatric evaluation   -- Will need to follow up with her OP neurologist.

## 2022-06-26 NOTE — CONSULTS
[facility]  Neurology  Consult Note    Patient Name: Yana Orellana  MRN: [id number]  Admission Date: 6/25/2022  Hospital Length of Stay: 0 days  Code Status: Full Code   Attending Provider: Tri Forrest MD   Consulting Provider: Areli Weldon MD  Primary Care Physician: Caitlyn Graf MD  Principal Problem:Bowel incontinence    Inpatient consult to Neurology  Consult performed by: Areli Weldon MD  Consult ordered by: Tanisha Gage DO         Subjective:     Chief Complaint:  Bowel incontinence      HPI:   76 yo F w PMH HTN, Non- alcoholic cirrhosis, obesity, anxiety, macrocytic anemia, hx of lung CA in 2009 s/p surgery and chemotherapy with subsequent chemotherapy induced neuropathy, hx of polio, RA, fibromyalgia and chronic lower extremity weakness and bowel incontinence (being followed by multiple Neurologist) admitted to  for worsening incontinence and diarrhea for which neurology consulted due to known history of lower extremity weakness. Patient has had issues with her bowels for years but in the last week she refers that is no longer able to control her stool and she is having many accidents, while at bed and also walking. She has found herself seating in stool without realizing. She denies any urinary incontinence or rectal numbness and refers that she has both hard and loose stools, denies any hematochezia. She refers having decreased appetite in the last year and a 10lb non intentional weight loss. Denies any fevers, chills, abdominal/rectal pain. Other than her incontinence, patient has been struggling with worsening lower extremity weakness and she is requiring more help to her ADLs. She ambulates with walker, she needs assistance getting up and down then toilet or chairs. She has to crawl herself to bed face forward and is having difficulty turning herself in bed. She refers that she shuffles her gate and feels unbalanced. She currently lives alone and has help  from a friend. She is  and both children have passed away and granddaughter lives in [place].     Of note patient has been seen by multiple neurologists in the past with a very extensive workup for her progressive lower extremity weakness that began in 1980s starting as difficulty with ambulation. Then in 1990 she started to have gate difficulty, feeling of balance and was seen in [place] who thought it might be a non specific ataxia. Then in 2000 she began to have difficulty standing from seating positions (proximal weakness) and then developed acute sensory neuropathy post chemotherapy in 2009. Her most recent neurologist have been Dr. Vaca who referred her to Dr. España at [facility] who is her primary neurologist at the time. Patient has been worked up for MS, PD, Neuromuscular myopathies, MG/Lambert Eaton with a negative workup including EMG/NCS (showed R hand CTS, R C6-C7 radiculopathy, non specific neuropathy at lower extremities and normal EMG). Patient has had previous MRIs that show chronic microvascular changes w a larger [id number] hyperintensity at L frontal lobe, cervical spondylosis at C5-C6 w mild spinal stenosis and significant lumbar degenerative changes, specifically at L5-S1.  From her serum workup pertinent +/- include a +anti smooth muscle Ab, low Vit D 13, elevated PTH 82.6, and serum electropheresis with elevated kappa light chain, lambda and gamma.     At the ED, patient is afebrile, , RR 16, /53, sat 99% on room air.  Lab is significant for hgb 11.4, , platelets 68, bicarb 18, Mg 1.4, lactate acid 3.4.  Patient was given 1 L of LR, 2 g of IV Mg sulfate, 1 L of NS.  ED physician performed digital rectal exam, noticed patient's without rectal tone. MRI lumbar ordered to further evaluate for cauda equina.     MRI showed degenerative changes of the lumbar spine which remain most pronounced at L4-L5 demonstrating grade I anterolisthesis, circumferential disc bulge, and ligamentum  flavum thickening. This was personally reviewed and compares to previous and seems worse than her prio MRI. There is also mild-to-moderate spinal canal stenosis as well as moderate right-sided and mild left-sided neural foraminal narrowing.        Past Medical History:   Diagnosis Date    Anemia     Anxiety     Cataract     Colon polyps 2012    Fibromyalgia 10/15/2012    Lung cancer     Pneumonia due to infectious organism 7/19/2019    Post-polio syndrome     Rheumatoid arthritis(714.0) 7/16/2012    2009 onset with mod positive CCP and RF MTX 2011 - present     Thyroid disease     Vertigo     postural vertigo       Past Surgical History:   Procedure Laterality Date    BLADDER SURGERY  1959    CHOLECYSTECTOMY      HYSTERECTOMY  1988    LUNG REMOVAL, PARTIAL  2009    left lower lobectomy    OVARIAN CYST REMOVAL  1970    TONSILLECTOMY  1952       Review of patient's allergies indicates:   Allergen Reactions    Ibuprofen Nausea Only    Demerol [meperidine]      Euphoria    Gabapentin Other (See Comments)     Hallucinations    Mellaril-s Nausea Only    Versed [midazolam]      Excessive talking, hyper    Vicodin [hydrocodone-acetaminophen]      Joint pain, muscle pain    Xanax [alprazolam]      fatigue       Current Neurological Medications: as detailed below     No current facility-administered medications on file prior to encounter.     Current Outpatient Medications on File Prior to Encounter   Medication Sig    cholecalciferol, vitamin D3, (VITAMIN D3) 25 mcg (1,000 unit) capsule Take 5 capsules (5,000 Units total) by mouth once daily.    multivitamin with minerals tablet Take 1 tablet by mouth once daily.    oxyCODONE-acetaminophen (PERCOCET) 5-325 mg per tablet Take 1 tablet by mouth every 8 (eight) hours as needed for Pain.    predniSONE (DELTASONE) 5 MG tablet Take 1-2 tablets (5-10 mg total) by mouth 2 (two) times daily as needed (arthritis).    diclofenac sodium (VOLTAREN) 1 % Gel  Apply 2 g topically 3 (three) times daily.    spironolactone (ALDACTONE) 50 MG tablet Take 1 tablet (50 mg total) by mouth once daily.     Family History       Problem Relation (Age of Onset)    Arrhythmia Mother    Colon cancer Maternal Grandfather    Diabetes Maternal Grandmother    Heart attack Son (45)    Lung cancer Father, Maternal Aunt, Maternal Uncle    Parkinsonism Paternal Grandmother          Tobacco Use    Smoking status: Former Smoker     Packs/day: 1.50     Years: 40.00     Pack years: 60.00     Types: Cigarettes     Quit date: 2009     Years since quittin.9    Smokeless tobacco: Never Used   Substance and Sexual Activity    Alcohol use: Not Currently    Drug use: No    Sexual activity: Not Currently     Review of Systems   Constitutional:  Positive for appetite change, fatigue and unexpected weight change. Negative for chills and fever.   HENT:  Negative for congestion, sore throat, trouble swallowing and voice change.    Eyes: Negative.    Respiratory:  Negative for cough and shortness of breath.    Cardiovascular:  Negative for chest pain and leg swelling.   Gastrointestinal:  Positive for diarrhea. Negative for abdominal distention, abdominal pain, blood in stool, constipation, nausea, rectal pain and vomiting.   Endocrine: Negative.    Genitourinary: Negative.  Negative for difficulty urinating and dyspareunia.   Musculoskeletal:  Positive for arthralgias and gait problem. Negative for back pain.   Skin: Negative.    Neurological:  Positive for weakness. Negative for headaches.   Hematological:  Bruises/bleeds easily.   Psychiatric/Behavioral: Negative.     Objective:     Vital Signs (Most Recent):  Temp: 98.8 °F (37.1 °C) (22)  Pulse: 91 (22)  Resp: 18 (22)  BP: (!) 153/69 (22)  SpO2: 95 % (22)   Vital Signs (24h Range):  Temp:  [97.8 °F (36.6 °C)-98.8 °F (37.1 °C)] 98.8 °F (37.1 °C)  Pulse:  [] 91  Resp:  [14-20]  18  SpO2:  [95 %-100 %] 95 %  BP: (146-162)/(53-71) 153/69     Weight: 120 kg (264 lb 8.8 oz)  Body mass index is 37.96 kg/m².    Physical Exam  Constitutional:       Appearance: Normal appearance. She is obese.   HENT:      Head: Normocephalic and atraumatic.      Nose: Nose normal.      Mouth/Throat:      Mouth: Mucous membranes are moist.   Eyes:      Extraocular Movements: Extraocular movements intact.      Pupils: Pupils are equal, round, and reactive to light.   Cardiovascular:      Rate and Rhythm: Normal rate and regular rhythm.   Pulmonary:      Effort: No respiratory distress.   Abdominal:      General: There is no distension.      Palpations: Abdomen is soft.      Tenderness: There is no abdominal tenderness.   Musculoskeletal:         General: No swelling or deformity. Normal range of motion.      Cervical back: Normal range of motion.   Skin:     General: Skin is warm.      Capillary Refill: Capillary refill takes less than 2 seconds.      Findings: Bruising present.   Neurological:      General: No focal deficit present.      Mental Status: She is alert and oriented to person, place, and time.      Cranial Nerves: Cranial nerves 2-12 are intact.      Sensory: Sensory deficit present.      Motor: Weakness present.      Coordination: Coordination abnormal.      Deep Tendon Reflexes: Reflexes abnormal.   Psychiatric:         Speech: Speech normal.       NEUROLOGICAL EXAMINATION:     MENTAL STATUS   Oriented to person, place, and time.   Attention: normal. Concentration: normal.   Speech: speech is normal   Level of consciousness: alert    CRANIAL NERVES   Cranial nerves II through XII intact.     CN III, IV, VI   Pupils are equal, round, and reactive to light.    MOTOR EXAM   Muscle bulk: normal  Overall muscle tone: normal  Right arm tone: normal  Left arm tone: normal  Right arm pronator drift: absent  Left arm pronator drift: absent  Right leg tone: normal  Left leg tone: normal    Strength   Strength  5/5 except as noted.   Right iliopsoas: 3/5  Left iliopsoas: 3/5  Right quadriceps: 4/5  Left quadriceps: 4/5  Right hamstrin/5  Left hamstrin/5  Right anterior tibial: 5/5  Left anterior tibial: 5/5  Right posterior tibial: 5/5  Left posterior tibial: 5/5  Right peroneal: 5/5  Left peroneal: 5/5       During interview patient was spontaneously moving her bl upper extremities without any difficulty, completely anti gravity and able to hold her juice.     On entry her lower extremities were crossed, LLE over her RLE and during interview patient was spontaneously bending her legs to get comfortable without any effort issues.     During thorough exam upper extremities strength intact   Lower extremity exam was effort dependent but there is more prominent proximal weakness that is also affected by patient's body habitus.        Significant Labs: CBC:   Recent Labs   Lab 22  08   WBC 7.04 5.39   HGB 11.4* 11.5*   HCT 34.0* 34.8*   PLT 68* 71*     CMP:   Recent Labs   Lab 22   * 128*    140   K 3.9 3.4*    112*   CO2 18* 19*   BUN 9 9   CREATININE 0.7 0.6   CALCIUM 8.2* 8.0*   MG 1.4* 1.7   PROT 6.4 6.1   ALBUMIN 2.3* 2.2*   BILITOT 3.9* 3.4*   ALKPHOS 91 94   AST 41* 37   ALT 15 16   ANIONGAP 12 9   EGFRNONAA >60.0 >60.0     All pertinent lab results from the past 24 hours have been reviewed.    Significant Imaging: I have reviewed and interpreted all pertinent imaging results/findings within the past 24 hours.        EXAMINATION:  MRI LUMBAR SPINE WITHOUT CONTRAST     CLINICAL HISTORY:  Low back pain, cauda equina syndrome suspected;     TECHNIQUE:  Multiplanar, multisequence MR images were acquired from the thoracolumbar junction to the sacrum without the administration of contrast.     COMPARISON:  MRI lumbar spine 2020     FINDINGS:  There is mild grade 1 anterolisthesis of L4 on L5, similar to prior examination.  The vertebral body  heights are well maintained, with no fracture.  There is no marrow signal abnormality suspicious for infiltrative process.  There is disc desiccation at L4-L5 and L5-S1.  There are endplate degenerative changes at L4-L5 with Schmorl node formation.  There is mild degenerative endplate edema present.  Additionally, at the level of T11-T12, there are remote endplate degenerative changes with intervertebral disc space height loss.  The conus is normal in appearance and terminates at the L1 level.     L1-L2: There is no focal disk herniation.  No significant spinal canal or neural foraminal narrowing.     L2-L3: There is no focal disk herniation.  There is mild bilateral facet arthropathy.  No significant spinal canal or neural foraminal narrowing.     L3-L4: There is a mild circumferential disc bulge, ligamentum flavum thickening, and bilateral facet arthropathy.  No significant spinal canal or neural foraminal narrowing.     L4-L5: There is grade 1 anterolisthesis with uncovering of the disc.  There is a circumferential disc bulge and ligamentum flavum thickening.  There is advanced bilateral facet arthropathy with degenerative cystic change and small synovial facet cyst formation.  Prominent fluid is noted within the bilateral facet articulations with mild adjacent soft tissue edema.  There is mild to moderate spinal canal stenosis.  There is moderate right-sided and mild left-sided neural foraminal narrowing.     L5-S1: There is a circumferential disc bulge which is asymmetric to the right.  There is moderate bilateral facet arthropathy.  There is mild effacement of the anterior thecal sac without significant spinal canal stenosis.  There is mild bilateral neural foraminal narrowing.     Limited intra-abdominal views demonstrate no acute abnormalities.  There is prominent dependent subcutaneous edema within the paraspinal soft tissues.     Impression:     Degenerative changes of the lumbar spine which remain most  pronounced at L4-L5 demonstrating grade I anterolisthesis, circumferential disc bulge, and ligamentum flavum thickening.  There is advanced bilateral facet arthropathy with prominent fluid noted in the bilateral facet articulations as well as adjacent soft tissue edema.  This contributes to mild-to-moderate spinal canal stenosis as well as moderate right-sided and mild left-sided neural foraminal narrowing.  Please see above for additional level by level details.    Assessment and Plan:     * Bowel incontinence  78 yo F w PMH HTN, Non- alcoholic cirrhosis, obesity, anxiety, macrocytic anemia, hx of lung CA in 2009 s/p surgery and chemotherapy with subsequent chemotherapy induced neuropathy, hx of polio, RA, fibromyalgia and chronic lower extremity weakness and bowel incontinence admitted to  for worsening incontinence and diarrhea for which neurology consulted due to known history of lower extremity weakness. Patient has had issues with her incontinence for years but now is worse to the extend of complete loss of control of stool and per ED patient had a decreased rectal tone. She denies any saddle anesthesia. She has also bee suffering with a progressive lower extremity weakness with no clear diagnosis or positive workup to explain her symptoms. Patient refers being significantly debilitated refers severe weakness, unable to turn in bed and get in and out of chairs but she is still ambulating with a walker. But on her physical exam she has preserved spontaneous anti gravity movement at her upper and lower extremities. Exam is some what effort dependent in her lowers, but there is objective 3/5 strength at iliopsoas bl and 4-/5 with knee flexion/extension that can also be 2/2 to severe deconditioning.     She has had extensive workup in the past to r/o demyelinating diease, myopathy, autoimmune neuromuscular dx such as MG/LES and workup negative except for cervical spondylosis and extensive neurodegenerative  changes at L5-S1.   She denies any rectal trauma or during her deliveries. She has prior colonoscopy from 2012 that showed 5-10 polyps warranting surveillance but patient has not been able to get a colonoscopy because of her mobility limitation plus incontinence. She does refer a loss of appetite and non purposeful weight loss.     Overall presentation is unclear but unlikely to be purely neurological. She had repeat lumbar MRI that personally reviewed and it seems as there is worsening disc bulging at L5-S1 as well as the overall degenerative changes which could explain patient's rectal incontinence. For her lower extremity weakness there could be some underlying pathology amybe 2/2 to lumbar disease but there is high evidence that patient's morbid obesity and deconditioning can also explain her gate difficulty and weakness. If this were a neurodegenerative disease or autoimmune process patient would be declining more quickly with lack of treatment and she has extensive workup in the past to deny this. Finally given patient's known history of malignancy, chronic bowel incontinence and weight loss/loss of appetite there could also be a possible underlying GI issue that could explain her worsening incontinence, specially in setting of previously abnormal colonoscopy.     Recommendations   -- There is no indication for IP EMG/NCS given previous normal ones or other neurological workup.    -- Recommend NSGY consult to evaluate patient's imaging and clinical exam to see if symptoms could be explained by this and whether or not there could be something done    -- Consider working up other non neurological causes for incontinence, suggested possible GI or CRS evaluation as patient may benefit from IP colonoscospy to help with her prep. Given patient's lack of mobility at home and incontinence she couldn't prep adequately, could benefit from IP colonoscopy or evaluation.   -- Patient denies any pain or spasticity   --  PT/OT for home needs, patient will benefit from HH given lack of support at home   -- Nutrition/dietary consult, patient may benefit from OP bariatric evaluation   -- Will need to follow up with her OP neurologist.       Weakness of both lower extremities  -- see bowel incontinence         VTE Risk Mitigation (From admission, onward)         Ordered     enoxaparin injection 40 mg  Daily         06/25/22 2134     IP VTE HIGH RISK PATIENT  Once         06/25/22 2134     Place sequential compression device  Until discontinued         06/25/22 2134                Thank you for your consult. I will sign off. Please contact us if you have any additional questions.    Areli Weldon MD  Neurology  [facility]

## 2022-06-26 NOTE — DISCHARGE INSTRUCTIONS
Be sure to follow-up with your neurologist and GI doctors scheduled.  As I explained, you do not have tone in your rectum which is likely causing his frequent bowel movements.  We sent stool today to send for tests to see if it is infectious.

## 2022-06-26 NOTE — PROVIDER PROGRESS NOTES - EMERGENCY DEPT.
Encounter Date: 6/25/2022    ED Physician Progress Notes             I assumed care of this patient at change of shift from  ***. Briefly, this is a 77 y.o. female who is here with BLE weakness, diarrhea, no rectal tone.     Tried to get MRI, now encephalopathic from sedation.     Workup notable for:     Labs Reviewed   CBC W/ AUTO DIFFERENTIAL - Abnormal; Notable for the following components:       Result Value    RBC 3.30 (*)     Hemoglobin 11.4 (*)     Hematocrit 34.0 (*)      (*)     MCH 34.5 (*)     RDW 14.9 (*)     Platelets 68 (*)     Gran % 74.4 (*)     Lymph % 15.2 (*)     All other components within normal limits   COMPREHENSIVE METABOLIC PANEL - Abnormal; Notable for the following components:    CO2 18 (*)     Glucose 118 (*)     Calcium 8.2 (*)     Albumin 2.3 (*)     Total Bilirubin 3.9 (*)     AST 41 (*)     All other components within normal limits   MAGNESIUM - Abnormal; Notable for the following components:    Magnesium 1.4 (*)     All other components within normal limits   LACTIC ACID, PLASMA - Abnormal; Notable for the following components:    Lactate (Lactic Acid) 3.1 (*)     All other components within normal limits   CLOSTRIDIUM DIFFICILE   CULTURE, STOOL   HEPATITIS C ANTIBODY   WBC, STOOL   ROTAVIRUS ANTIGEN, STOOL   GIARDIA/CRYPTOSPORIDIUM (EIA)   STOOL EXAM-OVA,CYSTS,PARASITES   LACTIC ACID, PLASMA     MRI Lumbar Spine Without Contrast    (Results Pending)   X-Ray Chest AP Portable    (Results Pending)   X-Ray Abdomen AP 1 View (KUB)    (Results Pending)       Medications   sodium chloride 0.9% bolus 1,000 mL (has no administration in time range)   magnesium sulfate 2g in water 50mL IVPB (premix) (has no administration in time range)   lactated ringers bolus 1,000 mL (0 mLs Intravenous Stopped 6/25/22 1951)   diazePAM injection 10 mg (10 mg Intravenous Given 6/25/22 2025)         Final diagnoses:  [K62.89] Decreased rectal sphincter tone (Primary)  [R19.7] Diarrhea, unspecified  type  [E83.42] Hypomagnesemia  [R79.89] Elevated lactic acid level  [Z13.89] Encounter for imaging to screen for metal prior to MRI  [Z13.89] Encounter for imaging to screen for metal prior to MRI

## 2022-06-26 NOTE — HPI
Ms. Orellana is a 78 yo W with HTN, Non- alcoholic cirrhosis, obesity, anxiety, macrocytic anemia, hx of lung CA in  s/p surgery and chemotherapy with subsequent chemotherapy induced neuropathy, post-polio syndrome, lower extremity weakness (being followed by Neurology), rheumatoid arthritis, fibromyalgia, presents to the ED for acute on chronic diarrhea with associated bowel incontinence. Per pt, she has been having diarrhea for the past 2 years now. Watery stool with some mixed mucus. Denies any melena, bloody stool. She has about 3-4 bouts per day. In the past 2-3 weeks, she has been having more problems with controlling her bowel movement. Would wake up soaking in stool or unable to sense that she have had a bowel movement. Denies any chills, abdominal pain, nausea, or vomiting, chest pain, or SOB. Denies any recent fall. Denies any recent seafood consumption. States she mostly eats TV dinners. She is very distressed that she can no longer move around and perform her own ADLs.     Of note, pt also has been having problems with progressive bilateral lower legs weakness for many years. Per pt, she has been seeing neurology both here and at [facility] with extensive workup. 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 She is now requiring a walker to ambulate, unable to perform ADLs by herself. Would have a friend to come over to help out. Had 2 children but both are . Currently lives alone.     While in the ED, patient is afebrile, , RR 16, /53, sat 99% on room air.  Lab is significant for hgb 11.4, , platelets 68, bicarb 18, Mg 1.4, lactate acid 3.4.  Patient was given 1 L of LR, 2 g of IV Mg sulfate, 1 L of NS.  ED physician performed digital rectal exam, noticed patient's without rectal tone.  MRI lumbar ordered to further evaluate for cauda equina.  Patient has some reservation and asked to be given Valium  prior to MRI.  IV Valium 10 mg was given by the ED. Patient became acutely encephalopathic, unable to answer if there is metals in her body.  Chest and abdominal x-rays were ordered, MRI postponed.  Patient is admitted to hospital medicine for further evaluation and management.

## 2022-06-26 NOTE — ASSESSMENT & PLAN NOTE
Unlikely to be infectious given chronicity, and pt is without abdominal pain, bloody stool or nausea or vomitting. Osmotic vs secretory vs motility. Motility is most likely given the lacks of rectal tone      Plan:  - stool panel ordered   - Stool electrolytes to calculate for stool osmotic gap   - Once infectious etiology is ruled out, can consider anti-motility agent such as lomotil

## 2022-06-26 NOTE — HPI
Yana Orellana is a 77 y.o. female h/o HTN, Non- alcoholic cirrhosis, obesity, anxiety, macrocytic anemia, hx of lung CA in 2009 s/p surgery and chemotherapy with subsequent chemotherapy induced neuropathy, hx of polio, RA, fibromyalgia and chronic lower extremity weakness and bowel incontinence (being followed by multiple Neurologist) admitted to  for worsening incontinence and diarrhea. Patient has had issues with her bowels for years but in the last week she refers that is no longer able to control her stool and she is having many accidents, while at bed and also walking. She has found herself seating in stool without realizing. She denies any urinary incontinence or rectal numbness and refers that she has both hard and loose stools, denies any hematochezia. Patient has been struggling with worsening lower extremity weakness and she is requiring more help to her ADLs. She ambulates with walker, she needs assistance getting up and down then toilet or chairs. Patient has been worked up for MS, PD, Neuromuscular myopathies, MG/Lambert Eaton with a negative workup including EMG/NCS (showed R hand CTS, R C6-C7 radiculopathy, non specific neuropathy at lower extremities and normal EMG).      MRI Lsp w/ L4-L5 G1 anterolisthesis, L4 pseudo-disc, L5 diffuse herniation, ligamentous hypertrophy at these segments, with resultant moderate central stenosis L4-L5 and moderate right-sided and mild left-sided neural foraminal narrowing.

## 2022-06-26 NOTE — ASSESSMENT & PLAN NOTE
Likely secondary to history of cirrhosis     Plan:  - Daily CBC  - Transfuse if plt <10,000 or <50,000 with bleeding   - Thrombocytopenia precautions

## 2022-06-26 NOTE — SUBJECTIVE & OBJECTIVE
Medications Prior to Admission   Medication Sig Dispense Refill Last Dose    cholecalciferol, vitamin D3, (VITAMIN D3) 25 mcg (1,000 unit) capsule Take 5 capsules (5,000 Units total) by mouth once daily. 450 capsule 1 6/24/2022 at Unknown time    multivitamin with minerals tablet Take 1 tablet by mouth once daily.   6/24/2022 at Unknown time    oxyCODONE-acetaminophen (PERCOCET) 5-325 mg per tablet Take 1 tablet by mouth every 8 (eight) hours as needed for Pain. 21 tablet 0 6/24/2022 at Unknown time    predniSONE (DELTASONE) 5 MG tablet Take 1-2 tablets (5-10 mg total) by mouth 2 (two) times daily as needed (arthritis). 30 tablet 1 6/24/2022 at Unknown time    diclofenac sodium (VOLTAREN) 1 % Gel Apply 2 g topically 3 (three) times daily. 2 g 0     spironolactone (ALDACTONE) 50 MG tablet Take 1 tablet (50 mg total) by mouth once daily. 30 tablet 11        Review of patient's allergies indicates:   Allergen Reactions    Ibuprofen Nausea Only    Demerol [meperidine]      Euphoria    Gabapentin Other (See Comments)     Hallucinations    Mellaril-s Nausea Only    Versed [midazolam]      Excessive talking, hyper    Vicodin [hydrocodone-acetaminophen]      Joint pain, muscle pain    Xanax [alprazolam]      fatigue       Past Medical History:   Diagnosis Date    Anemia     Anxiety     Cataract     Colon polyps 2012    Fibromyalgia 10/15/2012    Lung cancer     Pneumonia due to infectious organism 7/19/2019    Post-polio syndrome     Rheumatoid arthritis(714.0) 7/16/2012    2009 onset with mod positive CCP and RF MTX 2011 - present     Thyroid disease     Vertigo     postural vertigo     Past Surgical History:   Procedure Laterality Date    BLADDER SURGERY  1959    CHOLECYSTECTOMY      HYSTERECTOMY  1988    LUNG REMOVAL, PARTIAL  2009    left lower lobectomy    OVARIAN CYST REMOVAL  1970    TONSILLECTOMY  1952     Family History       Problem Relation (Age of Onset)    Arrhythmia Mother    Colon cancer Maternal Grandfather     Diabetes Maternal Grandmother    Heart attack Son (45)    Lung cancer Father, Maternal Aunt, Maternal Uncle    Parkinsonism Paternal Grandmother          Tobacco Use    Smoking status: Former Smoker     Packs/day: 1.50     Years: 40.00     Pack years: 60.00     Types: Cigarettes     Quit date: 2009     Years since quittin.9    Smokeless tobacco: Never Used   Substance and Sexual Activity    Alcohol use: Not Currently    Drug use: No    Sexual activity: Not Currently     Review of Systems  Objective:     Weight: 120 kg (264 lb 8.8 oz)  Body mass index is 37.96 kg/m².  Vital Signs (Most Recent):  Temp: 98 °F (36.7 °C) (22 1306)  Pulse: 102 (22 1306)  Resp: 20 (22 1306)  BP: (!) 143/67 (22 1306)  SpO2: 98 % (22 1306)   Vital Signs (24h Range):  Temp:  [97.8 °F (36.6 °C)-98.8 °F (37.1 °C)] 98 °F (36.7 °C)  Pulse:  [] 102  Resp:  [14-20] 20  SpO2:  [95 %-100 %] 98 %  BP: (143-162)/(53-71) 143/67                     Female External Urinary Catheter 22 (Active)   Skin no redness;no breakdown 22 075   Tolerance no signs/symptoms of discomfort 22 075   Suction Continuous suction at 60 mmHg 22 0752       Physical Exam    Neurosurgery Physical Exam    AOx3, GCS E4V5M6      CNII-XII: Intact on fine exam, PERRL, visual fields grossly intact, EOMi, facial sensation preserved, no facial assymetry, tongue/uvula/palate midline, shoulder shrug equal    Motor:  Upper Extremity:                                  Deltoids        Triceps        Biceps        WE        WF                Interosseous           R        5/5              5/5              5/5             5/5         5/5         4/5                4/5           L         5/5              5/5              5/5             5/5         5/5         4/5                4/5  Lower Extremity:                                      HF        KE        KF        DF        PF        EHL           R       3/5         4/5        4/5        5/5        5/5        5/5           L        3/5        4/5        4/5        5/5        5/5        5/5  Rectal tone: absent  Intact blane-anal sensation.  No spinal TTP     Reflexes:     Patellar DTR: absent      Esposito's: Negative     Clonus: Negative     Sensory:     Nondermatomal sensory loss BLE     Cervical Spine:      Midline TTP: Negative     Thoracic Spine:     Midline TTP: Negative     Lumbar Spine:     Midline TTP: Negative     Straight Leg Test: Negative    Significant Labs:  Recent Labs   Lab 06/25/22 1942 06/26/22 0822   * 128*    140   K 3.9 3.4*    112*   CO2 18* 19*   BUN 9 9   CREATININE 0.7 0.6   CALCIUM 8.2* 8.0*   MG 1.4* 1.7     Recent Labs   Lab 06/25/22 1942 06/26/22 0822   WBC 7.04 5.39   HGB 11.4* 11.5*   HCT 34.0* 34.8*   PLT 68* 71*     No results for input(s): LABPT, INR, APTT in the last 48 hours.  Microbiology Results (last 7 days)       Procedure Component Value Units Date/Time    Blood culture [[id number]] Collected: 06/25/22 2154    Order Status: Completed Specimen: Blood from Peripheral, Forearm, Left Updated: 06/26/22 0515     Blood Culture, Routine No Growth to date    Blood culture [[id number]] Collected: 06/25/22 2154    Order Status: Completed Specimen: Blood from Peripheral, Forearm, Left Updated: 06/26/22 0515     Blood Culture, Routine No Growth to date    Stool culture [[id number]]     Order Status: No result Specimen: Stool     Clostridium difficile EIA [[id number]]     Order Status: No result Specimen: Stool           All pertinent labs from the last 24 hours have been reviewed.    Significant Diagnostics:  I have reviewed all pertinent imaging results/findings within the past 24 hours.  X-Ray Abdomen AP 1 View (KUB)    Result Date: 6/25/2022  Nonobstructive bowel gas pattern. Electronically signed by: All Sepulveda MD Date:    06/25/2022 Time:    22:46    MRI Lumbar Spine Without Contrast    Result Date:  6/26/2022  Degenerative changes of the lumbar spine which remain most pronounced at L4-L5 demonstrating grade I anterolisthesis, circumferential disc bulge, and ligamentum flavum thickening.  There is advanced bilateral facet arthropathy with prominent fluid noted in the bilateral facet articulations as well as adjacent soft tissue edema.  This contributes to mild-to-moderate spinal canal stenosis as well as moderate right-sided and mild left-sided neural foraminal narrowing.  Please see above for additional level by level details. Electronically signed by: All Sepulveda MD Date:    06/26/2022 Time:    00:09    X-Ray Chest AP Portable    Result Date: 6/25/2022  As above. Electronically signed by: All Sepulveda MD Date:    06/25/2022 Time:    22:07

## 2022-06-26 NOTE — ED NOTES
Patient unable to answer questions about hx in MRI due to valium admionistration. Pt also refusing to lay flat.  aware and requesting pt to move to main ED for monitoring. To reattempt MRI at later time.

## 2022-06-26 NOTE — ASSESSMENT & PLAN NOTE
Yana Orellana is a 77 y.o. female h/o HTN, Non- alcoholic cirrhosis, obesity, anxiety, macrocytic anemia, hx of lung CA in 2009 s/p surgery and chemotherapy with subsequent chemotherapy induced neuropathy, hx of polio, RA, fibromyalgia and chronic lower extremity weakness and bowel incontinence (being followed by multiple Neurologist) admitted to  for worsening incontinence and diarrhea.MRI Lsp w/ L4-L5 G1 anterolisthesis, L4 pseudo-disc, L5 diffuse herniation, ligamentous hypertrophy at these segments, with resultant moderate central stenosis L4-L5 and moderate right-sided and mild left-sided neural foraminal narrowing.     --Patient seen by NSGY at bedside  --All diagnostics reviewed  --All imaging reviewed  --No acute surgical intervention at this time  --Please obtain Xray Lumbar spine flexion/extension   --Pain medications per primary team  --We will continue to follow.   --Please page with exam change or questions.     Plan d/w Dr. Giselle Sargent: per primary

## 2022-06-26 NOTE — HPI
78 yo F w PMH HTN, Non- alcoholic cirrhosis, obesity, anxiety, macrocytic anemia, hx of lung CA in 2009 s/p surgery and chemotherapy with subsequent chemotherapy induced neuropathy, hx of polio, RA, fibromyalgia and chronic lower extremity weakness and bowel incontinence (being followed by multiple Neurologist) admitted to  for worsening incontinence and diarrhea for which neurology consulted due to known history of lower extremity weakness. Patient has had issues with her bowels for years but in the last week she refers that is no longer able to control her stool and she is having many accidents, while at bed and also walking. She has found herself seating in stool without realizing. She denies any urinary incontinence or rectal numbness and refers that she has both hard and loose stools, denies any hematochezia. She refers having decreased appetite in the last year and a 10lb non intentional weight loss. Denies any fevers, chills, abdominal/rectal pain. Other than her incontinence, patient has been struggling with worsening lower extremity weakness and she is requiring more help to her ADLs. She ambulates with walker, she needs assistance getting up and down then toilet or chairs. She has to crawl herself to bed face forward and is having difficulty turning herself in bed. She refers that she shuffles her gate and feels unbalanced. She currently lives alone and has help from a friend. She is  and both children have passed away and granddaughter lives in [place].     Of note patient has been seen by multiple neurologists in the past with a very extensive workup for her progressive lower extremity weakness that began in 1980s starting as difficulty with ambulation. Then in 1990 she started to have gate difficulty, feeling of balance and was seen in [place] who thought it might be a non specific ataxia. Then in 2000 she began to have difficulty standing from seating positions (proximal weakness) and then  developed acute sensory neuropathy post chemotherapy in 2009. Her most recent neurologist have been Dr. Vaca who referred her to Dr. España at [facility] who is her primary neurologist at the time. Patient has been worked up for MS, PD, Neuromuscular myopathies, MG/Lambert Eaton with a negative workup including EMG/NCS (showed R hand CTS, R C6-C7 radiculopathy, non specific neuropathy at lower extremities and normal EMG). Patient has had previous MRIs that show chronic microvascular changes w a larger [id number] hyperintensity at L frontal lobe, cervical spondylosis at C5-C6 w mild spinal stenosis and significant lumbar degenerative changes, specifically at L5-S1.  From her serum workup pertinent +/- include a +anti smooth muscle Ab, low Vit D 13, elevated PTH 82.6, and serum electropheresis with elevated kappa light chain, lambda and gamma.     At the ED, patient is afebrile, , RR 16, /53, sat 99% on room air.  Lab is significant for hgb 11.4, , platelets 68, bicarb 18, Mg 1.4, lactate acid 3.4.  Patient was given 1 L of LR, 2 g of IV Mg sulfate, 1 L of NS.  ED physician performed digital rectal exam, noticed patient's without rectal tone. MRI lumbar ordered to further evaluate for cauda equina.     MRI showed degenerative changes of the lumbar spine which remain most pronounced at L4-L5 demonstrating grade I anterolisthesis, circumferential disc bulge, and ligamentum flavum thickening. This was personally reviewed and compares to previous and seems worse than her [facility] MRI. There is also mild-to-moderate spinal canal stenosis as well as moderate right-sided and mild left-sided neural foraminal narrowing.

## 2022-06-26 NOTE — PLAN OF CARE
Problem: Adult Inpatient Plan of Care  Goal: Plan of Care Review  Outcome: Ongoing, Progressing  Goal: Patient-Specific Goal (Individualized)  Outcome: Ongoing, Progressing  Goal: Absence of Hospital-Acquired Illness or Injury  Outcome: Ongoing, Progressing  Goal: Optimal Comfort and Wellbeing  Outcome: Ongoing, Progressing  POC reviewed with pt. A&Ox4. VSS. Room air. No acute changes. Safety checks performed. Bed in lowest position. Wheels locked. Call light in reach. Will continue to monitor.

## 2022-06-26 NOTE — CONSULTS
[facility] - Tele[facility] Stepdown  Neurosurgery  Consult Note    Inpatient consult to Neurosurgery  Consult performed by: Jimenez Acevedo MD  Consult ordered by: Tavo Prakash MD        Subjective:     Chief Complaint/Reason for Admission: Incontinence    History of Present Illness: Yana Orellana is a 77 y.o. female h/o HTN, Non- alcoholic cirrhosis, obesity, anxiety, macrocytic anemia, hx of lung CA in 2009 s/p surgery and chemotherapy with subsequent chemotherapy induced neuropathy, hx of polio, RA, fibromyalgia and chronic lower extremity weakness and bowel incontinence (being followed by multiple Neurologist) admitted to  for worsening incontinence and diarrhea. Patient has had issues with her bowels for years but in the last week she refers that is no longer able to control her stool and she is having many accidents, while at bed and also walking. She has found herself seating in stool without realizing. She denies any urinary incontinence or rectal numbness and refers that she has both hard and loose stools, denies any hematochezia. Patient has been struggling with worsening lower extremity weakness and she is requiring more help to her ADLs. She ambulates with walker, she needs assistance getting up and down then toilet or chairs. Patient has been worked up for MS, PD, Neuromuscular myopathies, MG/Lambert Eaton with a negative workup including EMG/NCS (showed R hand CTS, R C6-C7 radiculopathy, non specific neuropathy at lower extremities and normal EMG).      MRI Lsp w/ L4-L5 G1 anterolisthesis, L4 pseudo-disc, L5 diffuse herniation, ligamentous hypertrophy at these segments, with resultant moderate central stenosis L4-L5 and moderate right-sided and mild left-sided neural foraminal narrowing.       Medications Prior to Admission   Medication Sig Dispense Refill Last Dose    cholecalciferol, vitamin D3, (VITAMIN D3) 25 mcg (1,000 unit) capsule Take 5 capsules (5,000 Units total) by mouth once daily.  450 capsule 1 6/24/2022 at Unknown time    multivitamin with minerals tablet Take 1 tablet by mouth once daily.   6/24/2022 at Unknown time    oxyCODONE-acetaminophen (PERCOCET) 5-325 mg per tablet Take 1 tablet by mouth every 8 (eight) hours as needed for Pain. 21 tablet 0 6/24/2022 at Unknown time    predniSONE (DELTASONE) 5 MG tablet Take 1-2 tablets (5-10 mg total) by mouth 2 (two) times daily as needed (arthritis). 30 tablet 1 6/24/2022 at Unknown time    diclofenac sodium (VOLTAREN) 1 % Gel Apply 2 g topically 3 (three) times daily. 2 g 0     spironolactone (ALDACTONE) 50 MG tablet Take 1 tablet (50 mg total) by mouth once daily. 30 tablet 11        Review of patient's allergies indicates:   Allergen Reactions    Ibuprofen Nausea Only    Demerol [meperidine]      Euphoria    Gabapentin Other (See Comments)     Hallucinations    Mellaril-s Nausea Only    Versed [midazolam]      Excessive talking, hyper    Vicodin [hydrocodone-acetaminophen]      Joint pain, muscle pain    Xanax [alprazolam]      fatigue       Past Medical History:   Diagnosis Date    Anemia     Anxiety     Cataract     Colon polyps 2012    Fibromyalgia 10/15/2012    Lung cancer     Pneumonia due to infectious organism 7/19/2019    Post-polio syndrome     Rheumatoid arthritis(714.0) 7/16/2012    2009 onset with mod positive CCP and RF MTX 2011 - present     Thyroid disease     Vertigo     postural vertigo     Past Surgical History:   Procedure Laterality Date    BLADDER SURGERY  1959    CHOLECYSTECTOMY      HYSTERECTOMY  1988    LUNG REMOVAL, PARTIAL  2009    left lower lobectomy    OVARIAN CYST REMOVAL  1970    TONSILLECTOMY  1952     Family History       Problem Relation (Age of Onset)    Arrhythmia Mother    Colon cancer Maternal Grandfather    Diabetes Maternal Grandmother    Heart attack Son (45)    Lung cancer Father, Maternal Aunt, Maternal Uncle    Parkinsonism Paternal Grandmother          Tobacco Use     Smoking status: Former Smoker     Packs/day: 1.50     Years: 40.00     Pack years: 60.00     Types: Cigarettes     Quit date: 2009     Years since quittin.9    Smokeless tobacco: Never Used   Substance and Sexual Activity    Alcohol use: Not Currently    Drug use: No    Sexual activity: Not Currently     Review of Systems  Objective:     Weight: 120 kg (264 lb 8.8 oz)  Body mass index is 37.96 kg/m².  Vital Signs (Most Recent):  Temp: 98 °F (36.7 °C) (22 1306)  Pulse: 102 (22 1306)  Resp: 20 (22 1306)  BP: (!) 143/67 (22 1306)  SpO2: 98 % (22 1306)   Vital Signs (24h Range):  Temp:  [97.8 °F (36.6 °C)-98.8 °F (37.1 °C)] 98 °F (36.7 °C)  Pulse:  [] 102  Resp:  [14-20] 20  SpO2:  [95 %-100 %] 98 %  BP: (143-162)/(53-71) 143/67                     Female External Urinary Catheter 22 (Active)   Skin no redness;no breakdown 22   Tolerance no signs/symptoms of discomfort 22   Suction Continuous suction at 60 mmHg 22       Physical Exam    Neurosurgery Physical Exam    AOx3, GCS E4V5M6      CNII-XII: Intact on fine exam, PERRL, visual fields grossly intact, EOMi, facial sensation preserved, no facial assymetry, tongue/uvula/palate midline, shoulder shrug equal    Motor:  Upper Extremity:                                  Deltoids        Triceps        Biceps        WE        WF                Interosseous           R        5/5              5/5              5/5             5/5         5/5         4/5                4/5           L         5/5              5/5              5/5             5/5         5/5         4/5                4/5  Lower Extremity:                                      HF        KE        KF        DF        PF        EHL           R       3/5        4/5        4/5        5/5        5/5        5/5           L        3/5        4/5        4/5        5/5        5/5        5/5  Rectal tone: absent  Intact blane-anal  sensation.  No spinal TTP     Reflexes:     Patellar DTR: absent      Esposito's: Negative     Clonus: Negative     Sensory:     Nondermatomal sensory loss BLE     Cervical Spine:      Midline TTP: Negative     Thoracic Spine:     Midline TTP: Negative     Lumbar Spine:     Midline TTP: Negative     Straight Leg Test: Negative    Significant Labs:  Recent Labs   Lab 06/25/22 1942 06/26/22 0822   * 128*    140   K 3.9 3.4*    112*   CO2 18* 19*   BUN 9 9   CREATININE 0.7 0.6   CALCIUM 8.2* 8.0*   MG 1.4* 1.7     Recent Labs   Lab 06/25/22 1942 06/26/22 0822   WBC 7.04 5.39   HGB 11.4* 11.5*   HCT 34.0* 34.8*   PLT 68* 71*     No results for input(s): LABPT, INR, APTT in the last 48 hours.  Microbiology Results (last 7 days)       Procedure Component Value Units Date/Time    Blood culture [[id number]] Collected: 06/25/22 2154    Order Status: Completed Specimen: Blood from Peripheral, Forearm, Left Updated: 06/26/22 0515     Blood Culture, Routine No Growth to date    Blood culture [[id number]] Collected: 06/25/22 2154    Order Status: Completed Specimen: Blood from Peripheral, Forearm, Left Updated: 06/26/22 0515     Blood Culture, Routine No Growth to date    Stool culture [[id number]]     Order Status: No result Specimen: Stool     Clostridium difficile EIA [[id number]]     Order Status: No result Specimen: Stool           All pertinent labs from the last 24 hours have been reviewed.    Significant Diagnostics:  I have reviewed all pertinent imaging results/findings within the past 24 hours.  X-Ray Abdomen AP 1 View (KUB)    Result Date: 6/25/2022  Nonobstructive bowel gas pattern. Electronically signed by: All Sepulveda MD Date:    06/25/2022 Time:    22:46    MRI Lumbar Spine Without Contrast    Result Date: 6/26/2022  Degenerative changes of the lumbar spine which remain most pronounced at L4-L5 demonstrating grade I anterolisthesis, circumferential disc bulge, and ligamentum flavum  thickening.  There is advanced bilateral facet arthropathy with prominent fluid noted in the bilateral facet articulations as well as adjacent soft tissue edema.  This contributes to mild-to-moderate spinal canal stenosis as well as moderate right-sided and mild left-sided neural foraminal narrowing.  Please see above for additional level by level details. Electronically signed by: All Sepulveda MD Date:    06/26/2022 Time:    00:09    X-Ray Chest AP Portable    Result Date: 6/25/2022  As above. Electronically signed by: All Sepulveda MD Date:    06/25/2022 Time:    22:07       Assessment/Plan:     * Bowel incontinence  Yana Orellana is a 77 y.o. female h/o HTN, Non- alcoholic cirrhosis, obesity, anxiety, macrocytic anemia, hx of lung CA in 2009 s/p surgery and chemotherapy with subsequent chemotherapy induced neuropathy, hx of polio, RA, fibromyalgia and chronic lower extremity weakness and bowel incontinence (being followed by multiple Neurologist) admitted to  for worsening incontinence and diarrhea.MRI Lsp w/ L4-L5 G1 anterolisthesis, L4 pseudo-disc, L5 diffuse herniation, ligamentous hypertrophy at these segments, with resultant moderate central stenosis L4-L5 and moderate right-sided and mild left-sided neural foraminal narrowing.     --Patient seen by NSGY at bedside  --All diagnostics reviewed  --All imaging reviewed  --No acute surgical intervention at this time  --Please obtain Xray Lumbar spine flexion/extension   --Pain medications per primary team  --We will continue to follow.   --Please page with exam change or questions.     Plan d/w Dr. Desai    Dispo: per primary          Thank you for your consult. I will follow-up with patient. Please contact us if you have any additional questions.    Jimenez Acevedo MD  Neurosurgery  [facility] - Telemetry Stepdown

## 2022-06-26 NOTE — ASSESSMENT & PLAN NOTE
Body mass index is 37.96 kg/m². Morbid obesity complicates all aspects of disease management from diagnostic modalities to treatment. Weight loss encouraged and health benefits explained to patient.        Detail Level: Generalized Detail Level: Zone Detail Level: Simple Detail Level: Detailed

## 2022-06-27 NOTE — HOSPITAL COURSE
Patient was admitted for bowel incontinence for the last two weeks. Patient with no rectal tone on exam, MRI ordered due to concerns for cauda equina but there were no concerning findings on MRI. Neurosurgery and colorectal surgery consulted, no indication for any procedure at this time. Will rule out infectious etiologies for her incontinence then treat with symptom management. Patient also has progressively worsening weakness for years that has extensively been worked up by neurologists at [facility] and [facility]. Neurology consulted and no indication for IP EMG/NCS given previous normal ones. Two blood cultures growing staph epi but taken from same site, repeats ordered, NGTD. ID consulted for further evaluation. Imodium and cholestyramine added with good effect for diarrhea.     Plan to discharge to SNF for PT/OT with medical management as above for diarrhea. Outpatient f/u with PCP, Neurology, CRS for outpatient C-scope.

## 2022-06-27 NOTE — ASSESSMENT & PLAN NOTE
Yana Orellana is a 77 y.o. female h/o HTN, Non- alcoholic cirrhosis, obesity, anxiety, macrocytic anemia, hx of lung CA in 2009 s/p surgery and chemotherapy with subsequent chemotherapy induced neuropathy, hx of polio, RA, fibromyalgia and chronic lower extremity weakness and bowel incontinence (being followed by multiple Neurologist) admitted to  for worsening incontinence and diarrhea.MRI Lsp w/ L4-L5 G1 anterolisthesis, L4 pseudo-disc, L5 diffuse herniation, ligamentous hypertrophy at these segments, with resultant moderate central stenosis L4-L5 and moderate right-sided and mild left-sided neural foraminal narrowing.     --Patient seen by NSGY at bedside  --All pertinent labs and diagnostics personally reviewed.  --No acute surgical intervention at this time  --Xray Lumbar spine flexion/extension: no instability appreciated  --F/u cervical MRI for BUE weakness and clumsiness  --Pain medications per primary team  --Bacteremia: blood cultures growing staph species.  Cont IV vanc.  --UA growing e. Coli.   --Bowel incontinence: colorectal surgery without surgical intervention. Per colorectal pursue infectious workup.  --Please page with exam change or questions.     Plan d/w Dr. Desai    Dispo: per primary

## 2022-06-27 NOTE — TELEPHONE ENCOUNTER
"Called pt to f/u with her in regards to her call Saturday about diarrhea. Nurse on-call note reads as follows:     "Reason for Disposition   Shock suspected (e.g., cold/pale/clammy skin, too weak to stand, low BP, rapid pulse)  Protocols used: DIARRHEA-A-AH  pt called re diarrhea. cant get it to stop. started on 6/11. in [place] last Monday. immodium wasnt working. explosive and kept coming. gave meds at hosp that worked but med ran out. no blood, pt admits to being too weak to stand. rec EMS. Pt states friend coming over and may be able to give her a ride. Pt states her hands not working right. Cant touch finger to nose.  Reiterate EMS. Pt agrees. Call back with questions."    Did not get an answer with [phone], a recording came on asking for a remote access code.  Called [phone]. Pt states she's in hospital, "a whole mess" according to pt.   "

## 2022-06-27 NOTE — PLAN OF CARE
Problem: Physical Therapy  Goal: Physical Therapy Goal  Description: Goals to be met by: 22     Patient will increase functional independence with mobility by performin. Supine to sit with Modified [place]  2. Sit to supine with Modified [place]  3. Sit to stand transfer with Stand-by Assistance  4. Gait  x 30 feet with Contact Guard Assistance using Rolling Walker.   5. Lower extremity exercise program x 20 reps per handout, with independence  6. Ascend/descend 4 stair with bilateral Handrails Minimal Assistance using No Assistive Device.     Outcome: Ongoing, Progressing   Pt progressing, appropriate goals established

## 2022-06-27 NOTE — SUBJECTIVE & OBJECTIVE
Interval History: Patient with one loose stool last night but none today. Still having lower extremity weakness. Colorectal surgery and neurosurgery consulted, no procedure indicated at this time.     Review of Systems   Constitutional:  Negative for appetite change, chills and fever.   HENT:  Negative for sore throat.    Eyes:  Negative for discharge.   Respiratory:  Negative for shortness of breath.    Cardiovascular:  Negative for chest pain and palpitations.   Gastrointestinal:  Positive for diarrhea. Negative for abdominal distention, abdominal pain, constipation, nausea and vomiting.   Endocrine: Negative for polydipsia and polyuria.   Genitourinary:  Negative for dysuria, hematuria and urgency.   Musculoskeletal:  Negative for back pain and gait problem.   Skin:  Negative for color change and rash.   Neurological:  Positive for weakness. Negative for numbness.   Hematological:  Does not bruise/bleed easily.   Psychiatric/Behavioral:  Negative for self-injury.    Objective:     Vital Signs (Most Recent):  Temp: 98 °F (36.7 °C) (06/27/22 1139)  Pulse: 81 (06/27/22 1139)  Resp: 18 (06/27/22 1139)  BP: 128/65 (06/27/22 1139)  SpO2: 98 % (06/27/22 1139) Vital Signs (24h Range):  Temp:  [97.7 °F (36.5 °C)-98.7 °F (37.1 °C)] 98 °F (36.7 °C)  Pulse:  [] 81  Resp:  [16-18] 18  SpO2:  [97 %-98 %] 98 %  BP: (128-146)/(65-76) 128/65     Weight: 120 kg (264 lb 8.8 oz)  Body mass index is 37.96 kg/m².    Intake/Output Summary (Last 24 hours) at 6/27/2022 1308  Last data filed at 6/27/2022 1141  Gross per 24 hour   Intake 200 ml   Output 1100 ml   Net -900 ml      Physical Exam  Vitals and nursing note reviewed.   Constitutional:       Appearance: Normal appearance. She is obese.   HENT:      Head: Normocephalic and atraumatic.      Right Ear: External ear normal.      Left Ear: External ear normal.      Nose: Nose normal.      Mouth/Throat:      Mouth: Mucous membranes are moist.   Eyes:      General:         Right  eye: No discharge.         Left eye: No discharge.      Conjunctiva/sclera: Conjunctivae normal.   Cardiovascular:      Rate and Rhythm: Normal rate and regular rhythm.      Heart sounds: No murmur heard.  Pulmonary:      Effort: Pulmonary effort is normal.      Breath sounds: Normal breath sounds. No wheezing or rales.   Abdominal:      General: Abdomen is flat. There is no distension.      Palpations: Abdomen is soft.      Tenderness: There is no abdominal tenderness.   Musculoskeletal:         General: No swelling. Normal range of motion.      Cervical back: Normal range of motion.      Right lower leg: Edema (without pitting) present.      Left lower leg: Edema (without pitting) present.   Skin:     General: Skin is warm and dry.      Capillary Refill: Capillary refill takes less than 2 seconds.   Neurological:      Mental Status: She is alert and oriented to person, place, and time.      Sensory: No sensory deficit.      Motor: Weakness present.      Comments: Bilateral Lower extremities strength 2/5   Psychiatric:         Mood and Affect: Mood normal.       Significant Labs: All pertinent labs within the past 24 hours have been reviewed.    Significant Imaging: I have reviewed all pertinent imaging results/findings within the past 24 hours.

## 2022-06-27 NOTE — ASSESSMENT & PLAN NOTE
Ms. Orellana is a 78 yo W with HTN, Non- alcoholic cirrhosis, obesity, anxiety, macrocytic anemia, hx of lung CA in 2009 s/p surgery and chemotherapy with subsequent chemotherapy induced neuropathy, post-polio syndrome, lower extremity weakness (being followed by Neurology), rheumatoid arthritis, fibromyalgia, presents to the ED for acute on chronic diarrhea with associated bowel incontinence.    Differential includes but not limited to: neuromuscular weakness vs cauda equina vs myopathies     Pt had extensive workup with Neurology here. Last seen with Dr. Myers on 11/2019. She was then referred to Dr. Rosendo Ulloa at [facility] for a second opinion. She ended up following up with Dr. Dr. Mary Padilla who dx her with a rare disease--lambert-eaton myasthenic syndrome. Per pt, physician there changed her in regards to this dx.     Plan:  - Neurology consulted, workup negative so far    - Neurosurgery consulted, no surgery indicated at this time   - Colorectal surgery, no procedure indicated at this time  - Need outpatient colonoscopy and colorectal surgery follow-up if symptoms unresolved    - MRI Lumbar not concerning for cauda equina   - Workup etiology of diarrhea; if infectious workup negative will treat with symptom management   - PT/OT

## 2022-06-27 NOTE — PROGRESS NOTES
[facility] - Telemetry Stepdown  Neurosurgery  Progress Note    Subjective:     History of Present Illness: Yana Orellana is a 77 y.o. female h/o HTN, Non- alcoholic cirrhosis, obesity, anxiety, macrocytic anemia, hx of lung CA in 2009 s/p surgery and chemotherapy with subsequent chemotherapy induced neuropathy, hx of polio, RA, fibromyalgia and chronic lower extremity weakness and bowel incontinence (being followed by multiple Neurologist) admitted to  for worsening incontinence and diarrhea. Patient has had issues with her bowels for years but in the last week she refers that is no longer able to control her stool and she is having many accidents, while at bed and also walking. She has found herself seating in stool without realizing. She denies any urinary incontinence or rectal numbness and refers that she has both hard and loose stools, denies any hematochezia. Patient has been struggling with worsening lower extremity weakness and she is requiring more help to her ADLs. She ambulates with walker, she needs assistance getting up and down then toilet or chairs. Patient has been worked up for MS, PD, Neuromuscular myopathies, MG/Lambert Eaton with a negative workup including EMG/NCS (showed R hand CTS, R C6-C7 radiculopathy, non specific neuropathy at lower extremities and normal EMG).      MRI Lsp w/ L4-L5 G1 anterolisthesis, L4 pseudo-disc, L5 diffuse herniation, ligamentous hypertrophy at these segments, with resultant moderate central stenosis L4-L5 and moderate right-sided and mild left-sided neural foraminal narrowing.       Post-Op Info:  * No surgery found *         Interval History: NAEON. Neuro exam stable. Rec cervical MRI in setting of diffuse upper extremity weakness. Patient reports handwriting changes and dropping objects. Colorectal surgery without acute intervention.  Staph growing in blood culture.    Medications:  Continuous Infusions:  Scheduled Meds:   enoxaparin  40 mg Subcutaneous  Daily    multivitamin  1 tablet Oral Daily    spironolactone  50 mg Oral Daily    vancomycin (VANCOCIN) IVPB  1,750 mg Intravenous Q12H    vitamin D  1,000 Units Oral Daily     PRN Meds:dextrose 10%, dextrose 10%, glucagon (human recombinant), glucose, glucose, naloxone, sodium chloride 0.9%     Review of Systems  Objective:     Weight: 120 kg (264 lb 8.8 oz)  Body mass index is 37.96 kg/m².  Vital Signs (Most Recent):  Temp: 98 °F (36.7 °C) (06/27/22 1139)  Pulse: 81 (06/27/22 1139)  Resp: 18 (06/27/22 1139)  BP: 128/65 (06/27/22 1139)  SpO2: 98 % (06/27/22 1139) Vital Signs (24h Range):  Temp:  [97.7 °F (36.5 °C)-98.7 °F (37.1 °C)] 98 °F (36.7 °C)  Pulse:  [] 81  Resp:  [16-20] 18  SpO2:  [97 %-98 %] 98 %  BP: (128-146)/(65-76) 128/65                     Female External Urinary Catheter 06/26/22 (Active)   Skin no redness;no breakdown 06/26/22 0752   Tolerance no signs/symptoms of discomfort 06/26/22 0752   Suction Continuous suction at 60 mmHg 06/26/22 0752   Output (mL) 300 mL 06/27/22 0518       Physical Exam  General: well developed, well nourished, no distress.   Head: normocephalic, atraumatic  Neck: No tracheal deviation. No palpable masses. Full ROM.   Neurologic: Alert and oriented. Thought content appropriate.  GCS: E4 V5 M6. GCS Total: 15  Mental Status: Awake, Alert, Oriented x 4  Language: No aphasia  Speech: No dysarthria  Cranial nerves: face symmetric, tongue midline, CN II-XII grossly intact.   Eyes: pupils equal, round, reactive to light with accomodation, EOMI.   Pulmonary: normal respirations, no signs of respiratory distress  Abdomen: soft, non-distended, not tender to palpation    Sensory: intact to light touch throughout  Motor Strength: Moves all extremities spontaneously with good tone.  No abnormal movements seen.     Strength  Deltoids Triceps Biceps Wrist Extension Wrist Flexion Hand    Upper: R 4/5 4/5 4/5 4/5 4/5 4/5    L 4/5 4/5 4/5 4/5 4/5 4/5     Strength  Iliopsoas  Quadriceps Knee  Flexion Tibialis  anterior Gastro- cnemius EHL   Lower: R 3/5 4/5 4/5 5/5 5/5 4+/5    L 3/5 4/5 4/5 5/5 5/5 4+/5     Vascular: No LE edema.   Skin: Skin is warm, dry and intact.    Reflexes:   Esposito's: Negative        Significant Labs:  Recent Labs   Lab 06/25/22 1942 06/26/22 0822 06/27/22  0435   * 128* 113*    140 140   K 3.9 3.4* 4.6    112* 114*   CO2 18* 19* 17*   BUN 9 9 8   CREATININE 0.7 0.6 0.7   CALCIUM 8.2* 8.0* 8.2*   MG 1.4* 1.7 1.8     Recent Labs   Lab 06/25/22 1942 06/26/22 0822 06/27/22  0435   WBC 7.04 5.39 4.50   HGB 11.4* 11.5* 10.3*   HCT 34.0* 34.8* 31.6*   PLT 68* 71* SEE COMMENT     No results for input(s): LABPT, INR, APTT in the last 48 hours.  Microbiology Results (last 7 days)       Procedure Component Value Units Date/Time    Blood culture [[id number]]  (Abnormal) Collected: 06/25/22 2154    Order Status: Completed Specimen: Blood from Peripheral, Forearm, Left Updated: 06/27/22 1029     Blood Culture, Routine Gram stain benja bottle: Gram positive cocci in clusters resembling Staph      Results called to and read back by: Brandy Diego RN.  06/26/2022        18:20      STAPHYLOCOCCUS SPECIES  Identification and susceptibility pending      Blood culture [[id number]]  (Abnormal) Collected: 06/25/22 2154    Order Status: Completed Specimen: Blood from Peripheral, Forearm, Left Updated: 06/27/22 1025     Blood Culture, Routine Gram stain aer bottle: Gram positive cocci in clusters resembling Staph       Positive results previously called 06/26/2022  22:43      STAPHYLOCOCCUS SPECIES  Identification and susceptibility pending      Blood culture [[id number]] Collected: 06/26/22 1952    Order Status: Completed Specimen: Blood from Peripheral, Right Wrist Updated: 06/27/22 0315     Blood Culture, Routine No Growth to date    Blood culture [[id number]] Collected: 06/26/22 1953    Order Status: Completed Specimen: Blood from Peripheral, Left Hand Updated:  06/27/22 0315     Blood Culture, Routine No Growth to date    Stool culture [[id number]]     Order Status: No result Specimen: Stool     Clostridium difficile EIA [[id number]]     Order Status: Canceled Specimen: Stool           All pertinent labs from the last 24 hours have been reviewed.    Significant Diagnostics:  I have reviewed all pertinent imaging results/findings within the past 24 hours.    Assessment/Plan:     * Bowel incontinence  Yana Orellana is a 77 y.o. female h/o HTN, Non- alcoholic cirrhosis, obesity, anxiety, macrocytic anemia, hx of lung CA in 2009 s/p surgery and chemotherapy with subsequent chemotherapy induced neuropathy, hx of polio, RA, fibromyalgia and chronic lower extremity weakness and bowel incontinence (being followed by multiple Neurologist) admitted to  for worsening incontinence and diarrhea.MRI Lsp w/ L4-L5 G1 anterolisthesis, L4 pseudo-disc, L5 diffuse herniation, ligamentous hypertrophy at these segments, with resultant moderate central stenosis L4-L5 and moderate right-sided and mild left-sided neural foraminal narrowing.     --Patient seen by NSGY at bedside  --All pertinent labs and diagnostics personally reviewed.  --No acute surgical intervention at this time  --Xray Lumbar spine flexion/extension: no instability appreciated  --F/u cervical MRI for BUE weakness and clumsiness  --Pain medications per primary team  --Bacteremia: blood cultures growing staph species.  Cont IV vanc.  --UA growing e. Coli.   --Bowel incontinence: colorectal surgery without surgical intervention. Per colorectal pursue infectious workup.  --Please page with exam change or questions.     Plan d/w Dr. Desai    Dispo: per primary          Ele Francis PA-C  Neurosurgery  [facility] - Telemetry Stepdown

## 2022-06-27 NOTE — PLAN OF CARE
Problem: Adult Inpatient Plan of Care  Goal: Plan of Care Review  Outcome: Ongoing, Progressing  Goal: Patient-Specific Goal (Individualized)  Outcome: Ongoing, Progressing  Goal: Absence of Hospital-Acquired Illness or Injury  Outcome: Ongoing, Progressing  Goal: Optimal Comfort and Wellbeing  Outcome: Ongoing, Progressing  Goal: Readiness for Transition of Care  Outcome: Ongoing, Progressing     Problem: Infection  Goal: Absence of Infection Signs and Symptoms  Outcome: Ongoing, Progressing     Problem: Skin Injury Risk Increased  Goal: Skin Health and Integrity  Outcome: Ongoing, Progressing   Patient is alert, oriented and conscious. Vital signs taken and recorded. SPO2 maintain in RA. Medication given as per order. Intake output recorded. Will continue to monitor.

## 2022-06-27 NOTE — PT/OT/SLP EVAL
"Physical Therapy Evaluation and Tx    Patient Name:  Yana Orellana   MRN:  [id number]  Admit Date: 6/25/2022  Admitting Diagnosis:  Bowel incontinence  Length of Stay: 0 days  Recent Surgery: * No surgery found *      Recommendations:   Discharge Recommendations:  nursing facility, skilled   Discharge Equipment Recommendations: bedside commode, bath bench   Barriers to discharge: Inaccessible home and Decreased caregiver support      Assessment:     Ynaa Orellana is a 77 y.o. female admitted with a medical diagnosis of Bowel incontinence.  Pt presents with significant functional mobility deficits and is functioning below baseline. Pt is mainly limited by global weakness and decreased activity tolerance. Reports that she has slowly declined over the past few months and that she is having difficulty getting in and out of bed and standing from low surfaces. Pt lives alone and has a "helper," who is not a medical professional, who is with her <25% of the time. She is worried about her declining function but also states that outpatient PT "didn't help" [with weakness]in the past. Pt is a high fall risk at this time. Pt will continue to benefit from acute PT services in order to maximize safety and (I) with functional mobility.    Problem List: weakness, impaired endurance, impaired self care skills, impaired functional mobilty, gait instability, impaired balance, decreased lower extremity function  Rehab Prognosis: Good; patient would benefit from acute skilled PT services to address these deficits and reach maximum level of function.        Plan:   During this hospitalization, patient to be seen 3 x/week to address the above listed problems via gait training, therapeutic activities, therapeutic exercises, neuromuscular re-education  · Plan of Care Expires:  07/25/22    Subjective     Chief Complaint: Diarrhea (X2wks. Denies abdominal pain and vomiting)    Patient/Family Comments/goals: "I'm fine once I'm " "standing."  Pain/Comfort:  · Pain Rating 1: 0/10  · Pain Rating Post-Intervention 1: 0/10    Social History:  Residence: Pt lives alone in [facility] with 4-5 IAD and bilateral handrails. She has a ramp entrance into the home but does not use it 2/2 fear of falling.   Support available: "" who is available <25% of the time  Equipment Used: grab bar, wheelchair, walker, rolling  Prior level of function: assist required for sit to stand transfers and supine<>sit transfers. Mod(I) with RW for ambulation    Objective:     Communicated with RN prior to session.  Patient found HOB elevated upon PT entry to room.   Additional staff present: N/A    General Precautions: Standard, fall   Orthopedic Precautions:N/A   Braces: N/A   Oxygen Device: Room Air    Exams:  · Cognition:   · AxOx4  · Command following: Follows multistep verbal commands    · Postural Exam:  Patient presented with the following abnormalities:    · -       Rounded shoulders  · -       Forward head  · -       Posterior pelvic tilt  · Sensation:    · -       Intact    · RLE ROM: WFL  · RLE Strength: WFL except hip flexion/ext and knee extension 3+/5  · LLE ROM: WFL  · LLE Strength: WFL except hip flexion/ext and knee extension 3/5    Functional Mobility:  Bed Mobility:     Scooting: stand by assistance  Bridging: stand by assistance  Supine to Sit: contact guard assistance  Sit to Supine: contact guard assistance  Transfers:     Sit to Stand:  minimum assistance with hand-held assist  · Also performed with RW and CGA  Gait:   · Distance: Pt took 3-4 R lateral steps and 1 step fwd/bwd  · Level of Assistance:  contact guard assistance  · AD used: rolling walker  · Gait Deviations: decreased speed, step length, swing through, heel strike, forward flexed posture, wide DANIELITO.   · Comments: PT providing verbal and tactile cues for improved upright posture, gaze direction, AD management, and gait fluidity.   Balance:   · Static Sitting: SPV  · Dynamic Sitting:  " SBA  · Static Standing: CGA  · Dynamic Standing: CGA    Therapeutic Activities & Exercises:   PT instructed Pt on bed level therex:  2 x 5 bridges  2 x 10 quad sets  *PT gave written instructions for Pt to perform 3-5x/day     Role of PT in POC   Education regarding importance of ongoing PT services in order to return to PLOF and prevent functional decline.   Patient educated on the importance of early mobility to prevent functional decline during hospital stay   Patient was instructed to utilize staff assistance for mobility/transfers.   Patient was educated on PT POC and all questions answered within PT scope of practice.   Patient able to verbalize understanding; will follow-up with pt during current admit for additional questions/concerns within scope of practice.     Outcome Measures:  AM-PAC 6 CLICK MOBILITY  Turning over in bed (including adjusting bedclothes, sheets and blankets)?: 3  Sitting down on and standing up from a chair with arms (e.g., wheelchair, bedside commode, etc.): 2  Moving from lying on back to sitting on the side of the bed?: 3  Moving to and from a bed to a chair (including a wheelchair)?: 2  Need to walk in hospital room?: 2  Climbing 3-5 steps with a railing?: 2  Basic Mobility Total Score: 14     Patient left HOB elevated with all lines intact, call button in reach and RN notified.    GOALS:   Multidisciplinary Problems     Physical Therapy Goals        Problem: Physical Therapy    Goal Priority Disciplines Outcome Goal Variances Interventions   Physical Therapy Goal     PT, PT/OT Ongoing, Progressing     Description: Goals to be met by: 22     Patient will increase functional independence with mobility by performin. Supine to sit with Modified [place]  2. Sit to supine with Modified [place]  3. Sit to stand transfer with Stand-by Assistance  4. Gait  x 30 feet with Contact Guard Assistance using Rolling Walker.   5. Lower extremity exercise program x 20  reps per handout, with independence  6. Ascend/descend 4 stair with bilateral Handrails Minimal Assistance using No Assistive Device.                      History:     Past Medical History:   Diagnosis Date    Anemia     Anxiety     Cataract     Colon polyps 2012    Fibromyalgia 10/15/2012    Lung cancer     Pneumonia due to infectious organism 2019    Post-polio syndrome     Rheumatoid arthritis(714.0) 2012    2009 onset with mod positive CCP and RF MTX 2011 - present     Thyroid disease     Vertigo     postural vertigo       Past Surgical History:   Procedure Laterality Date    BLADDER SURGERY      CHOLECYSTECTOMY      HYSTERECTOMY  1988    LUNG REMOVAL, PARTIAL  2009    left lower lobectomy    OVARIAN CYST REMOVAL  1970    TONSILLECTOMY         Family History   Problem Relation Age of Onset    Lung cancer Father     Lung cancer Maternal Aunt     Diabetes Maternal Grandmother     Colon cancer Maternal Grandfather     Parkinsonism Paternal Grandmother     Arrhythmia Mother     Lung cancer Maternal Uncle     Heart attack Son 45    Hypertension Neg Hx        Social History     Socioeconomic History    Marital status:    Occupational History    Occupation: Writer and    Tobacco Use    Smoking status: Former Smoker     Packs/day: 1.50     Years: 40.00     Pack years: 60.00     Types: Cigarettes     Quit date: 2009     Years since quittin.9    Smokeless tobacco: Never Used   Substance and Sexual Activity    Alcohol use: Not Currently    Drug use: No    Sexual activity: Not Currently     Time Tracking:     PT Received On: 22  PT Start Time: 1235     PT Stop Time: 1325  PT Total Time (min): 50 min     Billable Minutes: Evaluation 10, Therapeutic Activity 25 and Therapeutic Exercise 15    2022

## 2022-06-27 NOTE — PROGRESS NOTES
[facility] - Telemetry [facility] Medicine  Progress Note    Patient Name: Yana Orellana  MRN: [id number]  Patient Class: OP- Observation   Admission Date: 2022  Length of Stay: 0 days  Attending Physician: Tri Forrest MD  Primary Care Provider: Caitlyn Graf MD        Subjective:     Principal Problem:Bowel incontinence        HPI:  Ms. Orellana is a 76 yo W with HTN, Non- alcoholic cirrhosis, obesity, anxiety, macrocytic anemia, hx of lung CA in  s/p surgery and chemotherapy with subsequent chemotherapy induced neuropathy, post-polio syndrome, lower extremity weakness (being followed by Neurology), rheumatoid arthritis, fibromyalgia, presents to the ED for acute on chronic diarrhea with associated bowel incontinence. Per pt, she has been having diarrhea for the past 2 years now. Watery stool with some mixed mucus. Denies any melena, bloody stool. She has about 3-4 bouts per day. In the past 2-3 weeks, she has been having more problems with controlling her bowel movement. Would wake up soaking in stool or unable to sense that she have had a bowel movement. Denies any chills, abdominal pain, nausea, or vomiting, chest pain, or SOB. Denies any recent fall. Denies any recent seafood consumption. States she mostly eats TV dinners. She is very distressed that she can no longer move around and perform her own ADLs.     Of note, pt also has been having problems with progressive bilateral lower legs weakness for many years. Per pt, she has been seeing neurology both here and at [facility] with extensive workup. 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 She is now requiring a walker to ambulate, unable to perform ADLs by herself. Would have a friend to come over to help out. Had 2 children but both are . Currently lives alone.     While in the ED, patient is afebrile, , RR 16, /53, sat 99% on room air.  Lab is  significant for hgb 11.4, , platelets 68, bicarb 18, Mg 1.4, lactate acid 3.4.  Patient was given 1 L of LR, 2 g of IV Mg sulfate, 1 L of NS.  ED physician performed digital rectal exam, noticed patient's without rectal tone.  MRI lumbar ordered to further evaluate for cauda equina.  Patient has some reservation and asked to be given Valium prior to MRI.  IV Valium 10 mg was given by the ED. Patient became acutely encephalopathic, unable to answer if there is metals in her body.  Chest and abdominal x-rays were ordered, MRI postponed.  Patient is admitted to hospital medicine for further evaluation and management.       Overview/Hospital Course:  Patient was admitted for bowel incontinence for the last two weeks. Patient with no rectal tone on exam, MRI ordered due to concerns for cauda equina but there were no concerning findings on MRI. Neurosurgery and colorectal surgery consulted, no indication for any procedure at this time. Will rule out infectious etiologies for her incontinence then treat with symptom management. Patient also has progressively worsening weakness for years that has extensively been worked up by neurologists at [facility] and [facility]. Neurology consulted and no indication for IP EMG/NCS given previous normal ones. Two blood cultures growing staph but taken from same site, repeats ordered.       Interval History: Patient with one loose stool last night but none today. Still having lower extremity weakness. Colorectal surgery and neurosurgery consulted, no procedure indicated at this time.     Review of Systems   Constitutional:  Negative for appetite change, chills and fever.   HENT:  Negative for sore throat.    Eyes:  Negative for discharge.   Respiratory:  Negative for shortness of breath.    Cardiovascular:  Negative for chest pain and palpitations.   Gastrointestinal:  Positive for diarrhea. Negative for abdominal distention, abdominal pain, constipation, nausea and vomiting.   Endocrine:  Negative for polydipsia and polyuria.   Genitourinary:  Negative for dysuria, hematuria and urgency.   Musculoskeletal:  Negative for back pain and gait problem.   Skin:  Negative for color change and rash.   Neurological:  Positive for weakness. Negative for numbness.   Hematological:  Does not bruise/bleed easily.   Psychiatric/Behavioral:  Negative for self-injury.    Objective:     Vital Signs (Most Recent):  Temp: 98 °F (36.7 °C) (06/27/22 1139)  Pulse: 81 (06/27/22 1139)  Resp: 18 (06/27/22 1139)  BP: 128/65 (06/27/22 1139)  SpO2: 98 % (06/27/22 1139) Vital Signs (24h Range):  Temp:  [97.7 °F (36.5 °C)-98.7 °F (37.1 °C)] 98 °F (36.7 °C)  Pulse:  [] 81  Resp:  [16-18] 18  SpO2:  [97 %-98 %] 98 %  BP: (128-146)/(65-76) 128/65     Weight: 120 kg (264 lb 8.8 oz)  Body mass index is 37.96 kg/m².    Intake/Output Summary (Last 24 hours) at 6/27/2022 1308  Last data filed at 6/27/2022 1141  Gross per 24 hour   Intake 200 ml   Output 1100 ml   Net -900 ml      Physical Exam  Vitals and nursing note reviewed.   Constitutional:       Appearance: Normal appearance. She is obese.   HENT:      Head: Normocephalic and atraumatic.      Right Ear: External ear normal.      Left Ear: External ear normal.      Nose: Nose normal.      Mouth/Throat:      Mouth: Mucous membranes are moist.   Eyes:      General:         Right eye: No discharge.         Left eye: No discharge.      Conjunctiva/sclera: Conjunctivae normal.   Cardiovascular:      Rate and Rhythm: Normal rate and regular rhythm.      Heart sounds: No murmur heard.  Pulmonary:      Effort: Pulmonary effort is normal.      Breath sounds: Normal breath sounds. No wheezing or rales.   Abdominal:      General: Abdomen is flat. There is no distension.      Palpations: Abdomen is soft.      Tenderness: There is no abdominal tenderness.   Musculoskeletal:         General: No swelling. Normal range of motion.      Cervical back: Normal range of motion.      Right lower  leg: Edema (without pitting) present.      Left lower leg: Edema (without pitting) present.   Skin:     General: Skin is warm and dry.      Capillary Refill: Capillary refill takes less than 2 seconds.   Neurological:      Mental Status: She is alert and oriented to person, place, and time.      Sensory: No sensory deficit.      Motor: Weakness present.      Comments: Bilateral Lower extremities strength 2/5   Psychiatric:         Mood and Affect: Mood normal.       Significant Labs: All pertinent labs within the past 24 hours have been reviewed.    Significant Imaging: I have reviewed all pertinent imaging results/findings within the past 24 hours.      Assessment/Plan:      * Bowel incontinence  Ms. Orellana is a 78 yo W with HTN, Non- alcoholic cirrhosis, obesity, anxiety, macrocytic anemia, hx of lung CA in 2009 s/p surgery and chemotherapy with subsequent chemotherapy induced neuropathy, post-polio syndrome, lower extremity weakness (being followed by Neurology), rheumatoid arthritis, fibromyalgia, presents to the ED for acute on chronic diarrhea with associated bowel incontinence.    Differential includes but not limited to: neuromuscular weakness vs cauda equina vs myopathies     Pt had extensive workup with Neurology here. Last seen with Dr. Myers on 11/2019. She was then referred to Dr. Rosendo Ulloa at [facility] for a second opinion. She ended up following up with Dr. Dr. Mary Padilla who dx her with a rare disease--lambert-eaton myasthenic syndrome. Per pt, physician there changed her in regards to this dx.     Plan:  - Neurology consulted, workup negative so far    - Neurosurgery consulted, no surgery indicated at this time   - Colorectal surgery, no procedure indicated at this time  - Need outpatient colonoscopy and colorectal surgery follow-up if symptoms unresolved    - MRI Lumbar not concerning for cauda equina   - Workup etiology of diarrhea; if infectious workup negative will treat with symptom management    - PT/OT       Lactic acidosis  Likely secondary to copious diarrhea. S/p fluid resuscitation in the ED. Low suspicion for infection given pt is without fever or white count     Plan:  - Normal after fluids    - Patient has been afebrile with a normal WBC   - Staph growing in two blood cultures taken from same site, repeat cultures taken   - Continue vanc until repeat cultures result         Vitamin D deficiency  - Continue home vitamin D       Essential hypertension  Home regimen: aldactone 50 mg QD     Plan:   - Resume home regimen           Morbid obesity  Body mass index is 37.96 kg/m². Morbid obesity complicates all aspects of disease management from diagnostic modalities to treatment. Weight loss encouraged and health benefits explained to patient.         Hypomagnesemia  S/p replacement in the ED     Plan:  - Mag level daily      Physical deconditioning  Debility  - PT/OT consult  - Appreciate CM/SW assistance          Thrombocytopenia  Likely secondary to history of cirrhosis     Plan:  - Daily CBC  - Transfuse if plt <10,000 or <50,000 with bleeding   - Thrombocytopenia precautions         Weakness of both lower extremities  See bowel incontinence       Rheumatoid arthritis involving multiple sites with positive rheumatoid factor  F/u with Dr. Agrawal outpatient. No longer on MTX due to thrombocytopenia         Chronic diarrhea  Unlikely to be infectious given chronicity, and pt is without abdominal pain, bloody stool or nausea or vomitting. Osmotic vs secretory vs motility. Motility is most likely given the lacks of rectal tone      Plan:  - stool panel ordered   - Stool electrolytes to calculate for stool osmotic gap   - Once infectious etiology is ruled out, can consider anti-motility agent such as lomotil       VTE Risk Mitigation (From admission, onward)         Ordered     enoxaparin injection 40 mg  Daily         06/25/22 2134     IP VTE HIGH RISK PATIENT  Once         06/25/22 2134     Place  sequential compression device  Until discontinued         06/25/22 2134                Discharge Planning   JAE: 6/27/2022     Code Status: Full Code   Is the patient medically ready for discharge?: No    Reason for patient still in hospital (select all that apply): Patient trending condition                     Bib Ramos MD  Department of Hospital Medicine   [facility] - Telemetry Stepdown

## 2022-06-27 NOTE — SUBJECTIVE & OBJECTIVE
Interval History: NAEON. Neuro exam stable. Rec cervical MRI in setting of diffuse upper extremity weakness. Patient reports handwriting changes and dropping objects. Colorectal surgery without acute intervention.  Staph growing in blood culture.    Medications:  Continuous Infusions:  Scheduled Meds:   enoxaparin  40 mg Subcutaneous Daily    multivitamin  1 tablet Oral Daily    spironolactone  50 mg Oral Daily    vancomycin (VANCOCIN) IVPB  1,750 mg Intravenous Q12H    vitamin D  1,000 Units Oral Daily     PRN Meds:dextrose 10%, dextrose 10%, glucagon (human recombinant), glucose, glucose, naloxone, sodium chloride 0.9%     Review of Systems  Objective:     Weight: 120 kg (264 lb 8.8 oz)  Body mass index is 37.96 kg/m².  Vital Signs (Most Recent):  Temp: 98 °F (36.7 °C) (06/27/22 1139)  Pulse: 81 (06/27/22 1139)  Resp: 18 (06/27/22 1139)  BP: 128/65 (06/27/22 1139)  SpO2: 98 % (06/27/22 1139) Vital Signs (24h Range):  Temp:  [97.7 °F (36.5 °C)-98.7 °F (37.1 °C)] 98 °F (36.7 °C)  Pulse:  [] 81  Resp:  [16-20] 18  SpO2:  [97 %-98 %] 98 %  BP: (128-146)/(65-76) 128/65                     Female External Urinary Catheter 06/26/22 (Active)   Skin no redness;no breakdown 06/26/22 0752   Tolerance no signs/symptoms of discomfort 06/26/22 0752   Suction Continuous suction at 60 mmHg 06/26/22 0752   Output (mL) 300 mL 06/27/22 0518       Physical Exam    Neurosurgery Physical Exam    Significant Labs:  Recent Labs   Lab 06/25/22 1942 06/26/22 0822 06/27/22  0435   * 128* 113*    140 140   K 3.9 3.4* 4.6    112* 114*   CO2 18* 19* 17*   BUN 9 9 8   CREATININE 0.7 0.6 0.7   CALCIUM 8.2* 8.0* 8.2*   MG 1.4* 1.7 1.8     Recent Labs   Lab 06/25/22 1942 06/26/22  0822 06/27/22  0435   WBC 7.04 5.39 4.50   HGB 11.4* 11.5* 10.3*   HCT 34.0* 34.8* 31.6*   PLT 68* 71* SEE COMMENT     No results for input(s): LABPT, INR, APTT in the last 48 hours.  Microbiology Results (last 7 days)       Procedure  Component Value Units Date/Time    Blood culture [[id number]]  (Abnormal) Collected: 06/25/22 2154    Order Status: Completed Specimen: Blood from Peripheral, Forearm, Left Updated: 06/27/22 1029     Blood Culture, Routine Gram stain benja bottle: Gram positive cocci in clusters resembling Staph      Results called to and read back by: Brandy Diego RN.  06/26/2022        18:20      STAPHYLOCOCCUS SPECIES  Identification and susceptibility pending      Blood culture [[id number]]  (Abnormal) Collected: 06/25/22 2154    Order Status: Completed Specimen: Blood from Peripheral, Forearm, Left Updated: 06/27/22 1025     Blood Culture, Routine Gram stain aer bottle: Gram positive cocci in clusters resembling Staph       Positive results previously called 06/26/2022  22:43      STAPHYLOCOCCUS SPECIES  Identification and susceptibility pending      Blood culture [[id number]] Collected: 06/26/22 1952    Order Status: Completed Specimen: Blood from Peripheral, Right Wrist Updated: 06/27/22 0315     Blood Culture, Routine No Growth to date    Blood culture [[id number]] Collected: 06/26/22 1953    Order Status: Completed Specimen: Blood from Peripheral, Left Hand Updated: 06/27/22 0315     Blood Culture, Routine No Growth to date    Stool culture [[id number]]     Order Status: No result Specimen: Stool     Clostridium difficile EIA [[id number]]     Order Status: Canceled Specimen: Stool           All pertinent labs from the last 24 hours have been reviewed.    Significant Diagnostics:  I have reviewed all pertinent imaging results/findings within the past 24 hours.

## 2022-06-27 NOTE — PT/OT/SLP EVAL
"Occupational Therapy   Evaluation    Name: Yana Orellana  MRN: [id number]  Admitting Diagnosis:  Bowel incontinence  Recent Surgery: * No surgery found *      Recommendations:     Discharge Recommendations: nursing facility, skilled  Discharge Equipment Recommendations:  bedside commode, bath bench  Barriers to discharge:   decreased caregiver support    Assessment:     Yana Orellana is a 77 y.o. female with a medical diagnosis of Bowel incontinence.  She presents with the following Performance deficits affecting function: weakness, impaired endurance, impaired self care skills, impaired functional mobilty, gait instability, decreased lower extremity function.    Pt transitioned to EOB with SBA utilizing bedrails, transitioned to standing with CGA and elevated bed height to participate in ADL's this pm.      Rehab Prognosis: Good; patient would benefit from acute skilled OT services to address these deficits and reach maximum level of function.       Plan:     Patient to be seen 3 x/week to address the above listed problems via    · Plan of Care Expires:  7/25/22  · Plan of Care Reviewed with: patient    Subjective     Chief Complaint: Pt c/o she drops things with her hands - this was not noted during this evaluation with patient participating in the below ADL's.  Patient/Family Comments/goals: "for my hands to work better".    Occupational Profile:  Living Environment: Pt lives in a 1  with 4 IDA with BHR and a shower stall with a shower chair. (reportedly she has attempted unsuccessfully to have grab bars installed in shower).  Previous level of function: Lives alone yet has a non-medical care provider available, noted as her "" who assists her with community ADL's and transportation.  Roles and Routines: Responsible for her own care.  Equipment Used at Home:  grab bar, wheelchair, walker, rolling  Assistance upon Discharge: TBD if pts caregiver continues to be available.    Pain/Comfort:  · Pain " Rating 1: 0/10  · Pain Rating Post-Intervention 1: 0/10    Patients cultural, spiritual, Voodoo conflicts given the current situation:      Objective:     Communicated with: nurse prior to session.  Patient found HOB elevated with PureWick upon OT entry to room. She has just returned from MRI.    General Precautions: Standard, fall   Orthopedic Precautions:N/A   Braces: N/A  Respiratory Status: Room air    Bed Mobility:    · Patient completed Rolling/Turning to Left with  stand by assistance  · Patient completed Rolling/Turning to Right with stand by assistance  · Patient completed Supine to Sit with contact guard assistance  · Patient completed Sit to Supine with contact guard assistance  CGA for transitioning to EOB for BLE management only. Pt was dependent on use of hospital bedrails.    Functional Mobility/Transfers:  · Patient completed Sit <> Stand Transfer with contact guard assistance  with  hand-held assist   · Functional Mobility: Pt demonstrated ability to stand without assist x 4 minutes with limited functional reach required for ADL's due to impaired BLE and core strength deficits.    Activities of Daily Living:  · Feeding:  independence after set up  · Grooming: independence after set up for basic care of washing her face.  · Upper Body Dressing: mod A with managing hospital gown, tying. to further assess on next session.  · Toileting: maximal assistance on use of PureWick while in hospital. This OT did discuss with patient and PCT the importance of and risks of not transitioning back to use of bariatric BSC and requested such to be ordered. Noted bowel incontinence of unknown etiology    Cognitive/Visual Perceptual:  Cognitive/Psychosocial Skills:  -       Oriented to: Person, Place and Situation   -       Follows Commands/attention:Follows one-step commands  -       Safety awareness/insight to disability: intact   No apparent cognitive of  deficits at this time.    Physical Exam:  Balance: -        Sitting - good for static and dynamic; Stand - static is good; dynamic - fair+  Postural examination/scapula alignment: -       No postural abnormalities identified  Skin integrity: Visible skin intact  Sensation: -       Intact  Dominant hand: -       right  Upper Extremity Range of Motion:     Upper Extremity Strength:     Strength: -       Right Upper Extremity: WFL  -       Left Upper Extremity: WFL  Fine Motor Coordination:    -       Intact    AMPAC 6 Click ADL:  AMPAC Total Score: 19    Treatment & Education:  -Pt education on OT role and POC.  -Importance of E/OOB activity with staff assistance, emphasis on daily participation  -Multiple self-care tasks and functional mobility completed -- assistance level noted above  -Safety during functional transfer and mobility ensured  -Patient provided with education on importance of Bilateral UB/LB integration during functional tasks for improvement in functional performance.   -Education provided/reviewed, questions answered within OT scope of practice.   -Education provided to pt on use of call bell and repeating call to receive service in timely manner to prevent falls/injury  -Additional time spent providing emotional support as pt expressed experience since medical diagnosis and hospital stay  -Patient demonstrates understanding and learning this date.  Education:    Patient left HOB elevated with call button in reach    GOALS:   Multidisciplinary Problems     Occupational Therapy Goals        Problem: Occupational Therapy    Goal Priority Disciplines Outcome Interventions   Occupational Therapy Goal     OT, PT/OT Ongoing, Progressing    Description: Goals to be met by: 7/11/22    Patient will increase functional independence with ADLs by performing:    UE Dressing with Modified [place].  LE Dressing with Modified [place].  Grooming while standing at sink with Modified [place].  Bathing from  shower chair/bench with Supervision.  Toilet  transfer to bedside commode with Modified [place].  Upper extremity exercise program x10 reps per handout, with independence.  Pt to perform ADL's from standing level with good static standing balance.                     History:     Past Medical History:   Diagnosis Date    Anemia     Anxiety     Cataract     Colon polyps 2012    Fibromyalgia 10/15/2012    Lung cancer     Pneumonia due to infectious organism 7/19/2019    Post-polio syndrome     Rheumatoid arthritis(714.0) 7/16/2012    2009 onset with mod positive CCP and RF MTX 2011 - present     Thyroid disease     Vertigo     postural vertigo       Past Surgical History:   Procedure Laterality Date    BLADDER SURGERY  1959    CHOLECYSTECTOMY      HYSTERECTOMY  1988    LUNG REMOVAL, PARTIAL  2009    left lower lobectomy    OVARIAN CYST REMOVAL  1970    TONSILLECTOMY  1952       Time Tracking:     OT Date of Treatment: 06/27/22  OT Start Time: 0324  OT Stop Time: 0350  OT Total Time (min): 26 min    Billable Minutes:Evaluation 10  Self Care/Home Management 16    6/27/2022

## 2022-06-27 NOTE — PROGRESS NOTES
Pharmacokinetic Initial Assessment: IV Vancomycin    Assessment/Plan:    Initiate intravenous vancomycin with loading dose of 2000 mg once, followed by a maintenance dose of 1750 mg every 12 hours.  Desired empiric serum trough concentration is 15 to 20 mcg/mL  Draw vancomycin trough level 30 min prior to fourth dose on 06/28/2022 at approximately 0730  Pharmacy will continue to follow and monitor vancomycin.      Please contact pharmacy at extension [phone] with any questions regarding this assessment.     Thank you for the consult,   Gillian Wagner       Patient brief summary:  Yana Orellana is a 77 y.o. female initiated on antimicrobial therapy with IV Vancomycin for treatment of suspected bacteremia    Drug Allergies:   Review of patient's allergies indicates:   Allergen Reactions    Ibuprofen Nausea Only    Demerol [meperidine]      Euphoria    Gabapentin Other (See Comments)     Hallucinations    Mellaril-s Nausea Only    Versed [midazolam]      Excessive talking, hyper    Vicodin [hydrocodone-acetaminophen]      Joint pain, muscle pain    Xanax [alprazolam]      fatigue       Actual Body Weight:   120 kg    Renal Function:   Estimated Creatinine Clearance: 114 mL/min (based on SCr of 0.6 mg/dL).,     Dialysis Method (if applicable):  N/A    CBC (last 72 hours):  Recent Labs   Lab Result Units 06/25/22 1942 06/26/22  0822   WBC K/uL 7.04 5.39   Hemoglobin g/dL 11.4* 11.5*   Hemoglobin A1C %  --  4.8   Hematocrit % 34.0* 34.8*   Platelets K/uL 68* 71*   Gran % % 74.4* 67.9   Lymph % % 15.2* 19.1   Mono % % 8.7 8.9   Eosinophil % % 0.7 2.8   Basophil % % 0.6 0.9   Differential Method  Automated Automated       Metabolic Panel (last 72 hours):  Recent Labs   Lab Result Units 06/25/22 1942 06/26/22  0822   Sodium mmol/L 140 140   Potassium mmol/L 3.9 3.4*   Chloride mmol/L 110 112*   CO2 mmol/L 18* 19*   Glucose mg/dL 118* 128*   BUN mg/dL 9 9   Creatinine mg/dL 0.7 0.6   Albumin g/dL 2.3* 2.2*   Total  Bilirubin mg/dL 3.9* 3.4*   Alkaline Phosphatase U/L 91 94   AST U/L 41* 37   ALT U/L 15 16   Magnesium mg/dL 1.4* 1.7   Phosphorus mg/dL  --  2.5*       Drug levels (last 3 results):  No results for input(s): VANCOMYCINRA, VANCORANDOM, VANCOMYCINPE, VANCOPEAK, VANCOMYCINTR, VANCOTROUGH in the last 72 hours.    Microbiologic Results:  Microbiology Results (last 7 days)     Procedure Component Value Units Date/Time    Blood culture [[id number]]     Order Status: Sent Specimen: Blood     Blood culture [[id number]]     Order Status: Sent Specimen: Blood     Blood culture [[id number]] Collected: 06/25/22 2154    Order Status: Completed Specimen: Blood from Peripheral, Forearm, Left Updated: 06/26/22 1821     Blood Culture, Routine Gram stain benja bottle: Gram positive cocci in clusters resembling Staph      Results called to and read back by: Brandy Diego RN.  06/26/2022        18:20    Blood culture [[id number]] Collected: 06/25/22 2154    Order Status: Completed Specimen: Blood from Peripheral, Forearm, Left Updated: 06/26/22 0515     Blood Culture, Routine No Growth to date    Stool culture [[id number]]     Order Status: No result Specimen: Stool     Clostridium difficile EIA [[id number]]     Order Status: Canceled Specimen: Stool

## 2022-06-27 NOTE — CONSULTS
[facility] - Telemetry Stepdown  Colorectal Surgery  Consult Note    Patient Name: Yana Orellana  MRN: [id number]  Admission Date: 6/25/2022  Hospital Length of Stay: 0 days  Attending Physician: Tri Forrest MD  Primary Care Provider: Caitlyn Graf MD    Consults  Subjective:     Chief Complaint/Reason for Admission: Fecal Incontinence    History of Present Illness: 77F with PMH HTN, non-alcoholic cirrhosis, anxiety, anemia, lung cancer (2009) s/p L lower lobectomy, adjuvant chemotherapy, chemotherapy induced neuropathy, polio (age 17) with post-polio syndrome, RA, and chronic lower extremity weakness which has been slowly worsening. She reports that earlier this month she was in her normal state of health. She had bowel movements daily without straining. They were typically soft and occasionally (once every few months) she would have an episode of incontinence. This would occur when her bowel movements were closer to liquid. When this happened she would take imodium (1mg) with resolution of her symptoms. On June 16 she went out for dinner. Afterward she developed fever and diarrhea. Since the diarrhea began she has been incontinent of stool. Her bowel movements have been soft/liquidy and she has minimal to no control. She has a history of two vaginal childbirths, both with episiotomies performed. She had a colonoscopy last in 2012 with polyps removed. She has a family history of colon cancer in her grandfather (diagnosed in his 70s). She currently denies fever, chills, CP, SOB, dysuria, or hematuria. She continues to endorse chronic lower extremity weakness without any loss of perineal sensation or urinary incontinence.    Current Facility-Administered Medications   Medication    dextrose 10% bolus 125 mL    dextrose 10% bolus 250 mL    enoxaparin injection 40 mg    glucagon (human recombinant) injection 1 mg    glucose chewable tablet 16 g    glucose chewable tablet 24 g    multivitamin tablet     naloxone 0.4 mg/mL injection 0.02 mg    sodium chloride 0.9% flush 10 mL    spironolactone tablet 50 mg    vancomycin 1.75 g in 5 % dextrose 500 mL IVPB    vitamin D 1000 units tablet 1,000 Units       Review of patient's allergies indicates:   Allergen Reactions    Ibuprofen Nausea Only    Demerol [meperidine]      Euphoria    Gabapentin Other (See Comments)     Hallucinations    Mellaril-s Nausea Only    Versed [midazolam]      Excessive talking, hyper    Vicodin [hydrocodone-acetaminophen]      Joint pain, muscle pain    Xanax [alprazolam]      fatigue       Past Medical History:   Diagnosis Date    Anemia     Anxiety     Cataract     Colon polyps     Fibromyalgia 10/15/2012    Lung cancer     Pneumonia due to infectious organism 2019    Post-polio syndrome     Rheumatoid arthritis(714.0) 2012 onset with mod positive CCP and RF MTX  - present     Thyroid disease     Vertigo     postural vertigo     Past Surgical History:   Procedure Laterality Date    BLADDER SURGERY      CHOLECYSTECTOMY      HYSTERECTOMY      LUNG REMOVAL, PARTIAL  2009    left lower lobectomy    OVARIAN CYST REMOVAL  1970    TONSILLECTOMY       Family History     Problem Relation (Age of Onset)    Arrhythmia Mother    Colon cancer Maternal Grandfather    Diabetes Maternal Grandmother    Heart attack Son (45)    Lung cancer Father, Maternal Aunt, Maternal Uncle    Parkinsonism Paternal Grandmother        Tobacco Use    Smoking status: Former Smoker     Packs/day: 1.50     Years: 40.00     Pack years: 60.00     Types: Cigarettes     Quit date: 2009     Years since quittin.9    Smokeless tobacco: Never Used   Substance and Sexual Activity    Alcohol use: Not Currently    Drug use: No    Sexual activity: Not Currently     Review of Systems   Constitutional: Negative for chills and fever.   HENT: Negative for congestion and sore throat.    Eyes: Negative for pain and  redness.   Respiratory: Positive for shortness of breath. Negative for cough.    Cardiovascular: Negative for chest pain and palpitations.   Gastrointestinal: Positive for diarrhea. Negative for abdominal distention, abdominal pain, anal bleeding, blood in stool, constipation, nausea, rectal pain and vomiting.   Genitourinary: Negative for dysuria and hematuria.   Musculoskeletal: Negative for arthralgias and myalgias.   Skin: Negative for color change and pallor.   Neurological: Negative for dizziness and weakness.   Psychiatric/Behavioral: Negative for agitation and confusion.     Objective:     Vital Signs (Most Recent):  Temp: 97.7 °F (36.5 °C) (06/27/22 0812)  Pulse: 92 (06/27/22 0812)  Resp: 18 (06/27/22 0812)  BP: (!) 141/67 (06/27/22 0812)  SpO2: 97 % (06/27/22 0812) Vital Signs (24h Range):  Temp:  [97.7 °F (36.5 °C)-98.7 °F (37.1 °C)] 97.7 °F (36.5 °C)  Pulse:  [] 92  Resp:  [16-20] 18  SpO2:  [97 %-98 %] 97 %  BP: (137-146)/(65-76) 141/67     Weight: 120 kg (264 lb 8.8 oz)  Body mass index is 37.96 kg/m².    Physical Exam  Constitutional:       Appearance: Normal appearance.   HENT:      Head: Normocephalic and atraumatic.      Right Ear: External ear normal.      Left Ear: External ear normal.   Eyes:      Extraocular Movements: Extraocular movements intact.      Pupils: Pupils are equal, round, and reactive to light.   Cardiovascular:      Rate and Rhythm: Normal rate and regular rhythm.      Heart sounds: Normal heart sounds.   Pulmonary:      Effort: Pulmonary effort is normal. No respiratory distress.   Abdominal:      General: There is no distension.      Palpations: Abdomen is soft.      Tenderness: There is no abdominal tenderness.   Musculoskeletal:         General: No swelling or deformity.      Cervical back: Normal range of motion.   Skin:     General: Skin is warm and dry.   Neurological:      Mental Status: She is alert and oriented to person, place, and time. Mental status is at  baseline.   Psychiatric:         Mood and Affect: Mood normal.         Behavior: Behavior normal.     Anorectal Exam:  Anal Skin: Normal    Digital Rectal Exam:  Resting Tone low  Squeeze low  Relaxation with bear down present  Mass none  Rectocele  absent  Tenderness  absent    Significant Labs:  BMP (Last 3 Results):   Recent Labs   Lab 06/25/22 1942 06/26/22 0822 06/27/22  0435   * 128* 113*    140 140   K 3.9 3.4* 4.6    112* 114*   CO2 18* 19* 17*   BUN 9 9 8   CREATININE 0.7 0.6 0.7   CALCIUM 8.2* 8.0* 8.2*   MG 1.4* 1.7 1.8     CBC (Last 3 Results):   Recent Labs   Lab 06/20/22  1435 06/25/22 1942 06/26/22 0822 06/27/22  0435   WBC 4.70 7.04 5.39 4.50   RBC 3.57* 3.30* 3.34* 2.95*   HGB 12.5 11.4* 11.5* 10.3*   HCT 36.1* 34.0* 34.8* 31.6*   PLT 91* 68* 71*  --    * 103* 104* 107*   MCH 35.0* 34.5* 34.4* 34.9*   MCHC 34.6 33.5 33.0 32.6       Significant Diagnostics:  I have reviewed all pertinent imaging results/findings within the past 24 hours.    Assessment/Plan:     Active Diagnoses:    Diagnosis Date Noted POA    PRINCIPAL PROBLEM:  Bowel incontinence [R15.9] 06/25/2022 Yes    Lactic acidosis [E87.2] 06/25/2022 Unknown    Vitamin D deficiency [E55.9] 05/29/2021 Yes    Physical deconditioning [R53.81] 05/28/2021 Yes    Morbid obesity [E66.01] 05/28/2021 Yes    Essential hypertension [I10] 05/28/2021 Yes    Hypomagnesemia [E83.42] 05/28/2021 Unknown    Thrombocytopenia [D69.6] 09/12/2019 Yes    Weakness of both lower extremities [R29.898] 11/05/2018 Unknown    Rheumatoid arthritis involving multiple sites with positive rheumatoid factor [M05.79] 10/26/2015 Yes    Chronic diarrhea [K52.9] 03/28/2014 Yes    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]  Not Applicable    Xerostomia [K11.7] 12/13/2013 Yes     Chronic    Postmenopausal estrogen deficiency [Z78.0] 10/02/2013 Not Applicable     Chronic    Neuropathic pain of foot [M79.2] 01/16/2013 Yes     Chronic    Fibromyalgia  [M79.7] 10/15/2012 Yes     Chronic      Problems Resolved During this Admission:    Diagnosis Date Noted Date Resolved POA    Erythema ab igne [L59.0] 01/23/2017 06/25/2022 Yes     Chronic     77F with PMH HTN, non-alcoholic cirrhosis, anxiety, anemia, lung cancer (2009) s/p L lower lobectomy, adjuvant chemotherapy, chemotherapy induced neuropathy, polio (age 17) with post-polio syndrome, RA, and chronic lower extremity weakness which has been slowly worsening, currently admitted to Hospital Medicine with two weeks of diarrhea and incontinence after an episode of fever after dinner. Prior to this episode she had occasional incontinence to soft stool but managed her symptoms well with PRN imodium.    Exam reveals decreased rectal tone with weak but present squeeze and soft stool in rectal vault.     -No acute surgical intervention indicated  -Agree with infectious workup for diarrhea  -If workup negative for infection, recommend medical management of diarrhea for symptomatic management  -Given history of cholecystectomy, can start with daily cholestyramine   -Discussed with patient that imodium can be used as needed or prophylactically if she has plans where she is going to be out of the house and is concerned about her bowel function   -Last colonoscopy documented in 2012, given previous polyps and history of colon cancer in grandfather recommended follow up for repeat colonoscopy  -If symptoms not improved or if patient desires can follow up as an outpatient for further management of fecal incontinence (should follow with Dr. Loyola or Dr. Johnson)  -Above discussed with Dr. Abdullahi  -Further care per primary team    Thank you for your consult. I will sign off. Please contact us if you have any additional questions.    Candy Flores MD  Colorectal Surgery  [facility] - Telemetry Stepdown

## 2022-06-27 NOTE — TELEPHONE ENCOUNTER
This medication is marked as allergy, please check with patient that she's tolerated medication and is not allergic. Also, what is she taking medication for? Thanks!

## 2022-06-27 NOTE — PLAN OF CARE
Problem: Occupational Therapy  Goal: Occupational Therapy Goal  Description: Goals to be met by: 7/11/22    Patient will increase functional independence with ADLs by performing:    UE Dressing with Modified [place].  LE Dressing with Modified [place].  Grooming while standing at sink with Modified [place].  Bathing from  shower chair/bench with Supervision.  Toilet transfer to bedside commode with Modified [place].  Upper extremity exercise program x10 reps per handout, with independence.  Pt to perform ADL's from standing level with good static standing balance.    Outcome: Ongoing, Progressing

## 2022-06-27 NOTE — ASSESSMENT & PLAN NOTE
Likely secondary to copious diarrhea. S/p fluid resuscitation in the ED. Low suspicion for infection given pt is without fever or white count     Plan:  - Normal after fluids    - Patient has been afebrile with a normal WBC   - Staph growing in two blood cultures taken from same site, repeat cultures taken   - Continue vanc until repeat cultures result

## 2022-06-28 PROBLEM — R78.81 BACTEREMIA: Status: ACTIVE | Noted: 2022-01-01

## 2022-06-28 NOTE — PLAN OF CARE
NURSING HOME ORDERS    06/30/2022  [facility]  [facility] - TELEMETRY STEPDOWN  [address]  [facility] [address]  Dept: [phone]  Loc: [phone]     Admit to Nursing Home:  [facility]    Diagnoses:  Active Hospital Problems    Diagnosis  POA    *Bowel incontinence [R15.9]  Yes    Bacteremia [R78.81]  Unknown    Lactic acidosis [E87.2]  Unknown    Vitamin D deficiency [E55.9]  Yes    Physical deconditioning [R53.81]  Yes    Morbid obesity [E66.01]  Yes    Essential hypertension [I10]  Yes    Hypomagnesemia [E83.42]  Unknown    Thrombocytopenia [D69.6]  Yes    Weakness of both lower extremities [R29.898]  Unknown    Rheumatoid arthritis involving multiple sites with positive rheumatoid factor [M05.79]  Yes     2009 onset with mod positive CCP and RF  MTX 2011 - 2020; stopped due to low platelets      Chronic diarrhea [K52.9]  Yes    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]  Not Applicable    Xerostomia [K11.7]  Yes     Chronic    Postmenopausal estrogen deficiency [Z78.0]  Not Applicable     Chronic    Neuropathic pain of foot [M79.2]  Yes     Chronic     After chemo for lung ca      Fibromyalgia [M79.7]  Yes     Chronic      Resolved Hospital Problems    Diagnosis Date Resolved POA    Erythema ab igne [L59.0] 06/25/2022 Yes     Chronic       Patient is homebound due to:  Bowel incontinence    Allergies:  Review of patient's allergies indicates:   Allergen Reactions    Ibuprofen Nausea Only    Demerol [meperidine]      Euphoria    Gabapentin Other (See Comments)     Hallucinations    Mellaril-s Nausea Only    Versed [midazolam]      Excessive talking, hyper    Vicodin [hydrocodone-acetaminophen]      Joint pain, muscle pain    Xanax [alprazolam]      fatigue       Vitals:  Routine    Diet: regular diet    Activities:   Activity as tolerated    Goals of Care Treatment Preferences:  Code Status: Full Code      Labs:  Per facility  protocol    Nursing Precautions:  Fall and Pressure ulcer prevention    Consults:   PT to evaluate and treat- 5 times a week, OT to evaluate and treat- 5 times a week, ST to evaluate and treat- 5 times a week and Nutrition to evaluate and recommend diet     Miscellaneous Care: Routine Skin for Bedridden Patients:  Apply moisture barrier cream to all                   Diabetes Care:  N/A      Medications: Discontinue all previous medication orders, if any. See new list below.     Medication List      START taking these medications    cholestyramine 4 gram packet  Commonly known as: QUESTRAN  Take 1 packet (4 g total) by mouth once daily.     loperamide 2 mg capsule  Commonly known as: IMODIUM  Take 1 capsule (2 mg total) by mouth 3 (three) times daily.        CHANGE how you take these medications    diclofenac sodium 1 % Gel  Commonly known as: VOLTAREN  Apply 2 g topically 3 (three) times daily to bilateral knees, neck, shoulders PRN per patient request  What changed:   · when to take this  · reasons to take this        CONTINUE taking these medications    acetaminophen 500 MG tablet  Commonly known as: TYLENOL  Take 500 mg by mouth 2 (two) times daily as needed for Pain.     cholecalciferol (vitamin D3) 25 mcg (1,000 unit) capsule  Commonly known as: VITAMIN D3  Take 5 capsules (5,000 Units total) by mouth once daily.     LIDOcaine 5 %  Commonly known as: LIDODERM  Place 1 patch onto the skin to bilateral knee, shoulder, neck PRN per patient request every 24 hours. Remove & Discard patch within 12 hours or as directed by MD     multivitamin with minerals tablet  Take 1 tablet by mouth once daily.     predniSONE 5 MG tablet  Commonly known as: DELTASONE  Take 1-2 tablets (5-10 mg total) by mouth 2 (two) times daily as needed (arthritis).     Saline NasaL 0.65 % nasal spray  Generic drug: sodium chloride  1 spray by Nasal route as needed for Congestion.     spironolactone 50 MG tablet  Commonly known as:  ALDACTONE  Take 1 tablet (50 mg total) by mouth once daily.        STOP taking these medications    oxyCODONE-acetaminophen 5-325 mg per tablet  Commonly known as: PERCOCET              Immunizations Administered as of 6/30/2022     Name Date Dose VIS Date Route Exp Date    COVID-19, MRNA, LN-S, PF (Pfizer) (Purple Cap) 10/5/2021 0.3 mL -- Intramuscular --    Site: Left arm     : [employer]     Lot: [id number]     COVID-19, MRNA, LN-S, PF (Pfizer) (Purple Cap) 2/5/2021 -- -- Intramuscular --    Site: Left arm     : Pf[employer]     Lot: [id number]     COVID-19, MRNA, LN-S, PF (Pfizer) (Purple Cap) 2/5/2021 0.3 mL -- Intramuscular --    Site: Left arm     : Pf[employer]     Lot: [id number]     COVID-19, MRNA, LN-S, PF (Pfizer) (Purple Cap) 1/15/2021 -- -- Intramuscular --    Site: Left arm     : Pf[employer] Inc     Lot: [id number]           This patient has had both covid vaccinations    Some patients may experience side effects after vaccination.  These may include fever, headache, muscle or joint aches.  Most symptoms resolve with 24-48 hours and do not require urgent medical evaluation unless they persist for more than 72 hours or symptoms are concerning for an unrelated medical condition.          _________________________________  Tavo Prakash MD  06/30/2022

## 2022-06-28 NOTE — PLAN OF CARE
[facility] - Telemetry Stepdown  Initial Discharge Assessment       Primary Care Provider: Caitlyn Graf MD    Admission Diagnosis: Hypomagnesemia [E83.42]  Decreased rectal sphincter tone [K62.89]  Encounter for imaging to screen for metal prior to MRI [Z13.89]  Acute encephalopathy [G93.40]  Chest pain [R07.9]  Elevated lactic acid level [R79.89]  Diarrhea, unspecified type [R19.7]    Admission Date: 6/25/2022  Expected Discharge Date: 6/30/2022    Discharge Barriers Identified: None    Payor: MEDICARE / Plan: MEDICARE PART A & B / Product Type: Government /     Extended Emergency Contact Information  Primary Emergency Contact: Elly Brewer  Address: [address] [place] [id number]  Mobile Phone: [phone]  Relation: Relative  Secondary Emergency Contact: Bernice Brewer  Address: [address] [place] [address] [facility]  Home Phone: [phone]  Mobile Phone: [phone]  Relation: Relative    Discharge Plan A: Skilled Nursing Facility  Discharge Plan B: [facility]      [id number] [place], [place] - [address] AT [address] & [facility]  [address]  [facility] [address]  Phone: [phone] Fax: [phone]      Initial Assessment (most recent)     Adult Discharge Assessment - 06/28/22 1028        Discharge Assessment    Assessment Type Discharge Planning Assessment     Confirmed/corrected address, phone number and insurance Yes     Confirmed Demographics Correct on Facesheet     Source of Information patient     Communicated JAE with patient/caregiver Yes     Reason For Admission Decreased rectal sphincter tone     Lives With alone     Do you expect to return to your current living situation? Yes     Do you have help at home or someone to help you manage your care at home? Yes     Prior to hospitilization cognitive status: Alert/Oriented     Current cognitive status: Alert/Oriented     Walking or Climbing  Stairs Difficulty ambulation difficulty, requires equipment     Dressing/Bathing Difficulty none     Equipment Currently Used at Home walker, rolling;other (see comments)   Transport chair    Readmission within 30 days? No     Patient currently being followed by outpatient case management? No     Do you currently have service(s) that help you manage your care at home? No     Do you take prescription medications? Yes     Do you have prescription coverage? Yes     Do you have any problems affording any of your prescribed medications? No     Is the patient taking medications as prescribed? yes     How do you get to doctors appointments? other (see comments)   Patient has a .    Are you on dialysis? No     Do you take coumadin? No     Discharge Plan A Skilled Nursing Facility     Discharge Plan B Home Health     DME Needed Upon Discharge  other (see comments)   TBD    Discharge Plan discussed with: Patient     Discharge Barriers Identified None                 Grace Joy RN  Ext [phone]

## 2022-06-28 NOTE — ASSESSMENT & PLAN NOTE
Ms. Orellana is a 76 yo W with HTN, Non- alcoholic cirrhosis, obesity, anxiety, macrocytic anemia, hx of lung CA in 2009 s/p surgery and chemotherapy with subsequent chemotherapy induced neuropathy, post-polio syndrome, lower extremity weakness (being followed by Neurology), rheumatoid arthritis, fibromyalgia, presents to the ED for acute on chronic diarrhea with associated bowel incontinence.    Differential includes but not limited to: neuromuscular weakness vs cauda equina vs myopathies     Pt had extensive workup with Neurology here. Last seen with Dr. Myers on 11/2019. She was then referred to Dr. Rosendo Ulloa at [facility] for a second opinion. She ended up following up with Dr. Dr. Mary Padilla who dx her with a rare disease--lambert-eaton myasthenic syndrome. Per pt, physician there changed her in regards to this dx.     - Neurology consulted, workup negative so far; continued outpatient follow up  - Neurosurgery consulted, no surgery indicated at this time (MRI c-spine/l-spine unremarkable  - Colorectal surgery, no procedure indicated at this time; adding cholestyramine, imodium  - Outpatient colonoscopy and colorectal surgery follow-up   - Infectious w/u in process  - PT/OT recommending SNF

## 2022-06-28 NOTE — ASSESSMENT & PLAN NOTE
Yana Orellana is a 77 y.o. female h/o HTN, Non- alcoholic cirrhosis, obesity, anxiety, macrocytic anemia, hx of lung CA in 2009 s/p surgery and chemotherapy with subsequent chemotherapy induced neuropathy, hx of polio, RA, fibromyalgia and chronic lower extremity weakness and bowel incontinence (being followed by multiple Neurologist) admitted to  for worsening incontinence and diarrhea.MRI Lsp w/ L4-L5 G1 anterolisthesis, L4 pseudo-disc, L5 diffuse herniation, ligamentous hypertrophy at these segments, with resultant moderate central stenosis L4-L5 and moderate right-sided and mild left-sided neural foraminal narrowing.     --Patient seen by NSGY at bedside  --All pertinent labs and diagnostics personally reviewed.  --No acute surgical intervention at this time  --Xray Lumbar spine flexion/extension: no instability appreciated  --Cervical MRI without evidence of central spinal cord stenosis or significant neuro foraminal narrowing  --Pain medications per primary team  --Bacteremia: blood cultures growing staph epi.  Cont IV vanc.  --UA growing e. Coli.   --Bowel incontinence: colorectal surgery without surgical intervention. Per colorectal pursue infectious workup.  --Please page with exam change or questions.   --Plan for outpatient follow up of L4/L5 listhesis and disc bulge.     Neurosurgery will sign off at this time  Discussed with Dr. Desai

## 2022-06-28 NOTE — SUBJECTIVE & OBJECTIVE
Interval History: AF HDS NAEON. Patient with two BM of mixed quality in last 24 hours. No new complaints at this time. States frustration at lack of answers, but appreciates medical and nursing care. No new fevers.    Review of Systems   Constitutional:  Negative for appetite change, chills and fever.   HENT:  Negative for sore throat.    Eyes:  Negative for discharge.   Respiratory:  Negative for shortness of breath.    Cardiovascular:  Negative for chest pain and palpitations.   Gastrointestinal:  Positive for diarrhea. Negative for abdominal distention, abdominal pain, constipation, nausea and vomiting.   Endocrine: Negative for polydipsia and polyuria.   Genitourinary:  Negative for dysuria, hematuria and urgency.   Musculoskeletal:  Negative for back pain and gait problem.   Skin:  Negative for color change and rash.   Neurological:  Positive for weakness. Negative for numbness.   Hematological:  Does not bruise/bleed easily.   Psychiatric/Behavioral:  Negative for self-injury.    Objective:     Vital Signs (Most Recent):  Temp: 98.6 °F (37 °C) (06/28/22 1140)  Pulse: 96 (06/28/22 1140)  Resp: 18 (06/28/22 1140)  BP: 131/62 (06/28/22 1140)  SpO2: 99 % (06/28/22 1140) Vital Signs (24h Range):  Temp:  [97.4 °F (36.3 °C)-98.8 °F (37.1 °C)] 98.6 °F (37 °C)  Pulse:  [84-96] 96  Resp:  [18] 18  SpO2:  [96 %-99 %] 99 %  BP: (131-142)/(60-65) 131/62     Weight: 120 kg (264 lb 8.8 oz)  Body mass index is 37.96 kg/m².    Intake/Output Summary (Last 24 hours) at 6/28/2022 1442  Last data filed at 6/28/2022 0929  Gross per 24 hour   Intake 600 ml   Output 800 ml   Net -200 ml      Physical Exam  Vitals and nursing note reviewed.   Constitutional:       Appearance: Normal appearance. She is obese.   HENT:      Head: Normocephalic and atraumatic.      Right Ear: External ear normal.      Left Ear: External ear normal.      Nose: Nose normal.      Mouth/Throat:      Mouth: Mucous membranes are moist.   Eyes:      General:          Right eye: No discharge.         Left eye: No discharge.      Conjunctiva/sclera: Conjunctivae normal.   Cardiovascular:      Rate and Rhythm: Normal rate and regular rhythm.      Heart sounds: No murmur heard.  Pulmonary:      Effort: Pulmonary effort is normal.      Breath sounds: Normal breath sounds. No wheezing or rales.   Abdominal:      General: Abdomen is flat. There is no distension.      Palpations: Abdomen is soft.      Tenderness: There is no abdominal tenderness.   Musculoskeletal:         General: No swelling. Normal range of motion.      Cervical back: Normal range of motion.      Right lower leg: Edema (without pitting) present.      Left lower leg: Edema (without pitting) present.   Skin:     General: Skin is warm and dry.      Capillary Refill: Capillary refill takes less than 2 seconds.   Neurological:      Mental Status: She is alert and oriented to person, place, and time.      Sensory: No sensory deficit.      Motor: Weakness present.      Comments: Bilateral Lower extremities strength 2/5   Psychiatric:         Mood and Affect: Mood normal.       Significant Labs: All pertinent labs within the past 24 hours have been reviewed.    Significant Imaging: I have reviewed all pertinent imaging results/findings within the past 24 hours.

## 2022-06-28 NOTE — PROGRESS NOTES
Augie Perez - Telemetry [facility] Medicine  Progress Note    Patient Name: Yana Orellana  MRN: [id number]  Patient Class: IP- Inpatient   Admission Date: 2022  Length of Stay: 1 days  Attending Physician: Tri Forrest MD  Primary Care Provider: Caitlyn Graf MD        Subjective:     Principal Problem:Bowel incontinence        HPI:  Ms. Orellana is a 76 yo W with HTN, Non- alcoholic cirrhosis, obesity, anxiety, macrocytic anemia, hx of lung CA in  s/p surgery and chemotherapy with subsequent chemotherapy induced neuropathy, post-polio syndrome, lower extremity weakness (being followed by Neurology), rheumatoid arthritis, fibromyalgia, presents to the ED for acute on chronic diarrhea with associated bowel incontinence. Per pt, she has been having diarrhea for the past 2 years now. Watery stool with some mixed mucus. Denies any melena, bloody stool. She has about 3-4 bouts per day. In the past 2-3 weeks, she has been having more problems with controlling her bowel movement. Would wake up soaking in stool or unable to sense that she have had a bowel movement. Denies any chills, abdominal pain, nausea, or vomiting, chest pain, or SOB. Denies any recent fall. Denies any recent seafood consumption. States she mostly eats TV dinners. She is very distressed that she can no longer move around and perform her own ADLs.     Of note, pt also has been having problems with progressive bilateral lower legs weakness for many years. Per pt, she has been seeing neurology both here and at [facility] with extensive workup. 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 She is now requiring a walker to ambulate, unable to perform ADLs by herself. Would have a friend to come over to help out. Had 2 children but both are . Currently lives alone.     While in the ED, patient is afebrile, , RR 16, /53, sat 99% on room air.  Lab is  significant for hgb 11.4, , platelets 68, bicarb 18, Mg 1.4, lactate acid 3.4.  Patient was given 1 L of LR, 2 g of IV Mg sulfate, 1 L of NS.  ED physician performed digital rectal exam, noticed patient's without rectal tone.  MRI lumbar ordered to further evaluate for cauda equina.  Patient has some reservation and asked to be given Valium prior to MRI.  IV Valium 10 mg was given by the ED. Patient became acutely encephalopathic, unable to answer if there is metals in her body.  Chest and abdominal x-rays were ordered, MRI postponed.  Patient is admitted to hospital medicine for further evaluation and management.       Overview/Hospital Course:  Patient was admitted for bowel incontinence for the last two weeks. Patient with no rectal tone on exam, MRI ordered due to concerns for cauda equina but there were no concerning findings on MRI. Neurosurgery and colorectal surgery consulted, no indication for any procedure at this time. Will rule out infectious etiologies for her incontinence then treat with symptom management. Patient also has progressively worsening weakness for years that has extensively been worked up by neurologists at [facility]. Neurology consulted and no indication for IP EMG/NCS given previous normal ones. Two blood cultures growing staph epi but taken from same site, repeats ordered, NGTD.      Interval History: AF HDS NAEON. Patient with two BM of mixed quality in last 24 hours. No new complaints at this time. States frustration at lack of answers, but appreciates medical and nursing care. No new fevers.    Review of Systems   Constitutional:  Negative for appetite change, chills and fever.   HENT:  Negative for sore throat.    Eyes:  Negative for discharge.   Respiratory:  Negative for shortness of breath.    Cardiovascular:  Negative for chest pain and palpitations.   Gastrointestinal:  Positive for diarrhea. Negative for abdominal distention, abdominal pain, constipation, nausea  and vomiting.   Endocrine: Negative for polydipsia and polyuria.   Genitourinary:  Negative for dysuria, hematuria and urgency.   Musculoskeletal:  Negative for back pain and gait problem.   Skin:  Negative for color change and rash.   Neurological:  Positive for weakness. Negative for numbness.   Hematological:  Does not bruise/bleed easily.   Psychiatric/Behavioral:  Negative for self-injury.    Objective:     Vital Signs (Most Recent):  Temp: 98.6 °F (37 °C) (06/28/22 1140)  Pulse: 96 (06/28/22 1140)  Resp: 18 (06/28/22 1140)  BP: 131/62 (06/28/22 1140)  SpO2: 99 % (06/28/22 1140) Vital Signs (24h Range):  Temp:  [97.4 °F (36.3 °C)-98.8 °F (37.1 °C)] 98.6 °F (37 °C)  Pulse:  [84-96] 96  Resp:  [18] 18  SpO2:  [96 %-99 %] 99 %  BP: (131-142)/(60-65) 131/62     Weight: 120 kg (264 lb 8.8 oz)  Body mass index is 37.96 kg/m².    Intake/Output Summary (Last 24 hours) at 6/28/2022 1442  Last data filed at 6/28/2022 0929  Gross per 24 hour   Intake 600 ml   Output 800 ml   Net -200 ml      Physical Exam  Vitals and nursing note reviewed.   Constitutional:       Appearance: Normal appearance. She is obese.   HENT:      Head: Normocephalic and atraumatic.      Right Ear: External ear normal.      Left Ear: External ear normal.      Nose: Nose normal.      Mouth/Throat:      Mouth: Mucous membranes are moist.   Eyes:      General:         Right eye: No discharge.         Left eye: No discharge.      Conjunctiva/sclera: Conjunctivae normal.   Cardiovascular:      Rate and Rhythm: Normal rate and regular rhythm.      Heart sounds: No murmur heard.  Pulmonary:      Effort: Pulmonary effort is normal.      Breath sounds: Normal breath sounds. No wheezing or rales.   Abdominal:      General: Abdomen is flat. There is no distension.      Palpations: Abdomen is soft.      Tenderness: There is no abdominal tenderness.   Musculoskeletal:         General: No swelling. Normal range of motion.      Cervical back: Normal range of  motion.      Right lower leg: Edema (without pitting) present.      Left lower leg: Edema (without pitting) present.   Skin:     General: Skin is warm and dry.      Capillary Refill: Capillary refill takes less than 2 seconds.   Neurological:      Mental Status: She is alert and oriented to person, place, and time.      Sensory: No sensory deficit.      Motor: Weakness present.      Comments: Bilateral Lower extremities strength 2/5   Psychiatric:         Mood and Affect: Mood normal.       Significant Labs: All pertinent labs within the past 24 hours have been reviewed.    Significant Imaging: I have reviewed all pertinent imaging results/findings within the past 24 hours.      Assessment/Plan:      * Bowel incontinence  Ms. Orellana is a 76 yo W with HTN, Non- alcoholic cirrhosis, obesity, anxiety, macrocytic anemia, hx of lung CA in 2009 s/p surgery and chemotherapy with subsequent chemotherapy induced neuropathy, post-polio syndrome, lower extremity weakness (being followed by Neurology), rheumatoid arthritis, fibromyalgia, presents to the ED for acute on chronic diarrhea with associated bowel incontinence.    Differential includes but not limited to: neuromuscular weakness vs cauda equina vs myopathies     Pt had extensive workup with Neurology here. Last seen with Dr. Myers on 11/2019. She was then referred to Dr. Rosendo Ulloa at [facility] for a second opinion. She ended up following up with Dr. Dr. Mary Padilla who dx her with a rare disease--lambert-eaton myasthenic syndrome. Per pt, physician there changed her in regards to this dx.     - Neurology consulted, workup negative so far; continued outpatient follow up  - Neurosurgery consulted, no surgery indicated at this time (MRI c-spine/l-spine unremarkable  - Colorectal surgery, no procedure indicated at this time; adding cholestyramine, imodium  - Outpatient colonoscopy and colorectal surgery follow-up   - Infectious w/u in process  - PT/OT recommending  SNF      Bacteremia  BCx drawn from same site with Staph Epi - Vanc d/c'ed with new culture data. Repeat cultures NGTD, though suboptimal drawing technique (Abx started 3 minutes prior to BCx drawn).    - Likely contaminant, will follow repeat BCx for growth and for previous Staph epi susceptibility    Lactic acidosis  RESOLVED    Likely secondary to copious diarrhea. S/p fluid resuscitation in the ED. Low suspicion for infection given pt is without fever or white count     - Normal after fluids    - Patient has been afebrile with a normal WBC       Vitamin D deficiency  - Continue home vitamin D       Essential hypertension  Home regimen: aldactone 50 mg QD     - Resume home regimen           Morbid obesity  Body mass index is 37.96 kg/m². Morbid obesity complicates all aspects of disease management from diagnostic modalities to treatment. Weight loss encouraged and health benefits explained to patient.         Hypomagnesemia  S/p replacement in the ED     Plan:  - Mag level daily      Physical deconditioning  Debility  - PT/OT consult recommending SNF  - Appreciate CM/SW assistance; referrals sent          Thrombocytopenia  Likely secondary to history of cirrhosis     Plan:  - Daily CBC  - Transfuse if plt <10,000 or <50,000 with bleeding   - Thrombocytopenia precautions         Weakness of both lower extremities  See bowel incontinence       Rheumatoid arthritis involving multiple sites with positive rheumatoid factor  F/u with Dr. Agrawal outpatient. No longer on MTX due to thrombocytopenia         Chronic diarrhea  Unlikely to be infectious given chronicity, and pt is without abdominal pain, bloody stool or nausea or vomitting. Osmotic vs secretory vs motility. Motility is most likely given the lacks of rectal tone      - Imodium tid  - Cholestyramine qd  - Outpatient f/u with CR[facility]    VTE Risk Mitigation (From admission, onward)         Ordered     enoxaparin injection 40 mg  Daily         06/25/22 2134     IP VTE  HIGH RISK PATIENT  Once         06/25/22 2134     Place sequential compression device  Until discontinued         06/25/22 2134                Discharge Planning   JAE: 6/30/2022     Code Status: Full Code   Is the patient medically ready for discharge?: No    Reason for patient still in hospital (select all that apply): Treatment and Pending disposition  Discharge Plan A: Skilled Nursing Facility                  Tavo Prakash MD  Department of Hospital Medicine   Augie Perez - Telemetry Stepdown

## 2022-06-28 NOTE — SUBJECTIVE & OBJECTIVE
Interval History: NAEON. Cervical MRI without evidence of central cord compression. Patient denies new weakness or numbness. No acute neurosurgical intervention. Plan for outpatient follow up of L4/L5 listhesis and disc bulge.     Medications:  Continuous Infusions:  Scheduled Meds:   enoxaparin  40 mg Subcutaneous Daily    LIDOcaine  1 patch Transdermal Q24H    multivitamin  1 tablet Oral Daily    spironolactone  50 mg Oral Daily    vancomycin (VANCOCIN) IVPB  1,750 mg Intravenous Q12H    vitamin D  1,000 Units Oral Daily     PRN Meds:dextrose 10%, dextrose 10%, diclofenac sodium, glucagon (human recombinant), glucose, glucose, naloxone, sodium chloride 0.9%     Review of Systems  Objective:     Weight: 120 kg (264 lb 8.8 oz)  Body mass index is 37.96 kg/m².  Vital Signs (Most Recent):  Temp: 98.2 °F (36.8 °C) (06/28/22 0803)  Pulse: 84 (06/28/22 0803)  Resp: 18 (06/28/22 0803)  BP: 136/64 (06/28/22 0803)  SpO2: 96 % (06/28/22 0803) Vital Signs (24h Range):  Temp:  [97.4 °F (36.3 °C)-98.8 °F (37.1 °C)] 98.2 °F (36.8 °C)  Pulse:  [81-94] 84  Resp:  [18] 18  SpO2:  [96 %-99 %] 96 %  BP: (128-142)/(60-65) 136/64                     Female External Urinary Catheter 06/26/22 (Active)   Skin no redness;no breakdown 06/26/22 0752   Tolerance no signs/symptoms of discomfort 06/26/22 0752   Suction Continuous suction at 60 mmHg 06/26/22 0752   Output (mL) 300 mL 06/27/22 0518     Neurosurgery Physical Exam  General: well developed, well nourished, no distress.   Head: normocephalic, atraumatic  Neck: No tracheal deviation. No palpable masses. Full ROM.   Neurologic: Alert and oriented. Thought content appropriate.  GCS: E4 V5 M6. GCS Total: 15  Mental Status: Awake, Alert, Oriented x 4  Language: No aphasia  Speech: No dysarthria  Cranial nerves: face symmetric, tongue midline, CN II-XII grossly intact.   Eyes: pupils equal, round, reactive to light with accomodation, EOMI.   Pulmonary: normal respirations, no signs of  respiratory distress  Abdomen: soft, non-distended, not tender to palpation     Sensory: intact to light touch throughout  Motor Strength: Moves all extremities spontaneously with good tone.  No abnormal movements seen.      Strength   Deltoids Triceps Biceps Wrist Extension Wrist Flexion Hand    Upper: R 4+/5 4+/5 4+/5 4+/5 4+/5 4+/5     L 4+/5 4+/5 4+/5 4+/5 4+/5 4+/5      Strength   Iliopsoas Quadriceps Knee  Flexion Tibialis  anterior Gastro- cnemius EHL   Lower: R 3/5 4/5 4/5 5/5 5/5 4+/5     L 3/5 4/5 4/5 5/5 5/5 4+/5      Vascular: No LE edema.   Skin: Skin is warm, dry and intact.     Significant Labs:  Recent Labs   Lab 06/27/22 0435 06/28/22 0348   * 119*    141   K 4.6 3.6   * 114*   CO2 17* 20*   BUN 8 8   CREATININE 0.7 0.6   CALCIUM 8.2* 7.7*   MG 1.8 1.7     Recent Labs   Lab 06/27/22 0435 06/28/22 0348   WBC 4.50 4.61   HGB 10.3* 9.8*   HCT 31.6* 27.9*   PLT SEE COMMENT 67*     No results for input(s): LABPT, INR, APTT in the last 48 hours.  Microbiology Results (last 7 days)       Procedure Component Value Units Date/Time    Blood culture [[id number]]  (Abnormal) Collected: 06/25/22 2154    Order Status: Completed Specimen: Blood from Peripheral, Forearm, Left Updated: 06/28/22 0809     Blood Culture, Routine Gram stain benja bottle: Gram positive cocci in clusters resembling Staph      Results called to and read back by: Brandy Diego RN.  06/26/2022        18:20      STAPHYLOCOCCUS EPIDERMIDIS  Susceptibility pending      Blood culture [[id number]]  (Abnormal) Collected: 06/25/22 2154    Order Status: Completed Specimen: Blood from Peripheral, Forearm, Left Updated: 06/28/22 0808     Blood Culture, Routine Gram stain aer bottle: Gram positive cocci in clusters resembling Staph       Positive results previously called 06/26/2022  22:43      STAPHYLOCOCCUS EPIDERMIDIS  Susceptibility pending      Blood culture [[id number]] Collected: 06/26/22 1952    Order Status: Completed  Specimen: Blood from Peripheral, Right Wrist Updated: 06/27/22 2012     Blood Culture, Routine No Growth to date      No Growth to date    Blood culture [[id number]] Collected: 06/26/22 1953    Order Status: Completed Specimen: Blood from Peripheral, Left Hand Updated: 06/27/22 2012     Blood Culture, Routine No Growth to date      No Growth to date    Stool culture [[id number]]     Order Status: No result Specimen: Stool     Clostridium difficile EIA [[id number]]     Order Status: Canceled Specimen: Stool           All pertinent labs from the last 24 hours have been reviewed.    Significant Diagnostics:  I have reviewed all pertinent imaging results/findings within the past 24 hours.

## 2022-06-28 NOTE — ASSESSMENT & PLAN NOTE
Unlikely to be infectious given chronicity, and pt is without abdominal pain, bloody stool or nausea or vomitting. Osmotic vs secretory vs motility. Motility is most likely given the lacks of rectal tone      - Imodium tid  - Cholestyramine qd  - Outpatient f/u with CRS

## 2022-06-28 NOTE — PLAN OF CARE
06/28/22 1204   Post-Acute Status   Post-Acute Authorization Placement   Post-Acute Placement Status Referrals Sent   SNF referrals were sent to several facilities.    Farrah Krause LMSW  [facility]  Ext. [phone]

## 2022-06-28 NOTE — ASSESSMENT & PLAN NOTE
BCx drawn from same site with Staph Epi - Vanc d/c'ed with new culture data. Repeat cultures NGTD, though suboptimal drawing technique (Abx started 3 minutes prior to BCx drawn).    - Likely contaminant, will follow repeat BCx for growth and for previous Staph epi susceptibility

## 2022-06-28 NOTE — PLAN OF CARE
Problem: Adult Inpatient Plan of Care  Goal: Plan of Care Review  Outcome: Ongoing, Progressing  Goal: Patient-Specific Goal (Individualized)  Outcome: Ongoing, Progressing  Goal: Absence of Hospital-Acquired Illness or Injury  Outcome: Ongoing, Progressing  Goal: Optimal Comfort and Wellbeing  Outcome: Ongoing, Progressing  Goal: Readiness for Transition of Care  Outcome: Ongoing, Progressing     Problem: Infection  Goal: Absence of Infection Signs and Symptoms  Outcome: Ongoing, Progressing     Problem: Skin Injury Risk Increased  Goal: Skin Health and Integrity  Outcome: Ongoing, Progressing   Patient is alert, oriented and conscious. Vital signs taken and recorded. SPO2 maintain in RA. Medication given as per order.

## 2022-06-28 NOTE — PROGRESS NOTES
[facility] - Telemetry Stepdown  Neurosurgery  Progress Note    Subjective:     History of Present Illness: Yana Orellana is a 77 y.o. female h/o HTN, Non- alcoholic cirrhosis, obesity, anxiety, macrocytic anemia, hx of lung CA in 2009 s/p surgery and chemotherapy with subsequent chemotherapy induced neuropathy, hx of polio, RA, fibromyalgia and chronic lower extremity weakness and bowel incontinence (being followed by multiple Neurologist) admitted to  for worsening incontinence and diarrhea. Patient has had issues with her bowels for years but in the last week she refers that is no longer able to control her stool and she is having many accidents, while at bed and also walking. She has found herself seating in stool without realizing. She denies any urinary incontinence or rectal numbness and refers that she has both hard and loose stools, denies any hematochezia. Patient has been struggling with worsening lower extremity weakness and she is requiring more help to her ADLs. She ambulates with walker, she needs assistance getting up and down then toilet or chairs. Patient has been worked up for MS, PD, Neuromuscular myopathies, MG/Lambert Eaton with a negative workup including EMG/NCS (showed R hand CTS, R C6-C7 radiculopathy, non specific neuropathy at lower extremities and normal EMG).      MRI Lsp w/ L4-L5 G1 anterolisthesis, L4 pseudo-disc, L5 diffuse herniation, ligamentous hypertrophy at these segments, with resultant moderate central stenosis L4-L5 and moderate right-sided and mild left-sided neural foraminal narrowing.       Post-Op Info:  * No surgery found *         Interval History: NAEON. Cervical MRI without evidence of central cord compression. Patient denies new weakness or numbness. No acute neurosurgical intervention. Plan for outpatient follow up of L4/L5 listhesis and disc bulge.     Medications:  Continuous Infusions:  Scheduled Meds:   enoxaparin  40 mg Subcutaneous Daily    LIDOcaine   1 patch Transdermal Q24H    multivitamin  1 tablet Oral Daily    spironolactone  50 mg Oral Daily    vancomycin (VANCOCIN) IVPB  1,750 mg Intravenous Q12H    vitamin D  1,000 Units Oral Daily     PRN Meds:dextrose 10%, dextrose 10%, diclofenac sodium, glucagon (human recombinant), glucose, glucose, naloxone, sodium chloride 0.9%     Review of Systems  Objective:     Weight: 120 kg (264 lb 8.8 oz)  Body mass index is 37.96 kg/m².  Vital Signs (Most Recent):  Temp: 98.2 °F (36.8 °C) (06/28/22 0803)  Pulse: 84 (06/28/22 0803)  Resp: 18 (06/28/22 0803)  BP: 136/64 (06/28/22 0803)  SpO2: 96 % (06/28/22 0803) Vital Signs (24h Range):  Temp:  [97.4 °F (36.3 °C)-98.8 °F (37.1 °C)] 98.2 °F (36.8 °C)  Pulse:  [81-94] 84  Resp:  [18] 18  SpO2:  [96 %-99 %] 96 %  BP: (128-142)/(60-65) 136/64                     Female External Urinary Catheter 06/26/22 (Active)   Skin no redness;no breakdown 06/26/22 0752   Tolerance no signs/symptoms of discomfort 06/26/22 0752   Suction Continuous suction at 60 mmHg 06/26/22 0752   Output (mL) 300 mL 06/27/22 0518     Neurosurgery Physical Exam  General: well developed, well nourished, no distress.   Head: normocephalic, atraumatic  Neck: No tracheal deviation. No palpable masses. Full ROM.   Neurologic: Alert and oriented. Thought content appropriate.  GCS: E4 V5 M6. GCS Total: 15  Mental Status: Awake, Alert, Oriented x 4  Language: No aphasia  Speech: No dysarthria  Cranial nerves: face symmetric, tongue midline, CN II-XII grossly intact.   Eyes: pupils equal, round, reactive to light with accomodation, EOMI.   Pulmonary: normal respirations, no signs of respiratory distress  Abdomen: soft, non-distended, not tender to palpation     Sensory: intact to light touch throughout  Motor Strength: Moves all extremities spontaneously with good tone.  No abnormal movements seen.      Strength   Deltoids Triceps Biceps Wrist Extension Wrist Flexion Hand    Upper: R 4+/5 4+/5 4+/5 4+/5 4+/5  4+/5     L 4+/5 4+/5 4+/5 4+/5 4+/5 4+/5      Strength   Iliopsoas Quadriceps Knee  Flexion Tibialis  anterior Gastro- cnemius EHL   Lower: R 3/5 4/5 4/5 5/5 5/5 4+/5     L 3/5 4/5 4/5 5/5 5/5 4+/5      Vascular: No LE edema.   Skin: Skin is warm, dry and intact.     Significant Labs:  Recent Labs   Lab 06/27/22 0435 06/28/22 0348   * 119*    141   K 4.6 3.6   * 114*   CO2 17* 20*   BUN 8 8   CREATININE 0.7 0.6   CALCIUM 8.2* 7.7*   MG 1.8 1.7     Recent Labs   Lab 06/27/22 0435 06/28/22 0348   WBC 4.50 4.61   HGB 10.3* 9.8*   HCT 31.6* 27.9*   PLT SEE COMMENT 67*     No results for input(s): LABPT, INR, APTT in the last 48 hours.  Microbiology Results (last 7 days)       Procedure Component Value Units Date/Time    Blood culture [[id number]]  (Abnormal) Collected: 06/25/22 2154    Order Status: Completed Specimen: Blood from Peripheral, Forearm, Left Updated: 06/28/22 0809     Blood Culture, Routine Gram stain benja bottle: Gram positive cocci in clusters resembling Staph      Results called to and read back by: Brandy Diego RN.  06/26/2022        18:20      STAPHYLOCOCCUS EPIDERMIDIS  Susceptibility pending      Blood culture [[id number]]  (Abnormal) Collected: 06/25/22 2154    Order Status: Completed Specimen: Blood from Peripheral, Forearm, Left Updated: 06/28/22 0808     Blood Culture, Routine Gram stain aer bottle: Gram positive cocci in clusters resembling Staph       Positive results previously called 06/26/2022  22:43      STAPHYLOCOCCUS EPIDERMIDIS  Susceptibility pending      Blood culture [[id number]] Collected: 06/26/22 1952    Order Status: Completed Specimen: Blood from Peripheral, Right Wrist Updated: 06/27/22 2012     Blood Culture, Routine No Growth to date      No Growth to date    Blood culture [[id number]] Collected: 06/26/22 1953    Order Status: Completed Specimen: Blood from Peripheral, Left Hand Updated: 06/27/22 2012     Blood Culture, Routine No Growth to date      No  Growth to date    Stool culture [[id number]]     Order Status: No result Specimen: Stool     Clostridium difficile EIA [[id number]]     Order Status: Canceled Specimen: Stool           All pertinent labs from the last 24 hours have been reviewed.    Significant Diagnostics:  I have reviewed all pertinent imaging results/findings within the past 24 hours.    Assessment/Plan:     * Bowel incontinence  Yana Orellana is a 77 y.o. female h/o HTN, Non- alcoholic cirrhosis, obesity, anxiety, macrocytic anemia, hx of lung CA in 2009 s/p surgery and chemotherapy with subsequent chemotherapy induced neuropathy, hx of polio, RA, fibromyalgia and chronic lower extremity weakness and bowel incontinence (being followed by multiple Neurologist) admitted to  for worsening incontinence and diarrhea.MRI Lsp w/ L4-L5 G1 anterolisthesis, L4 pseudo-disc, L5 diffuse herniation, ligamentous hypertrophy at these segments, with resultant moderate central stenosis L4-L5 and moderate right-sided and mild left-sided neural foraminal narrowing.     --Patient seen by NSGY at bedside  --All pertinent labs and diagnostics personally reviewed.  --No acute surgical intervention at this time  --Xray Lumbar spine flexion/extension: no instability appreciated  --Cervical MRI without evidence of central spinal cord stenosis or significant neuro foraminal narrowing  --Pain medications per primary team  --Bacteremia: blood cultures growing staph epi.  Cont IV vanc.  --UA growing e. Coli.   --Bowel incontinence: colorectal surgery without surgical intervention. Per colorectal pursue infectious workup.  --Please page with exam change or questions.   --Plan for outpatient follow up of L4/L5 listhesis and disc bulge.     Neurosurgery will sign off at this time  Discussed with Dr. Giselle Francis PA-C  Neurosurgery  [facility] - Telemetry Stepdown

## 2022-06-28 NOTE — PROGRESS NOTES
Therapy with vancomycin complete and/or consult discontinued by provider.  Pharmacy will sign off, please re-consult as needed.    Colleen Vincent, PharmD  EXT [phone]

## 2022-06-28 NOTE — ASSESSMENT & PLAN NOTE
RESOLVED    Likely secondary to copious diarrhea. S/p fluid resuscitation in the ED. Low suspicion for infection given pt is without fever or white count     - Normal after fluids    - Patient has been afebrile with a normal WBC

## 2022-06-29 NOTE — SUBJECTIVE & OBJECTIVE
Interval History: AF HDS NAEON. Patient states her diarrhea has gotten better since medical management started. Patient hesitant of outpatient colonoscopy as     Review of Systems   Constitutional:  Negative for appetite change, chills and fever.   HENT:  Negative for sore throat.    Eyes:  Negative for discharge.   Respiratory:  Negative for shortness of breath.    Cardiovascular:  Negative for chest pain and palpitations.   Gastrointestinal:  Positive for diarrhea. Negative for abdominal distention, abdominal pain, constipation, nausea and vomiting.   Endocrine: Negative for polydipsia and polyuria.   Genitourinary:  Negative for dysuria, hematuria and urgency.   Musculoskeletal:  Negative for back pain and gait problem.   Skin:  Negative for color change and rash.   Neurological:  Positive for weakness. Negative for numbness.   Hematological:  Does not bruise/bleed easily.   Psychiatric/Behavioral:  Negative for self-injury.    Objective:     Vital Signs (Most Recent):  Temp: 98.8 °F (37.1 °C) (06/29/22 0733)  Pulse: 88 (06/29/22 0733)  Resp: 20 (06/29/22 0733)  BP: (!) 128/58 (06/29/22 0733)  SpO2: 98 % (06/29/22 0733)   Vital Signs (24h Range):  Temp:  [98.4 °F (36.9 °C)-99.2 °F (37.3 °C)] 98.8 °F (37.1 °C)  Pulse:  [] 88  Resp:  [18-20] 20  SpO2:  [94 %-99 %] 98 %  BP: (128-174)/(58-74) 128/58     Weight: 120 kg (264 lb 8.8 oz)  Body mass index is 37.96 kg/m².    Intake/Output Summary (Last 24 hours) at 6/29/2022 0844  Last data filed at 6/28/2022 1800  Gross per 24 hour   Intake 950 ml   Output 1400 ml   Net -450 ml      Physical Exam  Vitals and nursing note reviewed.   Constitutional:       Appearance: Normal appearance. She is obese.   HENT:      Head: Normocephalic and atraumatic.      Right Ear: External ear normal.      Left Ear: External ear normal.      Nose: Nose normal.      Mouth/Throat:      Mouth: Mucous membranes are moist.   Eyes:      General:         Right eye: No discharge.          Left eye: No discharge.      Conjunctiva/sclera: Conjunctivae normal.   Cardiovascular:      Rate and Rhythm: Normal rate and regular rhythm.      Heart sounds: No murmur heard.  Pulmonary:      Effort: Pulmonary effort is normal.      Breath sounds: Normal breath sounds. No wheezing or rales.   Abdominal:      General: Abdomen is flat. There is no distension.      Palpations: Abdomen is soft.      Tenderness: There is no abdominal tenderness.   Musculoskeletal:         General: No swelling. Normal range of motion.      Cervical back: Normal range of motion.      Right lower leg: Edema (without pitting) present.      Left lower leg: Edema (without pitting) present.   Skin:     General: Skin is warm and dry.      Capillary Refill: Capillary refill takes less than 2 seconds.   Neurological:      Mental Status: She is alert and oriented to person, place, and time.      Sensory: No sensory deficit.      Motor: Weakness present.      Comments: Bilateral Lower extremities strength 2/5   Psychiatric:         Mood and Affect: Mood normal.       Significant Labs: All pertinent labs within the past 24 hours have been reviewed.    Significant Imaging: I have reviewed all pertinent imaging results/findings within the past 24 hours.

## 2022-06-29 NOTE — PT/OT/SLP PROGRESS
"Physical Therapy Treatment    Patient Name:  Yana Orellana   MRN:  [id number]    Recommendations:     Discharge Recommendations:  nursing facility, skilled   Discharge Equipment Recommendations: bedside commode, bath bench   Barriers to discharge: Inaccessible home and Decreased caregiver support    Assessment:     Yana Orellana is a 77 y.o. female admitted with a medical diagnosis of Bowel incontinence.  She presents with the following impairments/functional limitations:  weakness, impaired endurance, impaired self care skills, impaired functional mobilty, gait instability, impaired balance, decreased lower extremity function.    Pt tolerated session fairly well with good participation. Pt continues to show progress toward an improved level of functional mobility independence, and will continue to benefit from skilled PT services.    Rehab Prognosis: Good; patient would benefit from acute skilled PT services to address these deficits and reach maximum level of function.    Recent Surgery: * No surgery found *      Plan:     During this hospitalization, patient to be seen 3 x/week to address the identified rehab impairments via gait training, therapeutic activities, therapeutic exercises, neuromuscular re-education and progress toward the following goals:    · Plan of Care Expires:  07/25/22    Subjective     Chief Complaint: fatigue  Patient/Family Comments/goals: "I can stand without the walker."  Pain/Comfort:  · Pain Rating 1: 0/10  · Pain Rating Post-Intervention 1: 0/10      Objective:     Communicated with RN prior to session.  Patient found HOB elevated with PureWick upon PT entry to room.     General Precautions: Standard, fall   Orthopedic Precautions:N/A   Braces: N/A  Respiratory Status: Room air     Functional Mobility:  · Bed Mobility:     · Scooting toward HOB: stand by assistance  · Supine to Sit: minimum assistance with HHA  · Required inc'd time  · Sit to Supine: minimum assistance for LE " management  · Transfers:     · Sit <> Stand:  stand by assistance no AD, 2 trials  · Cued for hand placement  · Gait: Pt ambulates 12 ft x2 trials using the rolling walker with SBA throughout the patient room. Pt demo'd appropriate step length, normal base of support, good vera, and upright postural alignment. Pt cued for safety. Rest breaks in between trials due to decreased endurance and fatigue. No LOB or unsteadiness noted.   · Balance: SBA      AM-PAC 6 CLICK MOBILITY  Turning over in bed (including adjusting bedclothes, sheets and blankets)?: 3  Sitting down on and standing up from a chair with arms (e.g., wheelchair, bedside commode, etc.): 3  Moving from lying on back to sitting on the side of the bed?: 3  Moving to and from a bed to a chair (including a wheelchair)?: 3  Need to walk in hospital room?: 3  Climbing 3-5 steps with a railing?: 2  Basic Mobility Total Score: 17       Therapeutic Activities and Exercises:  Pt educated on the goals of the session and PT POC.    Pt instructed in LAQs while seated at the edge of bed, 2 sets of 15 reps, to improve muscular strength and endurance for ambulation.    Patient left HOB elevated with all lines intact, call button in reach and RN notified..    GOALS:   Multidisciplinary Problems     Physical Therapy Goals        Problem: Physical Therapy    Goal Priority Disciplines Outcome Goal Variances Interventions   Physical Therapy Goal     PT, PT/OT Ongoing, Progressing     Description: Goals to be met by: 22     Patient will increase functional independence with mobility by performin. Supine to sit with Modified [place]  2. Sit to supine with Modified [place]  3. Sit to stand transfer with Stand-by Assistance  4. Gait  x 30 feet with Contact Guard Assistance using Rolling Walker.   5. Lower extremity exercise program x 20 reps per handout, with independence  6. Ascend/descend 4 stair with bilateral Handrails Minimal Assistance using No  Assistive Device.                      Time Tracking:     PT Received On: 06/29/22  PT Start Time: 1413     PT Stop Time: 1453  PT Total Time (min): 40 min     Billable Minutes: Gait Training 20, Therapeutic Activity 12 and Therapeutic Exercise 8    Treatment Type: Treatment  PT/PTA: PTA     PTA Visit Number: 1     06/29/2022

## 2022-06-29 NOTE — PLAN OF CARE
Completed LOCET and faxed PASRR to the [facility] of [facility]. Awaiting issuance of form 142.    9:49 AM  Received call from Tuyet ([phone]) at [facility] who reports patient is clinically accepted and their business office is verifying insurance. Tuyet reports they can meet with patient at bedside to complete admission paperwork.    10:04 AM  Form 142/PASRR uploaded to [facility].    Farrah Krause LMSW  [facility]  Ext. [phone]

## 2022-06-29 NOTE — ASSESSMENT & PLAN NOTE
Ms. Orellana is a 78 yo W with HTN, Non- alcoholic cirrhosis, obesity, anxiety, macrocytic anemia, hx of lung CA in 2009 s/p surgery and chemotherapy with subsequent chemotherapy induced neuropathy, post-polio syndrome, lower extremity weakness (being followed by Neurology), rheumatoid arthritis, fibromyalgia, presents to the ED for acute on chronic diarrhea with associated bowel incontinence.    Differential includes but not limited to: neuromuscular weakness vs cauda equina vs myopathies     Pt had extensive workup with Neurology here. Last seen with Dr. Myers on 11/2019. She was then referred to Dr. Rosendo Ulloa at [facility] for a second opinion. She ended up following up with Dr. Dr. Mary Padilla who dx her with a rare disease--lambert-eaton myasthenic syndrome. Per pt, physician there changed her in regards to this dx.     - Neurology consulted, workup negative so far; continued outpatient follow up  - Neurosurgery consulted, no surgery indicated at this time (MRI c-spine/l-spine unremarkable  - Colorectal surgery, no procedure indicated at this time  - Cholestyramine, imodium with interval improvement  - Outpatient colonoscopy and colorectal surgery follow-up   - Infectious w/u in process  - PT/OT recommending SNF

## 2022-06-29 NOTE — PT/OT/SLP PROGRESS
"Occupational Therapy      Patient Name:  Yana Orellana   MRN:  [id number]    Patient not seen today secondary to: patient politely deferred, remarking 'I've already worked with PT today" and when educated re: OT, deferred due to possible discharge tomorrow. Will follow-up 6/30/22 or as able while pt in acute care services.    6/29/2022  "

## 2022-06-29 NOTE — ASSESSMENT & PLAN NOTE
Unlikely to be infectious given chronicity, and pt is without abdominal pain, bloody stool or nausea or vomitting. Osmotic vs secretory vs motility. Motility is most likely given the lacks of rectal tone      - Imodium tid  - Cholestyramine qd  - Outpatient f/u with [facility] for outpatient c-scope

## 2022-06-29 NOTE — PROGRESS NOTES
Augie Perez - Telemetry [facility] Medicine  Progress Note    Patient Name: Yana Orellana  MRN: [id number]  Patient Class: IP- Inpatient   Admission Date: 2022  Length of Stay: 2 days  Attending Physician: Tri Forrest MD  Primary Care Provider: Caitlyn Graf MD        Subjective:     Principal Problem:Bowel incontinence    HPI:  Ms. Orellana is a 78 yo W with HTN, Non- alcoholic cirrhosis, obesity, anxiety, macrocytic anemia, hx of lung CA in  s/p surgery and chemotherapy with subsequent chemotherapy induced neuropathy, post-polio syndrome, lower extremity weakness (being followed by Neurology), rheumatoid arthritis, fibromyalgia, presents to the ED for acute on chronic diarrhea with associated bowel incontinence. Per pt, she has been having diarrhea for the past 2 years now. Watery stool with some mixed mucus. Denies any melena, bloody stool. She has about 3-4 bouts per day. In the past 2-3 weeks, she has been having more problems with controlling her bowel movement. Would wake up soaking in stool or unable to sense that she have had a bowel movement. Denies any chills, abdominal pain, nausea, or vomiting, chest pain, or SOB. Denies any recent fall. Denies any recent seafood consumption. States she mostly eats TV dinners. She is very distressed that she can no longer move around and perform her own ADLs.     Of note, pt also has been having problems with progressive bilateral lower legs weakness for many years. Per pt, she has been seeing neurology both here and at [facility] with extensive workup. 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 She is now requiring a walker to ambulate, unable to perform ADLs by herself. Would have a friend to come over to help out. Had 2 children but both are . Currently lives alone.     While in the ED, patient is afebrile, , RR 16, /53, sat 99% on room air.  Lab is  significant for hgb 11.4, , platelets 68, bicarb 18, Mg 1.4, lactate acid 3.4.  Patient was given 1 L of LR, 2 g of IV Mg sulfate, 1 L of NS.  ED physician performed digital rectal exam, noticed patient's without rectal tone.  MRI lumbar ordered to further evaluate for cauda equina.  Patient has some reservation and asked to be given Valium prior to MRI.  IV Valium 10 mg was given by the ED. Patient became acutely encephalopathic, unable to answer if there is metals in her body.  Chest and abdominal x-rays were ordered, MRI postponed.  Patient is admitted to hospital medicine for further evaluation and management.       Overview/Hospital Course:  Patient was admitted for bowel incontinence for the last two weeks. Patient with no rectal tone on exam, MRI ordered due to concerns for cauda equina but there were no concerning findings on MRI. Neurosurgery and colorectal surgery consulted, no indication for any procedure at this time. Will rule out infectious etiologies for her incontinence then treat with symptom management. Patient also has progressively worsening weakness for years that has extensively been worked up by neurologists at [facility] and [facility]. Neurology consulted and no indication for IP EMG/NCS given previous normal ones. Two blood cultures growing staph epi but taken from same site, repeats ordered, NGTD. ID consulted for further evaluation. Imodium and cholestyramine added with good effect for diarrhea.     Plan to discharge to SNF for PT/OT with medical management as above for diarrhea. Outpatient f/u with PCP, Neurology, CRS for outpatient C-scope.      Interval History: AF HDS NAEON. Patient states her diarrhea has gotten better since medical management started. Patient hesitant of outpatient colonoscopy as     Review of Systems   Constitutional:  Negative for appetite change, chills and fever.   HENT:  Negative for sore throat.    Eyes:  Negative for discharge.   Respiratory:  Negative for  shortness of breath.    Cardiovascular:  Negative for chest pain and palpitations.   Gastrointestinal:  Positive for diarrhea. Negative for abdominal distention, abdominal pain, constipation, nausea and vomiting.   Endocrine: Negative for polydipsia and polyuria.   Genitourinary:  Negative for dysuria, hematuria and urgency.   Musculoskeletal:  Negative for back pain and gait problem.   Skin:  Negative for color change and rash.   Neurological:  Positive for weakness. Negative for numbness.   Hematological:  Does not bruise/bleed easily.   Psychiatric/Behavioral:  Negative for self-injury.    Objective:     Vital Signs (Most Recent):  Temp: 98.8 °F (37.1 °C) (06/29/22 0733)  Pulse: 88 (06/29/22 0733)  Resp: 20 (06/29/22 0733)  BP: (!) 128/58 (06/29/22 0733)  SpO2: 98 % (06/29/22 0733)   Vital Signs (24h Range):  Temp:  [98.4 °F (36.9 °C)-99.2 °F (37.3 °C)] 98.8 °F (37.1 °C)  Pulse:  [] 88  Resp:  [18-20] 20  SpO2:  [94 %-99 %] 98 %  BP: (128-174)/(58-74) 128/58     Weight: 120 kg (264 lb 8.8 oz)  Body mass index is 37.96 kg/m².    Intake/Output Summary (Last 24 hours) at 6/29/2022 0844  Last data filed at 6/28/2022 1800  Gross per 24 hour   Intake 950 ml   Output 1400 ml   Net -450 ml      Physical Exam  Vitals and nursing note reviewed.   Constitutional:       Appearance: Normal appearance. She is obese.   HENT:      Head: Normocephalic and atraumatic.      Right Ear: External ear normal.      Left Ear: External ear normal.      Nose: Nose normal.      Mouth/Throat:      Mouth: Mucous membranes are moist.   Eyes:      General:         Right eye: No discharge.         Left eye: No discharge.      Conjunctiva/sclera: Conjunctivae normal.   Cardiovascular:      Rate and Rhythm: Normal rate and regular rhythm.      Heart sounds: No murmur heard.  Pulmonary:      Effort: Pulmonary effort is normal.      Breath sounds: Normal breath sounds. No wheezing or rales.   Abdominal:      General: Abdomen is flat. There is  no distension.      Palpations: Abdomen is soft.      Tenderness: There is no abdominal tenderness.   Musculoskeletal:         General: No swelling. Normal range of motion.      Cervical back: Normal range of motion.      Right lower leg: Edema (without pitting) present.      Left lower leg: Edema (without pitting) present.   Skin:     General: Skin is warm and dry.      Capillary Refill: Capillary refill takes less than 2 seconds.   Neurological:      Mental Status: She is alert and oriented to person, place, and time.      Sensory: No sensory deficit.      Motor: Weakness present.      Comments: Bilateral Lower extremities strength 2/5   Psychiatric:         Mood and Affect: Mood normal.       Significant Labs: All pertinent labs within the past 24 hours have been reviewed.    Significant Imaging: I have reviewed all pertinent imaging results/findings within the past 24 hours.      Assessment/Plan:      * Bowel incontinence  Ms. Orellana is a 76 yo W with HTN, Non- alcoholic cirrhosis, obesity, anxiety, macrocytic anemia, hx of lung CA in 2009 s/p surgery and chemotherapy with subsequent chemotherapy induced neuropathy, post-polio syndrome, lower extremity weakness (being followed by Neurology), rheumatoid arthritis, fibromyalgia, presents to the ED for acute on chronic diarrhea with associated bowel incontinence.    Differential includes but not limited to: neuromuscular weakness vs cauda equina vs myopathies     Pt had extensive workup with Neurology here. Last seen with Dr. Myers on 11/2019. She was then referred to Dr. Rosendo Ulloa at [facility] for a second opinion. She ended up following up with Dr. Dr. Mary Padilla who dx her with a rare disease--lambert-eaton myasthenic syndrome. Per pt, physician there changed her in regards to this dx.     - Neurology consulted, workup negative so far; continued outpatient follow up  - Neurosurgery consulted, no surgery indicated at this time (MRI c-spine/l-spine  unremarkable  - Colorectal surgery, no procedure indicated at this time  - Cholestyramine, imodium with interval improvement  - Outpatient colonoscopy and colorectal surgery follow-up   - Infectious w/u unremarkable  - PT/OT recommending SNF      Bacteremia  RESOLVED    BCx drawn from same site with Staph Epi - Vanc d/c'ed with new culture data. Repeat cultures NGTD, though suboptimal drawing technique (Abx started 3 minutes prior to BCx drawn).    - Initial BCx pan-sensitive Staph Epi  - Repeat BCx NGTD 48+ hours  - Received approx 3 day course of vancomycin, now d/c'ed    Lactic acidosis  RESOLVED    Likely secondary to copious diarrhea. S/p fluid resuscitation in the ED. Low suspicion for infection given pt is without fever or white count     - Normal after fluids    - Patient has been afebrile with a normal WBC       Vitamin D deficiency  - Continue home vitamin D       Essential hypertension  Home regimen: aldactone 50 mg QD     - Resume home regimen           Morbid obesity  Body mass index is 37.96 kg/m². Morbid obesity complicates all aspects of disease management from diagnostic modalities to treatment. Weight loss encouraged and health benefits explained to patient.         Hypomagnesemia  S/p replacement in the ED     Plan:  - Mag level daily      Physical deconditioning  Debility  - PT/OT consult recommending SNF  - Appreciate CM/SW assistance; referrals sent          Thrombocytopenia  Likely secondary to history of cirrhosis     Plan:  - Daily CBC  - Transfuse if plt <10,000 or <50,000 with bleeding   - Thrombocytopenia precautions         Weakness of both lower extremities  See bowel incontinence       Rheumatoid arthritis involving multiple sites with positive rheumatoid factor  F/u with Dr. Agrawal outpatient. No longer on MTX due to thrombocytopenia         Chronic diarrhea  Unlikely to be infectious given chronicity, and pt is without abdominal pain, bloody stool or nausea or vomitting. Osmotic vs  secretory vs motility. Motility is most likely given the lacks of rectal tone      - Imodium tid  - Cholestyramine qd  - Outpatient f/u with CRS for outpatient c-scope    VTE Risk Mitigation (From admission, onward)         Ordered     enoxaparin injection 40 mg  Daily         06/25/22 2134     IP VTE HIGH RISK PATIENT  Once         06/25/22 2134     Place sequential compression device  Until discontinued         06/25/22 2134                Discharge Planning   JAE: 6/30/2022     Code Status: Full Code   Is the patient medically ready for discharge?: Yes    Reason for patient still in hospital (select all that apply): Treatment and Pending disposition  Discharge Plan A: Skilled Nursing Facility                  Tavo Prakash MD  Department of Hospital Medicine   Augie Perez - Telemetry Stepdown

## 2022-06-29 NOTE — MEDICAL/APP STUDENT
.  [facility] Medicine Student   Progress Note  [facility] HOSP MED 2    Admit Date: 6/25/2022  Hospital Day: 2  06/29/2022  12:42 PM    SUBJECTIVE:   Ms. Yana Orellana is a 77 y.o. female with a relevant medical history of HTN, Non- alcoholic cirrhosis, obesity, anxiety, macrocytic anemia, hx of lung CA in 2009 s/p surgery and chemotherapy with subsequent chemotherapy induced neuropathy, post-polio syndrome, lower extremity weakness (being followed by Neurology), rheumatoid arthritis, fibromyalgia who is being followed up for Bowel incontinence.    HPI:  Ms. Orellana is a 76 yo W with HTN, Non- alcoholic cirrhosis, obesity, anxiety, macrocytic anemia, hx of lung CA in 2009 s/p surgery and chemotherapy with subsequent chemotherapy induced neuropathy, post-polio syndrome, lower extremity weakness (being followed by Neurology), rheumatoid arthritis, fibromyalgia, presents to the ED for acute on chronic diarrhea with associated bowel incontinence. Per pt, she has been having diarrhea for the past 2 years now. Watery stool with some mixed mucus. Denies any melena, bloody stool. She has about 3-4 bouts per day. In the past 2-3 weeks, she has been having more problems with controlling her bowel movement. Would wake up soaking in stool or unable to sense that she have had a bowel movement. Denies any chills, abdominal pain, nausea, or vomiting, chest pain, or SOB. Denies any recent fall. Denies any recent seafood consumption. States she mostly eats TV dinners. She is very distressed that she can no longer move around and perform her own ADLs.      Of note, pt also has been having problems with progressive bilateral lower legs weakness for many years. Per pt, she has been seeing neurology both here and at [facility] with extensive workup. 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 She is now requiring a walker to ambulate, unable to perform ADLs by  herself. Would have a friend to come over to help out. Had 2 children but both are . Currently lives alone.      While in the ED, patient is afebrile, , RR 16, /53, sat 99% on room air.  Lab is significant for hgb 11.4, , platelets 68, bicarb 18, Mg 1.4, lactate acid 3.4.  Patient was given 1 L of LR, 2 g of IV Mg sulfate, 1 L of NS.  ED physician performed digital rectal exam, noticed patient's without rectal tone.  MRI lumbar ordered to further evaluate for cauda equina.  Patient has some reservation and asked to be given Valium prior to MRI.  IV Valium 10 mg was given by the ED. Patient became acutely encephalopathic, unable to answer if there is metals in her body.  Chest and abdominal x-rays were ordered, MRI postponed.  Patient is admitted to hospital medicine for further evaluation and management.         Overview/Hospital Course:  Patient was admitted for bowel incontinence for the last two weeks. Patient with no rectal tone on exam, MRI ordered due to concerns for cauda equina but there were no concerning findings on MRI. Neurosurgery and colorectal surgery consulted, no indication for any procedure at this time. Will rule out infectious etiologies for her incontinence then treat with symptom management. Patient also has progressively worsening weakness for years that has extensively been worked up by neurologists at [facility] and [facility]. Neurology consulted and no indication for IP EMG/NCS given previous normal ones. Two blood cultures growing staph epi but taken from same site, repeats ordered, NGTD.         Interval history: AF HDS NAEON. Patient reports less frequent episodes of diarrhea. Still having incontinence. No new complaints. Compliant with SNF placement for d/c    Review of Systems   Constitutional: Negative for chills and fever.   HENT: Negative for congestion and sore throat.    Eyes: Negative for blurred vision and double vision.   Respiratory: Negative for cough,  shortness of breath and wheezing.    Cardiovascular: Negative for chest pain and palpitations.   Gastrointestinal: Positive for diarrhea. Negative for abdominal pain, nausea and vomiting.   Genitourinary: Negative for dysuria, frequency and urgency.   Musculoskeletal: Negative for back pain and neck pain.   Skin: Negative for rash.   Neurological: Positive for weakness. Negative for dizziness and headaches.   Endo/Heme/Allergies: Negative for environmental allergies. Bruises/bleeds easily.   Psychiatric/Behavioral: Negative for depression.       Please refer to the H&P for past medical, family, and social history.    OBJECTIVE:     Vital Signs Recent:  Temp: 98 °F (36.7 °C) (06/29/22 1127)  Pulse: 99 (06/29/22 1127)  Resp: 20 (06/29/22 1127)  BP: (!) 142/66 (06/29/22 1127)  SpO2: 98 % (06/29/22 1127)  Oxygen Documentation:                O2 Device (Oxygen Therapy): room air         Vital Signs Range (Last 24H):  Temp:  [98 °F (36.7 °C)-99.2 °F (37.3 °C)]   Pulse:  []   Resp:  [18-20]   BP: (128-174)/(58-74)   SpO2:  [94 %-98 %]        I & O (Last 24H):    Intake/Output Summary (Last 24 hours) at 6/29/2022 1242  Last data filed at 6/28/2022 1800  Gross per 24 hour   Intake 400 ml   Output 400 ml   Net 0 ml        Physical Exam:  Physical Exam  Constitutional:       Appearance: She is obese.   HENT:      Head: Normocephalic and atraumatic.      Nose: Nose normal.      Mouth/Throat:      Mouth: Mucous membranes are moist.      Pharynx: Oropharynx is clear.   Eyes:      Extraocular Movements: Extraocular movements intact.      Pupils: Pupils are equal, round, and reactive to light.   Cardiovascular:      Rate and Rhythm: Normal rate and regular rhythm.      Pulses: Normal pulses.      Heart sounds: Murmur heard.   Pulmonary:      Effort: Pulmonary effort is normal.      Breath sounds: Normal breath sounds.   Abdominal:      General: Bowel sounds are normal.      Palpations: Abdomen is soft.   Musculoskeletal:       Cervical back: Normal range of motion and neck supple.      Right lower leg: Edema present.      Left lower leg: Edema present.   Skin:     General: Skin is warm and dry.      Capillary Refill: Capillary refill takes less than 2 seconds.   Neurological:      Mental Status: She is alert.      Motor: Weakness present.   Psychiatric:         Mood and Affect: Mood normal.         Behavior: Behavior normal.         Labs:   Recent Labs   Lab 06/27/22 0435 06/28/22  0348 06/29/22  0559    141 139   K 4.6 3.6 3.9   * 114* 111*   CO2 17* 20* 23   BUN 8 8 8   CREATININE 0.7 0.6 0.6   * 119* 137*   CALCIUM 8.2* 7.7* 7.5*   MG 1.8 1.7 1.8   PHOS 2.5* 3.5 3.1     Recent Labs   Lab 06/27/22 0435 06/28/22 0348 06/29/22  0559   ALKPHOS 91 82 91   ALT 19 16 17   AST 59* 36 34   ALBUMIN 2.3* 2.0* 1.9*   PROT 6.3 5.4* 5.6*   BILITOT 3.9* 3.6* 2.7*     Recent Labs   Lab 06/27/22 0435 06/28/22  0348 06/29/22  0559   WBC 4.50 4.61 4.86   HGB 10.3* 9.8* 10.4*   HCT 31.6* 27.9* 31.4*   PLT SEE COMMENT 67* 63*         Scheduled Meds:   cholestyramine  1 packet Oral Daily    enoxaparin  40 mg Subcutaneous Daily    LIDOcaine  1 patch Transdermal Q24H    loperamide  2 mg Oral TID    magnesium sulfate IVPB  2 g Intravenous Once    multivitamin  1 tablet Oral Daily    spironolactone  50 mg Oral Daily    vitamin D  1,000 Units Oral Daily     Continuous Infusions:  PRN Meds:dextrose 10%, dextrose 10%, diclofenac sodium, glucagon (human recombinant), glucose, glucose, naloxone, sodium chloride 0.9%    ASSESSMENT/PLAN:   Ms. Yana Orellana is a 77 y.o. female with a relevant medical history of HTN, Non- alcoholic cirrhosis, obesity, anxiety, macrocytic anemia, hx of lung CA in 2009 s/p surgery and chemotherapy with subsequent chemotherapy induced neuropathy, post-polio syndrome, lower extremity weakness (being followed by Neurology), rheumatoid arthritis, fibromyalgia who is being followed up for Bowel  incontinence.    Active Hospital Problems    Diagnosis  POA    *Bowel incontinence [R15.9]  Yes    Bacteremia [R78.81]  Unknown    Lactic acidosis [E87.2]  Unknown    Vitamin D deficiency [E55.9]  Yes    Physical deconditioning [R53.81]  Yes    Morbid obesity [E66.01]  Yes    Essential hypertension [I10]  Yes    Hypomagnesemia [E83.42]  Unknown    Thrombocytopenia [D69.6]  Yes    Weakness of both lower extremities [R29.898]  Unknown    Rheumatoid arthritis involving multiple sites with positive rheumatoid factor [M05.79]  Yes     2009 onset with mod positive CCP and RF  MTX 2011 - 2020; stopped due to low platelets      Chronic diarrhea [K52.9]  Yes    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]  Not Applicable    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 Orellana is a 78 yo W with HTN, Non- alcoholic cirrhosis, obesity, anxiety, macrocytic anemia, hx of lung CA in 2009 s/p surgery and chemotherapy with subsequent chemotherapy induced neuropathy, post-polio syndrome, lower extremity weakness (being followed by Neurology), rheumatoid arthritis, fibromyalgia, presents to the ED for acute on chronic diarrhea with associated bowel incontinence.     Differential includes but not limited to: neuromuscular weakness vs cauda equina vs myopathies      Pt had extensive workup with Neurology here. Last seen with Dr. Myers on 11/2019. She was then referred to Dr. Rosendo Ulloa at [facility] for a second opinion. She ended up following up with Dr. Dr. Mary Padilla who dx her with a rare disease--lambert-eaton myasthenic syndrome. Per pt, physician there changed her in regards to this dx.      -  Neurology consulted, workup negative so far; continued outpatient follow up  - Neurosurgery consulted, no surgery indicated at this time (MRI c-spine/l-spine unremarkable  - Colorectal surgery, no procedure indicated at this time  - Cholestyramine, imodium with interval improvement  - Outpatient colonoscopy and colorectal surgery follow-up   - Infectious w/u unremarkable  - PT/OT recommending SNF        Bacteremia  RESOLVED     BCx drawn from same site with Staph Epi - Vanc d/c'ed with new culture data. Repeat cultures NGTD, though suboptimal drawing technique (Abx started 3 minutes prior to BCx drawn).     - Initial BCx pan-sensitive Staph Epi  - Repeat BCx NGTD 48+ hours  - Received approx 3 day course of vancomycin, now d/c'ed     Lactic acidosis  RESOLVED     Likely secondary to copious diarrhea. S/p fluid resuscitation in the ED. Low suspicion for infection given pt is without fever or white count      - Normal after fluids    - Patient has been afebrile with a normal WBC         Vitamin D deficiency  - Continue home vitamin D         Essential hypertension  Home regimen: aldactone 50 mg QD      - Resume home regimen               Morbid obesity  Body mass index is 37.96 kg/m². Morbid obesity complicates all aspects of disease management from diagnostic modalities to treatment. Weight loss encouraged and health benefits explained to patient.                   Hypomagnesemia  S/p replacement in the ED      Plan:  - Mag level daily        Physical deconditioning  Debility  - PT/OT consult recommending SNF  - Appreciate CM/SW assistance; referrals sent         Thrombocytopenia  Likely secondary to history of cirrhosis      Plan:  - Daily CBC  - Transfuse if plt <10,000 or <50,000 with bleeding   - Thrombocytopenia precautions            Weakness of both lower extremities  See bowel incontinence         Rheumatoid arthritis involving multiple sites with positive rheumatoid factor  F/u with Dr. Agrawal outpatient. No  longer on MTX due to thrombocytopenia         Chronic diarrhea  Unlikely to be infectious given chronicity, and pt is without abdominal pain, bloody stool or nausea or vomitting. Osmotic vs secretory vs motility. Motility is most likely given the lacks of rectal tone      - Imodium tid  - Cholestyramine qd  - Outpatient f/u with CR[facility] for outpatient c-scope        Jovan Freeman, MS3   [employer]

## 2022-06-29 NOTE — ASSESSMENT & PLAN NOTE
RESOLVED    BCx drawn from same site with Staph Epi - Vanc d/c'ed with new culture data. Repeat cultures NGTD, though suboptimal drawing technique (Abx started 3 minutes prior to BCx drawn).    - Initial BCx pan-sensitive Staph Epi  - Repeat BCx NGTD 48+ hours  - Received approx 3 day course of vancomycin, now d/c'ed

## 2022-06-29 NOTE — PHARMACY MED REC
"Admission Medication History     The home medication history was taken by Marzena Camarena.    You may go to "Admission" then "Reconcile Home Medications" tabs to review and/or act upon these items.      The home medication list has been updated by the Pharmacy department.    Please read ALL comments highlighted in yellow.    Please address this information as you see fit.     Feel free to contact us if you have any questions or require assistance.      Medications listed below were obtained from: Patient/family  PTA Medications   Medication Sig    acetaminophen (TYLENOL) 500 MG tablet   Take 500 mg by mouth 2 (two) times daily as needed for Pain.    cholecalciferol, vitamin D3, (VITAMIN D3) 25 mcg (1,000 unit) capsule   Take 5 capsules (5,000 Units total) by mouth once daily.    diclofenac sodium (VOLTAREN) 1 % Gel   Apply 2 g topically 3 (three) times daily as needed (PAIN).    LIDOcaine (LIDODERM) 5 %       Place 1 patch onto the skin every 24 hours. Remove & Discard patch within 12 hours or as directed by MD    multivitamin with minerals tablet   Take 1 tablet by mouth once daily.    oxyCODONE-acetaminophen (PERCOCET) 5-325 mg per tablet   Take 0.5 tablets by mouth every 8 (eight) hours as needed for Pain.)    predniSONE (DELTASONE) 5 MG tablet   Take 1-2 tablets (5-10 mg total) by mouth 2 (two) times daily as needed (arthritis).    sodium chloride (SALINE NASAL) 0.65 % nasal spray   1 spray by Nasal route as needed for Congestion.    spironolactone (ALDACTONE) 50 MG tablet Take 1 tablet (50 mg total) by mouth once daily.       Marzena Camarena  EXT [id number]                  .          "

## 2022-06-29 NOTE — PLAN OF CARE
06/29/22 1627   Post-Acute Status   Post-Acute Authorization Placement   Post-Acute Placement Status   (Pending consents from patient)       SW had two extensive conversations with pt regarding d/c to SNF. Pt was advised that [facility] does not have bed availability and that she would have to chose from accepting facilities are run the risk of non-covered stay after d/c order placed.     Pt has acceptance at [facility], [facility], and [facility]. Pt advised she would have her  tour [facility] and stated she would like facility consents faxed so that she complete in AM.    SW reiterated to pt that she will be medically ready for d/c tomorrow and will have to have final decision on facility. Pt expressed understanding of all information.     SW contacted [facility] admissions and asked that pt consents be faxed to . SW/CM will provide consents to pt for completion.     SW/CM will con't to follow.     Aislinn Guillermo [facility]  Case Management Social Worker   [facility] [address]

## 2022-06-29 NOTE — CARE UPDATE
[facility] scheduled pt's hospital f/u visit on 7/7/22 @ 11:00am and Colorectal Surgery 7/5/22 @ 10:00am.

## 2022-06-30 NOTE — ASSESSMENT & PLAN NOTE
RESOLVING    Ms. Orellana is a 78 yo W with HTN, Non- alcoholic cirrhosis, obesity, anxiety, macrocytic anemia, hx of lung CA in 2009 s/p surgery and chemotherapy with subsequent chemotherapy induced neuropathy, post-polio syndrome, lower extremity weakness (being followed by Neurology), rheumatoid arthritis, fibromyalgia, presents to the ED for acute on chronic diarrhea with associated bowel incontinence.    Differential includes but not limited to: neuromuscular weakness vs cauda equina vs myopathies     Pt had extensive workup with Neurology here. Last seen with Dr. Myers on 11/2019. She was then referred to Dr. Rosendo Ulloa at [facility] for a second opinion. She ended up following up with Dr. Dr. Mary Padilla who dx her with a rare disease--lambert-eaton myasthenic syndrome. Per pt, physician there changed her in regards to this dx.     - Neurology consulted, workup negative so far; continued outpatient follow up  - Neurosurgery consulted, no surgery indicated at this time (MRI c-spine/l-spine unremarkable  - Colorectal surgery, no procedure indicated at this time  - Cholestyramine, imodium with interval improvement  - Outpatient colonoscopy and colorectal surgery follow-up   - Infectious w/u in process  - PT/OT recommending SNF

## 2022-06-30 NOTE — PROGRESS NOTES
[facility] - Telemetry [facility] Medicine  Progress Note    Patient Name: Yana Orellana  MRN: [id number]  Patient Class: IP- Inpatient   Admission Date: 2022  Length of Stay: 3 days  Attending Physician: Dodie Larson MD  Primary Care Provider: Caitlyn Graf MD        Subjective:     Principal Problem:Bowel incontinence        HPI:  Ms. Orellana is a 76 yo W with HTN, Non- alcoholic cirrhosis, obesity, anxiety, macrocytic anemia, hx of lung CA in  s/p surgery and chemotherapy with subsequent chemotherapy induced neuropathy, post-polio syndrome, lower extremity weakness (being followed by Neurology), rheumatoid arthritis, fibromyalgia, presents to the ED for acute on chronic diarrhea with associated bowel incontinence. Per pt, she has been having diarrhea for the past 2 years now. Watery stool with some mixed mucus. Denies any melena, bloody stool. She has about 3-4 bouts per day. In the past 2-3 weeks, she has been having more problems with controlling her bowel movement. Would wake up soaking in stool or unable to sense that she have had a bowel movement. Denies any chills, abdominal pain, nausea, or vomiting, chest pain, or SOB. Denies any recent fall. Denies any recent seafood consumption. States she mostly eats TV dinners. She is very distressed that she can no longer move around and perform her own ADLs.     Of note, pt also has been having problems with progressive bilateral lower legs weakness for many years. Per pt, she has been seeing neurology both here and at [facility] with extensive workup. 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 She is now requiring a walker to ambulate, unable to perform ADLs by herself. Would have a friend to come over to help out. Had 2 children but both are . Currently lives alone.     While in the ED, patient is afebrile, , RR 16, /53, sat 99% on room air.  Lab is  "significant for hgb 11.4, , platelets 68, bicarb 18, Mg 1.4, lactate acid 3.4.  Patient was given 1 L of LR, 2 g of IV Mg sulfate, 1 L of NS.  ED physician performed digital rectal exam, noticed patient's without rectal tone.  MRI lumbar ordered to further evaluate for cauda equina.  Patient has some reservation and asked to be given Valium prior to MRI.  IV Valium 10 mg was given by the ED. Patient became acutely encephalopathic, unable to answer if there is metals in her body.  Chest and abdominal x-rays were ordered, MRI postponed.  Patient is admitted to hospital medicine for further evaluation and management.       Overview/Hospital Course:  Patient was admitted for bowel incontinence for the last two weeks. Patient with no rectal tone on exam, MRI ordered due to concerns for cauda equina but there were no concerning findings on MRI. Neurosurgery and colorectal surgery consulted, no indication for any procedure at this time. Will rule out infectious etiologies for her incontinence then treat with symptom management. Patient also has progressively worsening weakness for years that has extensively been worked up by neurologists at [facility]. Neurology consulted and no indication for IP EMG/NCS given previous normal ones. Two blood cultures growing staph epi but taken from same site, repeats ordered, NGTD. ID consulted for further evaluation. Imodium and cholestyramine added with good effect for diarrhea.     Plan to discharge to SNF for PT/OT with medical management as above for diarrhea. Outpatient f/u with PCP, Neurology, CRS for outpatient C-scope.      Interval History: AF HDS NAEON. Decreased PO intake in last 2-3 days, citing "she doesn't like the taste of the water". Overall, no other medical complaints.    Review of Systems   Constitutional:  Negative for appetite change, chills and fever.   HENT:  Negative for sore throat.    Eyes:  Negative for discharge.   Respiratory:  Negative for " shortness of breath.    Cardiovascular:  Negative for chest pain and palpitations.   Gastrointestinal:  Positive for diarrhea. Negative for abdominal distention, abdominal pain, constipation, nausea and vomiting.   Endocrine: Negative for polydipsia and polyuria.   Genitourinary:  Negative for dysuria, hematuria and urgency.   Musculoskeletal:  Negative for back pain and gait problem.   Skin:  Negative for color change and rash.   Neurological:  Positive for weakness. Negative for numbness.   Hematological:  Does not bruise/bleed easily.   Psychiatric/Behavioral:  Negative for self-injury.    Objective:     Vital Signs (Most Recent):  Temp: 97.9 °F (36.6 °C) (06/30/22 0732)  Pulse: 87 (06/30/22 0732)  Resp: 17 (06/30/22 0732)  BP: (!) 159/69 (06/30/22 0732)  SpO2: (!) 94 % (06/30/22 0732)   Vital Signs (24h Range):  Temp:  [97.9 °F (36.6 °C)-98.9 °F (37.2 °C)] 97.9 °F (36.6 °C)  Pulse:  [] 87  Resp:  [17-22] 17  SpO2:  [94 %-98 %] 94 %  BP: (138-172)/(64-72) 159/69     Weight: 120 kg (264 lb 8.8 oz)  Body mass index is 37.96 kg/m².    Intake/Output Summary (Last 24 hours) at 6/30/2022 0737  Last data filed at 6/29/2022 2200  Gross per 24 hour   Intake 250 ml   Output 750 ml   Net -500 ml      Physical Exam  Vitals and nursing note reviewed.   Constitutional:       Appearance: Normal appearance. She is obese.   HENT:      Head: Normocephalic and atraumatic.      Right Ear: External ear normal.      Left Ear: External ear normal.      Nose: Nose normal.      Mouth/Throat:      Mouth: Mucous membranes are moist.   Eyes:      General:         Right eye: No discharge.         Left eye: No discharge.      Conjunctiva/sclera: Conjunctivae normal.   Cardiovascular:      Rate and Rhythm: Normal rate and regular rhythm.      Heart sounds: No murmur heard.  Pulmonary:      Effort: Pulmonary effort is normal.      Breath sounds: Normal breath sounds. No wheezing or rales.   Abdominal:      General: Abdomen is flat. There  is no distension.      Palpations: Abdomen is soft.      Tenderness: There is no abdominal tenderness.   Musculoskeletal:         General: No swelling. Normal range of motion.      Cervical back: Normal range of motion.      Right lower leg: Edema (without pitting) present.      Left lower leg: Edema (without pitting) present.   Skin:     General: Skin is warm and dry.      Capillary Refill: Capillary refill takes less than 2 seconds.   Neurological:      Mental Status: She is alert and oriented to person, place, and time.      Sensory: No sensory deficit.      Motor: Weakness present.      Comments: Bilateral Lower extremities strength 2/5   Psychiatric:         Mood and Affect: Mood normal.       Significant Labs: All pertinent labs within the past 24 hours have been reviewed.    Significant Imaging: I have reviewed all pertinent imaging results/findings within the past 24 hours.      Assessment/Plan:      * Bowel incontinence  RESOLVING    Ms. Orellana is a 76 yo W with HTN, Non- alcoholic cirrhosis, obesity, anxiety, macrocytic anemia, hx of lung CA in 2009 s/p surgery and chemotherapy with subsequent chemotherapy induced neuropathy, post-polio syndrome, lower extremity weakness (being followed by Neurology), rheumatoid arthritis, fibromyalgia, presents to the ED for acute on chronic diarrhea with associated bowel incontinence.    Differential includes but not limited to: neuromuscular weakness vs cauda equina vs myopathies     Pt had extensive workup with Neurology here. Last seen with Dr. Myers on 11/2019. She was then referred to Dr. Rosendo Ulloa at [facility] for a second opinion. She ended up following up with Dr. Dr. Mary Padilla who dx her with a rare disease--lambert-eaton myasthenic syndrome. Per pt, physician there changed her in regards to this dx.     - Neurology consulted, workup negative so far; continued outpatient follow up  - Neurosurgery consulted, no surgery indicated at this time (MRI  c-spine/l-spine unremarkable  - Colorectal surgery, no procedure indicated at this time  - Cholestyramine, imodium with interval improvement  - Outpatient colonoscopy and colorectal surgery follow-up   - Infectious w/u in process  - PT/OT recommending SNF      Bacteremia  RESOLVED    BCx drawn from same site with Staph Epi - Vanc d/c'ed with new culture data. Repeat cultures NGTD, though suboptimal drawing technique (Abx started 3 minutes prior to BCx drawn).    - Initial BCx pan-sensitive Staph Epi  - Repeat BCx NGTD 48+ hours  - Received approx 3 day course of vancomycin, now d/c'ed    Lactic acidosis  RESOLVED    Likely secondary to copious diarrhea. S/p fluid resuscitation in the ED. Low suspicion for infection given pt is without fever or white count     - Normal after fluids    - Patient has been afebrile with a normal WBC       Vitamin D deficiency  - Continue home vitamin D       Essential hypertension  Home regimen: aldactone 50 mg QD     - Resume home regimen           Morbid obesity  Body mass index is 37.96 kg/m². Morbid obesity complicates all aspects of disease management from diagnostic modalities to treatment. Weight loss encouraged and health benefits explained to patient.         Hypomagnesemia  S/p replacement in the ED     Plan:  - Mag level daily      Physical deconditioning  Debility  - PT/OT consult recommending SNF  - Appreciate CM/SW assistance; referrals sent          Thrombocytopenia  Likely secondary to history of cirrhosis     Plan:  - Daily CBC  - Transfuse if plt <10,000 or <50,000 with bleeding   - Thrombocytopenia precautions         Weakness of both lower extremities  See bowel incontinence       Rheumatoid arthritis involving multiple sites with positive rheumatoid factor  F/u with Dr. Agrawal outpatient. No longer on MTX due to thrombocytopenia         Chronic diarrhea  Unlikely to be infectious given chronicity, and pt is without abdominal pain, bloody stool or nausea or vomitting.  Osmotic vs secretory vs motility. Motility is most likely given the lacks of rectal tone      - Imodium tid  - Cholestyramine qd  - Outpatient f/u with CRS for outpatient c-scope    VTE Risk Mitigation (From admission, onward)         Ordered     enoxaparin injection 40 mg  Daily         06/25/22 2134     IP VTE HIGH RISK PATIENT  Once         06/25/22 2134     Place sequential compression device  Until discontinued         06/25/22 2134                Discharge Planning   JAE: 6/30/2022     Code Status: Full Code   Is the patient medically ready for discharge?: Yes    Reason for patient still in hospital (select all that apply): Treatment  Discharge Plan A: Skilled Nursing Facility                  Tavo Prakash MD  Department of Hospital Medicine   [facility] - Telemetry Stepdown

## 2022-06-30 NOTE — NURSING
Instructions explained to patient- no remaining questions. Patient dressed and changed, IVs removed. Belongings collected including cell phone, multiple bags and 1 suitcase. Report called to Roseann at [facility]. Patient ready for dc.

## 2022-06-30 NOTE — PLAN OF CARE
Problem: Adult Inpatient Plan of Care  Goal: Plan of Care Review  Outcome: Ongoing, Progressing  Goal: Patient-Specific Goal (Individualized)  Outcome: Ongoing, Progressing  Goal: Absence of Hospital-Acquired Illness or Injury  Outcome: Ongoing, Progressing  Goal: Optimal Comfort and Wellbeing  Outcome: Ongoing, Progressing     Problem: Infection  Goal: Absence of Infection Signs and Symptoms  Outcome: Ongoing, Progressing

## 2022-06-30 NOTE — SUBJECTIVE & OBJECTIVE
"Interval History: AF HDS NAEON. Decreased PO intake in last 2-3 days, citing "she doesn't like the taste of the water". Overall, no other medical complaints.    Review of Systems   Constitutional:  Negative for appetite change, chills and fever.   HENT:  Negative for sore throat.    Eyes:  Negative for discharge.   Respiratory:  Negative for shortness of breath.    Cardiovascular:  Negative for chest pain and palpitations.   Gastrointestinal:  Positive for diarrhea. Negative for abdominal distention, abdominal pain, constipation, nausea and vomiting.   Endocrine: Negative for polydipsia and polyuria.   Genitourinary:  Negative for dysuria, hematuria and urgency.   Musculoskeletal:  Negative for back pain and gait problem.   Skin:  Negative for color change and rash.   Neurological:  Positive for weakness. Negative for numbness.   Hematological:  Does not bruise/bleed easily.   Psychiatric/Behavioral:  Negative for self-injury.    Objective:     Vital Signs (Most Recent):  Temp: 97.9 °F (36.6 °C) (06/30/22 0732)  Pulse: 87 (06/30/22 0732)  Resp: 17 (06/30/22 0732)  BP: (!) 159/69 (06/30/22 0732)  SpO2: (!) 94 % (06/30/22 0732)   Vital Signs (24h Range):  Temp:  [97.9 °F (36.6 °C)-98.9 °F (37.2 °C)] 97.9 °F (36.6 °C)  Pulse:  [] 87  Resp:  [17-22] 17  SpO2:  [94 %-98 %] 94 %  BP: (138-172)/(64-72) 159/69     Weight: 120 kg (264 lb 8.8 oz)  Body mass index is 37.96 kg/m².    Intake/Output Summary (Last 24 hours) at 6/30/2022 0737  Last data filed at 6/29/2022 2200  Gross per 24 hour   Intake 250 ml   Output 750 ml   Net -500 ml      Physical Exam  Vitals and nursing note reviewed.   Constitutional:       Appearance: Normal appearance. She is obese.   HENT:      Head: Normocephalic and atraumatic.      Right Ear: External ear normal.      Left Ear: External ear normal.      Nose: Nose normal.      Mouth/Throat:      Mouth: Mucous membranes are moist.   Eyes:      General:         Right eye: No discharge.         " Left eye: No discharge.      Conjunctiva/sclera: Conjunctivae normal.   Cardiovascular:      Rate and Rhythm: Normal rate and regular rhythm.      Heart sounds: No murmur heard.  Pulmonary:      Effort: Pulmonary effort is normal.      Breath sounds: Normal breath sounds. No wheezing or rales.   Abdominal:      General: Abdomen is flat. There is no distension.      Palpations: Abdomen is soft.      Tenderness: There is no abdominal tenderness.   Musculoskeletal:         General: No swelling. Normal range of motion.      Cervical back: Normal range of motion.      Right lower leg: Edema (without pitting) present.      Left lower leg: Edema (without pitting) present.   Skin:     General: Skin is warm and dry.      Capillary Refill: Capillary refill takes less than 2 seconds.   Neurological:      Mental Status: She is alert and oriented to person, place, and time.      Sensory: No sensory deficit.      Motor: Weakness present.      Comments: Bilateral Lower extremities strength 2/5   Psychiatric:         Mood and Affect: Mood normal.       Significant Labs: All pertinent labs within the past 24 hours have been reviewed.    Significant Imaging: I have reviewed all pertinent imaging results/findings within the past 24 hours.

## 2022-06-30 NOTE — PT/OT/SLP PROGRESS
Occupational Therapy      Patient Name:  Yana Orellana   MRN:  [id number]    Patient not seen today secondary to Patient unwilling to participate due to about to eat. Will follow-up.    6/30/2022

## 2022-06-30 NOTE — PLAN OF CARE
Problem: Adult Inpatient Plan of Care  Goal: Plan of Care Review  Outcome: Adequate for Care Transition  Goal: Patient-Specific Goal (Individualized)  Outcome: Adequate for Care Transition  Goal: Absence of Hospital-Acquired Illness or Injury  Outcome: Adequate for Care Transition  Goal: Optimal Comfort and Wellbeing  Outcome: Adequate for Care Transition  Goal: Readiness for Transition of Care  Outcome: Adequate for Care Transition     Problem: Infection  Goal: Absence of Infection Signs and Symptoms  Outcome: Adequate for Care Transition     Problem: Skin Injury Risk Increased  Goal: Skin Health and Integrity  Outcome: Adequate for Care Transition

## 2022-06-30 NOTE — PLAN OF CARE
Met with patient at bedside. Patient reports her friend will tour [facility] SNF today. CHAYITO provided patient with the admision packet [facility] sent over. Patient reports she will review and begin completing the packet.    10:04 AM  Received call from patient with questions regarding the admission packet. SW provided [facility] phone number and instructed patient to call and ask for the admissions department to review/inquire about the packet. Patient verbalized understanding.    Sent SNF orders to [facility].    11:53 AM  Sent updated SNF orders to [facility].    2:30 PM  CHAYITO sent admission packet in careport to [facility]. CHAYITO gave the admission packet back to patient who had it placed in her black bag to go with her today. Patient wants to review parts of the packet with the nursing home.    Covid test result escalated.    Attempted to contact Dodie ([phone]) at [facility] and left voicemail requesting return call.    2:48 PM  Sent complete MAR as requested to [facility].    Per Dodie at [facility], nurse can call report to [facility] at [phone] and patient will transfer to room 403.    [facility] must receive the negative covid test prior to patient being admitted via transport.    3:02 PM   06/30/22 1502   Post-Acute Status   Post-Acute Authorization Placement   Post-Acute Placement Status Set-up Complete/Auth obtained   CHAYITO arranged wheelchair transport via [facility]. Requested  time is 4:00 PM. Requested  time does not guarantee arrival time.    Farrah Krause LMSW  [facility]  Ext. [phone]

## 2022-07-01 NOTE — PLAN OF CARE
[facility] - Telemetry Stepdown  Discharge Final Note    Primary Care Provider: Caitlyn Graf MD    Expected Discharge Date: 6/30/2022       Future Appointments   Date Time Provider Department Center   7/5/2022 10:00 AM DAIJA Johnson MD [facility] COLSURG [facility]   7/7/2022 11:00 AM Caitlyn Graf MD [facility]   8/17/2022  9:30 AM Hector Lemon MD [facility] GASTRO [facility]   10/7/2022  9:00 AM Haja Desai MD [facility]UR[facility]       Final Discharge Note (most recent)     Final Note - 07/01/22 1046        Final Note    Assessment Type Final Discharge Note     Anticipated Discharge Disposition Skilled Nursing Facility     What phone number can be called within the next 1-3 days to see how you are doing after discharge? [phone]     Hospital Resources/Appts/Education Provided Appointments scheduled and added to AVS        Post-Acute Status    Post-Acute Authorization Placement     Post-Acute Placement Status Set-up Complete/Auth obtained   [facility]/SNF                Important Message from Medicare             Contact Info     Caitlyn Graf MD   Specialty: Internal Medicine   Relationship: PCP - General    [address]  [address]  [facility] [place] [facility] [address]   Phone: [phone]       Next Steps: Follow up    [facility] - Emergency Dept   Specialty: Emergency Medicine    [address]  [facility] [address]   Phone: [phone]       Next Steps: Follow up    Instructions: As needed, If symptoms worsen    [facility]   Specialty: Nursing Home Agency, SNF Agency    [address] [place] [address]   Phone: [phone]       Next Steps: Follow up          Grace Joy RN  Ext [phone]

## 2022-07-01 NOTE — DISCHARGE SUMMARY
[facility] - Telemetry [facility] Medicine  Discharge Summary      Patient Name: Yana Orellana  MRN: [id number]  Patient Class: IP- Inpatient  Admission Date: 2022  Hospital Length of Stay: 3 days  Discharge Date and Time:  2022 8:04 PM  Attending Physician: Elida att. providers found   Discharging Provider: Tavo Prakash MD  Primary Care Provider: Caitlyn Graf MD  Hospital Medicine Team: [facility] 2 Tavo Prakash MD    HPI:   Ms. Orellana is a 78 yo W with HTN, Non- alcoholic cirrhosis, obesity, anxiety, macrocytic anemia, hx of lung CA in  s/p surgery and chemotherapy with subsequent chemotherapy induced neuropathy, post-polio syndrome, lower extremity weakness (being followed by Neurology), rheumatoid arthritis, fibromyalgia, presents to the ED for acute on chronic diarrhea with associated bowel incontinence. Per pt, she has been having diarrhea for the past 2 years now. Watery stool with some mixed mucus. Denies any melena, bloody stool. She has about 3-4 bouts per day. In the past 2-3 weeks, she has been having more problems with controlling her bowel movement. Would wake up soaking in stool or unable to sense that she have had a bowel movement. Denies any chills, abdominal pain, nausea, or vomiting, chest pain, or SOB. Denies any recent fall. Denies any recent seafood consumption. States she mostly eats TV dinners. She is very distressed that she can no longer move around and perform her own ADLs.     Of note, pt also has been having problems with progressive bilateral lower legs weakness for many years. Per pt, she has been seeing neurology both here and at [facility] with extensive workup. 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 She is now requiring a walker to ambulate, unable to perform ADLs by herself. Would have a friend to come over to help out. Had 2 children but both are . Currently lives alone.      While in the ED, patient is afebrile, , RR 16, /53, sat 99% on room air.  Lab is significant for hgb 11.4, , platelets 68, bicarb 18, Mg 1.4, lactate acid 3.4.  Patient was given 1 L of LR, 2 g of IV Mg sulfate, 1 L of NS.  ED physician performed digital rectal exam, noticed patient's without rectal tone.  MRI lumbar ordered to further evaluate for cauda equina.  Patient has some reservation and asked to be given Valium prior to MRI.  IV Valium 10 mg was given by the ED. Patient became acutely encephalopathic, unable to answer if there is metals in her body.  Chest and abdominal x-rays were ordered, MRI postponed.  Patient is admitted to hospital medicine for further evaluation and management.       * No surgery found *      Hospital Course:   Patient was admitted for bowel incontinence for the last two weeks. Patient with no rectal tone on exam, MRI ordered due to concerns for cauda equina but there were no concerning findings on MRI. Neurosurgery and colorectal surgery consulted, no indication for any procedure at this time. Will rule out infectious etiologies for her incontinence then treat with symptom management. Patient also has progressively worsening weakness for years that has extensively been worked up by neurologists at [facility] [place]. Neurology consulted and no indication for IP EMG/NCS given previous normal ones. Two blood cultures growing staph epi but taken from same site, repeats ordered, NGTD. ID consulted for further evaluation. Imodium and cholestyramine added with good effect for diarrhea.     Plan to discharge to SNF for PT/OT with medical management as above for diarrhea. Outpatient f/u with PCP, Neurology, CRS for outpatient C-scope.       Goals of Care Treatment Preferences:  Code Status: Full Code      Consults:   Consults (From admission, onward)        Status Ordering Provider     Inpatient consult to [facility]  Once        Provider:  (Not yet assigned)     Acknowledged MELY CAMPUZANO     Inpatient consult to Midline team  Once        Provider:  (Not yet assigned)    Completed MELY CAMPUZANO     Inpatient consult to Colorectal Surgery  Once        Provider:  (Not yet assigned)    Completed KATIANA RYAN     Inpatient consult to Neurosurgery  Once        Provider:  (Not yet assigned)    Completed CAROLEE MCKEON     Inpatient consult to Neurology  Once        Provider:  (Not yet assigned)    Completed LUIS EDUARDO GOVEA          No new Assessment & Plan notes have been filed under this hospital service since the last note was generated.  Service: Hospital Medicine    Final Active Diagnoses:    Diagnosis Date Noted POA    PRINCIPAL PROBLEM:  Bowel incontinence [R15.9] 06/25/2022 Yes    Bacteremia [R78.81] 06/28/2022 Unknown    Lactic acidosis [E87.2] 06/25/2022 Unknown    Vitamin D deficiency [E55.9] 05/29/2021 Yes    Physical deconditioning [R53.81] 05/28/2021 Yes    Morbid obesity [E66.01] 05/28/2021 Yes    Essential hypertension [I10] 05/28/2021 Yes    Hypomagnesemia [E83.42] 05/28/2021 Unknown    Thrombocytopenia [D69.6] 09/12/2019 Yes    Weakness of both lower extremities [R29.898] 11/05/2018 Unknown    Rheumatoid arthritis involving multiple sites with positive rheumatoid factor [M05.79] 10/26/2015 Yes    Chronic diarrhea [K52.9] 03/28/2014 Yes    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]  Not Applicable    Xerostomia [K11.7] 12/13/2013 Yes     Chronic    Postmenopausal estrogen deficiency [Z78.0] 10/02/2013 Not Applicable     Chronic    Neuropathic pain of foot [M79.2] 01/16/2013 Yes     Chronic    Fibromyalgia [M79.7] 10/15/2012 Yes     Chronic      Problems Resolved During this Admission:    Diagnosis Date Noted Date Resolved POA    Erythema ab igne [L59.0] 01/23/2017 06/25/2022 Yes     Chronic       Discharged Condition: stable    Disposition: Skilled Nursing Facility    Follow Up:   Follow-up Information     Caitlyn Graf MD.    Specialty: Internal Medicine  Contact  information:  [address]  [address]  [facility] [address]  [phone]             [facility] - Emergency Dept.    Specialty: Emergency Medicine  Why: As needed, If symptoms worsen  Contact information:  [address]  [facility] [address] [phone]                     Patient Instructions:      Ambulatory referral/consult to Colorectal Surgery   Standing Status: Future   Referral Priority: Routine Referral Type: Consultation   Referral Reason: Specialty Services Required   Requested Specialty: Colon and Rectal Surgery   Number of Visits Requested: 1     Ambulatory referral/consult to Endo Procedure    Standing Status: Future   Referral Priority: Routine Referral Type: Consultation       Significant Diagnostic Studies: Labs:   CMP   Recent Labs   Lab 06/29/22  0559 06/30/22  0514    139   K 3.9 4.3   * 112*   CO2 23 23   * 166*   BUN 8 9   CREATININE 0.6 0.6   CALCIUM 7.5* 7.8*   PROT 5.6* 5.9*   ALBUMIN 1.9* 2.1*   BILITOT 2.7* 3.2*   ALKPHOS 91 95   AST 34 33   ALT 17 17   ANIONGAP 5* 4*   ESTGFRAFRICA >60.0 >60.0   EGFRNONAA >60.0 >60.0    and CBC   Recent Labs   Lab 06/29/22  0559 06/30/22  0514   WBC 4.86 5.50   HGB 10.4* 10.8*   HCT 31.4* 31.5*   PLT 63* 72*       Pending Diagnostic Studies:     None         Medications:  Reconciled Home Medications:      Medication List      START taking these medications    cholestyramine 4 gram packet  Commonly known as: QUESTRAN  Take 1 packet (4 g total) by mouth once daily.     loperamide 2 mg capsule  Commonly known as: IMODIUM  Take 1 capsule (2 mg total) by mouth 3 (three) times daily.        CHANGE how you take these medications    diclofenac sodium 1 % Gel  Commonly known as: VOLTAREN  Apply 2 g topically 3 (three) times daily.  What changed:   · when to take this  · reasons to take this        CONTINUE taking these medications    acetaminophen 500 MG tablet  Commonly known as: TYLENOL  Take 500 mg by mouth 2  (two) times daily as needed for Pain.     cholecalciferol (vitamin D3) 25 mcg (1,000 unit) capsule  Commonly known as: VITAMIN D3  Take 5 capsules (5,000 Units total) by mouth once daily.     LIDOcaine 5 %  Commonly known as: LIDODERM  Place 1 patch onto the skin every 24 hours. Remove & Discard patch within 12 hours or as directed by MD     multivitamin with minerals tablet  Take 1 tablet by mouth once daily.     predniSONE 5 MG tablet  Commonly known as: DELTASONE  Take 1-2 tablets (5-10 mg total) by mouth 2 (two) times daily as needed (arthritis).     Saline NasaL 0.65 % nasal spray  Generic drug: sodium chloride  1 spray by Nasal route as needed for Congestion.     spironolactone 50 MG tablet  Commonly known as: ALDACTONE  Take 1 tablet (50 mg total) by mouth once daily.        STOP taking these medications    oxyCODONE-acetaminophen 5-325 mg per tablet  Commonly known as: PERCOCET            Indwelling Lines/Drains at time of discharge:   Lines/Drains/Airways     None                 Time spent on the discharge of patient: 45 minutes         Lionel-Chago Prakash MD  Department of Hospital Medicine  [facility] - Telemetry Stepdown

## 2022-07-05 NOTE — PROGRESS NOTES
CRS Office Visit History and Physical    Referring Md:   Cris Prakash Md  [address] [place] [address]    SUBJECTIVE:     Chief Complaint:  Fecal incontinence    History of Present Illness:  The patient is a new patient to this practice.   Course is as follows:  Patient is a 77 y.o. female presents with fecal incontinence  History of HTN, Non- alcoholic cirrhosis, obesity, anxiety, macrocytic anemia, hx of lung CA in 2009 s/p surgery and chemotherapy with subsequent chemotherapy induced neuropathy, post-polio syndrome, lower extremity weakness (being followed by Neurology), rheumatoid arthritis, fibromyalgia  Presented to the ED for acute on chronic diarrhea with associated bowel incontinence on 06/25/2022.  MRI was requested to rule out cauda equina.  She was given Valium prior to the MRI in became acutely encephalopathic and was therefore admitted for 5 days.  Workup negative  She has a history of two vaginal childbirths, both with episiotomies performed. Valeriy has a family history of colon cancer in her grandfather (diagnosed in his 70s).     Since being discharged from the hospital, she started Imodium 2-3 times per day.  She is currently living in a skilled nursing facility.  With the Imodium, her bowel movements are more formed.  She is having 1 bowel movement per day.  No fecal leakage with solid bowel movements.  She presents for evaluation in a wheelchair.  She has bilateral lower extremity weakness secondary to polio.  With her limited mobility, she is unable to prep for colonoscopy.  Regarding her cirrhosis, she was last seen by hepatology over a year ago.  She wishes to be evaluated by them again for potential optimization.    Last Colonoscopy:  08/06/2012  Impression:           - Five 5 to 10 mm polyps in the sigmoid colon, in                         the proximal ascending colon, in the distal                         ascending colon and in the cecum. Complete                          resection. Partial retrieval.  --> pathology demonstrated multiple fragments of tubular adenoma    Review of patient's allergies indicates:   Allergen Reactions    Ibuprofen Nausea Only    Demerol [meperidine]      Euphoria    Gabapentin Other (See Comments)     Hallucinations    Mellaril-s Nausea Only    Versed [midazolam]      Excessive talking, hyper    Vicodin [hydrocodone-acetaminophen]      Joint pain, muscle pain    Xanax [alprazolam]      fatigue       Past Medical History:   Diagnosis Date    Anemia     Anxiety     Cataract     Colon polyps     Fibromyalgia 10/15/2012    Lung cancer     Pneumonia due to infectious organism 2019    Post-polio syndrome     Rheumatoid arthritis(714.0) 2012    2009 onset with mod positive CCP and RF MTX 2011 - present     Thyroid disease     Vertigo     postural vertigo     Past Surgical History:   Procedure Laterality Date    BLADDER SURGERY      CHOLECYSTECTOMY      HYSTERECTOMY      LUNG REMOVAL, PARTIAL  2009    left lower lobectomy    OVARIAN CYST REMOVAL  1970    TONSILLECTOMY       Family History   Problem Relation Age of Onset    Lung cancer Father     Lung cancer Maternal Aunt     Diabetes Maternal Grandmother     Colon cancer Maternal Grandfather     Parkinsonism Paternal Grandmother     Arrhythmia Mother     Lung cancer Maternal Uncle     Heart attack Son 45    Hypertension Neg Hx      Social History     Tobacco Use    Smoking status: Former Smoker     Packs/day: 1.50     Years: 40.00     Pack years: 60.00     Types: Cigarettes     Quit date: 2009     Years since quittin.9    Smokeless tobacco: Never Used   Substance Use Topics    Alcohol use: Not Currently    Drug use: No        Review of Systems:  Review of Systems   Constitutional: Positive for malaise/fatigue. Negative for chills, diaphoresis, fever and weight loss.   HENT: Negative for congestion.    Respiratory: Negative for shortness of  "breath.    Cardiovascular: Positive for leg swelling. Negative for chest pain.   Gastrointestinal: Negative for abdominal pain, blood in stool, constipation, nausea and vomiting.   Genitourinary: Negative for dysuria.   Musculoskeletal: Negative for back pain and myalgias.   Skin: Negative for rash.   Neurological: Positive for weakness. Negative for dizziness.   Endo/Heme/Allergies: Does not bruise/bleed easily.   Psychiatric/Behavioral: Negative for depression.       OBJECTIVE:     Vital Signs (Most Recent)  /61 (BP Location: Right arm, Patient Position: Sitting, BP Method: Large (Automatic))   Pulse 100   Ht 5' 10" (1.778 m)   Wt 119.7 kg (264 lb)   LMP  (LMP Unknown)   BMI 37.88 kg/m²     Physical Exam:  General: White female in no distress wheelchair-bound  Neuro: alert and oriented x 4.  Moves all extremities.     HEENT: no icterus.  Trachea midline  Respiratory: respirations are even and unlabored  Cardiac: regular rate  Abdomen:  Distended, no obvious masses  Extremities: Warm dry and intact  Skin: no rashes  Anorectal:  Deferred secondary to inability to get the patient out of the wheelchair per her request.    Labs:  H&H 1132. Albumin 2.1.  Normal renal function.    Imaging:  CT abdomen pelvis from 06/20/2022 personally reviewed and demonstrates moderate ascites.  Nodular contracted liver.  No obvious colon rectal or anal abnormalities      ASSESSMENT/PLAN:     Diagnoses and all orders for this visit:    Incontinence of feces, unspecified fecal incontinence type  -     Ambulatory referral/consult to Colorectal Surgery  -     Ambulatory referral/consult to Physical/Occupational Therapy; Future    Cirrhosis, non-alcoholic  -     Ambulatory referral/consult to Hepatology; Future        77 y.o. female with multiple medical comorbidities including cirrhosis with ascites who presents for evaluation for fecal incontinence during an episode of diarrhea.  No fecal incontinence with solid stool.  Unable " to fully evaluate today secondary to patient immobility.  Discussed proceeding with diagnostic colonoscopy for further evaluation.  However, she states that she is currently too weak to undergo bowel prep.  Referral to pelvic physical therapy placed to assist with pelvic floor muscle.  Referral to hepatology placed for optimization of her cirrhosis.  I will see her back in 6 months for interval evaluation.    DAIJA Johnson MD, FACS, FASCRS  Staff Surgeon  Colon & Rectal Surgery

## 2022-07-05 NOTE — TELEPHONE ENCOUNTER
----- Message from Aimee Moyer RN sent at 7/5/2022  1:03 PM CDT -----  Good afternoon,    A referral has been placed by Dr. Johnson. Please reach out for scheduling.    Thanks!

## 2022-07-07 PROBLEM — R78.81 BACTEREMIA: Status: RESOLVED | Noted: 2022-01-01 | Resolved: 2022-01-01

## 2022-07-07 PROBLEM — E87.20 LACTIC ACIDOSIS: Status: RESOLVED | Noted: 2022-01-01 | Resolved: 2022-01-01

## 2022-07-07 PROBLEM — R74.01 TRANSAMINITIS: Status: RESOLVED | Noted: 2020-06-12 | Resolved: 2022-01-01

## 2022-07-13 NOTE — PHYSICIAN QUERY
PT Name: Yana Orellana  MR #: [id number]     DOCUMENTATION CLARIFICATION      CDS/: Estephania Milton RN, CDI            Contact information:[email or web address]    This form is a permanent document in the medical record.     Query Date: July 13, 2022    Dear Provider,  By submitting this query, we are merely seeking further clarification of documentation.  Please utilize your independent clinical judgment when addressing the question(s) below.     The Medical Record contains the following:    Supporting Clinical Findings Location in Medical Record   Hospital Course:   Patient was admitted for bowel incontinence for the last two weeks. Patient with no rectal tone on exam, MRI ordered due to concerns for cauda equina but there were no concerning findings on MRI. Neurosurgery and colorectal surgery consulted, no indication for any procedure at this time. Will rule out infectious etiologies for her incontinence then treat with symptom management. Patient also has progressively worsening weakness for years that has extensively been worked up by [facility] Neurology consulted and no indication for IP EMG/NCS given previous normal ones. Two blood cultures growing staph epi but taken from same site, repeats ordered, NGTD. ID consulted for further evaluation. Imodium and cholestyramine added with good effect for diarrhea.      Plan to discharge to SNF for PT/OT with medical management as above for diarrhea. Outpatient f/u with PCP, Neurology, CRS for outpatient C-scope Discharge summary 6/30   Bacteremia  BCx drawn from same site with Staph Epi - Vanc d/c'ed with new culture data. Repeat cultures NGTD, though suboptimal drawing technique (Abx started 3 minutes prior to BCx drawn).  - Likely contaminant, will follow repeat BCx for growth and for previous Staph epi susceptibility HM PN 6/28   Bacteremia  RESOLVED  BCx drawn from same site with Staph Epi - Vanc d/c'ed with new culture data. Repeat  cultures NGTD, though suboptimal drawing technique (Abx started 3 minutes prior to BCx drawn).  - Initial BCx pan-sensitive Staph Epi  - Repeat BCx NGTD 48+ hours  - Received approx 3 day course of vancomycin, now d/c'ed  PN 6/29   Final Active Diagnoses:  Diagnosis                                                              Date Noted  PRINCIPAL PROBLEM:  Bowel incontinence  06/25/2022  Bacteremia                                                             06/28/2022 Discharge summary 6/30     Please clarify if the _____Bacteremia______ diagnosis has been:    [ x ] Ruled In   [  ] Ruled In, Now Resolved   [  ] Ruled Out/Contaminant   [  ] Still to be ruled out at the time of discharge   [  ] Other/Clarification of findings (please specify): _______________    [   ] Clinically undetermined     Please document in your progress notes daily for the duration of treatment, until resolved, and include in your discharge summary.    Form No. [id number]

## 2022-07-18 PROBLEM — K76.82 HEPATIC ENCEPHALOPATHY: Status: ACTIVE | Noted: 2022-01-01

## 2022-07-18 NOTE — PT/OT/SLP EVAL
Speech Language Pathology Evaluation  Bedside Swallow    Patient Name:  Yana Orellana   MRN:  [id number]  Admitting Diagnosis: Hepatic encephalopathy    Recommendations:                 General Recommendations:  Dysphagia therapy  Diet recommendations:  NPO, NPO   Aspiration Precautions: Alternate means of nutrition/hydration   General Precautions: Standard,    Communication strategies:  none    History:     Past Medical History:   Diagnosis Date    Anemia     Anxiety     Cataract     Colon polyps 2012    Fibromyalgia 10/15/2012    Lung cancer     Pneumonia due to infectious organism 7/19/2019    Post-polio syndrome     Rheumatoid arthritis(714.0) 7/16/2012 2009 onset with mod positive CCP and RF MTX 2011 - present     Thyroid disease     Vertigo     postural vertigo       Past Surgical History:   Procedure Laterality Date    BLADDER SURGERY  1959    CHOLECYSTECTOMY      HYSTERECTOMY  1988    LUNG REMOVAL, PARTIAL  2009    left lower lobectomy    OVARIAN CYST REMOVAL  1970    TONSILLECTOMY  1952       HPI: This is a 77 year old lady with HTN, Non- alcoholic cirrhosis, obesity, anxiety, macrocytic anemia, hx of lung CA in 2009 s/p surgery and chemotherapy with subsequent chemotherapy induced neuropathy, post-polio syndrome, lower extremity weakness (being followed by Neurology), rheumatoid arthritis, fibromyalgia who presented from her SNF for AMS. Patient history primarily obtained from nurse at [facility]. Patient was her normal self yesterday at 7pm but when her nurse went to recheck on her at 9pm, patient was confused, not following commands, and non-verbal. Nurse states that she did not have any recent fall or have anr focal neurological deficit. The patient did not appear to be diaphoretic, fevers, or weakness. Patient is able to ambulate on wheelchair.      In the ED, patient was tachycardic and hypertensive to 180s systolic. Labs showed Tbili of 4.4, AST/ALT 55/28, and ammonia of 122.  Lactic of 4.1. troponin and UA was negative. CT head and CXR showed no acute abnormalities.       Social History: Patient resides at NH unsure of baseline     Prior Intubation HX:  None this admission     Modified Barium Swallow: none prior at this facility     Prior diet: unknown     Subjective     Pt drowsy with open mouth posture upon arrival     Pain/Comfort:  · Pain Rating 1:  (Pt unable to report)  · Pain Rating Post-Intervention 1:  (Pt unable to report)    Respiratory Status: Room air    Objective:     Oral Musculature Evaluation  · Oral Musculature: unable to assess due to poor participation/comprehension  · Volitional Cough: unable to elicit  · Volitional Swallow: unable to elicit  · Voice Prior to PO Intake: with intermittent moaning voalc quality was noted to be strong and clear    Bedside Swallow Eval:   Consistencies Assessed:  · Thin liquids ice chips circled to lips  · Puree 1/8 tsp to lips and coate on tongue x1     Oral Phase:   · no active oral manipulation of any PO trials offered  · Poor oral acceptance   · SLP provided max tactile stimulation and verbal cues for pt to attempt to clear trace amount of applesauce pt did ultimately spontaneously swallow following 4+ minute delay    Pharyngeal Phase:   · coughing/choking x1 unable to determine if related to single PO trial offered or just coincidental given no true volume consumed or offered given clinical presentation     Treatment:  Education provided to Pt re: SLP role in acute care setting, overall impressions and therapeutic goals. Whiteboard updated.    Pt with poor alertness and participation with PO trials. Pt clinical presentation ( poor command following, poor alertness, etc.)  does not support any PO intake at this time including meds. Team to consider alternate means of nutrition/hydration/medication via NG tube and/or or IV fluids meds until alertness improves and SLP will continue to re-assess daily. Discussed results with RN and pt.  Ongoing reinforcement warranted and speech service to continue to follow.       Assessment:     Yana Orellana is a 77 y.o. female with an SLP diagnosis of Dysphagia and Cognitive-Linguistic Impairment.      Goals:   Multidisciplinary Problems     SLP Goals        Problem: SLP    Goal Priority Disciplines Outcome   SLP Goal     SLP    Description: Speech Language Pathology Goals  Goals expected to be met by 7/25    Pt will participate in ongoing assessment of swallow function to help determine least restrictive diet                      Plan:     · Patient to be seen:  4 x/week   · Plan of Care expires:     · Plan of Care reviewed with:  patient   · SLP Follow-Up:  Yes       Discharge recommendations:   (TBD)   Barriers to Discharge:  Level of Skilled Assistance Needed      Time Tracking:     SLP Treatment Date:   07/18/22  Speech Start Time:  1435  Speech Stop Time:  1443     Speech Total Time (min):  8 min    Billable Minutes: Eval Swallow and Oral Function 8    07/18/2022

## 2022-07-18 NOTE — H&P
[facility] - Emergency Dept  [facility] Medicine  History & Physical    Patient Name: Yana Orellana  MRN: [id number]  Patient Class: IP- Inpatient  Admission Date: 7/17/2022  Attending Physician: Efrain Naqvi MD   Primary Care Provider: Caitlyn Graf MD         Patient information was obtained from caregiver / friend, past medical records and ER records.     Subjective:     Principal Problem:Hepatic encephalopathy    Chief Complaint:   Chief Complaint   Patient presents with    Altered Mental Status     Pt arrives from [facility] for AMS. LKN around 1900. Nurses state pt baseline is AAOx4. Pt not following commands or answering questions appropriately at this time. -VAN        HPI: This is a 77 year old lady with HTN, Non- alcoholic cirrhosis, obesity, anxiety, macrocytic anemia, hx of lung CA in 2009 s/p surgery and chemotherapy with subsequent chemotherapy induced neuropathy, post-polio syndrome, lower extremity weakness (being followed by Neurology), rheumatoid arthritis, fibromyalgia who presented from her SNF for AMS. Patient history primarily obtained from nurse at [facility]. Patient was her normal self yesterday at 7pm but when her nurse went to recheck on her at 9pm, patient was confused, not following commands, and non-verbal. Nurse states that she did not have any recent fall or have anr focal neurological deficit. The patient did not appear to be diaphoretic, fevers, or weakness. Patient is able to ambulate on wheelchair.     In the ED, patient was tachycardic and hypertensive to 180s systolic. Labs showed Tbili of 4.4, AST/ALT 55/28, and ammonia of 122. Lactic of 4.1. troponin and UA was negative. CT head and CXR showed no acute abnormalities.       Past Medical History:   Diagnosis Date    Anemia     Anxiety     Cataract     Colon polyps 2012    Fibromyalgia 10/15/2012    Lung cancer     Pneumonia due to infectious organism 7/19/2019    Post-polio syndrome     Rheumatoid  arthritis(714.0) 7/16/2012    2009 onset with mod positive CCP and RF MTX 2011 - present     Thyroid disease     Vertigo     postural vertigo       Past Surgical History:   Procedure Laterality Date    BLADDER SURGERY  1959    CHOLECYSTECTOMY      HYSTERECTOMY  1988    LUNG REMOVAL, PARTIAL  2009    left lower lobectomy    OVARIAN CYST REMOVAL  1970    TONSILLECTOMY  1952       Review of patient's allergies indicates:   Allergen Reactions    Ibuprofen Nausea Only    Demerol [meperidine]      Euphoria    Gabapentin Other (See Comments)     Hallucinations    Mellaril-s Nausea Only    Versed [midazolam]      Excessive talking, hyper    Vicodin [hydrocodone-acetaminophen]      Joint pain, muscle pain    Xanax [alprazolam]      fatigue       No current facility-administered medications on file prior to encounter.     Current Outpatient Medications on File Prior to Encounter   Medication Sig    acetaminophen (TYLENOL) 500 MG tablet Take 500 mg by mouth 2 (two) times daily as needed for Pain.    cholecalciferol, vitamin D3, (VITAMIN D3) 25 mcg (1,000 unit) capsule Take 5 capsules (5,000 Units total) by mouth once daily.    cholestyramine (QUESTRAN) 4 gram packet Take 1 packet (4 g total) by mouth once daily. (Patient not taking: Reported on 7/5/2022)    diclofenac sodium (VOLTAREN) 1 % Gel Apply 2 g topically 3 (three) times daily. (Patient taking differently: Apply 2 g topically 3 (three) times daily as needed (PAIN).)    LIDOcaine (LIDODERM) 5 % Place 1 patch onto the skin every 24 hours. Remove & Discard patch within 12 hours or as directed by MD    loperamide (IMODIUM) 2 mg capsule Take 1 capsule (2 mg total) by mouth 3 (three) times daily.    multivitamin with minerals tablet Take 1 tablet by mouth once daily.    predniSONE (DELTASONE) 5 MG tablet Take 1-2 tablets (5-10 mg total) by mouth 2 (two) times daily as needed (arthritis). (Patient not taking: Reported on 7/5/2022)    sodium chloride  (OCEAN) 0.65 % nasal spray 1 spray by Nasal route as needed for Congestion.    spironolactone (ALDACTONE) 50 MG tablet Take 1 tablet (50 mg total) by mouth once daily.    [DISCONTINUED] oxyCODONE-acetaminophen (PERCOCET) 5-325 mg per tablet Take 1 tablet by mouth every 8 (eight) hours as needed for Pain. (Patient taking differently: Take 0.5 tablets by mouth every 8 (eight) hours as needed for Pain.)     Family History       Problem Relation (Age of Onset)    Arrhythmia Mother    Colon cancer Maternal Grandfather    Diabetes Maternal Grandmother    Heart attack Son (45)    Lung cancer Father, Maternal Aunt, Maternal Uncle    Parkinsonism Paternal Grandmother          Tobacco Use    Smoking status: Former Smoker     Packs/day: 1.50     Years: 40.00     Pack years: 60.00     Types: Cigarettes     Quit date: 2009     Years since quittin.0    Smokeless tobacco: Never Used   Substance and Sexual Activity    Alcohol use: Not Currently    Drug use: No    Sexual activity: Not Currently     Review of Systems   Unable to perform ROS: Mental status change   Objective:     Vital Signs (Most Recent):  Temp: 97.2 °F (36.2 °C) (22 2338)  Pulse: 105 (22 0300)  Resp: 18 (22 0300)  BP: (!) 176/69 (22 0300)  SpO2: 97 % (22 0300)   Vital Signs (24h Range):  Temp:  [97.2 °F (36.2 °C)-98.1 °F (36.7 °C)] 97.2 °F (36.2 °C)  Pulse:  [105-109] 105  Resp:  [17-18] 18  SpO2:  [96 %-98 %] 97 %  BP: (152-178)/(62-73) 176/69     Weight: 119.7 kg (264 lb)  Body mass index is 37.88 kg/m².    Physical Exam  Vitals and nursing note reviewed.   Constitutional:       General: She is not in acute distress.     Appearance: She is obese. She is not ill-appearing or diaphoretic.   HENT:      Head: Normocephalic and atraumatic.      Right Ear: External ear normal.      Left Ear: External ear normal.      Nose: Nose normal. No congestion or rhinorrhea.      Mouth/Throat:      Mouth: Mucous membranes are moist.       Pharynx: Oropharynx is clear. No oropharyngeal exudate or posterior oropharyngeal erythema.   Eyes:      General: No scleral icterus.     Extraocular Movements: Extraocular movements intact.      Conjunctiva/sclera: Conjunctivae normal.   Neck:      Vascular: No carotid bruit.   Cardiovascular:      Rate and Rhythm: Regular rhythm. Tachycardia present.      Pulses: Normal pulses.      Heart sounds: Murmur heard.     No friction rub.   Pulmonary:      Effort: Pulmonary effort is normal. No respiratory distress.      Breath sounds: Normal breath sounds. No stridor. No wheezing.   Chest:      Chest wall: No tenderness.   Abdominal:      General: Abdomen is flat. Bowel sounds are normal. There is distension.      Palpations: There is no mass.      Tenderness: There is no right CVA tenderness, left CVA tenderness or guarding.   Musculoskeletal:         General: No swelling, tenderness or deformity. Normal range of motion.      Cervical back: Normal range of motion. No rigidity or tenderness.      Right lower leg: Edema present.      Left lower leg: Edema present.      Comments: Bilateral non pitting edema   Skin:     General: Skin is warm and dry.      Capillary Refill: Capillary refill takes less than 2 seconds.      Coloration: Skin is not jaundiced or pale.      Findings: Rash present. No bruising or erythema.   Neurological:      Mental Status: She is alert. She is disoriented.      Comments: Unable to participate in neurological exam   Psychiatric:      Comments: Patient unable to participate in exam          Significant Labs: All pertinent labs within the past 24 hours have been reviewed.  ABGs:   Recent Labs   Lab 07/17/22 2252   PH 7.454*   PCO2 27.4*   HCO3 19.2*   POCSATURATED 88*   BE -5   PO2 51     CBC:   Recent Labs   Lab 07/17/22 2240 07/17/22 2245 07/18/22  0351   WBC  --  8.60 8.34   HGB  --  11.8* 12.0   HCT 35* 35.3* 35.1*   PLT  --  92* 76*     CMP:   Recent Labs   Lab 07/17/22 2245  07/18/22  0351    140   K 4.3 4.1    112*   CO2 16* 18*   * 148*   BUN 21 20   CREATININE 0.8 0.7   CALCIUM 9.0 9.1   PROT 7.3 7.1   ALBUMIN 2.6* 2.5*   BILITOT 4.4* 4.9*   ALKPHOS 101 98   AST 55* 55*   ALT 28 28   ANIONGAP 12 10   EGFRNONAA >60.0 >60.0     Cardiac Markers: No results for input(s): CKMB, MYOGLOBIN, BNP, TROPISTAT in the last 48 hours.  Lipase:   Recent Labs   Lab 07/17/22  2245   LIPASE 15     Troponin:   Recent Labs   Lab 07/17/22  2245   TROPONINI 0.015     Urine Studies:   Recent Labs   Lab 07/17/22  2334   COLORU Yue   APPEARANCEUA Clear   PHUR 5.0   SPECGRAV 1.020   PROTEINUA Negative   GLUCUA Negative   KETONESU Trace*   BILIRUBINUA Negative   OCCULTUA Negative   NITRITE Negative   LEUKOCYTESUR Negative   RBCUA 2   WBCUA 3   BACTERIA Occasional   SQUAMEPITHEL 1   HYALINECASTS 7*       Significant Imaging: I have reviewed all pertinent imaging results/findings within the past 24 hours.  CT head showed no acute abnormality  CXR no acute abnormality    Assessment/Plan:     * Hepatic encephalopathy  Cirrhosis, non-alcoholic  Hyperbilirubinemia  Metabolic acidosis  Lactic acidosis    77 y.o M with MATAMOROS cirrhosis presenting with AMS most likely secondary to hepatic encephalopathy. Labs on presentation significant for hyperammonemia (ammonia 122) and elevated lactic (4.1, although suspect this to be from decreased hepatic clearance at this time as patient with adequate oxygenation and no leukocytosis). CTH negative for acute abnormality. CMP showing no concomitant metabolic causes for encephalopathy     - F/u blood cultures to r/o infectious causes  - Lactulose 15 mg TID, titrate 3-4 BM/day  - will consider Rifaximin 550mg BID if encephalopathy does not resolve with lactulose  - NPO at this time in setting of AMS  - SLP swallow eval ordered  - Fall precautions; aspiration precautions; delirium precautions  - Can consider Zinc 220mg PO daily  - f/u cultures      Murmur,  cardiac  Systolic murmur heard on examination. No prior documentation of murmur.     - will obtain echo      VTE Risk Mitigation (From admission, onward)         Ordered     enoxaparin injection 40 mg  Daily         07/18/22 0333     IP VTE HIGH RISK PATIENT  Once         07/18/22 0333     Place sequential compression device  Until discontinued         07/18/22 0333                   José Miguel Blackmon MD  Department of Hospital Medicine   [facility] - Emergency Dept

## 2022-07-18 NOTE — ED PROVIDER NOTES
Signout Note    I received signout from the previous provider.     Chief complaint:  Altered Mental Status (Pt arrives from [facility] for AMS. LKN around 1900. Nurses state pt baseline is AAOx4. Pt not following commands or answering questions appropriately at this time. -JAMEL)      Pertinent history &exam:  Yana Orellana is a 77 y.o. female with pertinent PMH  of nonalcoholic cirrhosis, hypertension  Presenting to the ED with altered mental status.        Vitals:    07/18/22 0300   BP: (!) 176/69   Pulse: 105   Resp: 18   Temp:        Imaging Studies:    CT Head Without Contrast   Final Result      No evidence of acute intracranial pathology.      Chronic senescent and microvascular ischemic disease.      Electronically signed by resident: Franco Brower   Date:    07/18/2022   Time:    01:45      Electronically signed by: Jerad Nava MD   Date:    07/18/2022   Time:    02:06      X-Ray Chest 1 View   Final Result      No acute radiographic abnormality with no significant change.  See above comments.         Electronically signed by: Eduard Nascimento   Date:    07/17/2022   Time:    23:12          Medications Given:  Medications   sodium chloride 0.9% flush 10 mL (has no administration in time range)   naloxone 0.4 mg/mL injection 0.02 mg (has no administration in time range)   glucose chewable tablet 16 g (has no administration in time range)   glucose chewable tablet 24 g (has no administration in time range)   glucagon (human recombinant) injection 1 mg (has no administration in time range)   dextrose 10% bolus 125 mL (has no administration in time range)   dextrose 10% bolus 250 mL (has no administration in time range)   enoxaparin injection 40 mg (has no administration in time range)   lactulose 20 gram/30 mL solution Soln 15 g (has no administration in time range)   lactated ringers bolus 1,000 mL (0 mLs Intravenous Stopped 7/18/22 0307)   lactulose 20 gram/30 mL solution Soln 10 g (10 g Oral  Given 7/18/22 0110)       Pending Items/ MDM:  77 y.o. female who was signed out to me pending her altered mental status workup. Patient's labs were significant for elevated ammonia as well as elevated lactate.  I was concerned for hepatic encephalopathy and so patient was given lactulose.  CT head did not show any significant findings and patient's chest x-ray also did not show significant findings.  Discussion was had with Hospital Medicine and patient was admitted.       Diagnostic Impression:    1. Hepatic encephalopathy    2. AMS (altered mental status)    3. Chest pain    4. Lactic acidosis         ED Disposition Condition    Admit              Patient and/or family understands the plan and is in agreement, verbalized understanding, questions answered    ED Course as of 07/18/22 0411   Sun Jul 17, 2022   4281 Temp: 97.2 °F (36.2 °C) [AU]      ED Course User Index  [AU] MD Haylee Montaño MD  Emergency Medicine       Haylee Chapman MD  Resident  07/18/22 0411

## 2022-07-18 NOTE — ASSESSMENT & PLAN NOTE
Cirrhosis, non-alcoholic  Hyperbilirubinemia  Metabolic acidosis  Lactic acidosis    77 y.o M with MATAMOROS cirrhosis presenting with AMS most likely secondary to hepatic encephalopathy. Labs on presentation significant for hyperammonemia (ammonia 122) and elevated lactic (4.1, although suspect this to be from decreased hepatic clearance at this time as patient with adequate oxygenation and no leukocytosis). CTH negative for acute abnormality. CMP showing no concomitant metabolic causes for encephalopathy     - F/u blood cultures to r/o infectious causes  - Lactulose 15 mg TID, titrate 3-4 BM/day  - will consider Rifaximin 550mg BID if encephalopathy does not resolve with lactulose  - SLP permitting thin liquids and very soft solids now as mental status has improved slightly  - Fall precautions; aspiration precautions; delirium precautions  - Can consider Zinc 220mg PO daily  - f/u cultures  -MRI & CT head negative

## 2022-07-18 NOTE — PHARMACY MED REC
"Admission Medication History     The home medication history was taken by Marzena Camarena.    You may go to "Admission" then "Reconcile Home Medications" tabs to review and/or act upon these items.      The home medication list has been updated by the Pharmacy department.    Please read ALL comments highlighted in yellow.    Please address this information as you see fit.     Feel free to contact us if you have any questions or require assistance.      The medications listed below were removed from the home medication list. Please reorder if appropriate:  Patient reports no longer taking the following medication(s):   LOPERAMIDE 2 MG   OXYCODONE/APAP 5-325 MG      Medications listed below were obtained from: SNF/Rehab/LTAC   Medication Sig    acetaminophen (TYLENOL) 500 MG tablet   Take 500 mg by mouth 2 (two) times daily as needed for Pain.    amLODIPine (NORVASC) 5 MG tablet   Take 5 mg by mouth once daily.    cholestyramine (QUESTRAN) 4 gram packet   Take 1 packet (4 g total) by mouth once daily.    diclofenac sodium (VOLTAREN) 1 % Gel    Apply 2 g topically 3 (three) times daily as needed (PAIN).    ergocalciferol (VITAMIN D2) 50,000 unit Cap   Take 50,000 Units by mouth every Thursday.    LIDOcaine (LIDODERM) 5 %     Place 1 patch onto the skin every 24 hours. Remove & Discard patch within 12 hours or as directed by MD    multivitamin with minerals tablet   Take 1 tablet by mouth once daily.    predniSONE (DELTASONE) 5 MG tablet   Take 5 mg by mouth daily as needed (arthritis pain).    sodium chloride (OCEAN) 0.65 % nasal spray   1 spray by Nasal route as needed for Congestion.    spironolactone (ALDACTONE) 50 MG tablet Take 1 tablet (50 mg total) by mouth once daily.             Potential issues to be addressed PRIOR TO DISCHARGE  The listed medications were obtained from another facility ([facility] ). The patient may not have been able to fill these prescriptions " prior to this admission and may require new scripts upon discharge.     Marzena Camarena  [id number]                    .

## 2022-07-18 NOTE — HPI
This is a 77 year old lady with HTN, Non- alcoholic cirrhosis, obesity, anxiety, macrocytic anemia, hx of lung CA in 2009 s/p surgery and chemotherapy with subsequent chemotherapy induced neuropathy, post-polio syndrome, lower extremity weakness (being followed by Neurology), rheumatoid arthritis, fibromyalgia who presented from her SNF for AMS. Patient history primarily obtained from nurse at [facility]. Patient was her normal self yesterday at 7pm but when her nurse went to recheck on her at 9pm, patient was confused, not following commands, and non-verbal. Nurse states that she did not have any recent fall or have anr focal neurological deficit. The patient did not appear to be diaphoretic, fevers, or weakness. Patient is able to ambulate on wheelchair.     In the ED, patient was tachycardic and hypertensive to 180s systolic. Labs showed Tbili of 4.4, AST/ALT 55/28, and ammonia of 122. Lactic of 4.1. troponin and UA was negative. CT head and CXR showed no acute abnormalities.

## 2022-07-18 NOTE — ASSESSMENT & PLAN NOTE
Cirrhosis, non-alcoholic  Hyperbilirubinemia  Metabolic acidosis  Lactic acidosis    77 y.o M with MATAMOROS cirrhosis presenting with AMS most likely secondary to hepatic encephalopathy. Labs on presentation significant for hyperammonemia (ammonia 122) and elevated lactic (4.1, although suspect this to be from decreased hepatic clearance at this time as patient with adequate oxygenation and no leukocytosis). CTH negative for acute abnormality. CMP showing no concomitant metabolic causes for encephalopathy     - F/u blood cultures to r/o infectious causes  - Lactulose 15 mg TID, titrate 3-4 BM/day  - will consider Rifaximin 550mg BID if encephalopathy does not resolve with lactulose  - NPO at this time in setting of AMS  - SLP swallow eval ordered  - Fall precautions; aspiration precautions; delirium precautions  - Can consider Zinc 220mg PO daily

## 2022-07-18 NOTE — ED NOTES
I-STAT Chem-8+ Results:   Value Reference Range   Sodium 142 136-145 mmol/L   Potassium  4.6 3.5-5.1 mmol/L   Chloride 110  mmol/L   Ionized Calcium 1.13 1.06-1.42 mmol/L   CO2 (measured) 20 23-29 mmol/L   Glucose 158  mg/dL   BUN 23 6-30 mg/dL   Creatinine 0.8 0.5-1.4 mg/dL   Hematocrit 35 36-54%

## 2022-07-18 NOTE — PLAN OF CARE
Swallow assessment initiated and attempted. Pt not appearing safe to advance to any diet at this time and to remain NPO. Speech to continue to follow.     Emma Corcoran MS, CCC-SLP  Speech Language Pathologist  Pager: [phone]  Date 7/18/2022

## 2022-07-18 NOTE — ED PROVIDER NOTES
Encounter Date: 7/17/2022       History     Chief Complaint   Patient presents with    Altered Mental Status     Pt arrives from [facility] for AMS. LKN around 1900. Nurses state pt baseline is AAOx4. Pt not following commands or answering questions appropriately at this time. -VAN     77-year-old female with history of nonalcoholic cirrhosis, hypertension  Presenting to the ED with altered mental status.  Patient was last seen normal at 7:00 p.m. at rounds at nursing facility.  However when nurse checked in at 9:00 p.m., patient was unable to swallow, follow commands.  She is normally oriented x3, able to converse and follows commands.  However, she was unable to swallow.  She was found in bed, so low suspicion for any trauma or falls.  EMS reports POC glucose within normal limits.    The history is provided by the nursing home and the EMS personnel. The history is limited by the condition of the patient and the absence of a caregiver.     Review of patient's allergies indicates:   Allergen Reactions    Ibuprofen Nausea Only    Demerol [meperidine]      Euphoria    Gabapentin Other (See Comments)     Hallucinations    Mellaril-s Nausea Only    Versed [midazolam]      Excessive talking, hyper    Vicodin [hydrocodone-acetaminophen]      Joint pain, muscle pain    Xanax [alprazolam]      fatigue     Past Medical History:   Diagnosis Date    Anemia     Anxiety     Cataract     Colon polyps 2012    Fibromyalgia 10/15/2012    Lung cancer     Pneumonia due to infectious organism 7/19/2019    Post-polio syndrome     Rheumatoid arthritis(714.0) 7/16/2012    2009 onset with mod positive CCP and RF MTX 2011 - present     Thyroid disease     Vertigo     postural vertigo     Past Surgical History:   Procedure Laterality Date    BLADDER SURGERY  1959    CHOLECYSTECTOMY      HYSTERECTOMY  1988    LUNG REMOVAL, PARTIAL  2009    left lower lobectomy    OVARIAN CYST REMOVAL  1970    TONSILLECTOMY  1952      Family History   Problem Relation Age of Onset    Lung cancer Father     Lung cancer Maternal Aunt     Diabetes Maternal Grandmother     Colon cancer Maternal Grandfather     Parkinsonism Paternal Grandmother     Arrhythmia Mother     Lung cancer Maternal Uncle     Heart attack Son 45    Hypertension Neg Hx      Social History     Tobacco Use    Smoking status: Former Smoker     Packs/day: 1.50     Years: 40.00     Pack years: 60.00     Types: Cigarettes     Quit date: 2009     Years since quittin.0    Smokeless tobacco: Never Used   Substance Use Topics    Alcohol use: Not Currently    Drug use: No     Review of Systems   Unable to perform ROS: Mental status change       Physical Exam     Initial Vitals [22 2223]   BP Pulse Resp Temp SpO2   (!) 152/62 109 18 98.1 °F (36.7 °C) 98 %      MAP       --         Physical Exam    Nursing note and vitals reviewed.  Constitutional: She appears well-developed and well-nourished. She is not diaphoretic. No distress.   Morbidly obese   HENT:   Head: Normocephalic and atraumatic.   Eyes: Conjunctivae are normal. Pupils are equal, round, and reactive to light. Right eye exhibits no discharge. Left eye exhibits no discharge. No scleral icterus.   Cardiovascular: Normal rate and regular rhythm. Exam reveals no gallop and no friction rub.    No murmur heard.  Pulmonary/Chest: No respiratory distress. She has no wheezes. She has no rhonchi. She has no rales. She exhibits no tenderness.   Abdominal: Abdomen is soft. She exhibits no distension and no mass. There is no abdominal tenderness. There is no rebound and no guarding.     Neurological:   Looks around the room, moves all extremities to pull cover over her self.  However does not follow commands.   Skin: Skin is warm and dry. No erythema. No pallor.         ED Course   Procedures  Labs Reviewed   CBC W/ AUTO DIFFERENTIAL - Abnormal; Notable for the following components:       Result Value    RBC  3.53 (*)     Hemoglobin 11.8 (*)     Hematocrit 35.3 (*)      (*)     MCH 33.4 (*)     RDW 14.9 (*)     Platelets 92 (*)     Gran % 74.1 (*)     Lymph % 14.0 (*)     All other components within normal limits   COMPREHENSIVE METABOLIC PANEL - Abnormal; Notable for the following components:    CO2 16 (*)     Glucose 158 (*)     Albumin 2.6 (*)     Total Bilirubin 4.4 (*)     AST 55 (*)     All other components within normal limits   LACTIC ACID, PLASMA - Abnormal; Notable for the following components:    Lactate (Lactic Acid) 4.1 (*)     All other components within normal limits   URINALYSIS, REFLEX TO URINE CULTURE - Abnormal; Notable for the following components:    Ketones, UA Trace (*)     All other components within normal limits    Narrative:     Specimen Source->Urine   AMMONIA - Abnormal; Notable for the following components:    Ammonia 122 (*)     All other components within normal limits   PROTIME-INR - Abnormal; Notable for the following components:    Prothrombin Time 17.9 (*)     INR 1.8 (*)     All other components within normal limits   URINALYSIS MICROSCOPIC - Abnormal; Notable for the following components:    Non-Squam Epith 1 (*)     Hyaline Casts, UA 7 (*)     All other components within normal limits    Narrative:     Specimen Source->Urine   ISTAT PROCEDURE - Abnormal; Notable for the following components:    POC Glucose 158 (*)     POC TCO2 (MEASURED) 20 (*)     POC Hematocrit 35 (*)     All other components within normal limits   ISTAT PROCEDURE - Abnormal; Notable for the following components:    POC PH 7.454 (*)     POC PCO2 27.4 (*)     POC HCO3 19.2 (*)     POC SATURATED O2 88 (*)     POC TCO2 20 (*)     All other components within normal limits   CULTURE, BLOOD   CULTURE, BLOOD   MAGNESIUM   LIPASE   TROPONIN I   DRUG SCREEN PANEL, URINE EMERGENCY    Narrative:     Specimen Source->Urine   SARS-COV-2 RNA AMPLIFICATION, QUAL   B-TYPE NATRIURETIC PEPTIDE   ISTAT CHEM8     EKG Readings:  (Independently Interpreted)   Normal sinus rhythm, rate of 89. No ST elevations or depressions concerning for ischemia.  No STEMI per my independent interpretation         Imaging Results          X-Ray Chest 1 View (Final result)  Result time 07/17/22 23:12:06    Final result by Eduard Kirk MD (07/17/22 23:12:06)                 Impression:      No acute radiographic abnormality with no significant change.  See above comments.      Electronically signed by: Eduard Kirk  Date:    07/17/2022  Time:    23:12             Narrative:    EXAMINATION:  XR CHEST 1 VIEW    CLINICAL HISTORY:  Altered mental status, unspecified    TECHNIQUE:  Single frontal view of the chest was performed.    COMPARISON:  06/25/2022    FINDINGS:  Suboptimal inspiration.  Cardiac silhouette is upper normal size.    No large effusion.  No evidence of pneumothorax.    No mass or consolidation.  No focal infiltrate.    Mild diffuse interstitial changes could be associated with scarring or mild infiltrate.                              X-Rays:   Independently Interpreted Readings:   Other Readings:  No pneumonia, pneumothorax, or pleural effusion noted on CXR      Medications   lactated ringers bolus 1,000 mL (1,000 mLs Intravenous New Bag 7/17/22 7167)   lactulose 20 gram/30 mL solution Soln 10 g (has no administration in time range)     Medical Decision Making:   History:   Old Medical Records: I decided to obtain old medical records.  Initial Assessment:   77 year old female with history of nonalcoholic cirrhosis presenting to the ED with altered mental status.  Last known normal was 7:00 p.m..  Is not following commands now, no grimacing on abdomen exam.  Clear lung sounds.    Differential includes was not limited to sepsis including sources of UTI, pneumonia, bacteremia; hepatic encephalopathy, hypercapnia, electrolyte abnormalities, hypoglycemia, intracranial bleed/mass/large stroke.    CBC showed no leukocytosis or anemia.  CMP showed  no electrolyte most concerning hospitalization.  Troponin negative.  EKG showed no concern for ischemia.  UA pending.  Ammonia significantly elevated at 144. Giving lactulose.  Suspect cause patient's symptoms is due to hepatic encephalopathy.  Pending rest of labs and imaging, pt handed off to oncoming team.   Independently Interpreted Test(s):   I have ordered and independently interpreted X-rays - see prior notes.  I have ordered and independently interpreted EKG Reading(s) - see prior notes  Clinical Tests:   Lab Tests: Ordered and Reviewed  Radiological Study: Ordered and Reviewed  Medical Tests: Ordered and Reviewed            Attending Attestation:   Physician Attestation Statement for Resident:  As the supervising MD   Physician Attestation Statement: I have personally seen and examined this patient.   I agree with the above history. -:   As the supervising MD I agree with the above PE.    As the supervising MD I agree with the above treatment, course, plan, and disposition.                ED Course as of 07/18/22 0031   Sun Jul 17, 2022   9272 Temp: 97.2 °F (36.2 °C) [AU]      ED Course User Index  [AU] Richard Hammond MD             Clinical Impression:   Final diagnoses:  [R41.82] AMS (altered mental status)  [K72.90] Hepatic encephalopathy (Primary)                 Richard Hammond MD  Resident  07/18/22 0031       Julio Hampton MD  07/18/22 0059

## 2022-07-18 NOTE — SUBJECTIVE & OBJECTIVE
Past Medical History:   Diagnosis Date    Anemia     Anxiety     Cataract     Colon polyps 2012    Fibromyalgia 10/15/2012    Lung cancer     Pneumonia due to infectious organism 7/19/2019    Post-polio syndrome     Rheumatoid arthritis(714.0) 7/16/2012    2009 onset with mod positive CCP and RF MTX 2011 - present     Thyroid disease     Vertigo     postural vertigo       Past Surgical History:   Procedure Laterality Date    BLADDER SURGERY  1959    CHOLECYSTECTOMY      HYSTERECTOMY  1988    LUNG REMOVAL, PARTIAL  2009    left lower lobectomy    OVARIAN CYST REMOVAL  1970    TONSILLECTOMY  1952       Review of patient's allergies indicates:   Allergen Reactions    Ibuprofen Nausea Only    Demerol [meperidine]      Euphoria    Gabapentin Other (See Comments)     Hallucinations    Mellaril-s Nausea Only    Versed [midazolam]      Excessive talking, hyper    Vicodin [hydrocodone-acetaminophen]      Joint pain, muscle pain    Xanax [alprazolam]      fatigue       No current facility-administered medications on file prior to encounter.     Current Outpatient Medications on File Prior to Encounter   Medication Sig    acetaminophen (TYLENOL) 500 MG tablet Take 500 mg by mouth 2 (two) times daily as needed for Pain.    cholecalciferol, vitamin D3, (VITAMIN D3) 25 mcg (1,000 unit) capsule Take 5 capsules (5,000 Units total) by mouth once daily.    cholestyramine (QUESTRAN) 4 gram packet Take 1 packet (4 g total) by mouth once daily. (Patient not taking: Reported on 7/5/2022)    diclofenac sodium (VOLTAREN) 1 % Gel Apply 2 g topically 3 (three) times daily. (Patient taking differently: Apply 2 g topically 3 (three) times daily as needed (PAIN).)    LIDOcaine (LIDODERM) 5 % Place 1 patch onto the skin every 24 hours. Remove & Discard patch within 12 hours or as directed by MD    loperamide (IMODIUM) 2 mg capsule Take 1 capsule (2 mg total) by mouth 3 (three) times daily.    multivitamin with minerals tablet Take 1 tablet by  mouth once daily.    predniSONE (DELTASONE) 5 MG tablet Take 1-2 tablets (5-10 mg total) by mouth 2 (two) times daily as needed (arthritis). (Patient not taking: Reported on 2022)    sodium chloride (OCEAN) 0.65 % nasal spray 1 spray by Nasal route as needed for Congestion.    spironolactone (ALDACTONE) 50 MG tablet Take 1 tablet (50 mg total) by mouth once daily.    [DISCONTINUED] oxyCODONE-acetaminophen (PERCOCET) 5-325 mg per tablet Take 1 tablet by mouth every 8 (eight) hours as needed for Pain. (Patient taking differently: Take 0.5 tablets by mouth every 8 (eight) hours as needed for Pain.)     Family History       Problem Relation (Age of Onset)    Arrhythmia Mother    Colon cancer Maternal Grandfather    Diabetes Maternal Grandmother    Heart attack Son (45)    Lung cancer Father, Maternal Aunt, Maternal Uncle    Parkinsonism Paternal Grandmother          Tobacco Use    Smoking status: Former Smoker     Packs/day: 1.50     Years: 40.00     Pack years: 60.00     Types: Cigarettes     Quit date: 2009     Years since quittin.0    Smokeless tobacco: Never Used   Substance and Sexual Activity    Alcohol use: Not Currently    Drug use: No    Sexual activity: Not Currently     Review of Systems   Unable to perform ROS: Mental status change   Objective:     Vital Signs (Most Recent):  Temp: 97.2 °F (36.2 °C) (22 2338)  Pulse: 105 (22 0300)  Resp: 18 (22 0300)  BP: (!) 176/69 (22 0300)  SpO2: 97 % (22 0300)   Vital Signs (24h Range):  Temp:  [97.2 °F (36.2 °C)-98.1 °F (36.7 °C)] 97.2 °F (36.2 °C)  Pulse:  [105-109] 105  Resp:  [17-18] 18  SpO2:  [96 %-98 %] 97 %  BP: (152-178)/(62-73) 176/69     Weight: 119.7 kg (264 lb)  Body mass index is 37.88 kg/m².    Physical Exam  Vitals and nursing note reviewed.   Constitutional:       General: She is not in acute distress.     Appearance: She is obese. She is not ill-appearing or diaphoretic.   HENT:      Head: Normocephalic and  atraumatic.      Right Ear: External ear normal.      Left Ear: External ear normal.      Nose: Nose normal. No congestion or rhinorrhea.      Mouth/Throat:      Mouth: Mucous membranes are moist.      Pharynx: Oropharynx is clear. No oropharyngeal exudate or posterior oropharyngeal erythema.   Eyes:      General: No scleral icterus.     Extraocular Movements: Extraocular movements intact.      Conjunctiva/sclera: Conjunctivae normal.   Neck:      Vascular: No carotid bruit.   Cardiovascular:      Rate and Rhythm: Regular rhythm. Tachycardia present.      Pulses: Normal pulses.      Heart sounds: Murmur heard.     No friction rub.   Pulmonary:      Effort: Pulmonary effort is normal. No respiratory distress.      Breath sounds: Normal breath sounds. No stridor. No wheezing.   Chest:      Chest wall: No tenderness.   Abdominal:      General: Abdomen is flat. Bowel sounds are normal. There is distension.      Palpations: There is no mass.      Tenderness: There is no right CVA tenderness, left CVA tenderness or guarding.   Musculoskeletal:         General: No swelling, tenderness or deformity. Normal range of motion.      Cervical back: Normal range of motion. No rigidity or tenderness.      Right lower leg: Edema present.      Left lower leg: Edema present.      Comments: Bilateral non pitting edema   Skin:     General: Skin is warm and dry.      Capillary Refill: Capillary refill takes less than 2 seconds.      Coloration: Skin is not jaundiced or pale.      Findings: Rash present. No bruising or erythema.   Neurological:      Mental Status: She is alert. She is disoriented.      Comments: Unable to participate in neurological exam   Psychiatric:      Comments: Patient unable to participate in exam          Significant Labs: All pertinent labs within the past 24 hours have been reviewed.  ABGs:   Recent Labs   Lab 07/17/22  2252   PH 7.454*   PCO2 27.4*   HCO3 19.2*   POCSATURATED 88*   BE -5   PO2 51     CBC:    Recent Labs   Lab 07/17/22 2240 07/17/22 2245 07/18/22  0351   WBC  --  8.60 8.34   HGB  --  11.8* 12.0   HCT 35* 35.3* 35.1*   PLT  --  92* 76*     CMP:   Recent Labs   Lab 07/17/22 2245 07/18/22  0351    140   K 4.3 4.1    112*   CO2 16* 18*   * 148*   BUN 21 20   CREATININE 0.8 0.7   CALCIUM 9.0 9.1   PROT 7.3 7.1   ALBUMIN 2.6* 2.5*   BILITOT 4.4* 4.9*   ALKPHOS 101 98   AST 55* 55*   ALT 28 28   ANIONGAP 12 10   EGFRNONAA >60.0 >60.0     Cardiac Markers: No results for input(s): CKMB, MYOGLOBIN, BNP, TROPISTAT in the last 48 hours.  Lipase:   Recent Labs   Lab 07/17/22 2245   LIPASE 15     Troponin:   Recent Labs   Lab 07/17/22 2245   TROPONINI 0.015     Urine Studies:   Recent Labs   Lab 07/17/22  2334   COLORU Yue   APPEARANCEUA Clear   PHUR 5.0   SPECGRAV 1.020   PROTEINUA Negative   GLUCUA Negative   KETONESU Trace*   BILIRUBINUA Negative   OCCULTUA Negative   NITRITE Negative   LEUKOCYTESUR Negative   RBCUA 2   WBCUA 3   BACTERIA Occasional   SQUAMEPITHEL 1   HYALINECASTS 7*       Significant Imaging: I have reviewed all pertinent imaging results/findings within the past 24 hours.  CT head showed no acute abnormality  CXR no acute abnormality

## 2022-07-18 NOTE — ED NOTES
Pt  resting quietly on stretcher with eyes open, not responding verbally per baseline on this admit to hospital,  no acute distress or discomfort  observed.   Bed locked in lowest position; side rails up and locked x 2; call light, bedside table, and personal belongings within reach. Room assessed for safety measures and cleanliness; no action needed at this time. Plan of care discussed.  will continue to monitor.

## 2022-07-19 NOTE — PLAN OF CARE
Problem: Infection  Goal: Absence of Infection Signs and Symptoms  7/19/2022 1338 by Maribel Dahl RN  Outcome: Ongoing, Progressing  7/19/2022 1334 by Maribel Dahl RN  Outcome: Ongoing, Progressing     Problem: Adult Inpatient Plan of Care  Goal: Plan of Care Review  7/19/2022 1338 by Maribel Dahl RN  Outcome: Ongoing, Progressing  7/19/2022 1334 by Maribel Dahl RN  Outcome: Ongoing, Progressing     Problem: Skin Injury Risk Increased  Goal: Skin Health and Integrity  7/19/2022 1338 by Maribel Dahl RN  Outcome: Ongoing, Progressing  7/19/2022 1334 by Maribel Dahl RN  Outcome: Ongoing, Progressing     Problem: Fall Injury Risk  Goal: Absence of Fall and Fall-Related Injury  7/19/2022 1338 by Maribel Dahl RN  Outcome: Ongoing, Progressing  7/19/2022 1334 by Maribel Dahl RN  Outcome: Ongoing, Progressing

## 2022-07-19 NOTE — SUBJECTIVE & OBJECTIVE
Interval History: No acute events overnight. This AM pt remained altered although making notable improvements from days prior. She was able to follow commands and state her own name. She was not oriented to place or time however. She otherwise did not answer questions, instead repeating that she was cold. She thanked me after I delivered another blanket. MRI yesterday was non revealing.      Review of Systems   Unable to perform ROS: Mental status change   Psychiatric/Behavioral:  Positive for confusion.    Objective:     Vital Signs (Most Recent):  Temp: 98 °F (36.7 °C) (07/19/22 1130)  Pulse: 96 (07/19/22 1322)  Resp: 16 (07/19/22 1130)  BP: 129/60 (07/19/22 1130)  SpO2: 96 % (07/19/22 1238) Vital Signs (24h Range):  Temp:  [96.7 °F (35.9 °C)-98 °F (36.7 °C)] 98 °F (36.7 °C)  Pulse:  [] 96  Resp:  [14-20] 16  SpO2:  [95 %-99 %] 96 %  BP: (129-200)/(60-90) 129/60     Weight: 119.7 kg (264 lb)  Body mass index is 37.88 kg/m².    Intake/Output Summary (Last 24 hours) at 7/19/2022 1350  Last data filed at 7/19/2022 0300  Gross per 24 hour   Intake --   Output 1 ml   Net -1 ml      Physical Exam  Vitals and nursing note reviewed.   Constitutional:       General: She is not in acute distress.     Appearance: She is obese. She is not ill-appearing or diaphoretic.   HENT:      Head: Normocephalic and atraumatic.      Right Ear: External ear normal.      Left Ear: External ear normal.      Nose: Nose normal. No congestion or rhinorrhea.      Mouth/Throat:      Mouth: Mucous membranes are dry.      Pharynx: Oropharynx is clear. No oropharyngeal exudate or posterior oropharyngeal erythema.   Eyes:      General: No scleral icterus.     Extraocular Movements: Extraocular movements intact.      Conjunctiva/sclera: Conjunctivae normal.      Pupils: Pupils are equal, round, and reactive to light.   Neck:      Vascular: No carotid bruit.   Cardiovascular:      Rate and Rhythm: Normal rate and regular rhythm.      Pulses:  Normal pulses.      Heart sounds: Murmur heard.     No friction rub.   Pulmonary:      Effort: Pulmonary effort is normal. No respiratory distress.      Breath sounds: Normal breath sounds. No stridor. No wheezing.   Chest:      Chest wall: No tenderness.   Abdominal:      General: Abdomen is flat. Bowel sounds are normal. There is no distension.      Palpations: There is no mass.      Tenderness: There is no right CVA tenderness, left CVA tenderness or guarding.   Musculoskeletal:         General: No swelling, tenderness or deformity. Normal range of motion.      Cervical back: Normal range of motion. No rigidity or tenderness.      Right lower leg: No edema.      Left lower leg: No edema.   Skin:     General: Skin is warm and dry.      Capillary Refill: Capillary refill takes less than 2 seconds.      Coloration: Skin is not jaundiced or pale.      Findings: No bruising or erythema.   Neurological:      Mental Status: She is alert. She is disoriented.      Comments: Minimal participation in neuro exam. Oriented only to place. No asterixis.   Psychiatric:      Comments: Patient unable to participate in exam       Significant Labs: All pertinent labs within the past 24 hours have been reviewed.  CBC:   Recent Labs   Lab 07/17/22 2240 07/17/22 2245 07/18/22  0351   WBC  --  8.60 8.34   HGB  --  11.8* 12.0   HCT 35* 35.3* 35.1*   PLT  --  92* 76*     CMP:   Recent Labs   Lab 07/17/22 2245 07/18/22  0351    140   K 4.3 4.1    112*   CO2 16* 18*   * 148*   BUN 21 20   CREATININE 0.8 0.7   CALCIUM 9.0 9.1   PROT 7.3 7.1   ALBUMIN 2.6* 2.5*   BILITOT 4.4* 4.9*   ALKPHOS 101 98   AST 55* 55*   ALT 28 28   ANIONGAP 12 10   EGFRNONAA >60.0 >60.0       Significant Imaging: I have reviewed all pertinent imaging results/findings within the past 24 hours.

## 2022-07-19 NOTE — PROGRESS NOTES
[facility] - Intensive Care ([address] Medicine  Progress Note    Patient Name: Yana Orellana  MRN: [id number]  Patient Class: IP- Inpatient   Admission Date: 7/17/2022  Length of Stay: 1 days  Attending Physician: Efrain Naqvi MD  Primary Care Provider: Caitlyn Graf MD        Subjective:     Principal Problem:Hepatic encephalopathy        HPI:  This is a 77 year old lady with HTN, Non- alcoholic cirrhosis, obesity, anxiety, macrocytic anemia, hx of lung CA in 2009 s/p surgery and chemotherapy with subsequent chemotherapy induced neuropathy, post-polio syndrome, lower extremity weakness (being followed by Neurology), rheumatoid arthritis, fibromyalgia who presented from her SNF for AMS. Patient history primarily obtained from nurse at [facility]. Patient was her normal self yesterday at 7pm but when her nurse went to recheck on her at 9pm, patient was confused, not following commands, and non-verbal. Nurse states that she did not have any recent fall or have anr focal neurological deficit. The patient did not appear to be diaphoretic, fevers, or weakness. Patient is able to ambulate on wheelchair.     In the ED, patient was tachycardic and hypertensive to 180s systolic. Labs showed Tbili of 4.4, AST/ALT 55/28, and ammonia of 122. Lactic of 4.1. troponin and UA was negative. CT head and CXR showed no acute abnormalities.       Overview/Hospital Course:  Pt had AMS on arrival. SLP noted patient unable to swallow safely and she was made NPO. She was started on lactulose & rifaximin. Pt received multiple lactulose enemas with small improvement in mental status over time. She was started on oral lactulose after being cleared by SLP.       Interval History: No acute events overnight. This AM pt remained altered although making notable improvements from days prior. She was able to follow commands and state her own name. She was not oriented to place or time however. She otherwise did not answer  questions, instead repeating that she was cold. She thanked me after I delivered another blanket. MRI yesterday was non revealing.      Review of Systems   Unable to perform ROS: Mental status change   Psychiatric/Behavioral:  Positive for confusion.    Objective:     Vital Signs (Most Recent):  Temp: 98 °F (36.7 °C) (07/19/22 1130)  Pulse: 96 (07/19/22 1322)  Resp: 16 (07/19/22 1130)  BP: 129/60 (07/19/22 1130)  SpO2: 96 % (07/19/22 1238) Vital Signs (24h Range):  Temp:  [96.7 °F (35.9 °C)-98 °F (36.7 °C)] 98 °F (36.7 °C)  Pulse:  [] 96  Resp:  [14-20] 16  SpO2:  [95 %-99 %] 96 %  BP: (129-200)/(60-90) 129/60     Weight: 119.7 kg (264 lb)  Body mass index is 37.88 kg/m².    Intake/Output Summary (Last 24 hours) at 7/19/2022 1350  Last data filed at 7/19/2022 0300  Gross per 24 hour   Intake --   Output 1 ml   Net -1 ml      Physical Exam  Vitals and nursing note reviewed.   Constitutional:       General: She is not in acute distress.     Appearance: She is obese. She is not ill-appearing or diaphoretic.   HENT:      Head: Normocephalic and atraumatic.      Right Ear: External ear normal.      Left Ear: External ear normal.      Nose: Nose normal. No congestion or rhinorrhea.      Mouth/Throat:      Mouth: Mucous membranes are dry.      Pharynx: Oropharynx is clear. No oropharyngeal exudate or posterior oropharyngeal erythema.   Eyes:      General: No scleral icterus.     Extraocular Movements: Extraocular movements intact.      Conjunctiva/sclera: Conjunctivae normal.      Pupils: Pupils are equal, round, and reactive to light.   Neck:      Vascular: No carotid bruit.   Cardiovascular:      Rate and Rhythm: Normal rate and regular rhythm.      Pulses: Normal pulses.      Heart sounds: Murmur heard.     No friction rub.   Pulmonary:      Effort: Pulmonary effort is normal. No respiratory distress.      Breath sounds: Normal breath sounds. No stridor. No wheezing.   Chest:      Chest wall: No tenderness.    Abdominal:      General: Abdomen is flat. Bowel sounds are normal. There is no distension.      Palpations: There is no mass.      Tenderness: There is no right CVA tenderness, left CVA tenderness or guarding.   Musculoskeletal:         General: No swelling, tenderness or deformity. Normal range of motion.      Cervical back: Normal range of motion. No rigidity or tenderness.      Right lower leg: No edema.      Left lower leg: No edema.   Skin:     General: Skin is warm and dry.      Capillary Refill: Capillary refill takes less than 2 seconds.      Coloration: Skin is not jaundiced or pale.      Findings: No bruising or erythema.   Neurological:      Mental Status: She is alert. She is disoriented.      Comments: Minimal participation in neuro exam. Oriented only to place. No asterixis.   Psychiatric:      Comments: Patient unable to participate in exam       Significant Labs: All pertinent labs within the past 24 hours have been reviewed.  CBC:   Recent Labs   Lab 07/17/22 2240 07/17/22 2245 07/18/22  0351   WBC  --  8.60 8.34   HGB  --  11.8* 12.0   HCT 35* 35.3* 35.1*   PLT  --  92* 76*     CMP:   Recent Labs   Lab 07/17/22 2245 07/18/22  0351    140   K 4.3 4.1    112*   CO2 16* 18*   * 148*   BUN 21 20   CREATININE 0.8 0.7   CALCIUM 9.0 9.1   PROT 7.3 7.1   ALBUMIN 2.6* 2.5*   BILITOT 4.4* 4.9*   ALKPHOS 101 98   AST 55* 55*   ALT 28 28   ANIONGAP 12 10   EGFRNONAA >60.0 >60.0       Significant Imaging: I have reviewed all pertinent imaging results/findings within the past 24 hours.      Assessment/Plan:      * Hepatic encephalopathy  Cirrhosis, non-alcoholic  Hyperbilirubinemia  Metabolic acidosis  Lactic acidosis    77 y.o M with MATAMOROS cirrhosis presenting with AMS most likely secondary to hepatic encephalopathy. Labs on presentation significant for hyperammonemia (ammonia 122) and elevated lactic (4.1, although suspect this to be from decreased hepatic clearance at this time as  patient with adequate oxygenation and no leukocytosis). CTH negative for acute abnormality. CMP showing no concomitant metabolic causes for encephalopathy     - F/u blood cultures to r/o infectious causes  - Lactulose 15 mg TID, titrate 3-4 BM/day  - will consider Rifaximin 550mg BID if encephalopathy does not resolve with lactulose  - SLP permitting thin liquids and very soft solids now as mental status has improved slightly  - Fall precautions; aspiration precautions; delirium precautions  - Can consider Zinc 220mg PO daily  - f/u cultures  -MRI & CT head negative      Murmur, cardiac  Systolic murmur heard on examination. No prior documentation of murmur.     - will obtain echo        VTE Risk Mitigation (From admission, onward)         Ordered     enoxaparin injection 40 mg  Daily         07/18/22 0333     IP VTE HIGH RISK PATIENT  Once         07/18/22 0333     Place sequential compression device  Until discontinued         07/18/22 0333                Discharge Planning   JAE:      Code Status: Full Code   Is the patient medically ready for discharge?:     Reason for patient still in hospital (select all that apply): Treatment  Discharge Plan A: Home Health                  Rashard Elena MD  Department of Hospital Medicine   [facility] - Intensive Care ([place])     [Negative] : Heme/Lymph

## 2022-07-19 NOTE — PT/OT/SLP PROGRESS
"Speech Language Pathology Treatment    Patient Name:  Yana Orellana   MRN:  [id number]  Admitting Diagnosis: Hepatic encephalopathy    Recommendations:                 General Recommendations:  Dysphagia therapy  Diet recommendations:  Mechanical soft, Liquid Diet Level: Thin   Aspiration Precautions: 1 bite/sip at a time, Meds crushed in puree and Standard aspiration precautions   General Precautions: Standard,    Communication strategies:  none    Subjective     "I want a drink"   Pt awake     Pain/Comfort:  · Pain Rating 1: 0/10  · Pain Rating Post-Intervention 1: 0/10    Respiratory Status: Room air    Objective:     Has the patient been evaluated by SLP for swallowing?   Yes  Keep patient NPO? Yes     Pt with improved alertness this date. Pt following commands and verbal this date. Pt continues to present with confusion however unsure of pt baseline. Pt requesting thin liquids at this time and essentially refusing all solids. Pt with dry oral cavity and tongue. Pt willingly accepted thin liquid trials via open cup. Straw attempted however pt unable to extract fluid from straw. With open cup sips pt without overt clinical signs of aspiration. Ultimately, pt consumed trials of puree and solids with functional oral skills and no overt clinical signs of aspiration. Pt chewing of regular solids was mildly prolonged and given underlying confusion and unknown baseline diet would suggest advancing to mechanical soft solids and thin liquids. SLP discussed with pt rationale for diet recs and this time and reviewed general aspiration precautions. SLP updated whiteboard and discussed with MD team.       Assessment:     Yana Orellana is a 77 y.o. female with an SLP diagnosis of Dysphagia appearing to spontaneously improve.        Goals:   Multidisciplinary Problems     SLP Goals        Problem: SLP    Goal Priority Disciplines Outcome   SLP Goal     SLP    Description: Speech Language Pathology Goals  Goals expected " to be met by 7/25    Pt will participate in ongoing assessment of swallow function to help determine least restrictive diet                      Plan:     · Patient to be seen:  3 x/week   · Plan of Care expires:     · Plan of Care reviewed with:  patient   · SLP Follow-Up:  Yes       Discharge recommendations:   (return to NH)   Barriers to Discharge:  None    Time Tracking:     SLP Treatment Date:   07/19/22  Speech Start Time:  0855  Speech Stop Time:  0914     Speech Total Time (min):  19 min    Billable Minutes: Treatment Swallowing Dysfunction 10 and Self Care/Home Management Training 9    07/19/2022

## 2022-07-19 NOTE — MEDICAL/APP STUDENT
Progress Note  Hospital Medicine    Primary Team: [facility] [place] [facility] MED 3  Admit Date: 7/17/2022   Length of Stay:  LOS: 1 day   SUBJECTIVE:   Reason for Admission:  Hepatic encephalopathy    HPI: 77 year old F w/ PMH of non-alcoholic cirrhosis, post-polio syndrome, rheumatoid arthritis, HTN, hx of lung cancer (surgically removed, chemotherapy), fibromyalgia, macrocytic anemia, chemotherapy induced neuropathy, and obesity. She is on hospital day 2 for management of altered mental status due to presumed hepatic encephalopathy. Pt was awake and was able to talk to me when we entered the room. She was able to comprehend what I am saying, follows commands and responds appropriately.     Hospital Course: Pt presented with hyperammonemia (ammonia was 122 on ED admission) and her history of non-alcoholic cirrhosis led to a presumed altered mental status with etiology of hepatic encephalopathy. She was made NPO and received one lactulose enema. She has shown clinical improvement and Speech Therapy cleared her for thin liquids and crushed meds.   She had significant clinical improvement in mental status over the past 24 hours. Pt remains slightly confused with word finding difficulty.           Review of Systems:  Review of Systems   Constitutional: Negative for chills and fever.   Gastrointestinal: Negative for abdominal pain.   Genitourinary: Positive for dysuria (Due to skin rash).   Skin: Positive for rash (Pt complains of inguinal buring when urine gets on it).   Neurological: Positive for weakness (Walks with walker at baseline).          OBJECTIVE:     Temp:  [96.7 °F (35.9 °C)-98 °F (36.7 °C)]   Pulse:  []   Resp:  [14-20]   BP: (129-200)/(60-90)   SpO2:  [95 %-99 %]  Body mass index is 37.88 kg/m².  Intake/Outake:  This Shift:  No intake/output data recorded.    Net I/O past 24h:     Intake/Output Summary (Last 24 hours) at 7/19/2022 1234  Last data filed at 7/19/2022 0300  Gross per 24 hour   Intake --   Output 1 ml    Net -1 ml             Physical Exam:  Physical Exam  Eyes:      General: Scleral icterus (Slight) present.      Pupils: Pupils are equal, round, and reactive to light.   Neck:      Vascular: No carotid bruit.   Cardiovascular:      Rate and Rhythm: Normal rate and regular rhythm.      Pulses:           Radial pulses are 2+ on the right side and 2+ on the left side.      Heart sounds: Murmur (systolic, presumed recent) heard.   Pulmonary:      Effort: No respiratory distress.      Breath sounds: No stridor. No wheezing or rhonchi.   Abdominal:      General: There is distension.      Palpations: There is no mass.      Tenderness: There is no abdominal tenderness. There is no guarding.   Musculoskeletal:         General: Swelling (Bilateral lower limb swelling) present. No tenderness.      Right lower leg: Edema present.      Left lower leg: Edema present.   Skin:     General: Skin is warm and dry.      Findings: Rash (Inguinal erythema with skin breakdown) present.   Neurological:      Mental Status: She is alert.      Comments: Oriented to person, place and month             Laboratory:  CBC/Anemia Labs: Coags:    Recent Labs   Lab 07/17/22 2240 07/17/22 2245 07/18/22  0351   WBC  --  8.60 8.34   HGB  --  11.8* 12.0   HCT 35* 35.3* 35.1*   PLT  --  92* 76*   MCV  --  100* 101*   RDW  --  14.9* 14.9*    Recent Labs   Lab 07/17/22  2328   INR 1.8*        Chemistries:   Recent Labs   Lab 07/17/22 2245 07/18/22  0351    140   K 4.3 4.1    112*   CO2 16* 18*   BUN 21 20   CREATININE 0.8 0.7   CALCIUM 9.0 9.1   PROT 7.3 7.1   BILITOT 4.4* 4.9*   ALKPHOS 101 98   ALT 28 28   AST 55* 55*   MG 2.1 1.9   PHOS  --  3.3        Medications:  Scheduled Meds:   amLODIPine  5 mg Oral Daily    enoxaparin  40 mg Subcutaneous Daily    lactulose  30 g Oral TID    rifAXImin  550 mg Oral BID    spironolactone  50 mg Oral Daily                             Continuous Infusions:  PRN Meds:.dextrose 10%, dextrose 10%,  glucagon (human recombinant), glucose, glucose, naloxone, sodium chloride 0.9%     ASSESSMENT/PLAN:   Yana Orellana is a 77 y.o. year old presenting with   Chief Complaint   Patient presents with    Altered Mental Status     Pt arrives from [facility] for AMS. LKN around 1900. Nurses state pt baseline is AAOx4. Pt not following commands or answering questions appropriately at this time. -VAN                   DVT PPX   VTE Risk Mitigation (From admission, onward)         Ordered     enoxaparin injection 40 mg  Daily         07/18/22 0333     IP VTE HIGH RISK PATIENT  Once         07/18/22 0333     Place sequential compression device  Until discontinued         07/18/22 0333              Assessment: Yana Orellana is a 77 year old F here for management of altered mental status thought to be due to hepatic encephalopathy. She is clinically improving.     **Hepatic encephalopathy   CTH negative for acute abnormality. CMP showing no concomitant metabolic causes for encephalopathy     - F/u blood cultures to r/o infectious causes  - Lactulose 15 mg TID, titrate 3-4 BM/day - PO, rectal enema if mental status decreases  - Rifaximin 550mg BID if encephalopathy does not resolve with lactulose  - Fall precautions; aspiration precautions; delirium precautions    **Cardiac, murmur   - Awaiting echocardiogram    **Skin infection, presumed fungal  - Wound care consulted  - Itraconazole cream     DISPOSITION  JAE: 7/21/22  Code Status: Full Code   Is the patient medically ready for discharge?: No - altered mental status, awaiting echocardiogram, treatment for hyperammonemia.   Discharge Plan A: Skilled Nursing Facility      Estela Norman, MS3  [facility]

## 2022-07-19 NOTE — PLAN OF CARE
Problem: Infection  Goal: Absence of Infection Signs and Symptoms  Outcome: Ongoing, Progressing     Problem: Adult Inpatient Plan of Care  Goal: Plan of Care Review  Outcome: Ongoing, Progressing     Problem: Skin Injury Risk Increased  Goal: Skin Health and Integrity  Outcome: Ongoing, Progressing     Problem: Fall Injury Risk  Goal: Absence of Fall and Fall-Related Injury  Outcome: Ongoing, Progressing

## 2022-07-19 NOTE — PLAN OF CARE
[facility] - Intensive Care ([place] [address])  Initial Discharge Assessment       Primary Care Provider: Caitlyn Graf MD    Admission Diagnosis: Hepatic encephalopathy [K72.90]  Lactic acidosis [E87.2]  Chest pain [R07.9]  AMS (altered mental status) [R41.82]    Admission Date: 7/17/2022  Expected Discharge Date:          Payor: MEDICARE / Plan: MEDICARE PART A & B / Product Type: Government /     Extended Emergency Contact Information  Primary Emergency Contact: Elly Brewer  Address: [address] [place] [address]  Mobile Phone: [phone]  Relation: Relative  Secondary Emergency Contact: Bernice Brewer  Address: [address] [place] [address] [facility]  Home Phone: [phone]  Mobile Phone: [phone]  Relation: Relative    Discharge Plan A: (P) Home Health  Discharge Plan B: (P) Skilled Nursing Facility      [id number] [address] [facility]  [address]  [facility] [address]  Phone: [phone] Fax: [phone]      Initial Assessment (most recent)       Adult Discharge Assessment - 07/19/22 1255          Discharge Assessment    Assessment Type Discharge Planning Assessment (P)      Confirmed/corrected address, phone number and insurance Yes (P)      Confirmed Demographics Correct on Facesheet (P)      Source of Information patient (P)      Reason For Admission ams (P)      Facility Arrived From: [facility] rehab (P)      Do you expect to return to your current living situation? -- (P)    pt states she wants to go home    Do you have help at home or someone to help you manage your care at home? -- (P)    pt states her friend Inessa takes her to appointments and shopping but otherwise she lives alone    Prior to hospitilization cognitive status: -- (P)    alert oriented but confused as to why she is in hospital    Walking or Climbing Stairs Difficulty other (see comments)  (P)    pt came from snf rehab but states she normally lives alone and has 4 steps into house unable to assess pt level of function at this time    Do you have any problems with: Errands/Grocery;Needs other help (P)      Specify other help Inessa her friend helps (P)      Home Accessibility stairs to enter home (P)      Number of Stairs, Main Entrance four (P)      Equipment Currently Used at Home walker, rolling (P)      Patient currently being followed by outpatient case management? No (P)      Do you currently have service(s) that help you manage your care at home? No (P)      Do you take prescription medications? Yes (P)      Do you have prescription coverage? Yes (P)      Who is going to help you get home at discharge? Inessa [phone] (P)      How do you get to doctors appointments? family or friend will provide (P)      Are you on dialysis? No (P)      Do you take coumadin? No (P)      Discharge Plan A Home Health (P)      Discharge Plan B Skilled Nursing Facility (P)      DME Needed Upon Discharge  other (see comments) (P)    unknown    Discharge Plan discussed with: Patient (P)                       Pt is alert and oriented but confused as to why she is in the hospital she states she does not want to go back to [facility] because she does not like it she wants to go home at discharge she states she has a friend Inessa [phone] that drives her places, she has grab bars  and a raised toilet in her bathroom, also has a RW, attempted to call Inessa to discuss with pt permission pt is agreeable to HH states her cousin Elly Brewer is her healthcare poa call placed to [facility] pt was in their rehab not long term care

## 2022-07-19 NOTE — AI DETERIORATION ALERT
RAPID RESPONSE NURSE AI ALERT       AI alert received.    Chart Reviewed: 07/19/2022, 12:46 AM    MRN: [id number]  Bed: OBS11/EDOU11    Dx: Hepatic encephalopathy    Yana Orellana has a past medical history of Anemia, Anxiety, Cataract, Colon polyps, Fibromyalgia, Lung cancer, Pneumonia due to infectious organism, Post-polio syndrome, Rheumatoid arthritis(714.0), Thyroid disease, and Vertigo.    Last VS: BP (!) 144/90 (BP Location: Left arm, Patient Position: Lying)   Pulse 88   Temp 96.7 °F (35.9 °C) (Axillary)   Resp 16   Wt 119.7 kg (264 lb)   LMP  (LMP Unknown)   SpO2 97%   BMI 37.88 kg/m²     24H Vital Sign Range:  Temp:  [96.7 °F (35.9 °C)-98 °F (36.7 °C)]   Pulse:  []   Resp:  [16-20]   BP: (129-200)/(67-90)   SpO2:  [95 %-99 %]     Level of Consciousness (AVPU): responds to pain    Recent Labs     07/17/22 2240 07/17/22 2245 07/18/22  0351   WBC  --  8.60 8.34   HGB  --  11.8* 12.0   HCT 35* 35.3* 35.1*   PLT  --  92* 76*       Recent Labs     07/17/22 2245 07/18/22  0351    140   K 4.3 4.1    112*   CO2 16* 18*   CREATININE 0.8 0.7   * 148*   PHOS  --  3.3   MG 2.1 1.9        Recent Labs     07/17/22  2252   PH 7.454*   PCO2 27.4*   PO2 51   HCO3 19.2*   POCSATURATED 88*   BE -5        OXYGEN:        O2 Device (Oxygen Therapy): room air    MEWS score: 4    Bedside RNMary Jo contacted. No concerns verbalized at this time. Instructed to call [phone] for further concerns or assistance. Erroneous RR, real value 16    Nicole Diaz RN

## 2022-07-19 NOTE — CARE UPDATE
RAPID RESPONSE NURSE ROUND       Rounding completed with charge RN, Lola. No concerns verbalized at this time. Instructed to call [phone] for further concerns or assistance.

## 2022-07-20 NOTE — PLAN OF CARE
Problem: Infection  Goal: Absence of Infection Signs and Symptoms  Outcome: Ongoing, Progressing     Problem: Adult Inpatient Plan of Care  Goal: Plan of Care Review  Outcome: Ongoing, Progressing  Goal: Patient-Specific Goal (Individualized)  Outcome: Ongoing, Progressing  Goal: Absence of Hospital-Acquired Illness or Injury  Outcome: Ongoing, Progressing  Goal: Optimal Comfort and Wellbeing  Outcome: Ongoing, Progressing  Goal: Readiness for Transition of Care  Outcome: Ongoing, Progressing     Problem: Skin Injury Risk Increased  Goal: Skin Health and Integrity  Outcome: Ongoing, Progressing     Problem: Fall Injury Risk  Goal: Absence of Fall and Fall-Related Injury  Outcome: Ongoing, Progressing     Problem: Impaired Wound Healing  Goal: Optimal Wound Healing  Outcome: Ongoing, Progressing

## 2022-07-20 NOTE — SUBJECTIVE & OBJECTIVE
Interval History: Today Ms Rogerss mental status is greatly improved. She is fully oriented now and converses properly. She complains of a chronic leg pain she attributes to polio. She would like PT to help her start walking again. She reports using a walker at home.    Review of Systems   Unable to perform ROS: Mental status change   Constitutional:  Negative for chills and fever.   Respiratory:  Negative for shortness of breath.    Cardiovascular:  Negative for chest pain.   Gastrointestinal:  Negative for nausea and vomiting.   Genitourinary:  Negative for dysuria and hematuria.   Musculoskeletal:  Negative for back pain.        Complains of chronic leg pain   Psychiatric/Behavioral:  Negative for confusion.    Objective:     Vital Signs (Most Recent):  Temp: 97.7 °F (36.5 °C) (07/20/22 0737)  Pulse: 86 (07/20/22 0737)  Resp: 17 (07/20/22 0737)  BP: (!) 123/58 (07/20/22 0737)  SpO2: 100 % (07/20/22 0737)   Vital Signs (24h Range):  Temp:  [97.7 °F (36.5 °C)-98.3 °F (36.8 °C)] 97.7 °F (36.5 °C)  Pulse:  [86-96] 86  Resp:  [16-17] 17  SpO2:  [95 %-100 %] 100 %  BP: (123-137)/(58-68) 123/58     Weight: 119.7 kg (263 lb 14.3 oz)  Body mass index is 37.86 kg/m².    Intake/Output Summary (Last 24 hours) at 7/20/2022 1042  Last data filed at 7/20/2022 0800  Gross per 24 hour   Intake 250 ml   Output 100 ml   Net 150 ml      Physical Exam  Vitals and nursing note reviewed.   Constitutional:       Appearance: Normal appearance. She is obese.   HENT:      Head: Normocephalic and atraumatic.      Mouth/Throat:      Mouth: Mucous membranes are moist.   Cardiovascular:      Rate and Rhythm: Normal rate and regular rhythm.      Heart sounds: Murmur heard.   Pulmonary:      Effort: Pulmonary effort is normal.      Breath sounds: Normal breath sounds.   Abdominal:      General: Abdomen is flat.      Palpations: Abdomen is soft.   Musculoskeletal:         General: No swelling or tenderness.   Skin:     Capillary Refill:  Capillary refill takes 2 to 3 seconds.   Neurological:      Mental Status: She is alert and oriented to person, place, and time. Mental status is at baseline.   Psychiatric:         Behavior: Behavior normal.         Thought Content: Thought content normal.       Significant Labs: All pertinent labs within the past 24 hours have been reviewed.  CBC:   Recent Labs   Lab 07/19/22 1659 07/20/22  0335   WBC 8.16 6.22   HGB 11.7* 11.3*   HCT 34.5* 34.1*   PLT 80* 76*     CMP:   Recent Labs   Lab 07/19/22 1659 07/20/22  0335    140   K 3.8 3.7    111*   CO2 19* 20*   * 157*   BUN 23 22   CREATININE 0.8 0.7   CALCIUM 9.0 8.6*   PROT 6.9 6.4   ALBUMIN 2.4* 2.2*   BILITOT 4.5* 3.9*   ALKPHOS 101 94   AST 58* 63*   ALT 31 31   ANIONGAP 10 9   EGFRNONAA >60.0 >60.0       Significant Imaging: I have reviewed all pertinent imaging results/findings within the past 24 hours.

## 2022-07-20 NOTE — ASSESSMENT & PLAN NOTE
Systolic murmur heard on examination. No prior documentation of murmur.     - Echo demonstrating aortic valve sclerosis

## 2022-07-20 NOTE — PLAN OF CARE
Problem: Infection  Goal: Absence of Infection Signs and Symptoms  Outcome: Ongoing, Progressing     Problem: Adult Inpatient Plan of Care  Goal: Plan of Care Review  Outcome: Ongoing, Progressing  Goal: Patient-Specific Goal (Individualized)  Outcome: Ongoing, Progressing  Goal: Absence of Hospital-Acquired Illness or Injury  Outcome: Ongoing, Progressing  Goal: Optimal Comfort and Wellbeing  Outcome: Ongoing, Progressing  Goal: Readiness for Transition of Care  Outcome: Ongoing, Progressing     Problem: Skin Injury Risk Increased  Goal: Skin Health and Integrity  Outcome: Ongoing, Progressing     Problem: Fall Injury Risk  Goal: Absence of Fall and Fall-Related Injury  Outcome: Ongoing, Progressing   Pt A&Ox2. VSS. Afebrile. Meds given per MAR. Pt requesting lidocaine patch for leg pain. Med team made aware. Multiple bowel movements noted. Bed locked and lowered. Call light within reach.

## 2022-07-20 NOTE — PROGRESS NOTES
[facility] ([address] Medicine  Progress Note    Patient Name: Yana Orellana  MRN: [id number]  Patient Class: IP- Inpatient   Admission Date: 7/17/2022  Length of Stay: 2 days  Attending Physician: Efrain Naqvi MD  Primary Care Provider: Caitlyn Graf MD        Subjective:     Principal Problem:Hepatic encephalopathy        HPI:  This is a 77 year old lady with HTN, Non- alcoholic cirrhosis, obesity, anxiety, macrocytic anemia, hx of lung CA in 2009 s/p surgery and chemotherapy with subsequent chemotherapy induced neuropathy, post-polio syndrome, lower extremity weakness (being followed by Neurology), rheumatoid arthritis, fibromyalgia who presented from her SNF for AMS. Patient history primarily obtained from nurse at [facility]. Patient was her normal self yesterday at 7pm but when her nurse went to recheck on her at 9pm, patient was confused, not following commands, and non-verbal. Nurse states that she did not have any recent fall or have anr focal neurological deficit. The patient did not appear to be diaphoretic, fevers, or weakness. Patient is able to ambulate on wheelchair.     In the ED, patient was tachycardic and hypertensive to 180s systolic. Labs showed Tbili of 4.4, AST/ALT 55/28, and ammonia of 122. Lactic of 4.1. troponin and UA was negative. CT head and CXR showed no acute abnormalities.       Overview/Hospital Course:  Pt had AMS on arrival. SLP noted patient unable to swallow safely and she was made NPO. She was started on lactulose & rifaximin. Pt received multiple lactulose enemas with small improvement in mental status over time. She was started on oral lactulose after being cleared by SLP. Mental status improved with lactulose.       Interval History: Today Ms Orellana's mental status is greatly improved. She is fully oriented now and converses properly. She complains of a chronic leg pain she attributes to polio. She would like PT to help her start  walking again. She reports using a walker at home.    Review of Systems   Unable to perform ROS: Mental status change   Constitutional:  Negative for chills and fever.   Respiratory:  Negative for shortness of breath.    Cardiovascular:  Negative for chest pain.   Gastrointestinal:  Negative for nausea and vomiting.   Genitourinary:  Negative for dysuria and hematuria.   Musculoskeletal:  Negative for back pain.        Complains of chronic leg pain   Psychiatric/Behavioral:  Negative for confusion.    Objective:     Vital Signs (Most Recent):  Temp: 97.7 °F (36.5 °C) (07/20/22 0737)  Pulse: 86 (07/20/22 0737)  Resp: 17 (07/20/22 0737)  BP: (!) 123/58 (07/20/22 0737)  SpO2: 100 % (07/20/22 0737)   Vital Signs (24h Range):  Temp:  [97.7 °F (36.5 °C)-98.3 °F (36.8 °C)] 97.7 °F (36.5 °C)  Pulse:  [86-96] 86  Resp:  [16-17] 17  SpO2:  [95 %-100 %] 100 %  BP: (123-137)/(58-68) 123/58     Weight: 119.7 kg (263 lb 14.3 oz)  Body mass index is 37.86 kg/m².    Intake/Output Summary (Last 24 hours) at 7/20/2022 1042  Last data filed at 7/20/2022 0800  Gross per 24 hour   Intake 250 ml   Output 100 ml   Net 150 ml      Physical Exam  Vitals and nursing note reviewed.   Constitutional:       Appearance: Normal appearance. She is obese.   HENT:      Head: Normocephalic and atraumatic.      Mouth/Throat:      Mouth: Mucous membranes are moist.   Cardiovascular:      Rate and Rhythm: Normal rate and regular rhythm.      Heart sounds: Murmur heard.   Pulmonary:      Effort: Pulmonary effort is normal.      Breath sounds: Normal breath sounds.   Abdominal:      General: Abdomen is flat.      Palpations: Abdomen is soft.   Musculoskeletal:         General: No swelling or tenderness.   Skin:     Capillary Refill: Capillary refill takes 2 to 3 seconds.   Neurological:      Mental Status: She is alert and oriented to person, place, and time. Mental status is at baseline.   Psychiatric:         Behavior: Behavior normal.         Thought  Content: Thought content normal.       Significant Labs: All pertinent labs within the past 24 hours have been reviewed.  CBC:   Recent Labs   Lab 07/19/22  1659 07/20/22  0335   WBC 8.16 6.22   HGB 11.7* 11.3*   HCT 34.5* 34.1*   PLT 80* 76*     CMP:   Recent Labs   Lab 07/19/22  1659 07/20/22  0335    140   K 3.8 3.7    111*   CO2 19* 20*   * 157*   BUN 23 22   CREATININE 0.8 0.7   CALCIUM 9.0 8.6*   PROT 6.9 6.4   ALBUMIN 2.4* 2.2*   BILITOT 4.5* 3.9*   ALKPHOS 101 94   AST 58* 63*   ALT 31 31   ANIONGAP 10 9   EGFRNONAA >60.0 >60.0       Significant Imaging: I have reviewed all pertinent imaging results/findings within the past 24 hours.      Assessment/Plan:      * Hepatic encephalopathy  Cirrhosis, non-alcoholic  Hyperbilirubinemia  Metabolic acidosis  Lactic acidosis    77 y.o M with MATAMOROS cirrhosis presenting with AMS most likely secondary to hepatic encephalopathy. Labs on presentation significant for hyperammonemia (ammonia 122) and elevated lactic (4.1, although suspect this to be from decreased hepatic clearance at this time as patient with adequate oxygenation and no leukocytosis). CTH negative for acute abnormality. CMP showing no concomitant metabolic causes for encephalopathy     - F/u blood cultures to r/o infectious causes  - Lactulose 15 mg TID, titrate 3-4 BM/day  - will consider Rifaximin 550mg BID if encephalopathy does not resolve with lactulose  - SLP permitting thin liquids and very soft solids now as mental status has improved  - Fall precautions; aspiration precautions; delirium precautions  - Can consider Zinc 220mg PO daily  - f/u cultures  -MRI & CT head negative  -AMS resolved 7/20 Will seek PT/OT evaluation before discharge    Murmur, cardiac  Systolic murmur heard on examination. No prior documentation of murmur.     - Echo demonstrating aortic valve sclerosis    VTE Risk Mitigation (From admission, onward)         Ordered     enoxaparin injection 40 mg  Daily          07/18/22 0333     IP VTE HIGH RISK PATIENT  Once         07/18/22 0333     Place sequential compression device  Until discontinued         07/18/22 0333                Discharge Planning   JAE:      Code Status: Full Code   Is the patient medically ready for discharge?:     Reason for patient still in hospital (select all that apply): Treatment and PT / OT recommendations  Discharge Plan A: Home Health                  Rashard Elena MD  Department of Hospital Medicine   [facility] - Intensive Care ([address]

## 2022-07-20 NOTE — ASSESSMENT & PLAN NOTE
Cirrhosis, non-alcoholic  Hyperbilirubinemia  Metabolic acidosis  Lactic acidosis    77 y.o M with MATAMOROS cirrhosis presenting with AMS most likely secondary to hepatic encephalopathy. Labs on presentation significant for hyperammonemia (ammonia 122) and elevated lactic (4.1, although suspect this to be from decreased hepatic clearance at this time as patient with adequate oxygenation and no leukocytosis). CTH negative for acute abnormality. CMP showing no concomitant metabolic causes for encephalopathy     - F/u blood cultures to r/o infectious causes  - Lactulose 15 mg TID, titrate 3-4 BM/day  - will consider Rifaximin 550mg BID if encephalopathy does not resolve with lactulose  - SLP permitting thin liquids and very soft solids now as mental status has improved  - Fall precautions; aspiration precautions; delirium precautions  - Can consider Zinc 220mg PO daily  - f/u cultures  -MRI & CT head negative  -AMS resolved 7/20 Will seek PT/OT evaluation before discharge  -Lasix challenge for maintenance 20mg

## 2022-07-20 NOTE — CARE UPDATE
RAPID RESPONSE NURSE PROACTIVE ROUNDING NOTE       Time of Visit:     Admit Date: 2022  LOS: 1  Code Status: Full Code   Date of Visit: 2022  : 1945  Age: 77 y.o.  Sex: female  Race: White  Bed: 33021/95625 A:   MRN: [id number]  Was the patient discharged from an ICU this admission? No   Was the patient discharged from a PACU within last 24 hours? No   Did the patient receive conscious sedation/general anesthesia in last 24 hours? No  Was the patient in the ED within the past 24 hours? Yes  Was the patient on NIPPV within the past 24 hours? No   Attending Physician: Efrain Naqvi MD  Primary Service: [facility]OS[facility] MED 3   Time spent at the bedside: < 15 min    SITUATION    Notified by [employer] patient alert.  Reason for alert: mental status  Called to evaluate the patient for Neuro    BACKGROUND     Why is the patient in the hospital?: Hepatic encephalopathy    Patient has a past medical history of Anemia, Anxiety, Cataract, Colon polyps, Fibromyalgia, Lung cancer, Pneumonia due to infectious organism, Post-polio syndrome, Rheumatoid arthritis(714.0), Thyroid disease, and Vertigo.    Last Vitals:  Temp: 98 °F (36.7 °C) ( 1545)  Pulse: 94 ( 1545)  Resp: 16 ( 1545)  BP: 135/61 ( 1545)  SpO2: 96 % ( 1828)    24 Hours Vitals Range:  Temp:  [96.7 °F (35.9 °C)-98 °F (36.7 °C)]   Pulse:  []   Resp:  [14-20]   BP: (129-174)/(60-90)   SpO2:  [95 %-98 %]     Labs:  Recent Labs     22  0351 22  1659   WBC 8.60 8.34 8.16   HGB 11.8* 12.0 11.7*   HCT 35.3* 35.1* 34.5*   PLT 92* 76* 80*       Recent Labs     22  2245 22  0351 22  1659    140 139   K 4.3 4.1 3.8    112* 110   CO2 16* 18* 19*   CREATININE 0.8 0.7 0.8   * 148* 184*   PHOS  --  3.3 2.7   MG 2.1 1.9 1.8        Recent Labs     22  2252   PH 7.454*   PCO2 27.4*   PO2 51   HCO3 19.2*   POCSATURATED 88*   BE -5        ASSESSMENT    Patient alert but delayed  with response and will only say name. VSS. No concern for ICU need at this time.     INTERVENTIONS    None    RECOMMENDATIONS    Follow current POC     PROVIDER ESCALATION    Yes/No  no    Orders received and case discussed with NA.    Disposition: Remain in room 11751.    FOLLOW-UP    bedside RNИрина updated on plan of care. Instructed to call the Rapid Response Nurse, Clarice Klein RN at [phone] for additional questions or concerns.

## 2022-07-20 NOTE — PROGRESS NOTES
07/20/22 1450   WOCN Assessment   WOCN Total Time (mins) 30   Visit Date 07/20/22   Visit Time 1450   Consult Type New   WOCN Speciality Wound   Wound yeast   Intervention assessed;changed;applied;chart review;coordination of care;team conference   Positioning   Body Position supine   Head of Bed (HOB) Positioning HOB at 20-30 degrees   Positioning/Transfer Devices pillows;in use        Altered Skin Integrity 07/20/22 1427 [place]   Date First Assessed/Time First Assessed: 07/20/22 1427   Location: [facility]   Red (%), Wound Tissue Color 100 %   Patient consult for wound care to groin. This site was cleanse with mild soap and water and pat dry then apply antifungal powder daily and as needed.

## 2022-07-21 NOTE — PLAN OF CARE
Pt will need to return to SNF at discharge she would not be able to go home alone with just a friend to assist with errands, referrals sent    increased physiological demand in setting of wound healing lack of prior diet education

## 2022-07-21 NOTE — PLAN OF CARE
Problem: Physical Therapy  Goal: Physical Therapy Goal  Description: Goals to be met by: 22     Patient will increase functional independence with mobility by performin. Supine to sit with Modified [place]  2. Sit to supine with Modified [place]  3. Sit to stand transfer with Minimal Assistance  4. Gait  x 25 feet with Minimal Assistance using LRAD, if needed.   5. Sitting at edge of bed x10 minutes with [place]  6. Stand for 5 minutes with Minimal Assistance using LRAD, if needed  7. Lower extremity exercise program x10 reps per handout, with independence    Outcome: Ongoing, Progressing     Initial evaluation completed. Patient tolerated assessment well. Established POC and goals. Patient would continue to benefit from skilled PT services in order to improve functional mobility independence.     Tanisha Schultz, PT, DPT  2022

## 2022-07-21 NOTE — SUBJECTIVE & OBJECTIVE
Interval History: Ms Orellana reports she is doing well overall today. She denied any new symptoms. She continues to complain of joint pain and requests more lidocaine. She is interested in Pt for improving functional status.     Review of Systems   Constitutional:  Negative for chills and fever.   Respiratory:  Negative for shortness of breath.    Cardiovascular:  Negative for chest pain.   Gastrointestinal:  Positive for diarrhea. Negative for nausea and vomiting.   Genitourinary:  Negative for dysuria and hematuria.   Musculoskeletal:  Positive for arthralgias. Negative for back pain.        Complains of chronic leg pain   Psychiatric/Behavioral:  Negative for confusion.    Objective:     Vital Signs (Most Recent):  Temp: 98.5 °F (36.9 °C) (07/21/22 1143)  Pulse: 83 (07/21/22 1143)  Resp: 18 (07/21/22 0845)  BP: (!) 141/64 (07/21/22 1143)  SpO2: 95 % (07/21/22 1143)   Vital Signs (24h Range):  Temp:  [97.8 °F (36.6 °C)-98.8 °F (37.1 °C)] 98.5 °F (36.9 °C)  Pulse:  [75-92] 83  Resp:  [18-19] 18  SpO2:  [91 %-98 %] 95 %  BP: (125-151)/(60-65) 141/64     Weight: 119.7 kg (263 lb 14.3 oz)  Body mass index is 37.86 kg/m².    Intake/Output Summary (Last 24 hours) at 7/21/2022 1500  Last data filed at 7/20/2022 1800  Gross per 24 hour   Intake 240 ml   Output --   Net 240 ml      Physical Exam  Vitals and nursing note reviewed.   Constitutional:       Appearance: Normal appearance. She is obese.   HENT:      Head: Normocephalic and atraumatic.      Mouth/Throat:      Mouth: Mucous membranes are moist.   Cardiovascular:      Rate and Rhythm: Normal rate and regular rhythm.      Heart sounds: Murmur heard.   Pulmonary:      Effort: Pulmonary effort is normal.      Breath sounds: Normal breath sounds.   Abdominal:      General: Abdomen is flat.      Palpations: Abdomen is soft.      Tenderness: There is no abdominal tenderness.   Musculoskeletal:         General: No swelling or tenderness.   Skin:     Capillary Refill:  Capillary refill takes 2 to 3 seconds.   Neurological:      Mental Status: She is alert and oriented to person, place, and time. Mental status is at baseline.   Psychiatric:         Behavior: Behavior normal.         Thought Content: Thought content normal.       Significant Labs: All pertinent labs within the past 24 hours have been reviewed.  CBC:   Recent Labs   Lab 07/19/22 1659 07/20/22  0335 07/21/22  0519   WBC 8.16 6.22 5.39   HGB 11.7* 11.3* 10.4*   HCT 34.5* 34.1* 31.1*   PLT 80* 76* 70*     CMP:   Recent Labs   Lab 07/19/22 1659 07/20/22  0335 07/21/22  0519    140 139   K 3.8 3.7 3.8    111* 111*   CO2 19* 20* 21*   * 157* 124*   BUN 23 22 18   CREATININE 0.8 0.7 0.6   CALCIUM 9.0 8.6* 7.8*   PROT 6.9 6.4 5.8*   ALBUMIN 2.4* 2.2* 2.0*   BILITOT 4.5* 3.9* 3.5*   ALKPHOS 101 94 91   AST 58* 63* 53*   ALT 31 31 28   ANIONGAP 10 9 7*   EGFRNONAA >60.0 >60.0 >60.0       Significant Imaging: I have reviewed all pertinent imaging results/findings within the past 24 hours.

## 2022-07-21 NOTE — PT/OT/SLP EVAL
Physical Therapy Evaluation      Patient Name:  Yana Orellana   MRN:  [id number]    Recommendations:     Discharge Recommendations:  nursing facility, skilled   Discharge Equipment Recommendations:  (TBD pending progress)   Barriers to discharge: Decreased caregiver support    Assessment:     Yana Orellana is a 77 y.o. female admitted with a medical diagnosis of Hepatic encephalopathy.  She presents with the following impairments/functional limitations:  weakness, impaired endurance, impaired self care skills, impaired functional mobility, gait instability, impaired balance, decreased coordination, decreased upper extremity function, decreased lower extremity function, abnormal tone, decreased ROM, impaired fine motor . Patient tolerated session fairly well. Patient reports being admitted from SNF, but would like to go home. Limited support at home.  Patient will benefit from PT services to address the mentioned deficits in order to promote an improve functional mobility status. Patient is currently functioning below PLOF Patient would require increased assistance should they discharge home . Upon d/c, recommendation SNF  for Yana Orellana in order to progress towards an improved level of functional mobility independence.     Rehab Prognosis: Good; patient would benefit from acute skilled PT services to address these deficits and reach maximum level of function.    Recent Surgery: * No surgery found *      Plan:     During this hospitalization, patient to be seen 3 x/week to address the identified rehab impairments via gait training, therapeutic activities, therapeutic exercises, neuromuscular re-education and progress toward the following goals:    · Plan of Care Expires:  08/20/22    History:     Past Medical History:   Diagnosis Date    Anemia     Anxiety     Cataract     Colon polyps 2012    Fibromyalgia 10/15/2012    Lung cancer     Pneumonia due to infectious organism 7/19/2019     "Post-polio syndrome     Rheumatoid arthritis(714.0) 7/16/2012    2009 onset with mod positive CCP and RF MTX 2011 - present     Thyroid disease     Vertigo     postural vertigo       Past Surgical History:   Procedure Laterality Date    BLADDER SURGERY  1959    CHOLECYSTECTOMY      HYSTERECTOMY  1988    LUNG REMOVAL, PARTIAL  2009    left lower lobectomy    OVARIAN CYST REMOVAL  1970    TONSILLECTOMY  1952       Subjective     Chief Complaint: weakness   Patient/Family Comments/goals: "My legs just don't work."  Pain/Comfort:  · Pain Rating 1: 0/10  · Pain Rating Post-Intervention 1: 0/10    Patients cultural, spiritual, Mosque conflicts given the current situation: no    Living Environment:  Patient's living environment is as follows: *Patient admitted from [facility]. Reports was ambulating with RW and needed assist with ADLs. Was there for 20 days thus far.*  Home type home    1 or 2 stories  single-story    Number of IDA/ rails 4 IDA - ramp access   AD used?/Owned?  RW, wheelchair, grab bars   Bathroom set-up  walk-in shower - reports large ledge to step in    Working? No   Driving? No   Individuals living with patient:  Alone    Hobbies/Roles: None stated    Prior to admission, patients level of function was prior to admit to SNF - (I) for ADLs and mod(I) for mobility with RW. Patient reports 0 falls. Equipment used at home: walker, rolling, wheelchair, grab bar.  DME owned (not currently used): none.  Upon discharge, patient will have assistance from friend, Inessa, reports not physically.  Objective:   Communicated with RN prior to session.  Patient found HOB elevated with telemetry, peripheral IV, bed alarm  upon PT entry to room. See below for detailed functional assessment. Patient agreeable to participate in initial evaluation.    General Precautions: Standard, fall, aspiration   Orthopedic Precautions:N/A   Braces: N/A     Vitals:    07/21/22 1143   BP: (!) 141/64   Pulse: 83   Resp: "    Temp: 98.5 °F (36.9 °C)       Exams:  · Cognitive Exam:  Patient is oriented to Person, Place, Time, Situation  · Patient follows 100% of one -step commands   · Fine Motor Coordination:  Test:   Comments    Rapid ankle DF/PF  Intact     · Postural Exam:  Patient presented with the following abnormalities: -       Rounded shoulders  · -       Forward head  · Sensation:    LEFT LE: Intact light touch to BLEs RIGHT LE: Intact light touch to BLEs      ROM and Strength   Right Lower Extremity  Left Lower Extremity    Hip Flexion (Iliopsoas)  WFL 3+/5   Knee Extension (Quadriceps) WFL WFL   Knee Flexion (Hamstrings) WFL WFL   Ankle DF (Ant. Tib) WFL 4/5   Ankle PF (Gastrocnemius)  WFL 5/5      Functional Mobility:  · Bed Mobility:    · Rolling Left:  contact guard assistance  · Scooting: maximal assistance of 2 persons via drawsheet towards HOB   · Able to complete x3 lateral scoots to the L while EOB with CGA - no hip clearance   · Supine to Sit: contact guard assistance for trunk management  · Sit to Supine: stand by assistance    Further mobility deferred this date    Balance:    Level of assist Total Time  Comments   Static Sitting  (I)     Dynamic Sitting  (I)       Therapeutic Activities and Education:  -Patient educated on the role and goal of PT services during acute care LOS. Question and concerns acknowledged and answered as appropriate.   -Will continue to educated as needed.      - Educated on the importance of OOB mobility within safe range in order to decrease adverse effects of prolonged bedrest.        - Patient encouraged to get OOBTC 3x daily with assistance  -White board updated in patients room to reflect level of assistance needed with nursing.       - Patient is not clear to transfer with RN/PCT for OOB mobility with RN.     AM-PAC 6 CLICK MOBILITY  Total Score:13     Patient left HOB elevated with all lines intact, call button in reach and RN notified.  White board updated in patient room to  reflect level of function with nursing.     GOALS:   Multidisciplinary Problems     Physical Therapy Goals        Problem: Physical Therapy    Goal Priority Disciplines Outcome Goal Variances Interventions   Physical Therapy Goal     PT, PT/OT Ongoing, Progressing     Description: Goals to be met by: 22     Patient will increase functional independence with mobility by performin. Supine to sit with Modified [place]  2. Sit to supine with Modified [place]  3. Sit to stand transfer with Minimal Assistance  4. Gait  x 25 feet with Minimal Assistance using LRAD, if needed.   5. Sitting at edge of bed x10 minutes with [place]  6. Stand for 5 minutes with Minimal Assistance using LRAD, if needed  7. Lower extremity exercise program x10 reps per handout, with independence                     Time Tracking:     PT Received On: 22  PT Start Time: 1317     PT Stop Time: 1347  PT Total Time (min): 30 min     Billable Minutes: Evaluation 15 and Therapeutic Activity 15      Tanisha Schultz, PT  2022

## 2022-07-21 NOTE — MEDICAL/APP STUDENT
Progress Note  Hospital Medicine    Primary Team: [facility]  Admit Date: 7/17/2022   Length of Stay:  LOS: 3 days   SUBJECTIVE:   Reason for Admission:  Hepatic encephalopathy    HPI: 77 year old F w/ PMH of non-alcoholic cirrhosis, post-polio syndrome, rheumatoid arthritis, HTN, hx of lung cancer (surgically removed, chemotherapy), fibromyalgia, macrocytic anemia, chemotherapy induced neuropathy, and obesity. She is on hospital day 2 for management of altered mental status due to presumed hepatic encephalopathy. Pt was awake and was able to talk to me when we entered the room. She was able to comprehend what I am saying, follows commands and responds appropriately.      Hospital Course: Pt presented with hyperammonemia (ammonia was 122 on ED admission) and her history of non-alcoholic cirrhosis led to a presumed altered mental status with etiology of hepatic encephalopathy. She was made NPO and received one lactulose enema. She has shown clinical improvement and Speech Therapy cleared her for thin liquids and crushed meds.   She had significant clinical improvement in mental status over the past 24 hours. Pt remains slightly confused with word finding difficulty.     Interval History: Pt reported doing well this morning. She reports being thirsty and having some neck pains which she states is chronic due to her osteoarthritis. She requests some Voltaren Gel, which she uses at home. She also complains that her legs hurt and cannot take Gabapentin because it gives her hallucinations.         Review of Systems:  Review of Systems   Constitutional: Negative for chills and fever.   Eyes: Negative for blurred vision, photophobia and discharge.   Respiratory: Negative for cough and shortness of breath.    Cardiovascular: Negative for chest pain and palpitations.   Gastrointestinal: Positive for diarrhea (6 episodes in past 24 hours). Negative for nausea and vomiting.   Genitourinary: Negative for dysuria and urgency.    Musculoskeletal: Positive for neck pain. Negative for falls.   Skin: Positive for rash (Skin breakdown in groin and under R breast).   Neurological: Positive for focal weakness (Reports R hand weakness). Negative for dizziness and headaches.          OBJECTIVE:     Temp:  [97.8 °F (36.6 °C)-98.8 °F (37.1 °C)]   Pulse:  [75-92]   Resp:  [18-19]   BP: (129-151)/(60-65)   SpO2:  [91 %-97 %]  Body mass index is 37.86 kg/m².  Intake/[place]ake:  This Shift:  No intake/output data recorded.    Net I/O past 24h:     Intake/Output Summary (Last 24 hours) at 7/21/2022 1548  Last data filed at 7/20/2022 1800  Gross per 24 hour   Intake 240 ml   Output --   Net 240 ml             Physical Exam:  Physical Exam  Constitutional:       Appearance: She is obese.   HENT:      Head: Normocephalic and atraumatic.      Nose: No congestion.      Mouth/Throat:      Mouth: Mucous membranes are dry.   Eyes:      General: Scleral icterus (Mild) present.      Extraocular Movements: Extraocular movements intact.      Pupils: Pupils are equal, round, and reactive to light.   Cardiovascular:      Rate and Rhythm: Normal rate and regular rhythm.      Heart sounds: Murmur (Systolic) heard.   Pulmonary:      Effort: Pulmonary effort is normal. No respiratory distress.      Breath sounds: Normal breath sounds.   Abdominal:      General: Bowel sounds are normal. There is no distension.      Tenderness: There is no abdominal tenderness.   Musculoskeletal:      Right lower leg: Edema present.      Left lower leg: Edema (1+ bilateral lower limbs) present.   Skin:     General: Skin is warm and dry.      Findings: Rash (Erythematous skin breakdown in R groin area) present.   Neurological:      General: No focal deficit present.      Mental Status: She is alert and oriented to person, place, and time.      Cranial Nerves: No cranial nerve deficit.      Sensory: No sensory deficit.      Motor: Weakness present.   Psychiatric:         Behavior: Behavior  normal.         Thought Content: Thought content normal.             Laboratory:  CBC/Anemia Labs: Coags:    Recent Labs   Lab 07/19/22  1659 07/20/22  0335 07/21/22  0519   WBC 8.16 6.22 5.39   HGB 11.7* 11.3* 10.4*   HCT 34.5* 34.1* 31.1*   PLT 80* 76* 70*   * 105* 103*   RDW 15.1* 15.2* 15.0*    Recent Labs   Lab 07/17/22  2328   INR 1.8*        Chemistries:   Recent Labs   Lab 07/19/22  1659 07/20/22  0335 07/21/22  0519    140 139   K 3.8 3.7 3.8    111* 111*   CO2 19* 20* 21*   BUN 23 22 18   CREATININE 0.8 0.7 0.6   CALCIUM 9.0 8.6* 7.8*   PROT 6.9 6.4 5.8*   BILITOT 4.5* 3.9* 3.5*   ALKPHOS 101 94 91   ALT 31 31 28   AST 58* 63* 53*   MG 1.8 1.7 1.5*   PHOS 2.7 2.8 3.1        Medications:  Scheduled Meds:   amLODIPine  5 mg Oral Daily    enoxaparin  40 mg Subcutaneous Daily    lactulose  30 g Oral BID    miconazole NITRATE 2 %   Topical (Top) BID    rifAXImin  550 mg Oral BID    spironolactone  50 mg Oral Daily                             Continuous Infusions:  PRN Meds:.acetaminophen, dextrose 10%, dextrose 10%, diclofenac sodium, glucagon (human recombinant), glucose, glucose, LIDOcaine, naloxone, sodium chloride 0.9%     ASSESSMENT/PLAN:   Yana Orellana is a 77 y.o. year old presenting with   Chief Complaint   Patient presents with    Altered Mental Status     Pt arrives from [facility] for AMS. LKN around 1900. Nurses state pt baseline is AAOx4. Pt not following commands or answering questions appropriately at this time. -VAN         DVT PPX   VTE Risk Mitigation (From admission, onward)         Ordered     enoxaparin injection 40 mg  Daily         07/18/22 0333     IP VTE HIGH RISK PATIENT  Once         07/18/22 0333     Place sequential compression device  Until discontinued         07/18/22 0333              Assessment: Yana Orellana is a 77 year old F admitted for management of altered mental status due to hepatic encephalopathy which is improving.      Plan:    **Altered mental status, hepatic encephalopathy  - Lactulose 20g/30mL soln BID, aim for 2-3 bowel movements per day  - Rifaximin 550mg BID  - Re-referal to hepatology outpatient    **Skin breakdown, superficial fungal infection  - Miconazole 2% topical powder applied to R groin and under breasts PRN    **Post polio syndrome/chemotherapy induced neuropathy  - Diclofenac sodium 1% gel 2 g TOPICAL every 8 hours  - PT/OT to follow and recommend placement for her discharge  - Pt would like to go home with carer but would appreciate recs from PT/OT  - Topical lidocaine patch 1 patch 5% Lidocaine QD  - Neuro follow up out patient    **Hypomagnesemia  - One time mag sulf bolus 2 g in 50ml water  - Follow CMP    **Hypertension  - Spironolactone 50mg QD  - Amlodipine 5mg QD  - Encourage regular PCP visits    **Obesity  - Encourage healthy eating habits   - PT/OT at SNF to help with movement    DISPOSITION  JAE:7/22/22  Code Status: Full Code   Is the patient medically ready for discharge?: Yes  Discharge Plan A: Skilled Nursing Facility for improvement in ADL and strength/coordination      Estela Norman, MS3  [facility]

## 2022-07-21 NOTE — PROGRESS NOTES
[facility] ([address] Medicine  Progress Note    Patient Name: Yana Orellana  MRN: [id number]  Patient Class: IP- Inpatient   Admission Date: 7/17/2022  Length of Stay: 3 days  Attending Physician: Chari Ngo*  Primary Care Provider: Caitlyn Graf MD        Subjective:     Principal Problem:Hepatic encephalopathy        HPI:  This is a 77 year old lady with HTN, Non- alcoholic cirrhosis, obesity, anxiety, macrocytic anemia, hx of lung CA in 2009 s/p surgery and chemotherapy with subsequent chemotherapy induced neuropathy, post-polio syndrome, lower extremity weakness (being followed by Neurology), rheumatoid arthritis, fibromyalgia who presented from her SNF for AMS. Patient history primarily obtained from nurse at [facility]. Patient was her normal self yesterday at 7pm but when her nurse went to recheck on her at 9pm, patient was confused, not following commands, and non-verbal. Nurse states that she did not have any recent fall or have anr focal neurological deficit. The patient did not appear to be diaphoretic, fevers, or weakness. Patient is able to ambulate on wheelchair.     In the ED, patient was tachycardic and hypertensive to 180s systolic. Labs showed Tbili of 4.4, AST/ALT 55/28, and ammonia of 122. Lactic of 4.1. troponin and UA was negative. CT head and CXR showed no acute abnormalities.       Overview/Hospital Course:  Pt had AMS on arrival. SLP noted patient unable to swallow safely and she was made NPO. She was started on lactulose & rifaximin. Pt received multiple lactulose enemas with small improvement in mental status over time. She was started on oral lactulose after being cleared by SLP. Mental status improved with lactulose. Decreased lactulose to twice a day due to diarrhea. Pt seen by PT who recommended therapy to resume walking.      Interval History: Ms Orellana reports she is doing well overall today. She denied any new symptoms. She continues  to complain of joint pain and requests more lidocaine. She is interested in Pt for improving functional status.     Review of Systems   Constitutional:  Negative for chills and fever.   Respiratory:  Negative for shortness of breath.    Cardiovascular:  Negative for chest pain.   Gastrointestinal:  Positive for diarrhea. Negative for nausea and vomiting.   Genitourinary:  Negative for dysuria and hematuria.   Musculoskeletal:  Positive for arthralgias. Negative for back pain.        Complains of chronic leg pain   Psychiatric/Behavioral:  Negative for confusion.    Objective:     Vital Signs (Most Recent):  Temp: 98.5 °F (36.9 °C) (07/21/22 1143)  Pulse: 83 (07/21/22 1143)  Resp: 18 (07/21/22 0845)  BP: (!) 141/64 (07/21/22 1143)  SpO2: 95 % (07/21/22 1143)   Vital Signs (24h Range):  Temp:  [97.8 °F (36.6 °C)-98.8 °F (37.1 °C)] 98.5 °F (36.9 °C)  Pulse:  [75-92] 83  Resp:  [18-19] 18  SpO2:  [91 %-98 %] 95 %  BP: (125-151)/(60-65) 141/64     Weight: 119.7 kg (263 lb 14.3 oz)  Body mass index is 37.86 kg/m².    Intake/Output Summary (Last 24 hours) at 7/21/2022 1500  Last data filed at 7/20/2022 1800  Gross per 24 hour   Intake 240 ml   Output --   Net 240 ml      Physical Exam  Vitals and nursing note reviewed.   Constitutional:       Appearance: Normal appearance. She is obese.   HENT:      Head: Normocephalic and atraumatic.      Mouth/Throat:      Mouth: Mucous membranes are moist.   Cardiovascular:      Rate and Rhythm: Normal rate and regular rhythm.      Heart sounds: Murmur heard.   Pulmonary:      Effort: Pulmonary effort is normal.      Breath sounds: Normal breath sounds.   Abdominal:      General: Abdomen is flat.      Palpations: Abdomen is soft.      Tenderness: There is no abdominal tenderness.   Musculoskeletal:         General: No swelling or tenderness.   Skin:     Capillary Refill: Capillary refill takes 2 to 3 seconds.   Neurological:      Mental Status: She is alert and oriented to person,  place, and time. Mental status is at baseline.   Psychiatric:         Behavior: Behavior normal.         Thought Content: Thought content normal.       Significant Labs: All pertinent labs within the past 24 hours have been reviewed.  CBC:   Recent Labs   Lab 07/19/22 1659 07/20/22  0335 07/21/22  0519   WBC 8.16 6.22 5.39   HGB 11.7* 11.3* 10.4*   HCT 34.5* 34.1* 31.1*   PLT 80* 76* 70*     CMP:   Recent Labs   Lab 07/19/22  1659 07/20/22  0335 07/21/22  0519    140 139   K 3.8 3.7 3.8    111* 111*   CO2 19* 20* 21*   * 157* 124*   BUN 23 22 18   CREATININE 0.8 0.7 0.6   CALCIUM 9.0 8.6* 7.8*   PROT 6.9 6.4 5.8*   ALBUMIN 2.4* 2.2* 2.0*   BILITOT 4.5* 3.9* 3.5*   ALKPHOS 101 94 91   AST 58* 63* 53*   ALT 31 31 28   ANIONGAP 10 9 7*   EGFRNONAA >60.0 >60.0 >60.0       Significant Imaging: I have reviewed all pertinent imaging results/findings within the past 24 hours.      Assessment/Plan:      * Hepatic encephalopathy  Cirrhosis, non-alcoholic  Hyperbilirubinemia  Metabolic acidosis  Lactic acidosis    77 y.o M with MATAMOROS cirrhosis presenting with AMS most likely secondary to hepatic encephalopathy. Labs on presentation significant for hyperammonemia (ammonia 122) and elevated lactic (4.1, although suspect this to be from decreased hepatic clearance at this time as patient with adequate oxygenation and no leukocytosis). CTH negative for acute abnormality. CMP showing no concomitant metabolic causes for encephalopathy.     - F/u blood cultures to r/o infectious causes  - Lactulose 15 mg TID, titrate 3-4 BM/day  - will consider Rifaximin 550mg BID if encephalopathy does not resolve with lactulose  - SLP permitting thin liquids and very soft solids now as mental status has improved  - Fall precautions; aspiration precautions; delirium precautions  - Can consider Zinc 220mg PO daily  - f/u cultures  -MRI & CT head negative  -AMS resolved 7/20  -Lasix maintenance 20mg  - Discharge location TBD, must  discuss with patient & consider needs for PT    Murmur, cardiac  Systolic murmur heard on examination. No prior documentation of murmur.     - Echo demonstrating aortic valve sclerosis    Rheumatoid arthritis involving multiple sites with positive rheumatoid factor  Pt requests lidocaine patches & voltaren gel for pain.   -Topical pain relief provided.      VTE Risk Mitigation (From admission, onward)         Ordered     enoxaparin injection 40 mg  Daily         07/18/22 0333     IP VTE HIGH RISK PATIENT  Once         07/18/22 0333     Place sequential compression device  Until discontinued         07/18/22 0333                Discharge Planning   JAE: 7/22/2022     Code Status: Full Code   Is the patient medically ready for discharge?: Yes    Reason for patient still in hospital (select all that apply): PT / OT recommendations and Pending disposition  Discharge Plan A: Home Health                  Rashard Elena MD  Department of Hospital Medicine   [facility] - Intensive Care ([place]-16)

## 2022-07-21 NOTE — PLAN OF CARE
Problem: Occupational Therapy  Goal: Occupational Therapy Goal  Description: Goals to be met by: 8/1/2022     Patient will increase functional independence with ADLs by performing:    UE Dressing with Stand-by Assistance.  LE Dressing with Supervision.  Grooming while standing with Stand-by Assistance.  Toileting from bedside commode with Stand-by Assistance for hygiene and clothing management.   Supine to sit with Stand-by Assistance.  Step transfer with Stand-by Assistance using RW    Outcome: Ongoing, Progressing     OT eval completed.

## 2022-07-21 NOTE — PLAN OF CARE
Problem: Infection  Goal: Absence of Infection Signs and Symptoms  Outcome: Ongoing, Not Progressing     Problem: Adult Inpatient Plan of Care  Goal: Plan of Care Review  Outcome: Ongoing, Not Progressing  Goal: Patient-Specific Goal (Individualized)  Outcome: Ongoing, Not ProgressingPatient Provider & Advocacy  Goal: Absence of Hospital-Acquired Illness or Injury  Outcome: Ongoing, Not Progressing  Goal: Optimal Comfort and Wellbeing  Outcome: Ongoing, Not Progressing  Goal: Readiness for Transition of Care  Outcome: Ongoing, Not Progressing     Problem: Skin Injury Risk Increased  Goal: Skin Health and Integrity  Outcome: Ongoing, Not Progressing     Problem: Fall Injury Risk  Goal: Absence of Fall and Fall-Related Injury  Outcome: Ongoing, Not Progressing     Problem: Impaired Wound Healing  Goal: Optimal Wound Healing  Outcome: Ongoing, Not Progressing

## 2022-07-21 NOTE — ASSESSMENT & PLAN NOTE
Cirrhosis, non-alcoholic  Hyperbilirubinemia  Metabolic acidosis  Lactic acidosis    77 y.o M with MATAMOROS cirrhosis presenting with AMS most likely secondary to hepatic encephalopathy. Labs on presentation significant for hyperammonemia (ammonia 122) and elevated lactic (4.1, although suspect this to be from decreased hepatic clearance at this time as patient with adequate oxygenation and no leukocytosis). CTH negative for acute abnormality. CMP showing no concomitant metabolic causes for encephalopathy.     - F/u blood cultures to r/o infectious causes  - Lactulose 15 mg TID, titrate 3-4 BM/day  - will consider Rifaximin 550mg BID if encephalopathy does not resolve with lactulose  - SLP permitting thin liquids and very soft solids now as mental status has improved  - Fall precautions; aspiration precautions; delirium precautions  - Can consider Zinc 220mg PO daily  - f/u cultures  -MRI & CT head negative  -AMS resolved 7/20  -Lasix maintenance 20mg  - Discharge location TBD, must discuss with patient & consider needs for PT

## 2022-07-21 NOTE — PT/OT/SLP EVAL
Occupational Therapy   Evaluation    Name: Yana Orellana  MRN: [id number]  Admitting Diagnosis:  Hepatic encephalopathy  Recent Surgery: * No surgery found *      Recommendations:     Discharge Recommendations: nursing facility, skilled  Discharge Equipment Recommendations:  shower chair (grab bars in shower)  Barriers to discharge:  Decreased caregiver support, Inaccessible home environment    Assessment:     Yana Orellana is a 77 y.o. female with a medical diagnosis of Hepatic encephalopathy.  She presents with decreased independence with ADL's. Performance deficits affecting function: weakness, impaired endurance, impaired self care skills, impaired functional mobility, impaired balance, decreased upper extremity function, decreased lower extremity function, decreased safety awareness.      Rehab Prognosis: Good; patient would benefit from acute skilled OT services to address these deficits and reach maximum level of function.       Plan:     Patient to be seen 3 x/week to address the above listed problems via self-care/home management, therapeutic activities, therapeutic exercises  · Plan of Care Expires: 08/20/22  · Plan of Care Reviewed with: patient    Subjective     Chief Complaint: to get stronger with her standing  Patient/Family Comments/goals: to get stronger with her standing    Occupational Profile:  Living Environment: Pt resides alone in 1 story house with 4 steps (B) rails to enter. Pt was at SNF prior to this admit & required (A) with ADL's & ambulation with RW.  Pt at home has a shower stall (has a step up to get in).  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  Pt enjoys painting abstracts.  Equipment Used at Home:  walker, rolling (transport w/c, raised toilet with grab bars by toilet)  Assistance upon Discharge: none available    Pain/Comfort:  · Pain Rating 1: 0/10  · Pain Rating Post-Intervention 1: 0/10    Patients cultural, spiritual, Yazidism conflicts given the  current situation: no    Objective:     Communicated with: RN prior to session.  Patient found supine with telemetry, bed alarm (no family present) upon OT entry to room.    General Precautions: Standard, fall   Orthopedic Precautions:N/A   Braces: N/A    Occupational Performance:    Bed Mobility:    · Patient completed Supine to Sit with moderate assistance  · Patient completed Sit to Supine with minimum assistance    Functional Mobility/Transfers:  · Patient completed Sit <> Stand Transfer with minimum assistance  with  rolling walker   · Functional Mobility: SBA-CGA with static standing balance using RW    Activities of Daily Living:  · Upper Body Dressing: moderate assistance donning gown around back while seated EOB  · Lower Body Dressing: stand by assistance donning socks seated EOB  · Toileting: maximal assistance with hygiene while standing due to incontinence seated EOB    Cognitive/Visual Perceptual:  Cognitive/Psychosocial Skills:     -       Oriented to: Person, Place, Time and Situation   -       Follows Commands/attention:Follows multistep  commands  -       Safety awareness/insight to disability: impaired     Physical Exam:  Sensation:    -       Intact  Dominant hand:    -       right  Upper Extremity Range of Motion:  BUE WFL except ~ 100* (B) shoulder flexion  Upper Extremity Strength: BUE WFL except 3-/5 shoulder flexion   Strength: BUE WFL    AMPAC 6 Click ADL:  AMPAC Total Score: 15    Treatment & Education:  Provided education on safety.  SBA-CGA with static standing balance using RW.  Provided education regarding role of OT, POC, & discharge recommendations with pt verbalizing understanding.  Pt had no further questions & when asked whether there were any concerns pt reported none.      Education:    Patient left supine with all lines intact, call button in reach, bed alarm on and RN notified    GOALS:   Multidisciplinary Problems     Occupational Therapy Goals        Problem: Occupational  Therapy    Goal Priority Disciplines Outcome Interventions   Occupational Therapy Goal     OT, PT/OT Ongoing, Progressing    Description: Goals to be met by: 8/1/2022     Patient will increase functional independence with ADLs by performing:    UE Dressing with Stand-by Assistance.  LE Dressing with Supervision.  Grooming while standing with Stand-by Assistance.  Toileting from bedside commode with Stand-by Assistance for hygiene and clothing management.   Supine to sit with Stand-by Assistance.  Step transfer with Stand-by Assistance using RW                     History:     Past Medical History:   Diagnosis Date    Anemia     Anxiety     Cataract     Colon polyps 2012    Fibromyalgia 10/15/2012    Lung cancer     Pneumonia due to infectious organism 7/19/2019    Post-polio syndrome     Rheumatoid arthritis(714.0) 7/16/2012    2009 onset with mod positive CCP and RF MTX 2011 - present     Thyroid disease     Vertigo     postural vertigo       Past Surgical History:   Procedure Laterality Date    BLADDER SURGERY  1959    CHOLECYSTECTOMY      HYSTERECTOMY  1988    LUNG REMOVAL, PARTIAL  2009    left lower lobectomy    OVARIAN CYST REMOVAL  1970    TONSILLECTOMY  1952       Time Tracking:     OT Date of Treatment: 07/21/22  OT Start Time: 0953  OT Stop Time: 1021  OT Total Time (min): 28 min    Billable Minutes:Evaluation 18  Therapeutic Activity 10    7/21/2022

## 2022-07-21 NOTE — PT/OT/SLP PROGRESS
"Speech Language Pathology Treatment/ Discharge Summary     Patient Name:  Yana Orellana   MRN:  [id number]  Admitting Diagnosis: Hepatic encephalopathy    Recommendations:                 General Recommendations:  Dysphagia therapy  Diet recommendations:  Mechanical soft, Liquid Diet Level: NPO   Aspiration Precautions: 1 bite/sip at a time, Meds crushed in puree and Standard aspiration precautions   General Precautions: Standard,    Communication strategies:  none    Subjective     "I think I should stay on the soft stuff"   Pt awake     Pain/Comfort:  Pain Rating 1: 0/10  Pain Rating Post-Intervention 1: 0/10    Respiratory Status: Room air    Objective:     Has the patient been evaluated by SLP for swallowing?   Yes  Keep patient NPO? No     Pt continues to exhibit improved alertness and confusion.  Pt following commands and appropriately verbally engaging in conversational exchanges appropriately.  Pt with AM meal at the bedside of mechanical soft solids and thin liquids. Pt managing diet without difficulty. Pt managed regular solid trials without difficulty but continued to report ongoing preference of soft chopped solids.  SLP discussed with Pt continuing diet as pt prefers however advancing to regular solids per MD team when pt feels ready. No further skilled speech therapy services warranted at this time. SLP updated whiteboard.     Assessment:     Yana Orellana is a 77 y.o. female with an SLP diagnosis of Dysphagia appearing to spontaneously improve and at or near baseline without need for further follow up.       Goals:   Multidisciplinary Problems     SLP Goals        Problem: SLP    Goal Priority Disciplines Outcome   SLP Goal     SLP    Description: Speech Language Pathology Goals  Goals expected to be met by 7/26    1. Pt will tolerate diet of mechanical soft solids and thin liquids without overt clinical signs of aspiration                      Plan:     · Patient to be seen:  3 x/week "   · Plan of Care expires:     · Plan of Care reviewed with:  patient   · SLP Follow-Up:  No       Discharge recommendations:  home   Barriers to Discharge:  None    Time Tracking:     SLP Treatment Date:   07/21/22  Speech Start Time:  0920  Speech Stop Time:  0928     Speech Total Time (min):  8 min    Billable Minutes: Treatment Swallowing Dysfunction 8    07/21/2022

## 2022-07-22 NOTE — SUBJECTIVE & OBJECTIVE
Interval History: Ms Orellana is doing well today. No new complaints. Her mental status remains at baseline. She complains of lack of sleep in the hospital & would like to leave. Today we counseled her on safety of going home vs SNF placement.     Review of Systems   Constitutional:  Negative for chills and fever.   Respiratory:  Negative for shortness of breath.    Cardiovascular:  Negative for chest pain.   Gastrointestinal:  Positive for diarrhea. Negative for nausea and vomiting.   Genitourinary:  Negative for dysuria and hematuria.   Musculoskeletal:  Positive for arthralgias. Negative for back pain.        Complains of chronic leg pain   Psychiatric/Behavioral:  Negative for confusion.    Objective:     Vital Signs (Most Recent):  Temp: 97.8 °F (36.6 °C) (07/22/22 1213)  Pulse: 75 (07/22/22 1213)  Resp: 18 (07/22/22 1213)  BP: (!) 106/53 (07/22/22 1213)  SpO2: (!) 94 % (07/22/22 1213)   Vital Signs (24h Range):  Temp:  [97.5 °F (36.4 °C)-98.3 °F (36.8 °C)] 97.8 °F (36.6 °C)  Pulse:  [71-88] 75  Resp:  [16-20] 18  SpO2:  [93 %-96 %] 94 %  BP: (106-138)/(53-61) 106/53     Weight: 119.7 kg (263 lb 14.3 oz)  Body mass index is 37.86 kg/m².  No intake or output data in the 24 hours ending 07/22/22 1401   Physical Exam  Vitals and nursing note reviewed.   Constitutional:       Appearance: Normal appearance. She is obese.   HENT:      Head: Normocephalic and atraumatic.      Mouth/Throat:      Mouth: Mucous membranes are moist.   Cardiovascular:      Rate and Rhythm: Normal rate and regular rhythm.      Heart sounds: Murmur heard.   Pulmonary:      Effort: Pulmonary effort is normal.      Breath sounds: Normal breath sounds.   Abdominal:      General: Abdomen is flat.      Palpations: Abdomen is soft.      Tenderness: There is no abdominal tenderness.   Musculoskeletal:         General: No swelling or tenderness.   Skin:     Capillary Refill: Capillary refill takes 2 to 3 seconds.   Neurological:      Mental Status:  She is alert and oriented to person, place, and time. Mental status is at baseline.   Psychiatric:         Behavior: Behavior normal.         Thought Content: Thought content normal.       Significant Labs: All pertinent labs within the past 24 hours have been reviewed.  CBC:   Recent Labs   Lab 07/21/22  0519 07/22/22  0704   WBC 5.39 5.01   HGB 10.4* 10.3*   HCT 31.1* 30.8*   PLT 70* 71*     CMP:   Recent Labs   Lab 07/21/22  0519 07/22/22  0704    137   K 3.8 3.9   * 109   CO2 21* 23   * 100   BUN 18 17   CREATININE 0.6 0.7   CALCIUM 7.8* 7.6*   PROT 5.8* 5.9*   ALBUMIN 2.0* 2.0*   BILITOT 3.5* 3.4*   ALKPHOS 91 96   AST 53* 53*   ALT 28 30   ANIONGAP 7* 5*   EGFRNONAA >60.0 >60.0       Significant Imaging: I have reviewed all pertinent imaging results/findings within the past 24 hours.

## 2022-07-22 NOTE — PLAN OF CARE
Call placed to [facility] message left on Dodie vm need to know if they are accepting pt back or if cm needs to f/u with other pending referrals

## 2022-07-22 NOTE — PLAN OF CARE
[facility] ([place] [id number])      HOME HEALTH ORDERS  FACE TO FACE ENCOUNTER    Patient Name: Yana Orellana  YOB: 1945    PCP: Caitlyn Graf MD   PCP Address: [address] [place] [address] / [facility] [address]  PCP Phone Number: [phone]  PCP Fax: [phone]    Encounter Date: 7/17/22    Admit to Home Health    Diagnoses:  Active Hospital Problems    Diagnosis  POA    *Hepatic encephalopathy [K72.90]  Yes    Murmur, cardiac [R01.1]  Yes    Rheumatoid arthritis involving multiple sites with positive rheumatoid factor [M05.79]  Yes     2009 onset with mod positive CCP and RF  MTX 2011 - 2020; stopped due to low platelets        Resolved Hospital Problems   No resolved problems to display.       Follow Up Appointments:  Future Appointments   Date Time Provider Department Center   10/7/2022  9:00 AM Haja Desai MD [facility] NEUROS8 [facility]   11/7/2022 11:30 AM Smooth Agrawal MD [facility] RHEUM [facility]   1/10/2023  9:20 AM DAIJA Johnson MD [facility] COLSURG [facility]       Allergies:  Review of patient's allergies indicates:   Allergen Reactions    Ibuprofen Nausea Only    Demerol [meperidine]      Euphoria    Gabapentin Other (See Comments)     Hallucinations    Mellaril-s Nausea Only    Versed [midazolam]      Excessive talking, hyper    Vicodin [hydrocodone-acetaminophen]      Joint pain, muscle pain    Xanax [alprazolam]      fatigue       Medications: Review discharge medications with patient and family and provide education.    Current Facility-Administered Medications   Medication Dose Route Frequency Provider Last Rate Last Admin    acetaminophen tablet 650 mg  650 mg Oral Q8H PRN Sherman Michele MD   650 mg at 07/21/22 2324    amLODIPine tablet 5 mg  5 mg Oral Daily Chandler R Anton DO   5 mg at 07/22/22 0825    dextrose 10% bolus 125 mL  12.5 g Intravenous PRN José Miguel Blackmon MD        dextrose 10% bolus 250 mL  25 g Intravenous PRN José Miguel Blackmon MD         diclofenac sodium 1 % gel 4 g  4 g Topical (Top) Q8H PRN Rashard Elena MD        enoxaparin injection 40 mg  40 mg Subcutaneous Daily José Miguel Blackmon MD   40 mg at 07/19/22 1600    glucagon (human recombinant) injection 1 mg  1 mg Intramuscular PRN José Miguel Blackmon MD        glucose chewable tablet 16 g  16 g Oral PRN José Miguel Blackmon MD        glucose chewable tablet 24 g  24 g Oral PRN José Miguel Blackmon MD        lactulose 20 gram/30 mL solution Soln 30 g  30 g Oral BID Rashard Elena MD   30 g at 07/22/22 0825    LIDOcaine 5 % patch 3 patch  3 patch Transdermal Daily PRN Rashard Elena MD        miconazole NITRATE 2 % top powder   Topical (Top) BID Efrain Naqvi MD   Given at 07/21/22 2032    naloxone 0.4 mg/mL injection 0.02 mg  0.02 mg Intravenous PRN José Miguel Blackmon MD        rifAXIMin tablet 550 mg  550 mg Oral BID Chandler R Anton, DO   550 mg at 07/22/22 0825    sodium chloride 0.9% flush 10 mL  10 mL Intravenous Q12H PRN José Miguel Blackmon MD        spironolactone tablet 50 mg  50 mg Oral Daily Chandler R Anton, DO   50 mg at 07/22/22 0825     Current Discharge Medication List      START taking these medications    Details   lactulose (CHRONULAC) 20 gram/30 mL Soln Take 45 mLs (30 g total) by mouth 2 (two) times daily.  Qty: 2700 mL, Refills: 2      miconazole NITRATE 2 % (MICOTIN) 2 % top powder Apply topically 2 (two) times daily.  Qty: 85 g, Refills: 1         CONTINUE these medications which have NOT CHANGED    Details   acetaminophen (TYLENOL) 500 MG tablet Take 500 mg by mouth 2 (two) times daily as needed for Pain.      amLODIPine (NORVASC) 5 MG tablet Take 5 mg by mouth once daily.      diclofenac sodium (VOLTAREN) 1 % Gel Apply 2 g topically 3 (three) times daily.  Qty: 2 g, Refills: 0      ergocalciferol (VITAMIN D2) 50,000 unit Cap Take 50,000 Units by mouth every Thursday.      LIDOcaine (LIDODERM) 5 % Place 1 patch onto the skin every 24 hours. Remove & Discard patch within 12  hours or as directed by MD      multivitamin with minerals tablet Take 1 tablet by mouth once daily.      predniSONE (DELTASONE) 5 MG tablet Take 1-2 tablets (5-10 mg total) by mouth 2 (two) times daily as needed (arthritis).  Qty: 30 tablet, Refills: 1    Associated Diagnoses: Rheumatoid arthritis involving multiple sites with positive rheumatoid factor      sodium chloride (OCEAN) 0.65 % nasal spray 1 spray by Nasal route as needed for Congestion.      spironolactone (ALDACTONE) 50 MG tablet Take 1 tablet (50 mg total) by mouth once daily.  Qty: 30 tablet, Refills: 11    Comments: .         STOP taking these medications       cholestyramine (QUESTRAN) 4 gram packet Comments:   Reason for Stopping:         oxyCODONE-acetaminophen (PERCOCET) 5-325 mg per tablet Comments:   Reason for Stopping:                 I have seen and examined this patient within the last 30 days. My clinical findings that support the need for the home health skilled services and home bound status are the following:no   Weakness/numbness causing balance and gait disturbance due to Weakness/Debility making it taxing to leave home.     Diet:   regular diet    Labs:  SN to perform labs:  CMP: once; 1 week(s) and Report Lab results to PCP.    Referrals/ Consults  Physical Therapy to evaluate and treat. Evaluate for home safety and equipment needs; Establish/upgrade home exercise program. Perform / instruct on therapeutic exercises, gait training, transfer training, and Range of Motion.  Occupational Therapy to evaluate and treat. Evaluate home environment for safety and equipment needs. Perform/Instruct on transfers, ADL training, ROM, and therapeutic exercises.    Activities:   activity as tolerated    Nursing:   Agency to admit patient within 24 hours of hospital discharge unless specified on physician order or at patient request    SN to complete comprehensive assessment including routine vital signs. Instruct on disease process and s/s of  complications to report to MD. Review/verify medication list sent home with the patient at time of discharge  and instruct patient/caregiver as needed. Frequency may be adjusted depending on start of care date.     Skilled nurse to perform up to 3 visits PRN for symptoms related to diagnosis    Notify MD if SBP > 160 or < 90; DBP > 90 or < 50; HR > 120 or < 50; Temp > 101; O2 < 88%    Ok to schedule additional visits based on staff availability and patient request on consecutive days within the home health episode.    When multiple disciplines ordered:    Start of Care occurs on Sunday - Wednesday schedule remaining discipline evaluations as ordered on separate consecutive days following the start of care.    Thursday SOC -schedule subsequent evaluations Friday and Monday the following week.     Friday - Saturday SOC - schedule subsequent discipline evaluations on consecutive days starting Monday of the following week.    For all post-discharge communication and subsequent orders please contact patient's primary care physician. If unable to reach primary care physician or do not receive response within 30 minutes, please contact [facility] for clinical staff order clarification    Miscellaneous   Routine Skin for Bedridden Patients: Instruct patient/caregiver to apply moisture barrier cream to all skin folds and wet areas in perineal area daily and after baths and all bowel movements.    Home Health Aide:  Nursing Three times weekly, Physical Therapy Three times weekly, Occupational Therapy Three times weekly and Home Health Aide Three times weekly    Wound Care Orders  yes:  Yeast infectoin in groin. apply topical antifungal powder    I certify that this patient is confined to her home and needs intermittent skilled nursing care, physical therapy and occupational therapy.

## 2022-07-22 NOTE — DISCHARGE INSTRUCTIONS
You were admitted for hepatic encephalopathy. In the hospital you received lactulose & rifaximin. You will need to continue taking lactulose at home to prevent future occurences. Follow up with your primary care provider. Take all medications as prescribed. Work with PT/OT to regain strength and complete daily tasks.

## 2022-07-22 NOTE — PLAN OF CARE
Spoke with pt she does not want to go to back to [facility] or any other SNF states she wants to go home she has a lady coming from [place] to help her and her friend Inessa will be available for errands and grocery shopping,   Discussed with pt that she would need somebody with her 24/7 since she was only able to walk one step yesterday per her, she is unsure if there will be anybody there 24/7 but she will check and see if they can stay with her, she has a fall alert, she states she will need a bariatric bsc and would like a trapeze bar and HH, explained to pt that it would not be safe to send her home if she does not have 24/7 caregiver, explained that if she cannot get up then she could not get herself to the bathroom or bsc   She will check with her Inessa to see if there will be somebody there 24/7 cm will check back ,

## 2022-07-22 NOTE — ASSESSMENT & PLAN NOTE
Chronic problem. Likely multifactorial causes including post-polio syndrome, deconditioning, arthritis, obesity   -Barrier to discharge at home. Pt unable to walk currently  -PT/OT recommending d/c to SNF

## 2022-07-22 NOTE — PROGRESS NOTES
[facility] - Intensive Care ([address] Medicine  Progress Note    Patient Name: Yana Orellana  MRN: [id number]  Patient Class: IP- Inpatient   Admission Date: 7/17/2022  Length of Stay: 4 days  Attending Physician: Chari Ngo*  Primary Care Provider: Caitlyn Graf MD        Subjective:     Principal Problem:Hepatic encephalopathy        HPI:  This is a 77 year old lady with HTN, Non- alcoholic cirrhosis, obesity, anxiety, macrocytic anemia, hx of lung CA in 2009 s/p surgery and chemotherapy with subsequent chemotherapy induced neuropathy, post-polio syndrome, lower extremity weakness (being followed by Neurology), rheumatoid arthritis, fibromyalgia who presented from her SNF for AMS. Patient history primarily obtained from nurse at [facility]. Patient was her normal self yesterday at 7pm but when her nurse went to recheck on her at 9pm, patient was confused, not following commands, and non-verbal. Nurse states that she did not have any recent fall or have anr focal neurological deficit. The patient did not appear to be diaphoretic, fevers, or weakness. Patient is able to ambulate on wheelchair.     In the ED, patient was tachycardic and hypertensive to 180s systolic. Labs showed Tbili of 4.4, AST/ALT 55/28, and ammonia of 122. Lactic of 4.1. troponin and UA was negative. CT head and CXR showed no acute abnormalities.       Overview/Hospital Course:  Pt had AMS on arrival. SLP noted patient unable to swallow safely and she was made NPO. She was started on lactulose & rifaximin. Pt received multiple lactulose enemas with small improvement in mental status over time. She was started on oral lactulose after being cleared by SLP. Mental status improved with lactulose. Decreased lactulose to twice a day due to diarrhea. Pt seen by PT who recommended therapy to resume walking. Pt to be discharged on lactulose with PT/OT to regain strength & function.       Interval History: Ms Orellana is  doing well today. No new complaints. Her mental status remains at baseline. She complains of lack of sleep in the hospital & would like to leave. Today we counseled her on safety of going home vs SNF placement.     Review of Systems   Constitutional:  Negative for chills and fever.   Respiratory:  Negative for shortness of breath.    Cardiovascular:  Negative for chest pain.   Gastrointestinal:  Positive for diarrhea. Negative for nausea and vomiting.   Genitourinary:  Negative for dysuria and hematuria.   Musculoskeletal:  Positive for arthralgias. Negative for back pain.        Complains of chronic leg pain   Psychiatric/Behavioral:  Negative for confusion.    Objective:     Vital Signs (Most Recent):  Temp: 97.8 °F (36.6 °C) (07/22/22 1213)  Pulse: 75 (07/22/22 1213)  Resp: 18 (07/22/22 1213)  BP: (!) 106/53 (07/22/22 1213)  SpO2: (!) 94 % (07/22/22 1213)   Vital Signs (24h Range):  Temp:  [97.5 °F (36.4 °C)-98.3 °F (36.8 °C)] 97.8 °F (36.6 °C)  Pulse:  [71-88] 75  Resp:  [16-20] 18  SpO2:  [93 %-96 %] 94 %  BP: (106-138)/(53-61) 106/53     Weight: 119.7 kg (263 lb 14.3 oz)  Body mass index is 37.86 kg/m².  No intake or output data in the 24 hours ending 07/22/22 1401   Physical Exam  Vitals and nursing note reviewed.   Constitutional:       Appearance: Normal appearance. She is obese.   HENT:      Head: Normocephalic and atraumatic.      Mouth/Throat:      Mouth: Mucous membranes are moist.   Cardiovascular:      Rate and Rhythm: Normal rate and regular rhythm.      Heart sounds: Murmur heard.   Pulmonary:      Effort: Pulmonary effort is normal.      Breath sounds: Normal breath sounds.   Abdominal:      General: Abdomen is flat.      Palpations: Abdomen is soft.      Tenderness: There is no abdominal tenderness.   Musculoskeletal:         General: No swelling or tenderness.   Skin:     Capillary Refill: Capillary refill takes 2 to 3 seconds.   Neurological:      Mental Status: She is alert and oriented to  person, place, and time. Mental status is at baseline.   Psychiatric:         Behavior: Behavior normal.         Thought Content: Thought content normal.       Significant Labs: All pertinent labs within the past 24 hours have been reviewed.  CBC:   Recent Labs   Lab 07/21/22  0519 07/22/22  0704   WBC 5.39 5.01   HGB 10.4* 10.3*   HCT 31.1* 30.8*   PLT 70* 71*     CMP:   Recent Labs   Lab 07/21/22  0519 07/22/22  0704    137   K 3.8 3.9   * 109   CO2 21* 23   * 100   BUN 18 17   CREATININE 0.6 0.7   CALCIUM 7.8* 7.6*   PROT 5.8* 5.9*   ALBUMIN 2.0* 2.0*   BILITOT 3.5* 3.4*   ALKPHOS 91 96   AST 53* 53*   ALT 28 30   ANIONGAP 7* 5*   EGFRNONAA >60.0 >60.0       Significant Imaging: I have reviewed all pertinent imaging results/findings within the past 24 hours.      Assessment/Plan:      * Hepatic encephalopathy  Cirrhosis, non-alcoholic  Hyperbilirubinemia  Metabolic acidosis  Lactic acidosis    77 y.o M with MATAMOROS cirrhosis presenting with AMS most likely secondary to hepatic encephalopathy. Labs on presentation significant for hyperammonemia (ammonia 122) and elevated lactic (4.1, although suspect this to be from decreased hepatic clearance at this time as patient with adequate oxygenation and no leukocytosis). CTH negative for acute abnormality. CMP showing no concomitant metabolic causes for encephalopathy.     - F/u blood cultures to r/o infectious causes  - Lactulose 15 mg TID, titrate 3-4 BM/day  - will consider Rifaximin 550mg BID if encephalopathy does not resolve with lactulose  - SLP permitting thin liquids and very soft solids now as mental status has improved  - Fall precautions; aspiration precautions; delirium precautions  - Can consider Zinc 220mg PO daily  - f/u cultures  -MRI & CT head negative  -AMS resolved 7/20  -Lasix maintenance 20mg  - Discharge location TBD, must discuss with patient & consider needs for PT    Murmur, cardiac  Systolic murmur heard on examination. No prior  documentation of murmur.     - Echo demonstrating aortic valve sclerosis    Weakness of both lower extremities  Chronic problem. Likely multifactorial causes including post-polio syndrome, deconditioning, arthritis, obesity   -Barrier to discharge at home. Pt unable to walk currently  -PT/OT recommending d/c to SNF    Rheumatoid arthritis involving multiple sites with positive rheumatoid factor  Pt requests lidocaine patches & voltaren gel for pain.   -Topical pain relief provided.      VTE Risk Mitigation (From admission, onward)         Ordered     enoxaparin injection 40 mg  Daily         07/18/22 0333     IP VTE HIGH RISK PATIENT  Once         07/18/22 0333     Place sequential compression device  Until discontinued         07/18/22 0333                Discharge Planning   JAE: 7/22/2022     Code Status: Full Code   Is the patient medically ready for discharge?: Yes    Reason for patient still in hospital (select all that apply): PT / OT recommendations and Pending disposition  Discharge Plan A: Home Health Home health viewed as unsafe unless pt can secure 24 hr care.                 Rashard Elena MD  Department of Hospital Medicine   [facility] - Intensive Care ([place]-[facility])

## 2022-07-22 NOTE — PLAN OF CARE
Referrals sent [facility] to see if pt would qualify for this level, pt will not have 24/7 care at home she insist on going home but this is not a safe plan for this patient she is unable to walk and would be unable to care for herself alone

## 2022-07-23 NOTE — ASSESSMENT & PLAN NOTE
Cirrhosis, non-alcoholic  Hyperbilirubinemia  Metabolic acidosis  Lactic acidosis    77 y.o M with MATAMOROS cirrhosis presenting with AMS most likely secondary to hepatic encephalopathy. Labs on presentation significant for hyperammonemia (ammonia 122) and elevated lactic (4.1, although suspect this to be from decreased hepatic clearance at this time as patient with adequate oxygenation and no leukocytosis). CTH negative for acute abnormality. CMP showing no concomitant metabolic causes for encephalopathy.     - F/u blood cultures to r/o infectious causes  - Lactulose 10 mg BID, having 2-3 BM/day  - Rifaximin 550mg BID  - SLP permitting thin liquids and very soft solids now as mental status has improved  - Fall precautions; aspiration precautions; delirium precautions  - f/u cultures: negative  -MRI & CT head negative  -AMS resolved 7/20  - Discharge location TBD, must discuss with patient & consider needs for PT

## 2022-07-23 NOTE — PROGRESS NOTES
[facility] - Intensive Care ([address] Medicine  Progress Note    Patient Name: Yana Orellana  MRN: [id number]  Patient Class: IP- Inpatient   Admission Date: 7/17/2022  Length of Stay: 5 days  Attending Physician: Chari gNo*  Primary Care Provider: Caitlyn Graf MD        Subjective:     Principal Problem:Hepatic encephalopathy        HPI:  This is a 77 year old lady with HTN, Non- alcoholic cirrhosis, obesity, anxiety, macrocytic anemia, hx of lung CA in 2009 s/p surgery and chemotherapy with subsequent chemotherapy induced neuropathy, post-polio syndrome, lower extremity weakness (being followed by Neurology), rheumatoid arthritis, fibromyalgia who presented from her SNF for AMS. Patient history primarily obtained from nurse at [facility]. Patient was her normal self yesterday at 7pm but when her nurse went to recheck on her at 9pm, patient was confused, not following commands, and non-verbal. Nurse states that she did not have any recent fall or have anr focal neurological deficit. The patient did not appear to be diaphoretic, fevers, or weakness. Patient is able to ambulate on wheelchair.     In the ED, patient was tachycardic and hypertensive to 180s systolic. Labs showed Tbili of 4.4, AST/ALT 55/28, and ammonia of 122. Lactic of 4.1. troponin and UA was negative. CT head and CXR showed no acute abnormalities.       Overview/Hospital Course:  Pt had AMS on arrival. SLP noted patient unable to swallow safely and she was made NPO. She was started on lactulose & rifaximin. Pt received multiple lactulose enemas with small improvement in mental status over time. She was started on oral lactulose after being cleared by SLP. Mental status improved with lactulose. Decreased lactulose to twice a day due to diarrhea. Pt seen by PT who recommended therapy to resume walking. Pt to be discharged on lactulose with PT/OT to regain strength & function.       Interval History: No new complaints  this AM. Pt reports being visited by rep of [facility].     Review of Systems   Constitutional:  Negative for chills and fever.   Respiratory:  Negative for shortness of breath.    Cardiovascular:  Negative for chest pain.   Gastrointestinal:  Positive for diarrhea. Negative for nausea and vomiting.   Genitourinary:  Negative for dysuria and hematuria.   Musculoskeletal:  Positive for arthralgias. Negative for back pain.        Complains of chronic leg pain   Neurological:  Negative for numbness.   Psychiatric/Behavioral:  Negative for confusion.    Objective:     Vital Signs (Most Recent):  Temp: 97.5 °F (36.4 °C) (07/23/22 0822)  Pulse: 74 (07/23/22 0822)  Resp: 18 (07/23/22 0822)  BP: 135/61 (07/23/22 0822)  SpO2: 95 % (07/23/22 0822) Vital Signs (24h Range):  Temp:  [97.5 °F (36.4 °C)-97.8 °F (36.6 °C)] 97.5 °F (36.4 °C)  Pulse:  [74-78] 74  Resp:  [18] 18  SpO2:  [94 %-98 %] 95 %  BP: (106-143)/(53-64) 135/61     Weight: 119.7 kg (263 lb 14.3 oz)  Body mass index is 37.86 kg/m².    Intake/Output Summary (Last 24 hours) at 7/23/2022 0930  Last data filed at 7/22/2022 1800  Gross per 24 hour   Intake 480 ml   Output --   Net 480 ml      Physical Exam  Vitals and nursing note reviewed.   Constitutional:       Appearance: Normal appearance. She is obese.   HENT:      Head: Normocephalic and atraumatic.      Mouth/Throat:      Mouth: Mucous membranes are moist.   Cardiovascular:      Rate and Rhythm: Normal rate and regular rhythm.      Heart sounds: Murmur heard.   Pulmonary:      Effort: Pulmonary effort is normal.      Breath sounds: Normal breath sounds.   Abdominal:      General: Abdomen is flat.      Palpations: Abdomen is soft.      Tenderness: There is no abdominal tenderness.   Musculoskeletal:         General: No swelling or tenderness.   Skin:     Capillary Refill: Capillary refill takes 2 to 3 seconds.   Neurological:      Mental Status: She is alert and oriented to person, place, and  time. Mental status is at baseline.   Psychiatric:         Behavior: Behavior normal.         Thought Content: Thought content normal.       Significant Labs: All pertinent labs within the past 24 hours have been reviewed.  CBC:   Recent Labs   Lab 07/22/22  0704 07/23/22  0620   WBC 5.01 4.89   HGB 10.3* 11.1*   HCT 30.8* 33.1*   PLT 71* 75*     CMP:   Recent Labs   Lab 07/22/22  0704 07/23/22  0620    135*   K 3.9 4.8    107   CO2 23 19*    107   BUN 17 20   CREATININE 0.7 0.6   CALCIUM 7.6* 7.7*   PROT 5.9* 6.1   ALBUMIN 2.0* 2.0*   BILITOT 3.4* 3.3*   ALKPHOS 96 102   AST 53* 56*   ALT 30 28   ANIONGAP 5* 9   EGFRNONAA >60.0 >60.0       Significant Imaging: I have reviewed all pertinent imaging results/findings within the past 24 hours.      Assessment/Plan:      * Hepatic encephalopathy  Cirrhosis, non-alcoholic  Hyperbilirubinemia  Metabolic acidosis  Lactic acidosis    77 y.o M with MATAMROOS cirrhosis presenting with AMS most likely secondary to hepatic encephalopathy. Labs on presentation significant for hyperammonemia (ammonia 122) and elevated lactic (4.1, although suspect this to be from decreased hepatic clearance at this time as patient with adequate oxygenation and no leukocytosis). CTH negative for acute abnormality. CMP showing no concomitant metabolic causes for encephalopathy.     - F/u blood cultures to r/o infectious causes  - Lactulose 10 mg BID, having 2-3 BM/day  - Rifaximin 550mg BID  - SLP permitting thin liquids and very soft solids now as mental status has improved  - Fall precautions; aspiration precautions; delirium precautions  - f/u cultures: negative  -MRI & CT head negative  -AMS resolved 7/20  - Discharge location TBD, must discuss with patient & consider needs for PT    Murmur, cardiac  Systolic murmur heard on examination. No prior documentation of murmur.     - Echo demonstrating aortic valve sclerosis    Weakness of both lower extremities  Chronic problem. Likely  multifactorial causes including post-polio syndrome, deconditioning, arthritis, obesity   -Barrier to discharge at home. Pt unable to walk currently  -PT/OT recommending d/c to SNF    Rheumatoid arthritis involving multiple sites with positive rheumatoid factor  Pt requests lidocaine patches & voltaren gel for pain.   -Topical pain relief provided.        VTE Risk Mitigation (From admission, onward)         Ordered     enoxaparin injection 40 mg  Daily         07/18/22 0333     IP VTE HIGH RISK PATIENT  Once         07/18/22 0333     Place sequential compression device  Until discontinued         07/18/22 0333                Discharge Planning   JAE: 7/22/2022     Code Status: Full Code   Is the patient medically ready for discharge?: Yes    Reason for patient still in hospital (select all that apply): Pending disposition  Discharge Plan A: Home Health vs SNF                 Rashard Elena MD  Department of Hospital Medicine   [facility] - Intensive Care ([place]-[facility])

## 2022-07-23 NOTE — SUBJECTIVE & OBJECTIVE
Interval History: No new complaints this AM. Pt reports being visited by rep of [facility].     Review of Systems   Constitutional:  Negative for chills and fever.   Respiratory:  Negative for shortness of breath.    Cardiovascular:  Negative for chest pain.   Gastrointestinal:  Positive for diarrhea. Negative for nausea and vomiting.   Genitourinary:  Negative for dysuria and hematuria.   Musculoskeletal:  Positive for arthralgias. Negative for back pain.        Complains of chronic leg pain   Neurological:  Negative for numbness.   Psychiatric/Behavioral:  Negative for confusion.    Objective:     Vital Signs (Most Recent):  Temp: 97.5 °F (36.4 °C) (07/23/22 0822)  Pulse: 74 (07/23/22 0822)  Resp: 18 (07/23/22 0822)  BP: 135/61 (07/23/22 0822)  SpO2: 95 % (07/23/22 0822) Vital Signs (24h Range):  Temp:  [97.5 °F (36.4 °C)-97.8 °F (36.6 °C)] 97.5 °F (36.4 °C)  Pulse:  [74-78] 74  Resp:  [18] 18  SpO2:  [94 %-98 %] 95 %  BP: (106-143)/(53-64) 135/61     Weight: 119.7 kg (263 lb 14.3 oz)  Body mass index is 37.86 kg/m².    Intake/Output Summary (Last 24 hours) at 7/23/2022 0930  Last data filed at 7/22/2022 1800  Gross per 24 hour   Intake 480 ml   Output --   Net 480 ml      Physical Exam  Vitals and nursing note reviewed.   Constitutional:       Appearance: Normal appearance. She is obese.   HENT:      Head: Normocephalic and atraumatic.      Mouth/Throat:      Mouth: Mucous membranes are moist.   Cardiovascular:      Rate and Rhythm: Normal rate and regular rhythm.      Heart sounds: Murmur heard.   Pulmonary:      Effort: Pulmonary effort is normal.      Breath sounds: Normal breath sounds.   Abdominal:      General: Abdomen is flat.      Palpations: Abdomen is soft.      Tenderness: There is no abdominal tenderness.   Musculoskeletal:         General: No swelling or tenderness.   Skin:     Capillary Refill: Capillary refill takes 2 to 3 seconds.   Neurological:      Mental Status: She is alert  and oriented to person, place, and time. Mental status is at baseline.   Psychiatric:         Behavior: Behavior normal.         Thought Content: Thought content normal.       Significant Labs: All pertinent labs within the past 24 hours have been reviewed.  CBC:   Recent Labs   Lab 07/22/22  0704 07/23/22  0620   WBC 5.01 4.89   HGB 10.3* 11.1*   HCT 30.8* 33.1*   PLT 71* 75*     CMP:   Recent Labs   Lab 07/22/22  0704 07/23/22 0620    135*   K 3.9 4.8    107   CO2 23 19*    107   BUN 17 20   CREATININE 0.7 0.6   CALCIUM 7.6* 7.7*   PROT 5.9* 6.1   ALBUMIN 2.0* 2.0*   BILITOT 3.4* 3.3*   ALKPHOS 96 102   AST 53* 56*   ALT 30 28   ANIONGAP 5* 9   EGFRNONAA >60.0 >60.0       Significant Imaging: I have reviewed all pertinent imaging results/findings within the past 24 hours.

## 2022-07-24 NOTE — PLAN OF CARE
Problem: Infection  Goal: Absence of Infection Signs and Symptoms  Outcome: Met     Problem: Adult Inpatient Plan of Care  Goal: Plan of Care Review  Outcome: Met  Goal: Patient-Specific Goal (Individualized)  Outcome: Met  Goal: Absence of Hospital-Acquired Illness or Injury  Outcome: Met  Goal: Optimal Comfort and Wellbeing  Outcome: Met  Goal: Readiness for Transition of Care  Outcome: Met     Problem: Skin Injury Risk Increased  Goal: Skin Health and Integrity  Outcome: Met     Problem: Fall Injury Risk  Goal: Absence of Fall and Fall-Related Injury  Outcome: Met     Problem: Impaired Wound Healing  Goal: Optimal Wound Healing  Outcome: Met

## 2022-07-24 NOTE — PLAN OF CARE
[facility] - Intensive Care ([facility])  Discharge Final Note    Primary Care Provider: Caitlyn Graf MD    Expected Discharge Date: 7/25/2022    Final Discharge Note (most recent)       Final Note - 07/24/22 1226          Final Note    Assessment Type Final Discharge Note (P)      Anticipated Discharge Disposition Rehab Facility (P)    [facility]. Pt med ready and will d/c to [address] [place] [address] second floor. Nurse call report to [phone]. Transport set for 1:45 PM p/u. *This is an est time.       Post-Acute Status    Post-Acute Authorization Placement (P)      Post-Acute Placement Status Set-up Complete/Auth obtained (P)      Discharge Delays None known at this time (P)                      Important Message from Medicare Shaniqua Wesley, LMSW  Case Management Social Worker   [facility] [address]

## 2022-07-24 NOTE — NURSING
Report given to receiving nurse at [facility]'s rehab. IV removed. Waiting on ambulance.

## 2022-07-24 NOTE — DISCHARGE SUMMARY
[facility] - Intensive Care ([place] [address])  [facility] Medicine  Discharge Summary      Patient Name: Yana Orellana  MRN: [id number]  Patient Class: IP- Inpatient  Admission Date: 7/17/2022  Hospital Length of Stay: 6 days  Discharge Date and Time: 07/24/2022 4:12 PM  Attending Physician: Chari Ngo*   Discharging Provider: Chandler Walton DO  Primary Care Provider: Caitlyn Graf MD  [facility] Medicine Team: [facility] 3 Chandler Walton DO    HPI:   This is a 77 year old lady with HTN, Non- alcoholic cirrhosis, obesity, anxiety, macrocytic anemia, hx of lung CA in 2009 s/p surgery and chemotherapy with subsequent chemotherapy induced neuropathy, post-polio syndrome, lower extremity weakness (being followed by Neurology), rheumatoid arthritis, fibromyalgia who presented from her SNF for AMS. Patient history primarily obtained from nurse at [facility]. Patient was her normal self yesterday at 7pm but when her nurse went to recheck on her at 9pm, patient was confused, not following commands, and non-verbal. Nurse states that she did not have any recent fall or have anr focal neurological deficit. The patient did not appear to be diaphoretic, fevers, or weakness. Patient is able to ambulate on wheelchair.     In the ED, patient was tachycardic and hypertensive to 180s systolic. Labs showed Tbili of 4.4, AST/ALT 55/28, and ammonia of 122. Lactic of 4.1. troponin and UA was negative. CT head and CXR showed no acute abnormalities.       * No surgery found *      Hospital Course:   Admitted to hospital medicine for evaluation of altered mental status with labs notable for hyperammonemia. CT head and MRI brain non-acute. Abdominal U/S showed small volume ascites, cirrhosis and splenomegaly. She was started on lactulose & rifaximin. Received multiple lactulose enemas with small improvement in mental status over time. Lactic acidosis improved.  She was started on oral lactulose after being cleared by SLP. Mental  "status improved to baseline with lactulose. Decreased lactulose to twice a day due to diarrhea. Registered Dietitian counseled patient on low salt diet. Discharged to rehab with PT/OT to regain strength & function. All questions and concerns were answered. Return precautions provided.        Goals of Care Treatment Preferences:  Code Status: Full Code  BP (!) 123/58 (BP Location: Right arm, Patient Position: Lying)   Pulse 73   Temp 98.1 °F (36.7 °C) (Oral)   Resp 18   Ht 5' 10" (1.778 m)   Wt 119.7 kg (263 lb 14.3 oz)   LMP  (LMP Unknown)   SpO2 96%   Breastfeeding No   BMI 37.86 kg/m²     Physical Exam  Vitals and nursing note reviewed.   Constitutional:       Appearance: Normal appearance. She is obese.   HENT:      Head: Normocephalic and atraumatic.      Mouth/Throat:      Mouth: Mucous membranes are moist.   Cardiovascular:      Rate and Rhythm: Normal rate and regular rhythm.   Pulmonary:      Effort: Pulmonary effort is normal.      Breath sounds: Normal breath sounds.   Abdominal:       Palpations: Abdomen is soft.      Tenderness: There is no abdominal tenderness.   Musculoskeletal:         General: Bowel sounds are normal. There is distension. No tenderness.      Right lower leg: Edema present.      Left lower leg: Edema present.   Skin:     Capillary Refill: Capillary refill takes less than 2 seconds.      Skin is not jaundiced. Bruising present at IV access sites  Neurological:      Mental Status: She is alert and oriented to person, place, and time. Mental status is at baseline.   Psychiatric:         Behavior: Behavior normal.         Thought Content: Thought content normal.          Consults:   Consults (From admission, onward)        Status Ordering Provider     Inpatient consult to Registered Dietitian/Nutritionist  Once        Provider:  (Not yet assigned)    Completed MALICK ALFARO     Inpatient consult to PICC team (ELEAZAR)  Once        Provider:  (Not yet assigned)    Completed " JUNITO LEE     IP consult to case management  Once        Provider:  (Not yet assigned)    Acknowledged JUNITO LEE          No new Assessment & Plan notes have been filed under this hospital service since the last note was generated.  Service: Hospital Medicine    Final Active Diagnoses:    Diagnosis Date Noted POA    PRINCIPAL PROBLEM:  Hepatic encephalopathy [K72.90] 07/18/2022 Yes    Murmur, cardiac [R01.1] 06/12/2020 Yes    Weakness of both lower extremities [R29.898] 11/05/2018 Yes    Rheumatoid arthritis involving multiple sites with positive rheumatoid factor [M05.79] 10/26/2015 Yes      Problems Resolved During this Admission:       Discharged Condition: good    Disposition: Rehab Facility    Follow Up:   Follow-up Information     Caitlyn Graf MD Follow up in 1 week(s).    Specialty: Internal Medicine  Contact information:  [address]  [facility] [address]  [phone]                       Patient Instructions:      Notify your health care provider if you experience any of the following:  temperature >100.4     Notify your health care provider if you experience any of the following:  persistent nausea and vomiting or diarrhea     Notify your health care provider if you experience any of the following:  severe uncontrolled pain     Notify your health care provider if you experience any of the following:  redness, tenderness, or signs of infection (pain, swelling, redness, odor or green/yellow discharge around incision site)     Notify your health care provider if you experience any of the following:  difficulty breathing or increased cough     Notify your health care provider if you experience any of the following:  severe persistent headache     Notify your health care provider if you experience any of the following:  worsening rash     Notify your health care provider if you experience any of the following:  persistent dizziness, light-headedness, or visual  disturbances     Notify your health care provider if you experience any of the following:  increased confusion or weakness       Significant Diagnostic Studies: Labs:   CMP   Recent Labs   Lab 07/23/22  0620   *   K 4.8      CO2 19*      BUN 20   CREATININE 0.6   CALCIUM 7.7*   PROT 6.1   ALBUMIN 2.0*   BILITOT 3.3*   ALKPHOS 102   AST 56*   ALT 28   ANIONGAP 9   ESTGFRAFRICA >60.0   EGFRNONAA >60.0    and CBC   Recent Labs   Lab 07/23/22  0620   WBC 4.89   HGB 11.1*   HCT 33.1*   PLT 75*       Pending Diagnostic Studies:     Procedure Component Value Units Date/Time    Brain natriuretic peptide [[id number]] Collected: 07/17/22 2245    Order Status: Sent Lab Status: In process Updated: 07/17/22 2245    Specimen: Blood          Medications:  Reconciled Home Medications:      Medication List      START taking these medications    lactulose 10 gram/15 mL solution  Commonly known as: CHRONULAC  Take 45 mLs (30 g total) by mouth 2 (two) times daily. May adjust dose every 1 to 2 days to achieve 2 to 3 soft stools/day     miconazole NITRATE 2 % 2 % top powder  Commonly known as: MICOTIN  Apply topically 2 (two) times daily.     rifAXIMin 550 mg Tab  Commonly known as: XIFAXAN  Take 1 tablet (550 mg total) by mouth 2 (two) times daily.        CHANGE how you take these medications    diclofenac sodium 1 % Gel  Commonly known as: VOLTAREN  Apply 2 g topically 3 (three) times daily.  What changed:   · when to take this  · reasons to take this     predniSONE 5 MG tablet  Commonly known as: DELTASONE  Take 1-2 tablets (5-10 mg total) by mouth 2 (two) times daily as needed (arthritis).  What changed:   · how much to take  · when to take this  · reasons to take this        CONTINUE taking these medications    acetaminophen 500 MG tablet  Commonly known as: TYLENOL  Take 500 mg by mouth 2 (two) times daily as needed for Pain.     amLODIPine 5 MG tablet  Commonly known as: NORVASC  Take 5 mg by mouth once daily.      LIDOcaine 5 %  Commonly known as: LIDODERM  Place 1 patch onto the skin every 24 hours. Remove & Discard patch within 12 hours or as directed by MD     multivitamin with minerals tablet  Take 1 tablet by mouth once daily.     sodium chloride 0.65 % nasal spray  Commonly known as: OCEAN  1 spray by Nasal route as needed for Congestion.     spironolactone 50 MG tablet  Commonly known as: ALDACTONE  Take 1 tablet (50 mg total) by mouth once daily.     VITAMIN D2 50,000 unit Cap  Generic drug: ergocalciferol  Take 50,000 Units by mouth every Thursday.        STOP taking these medications    cholestyramine 4 gram packet  Commonly known as: QUESTRAN     oxyCODONE-acetaminophen 5-325 mg per tablet  Commonly known as: PERCOCET            Indwelling Lines/Drains at time of discharge:   Lines/Drains/Airways     None                 Time spent on the discharge of patient: 35 minutes         Chandler Walton DO  Department of Hospital Medicine  [facility] - Intensive Care ([place]-16)

## 2022-07-24 NOTE — PLAN OF CARE
[facility]    FACILITY TRANSFER ORDERS      Patient Name: Yana Orellana  YOB: 1945    PCP: Caitlyn Graf MD   PCP Address: [address] [place], [place] [address] [address] / [place] [address]  PCP Phone Number: [phone]  PCP Fax: [phone]    Encounter Date: 07/24/2022    Admit to: Inpatient Rehab    Vital Signs:  Routine    Diagnoses:   Active Hospital Problems    Diagnosis  POA    *Hepatic encephalopathy [K72.90]  Yes    Murmur, cardiac [R01.1]  Yes    Weakness of both lower extremities [R29.898]  Yes    Rheumatoid arthritis involving multiple sites with positive rheumatoid factor [M05.79]  Yes     2009 onset with mod positive CCP and RF  MTX 2011 - 2020; stopped due to low platelets        Resolved Hospital Problems   No resolved problems to display.       Allergies:  Review of patient's allergies indicates:   Allergen Reactions    Ibuprofen Nausea Only    Demerol [meperidine]      Euphoria    Gabapentin Other (See Comments)     Hallucinations    Mellaril-s Nausea Only    Versed [midazolam]      Excessive talking, hyper    Vicodin [hydrocodone-acetaminophen]      Joint pain, muscle pain    Xanax [alprazolam]      fatigue       Diet: Low sodium diet    Activities: Activity as tolerated    Goals of Care Treatment Preferences:  Code Status: Full Code      Nursing: Per Facility Routine     Labs: CBC and CMP Per facility routine     CONSULTS:    Physical Therapy to evaluate and treat. , Occupational Therapy to evaluate and treat. and  to evaluate for community resources/long-range planning.    MISCELLANEOUS CARE:  Routine Skin for Bedridden Patients: Apply moisture barrier cream to all skin folds and wet areas in perineal area daily and after baths and all bowel movements.     WOUND CARE ORDERS  Yes: .     Rash 07/19/22 0523 Right Groin      Altered Skin Integrity 07/20/22 1945 [place]          Medications: Review discharge medications with patient and family and provide  education.      Current Discharge Medication List      START taking these medications    Details   lactulose (CHRONULAC) 10 gram/15 mL solution Take 45 mLs (30 g total) by mouth 2 (two) times daily. May adjust dose every 1 to 2 days to achieve 2 to 3 soft stools/day  Qty: 2700 mL, Refills: 2      miconazole NITRATE 2 % (MICOTIN) 2 % top powder Apply topically 2 (two) times daily.  Qty: 85 g, Refills: 1      rifAXIMin (XIFAXAN) 550 mg Tab Take 1 tablet (550 mg total) by mouth 2 (two) times daily.  Qty: 60 tablet, Refills: 2         CONTINUE these medications which have NOT CHANGED    Details   acetaminophen (TYLENOL) 500 MG tablet Take 500 mg by mouth 2 (two) times daily as needed for Pain.      amLODIPine (NORVASC) 5 MG tablet Take 5 mg by mouth once daily.      diclofenac sodium (VOLTAREN) 1 % Gel Apply 2 g topically 3 (three) times daily.  Qty: 2 g, Refills: 0      ergocalciferol (VITAMIN D2) 50,000 unit Cap Take 50,000 Units by mouth every Thursday.      LIDOcaine (LIDODERM) 5 % Place 1 patch onto the skin every 24 hours. Remove & Discard patch within 12 hours or as directed by MD      multivitamin with minerals tablet Take 1 tablet by mouth once daily.      predniSONE (DELTASONE) 5 MG tablet Take 1-2 tablets (5-10 mg total) by mouth 2 (two) times daily as needed (arthritis).  Qty: 30 tablet, Refills: 1    Associated Diagnoses: Rheumatoid arthritis involving multiple sites with positive rheumatoid factor      sodium chloride (OCEAN) 0.65 % nasal spray 1 spray by Nasal route as needed for Congestion.      spironolactone (ALDACTONE) 50 MG tablet Take 1 tablet (50 mg total) by mouth once daily.  Qty: 30 tablet, Refills: 11    Comments: .         STOP taking these medications       cholestyramine (QUESTRAN) 4 gram packet Comments:   Reason for Stopping:         oxyCODONE-acetaminophen (PERCOCET) 5-325 mg per tablet Comments:   Reason for Stopping:                  Immunizations Administered as of 7/24/2022     Name  Date Dose VIS Date Route Exp Date    COVID-19, MRNA, LN-S, PF (Pfizer) (Purple Cap) 10/5/2021 0.3 mL -- Intramuscular --    Site: Left arm     : Pf[employer] Inc     Lot: [id number]     COVID-19, MRNA, LN-S, PF (Pfizer) (Purple Cap) 2/5/2021 -- -- Intramuscular --    Site: Left arm     : Pf[employer] Inc     Lot: [id number]     COVID-19, MRNA, LN-S, PF (Pfizer) (Purple Cap) 2/5/2021 0.3 mL -- Intramuscular --    Site: Left arm     : Pf[employer] Inc     Lot: [id number]     COVID-19, MRNA, LN-S, PF (Pfizer) (Purple Cap) 1/15/2021 -- -- Intramuscular --    Site: Left arm     : Pf[employer] Inc     Lot: [id number]           This patient has had both covid vaccinations    Some patients may experience side effects after vaccination.  These may include fever, headache, muscle or joint aches.  Most symptoms resolve with 24-48 hours and do not require urgent medical evaluation unless they persist for more than 72 hours or symptoms are concerning for an unrelated medical condition.          _________________________________  Chandler Walton DO  07/24/2022

## 2022-07-24 NOTE — CONSULTS
Food & Nutrition  Education    Diet Education: Low sodium   Time Spent: 15 minutes   Learners: Patient       Nutrition Education provided with handouts: Low sodium nutrition therapy      Comments: Pt requesting diet education 2/2 liver cirrhosis. Reviewed with Pt the importance of a low salt diet. Went over food labels, foods not recommended, foods recommended, and portion sizes. Review seasonings and daily intake.   All questions and concerns answered.       Follow-Up: Yes

## 2022-08-05 NOTE — TELEPHONE ENCOUNTER
----- Message from Destiney Buckley sent at 8/5/2022 11:17 AM CDT -----      Name of Who is Calling: SARAY WISE [[id number]]      What is the request in detail: Nurse called said pt needs a sonner appt than scheduled.Please contact to further discuss and advise.          Can the clinic reply by [facility]: N      What [facility]: [phone]

## 2022-08-05 NOTE — TELEPHONE ENCOUNTER
Unable to LVM for patient to return call to office. Please see message below for regards. Thanks.

## 2022-08-06 NOTE — TELEPHONE ENCOUNTER
Contacted pt to try and get her scheduled for a consult but pt just got out the hospital yesterday 8.6.22     So I informed her to call us when she's ready for an appt

## 2022-08-14 NOTE — TELEPHONE ENCOUNTER
MA tried contacting pt to schedule a consult seems as if she answered but wasn't saying anything but I seen note from last week that stated she will call back when she's ready 8/14

## 2022-08-16 NOTE — TELEPHONE ENCOUNTER
I sent in the Kristalose. She can try taking Magnesium over the counter.    Please reschedule with [facility] for constipation. Thanks!

## 2022-08-16 NOTE — TELEPHONE ENCOUNTER
Returned Ghislaine's call.    Ghislaine states she would be more than happy to speak with PCP if needed.    1) Wants Rx for Kristalose for pt as this is what was Rx'd at [facility] by Dr. Ness. Cost for Kristalose is $900 and will need PA. They tried using lactulose, but pt have not had results and became very constipated with very hard stool.  Friend Ghislaine went and paid for a couple of days worth out of her pocket to try again and states that Kristalose worked.    Please Rx Kristalose and have PA done.   [facility] [address]    2) Also want to know if pt can safely take Calm Magnesium supplements (that are added to water)?

## 2022-08-16 NOTE — TELEPHONE ENCOUNTER
----- Message from Destiney Buckley sent at 8/15/2022  2:09 PM CDT -----      Name of Who is Calling: SARAY WISE [[id number]]      What is the request in detail: called to increase home health and lactulose (CHRONULAC) 10 gram/15 mL solution medication isn't working for her.Please contact to further discuss and advise.          Can the clinic reply by [facility]: N      What Number to Call Back if not in [facility]: Ghislaine Albarran [phone]

## 2022-08-17 NOTE — TELEPHONE ENCOUNTER
Spoke to Ghislaine power of  for Ms. Orellana and informed her per Dr. Graf that I sent in the Kristalose. She can try taking Magnesium over the counter.     Please reschedule with [facility] or [facility] for constipation. Thanks!    Ghislaine states understanding and that she would like to contact patient insurance before schedule GI to see if they can schedule a transportation service to transfer her to her appt.

## 2022-08-19 NOTE — TELEPHONE ENCOUNTER
----- Message from Roseann Hayden sent at 8/18/2022 10:59 AM CDT -----   Name of Who is Calling:     What is the request in detail:    request call back in reference to medication lactulose (KRISTALOSE) 20 gram Pack / state generic is not working and pre authorization is needed for brand name  [facility] and want to know if can change scheduled appointment to virtual appointment   Please contact to further discuss and advise      Can the clinic reply by [facility]:     What Number to Call Back if not in [facility]: [phone] / victorina / care giver

## 2022-08-19 NOTE — TELEPHONE ENCOUNTER
Please keep it as an in person visit. Hospital follow up visits should be in person.  I also haven't seen her for 2 years. Thank you!

## 2022-08-21 PROBLEM — E87.5 HYPERKALEMIA: Status: ACTIVE | Noted: 2022-01-01

## 2022-08-21 PROBLEM — N17.9 AKI (ACUTE KIDNEY INJURY): Status: ACTIVE | Noted: 2022-01-01

## 2022-08-21 PROBLEM — A41.9 SEPSIS: Status: ACTIVE | Noted: 2022-01-01

## 2022-08-21 NOTE — ASSESSMENT & PLAN NOTE
Admitted to hospital medicine from 7/17-7/24 for evaluation of altered mental status with labs notable for hyperammonemia. At the time improved with lactulose and rifaximin. Patient unsure if she has been taking it at home since discharge.    - continue lactulose and rifaximin

## 2022-08-21 NOTE — ED NOTES
Pt. With complains of weakness stated has liver failure stated does not walk and has 24 hour home health care.

## 2022-08-21 NOTE — CARE UPDATE
RAPID RESPONSE NURSE PROACTIVE ROUNDING NOTE       Time of Visit: 0545    Admit Date: 2022  LOS: 1  Code Status: Full Code   Date of Visit: 2022  : 1945  Age: 77 y.o.  Sex: female  Race: White  Bed: 30004/46615 A:   MRN: [id number]  Was the patient discharged from an ICU this admission? No   Was the patient discharged from a PACU within last 24 hours? No   Did the patient receive conscious sedation/general anesthesia in last 24 hours? No  Was the patient in the ED within the past 24 hours? Yes  Was the patient on NIPPV within the past 24 hours? No   Attending Physician: Chari Ngo*  Primary Service: [facility]   Time spent at the bedside: < 15 min    SITUATION    Notified by charge RN via phone call.  Reason for alert: altered mental status   Called to evaluate the patient for altered mental status    BACKGROUND     Why is the patient in the hospital?: Sepsis    Patient has a past medical history of Anemia, Anxiety, Cataract, Colon polyps, Fibromyalgia, Lung cancer, Pneumonia due to infectious organism, Post-polio syndrome, Rheumatoid arthritis(714.0), Thyroid disease, and Vertigo.    Last Vitals:  Temp: 96.2 °F (35.7 °C) ( 0507)  Pulse: 80 ( 0545)  Resp: 14 ( 0545)  BP: 123/58 ( 0545)  SpO2: 96 % ( 0545)    24 Hours Vitals Range:  Temp:  [96.2 °F (35.7 °C)-97.5 °F (36.4 °C)]   Pulse:  []   Resp:  [10-20]   BP: (116-150)/(55-88)   SpO2:  [93 %-100 %]     Labs:  Recent Labs     22  0424   WBC 20.46* 15.93*   HGB 12.0 12.2   HCT 33.9* 36.2*   * 125*       Recent Labs     22   *   K 6.0*   CL 98   CO2 17*   CREATININE 1.9*   *   MG 2.1        No results for input(s): PH, PCO2, PO2, HCO3, POCSATURATED, BE in the last 72 hours.     ASSESSMENT    Physical Exam  Cardiovascular:      Rate and Rhythm: Normal rate and regular rhythm.   Pulmonary:      Effort: Pulmonary effort is normal.   Musculoskeletal:       Right lower leg: Edema present.      Left lower leg: Edema present.   Skin:     General: Skin is warm.      Coloration: Skin is jaundiced.   Neurological:      Mental Status: She is alert and oriented to person, place, and time.      Motor: Weakness present.   Psychiatric:         Speech: Speech is delayed.         Behavior: Behavior is slowed.         INTERVENTIONS    Examined at bedside   Obtained VS  Reviewed pertinent labs     RECOMMENDATIONS    Follow up repeat lactic acid   Continue current plan of care    PROVIDER ESCALATION    Yes/No  no    Orders received and case discussed with NA.    Disposition: Remain in room 04351.    FOLLOW-UP    Charge RNSlava and Bedside RN Aliya  updated on plan of care. Instructed to call the Rapid Response Nurse, Ari Wolf RN at [phone] for additional questions or concerns.

## 2022-08-21 NOTE — PROGRESS NOTES
Pharmacokinetic Initial Assessment: IV Vancomycin    Assessment/Plan:    Intravenous vancomycin with loading dose of 2000 mg was given once in ED. Subsequent doses when random concentrations are less than 20 mcg/mL will be given. Desired empiric serum trough concentration is 15 to 20 mcg/mL  Draw vancomycin random level on 08/21 at 1800.  Pharmacy will continue to follow and monitor vancomycin.      Please contact pharmacy at extension [phone] with any questions regarding this assessment.     Thank you for the consult,   Thee Miller       Patient brief summary:  Yana Orellana is a 77 y.o. female initiated on antimicrobial therapy with IV Vancomycin for treatment of suspected bacteremia    Drug Allergies:   Review of patient's allergies indicates:   Allergen Reactions    Ibuprofen Nausea Only    Demerol [meperidine]      Euphoria    Gabapentin Other (See Comments)     Hallucinations    Mellaril-s Nausea Only    Versed [midazolam]      Excessive talking, hyper    Vicodin [hydrocodone-acetaminophen]      Joint pain, muscle pain    Xanax [alprazolam]      fatigue       Actual Body Weight:   136.1 kg    Renal Function:   Estimated Creatinine Clearance: 38.6 mL/min (A) (based on SCr of 1.9 mg/dL (H)).,     Dialysis Method (if applicable):  N/A    CBC (last 72 hours):  Recent Labs   Lab Result Units 08/20/22 2037 08/21/22  0424   WBC K/uL 20.46* 15.93*   Hemoglobin g/dL 12.0 12.2   Hematocrit % 33.9* 36.2*   Platelets K/uL 139* 125*   Gran % % 85.1* 84.0*   Lymph % % 3.7* 5.0*   Mono % % 6.5 5.6   Eosinophil % % 0.2 0.4   Basophil % % 0.4 0.5   Differential Method  Automated Automated       Metabolic Panel (last 72 hours):  Recent Labs   Lab Result Units 08/20/22 2037 08/21/22  0424   Sodium mmol/L 127* 127*   Potassium mmol/L 6.0* 5.2*   Chloride mmol/L 98 98   CO2 mmol/L 17* 17*   Glucose mg/dL 149* 146*   BUN mg/dL 68* 69*   Creatinine mg/dL 1.9* 1.9*   Albumin g/dL 1.8* 1.7*   Total Bilirubin mg/dL 8.0*  8.5*   Alkaline Phosphatase U/L 142* 143*   AST U/L 59* 53*   ALT U/L 37 35   Magnesium mg/dL 2.1 2.0   Phosphorus mg/dL  --  4.5       Drug levels (last 3 results):  No results for input(s): VANCOMYCINRA, VANCORANDOM, VANCOMYCINPE, VANCOPEAK, VANCOMYCINTR, VANCOTROUGH in the last 72 hours.    Microbiologic Results:  Microbiology Results (last 7 days)       Procedure Component Value Units Date/Time    Blood culture [[id number]]     Order Status: Sent Specimen: Blood     Blood culture [[id number]]     Order Status: Sent Specimen: Blood     Blood culture [[id number]]     Order Status: Sent Specimen: Blood     Blood culture [[id number]]     Order Status: Sent Specimen: Blood     Blood culture x two cultures. Draw prior to antibiotics. [[id number]] Collected: 08/20/22 2038    Order Status: Completed Specimen: Blood from Peripheral, Antecubital, Right Updated: 08/21/22 1044     Blood Culture, Routine Gram stain aer bottle: Gram negative rods       Results called to and read back by:DANIELA PICHARDO RN 08/21/2022  10:44    Narrative:      Aerobic and anaerobic    Blood culture x two cultures. Draw prior to antibiotics. [[id number]] Collected: 08/20/22 2038    Order Status: Completed Specimen: Blood from Peripheral, Antecubital, Right Updated: 08/21/22 0315     Blood Culture, Routine No Growth to date    Narrative:      Aerobic and anaerobic    Culture, Respiratory with Gram Stain [[id number]]     Order Status: No result Specimen: Sputum, Expectorated

## 2022-08-21 NOTE — ASSESSMENT & PLAN NOTE
Creatinine 1.9 on admit, baseline around 0.6  - Likely pre-renal from dehydration and volume losses as patient appears dry on exam. Patient has elevated Cr, mild hyperkalemia and hyponatremia.   - Serum osm 301, higher than expected.     DDx includes prerenal SONYA vs Type 4 RTA vs myeloma related light chain nephropathy      Lab Results   Component Value Date    CREATININE 1.9 (H) 08/21/2022       RP US negative for hydronephrosis.     Plan:   - Check urine lytes: Na, K, chloride, osm  - Check urine protein creatinine ratio.  - UA, SPEP, STEPHIE due to protein gap (albumin 1.7, protein 6.1)  - Strict I&Os and daily weights   - Daily BMP  - Trend sCr  - Avoid nephrotoxic agents such as NSAIDs, gadolinium, ACEi, ARBs and IV radiocontrast.  - Renally dose meds to current GFR.  - Maintain MAP > 65.  - No indications for HD at this time.   - Maintain electrolytes at goal: Mg > 2, Phos > 3, K > 4.

## 2022-08-21 NOTE — CONSULTS
[facility] ([place] [address])  Nephrology  Consult Note    Patient Name: Yana Orellana  MRN: [id number]  Admission Date: 8/20/2022  Hospital Length of Stay: 1 days  Attending Provider: Chari Ngo*   Primary Care Physician: Caitlyn Graf MD  Principal Problem:Sepsis    Inpatient consult to Nephrology  Consult performed by: García Pan MD  Consult ordered by: Sherman Michele MD        Subjective:     HPI:  Yana Orellana is a 77 y.o. F w/PMH of HTN, non-alcoholic cirrhosis, anxiety, lung cancer s/p Left lower lobectomy and chemotherapy (gem/cis), polyneuropathy, post-polio syndrome and fibromyalgia, who presents to the ED with profound generalized weakness and abdominal pain. History was difficult to obtain from patient due to significant dysarthria. Per chart review, she has been progressively weaker since being discharged from the hospital last month. She was hospitalized from 7/17 to 7/24 due to altered mentation secondary to hepatic encephalopathy and hyperammonemia, and her mentation improved after receiving lactulose and rifaximin. She was discharged to acute rehab, but says she was not able to tolerate the physical therapy there and was sent home with home health nursing. Pt says now she is bedbound and can barely move or talk without significant effort, and that she was much more functional and independent prior to the last hospitalization. She also complains of generalized myalgias and pain, worse in her right leg and her abdomen. She denies any recent fever, headache, chest pain, SOB, or n/v/d.     Nephrology consulted for SONYA. Baseline Cr 0.6, now 1.9. No UOP recorded. History is limited 2/2 to patient's dysarthria. Does not see outpt nephrology. Patient does report thirst, denies decreased UOP. She has received 500 cc LR in the ED.          Past Medical History:   Diagnosis Date    Anemia     Anxiety     Cataract     Colon polyps 2012    Fibromyalgia 10/15/2012     Lung cancer     Pneumonia due to infectious organism 7/19/2019    Post-polio syndrome     Rheumatoid arthritis(714.0) 7/16/2012    2009 onset with mod positive CCP and RF MTX 2011 - present     Thyroid disease     Vertigo     postural vertigo       Past Surgical History:   Procedure Laterality Date    BLADDER SURGERY  1959    CHOLECYSTECTOMY      HYSTERECTOMY  1988    LUNG REMOVAL, PARTIAL  2009    left lower lobectomy    OVARIAN CYST REMOVAL  1970    TONSILLECTOMY  1952       Review of patient's allergies indicates:   Allergen Reactions    Ibuprofen Nausea Only    Demerol [meperidine]      Euphoria    Gabapentin Other (See Comments)     Hallucinations    Mellaril-s Nausea Only    Versed [midazolam]      Excessive talking, hyper    Vicodin [hydrocodone-acetaminophen]      Joint pain, muscle pain    Xanax [alprazolam]      fatigue     Current Facility-Administered Medications   Medication Frequency    dextrose 10% bolus 125 mL PRN    dextrose 10% bolus 250 mL PRN    [START ON 8/25/2022] ergocalciferol capsule 50,000 Units Every Thurs    glucagon (human recombinant) injection 1 mg PRN    glucose chewable tablet 16 g PRN    glucose chewable tablet 24 g PRN    heparin (porcine) injection 5,000 Units Q8H    lactulose 20 gram/30 mL solution Soln 20 g Daily    melatonin tablet 6 mg Nightly PRN    naloxone 0.4 mg/mL injection 0.02 mg PRN    oxyCODONE 5 mg/5 mL solution 5 mg Q6H PRN    piperacillin-tazobactam 4.5 g in sodium chloride 0.9% 100 mL IVPB (ready to mix system) Q8H    predniSONE tablet 5 mg Daily PRN    rifAXIMin tablet 550 mg BID    senna-docusate 8.6-50 mg per tablet 1 tablet BID PRN    sodium chloride 0.9% flush 10 mL Q12H PRN     Family History       Problem Relation (Age of Onset)    Arrhythmia Mother    Colon cancer Maternal Grandfather    Diabetes Maternal Grandmother    Heart attack Son (45)    Lung cancer Father, Maternal Aunt, Maternal Uncle    Parkinsonism Paternal  Grandmother          Tobacco Use    Smoking status: Former Smoker     Packs/day: 1.50     Years: 40.00     Pack years: 60.00     Types: Cigarettes     Quit date: 2009     Years since quittin.1    Smokeless tobacco: Never Used   Substance and Sexual Activity    Alcohol use: Not Currently    Drug use: No    Sexual activity: Not Currently     Review of Systems   Unable to perform ROS: Acuity of condition   Objective:     Vital Signs (Most Recent):  Temp: 97.1 °F (36.2 °C) (22 1306)  Pulse: 89 (22 1536)  Resp: 18 (22 1306)  BP: (!) 131/58 (22 1306)  SpO2: 97 % (22 1306)  O2 Device (Oxygen Therapy): room air (22 0545)   Vital Signs (24h Range):  Temp:  [96.2 °F (35.7 °C)-97.5 °F (36.4 °C)] 97.1 °F (36.2 °C)  Pulse:  [] 89  Resp:  [10-20] 18  SpO2:  [93 %-100 %] 97 %  BP: (116-150)/(55-88) 131/58     Weight: 136.1 kg (300 lb) (22 0611)  Body mass index is 43.05 kg/m².  Body surface area is 2.59 meters squared.    No intake/output data recorded.    Physical Exam  Constitutional:       General: She is not in acute distress.     Appearance: She is obese. She is ill-appearing.   HENT:      Head: Normocephalic.      Nose: Nose normal. No congestion.      Mouth/Throat:      Mouth: Mucous membranes are dry.   Cardiovascular:      Rate and Rhythm: Normal rate and regular rhythm.      Pulses: Normal pulses.      Heart sounds: Normal heart sounds.   Pulmonary:      Effort: Pulmonary effort is normal.      Breath sounds: Normal breath sounds.   Abdominal:      General: Abdomen is flat.      Palpations: Abdomen is soft.   Musculoskeletal:      Cervical back: Normal range of motion.   Neurological:      Mental Status: She is alert.      Sensory: No sensory deficit.      Motor: Weakness present.       Significant Labs:  CBC:   Recent Labs   Lab 22  0424   WBC 15.93*   RBC 3.45*   HGB 12.2   HCT 36.2*   *   *   MCH 35.4*   MCHC 33.7     CMP:   Recent Labs    Lab 08/21/22  0424   *   CALCIUM 8.5*   ALBUMIN 1.7*   PROT 6.1   *   K 5.2*   CO2 17*   CL 98   BUN 69*   CREATININE 1.9*   ALKPHOS 143*   ALT 35   AST 53*   BILITOT 8.5*     All labs within the past 24 hours have been reviewed.    Significant Imaging:  Labs: Reviewed    Assessment/Plan:     SONYA (acute kidney injury)  Creatinine 1.9 on admit, baseline around 0.6  - Likely pre-renal from dehydration and volume losses as patient appears dry on exam. Patient has elevated Cr, mild hyperkalemia and hyponatremia.   - Serum osm 301, higher than expected.     DDx includes prerenal SONYA vs Type 4 RTA vs myeloma related light chain nephropathy      Lab Results   Component Value Date    CREATININE 1.9 (H) 08/21/2022       RP US negative for hydronephrosis.     Plan:   - Check urine lytes: Na, K, chloride, osm  - Check urine protein creatinine ratio.  - UA, SPEP, STEPHIE due to protein gap (albumin 1.7, protein 6.1)  - Strict I&Os and daily weights   - Daily BMP  - Trend sCr  - Avoid nephrotoxic agents such as NSAIDs, gadolinium, ACEi, ARBs and IV radiocontrast.  - Renally dose meds to current GFR.  - Maintain MAP > 65.  - No indications for HD at this time.   - Maintain electrolytes at goal: Mg > 2, Phos > 3, K > 4.          Thank you for your consult. I will follow-up with patient. Please contact us if you have any additional questions.    García Pan MD  Nephrology  [facility] - Intensive Care ([place]-[facility])

## 2022-08-21 NOTE — PLAN OF CARE
Problem: Adult Inpatient Plan of Care  Goal: Plan of Care Review  Outcome: Ongoing, Progressing  Goal: Patient-Specific Goal (Individualized)  Outcome: Ongoing, Progressing  Goal: Absence of Hospital-Acquired Illness or Injury  Outcome: Ongoing, Progressing  Goal: Optimal Comfort and Wellbeing  Outcome: Ongoing, Progressing  Goal: Readiness for Transition of Care  Outcome: Ongoing, Progressing     Problem: Bariatric Environmental Safety  Goal: Safety Maintained with Care  Outcome: Ongoing, Progressing     Problem: Adjustment to Illness (Sepsis/Septic Shock)  Goal: Optimal Coping  Outcome: Ongoing, Progressing     Problem: Bleeding (Sepsis/Septic Shock)  Goal: Absence of Bleeding  Outcome: Ongoing, Progressing     Problem: Glycemic Control Impaired (Sepsis/Septic Shock)  Goal: Blood Glucose Level Within Desired Range  Outcome: Ongoing, Progressing     Problem: Infection Progression (Sepsis/Septic Shock)  Goal: Absence of Infection Signs and Symptoms  Outcome: Ongoing, Progressing     Problem: Nutrition Impaired (Sepsis/Septic Shock)  Goal: Optimal Nutrition Intake  Outcome: Ongoing, Progressing     Problem: Fluid and Electrolyte Imbalance (Acute Kidney Injury/Impairment)  Goal: Fluid and Electrolyte Balance  Outcome: Ongoing, Progressing     Problem: Oral Intake Inadequate (Acute Kidney Injury/Impairment)  Goal: Optimal Nutrition Intake  Outcome: Ongoing, Progressing     Problem: Renal Function Impairment (Acute Kidney Injury/Impairment)  Goal: Effective Renal Function  Outcome: Ongoing, Progressing     Problem: Impaired Wound Healing  Goal: Optimal Wound Healing  Outcome: Ongoing, Progressing     Problem: Skin Injury Risk Increased  Goal: Skin Health and Integrity  Outcome: Ongoing, Progressing     Problem: Adjustment to Illness (Delirium)  Goal: Optimal Coping  Outcome: Ongoing, Progressing     Problem: Altered Behavior (Delirium)  Goal: Improved Behavioral Control  Outcome: Ongoing, Progressing     Problem:  Attention and Thought Clarity Impairment (Delirium)  Goal: Improved Attention and Thought Clarity  Outcome: Ongoing, Progressing     Problem: Sleep Disturbance (Delirium)  Goal: Improved Sleep  Outcome: Ongoing, Progressing     Problem: Infection  Goal: Absence of Infection Signs and Symptoms  Outcome: Ongoing, Progressing  [id number]  Neuro: A&Ox2, disoriented to time and situation, slurred soft speech, weak strength to bilateral upper extremity grasp, absent dorsiflexion & plantarflexion to bilateral lower extremities, weakness to bilateral upper extremities, significant weakness to lower extremities, PERRLA, GCS 14, CAM positive.   CV: Cool extremities, decreased perfusion to all extremities noted. VSS.   Resp: VSS on room air. Lungs sounds diminished throughout on auscultation.   Skin: Skin warm, dry, and intact. Vaginal discharge noted, MD notified. Assisted with frequent turn/repositioning.    GI/: Pt failed nurse bedside swallow evaluation. Unable to tolerate 30mL of water without coughing, MD notified, NPO orders placed. Incontinent of bowel & bladder. Patient did not void this shift, notified MD team, newsome placed by this nurse. Cloudy concentrated orange urine. Kasey & newsome care provided.   M/S: Assist x2-3 in bed, unable to move lower extremities.    Pain: Denied pain this shift, frequently assessed.     Transfer orders in for stepdown unit. Newsome placed and urine specimens collected by this nurse. Zosyn antibiotics hung. Report called and given to RN on stepdown unit. Additional bedside swallow evaluation done by this nurse, MD team notified, placed orders for NG tube placement. Transport arrived to bring patient to ultrasound. Pt to transfer to stepdown from ultrasound. Belongings sent with pt to ultrasound.     Frequently rounded, call bell and personal items within reach. For further information please refer to flowsheet.

## 2022-08-21 NOTE — HPI
Yana Orellana is a 77 y.o. F w/PMH of HTN, non-alcoholic cirrhosis, anxiety, lung cancer s/p Left lower lobectomy and chemotherapy (gem/cis), polyneuropathy, post-polio syndrome and fibromyalgia, who presents to the ED with profound generalized weakness and abdominal pain. History was difficult to obtain from patient due to significant dysarthria. Per chart review, she has been progressively weaker since being discharged from the hospital last month. She was hospitalized from 7/17 to 7/24 due to altered mentation secondary to hepatic encephalopathy and hyperammonemia, and her mentation improved after receiving lactulose and rifaximin. She was discharged to acute rehab, but says she was not able to tolerate the physical therapy there and was sent home with home health nursing. Pt says now she is bedbound and can barely move or talk without significant effort, and that she was much more functional and independent prior to the last hospitalization. She also complains of generalized myalgias and pain, worse in her right leg and her abdomen. She denies any recent fever, headache, chest pain, SOB, or n/v/d.     Nephrology consulted for SONYA. Baseline Cr 0.6, now 1.9. No UOP recorded. History is limited 2/2 to patient's dysarthria. Does not see outpt nephrology. Patient does report thirst, denies decreased UOP. She has received 500 cc LR in the ED.

## 2022-08-21 NOTE — NURSING
Lab called a critical Lactic this morning for Yana Orellana.  Currently it is 4.4.  Reported to Med team 3.  No new orders given at this time.

## 2022-08-21 NOTE — HOSPITAL COURSE
08/21/22  Following admission, labs showed uptrending T-bili from 8.0 to 8.5. Lactate trending up to 4.4, Cr unchanged from 1.9, and pt was given another 500ccs of NS. NG tube placed for medication and nutrition.  08/22/2022  Pt had midline placed and NG tube placed day prior. Pt passed swallow test and NG was removed. Cultures came back GNR so vanc d/c. 1L NS bolus recommended by Nephrology and was given. Lactate down to 4.1 from 4.8. Phos 5.8 with tums given as a binder. POA updated on status of patient. Goals of care discussed.

## 2022-08-21 NOTE — ASSESSMENT & PLAN NOTE
Presented initially with K of 6.0. Shifted with insulin and sodium zirconium cyclosilicate. Given lokelma.    - trend BMP  - K down to 5.2 this morning

## 2022-08-21 NOTE — ASSESSMENT & PLAN NOTE
Hx of non-alcoholic cirrhosis. T-bili elevated at 8.0 with mildly elevated AST of 59.    - Trend CMP and LFTs  - F/u Limited abdo US

## 2022-08-21 NOTE — HPI
Yana Orellana is a 77 y.o. F w/PMH of HTN, non-alcoholic cirrhosis, anxiety, lung cancer s/p Left lower lobectomy and chemotherapy (gem/cis), polyneuropathy, post-polio syndrome and fibromyalgia, who presents to the ED with profound generalized weakness and abdominal pain. History was difficult to obtain from patient due to significant dysarthria. Pt reports that she has been progressively weaker since being discharged from the hospital last month. She was hospitalized from 7/17 to 7/24 due to altered mentation secondary to hepatic encephalopathy and hyperammonemia, and her mentation improved after receiving lactulose and rifaximin. She was discharged to acute rehab, but says she was not able to tolerate the physical therapy there and was sent home with home health nursing. Pt says now she is bedbound and can barely move or talk without significant effort, and that she was much more functional and independent prior to the last hospitalization. She also complains of generalized myalgias and pain, worse in her right leg and her abdomen. She denies any recent fever, headache, chest pain, SOB, or n/v/d.    ED Course: On presentation to the ED, pt was hemodynamically stable and initial workup showed labs notable for WBC 20.46, lactate 4.2, procal 1.09, Na 127, K 6.0, Bicarb 17, BUN/Cr 68/1.9, T-bili 8.0, AST 59. CXR showed opacity in LLL, possibly reflective of inflammation or infection. Xray pelvis and CT head were unrevealing. In the ED, pt received insulin to shift K, dextrose 250cc, LR 500cc, and NS 1.0L. She was started on vanc and zosyn, and she was given morphine 2mg and Lokelma. Pt was admitted to Hospital Medicine team 3.

## 2022-08-21 NOTE — ASSESSMENT & PLAN NOTE
Pt presenting with elevated WBC of 20.46, lactate of 4.2, procal of 1.09. Imaging showing possible infection or inflammation in Left lower lobe of lung. Pt not overtly ill and shows no acute signs of infection, however.  Antibiotics given-   Antibiotics (From admission, onward)            Start     Stop Route Frequency Ordered    08/21/22 0900  rifAXIMin tablet 550 mg         -- Oral 2 times daily 08/21/22 0110    08/20/22 2130  piperacillin-tazobactam 4.5 g in sodium chloride 0.9% 100 mL IVPB (ready to mix system)  (ED Adult Sepsis Treatment)         08/21 2129 IV Every 8 hours (non-standard times) 08/20/22 2118        Cultures were taken-   Microbiology Results (last 7 days)     Procedure Component Value Units Date/Time    Blood culture x two cultures. Draw prior to antibiotics. [[id number]] Collected: 08/20/22 2038    Order Status: Completed Specimen: Blood from Peripheral, Antecubital, Right Updated: 08/21/22 0315     Blood Culture, Routine No Growth to date    Narrative:      Aerobic and anaerobic    Blood culture x two cultures. Draw prior to antibiotics. [[id number]] Collected: 08/20/22 2038    Order Status: Completed Specimen: Blood from Peripheral, Antecubital, Right Updated: 08/21/22 0315     Blood Culture, Routine No Growth to date    Narrative:      Aerobic and anaerobic    Culture, Respiratory with Gram Stain [[id number]]     Order Status: No result Specimen: Sputum, Expectorated         Latest lactate reviewed, they are-  Recent Labs   Lab 08/20/22 2037 08/21/22  0423   LACTATE 4.2* 4.4*     Organ dysfunction indicated by Acute kidney injury  Source- Possibly pulmonary source.     - CXR showed possible infectious/inflammatory process in LLL  - BCx pending  - Started on empiric IV zosyn  - Daily CBCs, WBC trending down to 15.93  - Trend lactate

## 2022-08-21 NOTE — ASSESSMENT & PLAN NOTE
Patient with acute kidney injury likely due to IVVD/dehydration and pre-renal azotemia SONYA is currently stable. Labs reviewed- Renal function/electrolytes with Estimated Creatinine Clearance: 38.6 mL/min (A) (based on SCr of 1.9 mg/dL (H)). according to latest data. Monitor urine output and serial BMP and adjust therapy as needed. Avoid nephrotoxins and renally dose meds for GFR listed above.     Creatinine 1.9 on admit, baseline around 0.7. Still Cr 1.9 on repeat.  - Likely pre-renal from dehydration and volume losses and poor PO intake    Plan:   - IVF  - Order urine electrolytes, osmols, and urine protein/creatinine ratio.  - Order RP U/S  - Strict I&Os   - Consult nephrology  - Avoid nephrotoxic agents such as NSAIDs, gadolinium and IV radiocontrast.  - Renally dose meds to current GFR.  - Maintain MAP > 65.

## 2022-08-21 NOTE — H&P
[facility] ([address] Medicine  History & Physical    Patient Name: Yana Orellana  MRN: [id number]  Patient Class: IP- Inpatient  Admission Date: 8/20/2022  Attending Physician:    Primary Care Provider: Caitlyn Graf MD    Patient information was obtained from patient and ER records.     Subjective:     Principal Problem:Sepsis    Chief Complaint:   Chief Complaint   Patient presents with    Fatigue     Fatigue, generalized weakness x 2 months. Friends called EMS because they could not lift patient.         HPI: Yana Orellana is a 77 y.o. F w/PMH of HTN, non-alcoholic cirrhosis, anxiety, lung cancer s/p Left lower lobectomy and chemotherapy (gem/cis), polyneuropathy, post-polio syndrome and fibromyalgia, who presents to the ED with profound generalized weakness and abdominal pain. History was difficult to obtain from patient due to significant dysarthria. Pt reports that she has been progressively weaker since being discharged from the hospital last month. She was hospitalized from 7/17 to 7/24 due to altered mentation secondary to hepatic encephalopathy and hyperammonemia, and her mentation improved after receiving lactulose and rifaximin. She was discharged to acute rehab, but says she was not able to tolerate the physical therapy there and was sent home with home health nursing. Pt says now she is bedbound and can barely move or talk without significant effort, and that she was much more functional and independent prior to the last hospitalization. She also complains of generalized myalgias and pain, worse in her right leg and her abdomen. She denies any recent fever, headache, chest pain, SOB, or n/v/d.    ED Course: On presentation to the ED, pt was hemodynamically stable and initial workup showed labs notable for WBC 20.46, lactate 4.2, procal 1.09, Na 127, K 6.0, Bicarb 17, BUN/Cr 68/1.9, T-bili 8.0, AST 59. CXR showed opacity in LLL, possibly reflective of inflammation  or infection. Xray pelvis and CT head were unrevealing. In the ED, pt received insulin to shift K, dextrose 250cc, LR 500cc, and NS 1.0L. She was started on vanc and zosyn, and she was given morphine 2mg and Lokelma. Pt was admitted to Hospital Medicine team 3.      Past Medical History:   Diagnosis Date    Anemia     Anxiety     Cataract     Colon polyps 2012    Fibromyalgia 10/15/2012    Lung cancer     Pneumonia due to infectious organism 7/19/2019    Post-polio syndrome     Rheumatoid arthritis(714.0) 7/16/2012    2009 onset with mod positive CCP and RF MTX 2011 - present     Thyroid disease     Vertigo     postural vertigo       Past Surgical History:   Procedure Laterality Date    BLADDER SURGERY  1959    CHOLECYSTECTOMY      HYSTERECTOMY  1988    LUNG REMOVAL, PARTIAL  2009    left lower lobectomy    OVARIAN CYST REMOVAL  1970    TONSILLECTOMY  1952       Review of patient's allergies indicates:   Allergen Reactions    Ibuprofen Nausea Only    Demerol [meperidine]      Euphoria    Gabapentin Other (See Comments)     Hallucinations    Mellaril-s Nausea Only    Versed [midazolam]      Excessive talking, hyper    Vicodin [hydrocodone-acetaminophen]      Joint pain, muscle pain    Xanax [alprazolam]      fatigue       No current facility-administered medications on file prior to encounter.     Current Outpatient Medications on File Prior to Encounter   Medication Sig    acetaminophen (TYLENOL) 500 MG tablet Take 500 mg by mouth 2 (two) times daily as needed for Pain.    amLODIPine (NORVASC) 5 MG tablet Take 5 mg by mouth once daily.    diclofenac sodium (VOLTAREN) 1 % Gel Apply 2 g topically 3 (three) times daily. (Patient taking differently: Apply 2 g topically 3 (three) times daily as needed (PAIN).)    ergocalciferol (VITAMIN D2) 50,000 unit Cap Take 50,000 Units by mouth every Thursday.    lactulose (CHRONULAC) 10 gram/15 mL solution Take 45 mLs (30 g total) by mouth 2 (two) times  daily. May adjust dose every 1 to 2 days to achieve 2 to 3 soft stools/day    lactulose (KRISTALOSE) 20 gram Pack Take 1 packet (20 g total) by mouth daily as needed (constipation).    LIDOcaine (LIDODERM) 5 % Place 1 patch onto the skin every 24 hours. Remove & Discard patch within 12 hours or as directed by MD    miconazole NITRATE 2 % (MICOTIN) 2 % top powder Apply topically 2 (two) times daily.    multivitamin with minerals tablet Take 1 tablet by mouth once daily.    predniSONE (DELTASONE) 5 MG tablet Take 1-2 tablets (5-10 mg total) by mouth 2 (two) times daily as needed (arthritis). (Patient taking differently: Take 5 mg by mouth daily as needed (arthritis pain).)    rifAXIMin (XIFAXAN) 550 mg Tab Take 1 tablet (550 mg total) by mouth 2 (two) times daily.    sodium chloride (OCEAN) 0.65 % nasal spray 1 spray by Nasal route as needed for Congestion.    spironolactone (ALDACTONE) 50 MG tablet Take 1 tablet (50 mg total) by mouth once daily.    [DISCONTINUED] cholestyramine (QUESTRAN) 4 gram packet Take 1 packet (4 g total) by mouth once daily.    [DISCONTINUED] oxyCODONE-acetaminophen (PERCOCET) 5-325 mg per tablet Take 1 tablet by mouth every 8 (eight) hours as needed for Pain. (Patient taking differently: Take 0.5 tablets by mouth every 8 (eight) hours as needed for Pain.)     Family History       Problem Relation (Age of Onset)    Arrhythmia Mother    Colon cancer Maternal Grandfather    Diabetes Maternal Grandmother    Heart attack Son (45)    Lung cancer Father, Maternal Aunt, Maternal Uncle    Parkinsonism Paternal Grandmother          Tobacco Use    Smoking status: Former Smoker     Packs/day: 1.50     Years: 40.00     Pack years: 60.00     Types: Cigarettes     Quit date: 2009     Years since quittin.1    Smokeless tobacco: Never Used   Substance and Sexual Activity    Alcohol use: Not Currently    Drug use: No    Sexual activity: Not Currently     Review of Systems    Constitutional:  Positive for activity change, appetite change and fatigue. Negative for fever.   HENT:  Positive for voice change. Negative for congestion and rhinorrhea.    Eyes:  Negative for photophobia and visual disturbance.   Respiratory:  Negative for cough and shortness of breath.    Cardiovascular:  Negative for chest pain and leg swelling.   Gastrointestinal:  Positive for abdominal pain. Negative for abdominal distention and nausea.   Genitourinary:  Negative for frequency and urgency.   Musculoskeletal:  Positive for myalgias.   Skin: Negative.    Neurological:  Positive for speech difficulty and weakness. Negative for syncope, facial asymmetry, numbness and headaches.   Psychiatric/Behavioral:  Positive for confusion. Negative for dysphoric mood.    Objective:     Vital Signs (Most Recent):  Temp: 97.5 °F (36.4 °C) (08/21/22 0147)  Pulse: 86 (08/21/22 0147)  Resp: 12 (08/21/22 0147)  BP: (!) 150/65 (08/21/22 0147)  SpO2: (!) 93 % (08/21/22 0147)   Vital Signs (24h Range):  Temp:  [96.9 °F (36.1 °C)-97.5 °F (36.4 °C)] 97.5 °F (36.4 °C)  Pulse:  [] 86  Resp:  [10-20] 12  SpO2:  [93 %-100 %] 93 %  BP: (116-150)/(55-88) 150/65     Weight: 136.1 kg (300 lb)  Body mass index is 43.05 kg/m².    Physical Exam  Constitutional:       General: She is not in acute distress.     Appearance: She is ill-appearing.   HENT:      Head: Normocephalic.      Nose: Nose normal. No congestion.      Mouth/Throat:      Mouth: Mucous membranes are moist.      Pharynx: Oropharynx is clear.   Eyes:      Pupils: Pupils are equal, round, and reactive to light.   Cardiovascular:      Rate and Rhythm: Normal rate and regular rhythm.      Pulses: Normal pulses.      Heart sounds: Normal heart sounds.   Pulmonary:      Effort: Pulmonary effort is normal.      Breath sounds: Normal breath sounds.   Abdominal:      General: Abdomen is flat.      Palpations: Abdomen is soft.   Musculoskeletal:         General: No swelling,  tenderness, deformity or signs of injury.      Cervical back: Normal range of motion.   Neurological:      Mental Status: She is alert and oriented to person, place, and time.      Cranial Nerves: Cranial nerve deficit present.      Sensory: No sensory deficit.      Motor: Weakness present.      Comments: CN 1-6 generally intact.  CN 7: pt unable to blow her cheeks out, unable to fully raise eyebrows. Could keep her eyes closed however.   CN 8 intact  CN 11: pt exhibited weak shoulder shrug  CN 12: pt unable to stick tongue out fully           Significant Labs: All pertinent labs within the past 24 hours have been reviewed.  Bilirubin:   Recent Labs   Lab 07/23/22 0620 08/20/22 2037 08/21/22  0424   BILITOT 3.3* 8.0* 8.5*     CBC:   Recent Labs   Lab 08/20/22 2037 08/21/22  0424   WBC 20.46* 15.93*   HGB 12.0 12.2   HCT 33.9* 36.2*   * 125*     CMP:   Recent Labs   Lab 08/20/22 2037 08/21/22  0424   * 127*   K 6.0* 5.2*   CL 98 98   CO2 17* 17*   * 146*   BUN 68* 69*   CREATININE 1.9* 1.9*   CALCIUM 8.7 8.5*   PROT 6.4 6.1   ALBUMIN 1.8* 1.7*   BILITOT 8.0* 8.5*   ALKPHOS 142* 143*   AST 59* 53*   ALT 37 35   ANIONGAP 12 12     Lactic Acid:   Recent Labs   Lab 08/20/22 2037 08/21/22  0423   LACTATE 4.2* 4.4*       Significant Imaging: I have reviewed all pertinent imaging results/findings within the past 24 hours.    Assessment/Plan:     * Sepsis  Pt presenting with elevated WBC of 20.46, lactate of 4.2, procal of 1.09. Imaging showing possible infection or inflammation in Left lower lobe of lung. Pt not overtly ill and shows no acute signs of infection, however.  Antibiotics given-   Antibiotics (From admission, onward)            Start     Stop Route Frequency Ordered    08/21/22 0900  rifAXIMin tablet 550 mg         -- Oral 2 times daily 08/21/22 0110    08/20/22 2130  piperacillin-tazobactam 4.5 g in sodium chloride 0.9% 100 mL IVPB (ready to mix system)  (ED Adult Sepsis Treatment)          08/21 2129 IV Every 8 hours (non-standard times) 08/20/22 2118        Cultures were taken-   Microbiology Results (last 7 days)     Procedure Component Value Units Date/Time    Blood culture x two cultures. Draw prior to antibiotics. [[id number]] Collected: 08/20/22 2038    Order Status: Completed Specimen: Blood from Peripheral, Antecubital, Right Updated: 08/21/22 0315     Blood Culture, Routine No Growth to date    Narrative:      Aerobic and anaerobic    Blood culture x two cultures. Draw prior to antibiotics. [[id number]] Collected: 08/20/22 2038    Order Status: Completed Specimen: Blood from Peripheral, Antecubital, Right Updated: 08/21/22 0315     Blood Culture, Routine No Growth to date    Narrative:      Aerobic and anaerobic    Culture, Respiratory with Gram Stain [[id number]]     Order Status: No result Specimen: Sputum, Expectorated         Latest lactate reviewed, they are-  Recent Labs   Lab 08/20/22 2037 08/21/22  0423   LACTATE 4.2* 4.4*     Organ dysfunction indicated by Acute kidney injury  Source- Possibly pulmonary source.     - CXR showed possible infectious/inflammatory process in LLL  - BCx pending  - Started on empiric IV zosyn  - Daily CBCs, WBC trending down to 15.93  - Trend lactate    Polyneuropathy  77 y.o. F w/PMH of non-alcoholic cirrhosis, anxiety, lung cancer s/p Left lower lobectomy and chemotherapy (gem/cis), polyneuropathy, post-polio syndrome and fibromyalgia, presents to the ED with profound generalized weakness and physical debility.    - Neuro exam non-suggestive of etiology; possibly progression of her polyneuropathy or possibly 2/2 to sepsis and infectious etiology  - Follows with neurology at [facility]; work-up to explain polyneuropathy and weakness has been unrevealing thus far   - Consult PT/OT for physical debility  - Consult SLP for severe dysarthria and evaluation of swallowing    SONYA (acute kidney injury)  Patient with acute kidney injury likely due to IVVD/dehydration  and pre-renal azotemia SONYA is currently stable. Labs reviewed- Renal function/electrolytes with Estimated Creatinine Clearance: 38.6 mL/min (A) (based on SCr of 1.9 mg/dL (H)). according to latest data. Monitor urine output and serial BMP and adjust therapy as needed. Avoid nephrotoxins and renally dose meds for GFR listed above.     Creatinine 1.9 on admit, baseline around 0.7. Still Cr 1.9 on repeat.  - Likely pre-renal from dehydration and volume losses and poor PO intake    Plan:   - IVF  - Order urine electrolytes, osmols, and urine protein/creatinine ratio.  - Order RP U/S  - Strict I&Os   - Consult nephrology  - Avoid nephrotoxic agents such as NSAIDs, gadolinium and IV radiocontrast.  - Renally dose meds to current GFR.  - Maintain MAP > 65.    Hyperkalemia  Presented initially with K of 6.0. Shifted with insulin and sodium zirconium cyclosilicate. Given lokelma.    - trend BMP  - K down to 5.2 this morning    Hyperbilirubinemia  Hx of non-alcoholic cirrhosis. T-bili elevated at 8.0 with mildly elevated AST of 59.    - Trend CMP and LFTs  - F/u Limited abdo US      Hepatic encephalopathy  Admitted to hospital medicine from 7/17-7/24 for evaluation of altered mental status with labs notable for hyperammonemia. At the time improved with lactulose and rifaximin. Patient unsure if she has been taking it at home since discharge.    - continue lactulose and rifaximin    Essential hypertension    Holding home meds given possible septic picture: holding amlodipine, spironolactone.    Rheumatoid arthritis involving multiple sites with positive rheumatoid factor  - Continue home med of prednisone 5mg  - Follows with Rheumatology outpatient      Fibromyalgia  - Follows with Rheumatology outpatient      VTE Risk Mitigation (From admission, onward)         Ordered     heparin (porcine) injection 5,000 Units  Every 8 hours         08/20/22 2317     IP VTE HIGH RISK PATIENT  Once         08/20/22 2317                    Haja Chen MD  Department of Hospital Medicine   [facility] - Intensive Care ([facility]-)

## 2022-08-21 NOTE — SUBJECTIVE & OBJECTIVE
Past Medical History:   Diagnosis Date    Anemia     Anxiety     Cataract     Colon polyps 2012    Fibromyalgia 10/15/2012    Lung cancer     Pneumonia due to infectious organism 7/19/2019    Post-polio syndrome     Rheumatoid arthritis(714.0) 7/16/2012    2009 onset with mod positive CCP and RF MTX 2011 - present     Thyroid disease     Vertigo     postural vertigo       Past Surgical History:   Procedure Laterality Date    BLADDER SURGERY  1959    CHOLECYSTECTOMY      HYSTERECTOMY  1988    LUNG REMOVAL, PARTIAL  2009    left lower lobectomy    OVARIAN CYST REMOVAL  1970    TONSILLECTOMY  1952       Review of patient's allergies indicates:   Allergen Reactions    Ibuprofen Nausea Only    Demerol [meperidine]      Euphoria    Gabapentin Other (See Comments)     Hallucinations    Mellaril-s Nausea Only    Versed [midazolam]      Excessive talking, hyper    Vicodin [hydrocodone-acetaminophen]      Joint pain, muscle pain    Xanax [alprazolam]      fatigue       No current facility-administered medications on file prior to encounter.     Current Outpatient Medications on File Prior to Encounter   Medication Sig    acetaminophen (TYLENOL) 500 MG tablet Take 500 mg by mouth 2 (two) times daily as needed for Pain.    amLODIPine (NORVASC) 5 MG tablet Take 5 mg by mouth once daily.    diclofenac sodium (VOLTAREN) 1 % Gel Apply 2 g topically 3 (three) times daily. (Patient taking differently: Apply 2 g topically 3 (three) times daily as needed (PAIN).)    ergocalciferol (VITAMIN D2) 50,000 unit Cap Take 50,000 Units by mouth every Thursday.    lactulose (CHRONULAC) 10 gram/15 mL solution Take 45 mLs (30 g total) by mouth 2 (two) times daily. May adjust dose every 1 to 2 days to achieve 2 to 3 soft stools/day    lactulose (KRISTALOSE) 20 gram Pack Take 1 packet (20 g total) by mouth daily as needed (constipation).    LIDOcaine (LIDODERM) 5 % Place 1 patch onto the skin every 24 hours. Remove & Discard patch within 12 hours or  as directed by MD    miconazole NITRATE 2 % (MICOTIN) 2 % top powder Apply topically 2 (two) times daily.    multivitamin with minerals tablet Take 1 tablet by mouth once daily.    predniSONE (DELTASONE) 5 MG tablet Take 1-2 tablets (5-10 mg total) by mouth 2 (two) times daily as needed (arthritis). (Patient taking differently: Take 5 mg by mouth daily as needed (arthritis pain).)    rifAXIMin (XIFAXAN) 550 mg Tab Take 1 tablet (550 mg total) by mouth 2 (two) times daily.    sodium chloride (OCEAN) 0.65 % nasal spray 1 spray by Nasal route as needed for Congestion.    spironolactone (ALDACTONE) 50 MG tablet Take 1 tablet (50 mg total) by mouth once daily.    [DISCONTINUED] cholestyramine (QUESTRAN) 4 gram packet Take 1 packet (4 g total) by mouth once daily.    [DISCONTINUED] oxyCODONE-acetaminophen (PERCOCET) 5-325 mg per tablet Take 1 tablet by mouth every 8 (eight) hours as needed for Pain. (Patient taking differently: Take 0.5 tablets by mouth every 8 (eight) hours as needed for Pain.)     Family History       Problem Relation (Age of Onset)    Arrhythmia Mother    Colon cancer Maternal Grandfather    Diabetes Maternal Grandmother    Heart attack Son (45)    Lung cancer Father, Maternal Aunt, Maternal Uncle    Parkinsonism Paternal Grandmother          Tobacco Use    Smoking status: Former Smoker     Packs/day: 1.50     Years: 40.00     Pack years: 60.00     Types: Cigarettes     Quit date: 2009     Years since quittin.1    Smokeless tobacco: Never Used   Substance and Sexual Activity    Alcohol use: Not Currently    Drug use: No    Sexual activity: Not Currently     Review of Systems   Constitutional:  Positive for activity change, appetite change and fatigue. Negative for fever.   HENT:  Positive for voice change. Negative for congestion and rhinorrhea.    Eyes:  Negative for photophobia and visual disturbance.   Respiratory:  Negative for cough and shortness of breath.    Cardiovascular:  Negative  for chest pain and leg swelling.   Gastrointestinal:  Positive for abdominal pain. Negative for abdominal distention and nausea.   Genitourinary:  Negative for frequency and urgency.   Musculoskeletal:  Positive for myalgias.   Skin: Negative.    Neurological:  Positive for speech difficulty and weakness. Negative for syncope, facial asymmetry, numbness and headaches.   Psychiatric/Behavioral:  Positive for confusion. Negative for dysphoric mood.    Objective:     Vital Signs (Most Recent):  Temp: 97.5 °F (36.4 °C) (08/21/22 0147)  Pulse: 86 (08/21/22 0147)  Resp: 12 (08/21/22 0147)  BP: (!) 150/65 (08/21/22 0147)  SpO2: (!) 93 % (08/21/22 0147)   Vital Signs (24h Range):  Temp:  [96.9 °F (36.1 °C)-97.5 °F (36.4 °C)] 97.5 °F (36.4 °C)  Pulse:  [] 86  Resp:  [10-20] 12  SpO2:  [93 %-100 %] 93 %  BP: (116-150)/(55-88) 150/65     Weight: 136.1 kg (300 lb)  Body mass index is 43.05 kg/m².    Physical Exam  Constitutional:       General: She is not in acute distress.     Appearance: She is ill-appearing.   HENT:      Head: Normocephalic.      Nose: Nose normal. No congestion.      Mouth/Throat:      Mouth: Mucous membranes are moist.      Pharynx: Oropharynx is clear.   Eyes:      Pupils: Pupils are equal, round, and reactive to light.   Cardiovascular:      Rate and Rhythm: Normal rate and regular rhythm.      Pulses: Normal pulses.      Heart sounds: Normal heart sounds.   Pulmonary:      Effort: Pulmonary effort is normal.      Breath sounds: Normal breath sounds.   Abdominal:      General: Abdomen is flat.      Palpations: Abdomen is soft.   Musculoskeletal:         General: No swelling, tenderness, deformity or signs of injury.      Cervical back: Normal range of motion.   Neurological:      Mental Status: She is alert and oriented to person, place, and time.      Cranial Nerves: Cranial nerve deficit present.      Sensory: No sensory deficit.      Motor: Weakness present.      Comments: CN 1-6 generally  intact.  CN 7: pt unable to blow her cheeks out, unable to fully raise eyebrows. Could keep her eyes closed however.   CN 8 intact  CN 11: pt exhibited weak shoulder shrug  CN 12: pt unable to stick tongue out fully           Significant Labs: All pertinent labs within the past 24 hours have been reviewed.  Bilirubin:   Recent Labs   Lab 07/23/22 0620 08/20/22 2037 08/21/22  0424   BILITOT 3.3* 8.0* 8.5*     CBC:   Recent Labs   Lab 08/20/22 2037 08/21/22  0424   WBC 20.46* 15.93*   HGB 12.0 12.2   HCT 33.9* 36.2*   * 125*     CMP:   Recent Labs   Lab 08/20/22 2037 08/21/22  0424   * 127*   K 6.0* 5.2*   CL 98 98   CO2 17* 17*   * 146*   BUN 68* 69*   CREATININE 1.9* 1.9*   CALCIUM 8.7 8.5*   PROT 6.4 6.1   ALBUMIN 1.8* 1.7*   BILITOT 8.0* 8.5*   ALKPHOS 142* 143*   AST 59* 53*   ALT 37 35   ANIONGAP 12 12     Lactic Acid:   Recent Labs   Lab 08/20/22 2037 08/21/22  0423   LACTATE 4.2* 4.4*       Significant Imaging: I have reviewed all pertinent imaging results/findings within the past 24 hours.

## 2022-08-21 NOTE — ED PROVIDER NOTES
Encounter Date: 8/20/2022       History     Chief Complaint   Patient presents with    Fatigue     Fatigue, generalized weakness x 2 months. Friends called EMS because they could not lift patient.      78 yo W with pmhx HTN, non-alcoholic cirrhosis, obesity, anxiety, macrocytic anemia, hx of lung CA in 2009 s/p surgery and chemotherapy with subsequent chemotherapy induced neuropathy, post-polio syndrome, lower extremity weakness (being followed by Neurology), rheumatoid arthritis, fibromyalgia presents with a chief complaint of generalized weakness.  Patient reports I can barely move.  Weakness is severe today and she was unable to move at all.  Patient clarified that she has been weak over the past month.  She reports having home care to help her.  She was last hospitalized a few months ago and discharged to a SNF.  Patient reports pain everywhere.  She reports that the weakness is generalized and no more severe on either side.        Review of patient's allergies indicates:   Allergen Reactions    Ibuprofen Nausea Only    Demerol [meperidine]      Euphoria    Gabapentin Other (See Comments)     Hallucinations    Mellaril-s Nausea Only    Versed [midazolam]      Excessive talking, hyper    Vicodin [hydrocodone-acetaminophen]      Joint pain, muscle pain    Xanax [alprazolam]      fatigue     Past Medical History:   Diagnosis Date    Anemia     Anxiety     Cataract     Colon polyps 2012    Fibromyalgia 10/15/2012    Lung cancer     Pneumonia due to infectious organism 7/19/2019    Post-polio syndrome     Rheumatoid arthritis(714.0) 7/16/2012 2009 onset with mod positive CCP and RF MTX 2011 - present     Thyroid disease     Vertigo     postural vertigo     Past Surgical History:   Procedure Laterality Date    BLADDER SURGERY  1959    CHOLECYSTECTOMY      HYSTERECTOMY  1988    LUNG REMOVAL, PARTIAL  2009    left lower lobectomy    OVARIAN CYST REMOVAL  1970    TONSILLECTOMY  1952      Family History   Problem Relation Age of Onset    Lung cancer Father     Lung cancer Maternal Aunt     Diabetes Maternal Grandmother     Colon cancer Maternal Grandfather     Parkinsonism Paternal Grandmother     Arrhythmia Mother     Lung cancer Maternal Uncle     Heart attack Son 45    Hypertension Neg Hx      Social History     Tobacco Use    Smoking status: Former Smoker     Packs/day: 1.50     Years: 40.00     Pack years: 60.00     Types: Cigarettes     Quit date: 2009     Years since quittin.1    Smokeless tobacco: Never Used   Substance Use Topics    Alcohol use: Not Currently    Drug use: No     Review of Systems   Constitutional: Negative for fever.   HENT: Negative for sore throat.    Respiratory: Negative for shortness of breath.    Cardiovascular: Negative for chest pain.   Gastrointestinal: Negative for nausea.   Genitourinary: Negative for dysuria.   Musculoskeletal: Positive for arthralgias and gait problem.   Skin: Negative for rash.   Neurological: Positive for weakness.   Hematological: Does not bruise/bleed easily.       Physical Exam     Initial Vitals [22 1832]   BP Pulse Resp Temp SpO2   130/88 110 20 96.9 °F (36.1 °C) 97 %      MAP       --         Physical Exam    Nursing note and vitals reviewed.  Constitutional: She appears well-developed and well-nourished. She is not diaphoretic. No distress.   Obese, deconditioned   HENT:   Head: Normocephalic and atraumatic.   Dry mucus membranes   Eyes: EOM are normal. Right eye exhibits no discharge. Left eye exhibits no discharge. Scleral icterus is present.   Neck: Neck supple. No JVD present.   Normal range of motion.  Cardiovascular: Regular rhythm and normal heart sounds. Tachycardia present.  Exam reveals no gallop and no friction rub.    No murmur heard.  Pulses:       Dorsalis pedis pulses are 2+ on the right side and 2+ on the left side.   Pulmonary/Chest: Breath sounds normal. She has no wheezes. She has no  rhonchi. She has no rales. She exhibits no tenderness.   Mild tachypnea   Abdominal: Abdomen is soft. Bowel sounds are normal. She exhibits no distension and no mass. There is no abdominal tenderness. There is no rebound and no guarding.   Musculoskeletal:         General: Tenderness (bilat hips, R>L) and edema (Bilateral lower extremities, trace, pitting in ankles/feet) present. Normal range of motion.      Cervical back: Normal range of motion and neck supple.     Neurological: She is oriented to person, place, and time. No sensory deficit.   Awake and oriented x3 but slowed   Skin: Skin is warm and dry. Capillary refill takes less than 2 seconds.   Psychiatric:   Flat affect  Low volume speech         ED Course   Procedures  Labs Reviewed   CBC W/ AUTO DIFFERENTIAL - Abnormal; Notable for the following components:       Result Value    WBC 20.46 (*)     RBC 3.26 (*)     Hematocrit 33.9 (*)      (*)     MCH 36.8 (*)     RDW 15.9 (*)     Platelets 139 (*)     Immature Granulocytes 4.1 (*)     Gran # (ANC) 17.4 (*)     Immature Grans (Abs) 0.83 (*)     Lymph # 0.8 (*)     Mono # 1.3 (*)     Gran % 85.1 (*)     Lymph % 3.7 (*)     All other components within normal limits   COMPREHENSIVE METABOLIC PANEL - Abnormal; Notable for the following components:    Sodium 127 (*)     Potassium 6.0 (*)     CO2 17 (*)     Glucose 149 (*)     BUN 68 (*)     Creatinine 1.9 (*)     Albumin 1.8 (*)     Total Bilirubin 8.0 (*)     Alkaline Phosphatase 142 (*)     AST 59 (*)     eGFR 27.2 (*)     All other components within normal limits   LACTIC ACID, PLASMA - Abnormal; Notable for the following components:    Lactate (Lactic Acid) 4.2 (*)     All other components within normal limits   PROCALCITONIN - Abnormal; Notable for the following components:    Procalcitonin 1.09 (*)     All other components within normal limits   CULTURE, BLOOD   CULTURE, BLOOD   MAGNESIUM   B-TYPE NATRIURETIC PEPTIDE   TROPONIN I   URINALYSIS, REFLEX  TO URINE CULTURE   AMMONIA   LACTIC ACID, PLASMA   POCT GLUCOSE MONITORING CONTINUOUS     EKG Readings: (Independently Interpreted)   Initial Reading: No STEMI. Rhythm: Normal Sinus Rhythm. Heart Rate: 96. ST Segments: Normal ST Segments. T Waves: Normal. Axis: Left Axis Deviation.       Imaging Results          X-Ray Pelvis Routine AP (In process)  Result time 08/20/22 22:04:45               X-Ray Chest AP Portable (Final result)  Result time 08/20/22 20:37:11    Final result by Neymar Hurst MD (08/20/22 20:37:11)                 Impression:      New rounded opacity in the left lower lobe, likely developing infectious/inflammatory process.    Possible small left pleural effusion.    Electronically signed by resident: Jacki Gaitan  Date:    08/20/2022  Time:    20:18    Electronically signed by: Neymar Hurst MD  Date:    08/20/2022  Time:    20:37             Narrative:    EXAMINATION:  XR CHEST AP PORTABLE    CLINICAL HISTORY:  Sepsis;.    TECHNIQUE:  Single frontal portable view of the chest was performed.    COMPARISON:  Chest radiograph 07/17/2022.  06/25/2022.    FINDINGS:  New rounded opacity in the left lower lobe.Emphysematous changes bilaterally.  Possible small left pleural effusion.  No pneumothorax.    The cardiac silhouette is stable in size..  The hilar and mediastinal contours are unremarkable.    Bones are intact.                                 Medications   piperacillin-tazobactam 4.5 g in sodium chloride 0.9% 100 mL IVPB (ready to mix system) (4.5 g Intravenous New Bag 8/20/22 2154)   vancomycin 2 g in dextrose 5 % 500 mL IVPB (has no administration in time range)   sodium chloride 0.9% bolus 1,000 mL (1,000 mLs Intravenous New Bag 8/20/22 2156)   sodium zirconium cyclosilicate packet 10 g (has no administration in time range)   dextrose 10% bolus 250 mL (250 mLs Intravenous New Bag 8/20/22 2155)   insulin regular injection 5 Units 0.05 mL (has no administration in time range)  "  morphine injection 2 mg (has no administration in time range)   lactated ringers bolus 500 mL (0 mLs Intravenous Stopped 8/20/22 2203)     Medical Decision Making:   History:   I obtained history from: EMS provider.  Old Medical Records: I decided to obtain old medical records.  Initial Assessment:   78 yo W with pmhx HTN, non-alcoholic cirrhosis, obesity, anxiety, macrocytic anemia, hx of lung CA in 2009 s/p surgery and chemotherapy with subsequent chemotherapy induced neuropathy, post-polio syndrome, lower extremity weakness (being followed by Neurology), rheumatoid arthritis, fibromyalgia presents with a chief complaint of generalized weakness.    Differential Diagnosis:   Deconditioning, sepsis, anemia, hepatic encephalopathy, uremia, electrolyte abnormalities, infectious issues  Clinical Tests:   Lab Tests: Ordered  Radiological Study: Ordered  Medical Tests: Ordered  Sepsis Perfusion Assessment: "I attest a sepsis perfusion exam was performed within 6 hours of sepsis, severe sepsis, or septic shock presentation, following fluid resuscitation."  ED Management:  Will administer IV fluids.  Will obtain labs, chest x-ray, CT head.    Reassessment:  Patient's power of  at bedside.  She reports that she was discharged from nursing home on August 5th.  The patient currently has 24 hour home care.  They report that since discharge, the patient has been dehydrated and is not eating or drinking much.  She reports that normally the patient is able to use legs for assistance, and occasionally lays cross leg it.  However, patient endorse severe pain in her bilateral legs today and I was unable to move.  This is new.  The home health nurse recommended she come to the ER. Will admininister low-dose morphine.    Reassessment: Chest x-ray with left lower lobe infiltrate.  CBC with leukocytosis of 20.46.  Hemoglobin 12.0 improved baseline.  Will obtain cultures and treat with antibiotics for sepsis with vanc and " Zosyn.  CMP with acute kidney injury-creatinine 1.9 up from baseline at 0.6.  BUN is elevated at 68.  Will administer additional IV fluids.  There is acidosis with a bicarb of 17.  There is hyponatremia with a sodium 127.  There is hyperkalemia with a potassium of 6.0.  No ECG changes. Will administer insulin, D50, Lokelma.  BNP and troponin are normal.  Procalcitonin is significantly elevated at 1.059. Lactic acid elevated at 4.2. BP stable. I did not administer a 30cc/kg fluid bolus 2/2 unknown cardiac status. Pt will require inpatient admission.  She was informed of plan.  Inpatient per case management.                Attending Attestation:         Attending Critical Care:   Critical Care Times:   Direct Patient Care (initial evaluation, reassessments, and time considering the case)................................................................15 minutes.   Additional History from reviewing old medical records or taking additional history from the family, EMS, PCP, etc.......................5 minutes.   Ordering, Reviewing, and Interpreting Diagnostic Studies...............................................................................................................5 minutes.   Documentation..................................................................................................................................................................................5 minutes.   Consultation with other Physicians. .................................................................................................................................................5 minutes.   ==============================================================  · Total Critical Care Time - exclusive of procedural time: 35 minutes.  ==============================================================  Critical care was necessary to treat or prevent imminent or life-threatening deterioration of the following conditions: sepsis.                     Clinical Impression:   Final diagnoses:  [R00.0] Tachycardia  [A41.9] Sepsis, due to unspecified organism, unspecified whether acute organ dysfunction present (Primary)  [N17.9] SONYA (acute kidney injury)  [E87.1] Hyponatremia  [E87.5] Hyperkalemia          ED Disposition Condition    Admit               Sarabjit Wood MD  08/20/22 9048

## 2022-08-21 NOTE — ASSESSMENT & PLAN NOTE
77 y.o. F w/PMH of non-alcoholic cirrhosis, anxiety, lung cancer s/p Left lower lobectomy and chemotherapy (gem/cis), polyneuropathy, post-polio syndrome and fibromyalgia, presents to the ED with profound generalized weakness and physical debility.    - Neuro exam non-suggestive of etiology; possibly progression of her polyneuropathy or possibly 2/2 to sepsis and infectious etiology  - Follows with neurology at [facility]; work-up to explain polyneuropathy and weakness has been unrevealing thus far   - Consult PT/OT for physical debility  - Consult SLP for severe dysarthria and evaluation of swallowing

## 2022-08-22 PROBLEM — I81 PORTAL VEIN THROMBOSIS: Status: ACTIVE | Noted: 2022-01-01

## 2022-08-22 NOTE — SUBJECTIVE & OBJECTIVE
Past Medical History:   Diagnosis Date    Anemia     Anxiety     Cataract     Colon polyps     Fibromyalgia 10/15/2012    Lung cancer     Pneumonia due to infectious organism 2019    Post-polio syndrome     Rheumatoid arthritis(714.0) 2012 onset with mod positive CCP and RF MTX 2011 - present     Thyroid disease     Vertigo     postural vertigo       Past Surgical History:   Procedure Laterality Date    BLADDER SURGERY      CHOLECYSTECTOMY      HYSTERECTOMY  1988    LUNG REMOVAL, PARTIAL  2009    left lower lobectomy    OVARIAN CYST REMOVAL  1970    TONSILLECTOMY         Review of patient's allergies indicates:   Allergen Reactions    Ibuprofen Nausea Only    Demerol [meperidine]      Euphoria    Gabapentin Other (See Comments)     Hallucinations    Mellaril-s Nausea Only    Versed [midazolam]      Excessive talking, hyper    Vicodin [hydrocodone-acetaminophen]      Joint pain, muscle pain    Xanax [alprazolam]      fatigue     Family History       Problem Relation (Age of Onset)    Arrhythmia Mother    Colon cancer Maternal Grandfather    Diabetes Maternal Grandmother    Heart attack Son (45)    Lung cancer Father, Maternal Aunt, Maternal Uncle    Parkinsonism Paternal Grandmother          Tobacco Use    Smoking status: Former Smoker     Packs/day: 1.50     Years: 40.00     Pack years: 60.00     Types: Cigarettes     Quit date: 2009     Years since quittin.1    Smokeless tobacco: Never Used   Substance and Sexual Activity    Alcohol use: Not Currently    Drug use: No    Sexual activity: Not Currently     Review of Systems   Constitutional:  Positive for activity change, chills and fatigue. Negative for fever.   HENT:  Positive for trouble swallowing.    Eyes:  Positive for visual disturbance.   Respiratory:  Negative for chest tightness and shortness of breath.    Cardiovascular:  Negative for chest pain.   Gastrointestinal:  Positive for abdominal pain.   Endocrine: Positive  for cold intolerance.   Musculoskeletal:  Positive for arthralgias and myalgias.   Neurological:  Negative for headaches.   Psychiatric/Behavioral:  Negative for confusion.    Objective:     Vital Signs (Most Recent):  Temp: 97.4 °F (36.3 °C) (08/22/22 0800)  Pulse: 80 (08/22/22 0800)  Resp: 18 (08/22/22 0904)  BP: (!) 124/57 (08/22/22 0800)  SpO2: 98 % (08/22/22 0800)   Vital Signs (24h Range):  Temp:  [96.6 °F (35.9 °C)-97.4 °F (36.3 °C)] 97.4 °F (36.3 °C)  Pulse:  [80-93] 80  Resp:  [12-20] 18  SpO2:  [95 %-99 %] 98 %  BP: (108-140)/(49-61) 124/57     Weight: 136.1 kg (300 lb) (08/21/22 0611)  Body mass index is 43.05 kg/m².    No intake or output data in the 24 hours ending 08/22/22 0925    Lines/Drains/Airways       Drain  Duration                  NG/OG Tube 08/21/22 2353 nasogastric 14 Fr. Left nostril <1 day         Urethral Catheter 08/21/22 1350 Silicone;Straight-tip 16 Fr. <1 day              Peripheral Intravenous Line  Duration                  Peripheral IV - Single Lumen 08/20/22 2057 22 G Left Shoulder 1 day         Peripheral IV - Single Lumen 08/20/22 2058 22 G Right Forearm 1 day         Midline Catheter Insertion/Assessment  - Single Lumen 08/21/22 2005 Right brachial vein other (see comments) <1 day                    Physical Exam  Vitals reviewed.   Constitutional:       General: She is not in acute distress.     Appearance: She is obese. She is ill-appearing.   HENT:      Head: Normocephalic and atraumatic.   Eyes:      Extraocular Movements: Extraocular movements intact.   Cardiovascular:      Rate and Rhythm: Normal rate and regular rhythm.      Pulses: Normal pulses.   Pulmonary:      Effort: Pulmonary effort is normal. No respiratory distress.   Abdominal:      General: There is distension.      Palpations: Abdomen is soft. There is no mass.      Tenderness: There is abdominal tenderness.   Musculoskeletal:         General: Swelling present.   Skin:     General: Skin is warm and dry.    Neurological:      Mental Status: She is alert. Mental status is at baseline.       Significant Labs:  CBC:   Recent Labs   Lab 08/20/22 2037 08/21/22 0424 08/22/22  0524   WBC 20.46* 15.93* 18.17*   HGB 12.0 12.2 11.4*   HCT 33.9* 36.2* 33.1*   * 125* 127*     CMP:   Recent Labs   Lab 08/22/22  0524   GLU 99   CALCIUM 8.2*   ALBUMIN 1.5*   PROT 5.8*   *   K 5.4*   CO2 18*   CL 99   BUN 77*   CREATININE 2.5*   ALKPHOS 140*   ALT 33   AST 50*   BILITOT 7.6*       Significant Imaging:  Imaging results within the past 24 hours have been reviewed.

## 2022-08-22 NOTE — HPI
"Ms. Yana Orellana is a 77 year old female for whom hepatology is consulted for management of portal vein thrombus. She has a PMH significant for MATAMOROS cirrhosis (associated with ascites), fibromyalgia, obesity, lung cancer (status post lobectomy in 12/2009 and chemotherapy), and rheumatoid arthritis (not currently on DMARDS).     Hospital Course: Patient presented to [facility] on 08/20 for evaluation of generalized myalgias and lower extremity pain. On presentation here vital signs were normal on room air. Labs were notable for leukocytosis (20.5), normal Hgb, hyponatremia (127), hyperkalemia (6), SONYA (Cr 1.9), transaminitis (AST 59), and hyperbilirubinemia (8). UA showed UTI. CXR showed possible left lower pneumonia. She was admitted to hospital medicine and initiated on IVF and antibiotics. Abdominal US with doppler showed evidence of occlusion of main and right branches of the portal vein. Hepatology consulted for assistance.     On initial bedside interview, patient was unsure of exact reason for hospitalization. She claims she was brought to hospital from her nursing home due to being found in a coma and needing medication to resolve it. She reports feeling "very weak" overall for many months and no longer feels strong enough to ambulate. She reports onset of non-bloody diarrhea since admission to hospital, but denies symptom association with abdominal pain, nausea, vomiting, or skin or eye yellowing.   "

## 2022-08-22 NOTE — PROGRESS NOTES
[facility] Tele[facility] Stepdown  Nephrology  Progress Note    Patient Name: Yana Orellana  MRN: [id number]  Admission Date: 8/20/2022  Hospital Length of Stay: 2 days  Attending Provider: Chari Ngo*   Primary Care Physician: Caitlyn Graf MD  Principal Problem:Sepsis    Subjective:     HPI:  Yana Orellana is a 77 y.o. F w/PMH of HTN, non-alcoholic cirrhosis, anxiety, lung cancer s/p Left lower lobectomy and chemotherapy (gem/cis), polyneuropathy, post-polio syndrome and fibromyalgia, who presents to the ED with profound generalized weakness and abdominal pain. History was difficult to obtain from patient due to significant dysarthria. Per chart review, she has been progressively weaker since being discharged from the hospital last month. She was hospitalized from 7/17 to 7/24 due to altered mentation secondary to hepatic encephalopathy and hyperammonemia, and her mentation improved after receiving lactulose and rifaximin. She was discharged to acute rehab, but says she was not able to tolerate the physical therapy there and was sent home with home health nursing. Pt says now she is bedbound and can barely move or talk without significant effort, and that she was much more functional and independent prior to the last hospitalization. She also complains of generalized myalgias and pain, worse in her right leg and her abdomen. She denies any recent fever, headache, chest pain, SOB, or n/v/d.     Nephrology consulted for SONYA. Baseline Cr 0.6, now 1.9. No UOP recorded. History is limited 2/2 to patient's dysarthria. Does not see outpt nephrology. Patient does report thirst, denies decreased UOP. She has received 500 cc LR in the ED.          Interval History: Pt appears quite dry on exam. NGT in place. Cr worsened to 2.5 today.     Review of patient's allergies indicates:   Allergen Reactions    Ibuprofen Nausea Only    Demerol [meperidine]      Euphoria    Gabapentin Other (See Comments)      Hallucinations    Mellaril-s Nausea Only    Versed [midazolam]      Excessive talking, hyper    Vicodin [hydrocodone-acetaminophen]      Joint pain, muscle pain    Xanax [alprazolam]      fatigue     Current Facility-Administered Medications   Medication Frequency    0.9%  NaCl infusion Continuous    calcium carbonate 200 mg calcium (500 mg) chewable tablet 500 mg TID    dextrose 10% bolus 125 mL PRN    dextrose 10% bolus 250 mL PRN    [START ON 8/25/2022] ergocalciferol capsule 50,000 Units Every Thurs    glucagon (human recombinant) injection 1 mg PRN    glucose chewable tablet 16 g PRN    glucose chewable tablet 24 g PRN    heparin (porcine) injection 5,000 Units Q8H    lactulose 20 gram/30 mL solution Soln 20 g Daily    melatonin tablet 6 mg Nightly PRN    naloxone 0.4 mg/mL injection 0.02 mg PRN    oxyCODONE 5 mg/5 mL solution 5 mg Q6H PRN    piperacillin-tazobactam 4.5 g in sodium chloride 0.9% 100 mL IVPB (ready to mix system) Q8H    predniSONE tablet 5 mg Daily PRN    rifAXIMin tablet 550 mg BID    senna-docusate 8.6-50 mg per tablet 1 tablet BID PRN    sodium bicarbonate tablet 650 mg BID    sodium chloride 0.9% flush 10 mL Q12H PRN    sodium chloride 0.9% flush 10 mL Q6H    And    sodium chloride 0.9% flush 10 mL PRN       Objective:     Vital Signs (Most Recent):  Temp: 97.3 °F (36.3 °C) (08/22/22 1153)  Pulse: 87 (08/22/22 1153)  Resp: 20 (08/22/22 1153)  BP: (!) 129/58 (08/22/22 1153)  SpO2: 100 % (08/22/22 1153)  O2 Device (Oxygen Therapy): room air (08/22/22 0800)   Vital Signs (24h Range):  Temp:  [96.8 °F (36 °C)-97.4 °F (36.3 °C)] 97.3 °F (36.3 °C)  Pulse:  [80-93] 87  Resp:  [12-20] 20  SpO2:  [95 %-100 %] 100 %  BP: (108-133)/(49-60) 129/58     Weight: 136.1 kg (300 lb) (08/21/22 0611)  Body mass index is 43.05 kg/m².  Body surface area is 2.59 meters squared.    No intake/output data recorded.    Physical Exam  Constitutional:       General: She is not in acute  distress.     Appearance: She is obese. She is ill-appearing.   HENT:      Head: Normocephalic.      Nose: Nose normal. No congestion.      Mouth/Throat:      Mouth: Mucous membranes are dry.   Cardiovascular:      Rate and Rhythm: Normal rate and regular rhythm.      Pulses: Normal pulses.      Heart sounds: Normal heart sounds.   Pulmonary:      Effort: Pulmonary effort is normal.      Breath sounds: Normal breath sounds.   Abdominal:      General: Abdomen is flat. There is distension.      Palpations: Abdomen is soft.   Musculoskeletal:      Cervical back: Normal range of motion.      Right lower leg: Edema present.      Left lower leg: Edema present.   Skin:     Capillary Refill: Capillary refill takes 2 to 3 seconds.   Neurological:      Mental Status: She is alert.      Sensory: No sensory deficit.      Motor: Weakness present.       Significant Labs:  CBC:   Recent Labs   Lab 08/22/22 0524   WBC 18.17*   RBC 3.15*   HGB 11.4*   HCT 33.1*   *   *   MCH 36.2*   MCHC 34.4     CMP:   Recent Labs   Lab 08/22/22 0524   GLU 99   CALCIUM 8.2*   ALBUMIN 1.5*   PROT 5.8*   *   K 5.4*   CO2 18*   CL 99   BUN 77*   CREATININE 2.5*   ALKPHOS 140*   ALT 33   AST 50*   BILITOT 7.6*     All labs within the past 24 hours have been reviewed.     Significant Imaging:  Labs: Reviewed    Assessment/Plan:     SONYA (acute kidney injury)  Creatinine 1.9 on admit, baseline around 0.6  - Likely pre-renal from dehydration and volume losses as patient appears dry on exam. Patient has elevated Cr, mild hyperkalemia and hyponatremia.   - Serum osm 301, higher than expected.     DDx is likely prerenal SONYA    Lab Results   Component Value Date    CREATININE 2.5 (H) 08/22/2022       RP US negative for hydronephrosis.     Plan:   - Check urine lytes: Na, K, chloride, osm  - Check urine protein creatinine ratio.  - Give 1 L NaCl bolus.   - RFP BID  - Strict I&Os and daily weights   - Trend sCr. 1.9->2.5  - Avoid  nephrotoxic agents such as NSAIDs, gadolinium, ACEi, ARBs and IV radiocontrast.  - Renally dose meds to current GFR.  - Maintain MAP > 65.  - No indications for HD at this time.   - Maintain electrolytes at goal: Mg > 2, Phos > 3, K > 4.          Thank you for your consult. I will follow-up with patient. Please contact us if you have any additional questions.    Hansa Potter MD  Nephrology  [facility] Tele[facility]

## 2022-08-22 NOTE — PROGRESS NOTES
[facility] Tele[facility] Medicine  Progress Note    Patient Name: Yana Orellana  MRN: [id number]  Patient Class: IP- Inpatient   Admission Date: 8/20/2022  Length of Stay: 2 days  Attending Physician: Chari Ngo*  Primary Care Provider: Caitlyn Graf MD        Subjective:     Principal Problem:Sepsis        HPI:  Yana Orellana is a 77 y.o. F w/PMH of HTN, non-alcoholic cirrhosis, anxiety, lung cancer s/p Left lower lobectomy and chemotherapy (gem/cis), polyneuropathy, post-polio syndrome and fibromyalgia, who presents to the ED with profound generalized weakness and abdominal pain. History was difficult to obtain from patient due to significant dysarthria. Pt reports that she has been progressively weaker since being discharged from the hospital last month. She was hospitalized from 7/17 to 7/24 due to altered mentation secondary to hepatic encephalopathy and hyperammonemia, and her mentation improved after receiving lactulose and rifaximin. She was discharged to acute rehab, but says she was not able to tolerate the physical therapy there and was sent home with home health nursing. Pt says now she is bedbound and can barely move or talk without significant effort, and that she was much more functional and independent prior to the last hospitalization. She also complains of generalized myalgias and pain, worse in her right leg and her abdomen. She denies any recent fever, headache, chest pain, SOB, or n/v/d.    ED Course: On presentation to the ED, pt was hemodynamically stable and initial workup showed labs notable for WBC 20.46, lactate 4.2, procal 1.09, Na 127, K 6.0, Bicarb 17, BUN/Cr 68/1.9, T-bili 8.0, AST 59. CXR showed opacity in LLL, possibly reflective of inflammation or infection. Xray pelvis and CT head were unrevealing. In the ED, pt received insulin to shift K, dextrose 250cc, LR 500cc, and NS 1.0L. She was started on vanc and zosyn, and she was given morphine 2mg  and [facility]. Pt was admitted to Hospital Medicine team 3.      Overview/Hospital Course:  08/21/22  Following admission, labs showed uptrending T-bili from 8.0 to 8.5. Lactate trending up to 4.4, Cr unchanged from 1.9, and pt was given another 500ccs of NS. NG tube placed for medication and nutrition.  08/22/2022  Pt had midline placed and NG tube placed day prior. Pt passed swallow test and NG was removed. Cultures came back GNR so vanc d/c. 1L NS bolus recommended by Nephrology and was given. Lactate down to 4.1 from 4.8. Phos 5.8 with tums given as a binder. POA updated on status of patient. Goals of care discussed.       Interval History: Pt still complaining of severe diffuse weakness and mild pain to extremities due to inactivity. No new symptoms reported. Pt's vocalization improving.     Review of Systems   Constitutional:  Positive for fatigue. Negative for appetite change, chills and fever.   HENT:  Negative for congestion, dental problem, facial swelling, postnasal drip, rhinorrhea, sore throat and trouble swallowing.    Eyes:  Negative for photophobia, pain and redness.   Respiratory:  Negative for cough, choking, chest tightness, shortness of breath and wheezing.    Cardiovascular:  Positive for leg swelling. Negative for chest pain and palpitations.   Gastrointestinal:  Positive for diarrhea. Negative for abdominal distention, abdominal pain, blood in stool, constipation, nausea and vomiting.   Endocrine: Negative.    Genitourinary:  Negative for difficulty urinating, dysuria, flank pain, frequency and hematuria.   Musculoskeletal:  Positive for myalgias. Negative for back pain and neck pain.   Skin:  Negative for pallor and rash.   Allergic/Immunologic: Negative.    Neurological:  Positive for speech difficulty and weakness. Negative for dizziness, syncope, light-headedness, numbness and headaches.   Psychiatric/Behavioral:  The patient is not nervous/anxious.    Objective:     Vital Signs (Most  Recent):  Temp: 97.3 °F (36.3 °C) (08/22/22 1153)  Pulse: 87 (08/22/22 1153)  Resp: 20 (08/22/22 1153)  BP: (!) 129/58 (08/22/22 1153)  SpO2: 100 % (08/22/22 1153)   Vital Signs (24h Range):  Temp:  [96.8 °F (36 °C)-97.4 °F (36.3 °C)] 97.3 °F (36.3 °C)  Pulse:  [80-93] 87  Resp:  [12-20] 20  SpO2:  [95 %-100 %] 100 %  BP: (108-133)/(49-60) 129/58     Weight: 136.1 kg (300 lb)  Body mass index is 43.05 kg/m².    Intake/Output Summary (Last 24 hours) at 8/22/2022 1439  Last data filed at 8/22/2022 1300  Gross per 24 hour   Intake 50 ml   Output --   Net 50 ml      Physical Exam  Vitals and nursing note reviewed.   Constitutional:       General: She is not in acute distress.     Appearance: Normal appearance. She is obese. She is not ill-appearing or diaphoretic.   HENT:      Head: Normocephalic and atraumatic.      Right Ear: External ear normal.      Left Ear: External ear normal.      Nose: Nose normal.      Mouth/Throat:      Mouth: Mucous membranes are dry.      Pharynx: Oropharynx is clear.   Eyes:      General: No scleral icterus.     Extraocular Movements: Extraocular movements intact.      Conjunctiva/sclera: Conjunctivae normal.      Pupils: Pupils are equal, round, and reactive to light.   Cardiovascular:      Rate and Rhythm: Normal rate and regular rhythm.      Pulses: Normal pulses.      Heart sounds: Normal heart sounds. No murmur heard.  Pulmonary:      Effort: Pulmonary effort is normal. No respiratory distress.      Breath sounds: Normal breath sounds. No stridor. No wheezing or rhonchi.   Chest:      Chest wall: No tenderness.   Abdominal:      General: Abdomen is flat. Bowel sounds are normal. There is no distension.      Palpations: Abdomen is soft.      Tenderness: There is no abdominal tenderness. There is no guarding or rebound.   Musculoskeletal:         General: No swelling or tenderness. Normal range of motion.      Cervical back: Normal range of motion and neck supple. No rigidity or  tenderness.      Right lower leg: Edema present.      Left lower leg: Edema present.   Skin:     General: Skin is warm and dry.      Capillary Refill: Capillary refill takes less than 2 seconds.      Coloration: Skin is pale. Skin is not jaundiced.      Findings: No bruising, erythema or rash.   Neurological:      General: No focal deficit present.      Mental Status: She is alert and oriented to person, place, and time.      Cranial Nerves: No cranial nerve deficit.      Motor: No weakness.   Psychiatric:         Mood and Affect: Mood normal.         Behavior: Behavior normal.       Significant Labs: All pertinent labs within the past 24 hours have been reviewed.    Significant Imaging: I have reviewed all pertinent imaging results/findings within the past 24 hours.      Assessment/Plan:      * Sepsis  Pt presenting with elevated WBC of 20.46, lactate of 4.2, procal of 1.09. Imaging showing possible infection or inflammation in Left lower lobe of lung. Pt not overtly ill and shows no acute signs of infection, however.  Antibiotics given-   Antibiotics (From admission, onward)            Start     Stop Route Frequency Ordered    08/21/22 1600  piperacillin-tazobactam 4.5 g in sodium chloride 0.9% 100 mL IVPB (ready to mix system)         -- IV Every 8 hours (non-standard times) 08/21/22 1451    08/21/22 0900  rifAXIMin tablet 550 mg         -- Oral 2 times daily 08/21/22 0110        Cultures were taken-   Microbiology Results (last 7 days)     Procedure Component Value Units Date/Time    Blood culture x two cultures. Draw prior to antibiotics. [[id number]]  (Abnormal) Collected: 08/20/22 2038    Order Status: Completed Specimen: Blood from Peripheral, Antecubital, Right Updated: 08/22/22 1434     Blood Culture, Routine Gram stain aer bottle: Gram negative rods       Results called to and read back by:DANIELA PICHARDO RN 08/21/2022  10:44      KLEBSIELLA PNEUMONIAE  Susceptibility pending      Narrative:      Aerobic  and anaerobic    Urine culture [[id number]]  (Abnormal) Collected: 08/21/22 1434    Order Status: Completed Specimen: Urine Updated: 08/22/22 1303     Urine Culture, Routine GRAM NEGATIVE MINGO  >100,000 cfu/ml  Identification and susceptibility pending      Narrative:      Specimen Source->Urine    Blood culture x two cultures. Draw prior to antibiotics. [[id number]] Collected: 08/20/22 2038    Order Status: Completed Specimen: Blood from Peripheral, Antecubital, Right Updated: 08/21/22 2212     Blood Culture, Routine No Growth to date      No Growth to date    Narrative:      Aerobic and anaerobic    Blood culture [[id number]]     Order Status: Sent Specimen: Blood     Blood culture [[id number]]     Order Status: Sent Specimen: Blood     Blood culture [[id number]]     Order Status: Sent Specimen: Blood     Blood culture [[id number]]     Order Status: Sent Specimen: Blood     Culture, Respiratory with Gram Stain [[id number]]     Order Status: No result Specimen: Sputum, Expectorated         Latest lactate reviewed, they are-  Recent Labs   Lab 08/21/22  0423 08/21/22  1112 08/22/22  0524   LACTATE 4.4* 4.8* 4.1*     Organ dysfunction indicated by Acute kidney injury  Source- Possibly pulmonary source.     - CXR showed possible infectious/inflammatory process in LLL  - BCx showed GNR  - Started on empiric IV zosyn  - Daily CBCs, WBC trending down to 15.93  - Trend lactate; currently 4.1    Portal vein thrombosis  US with doppler showed portal vein thrombosis. Investigation for esophageal varices needed before anticoagulation can be started.    -Hepatology consulted and did not recommend anticoagulation in this patient  -Thrombosis is likely chronic and not impairing liver function      Hyperkalemia  Presented initially with K of 6.0. Shifted with insulin and sodium zirconium cyclosilicate. Given lokelma.    - trend BMP  - K down to 5.4 this morning    SONYA (acute kidney injury)  Patient with acute kidney injury likely due to  IVVD/dehydration and pre-renal azotemia SONYA is currently stable. Labs reviewed- Renal function/electrolytes with Estimated Creatinine Clearance: 29.3 mL/min (A) (based on SCr of 2.5 mg/dL (H)). according to latest data. Monitor urine output and serial BMP and adjust therapy as needed. Avoid nephrotoxins and renally dose meds for GFR listed above.     Creatinine 1.9 on admit, baseline around 0.7. Cr 2.5 on repeat.  - Likely pre-renal from dehydration and volume losses and poor PO intake    Plan:   - IVF; 1L NS bolus then repeat BMP  - Renal function panel BID  - f/u urine electrolytes, osmols, and urine protein/creatinine ratio.  - Strict I&Os   - Avoid nephrotoxic agents such as NSAIDs, gadolinium and IV radiocontrast.  - Renally dose meds to current GFR.  - Maintain MAP > 65.    Hepatic encephalopathy  Admitted to hospital medicine from 7/17-7/24 for evaluation of altered mental status with labs notable for hyperammonemia. At the time improved with lactulose and rifaximin. Patient unsure if she has been taking it at home since discharge.    - continue lactulose and rifaximin    Essential hypertension    Holding home meds given possible septic picture: holding amlodipine, spironolactone.    Polyneuropathy  77 y.o. F w/PMH of non-alcoholic cirrhosis, anxiety, lung cancer s/p Left lower lobectomy and chemotherapy (gem/cis), polyneuropathy, post-polio syndrome and fibromyalgia, presents to the ED with profound generalized weakness and physical debility.    - Neuro exam non-suggestive of etiology; possibly progression of her polyneuropathy or possibly 2/2 to sepsis and infectious etiology  - Follows with neurology at [facility]; work-up to explain polyneuropathy and weakness has been unrevealing thus far   - Consult PT/OT for physical debility  - Consult SLP for severe dysarthria and evaluation of swallowing    Hyperbilirubinemia  Hx of non-alcoholic cirrhosis. T-bili elevated at 8.0 with mildly elevated AST of 59 on  admission.    - Trend CMP and LFTs  -Limited abdo US: Occlusion of the main and right branches of the main portal vein with slow, reversed flow in the left branch.  - Continue IVF hydration      Rheumatoid arthritis involving multiple sites with positive rheumatoid factor  - Continue home med of prednisone 5mg  - Follows with Rheumatology outpatient      Fibromyalgia  - Follows with Rheumatology outpatient  - Pain control PRN        VTE Risk Mitigation (From admission, onward)         Ordered     heparin (porcine) injection 5,000 Units  Every 8 hours         08/20/22 2317     IP VTE HIGH RISK PATIENT  Once         08/20/22 2317                Discharge Planning   JAE: 8/26/2022     Code Status: Full Code   Is the patient medically ready for discharge?: No    Reason for patient still in hospital (select all that apply): Patient trending condition           Sherman Michele MD  Department of Hospital Medicine   [facility] - Tele[facility]

## 2022-08-22 NOTE — ASSESSMENT & PLAN NOTE
-Pt is a 76 yo F with pmh of non-alcoholic cirrhosis, anxiety, lung cancer s/p L lower lobectomy and chemo (gem/cis), polyneuropathy, post-polio syndrome, fibromyalgia, and recent hepatic encephalopathy (on lactulose and rifaximin) who presents with generalized weakness and ab pain.  -Pt noted to have portal vein thrombosis and GI consulted for EGD for evaluation of esophageal varices prior to starting AC.     -Per Hep, no current need for AC, so will not proceed with EGD at this time

## 2022-08-22 NOTE — ASSESSMENT & PLAN NOTE
Creatinine 1.9 on admit, baseline around 0.6  - Likely pre-renal from dehydration and volume losses as patient appears dry on exam. Patient has elevated Cr, mild hyperkalemia and hyponatremia.   - Serum osm 301, higher than expected.     DDx is likely prerenal SONYA    Lab Results   Component Value Date    CREATININE 2.5 (H) 08/22/2022       RP US negative for hydronephrosis.     Plan:   - Check urine lytes: Na, K, chloride, osm  - Check urine protein creatinine ratio.  - Give 1 L NaCl bolus.   - RFP BID  - Strict I&Os and daily weights   - Trend sCr. 1.9->2.5  - Avoid nephrotoxic agents such as NSAIDs, gadolinium, ACEi, ARBs and IV radiocontrast.  - Renally dose meds to current GFR.  - Maintain MAP > 65.  - No indications for HD at this time.   - Maintain electrolytes at goal: Mg > 2, Phos > 3, K > 4.

## 2022-08-22 NOTE — CONSULTS
[facility] - Telemetry Stepdown  Gastroenterology  Consult Note    Patient Name: Yana Orellana  MRN: [id number]  Admission Date: 8/20/2022  Hospital Length of Stay: 2 days  Code Status: Full Code   Attending Provider: Chari Ngo*   Consulting Provider: Krzysztof Thompson MD  Primary Care Physician: Caitlyn Graf MD  Principal Problem:Sepsis    Inpatient consult to Gastroenterology  Consult performed by: Krzysztof Thompson MD  Consult ordered by: Jasmin Noriega MD        Subjective:     HPI:  Yana Orellana is a 77 y.o. F with PMH of HTN, non-alcoholic cirrhosis, anxiety, lung cancer s/p Left lower lobectomy and chemotherapy (gem/cis), polyneuropathy, post-polio syndrome and fibromyalgia, who presents to the ED with profound generalized weakness and abdominal pain. History was difficult to obtain from patient due to significant dysarthria. Pt reports progressively worsening weakness since being discharged from the hospital last month. She was hospitalized from 7/17 to 7/24 due to AMS 2/2 to hepatic encephalopathy and hyperammonemia. Her mentation improved after receiving lactulose and rifaximin. She was discharged to acute rehab, but says she was not able to tolerate PT and was sent home with . Pt is now bed bound and can barely move or talk without significant effort. She states that she was much more functional and independent prior to the last hospitalization. She also complains of generalized myalgias and pain, worse in her left leg and her abdomen. She denies any recent fever, headache, chest pain, SOB, or n/v/d.     ED Course: On presentation to the ED, pt was hemodynamically stable and initial workup showed labs notable for WBC 20.46, lactate 4.2, procal 1.09, Na 127, K 6.0, Bicarb 17, BUN/Cr 68/1.9, T-bili 8.0, AST 59. CXR showed opacity in LLL, possibly reflective of inflammation or infection. Xray pelvis and CT head were unrevealing. In the ED, pt received insulin to shift K, dextrose  250cc, LR 500cc, and NS 1.0L. She was started on vanc and zosyn, and she was given morphine 2mg and Lokelma.  U/S showed main portal vein thrombus, occlusion of main and right branches of portal vein with slow reversed flow in L branch.     GI consulted for EGD for eval of gastric varices prior to starting AC.            Past Medical History:   Diagnosis Date    Anemia     Anxiety     Cataract     Colon polyps     Fibromyalgia 10/15/2012    Lung cancer     Pneumonia due to infectious organism 2019    Post-polio syndrome     Rheumatoid arthritis(714.0) 2012    2009 onset with mod positive CCP and RF MTX 2011 - present     Thyroid disease     Vertigo     postural vertigo       Past Surgical History:   Procedure Laterality Date    BLADDER SURGERY      CHOLECYSTECTOMY      HYSTERECTOMY      LUNG REMOVAL, PARTIAL  2009    left lower lobectomy    OVARIAN CYST REMOVAL      TONSILLECTOMY         Review of patient's allergies indicates:   Allergen Reactions    Ibuprofen Nausea Only    Demerol [meperidine]      Euphoria    Gabapentin Other (See Comments)     Hallucinations    Mellaril-s Nausea Only    Versed [midazolam]      Excessive talking, hyper    Vicodin [hydrocodone-acetaminophen]      Joint pain, muscle pain    Xanax [alprazolam]      fatigue     Family History       Problem Relation (Age of Onset)    Arrhythmia Mother    Colon cancer Maternal Grandfather    Diabetes Maternal Grandmother    Heart attack Son (45)    Lung cancer Father, Maternal Aunt, Maternal Uncle    Parkinsonism Paternal Grandmother          Tobacco Use    Smoking status: Former Smoker     Packs/day: 1.50     Years: 40.00     Pack years: 60.00     Types: Cigarettes     Quit date: 2009     Years since quittin.1    Smokeless tobacco: Never Used   Substance and Sexual Activity    Alcohol use: Not Currently    Drug use: No    Sexual activity: Not Currently     Review of Systems    Constitutional:  Positive for activity change, chills and fatigue. Negative for fever.   HENT:  Positive for trouble swallowing.    Eyes:  Positive for visual disturbance.   Respiratory:  Negative for chest tightness and shortness of breath.    Cardiovascular:  Negative for chest pain.   Gastrointestinal:  Positive for abdominal pain.   Endocrine: Positive for cold intolerance.   Musculoskeletal:  Positive for arthralgias and myalgias.   Neurological:  Negative for headaches.   Psychiatric/Behavioral:  Negative for confusion.    Objective:     Vital Signs (Most Recent):  Temp: 97.4 °F (36.3 °C) (08/22/22 0800)  Pulse: 80 (08/22/22 0800)  Resp: 18 (08/22/22 0904)  BP: (!) 124/57 (08/22/22 0800)  SpO2: 98 % (08/22/22 0800)   Vital Signs (24h Range):  Temp:  [96.6 °F (35.9 °C)-97.4 °F (36.3 °C)] 97.4 °F (36.3 °C)  Pulse:  [80-93] 80  Resp:  [12-20] 18  SpO2:  [95 %-99 %] 98 %  BP: (108-140)/(49-61) 124/57     Weight: 136.1 kg (300 lb) (08/21/22 0611)  Body mass index is 43.05 kg/m².    No intake or output data in the 24 hours ending 08/22/22 0925    Lines/Drains/Airways       Drain  Duration                  NG/OG Tube 08/21/22 2353 nasogastric 14 Fr. Left nostril <1 day         Urethral Catheter 08/21/22 1350 Silicone;Straight-tip 16 Fr. <1 day              Peripheral Intravenous Line  Duration                  Peripheral IV - Single Lumen 08/20/22 2057 22 G Left Shoulder 1 day         Peripheral IV - Single Lumen 08/20/22 2058 22 G Right Forearm 1 day         Midline Catheter Insertion/Assessment  - Single Lumen 08/21/22 2005 Right brachial vein other (see comments) <1 day                    Physical Exam  Vitals reviewed.   Constitutional:       General: She is not in acute distress.     Appearance: She is obese. She is ill-appearing.   HENT:      Head: Normocephalic and atraumatic.   Eyes:      Extraocular Movements: Extraocular movements intact.   Cardiovascular:      Rate and Rhythm: Normal rate and regular  rhythm.      Pulses: Normal pulses.   Pulmonary:      Effort: Pulmonary effort is normal. No respiratory distress.   Abdominal:      General: There is distension.      Palpations: Abdomen is soft. There is no mass.      Tenderness: There is abdominal tenderness.   Musculoskeletal:         General: Swelling present.   Skin:     General: Skin is warm and dry.   Neurological:      Mental Status: She is alert. Mental status is at baseline.       Significant Labs:  CBC:   Recent Labs   Lab 08/20/22  2037 08/21/22  0424 08/22/22  0524   WBC 20.46* 15.93* 18.17*   HGB 12.0 12.2 11.4*   HCT 33.9* 36.2* 33.1*   * 125* 127*     CMP:   Recent Labs   Lab 08/22/22  0524   GLU 99   CALCIUM 8.2*   ALBUMIN 1.5*   PROT 5.8*   *   K 5.4*   CO2 18*   CL 99   BUN 77*   CREATININE 2.5*   ALKPHOS 140*   ALT 33   AST 50*   BILITOT 7.6*       Significant Imaging:  Imaging results within the past 24 hours have been reviewed.    Assessment/Plan:       Portal Vein Thrombosis  -Pt is a 76 yo F with pmh of non-alcoholic cirrhosis, anxiety, lung cancer s/p L lower lobectomy and chemo (gem/cis), polyneuropathy, post-polio syndrome, fibromyalgia, and recent hepatic encephalopathy (on lactulose and rifaximin) who presents with generalized weakness and ab pain.  -Pt noted to have portal vein thrombosis and GI consulted for EGD for evaluation of esophageal varices prior to starting AC.  -Per Hep, no current need for AC, so will not proceed with EGD at this time    Thank you for your consult. I will follow-up with patient. Please contact us if you have any additional questions.    Krzysztof Thompson MD  Gastroenterology  [facility] - Telemetry Stepdown

## 2022-08-22 NOTE — HPI
Yana Orellana is a 77 y.o. F with PMH of HTN, non-alcoholic cirrhosis, anxiety, lung cancer s/p Left lower lobectomy and chemotherapy (gem/cis), polyneuropathy, post-polio syndrome and fibromyalgia, who presents to the ED with profound generalized weakness and abdominal pain. History was difficult to obtain from patient due to significant dysarthria. Pt reports progressively worsening weakness since being discharged from the hospital last month. She was hospitalized from 7/17 to 7/24 due to AMS 2/2 to hepatic encephalopathy and hyperammonemia. Her mentation improved after receiving lactulose and rifaximin. She was discharged to acute rehab, but says she was not able to tolerate PT and was sent home with . Pt is now bed bound and can barely move or talk without significant effort. She states that she was much more functional and independent prior to the last hospitalization. She also complains of generalized myalgias and pain, worse in her left leg and her abdomen. She denies any recent fever, headache, chest pain, SOB, or n/v/d.     ED Course: On presentation to the ED, pt was hemodynamically stable and initial workup showed labs notable for WBC 20.46, lactate 4.2, procal 1.09, Na 127, K 6.0, Bicarb 17, BUN/Cr 68/1.9, T-bili 8.0, AST 59. CXR showed opacity in LLL, possibly reflective of inflammation or infection. Xray pelvis and CT head were unrevealing. In the ED, pt received insulin to shift K, dextrose 250cc, LR 500cc, and NS 1.0L. She was started on vanc and zosyn, and she was given morphine 2mg and Lokelma.  U/S showed main portal vein thrombus, occlusion of main and right branches of portal vein with slow reversed flow in L branch.     GI consulted for EGD for eval of gastric varices prior to starting AC.

## 2022-08-22 NOTE — ASSESSMENT & PLAN NOTE
US with doppler showed portal vein thrombosis. Investigation for esophageal varices needed before anticoagulation can be started.    -Hepatology consulted and did not recommend anticoagulation in this patient  -Thrombosis is likely chronic and not impairing liver function

## 2022-08-22 NOTE — PLAN OF CARE
Swallow evaluation completed. Pt safe to initiate mechanical soft diet with thin liquids. Order requested from team. ST to f/u for diet tolerance/advancement and to target dysarthria. Brigid Norton CCC-SLP 8/22/2022 10:33 AM

## 2022-08-22 NOTE — PT/OT/SLP PROGRESS
Occupational Therapy      Patient Name:  Yana Orellana   MRN:  [id number]    Patient not seen today secondary to Pain, reports unable to participate in evaluation this date. Will follow-up on next available date as medically appropriate for initial evaluation and POC establishment.    8/22/2022

## 2022-08-22 NOTE — ASSESSMENT & PLAN NOTE
Hx of non-alcoholic cirrhosis. T-bili elevated at 8.0 with mildly elevated AST of 59 on admission.    - Trend CMP and LFTs  -Limited abdo US: Occlusion of the main and right branches of the main portal vein with slow, reversed flow in the left branch.  - Continue IVF hydration

## 2022-08-22 NOTE — SUBJECTIVE & OBJECTIVE
Interval History: Pt appears quite dry on exam. NGT in place. Cr worsened to 2.5 today.     Review of patient's allergies indicates:   Allergen Reactions    Ibuprofen Nausea Only    Demerol [meperidine]      Euphoria    Gabapentin Other (See Comments)     Hallucinations    Mellaril-s Nausea Only    Versed [midazolam]      Excessive talking, hyper    Vicodin [hydrocodone-acetaminophen]      Joint pain, muscle pain    Xanax [alprazolam]      fatigue     Current Facility-Administered Medications   Medication Frequency    0.9%  NaCl infusion Continuous    calcium carbonate 200 mg calcium (500 mg) chewable tablet 500 mg TID    dextrose 10% bolus 125 mL PRN    dextrose 10% bolus 250 mL PRN    [START ON 8/25/2022] ergocalciferol capsule 50,000 Units Every Thurs    glucagon (human recombinant) injection 1 mg PRN    glucose chewable tablet 16 g PRN    glucose chewable tablet 24 g PRN    heparin (porcine) injection 5,000 Units Q8H    lactulose 20 gram/30 mL solution Soln 20 g Daily    melatonin tablet 6 mg Nightly PRN    naloxone 0.4 mg/mL injection 0.02 mg PRN    oxyCODONE 5 mg/5 mL solution 5 mg Q6H PRN    piperacillin-tazobactam 4.5 g in sodium chloride 0.9% 100 mL IVPB (ready to mix system) Q8H    predniSONE tablet 5 mg Daily PRN    rifAXIMin tablet 550 mg BID    senna-docusate 8.6-50 mg per tablet 1 tablet BID PRN    sodium bicarbonate tablet 650 mg BID    sodium chloride 0.9% flush 10 mL Q12H PRN    sodium chloride 0.9% flush 10 mL Q6H    And    sodium chloride 0.9% flush 10 mL PRN       Objective:     Vital Signs (Most Recent):  Temp: 97.3 °F (36.3 °C) (08/22/22 1153)  Pulse: 87 (08/22/22 1153)  Resp: 20 (08/22/22 1153)  BP: (!) 129/58 (08/22/22 1153)  SpO2: 100 % (08/22/22 1153)  O2 Device (Oxygen Therapy): room air (08/22/22 0800)   Vital Signs (24h Range):  Temp:  [96.8 °F (36 °C)-97.4 °F (36.3 °C)] 97.3 °F (36.3 °C)  Pulse:  [80-93] 87  Resp:  [12-20] 20  SpO2:  [95 %-100 %] 100 %  BP: (108-133)/(49-60) 129/58      Weight: 136.1 kg (300 lb) (08/21/22 0611)  Body mass index is 43.05 kg/m².  Body surface area is 2.59 meters squared.    No intake/output data recorded.    Physical Exam  Constitutional:       General: She is not in acute distress.     Appearance: She is obese. She is ill-appearing.   HENT:      Head: Normocephalic.      Nose: Nose normal. No congestion.      Mouth/Throat:      Mouth: Mucous membranes are dry.   Cardiovascular:      Rate and Rhythm: Normal rate and regular rhythm.      Pulses: Normal pulses.      Heart sounds: Normal heart sounds.   Pulmonary:      Effort: Pulmonary effort is normal.      Breath sounds: Normal breath sounds.   Abdominal:      General: Abdomen is flat. There is distension.      Palpations: Abdomen is soft.   Musculoskeletal:      Cervical back: Normal range of motion.      Right lower leg: Edema present.      Left lower leg: Edema present.   Skin:     Capillary Refill: Capillary refill takes 2 to 3 seconds.   Neurological:      Mental Status: She is alert.      Sensory: No sensory deficit.      Motor: Weakness present.       Significant Labs:  CBC:   Recent Labs   Lab 08/22/22 0524   WBC 18.17*   RBC 3.15*   HGB 11.4*   HCT 33.1*   *   *   MCH 36.2*   MCHC 34.4     CMP:   Recent Labs   Lab 08/22/22 0524   GLU 99   CALCIUM 8.2*   ALBUMIN 1.5*   PROT 5.8*   *   K 5.4*   CO2 18*   CL 99   BUN 77*   CREATININE 2.5*   ALKPHOS 140*   ALT 33   AST 50*   BILITOT 7.6*     All labs within the past 24 hours have been reviewed.     Significant Imaging:  Labs: Reviewed

## 2022-08-22 NOTE — PT/OT/SLP EVAL
"Speech Language Pathology Evaluation  Bedside Swallow    Patient Name:  Yana Orellana   MRN:  [id number]  Admitting Diagnosis: Sepsis    Recommendations:                 General Recommendations:  Dysphagia therapy  Diet recommendations:  Mechanical soft, Thin   Aspiration Precautions: Assistance with meals, Eliminate distractions, Feed only when awake/alert, Frequent oral care, HOB to 90 degrees, Meds whole 1 at a time, Monitor for s/s of aspiration, Remain upright 30 minutes post meal, Small bites/sips and Standard aspiration precautions   General Precautions: Standard, aspiration, fall  Communication strategies:  provide increased time to answer and go to room if call light pushed    History:     Past Medical History:   Diagnosis Date    Anemia     Anxiety     Cataract     Colon polyps 2012    Fibromyalgia 10/15/2012    Lung cancer     Pneumonia due to infectious organism 7/19/2019    Post-polio syndrome     Rheumatoid arthritis(714.0) 7/16/2012 2009 onset with mod positive CCP and RF MTX 2011 - present     Thyroid disease     Vertigo     postural vertigo       Past Surgical History:   Procedure Laterality Date    BLADDER SURGERY  1959    CHOLECYSTECTOMY      HYSTERECTOMY  1988    LUNG REMOVAL, PARTIAL  2009    left lower lobectomy    OVARIAN CYST REMOVAL  1970    TONSILLECTOMY  1952       Social History: Patient lives with alone.    Prior Intubation HX: none this admission    Modified Barium Swallow: none this admission    Chest X-Rays: 8/22- New NG tube is been placed with its tip and side port over the body of the stomach.  Opacity in the left lung base may be slightly improved    Prior diet: regular/thin        Subjective     "I have trouble talking"    Pain/Comfort:  · Pain Rating 1: 0/10  · Pain Rating Post-Intervention 1: 0/10    Respiratory Status: Room air    Objective:     Oral Musculature Evaluation  · Oral Musculature: general weakness  · Dentition: present and " adequate  · Secretion Management: adequate  · Mucosal Quality: dry  · Volitional Cough: elicited  · Volitional Swallow: elicited  · Voice Prior to PO Intake: dysarthric c/b imprecise articulation and increased rate of speech    Bedside Swallow Eval:   Consistencies Assessed:  · Thin liquids via tsp, cup, straw  · Puree pudding  · Soft solids crackers softened in puree  · Solids crackers     Oral Phase:   · Prolonged mastication with regular solids    Pharyngeal Phase:   · no overt clinical signs/symptoms of aspiration  · no overt clinical signs/symptoms of pharyngeal dysphagia    Compensatory Strategies  · None    Treatment: Provided education to pt re: role of ST, POC, clear speech strategies, swallow precautions, recommendations for mechanical soft diet with thin liquids, and plan to f/u. Pt to benefit from continued reinforcement. White board updated. RN and MD notified of results and recs.      Assessment:     Yana Orellana is a 77 y.o. female with an SLP diagnosis of Dysphagia and Dysarthria.      Goals:   Multidisciplinary Problems     SLP Goals        Problem: SLP    Goal Priority Disciplines Outcome   SLP Goal     SLP    Description: Goals expected to be met by 8/29:  1. Pt will tolerate least restrictive diet without overt s/sx airway threat.   2. Pt will utilize clear speech strategies at conversation level to improve intelligibility to >90%.                   Plan:     · Patient to be seen:  3 x/week   · Plan of Care expires:  09/20/22  · Plan of Care reviewed with:  patient   · SLP Follow-Up:  Yes       Discharge recommendations:  nursing facility, skilled   Barriers to Discharge:  Level of Skilled Assistance Needed .    Time Tracking:     SLP Treatment Date:   08/22/22  Speech Start Time:  0815  Speech Stop Time:  0835     Speech Total Time (min):  20 min    Billable Minutes: Eval Swallow and Oral Function 12 and Self Care/Home Management Training 10    08/22/2022

## 2022-08-22 NOTE — ASSESSMENT & PLAN NOTE
Patient with acute kidney injury likely due to IVVD/dehydration and pre-renal azotemia SONYA is currently stable. Labs reviewed- Renal function/electrolytes with Estimated Creatinine Clearance: 29.3 mL/min (A) (based on SCr of 2.5 mg/dL (H)). according to latest data. Monitor urine output and serial BMP and adjust therapy as needed. Avoid nephrotoxins and renally dose meds for GFR listed above.     Creatinine 1.9 on admit, baseline around 0.7. Cr 2.5 on repeat.  - Likely pre-renal from dehydration and volume losses and poor PO intake    Plan:   - IVF; 1L NS bolus then repeat BMP  - Renal function panel BID  - f/u urine electrolytes, osmols, and urine protein/creatinine ratio.  - Strict I&Os   - Avoid nephrotoxic agents such as NSAIDs, gadolinium and IV radiocontrast.  - Renally dose meds to current GFR.  - Maintain MAP > 65.

## 2022-08-22 NOTE — ASSESSMENT & PLAN NOTE
-Pt is a 76 yo F with pmh of non-alcoholic cirrhosis, anxiety, lung cancer s/p L lower lobectomy and chemo (gem/cis), polyneuropathy, post-polio syndrome, fibromyalgia, and recent hepatic encephalopathy (on lactulose and rifaximin) who presents with generalized weakness and ab pain.  -Pt noted to have portal vein thrombosis and GI consulted for EGD for evaluation of esophageal varices prior to starting AC.     -NPO at midnight and EGD tomorrow, 8/23, to evaluate and treat for possible varices prior to starting AC

## 2022-08-22 NOTE — NURSING
Spoke with Resident Manuel to verify PICC vs midline.  Okay to place midline.  Also cleared with midline with nephrology.

## 2022-08-22 NOTE — ASSESSMENT & PLAN NOTE
Presented initially with K of 6.0. Shifted with insulin and sodium zirconium cyclosilicate. Given lokelma.    - trend BMP  - K down to 5.4 this morning

## 2022-08-22 NOTE — ASSESSMENT & PLAN NOTE
Pt presenting with elevated WBC of 20.46, lactate of 4.2, procal of 1.09. Imaging showing possible infection or inflammation in Left lower lobe of lung. Pt not overtly ill and shows no acute signs of infection, however.  Antibiotics given-   Antibiotics (From admission, onward)            Start     Stop Route Frequency Ordered    08/21/22 1600  piperacillin-tazobactam 4.5 g in sodium chloride 0.9% 100 mL IVPB (ready to mix system)         -- IV Every 8 hours (non-standard times) 08/21/22 1451    08/21/22 0900  rifAXIMin tablet 550 mg         -- Oral 2 times daily 08/21/22 0110        Cultures were taken-   Microbiology Results (last 7 days)     Procedure Component Value Units Date/Time    Blood culture x two cultures. Draw prior to antibiotics. [[id number]]  (Abnormal) Collected: 08/20/22 2038    Order Status: Completed Specimen: Blood from Peripheral, Antecubital, Right Updated: 08/22/22 1434     Blood Culture, Routine Gram stain aer bottle: Gram negative rods       Results called to and read back by:DANIELA PICHARDO RN 08/21/2022  10:44      KLEBSIELLA PNEUMONIAE  Susceptibility pending      Narrative:      Aerobic and anaerobic    Urine culture [[id number]]  (Abnormal) Collected: 08/21/22 1434    Order Status: Completed Specimen: Urine Updated: 08/22/22 1303     Urine Culture, Routine GRAM NEGATIVE MINGO  >100,000 cfu/ml  Identification and susceptibility pending      Narrative:      Specimen Source->Urine    Blood culture x two cultures. Draw prior to antibiotics. [[id number]] Collected: 08/20/22 2038    Order Status: Completed Specimen: Blood from Peripheral, Antecubital, Right Updated: 08/21/22 2212     Blood Culture, Routine No Growth to date      No Growth to date    Narrative:      Aerobic and anaerobic    Blood culture [[id number]]     Order Status: Sent Specimen: Blood     Blood culture [[id number]]     Order Status: Sent Specimen: Blood     Blood culture [[id number]]     Order Status: Sent Specimen: Blood      Blood culture [[id number]]     Order Status: Sent Specimen: Blood     Culture, Respiratory with Gram Stain [[id number]]     Order Status: No result Specimen: Sputum, Expectorated         Latest lactate reviewed, they are-  Recent Labs   Lab 08/21/22  0423 08/21/22  1112 08/22/22  0524   LACTATE 4.4* 4.8* 4.1*     Organ dysfunction indicated by Acute kidney injury  Source- Possibly pulmonary source.     - CXR showed possible infectious/inflammatory process in LLL  - BCx showed GNR  - Started on empiric IV zosyn  - Daily CBCs, WBC trending down to 15.93  - Trend lactate; currently 4.1

## 2022-08-22 NOTE — PLAN OF CARE
Problem: Adult Inpatient Plan of Care  Goal: Plan of Care Review  Outcome: Ongoing, Progressing  Goal: Patient-Specific Goal (Individualized)  Outcome: Ongoing, Progressing  Goal: Absence of Hospital-Acquired Illness or Injury  Outcome: Ongoing, Progressing  Goal: Optimal Comfort and Wellbeing  Outcome: Ongoing, Progressing  Goal: Readiness for Transition of Care  Outcome: Ongoing, Progressing     Problem: Bariatric Environmental Safety  Goal: Safety Maintained with Care  Outcome: Ongoing, Progressing     Problem: Adjustment to Illness (Sepsis/Septic Shock)  Goal: Optimal Coping  Outcome: Ongoing, Progressing     Problem: Bleeding (Sepsis/Septic Shock)  Goal: Absence of Bleeding  Outcome: Ongoing, Progressing     Problem: Glycemic Control Impaired (Sepsis/Septic Shock)  Goal: Blood Glucose Level Within Desired Range  Outcome: Ongoing, Progressing     Problem: Infection Progression (Sepsis/Septic Shock)  Goal: Absence of Infection Signs and Symptoms  Outcome: Ongoing, Progressing     Problem: Nutrition Impaired (Sepsis/Septic Shock)  Goal: Optimal Nutrition Intake  Outcome: Ongoing, Progressing     Problem: Fluid and Electrolyte Imbalance (Acute Kidney Injury/Impairment)  Goal: Fluid and Electrolyte Balance  Outcome: Ongoing, Progressing     Problem: Oral Intake Inadequate (Acute Kidney Injury/Impairment)  Goal: Optimal Nutrition Intake  Outcome: Ongoing, Progressing     Problem: Renal Function Impairment (Acute Kidney Injury/Impairment)  Goal: Effective Renal Function  Outcome: Ongoing, Progressing     Problem: Impaired Wound Healing  Goal: Optimal Wound Healing  Outcome: Ongoing, Progressing     Problem: Skin Injury Risk Increased  Goal: Skin Health and Integrity  Outcome: Ongoing, Progressing     Problem: Adjustment to Illness (Delirium)  Goal: Optimal Coping  Outcome: Ongoing, Progressing     Problem: Altered Behavior (Delirium)  Goal: Improved Behavioral Control  Outcome: Ongoing, Progressing     Problem:  Attention and Thought Clarity Impairment (Delirium)  Goal: Improved Attention and Thought Clarity  Outcome: Ongoing, Progressing     Problem: Sleep Disturbance (Delirium)  Goal: Improved Sleep  Outcome: Ongoing, Progressing     Problem: Infection  Goal: Absence of Infection Signs and Symptoms  Outcome: Ongoing, Progressing     Pt. AOx3, oriented to person, place, and situtaion. VSS with C/O of leg pain and stiffness controlled by PRN medicine and repositioning at this time. Pt remains free of injury during shift. HM3 notified of elevated lactic acid (see order).

## 2022-08-22 NOTE — ASSESSMENT & PLAN NOTE
This is a 77 year old female who has a PMH significant for MATAMOROS cirrhosis (associated with ascites), fibromyalgia, obesity, lung cancer (status post lobectomy in 12/2009 and chemotherapy), and rheumatoid arthritis (not currently on DMARDS) who was admitted to [facility] on 08/20 with sepsis secondary to UTI and possible pneumonia with GNR bacteremia and SONYA. Hepatology consulted on 08/21 for comment on portal vein thrombus seen on abdominal US.     Recommendations:     -Thrombus is likely chronic and not interfering with overall liver function. Recommend against use of anticoagulation in this frail patient at increased risk from falls.   -Continue Lactulose TID and Rifaximin BID titrating Lactulose to 3-4 bowel movements daily.   -Hold diuretics until resolution of SONYA.   -Continued treatment of underlying sepsis and infections.   -CMP and INR daily.

## 2022-08-22 NOTE — SUBJECTIVE & OBJECTIVE
Review of Systems   Constitutional:  Positive for fatigue. Negative for appetite change, chills and fever.   HENT:  Negative for congestion and trouble swallowing.    Eyes:  Negative for pain and redness.   Respiratory:  Negative for cough and shortness of breath.    Cardiovascular:  Positive for leg swelling. Negative for chest pain and palpitations.   Gastrointestinal:  Positive for diarrhea. Negative for abdominal pain, blood in stool, constipation, nausea and vomiting.   Genitourinary:  Negative for difficulty urinating and dysuria.   Musculoskeletal:  Positive for myalgias. Negative for back pain and neck pain.   Skin:  Negative for rash.   Neurological:  Positive for weakness. Negative for dizziness, light-headedness and headaches.     Past Medical History:   Diagnosis Date    Anemia     Anxiety     Cataract     Colon polyps     Fibromyalgia 10/15/2012    Lung cancer     Pneumonia due to infectious organism 2019    Post-polio syndrome     Rheumatoid arthritis(714.0) 2012    2009 onset with mod positive CCP and RF MTX  - present     Thyroid disease     Vertigo     postural vertigo       Past Surgical History:   Procedure Laterality Date    BLADDER SURGERY      CHOLECYSTECTOMY      HYSTERECTOMY  1988    LUNG REMOVAL, PARTIAL  2009    left lower lobectomy    OVARIAN CYST REMOVAL  1970    TONSILLECTOMY       Review of patient's allergies indicates:   Allergen Reactions    Ibuprofen Nausea Only    Demerol [meperidine]      Euphoria    Gabapentin Other (See Comments)     Hallucinations    Mellaril-s Nausea Only    Versed [midazolam]      Excessive talking, hyper    Vicodin [hydrocodone-acetaminophen]      Joint pain, muscle pain    Xanax [alprazolam]      fatigue         Tobacco Use    Smoking status: Former Smoker     Packs/day: 1.50     Years: 40.00     Pack years: 60.00     Types: Cigarettes     Quit date: 2009     Years since quittin.1    Smokeless tobacco: Never Used    Substance and Sexual Activity    Alcohol use: Not Currently    Drug use: No    Sexual activity: Not Currently       Medications Prior to Admission   Medication Sig Dispense Refill Last Dose    acetaminophen (TYLENOL) 500 MG tablet Take 500 mg by mouth 2 (two) times daily as needed for Pain.       amLODIPine (NORVASC) 5 MG tablet Take 5 mg by mouth once daily.       diclofenac sodium (VOLTAREN) 1 % Gel Apply 2 g topically 3 (three) times daily. (Patient taking differently: Apply 2 g topically 3 (three) times daily as needed (PAIN).) 2 g 0     ergocalciferol (VITAMIN D2) 50,000 unit Cap Take 50,000 Units by mouth every Thursday.       lactulose (CHRONULAC) 10 gram/15 mL solution Take 45 mLs (30 g total) by mouth 2 (two) times daily. May adjust dose every 1 to 2 days to achieve 2 to 3 soft stools/day 2700 mL 2     lactulose (KRISTALOSE) 20 gram Pack Take 1 packet (20 g total) by mouth daily as needed (constipation). 30 packet 1     LIDOcaine (LIDODERM) 5 % Place 1 patch onto the skin every 24 hours. Remove & Discard patch within 12 hours or as directed by MD       miconazole NITRATE 2 % (MICOTIN) 2 % top powder Apply topically 2 (two) times daily. 85 g 1     multivitamin with minerals tablet Take 1 tablet by mouth once daily.       predniSONE (DELTASONE) 5 MG tablet Take 1-2 tablets (5-10 mg total) by mouth 2 (two) times daily as needed (arthritis). (Patient taking differently: Take 5 mg by mouth daily as needed (arthritis pain).) 30 tablet 1     rifAXIMin (XIFAXAN) 550 mg Tab Take 1 tablet (550 mg total) by mouth 2 (two) times daily. 60 tablet 2     sodium chloride (OCEAN) 0.65 % nasal spray 1 spray by Nasal route as needed for Congestion.       spironolactone (ALDACTONE) 50 MG tablet Take 1 tablet (50 mg total) by mouth once daily. 30 tablet 11        Objective:     Vital Signs (Most Recent):  Temp: 97.3 °F (36.3 °C) (08/22/22 1153)  Pulse: 87 (08/22/22 1153)  Resp: 20 (08/22/22 1153)  BP: (!) 129/58 (08/22/22  1153)  SpO2: 100 % (08/22/22 1153)   Vital Signs (24h Range):  Temp:  [96.8 °F (36 °C)-97.4 °F (36.3 °C)] 97.3 °F (36.3 °C)  Pulse:  [80-93] 87  Resp:  [12-20] 20  SpO2:  [95 %-100 %] 100 %  BP: (108-133)/(49-60) 129/58     Weight: 136.1 kg (300 lb) (08/21/22 0611)  Body mass index is 43.05 kg/m².    Physical Exam  Constitutional:       Appearance: Normal appearance. She is obese. She is not ill-appearing.   Eyes:      General: Scleral icterus present.      Conjunctiva/sclera: Conjunctivae normal.   Cardiovascular:      Rate and Rhythm: Normal rate and regular rhythm.      Pulses: Normal pulses.      Heart sounds: Normal heart sounds.   Pulmonary:      Effort: Pulmonary effort is normal. No respiratory distress.      Breath sounds: Normal breath sounds.   Abdominal:      General: Bowel sounds are normal. There is distension.      Palpations: Abdomen is soft.      Tenderness: There is no abdominal tenderness.   Musculoskeletal:      Right lower leg: Edema present.      Left lower leg: Edema present.   Skin:     General: Skin is warm and dry.      Coloration: Skin is jaundiced.      Findings: No bruising or rash.   Neurological:      Mental Status: She is alert and oriented to person, place, and time.       MELD-Na score: 33 at 8/22/2022  5:24 AM  MELD score: 30 at 8/22/2022  5:24 AM  Calculated from:  Serum Creatinine: 2.5 mg/dL at 8/22/2022  5:24 AM  Serum Sodium: 127 mmol/L at 8/22/2022  5:24 AM  Total Bilirubin: 7.6 mg/dL at 8/22/2022  5:24 AM  INR(ratio): 1.9 at 8/22/2022  5:24 AM  Age: 77 years    Significant Labs:  Labs within the past month have been reviewed.    Significant Imaging:  US: Reviewed

## 2022-08-22 NOTE — ASSESSMENT & PLAN NOTE
Creatinine 1.9 on admit, baseline around 0.6  - Likely pre-renal from dehydration and volume losses as patient appears dry on exam. Patient has elevated Cr, mild hyperkalemia and hyponatremia.   - Serum osm 301, higher than expected.   - urine osm 333  - urine chloride <20, urine Na 16, urine K 30, UPCr 0.38. Urine lytes indicative of low intravascular volume    DDx is likely prerenal SONYA    Lab Results   Component Value Date    CREATININE 2.5 (H) 08/22/2022       RP US negative for hydronephrosis.     Plan:   - IVF  - RFP BID  - Strict I&Os and daily weights   - Trend sCr. 1.9->2.5  - Avoid nephrotoxic agents such as NSAIDs, gadolinium, ACEi, ARBs and IV radiocontrast.  - Renally dose meds to current GFR.  - Maintain MAP > 65.  - No indications for HD at this time.   - Maintain electrolytes at goal: Mg > 2, Phos > 3, K > 4.

## 2022-08-22 NOTE — SUBJECTIVE & OBJECTIVE
Interval History: Pt still complaining of severe diffuse weakness and mild pain to extremities due to inactivity. No new symptoms reported. Pt's vocalization improving.     Review of Systems   Constitutional:  Positive for fatigue. Negative for appetite change, chills and fever.   HENT:  Negative for congestion, dental problem, facial swelling, postnasal drip, rhinorrhea, sore throat and trouble swallowing.    Eyes:  Negative for photophobia, pain and redness.   Respiratory:  Negative for cough, choking, chest tightness, shortness of breath and wheezing.    Cardiovascular:  Positive for leg swelling. Negative for chest pain and palpitations.   Gastrointestinal:  Positive for diarrhea. Negative for abdominal distention, abdominal pain, blood in stool, constipation, nausea and vomiting.   Endocrine: Negative.    Genitourinary:  Negative for difficulty urinating, dysuria, flank pain, frequency and hematuria.   Musculoskeletal:  Positive for myalgias. Negative for back pain and neck pain.   Skin:  Negative for pallor and rash.   Allergic/Immunologic: Negative.    Neurological:  Positive for speech difficulty and weakness. Negative for dizziness, syncope, light-headedness, numbness and headaches.   Psychiatric/Behavioral:  The patient is not nervous/anxious.    Objective:     Vital Signs (Most Recent):  Temp: 97.3 °F (36.3 °C) (08/22/22 1153)  Pulse: 87 (08/22/22 1153)  Resp: 20 (08/22/22 1153)  BP: (!) 129/58 (08/22/22 1153)  SpO2: 100 % (08/22/22 1153)   Vital Signs (24h Range):  Temp:  [96.8 °F (36 °C)-97.4 °F (36.3 °C)] 97.3 °F (36.3 °C)  Pulse:  [80-93] 87  Resp:  [12-20] 20  SpO2:  [95 %-100 %] 100 %  BP: (108-133)/(49-60) 129/58     Weight: 136.1 kg (300 lb)  Body mass index is 43.05 kg/m².    Intake/Output Summary (Last 24 hours) at 8/22/2022 4478  Last data filed at 8/22/2022 1300  Gross per 24 hour   Intake 50 ml   Output --   Net 50 ml      Physical Exam  Vitals and nursing note reviewed.   Constitutional:        General: She is not in acute distress.     Appearance: Normal appearance. She is obese. She is not ill-appearing or diaphoretic.   HENT:      Head: Normocephalic and atraumatic.      Right Ear: External ear normal.      Left Ear: External ear normal.      Nose: Nose normal.      Mouth/Throat:      Mouth: Mucous membranes are dry.      Pharynx: Oropharynx is clear.   Eyes:      General: No scleral icterus.     Extraocular Movements: Extraocular movements intact.      Conjunctiva/sclera: Conjunctivae normal.      Pupils: Pupils are equal, round, and reactive to light.   Cardiovascular:      Rate and Rhythm: Normal rate and regular rhythm.      Pulses: Normal pulses.      Heart sounds: Normal heart sounds. No murmur heard.  Pulmonary:      Effort: Pulmonary effort is normal. No respiratory distress.      Breath sounds: Normal breath sounds. No stridor. No wheezing or rhonchi.   Chest:      Chest wall: No tenderness.   Abdominal:      General: Abdomen is flat. Bowel sounds are normal. There is no distension.      Palpations: Abdomen is soft.      Tenderness: There is no abdominal tenderness. There is no guarding or rebound.   Musculoskeletal:         General: No swelling or tenderness. Normal range of motion.      Cervical back: Normal range of motion and neck supple. No rigidity or tenderness.      Right lower leg: Edema present.      Left lower leg: Edema present.   Skin:     General: Skin is warm and dry.      Capillary Refill: Capillary refill takes less than 2 seconds.      Coloration: Skin is pale. Skin is not jaundiced.      Findings: No bruising, erythema or rash.   Neurological:      General: No focal deficit present.      Mental Status: She is alert and oriented to person, place, and time.      Cranial Nerves: No cranial nerve deficit.      Motor: No weakness.   Psychiatric:         Mood and Affect: Mood normal.         Behavior: Behavior normal.       Significant Labs: All pertinent labs within the past 24  hours have been reviewed.    Significant Imaging: I have reviewed all pertinent imaging results/findings within the past 24 hours.

## 2022-08-22 NOTE — CONSULTS
"[facility] - Telemetry Stepdown  Hepatology  Consult Note    Patient Name: Yana Orellana  MRN: [id number]  Admission Date: 8/20/2022  Hospital Length of Stay: 2 days  Attending Provider: Chari Ngo*   Primary Care Physician: Caitlyn Graf MD  Principal Problem:Sepsis    Inpatient consult to Hepatology  Consult performed by: Rinku Jolly MD  Consult ordered by: Jasmin Noriega MD        Subjective:     Transplant status: No    HPI:  Ms. Yana Orellana is a 77 year old female for whom hepatology is consulted for management of portal vein thrombus. She has a PMH significant for MATAMOROS cirrhosis (associated with ascites), fibromyalgia, obesity, lung cancer (status post lobectomy in 12/2009 and chemotherapy), and rheumatoid arthritis (not currently on DMARDS).     Hospital Course: Patient presented to [facility] on 08/20 for evaluation of generalized myalgias and lower extremity pain. On presentation here vital signs were normal on room air. Labs were notable for leukocytosis (20.5), normal Hgb, hyponatremia (127), hyperkalemia (6), SONYA (Cr 1.9), transaminitis (AST 59), and hyperbilirubinemia (8). UA showed UTI. CXR showed possible left lower pneumonia. She was admitted to hospital medicine and initiated on IVF and antibiotics. Abdominal US with doppler showed evidence of occlusion of main and right branches of the portal vein. Hepatology consulted for assistance.     On initial bedside interview, patient was unsure of exact reason for hospitalization. She claims she was brought to hospital from her nursing home due to being found in a coma and needing medication to resolve it. She reports feeling "very weak" overall for many months and no longer feels strong enough to ambulate. She reports onset of non-bloody diarrhea since admission to hospital, but denies symptom association with abdominal pain, nausea, vomiting, or skin or eye yellowing.       Review of Systems   Constitutional:  Positive for " fatigue. Negative for appetite change, chills and fever.   HENT:  Negative for congestion and trouble swallowing.    Eyes:  Negative for pain and redness.   Respiratory:  Negative for cough and shortness of breath.    Cardiovascular:  Positive for leg swelling. Negative for chest pain and palpitations.   Gastrointestinal:  Positive for diarrhea. Negative for abdominal pain, blood in stool, constipation, nausea and vomiting.   Genitourinary:  Negative for difficulty urinating and dysuria.   Musculoskeletal:  Positive for myalgias. Negative for back pain and neck pain.   Skin:  Negative for rash.   Neurological:  Positive for weakness. Negative for dizziness, light-headedness and headaches.     Past Medical History:   Diagnosis Date    Anemia     Anxiety     Cataract     Colon polyps     Fibromyalgia 10/15/2012    Lung cancer     Pneumonia due to infectious organism 2019    Post-polio syndrome     Rheumatoid arthritis(714.0) 2012 onset with mod positive CCP and RF MTX  - present     Thyroid disease     Vertigo     postural vertigo       Past Surgical History:   Procedure Laterality Date    BLADDER SURGERY      CHOLECYSTECTOMY      HYSTERECTOMY      LUNG REMOVAL, PARTIAL  2009    left lower lobectomy    OVARIAN CYST REMOVAL      TONSILLECTOMY       Review of patient's allergies indicates:   Allergen Reactions    Ibuprofen Nausea Only    Demerol [meperidine]      Euphoria    Gabapentin Other (See Comments)     Hallucinations    Mellaril-s Nausea Only    Versed [midazolam]      Excessive talking, hyper    Vicodin [hydrocodone-acetaminophen]      Joint pain, muscle pain    Xanax [alprazolam]      fatigue         Tobacco Use    Smoking status: Former Smoker     Packs/day: 1.50     Years: 40.00     Pack years: 60.00     Types: Cigarettes     Quit date: 2009     Years since quittin.1    Smokeless tobacco: Never Used   Substance and Sexual Activity     Alcohol use: Not Currently    Drug use: No    Sexual activity: Not Currently       Medications Prior to Admission   Medication Sig Dispense Refill Last Dose    acetaminophen (TYLENOL) 500 MG tablet Take 500 mg by mouth 2 (two) times daily as needed for Pain.       amLODIPine (NORVASC) 5 MG tablet Take 5 mg by mouth once daily.       diclofenac sodium (VOLTAREN) 1 % Gel Apply 2 g topically 3 (three) times daily. (Patient taking differently: Apply 2 g topically 3 (three) times daily as needed (PAIN).) 2 g 0     ergocalciferol (VITAMIN D2) 50,000 unit Cap Take 50,000 Units by mouth every Thursday.       lactulose (CHRONULAC) 10 gram/15 mL solution Take 45 mLs (30 g total) by mouth 2 (two) times daily. May adjust dose every 1 to 2 days to achieve 2 to 3 soft stools/day 2700 mL 2     lactulose (KRISTALOSE) 20 gram Pack Take 1 packet (20 g total) by mouth daily as needed (constipation). 30 packet 1     LIDOcaine (LIDODERM) 5 % Place 1 patch onto the skin every 24 hours. Remove & Discard patch within 12 hours or as directed by MD       miconazole NITRATE 2 % (MICOTIN) 2 % top powder Apply topically 2 (two) times daily. 85 g 1     multivitamin with minerals tablet Take 1 tablet by mouth once daily.       predniSONE (DELTASONE) 5 MG tablet Take 1-2 tablets (5-10 mg total) by mouth 2 (two) times daily as needed (arthritis). (Patient taking differently: Take 5 mg by mouth daily as needed (arthritis pain).) 30 tablet 1     rifAXIMin (XIFAXAN) 550 mg Tab Take 1 tablet (550 mg total) by mouth 2 (two) times daily. 60 tablet 2     sodium chloride (OCEAN) 0.65 % nasal spray 1 spray by Nasal route as needed for Congestion.       spironolactone (ALDACTONE) 50 MG tablet Take 1 tablet (50 mg total) by mouth once daily. 30 tablet 11        Objective:     Vital Signs (Most Recent):  Temp: 97.3 °F (36.3 °C) (08/22/22 1153)  Pulse: 87 (08/22/22 1153)  Resp: 20 (08/22/22 1153)  BP: (!) 129/58 (08/22/22 1153)  SpO2: 100 %  (08/22/22 1153)   Vital Signs (24h Range):  Temp:  [96.8 °F (36 °C)-97.4 °F (36.3 °C)] 97.3 °F (36.3 °C)  Pulse:  [80-93] 87  Resp:  [12-20] 20  SpO2:  [95 %-100 %] 100 %  BP: (108-133)/(49-60) 129/58     Weight: 136.1 kg (300 lb) (08/21/22 0611)  Body mass index is 43.05 kg/m².    Physical Exam  Constitutional:       Appearance: Normal appearance. She is obese. She is not ill-appearing.   Eyes:      General: Scleral icterus present.      Conjunctiva/sclera: Conjunctivae normal.   Cardiovascular:      Rate and Rhythm: Normal rate and regular rhythm.      Pulses: Normal pulses.      Heart sounds: Normal heart sounds.   Pulmonary:      Effort: Pulmonary effort is normal. No respiratory distress.      Breath sounds: Normal breath sounds.   Abdominal:      General: Bowel sounds are normal. There is distension.      Palpations: Abdomen is soft.      Tenderness: There is no abdominal tenderness.   Musculoskeletal:      Right lower leg: Edema present.      Left lower leg: Edema present.   Skin:     General: Skin is warm and dry.      Coloration: Skin is jaundiced.      Findings: No bruising or rash.   Neurological:      Mental Status: She is alert and oriented to person, place, and time.       MELD-Na score: 33 at 8/22/2022  5:24 AM  MELD score: 30 at 8/22/2022  5:24 AM  Calculated from:  Serum Creatinine: 2.5 mg/dL at 8/22/2022  5:24 AM  Serum Sodium: 127 mmol/L at 8/22/2022  5:24 AM  Total Bilirubin: 7.6 mg/dL at 8/22/2022  5:24 AM  INR(ratio): 1.9 at 8/22/2022  5:24 AM  Age: 77 years    Significant Labs:  Labs within the past month have been reviewed.    Significant Imaging:  US: Reviewed    Assessment/Plan:     Portal vein thrombosis  This is a 77 year old female who has a PMH significant for MATAMOROS cirrhosis (associated with ascites), fibromyalgia, obesity, lung cancer (status post lobectomy in 12/2009 and chemotherapy), and rheumatoid arthritis (not currently on DMARDS) who was admitted to [facility] on 08/20 with sepsis  secondary to UTI and possible pneumonia with GNR bacteremia and SONYA. Hepatology consulted on 08/21 for comment on portal vein thrombus seen on abdominal US.     Recommendations:     -Thrombus is likely chronic and not interfering with overall liver function. Recommend against use of anticoagulation in this frail patient at increased risk from falls.   -Continue Lactulose TID and Rifaximin BID titrating Lactulose to 3-4 bowel movements daily.   -Hold diuretics until resolution of SONYA.   -Continued treatment of underlying sepsis and infections.   -CMP and INR daily.         Thank you for your consult. I will sign off. Please contact us if you have any additional questions.    Rinku Jolly MD  Hepatology  [facility] - Telemetry Stepdown

## 2022-08-23 PROBLEM — Z51.5 COMFORT MEASURES ONLY STATUS: Status: ACTIVE | Noted: 2022-01-01

## 2022-08-23 NOTE — PT/OT/SLP PROGRESS
Speech Language Pathology  Discharge    Yana Orellana  MRN: [id number]    Patient not seen today secondary to pt .   2022

## 2022-08-23 NOTE — HPI
Yana Orellana is a 77 y.o. F w/PMH of HTN, non-alcoholic cirrhosis, anxiety, lung cancer s/p Left lower lobectomy and chemotherapy (gem/cis), polyneuropathy, post-polio syndrome and fibromyalgia, who presents to the ED with profound generalized weakness and abdominal pain. History was difficult to obtain from patient due to significant dysarthria. Pt reports that she has been progressively weaker since being discharged from the hospital last month. She was hospitalized from 7/17 to 7/24 due to altered mentation secondary to hepatic encephalopathy and hyperammonemia, and her mentation improved after receiving lactulose and rifaximin. She was discharged to acute rehab, but says she was not able to tolerate the physical therapy there and was sent home with home health nursing. Pt says now she is bedbound and can barely move or talk without significant effort, and that she was much more functional and independent prior to the last hospitalization. She also complains of generalized myalgias and pain, worse in her right leg and her abdomen. She denies any recent fever, headache, chest pain, SOB, or n/v/d.     ED Course: On presentation to the ED, pt was hemodynamically stable and initial workup showed labs notable for WBC 20.46, lactate 4.2, procal 1.09, Na 127, K 6.0, Bicarb 17, BUN/Cr 68/1.9, T-bili 8.0, AST 59. CXR showed opacity in LLL, possibly reflective of inflammation or infection. Xray pelvis and CT head were unrevealing. In the ED, pt received insulin to shift K, dextrose 250cc, LR 500cc, and NS 1.0L. She was started on vanc and zosyn, and she was given morphine 2mg and Lokelma. Pt was admitted to Hospital Medicine.    08/21/22  Following admission, labs showed uptrending T-bili from 8.0 to 8.5. Lactate trending up to 4.4, Cr unchanged from 1.9, and pt was given another 500ccs of NS. NG tube placed for medication and nutrition.  08/22/2022  Pt had midline placed and NG tube placed day prior. Pt passed  swallow test and NG was removed. Cultures came back GNR so vanc d/c. 1L NS bolus recommended by Nephrology and was given. Lactate down to 4.1 from 4.8. Phos 5.8 with tums given as a binder. POA updated on status of patient. Goals of care discussed.     Critical care medicine consulted for worsening lactic acid, decreased urine output with worsening kidney function and increased work of breathing. Pt with new mottling of bilateral lower extremities. Will be admitted to MICU

## 2022-08-23 NOTE — HPI
77-year-old female with multiple medical co-morbidities including non-alcoholic liver cirrhosis who initially presented to the ED with generalized fatigue, myalgias, and vague abdominal pain. Initial workup revealed sepsis of unclear etiology, SONYA, and portal vein thrombus. Abdominal pain and further clinical decline continued including need for CRRT, pressors, and respiratory support. CT was done today on hospital day three which showed what appears to be small bowel ischemia with occluded SMA and JANETT as well as ascites. General surgery consulted for surgical management. Patient obtunded and unable to answer questions at this time.

## 2022-08-23 NOTE — SUBJECTIVE & OBJECTIVE
Past Medical History:   Diagnosis Date    Anemia     Anxiety     Cataract     Colon polyps     Fibromyalgia 10/15/2012    Lung cancer     Pneumonia due to infectious organism 2019    Post-polio syndrome     Rheumatoid arthritis(714.0) 2012 onset with mod positive CCP and RF MTX 2011 - present     Thyroid disease     Vertigo     postural vertigo       Past Surgical History:   Procedure Laterality Date    BLADDER SURGERY      CHOLECYSTECTOMY      HYSTERECTOMY      LUNG REMOVAL, PARTIAL  2009    left lower lobectomy    OVARIAN CYST REMOVAL  1970    TONSILLECTOMY         Review of patient's allergies indicates:   Allergen Reactions    Ibuprofen Nausea Only    Demerol [meperidine]      Euphoria    Gabapentin Other (See Comments)     Hallucinations    Mellaril-s Nausea Only    Versed [midazolam]      Excessive talking, hyper    Vicodin [hydrocodone-acetaminophen]      Joint pain, muscle pain    Xanax [alprazolam]      fatigue       Family History       Problem Relation (Age of Onset)    Arrhythmia Mother    Colon cancer Maternal Grandfather    Diabetes Maternal Grandmother    Heart attack Son (45)    Lung cancer Father, Maternal Aunt, Maternal Uncle    Parkinsonism Paternal Grandmother          Tobacco Use    Smoking status: Former Smoker     Packs/day: 1.50     Years: 40.00     Pack years: 60.00     Types: Cigarettes     Quit date: 2009     Years since quittin.1    Smokeless tobacco: Never Used   Substance and Sexual Activity    Alcohol use: Not Currently    Drug use: No    Sexual activity: Not Currently      Review of Systems   Unable to perform ROS: Mental status change   Objective:     Vital Signs (Most Recent):  Temp: 96.5 °F (35.8 °C) (22)  Pulse: 102 (22)  Resp: (!) 34 (22)  BP: 129/60 (22)  SpO2: 95 % (22)   Vital Signs (24h Range):  Temp:  [96.5 °F (35.8 °C)-97.6 °F (36.4 °C)] 96.5 °F (35.8 °C)  Pulse:  []  102  Resp:  [18-36] 34  SpO2:  [95 %-100 %] 95 %  BP: (108-138)/(50-62) 129/60   Weight: 136.1 kg (300 lb)  Body mass index is 43.05 kg/m².      Intake/Output Summary (Last 24 hours) at 8/22/2022 2127  Last data filed at 8/22/2022 1800  Gross per 24 hour   Intake 50 ml   Output 230 ml   Net -180 ml       Physical Exam  Vitals and nursing note reviewed.   Constitutional:       Appearance: She is obese. She is ill-appearing.   Eyes:      Pupils: Pupils are equal, round, and reactive to light.   Cardiovascular:      Rate and Rhythm: Regular rhythm. Tachycardia present.      Pulses: Normal pulses.      Heart sounds: Normal heart sounds.   Pulmonary:      Comments: Decreased air movement  Abdominal:      General: Abdomen is flat. Bowel sounds are normal. There is distension.      Palpations: Abdomen is soft.      Tenderness: There is no guarding.   Musculoskeletal:         General: Normal range of motion.   Skin:     General: Skin is warm and dry.      Comments: Bruising to BUE. Mottling to BLE   Neurological:      Mental Status: She is alert. She is disoriented.       Vents:     Lines/Drains/Airways       Drain  Duration                  Urethral Catheter 08/21/22 1350 Silicone;Straight-tip 16 Fr. 1 day              Peripheral Intravenous Line  Duration                  Peripheral IV - Single Lumen 08/20/22 2057 22 G Left Shoulder 2 days         Peripheral IV - Single Lumen 08/20/22 2058 22 G Right Forearm 2 days         Midline Catheter Insertion/Assessment  - Single Lumen 08/21/22 2005 Right brachial vein other (see comments) 1 day                  Significant Labs:    CBC/Anemia Profile:  Recent Labs   Lab 08/21/22 0424 08/22/22  0524   WBC 15.93* 18.17*   HGB 12.2 11.4*   HCT 36.2* 33.1*   * 127*   * 105*   RDW 15.8* 16.4*        Chemistries:  Recent Labs   Lab 08/21/22  0424 08/22/22  0011 08/22/22  0524 08/22/22  1829   * 128* 127* 128*  128*   K 5.2* 5.6* 5.4* 5.7*  5.7*   CL 98 101 99 101   101   CO2 17* 15* 18* 9*  9*   BUN 69* 73* 77* 77*  77*   CREATININE 1.9* 2.4* 2.5* 3.0*  3.0*   CALCIUM 8.5* 8.2* 8.2* 8.1*  8.1*   ALBUMIN 1.7*  --  1.5* 1.5*   PROT 6.1  --  5.8*  --    BILITOT 8.5*  --  7.6*  --    ALKPHOS 143*  --  140*  --    ALT 35  --  33  --    AST 53*  --  50*  --    MG 2.0  --  2.1  --    PHOS 4.5  --  5.8* 6.8*       All pertinent labs within the past 24 hours have been reviewed.    Significant Imaging: I have reviewed all pertinent imaging results/findings within the past 24 hours.

## 2022-08-23 NOTE — CARE UPDATE
Called by overnight hospital medicine team for worsening hyperkalemia and lactic acidosis. Patient received 3 liters over last 24 hours and with newsome in with 300 cc output. Additionally with rising oxygen requirements from room air to 6 liters then bipap 2/2 resp distress.   Discussed with staff and in agreement for CRRT    10 hours  , ,  cc/hr          Laz Cardona MD  PGY-4 Nephrology

## 2022-08-23 NOTE — PROGRESS NOTES
08/23/22 0041   Treatment   Treatment Type SLED   Treatment Status New start   Dialysis Machine Number [id number]   Dialyzer Time (hours) 0   BVP (Liters) 0 L   Solutions Labeled and Current  Yes   Access Right;IJ;Temporary Cath   Catheter Dressing Intact  Yes   Alarms Engaged Yes   CRRT Comments SLED started   $ CRRT Charges   $ CRRT Charges Initial Setup   Prescription   Time (Hours) 10   Dialysate K + (mEq/L) 4   Dialysate CA + (mEq/L) 2.25   Dialysate HCO3 - (Bicarb) (mEq/L) 30   Dialysate Na + (mEq/L) 135   Cartridge Type Other  (r300)   Dialysate Flow Rate (mL/min) 200   UF Goal Rate 100 mL/hr   CRRT Hourly Documentation   Blood Flow (mL/min) 150   UF Rate 100 cc/hr   Arterial Pressure (mmHg) -30 mmHg   Venous Pressure (mmHg) 30 mmHg   Effluent Pressure (EP) (mmHg) 10 mmHg

## 2022-08-23 NOTE — ASSESSMENT & PLAN NOTE
Pt with history of non alcoholic cirrhosis and elevated ammonia    -Continue Rifaximin and Lactulose  -Delirium precautions

## 2022-08-23 NOTE — ACP (ADVANCE CARE PLANNING)
Called patient's healthcare power of , friend, and legal  Ghislaine to discussed patient's critical condition including being in renal failure requiring emergent dialysis, severe liver damage, and now ischemic bowel due to occlusive thrombi of SMA, JANETT, and SMV. The only potential treatment for this is surgery.  We discussed that she is not a surgical candidate due to extremely high mortality risk associated with surgery (estimated at >70% per surgery team), and that unfortunately, this is a terminal event for her, and I expect her to die within the next day.   We discussed that we will ensure she is comfortable and peaceful, and will not be performing CPR or intubation when her heart stops due to medical futility. We will stop all medical therapies not directly contributing to comfort.  Ghislaine was thankful for the update and care, and does not wish to come to the hospital at this time. She may come in the morning when it is light out. She will let the patient's cousins who live in [place] know (they are her closest relatives other than a distant granddaughter who lives out of state).     DNR and comfort measures only orders placed in chart.    Mary Hunter MD  PGY-3 Internal Medicine  MICU

## 2022-08-23 NOTE — CONSULTS
Critical care medicine consulted for lactic acidosis, worsening kidney failure, increased work of breathing and altered mental status. Pt will be admitted to MICU. Full H&P to follow.     GUY Bentley-  Critical Care Medicine  08/22/2022 9:45 PM

## 2022-08-23 NOTE — SIGNIFICANT EVENT
Called to the bedside as patient was asystole, pulseless, apneic and without reflexes. Time of death was called at 0354. Death from septic shock 2/2 bowel ischemia.    Amy Merino MD

## 2022-08-23 NOTE — DISCHARGE SUMMARY
[facility] - Cardiac Medical ICU  Critical Care Medicine  Discharge Summary      Patient Name: Yana Orellana  MRN: [id number]  Admission Date: 8/20/2022  Hospital Length of Stay: 3 days  Discharge Date and Time:  08/23/2022 3:55 AM  Attending Physician: Maribell Sorenson MD   Discharging Provider: Love Mckeon DNP  Primary Care Provider: Caitlyn Graf MD  Reason for Admission: Altered Mental Status    HPI:   Yana Orellana is a 77 y.o. F w/PMH of HTN, non-alcoholic cirrhosis, anxiety, lung cancer s/p Left lower lobectomy and chemotherapy (gem/cis), polyneuropathy, post-polio syndrome and fibromyalgia, who presents to the ED with profound generalized weakness and abdominal pain. History was difficult to obtain from patient due to significant dysarthria. Pt reports that she has been progressively weaker since being discharged from the hospital last month. She was hospitalized from 7/17 to 7/24 due to altered mentation secondary to hepatic encephalopathy and hyperammonemia, and her mentation improved after receiving lactulose and rifaximin. She was discharged to acute rehab, but says she was not able to tolerate the physical therapy there and was sent home with home health nursing. Pt says now she is bedbound and can barely move or talk without significant effort, and that she was much more functional and independent prior to the last hospitalization. She also complains of generalized myalgias and pain, worse in her right leg and her abdomen. She denies any recent fever, headache, chest pain, SOB, or n/v/d.     ED Course: On presentation to the ED, pt was hemodynamically stable and initial workup showed labs notable for WBC 20.46, lactate 4.2, procal 1.09, Na 127, K 6.0, Bicarb 17, BUN/Cr 68/1.9, T-bili 8.0, AST 59. CXR showed opacity in LLL, possibly reflective of inflammation or infection. Xray pelvis and CT head were unrevealing. In the ED, pt received insulin to shift K, dextrose 250cc, LR 500cc,  and NS 1.0L. She was started on vanc and zosyn, and she was given morphine 2mg and Lokelma. Pt was admitted to Hospital Medicine.    08/21/22  Following admission, labs showed uptrending T-bili from 8.0 to 8.5. Lactate trending up to 4.4, Cr unchanged from 1.9, and pt was given another 500ccs of NS. NG tube placed for medication and nutrition.  08/22/2022  Pt had midline placed and NG tube placed day prior. Pt passed swallow test and NG was removed. Cultures came back GNR so vanc d/c. 1L NS bolus recommended by Nephrology and was given. Lactate down to 4.1 from 4.8. Phos 5.8 with tums given as a binder. POA updated on status of patient. Goals of care discussed.     Critical care medicine consulted for worsening lactic acid, decreased urine output with worsening kidney function and increased work of breathing. Pt with new mottling of bilateral lower extremities. Will be admitted to MICU      Procedure(s) (LRB):  EGD (ESOPHAGOGASTRODUODENOSCOPY) (N/A)    Indwelling Lines/Drains at Time of Discharge:   Lines/Drains/Airways     Central Venous Catheter Line  Duration           Trialysis (Dialysis) Catheter 08/22/22 2233 right internal jugular <1 day          Drain  Duration                Urethral Catheter 08/21/22 1350 Silicone;Straight-tip 16 Fr. 1 day              Hospital Course:   No notes on file    Consults (From admission, onward)        Status Ordering Provider     Inpatient consult to General Surgery  Once        Provider:  (Not yet assigned)    Completed TRI TOMLINSON     Inpatient consult to Critical Care Medicine  Once        Provider:  (Not yet assigned)    Completed FIDELINA GALAN     Inpatient consult to Midline team  Once        Provider:  (Not yet assigned)    Completed PERRY WRIGHT     Inpatient consult to Hepatology  Once        Provider:  (Not yet assigned)    Completed VINOD HARO     Inpatient consult to Gastroenterology  Once        Provider:  (Not yet assigned)     Completed VINOD HARO     Inpatient consult to Nephrology  Once        Provider:  (Not yet assigned)    Completed OMAR BAUTISTA        Significant Labs:  All pertinent labs within the past 24 hours have been reviewed.    Significant Imaging:  I have reviewed all pertinent imaging results/findings within the past 24 hours.    Pending Diagnostic Studies:     Procedure Component Value Units Date/Time    Ammonia [[id number]] Collected: 08/20/22 2037    Order Status: Sent Lab Status: In process Updated: 08/20/22 2038    Specimen: Blood     Troponin I [[id number]] Collected: 08/22/22 2353    Order Status: Sent Lab Status: In process Updated: 08/22/22 2354    Specimen: Blood     Narrative:      Collection has been rescheduled by [facility] at 08/22/2022 23:42 Reason:   Nurse Draw        Final Active Diagnoses:    Diagnosis Date Noted POA    PRINCIPAL PROBLEM:  Sepsis [A41.9] 08/21/2022 Yes    Comfort measures only status [Z51.5] 08/23/2022 Not Applicable    Portal vein thrombosis [I81] 08/22/2022 Yes    SONYA (acute kidney injury) [N17.9] 08/21/2022 Yes    Hyperkalemia [E87.5] 08/21/2022 Yes    Hepatic encephalopathy [K72.90] 07/18/2022 Yes    Lactic acidosis [E87.2] 06/25/2022 No    Essential hypertension [I10] 05/28/2021 Yes    Cirrhosis, non-alcoholic [K74.60] 05/27/2021 Yes    Polyneuropathy [G62.9] 07/07/2020 Yes    Hyperbilirubinemia [E80.6] 06/12/2020 Yes    Rheumatoid arthritis involving multiple sites with positive rheumatoid factor [M05.79] 10/26/2015 Yes    Fibromyalgia [M79.7] 10/15/2012 Yes     Chronic    Class 3 severe obesity due to excess calories with serious comorbidity and body mass index (BMI) of 40.0 to 44.9 in adult [E66.01, Z68.41] 07/16/2012 Not Applicable      Problems Resolved During this Admission:     No new Assessment & Plan notes have been filed under this hospital service since the last note was generated.  Service: Critical Care Medicine    Discharged Condition:      Disposition:     Pt transitioned to comfort care CTA revealed ischemic bowel and pt found to be not a surgical candidate. Pt passed away comfortably at 3:54 am. Condolences offered.     Patient Instructions:   No discharge procedures on file.  Medications:  None     Love Mckeon DNP  Critical Care Medicine  [facility] - Cardiac Medical ICU

## 2022-08-23 NOTE — ASSESSMENT & PLAN NOTE
77-year-old female with multiple medical co-morbidities including non-alcoholic liver cirrhosis who presented with abdominal pain and sepsis. CT done today on hospital day three that shows what appears to be ischemic/dead gut secondary to SMA and JANETT thromboembolism. With her co-morbidities including MELD of 35 and operative risk of death of over 70%, believe operative intervention is futile in this very sick patient.     -do not recommend surgical intervention at this time given futility of clinical picture and extremely high risk of operative death and morbidity.   -recommend comfort care  -surgery available if needed.

## 2022-08-23 NOTE — PROGRESS NOTES
Pharmacokinetic Initial Assessment: IV Vancomycin    Assessment/Plan:    Ms. Orellana received vancomycin with loading dose of 2000 mg once in the ED earlier this admission. A random level was drawn ~24 hours after loading dose and resulted at 21.7. Vanc was discontinued earlier today.  - She is in severe SONYA with baseline Scr ~0.7 and admitting Scr ~1.9, which has since increased. Nephrology is following and just ordered 10-hour SLED.  - Will not re-dose now given this morning's level of 21, but will re-dose with subsequent doses when random concentrations are less than 20 mcg/mL.  - Desired empiric serum trough concentration is 15 to 20 mcg/mL.  - Draw vancomycin random level on 8/23 at 0430 with AM labs.     Pharmacy will continue to follow and monitor vancomycin.      Please contact pharmacy at extension [phone] with any questions regarding this assessment.     Thank you for the consult,   Janett Welch       Patient brief summary:  Yana Orellana is a 77 y.o. female initiated on antimicrobial therapy with IV Vancomycin for treatment of suspected bacteremia    Actual Body Weight:   136.1 kg    Renal Function:   Estimated Creatinine Clearance: 24.4 mL/min (A) (based on SCr of 3 mg/dL (H)).     Dialysis Method (if applicable):  N/A

## 2022-08-23 NOTE — CHAPLAIN
Collected and filed DCP. All pt belongings given to Ghislaine Albarran. Ghislaine informed  that distant relatives have been informed of death. For now  arrangements have been made with Elsa. No donation. PPW is on hold.     Chaplain Castillo, Spiritual Care  Spectra *[phone]

## 2022-08-23 NOTE — EICU
Rounding (Video Assessment):  No    Intervention Initiated From:  Bedside    Alisia Communicated with Bedside Nurse regarding:  Time-Out    Nurse Notified:  No    Doctor Notified:  No    Comments: elert from bedside staff for trialysis line placement per Dr. HAMZAH Hunter. See time out flowsheet.   22:40-pt tolerated procedure well.

## 2022-08-23 NOTE — PROCEDURES
"Yana Orellana is a 77 y.o. female patient.    Temp: 96.5 °F (35.8 °C) (22)  Pulse: 99 (22)  Resp: (!) 27 (22)  BP: 129/60 (22)  SpO2: 100 % (22)  Weight: 136.1 kg (300 lb) (22)  Height: 5' 10" (177.8 cm) (22)       Arterial Line    Date/Time: 2022 11:17 PM  Location procedure was performed: [facility] CRITICAL CARE MEDICINE  Performed by: Lianne Good MD  Authorized by: Lianne Good MD   Assisting provider: Lianne Good MD  Pre-op Diagnosis: hepatic failure  Post-operative diagnosis: hepatic failure   Consent Done: Yes  Consent: Written consent obtained.  Risks and benefits: risks, benefits and alternatives were discussed  Patient understanding: patient states understanding of the procedure being performed  Patient identity confirmed: , MRN and name  Time out: Immediately prior to procedure a "time out" was called to verify the correct patient, procedure, equipment, support staff and site/side marked as required.  Indications: multiple ABGs and hemodynamic monitoring  Location: left radial  Anesthesia: local infiltration    Anesthesia:  Local Anesthetic: lidocaine 1% with epinephrine  Anesthetic total: 3 mL    Patient sedated: no  Description of findings: x   Needle gauge: 22  Seldinger technique: Seldinger technique used  Number of attempts: 2  Technical procedures used: x  Significant surgical tasks conducted by the assistant(s): x  Complications: No  Estimated blood loss (mL): 1  Specimens: No  Implants: No  Post-procedure: line sutured and dressing applied  Patient tolerance: Patient tolerated the procedure well with no immediate complications          2022  "

## 2022-08-23 NOTE — ASSESSMENT & PLAN NOTE
Pt with worseining kidney function and decreased urine output. Likely sepsis vs. HRS.     -Nephrology following  -Sodium bicarb tabs  -Lasix 120 mg once  -s/p 2LNS fluid resucitation  -Plan for emergent CRRT tonight  -Renally dose all medications  -Avoid nephrotoxic agents  -Strict intake and ouput  -Daily weight.

## 2022-08-23 NOTE — ASSESSMENT & PLAN NOTE
"This patient does have evidence of infective focus  My overall impression is sepsis. Vital signs were reviewed and noted in progress note.  Antibiotics given-   Antibiotics (From admission, onward)            Start     Stop Route Frequency Ordered    08/22/22 2216  vancomycin - pharmacy to dose  (vancomycin IVPB)        "And" Linked Group Details    -- IV pharmacy to manage frequency 08/22/22 2116    08/21/22 1600  piperacillin-tazobactam 4.5 g in sodium chloride 0.9% 100 mL IVPB (ready to mix system)         -- IV Every 8 hours (non-standard times) 08/21/22 1451    08/21/22 0900  rifAXIMin tablet 550 mg         -- Oral 2 times daily 08/21/22 0110        Cultures were taken-   Microbiology Results (last 7 days)     Procedure Component Value Units Date/Time    Blood culture [[id number]]     Order Status: Sent Specimen: Blood     Blood culture [[id number]]     Order Status: Sent Specimen: Blood     Blood culture x two cultures. Draw prior to antibiotics. [[id number]]  (Abnormal) Collected: 08/20/22 2038    Order Status: Completed Specimen: Blood from Peripheral, Antecubital, Right Updated: 08/22/22 1434     Blood Culture, Routine Gram stain aer bottle: Gram negative rods       Results called to and read back by:DANIELA PICHARDO RN 08/21/2022  10:44      KLEBSIELLA PNEUMONIAE  Susceptibility pending      Narrative:      Aerobic and anaerobic    Urine culture [[id number]]  (Abnormal) Collected: 08/21/22 1434    Order Status: Completed Specimen: Urine Updated: 08/22/22 1303     Urine Culture, Routine GRAM NEGATIVE MINGO  >100,000 cfu/ml  Identification and susceptibility pending      Narrative:      Specimen Source->Urine    Blood culture x two cultures. Draw prior to antibiotics. [[id number]] Collected: 08/20/22 2038    Order Status: Completed Specimen: Blood from Peripheral, Antecubital, Right Updated: 08/21/22 2212     Blood Culture, Routine No Growth to date      No Growth to date    Narrative:      Aerobic and anaerobic    " Blood culture [[id number]]     Order Status: Canceled Specimen: Blood     Blood culture [[id number]]     Order Status: Canceled Specimen: Blood     Blood culture [[id number]]     Order Status: Canceled Specimen: Blood     Blood culture [[id number]]     Order Status: Canceled Specimen: Blood     Culture, Respiratory with Gram Stain [[id number]]     Order Status: No result Specimen: Sputum, Expectorated         Latest lactate reviewed, they are-  Recent Labs   Lab 08/21/22  1112 08/22/22  0524 08/22/22  1616   LACTATE 4.8* 4.1* 5.7*       Organ dysfunction indicated by Acute kidney injury and Encephalopathy   Source- Urine, blood    Source control Achieved by- Antibiotics    -Continue Zostyn  -Lactic acid q4hrs  -Start Vanc  -Levophed for MAP > 65  -Blood cultures x 2  -Repeat UA, curine culture

## 2022-08-23 NOTE — CONSULTS
[facility] - Cardiac Medical ICU  General Surgery  Consult Note    Patient Name: Yana Orellana  MRN: [id number]  Code Status: Full Code  Admission Date: 8/20/2022  Hospital Length of Stay: 3 days  Attending Physician: Maribell Sorenson MD  Primary Care Provider: Caitlyn Graf MD    Patient information was obtained from caregiver / friend, past medical records, ER records and primary team.     Inpatient consult to General Surgery  Consult performed by: Kailash Sevilla MD  Consult ordered by: Mary Hunter MD        Subjective:     Principal Problem: Sepsis    History of Present Illness: 77-year-old female with multiple medical co-morbidities including non-alcoholic liver cirrhosis who initially presented to the ED with generalized fatigue, myalgias, and vague abdominal pain. Initial workup revealed sepsis of unclear etiology, SONYA, and portal vein thrombus. Abdominal pain and further clinical decline continued including need for CRRT, pressors, and respiratory support. CT was done today on hospital day three which showed what appears to be small bowel ischemia with occluded SMA and JANETT as well as ascites. General surgery consulted for surgical management. Patient obtunded and unable to answer questions at this time.       No current facility-administered medications on file prior to encounter.     Current Outpatient Medications on File Prior to Encounter   Medication Sig    acetaminophen (TYLENOL) 500 MG tablet Take 500 mg by mouth 2 (two) times daily as needed for Pain.    amLODIPine (NORVASC) 5 MG tablet Take 5 mg by mouth once daily.    diclofenac sodium (VOLTAREN) 1 % Gel Apply 2 g topically 3 (three) times daily. (Patient taking differently: Apply 2 g topically 3 (three) times daily as needed (PAIN).)    ergocalciferol (VITAMIN D2) 50,000 unit Cap Take 50,000 Units by mouth every Thursday.    lactulose (CHRONULAC) 10 gram/15 mL solution Take 45 mLs (30 g total) by mouth 2 (two) times daily. May adjust dose  every 1 to 2 days to achieve 2 to 3 soft stools/day    lactulose (KRISTALOSE) 20 gram Pack Take 1 packet (20 g total) by mouth daily as needed (constipation).    LIDOcaine (LIDODERM) 5 % Place 1 patch onto the skin every 24 hours. Remove & Discard patch within 12 hours or as directed by MD    miconazole NITRATE 2 % (MICOTIN) 2 % top powder Apply topically 2 (two) times daily.    multivitamin with minerals tablet Take 1 tablet by mouth once daily.    predniSONE (DELTASONE) 5 MG tablet Take 1-2 tablets (5-10 mg total) by mouth 2 (two) times daily as needed (arthritis). (Patient taking differently: Take 5 mg by mouth daily as needed (arthritis pain).)    rifAXIMin (XIFAXAN) 550 mg Tab Take 1 tablet (550 mg total) by mouth 2 (two) times daily.    sodium chloride (OCEAN) 0.65 % nasal spray 1 spray by Nasal route as needed for Congestion.    spironolactone (ALDACTONE) 50 MG tablet Take 1 tablet (50 mg total) by mouth once daily.    [DISCONTINUED] cholestyramine (QUESTRAN) 4 gram packet Take 1 packet (4 g total) by mouth once daily.    [DISCONTINUED] oxyCODONE-acetaminophen (PERCOCET) 5-325 mg per tablet Take 1 tablet by mouth every 8 (eight) hours as needed for Pain. (Patient taking differently: Take 0.5 tablets by mouth every 8 (eight) hours as needed for Pain.)       Review of patient's allergies indicates:   Allergen Reactions    Ibuprofen Nausea Only    Demerol [meperidine]      Euphoria    Gabapentin Other (See Comments)     Hallucinations    Mellaril-s Nausea Only    Versed [midazolam]      Excessive talking, hyper    Vicodin [hydrocodone-acetaminophen]      Joint pain, muscle pain    Xanax [alprazolam]      fatigue       Past Medical History:   Diagnosis Date    Anemia     Anxiety     Cataract     Colon polyps 2012    Fibromyalgia 10/15/2012    Lung cancer     Pneumonia due to infectious organism 7/19/2019    Post-polio syndrome     Rheumatoid arthritis(714.0) 7/16/2012    2009 onset with  mod positive CCP and RF MTX 2011 - present     Thyroid disease     Vertigo     postural vertigo     Past Surgical History:   Procedure Laterality Date    BLADDER SURGERY      CHOLECYSTECTOMY      HYSTERECTOMY      LUNG REMOVAL, PARTIAL  2009    left lower lobectomy    OVARIAN CYST REMOVAL      TONSILLECTOMY       Family History       Problem Relation (Age of Onset)    Arrhythmia Mother    Colon cancer Maternal Grandfather    Diabetes Maternal Grandmother    Heart attack Son (45)    Lung cancer Father, Maternal Aunt, Maternal Uncle    Parkinsonism Paternal Grandmother          Tobacco Use    Smoking status: Former Smoker     Packs/day: 1.50     Years: 40.00     Pack years: 60.00     Types: Cigarettes     Quit date: 2009     Years since quittin.1    Smokeless tobacco: Never Used   Substance and Sexual Activity    Alcohol use: Not Currently    Drug use: No    Sexual activity: Not Currently     Review of Systems   Unable to perform ROS: Acuity of condition   Objective:     Vital Signs (Most Recent):  Temp: 96.5 °F (35.8 °C) (22)  Pulse: 100 (22)  Resp: 20 (22)  BP: (!) 84/57 (22)  SpO2: (!) 80 % (22)   Vital Signs (24h Range):  Temp:  [96.5 °F (35.8 °C)-97.6 °F (36.4 °C)] 96.5 °F (35.8 °C)  Pulse:  [] 100  Resp:  [18-36] 20  SpO2:  [63 %-100 %] 80 %  BP: ()/(39-62) 84/57  Arterial Line BP: (77-93)/(41-43) 77/43     Weight: 136.1 kg (300 lb)  Body mass index is 43.05 kg/m².    Physical Exam  Vitals and nursing note reviewed.   Constitutional:       Appearance: She is obese. She is ill-appearing and toxic-appearing.      Comments: Patient is alert and able to nod that her abdomen is hurting, but not much else.    HENT:      Head: Normocephalic and atraumatic.      Right Ear: External ear normal.      Left Ear: External ear normal.      Mouth/Throat:      Mouth: Mucous membranes are moist.   Eyes:       Conjunctiva/sclera: Conjunctivae normal.   Cardiovascular:      Rate and Rhythm: Regular rhythm. Tachycardia present.   Pulmonary:      Effort: Respiratory distress present.      Comments: BiPAP  Abdominal:      General: There is no distension.      Tenderness: There is abdominal tenderness. There is guarding and rebound.      Comments: Abdomen is globally tender with peritonitis. Midline laparotomy incision well healed.    Musculoskeletal:      Cervical back: Normal range of motion.      Right lower leg: Edema present.      Left lower leg: Edema present.      Comments: Some mottling   Neurological:      Mental Status: She is alert.      Comments: Unable to assess   Psychiatric:      Comments: Unable to assess       Significant Labs:  I have reviewed all pertinent lab results within the past 24 hours.  CBC:   Recent Labs   Lab 08/22/22 0524   WBC 18.17*   RBC 3.15*   HGB 11.4*   HCT 33.1*   *   *   MCH 36.2*   MCHC 34.4     CMP:   Recent Labs   Lab 08/22/22 0524 08/22/22  1829   GLU 99 82  82   CALCIUM 8.2* 8.1*  8.1*   ALBUMIN 1.5* 1.5*   PROT 5.8*  --    * 128*  128*   K 5.4* 5.7*  5.7*   CO2 18* 9*  9*   CL 99 101  101   BUN 77* 77*  77*   CREATININE 2.5* 3.0*  3.0*   ALKPHOS 140*  --    ALT 33  --    AST 50*  --    BILITOT 7.6*  --      LFTs:   Recent Labs   Lab 08/22/22 0524 08/22/22  1829   ALT 33  --    AST 50*  --    ALKPHOS 140*  --    BILITOT 7.6*  --    PROT 5.8*  --    ALBUMIN 1.5* 1.5*     Coagulation:   Recent Labs   Lab 08/22/22 0524   LABPROT 19.4*   INR 1.9*     Cardiac markers:   Recent Labs   Lab 08/20/22  2037   TROPONINI 0.009     ABGs:   Recent Labs   Lab 08/22/22  2329   PH 7.085*   PCO2 18.2*   PO2 126*   HCO3 5.4*   POCSATURATED 97   BE -24       Significant Diagnostics:  CT abdomen/pelvis with contrast shows JANETT and SMA are occluded, which appears acute. Not much flow into small bowel with ascites. Concern for ischemic bowel.       Assessment/Plan:     *  Sepsis  77-year-old female with multiple medical co-morbidities including non-alcoholic liver cirrhosis who presented with abdominal pain and sepsis. CT done today on hospital day three that shows what appears to be ischemic/dead gut secondary to SMA and JANETT thromboembolism. With her co-morbidities including MELD of 35 and operative risk of death of over 70%, believe operative intervention is futile in this very sick patient.     -do not recommend surgical intervention at this time given futility of clinical picture and extremely high risk of operative death and morbidity.   -recommend comfort care  -surgery available if needed.       VTE Risk Mitigation (From admission, onward)         Ordered     heparin 25,000 units in dextrose 5% (100 units/ml) IV bolus from bag - ADDITIONAL PRN BOLUS - 60 units/kg  As needed (PRN)        Question:  Heparin Infusion Adjustment (DO NOT MODIFY ANSWER)  Answer:  \\[email or web address]\epic\Images\Pharmacy\HeparinInfusions\heparin HIGH INTENSITY nomogram with ANTI-XA [id number]    08/22/22 2335     heparin 25,000 units in dextrose 5% (100 units/ml) IV bolus from bag - ADDITIONAL PRN BOLUS - 30 units/kg  As needed (PRN)        Question:  Heparin Infusion Adjustment (DO NOT MODIFY ANSWER)  Answer:  \\[email or web address]\epic\Images\Pharmacy\HeparinInfusions\heparin HIGH INTENSITY nomogram with ANTI-XA [id number]    08/22/22 2335     heparin 25,000 units in dextrose 5% 250 mL (100 units/mL) infusion HIGH INTENSITY nomogram Anti- Xa  Continuous        Question Answer Comment   Heparin Infusion Adjustment (DO NOT MODIFY ANSWER) \\[email or web address]\epic\Images\Pharmacy\HeparinInfusions\heparin HIGH INTENSITY nomogram with ANTI-XA [id number]    Begin at (in units/kg/hr) 18        08/22/22 2335     IP VTE HIGH RISK PATIENT  Once         08/20/22 2317                Thank you for your consult. I will follow-up with patient. Please contact us if you have any additional questions.    Kailash Sevilla MD  General  Surgery  [facility] - Cardiac Medical ICU

## 2022-08-23 NOTE — NURSING
PATIENT TRANSITIONED TO COMFORT MEASURES AFTER SPEAKING WITH POWER OF .  AT BEDSIDE TO PRAY WITH PATIENT.

## 2022-08-23 NOTE — ASSESSMENT & PLAN NOTE
Hx of lung cancer s/p lobectomy with polyneuropathy, post-polio syndrome and fibromyalgia, following chemotherapy      - Neuro exam non-suggestive of etiology; possibly progression of her polyneuropathy or possibly 2/2 to sepsis and infectious etiology  - Follows with neurology at [facility]; work-up to explain polyneuropathy and weakness has been unrevealing thus far   - Consult PT/OT for physical debility  - Consult SLP for severe dysarthria and evaluation of swallowing

## 2022-08-23 NOTE — ASSESSMENT & PLAN NOTE
Hx of portal vein thrombosis. Pt with worsening mottling to BLE and acute rise of lactic acid. Unable to anticoagulate due to liver function. INR 1.9.     -CTA Chest, Abdomen pelvis.

## 2022-08-23 NOTE — CODE/ RAPID DOCUMENTATION
RAPID RESPONSE NURSE NOTE        Admit Date: 2022  LOS: 2  Code Status: Full Code   Date of Consult: 2022  : 1945  Age: 77 y.o.  Weight:   Wt Readings from Last 1 Encounters:   22 136.1 kg (300 lb)     Sex: female  Race: White   Bed: 8093/8093 A:   MRN: [id number]  Time Rapid Response Team page Received: called by bedside RN at   Time Rapid Response Team at Bedside:   Time Rapid Response Team left Bedside:   Was the patient discharged from an ICU this admission? No   Was the patient discharged from a PACU within last 24 hours? No   Did the patient receive conscious sedation/general anesthesia in last 24 hours? No  Was the patient in the ED within the past 24 hours? No  Was the patient on NIPPV within the past 24 hours? No   Did this progress into an ARC or CPA: no  Attending Physician: Chari Ngo*  Primary Service: [facility] MED 3       SITUATION    Notified by bedside RN via phone call.  Reason for alert: AMS, elevated lactic acid  Called to evaluate the patient for Circulatory    BACKGROUND     Why is the patient in the hospital?: Sepsis    Patient has a past medical history of Anemia, Anxiety, Cataract, Colon polyps, Fibromyalgia, Lung cancer, Pneumonia due to infectious organism, Post-polio syndrome, Rheumatoid arthritis(714.0), Thyroid disease, and Vertigo.    Last Vitals:  Temp: 96.5 °F (35.8 °C) (2000)  Pulse: 99 (2037)  Resp: 36 (2037)  BP: 138/50 (2037)  SpO2: 98 % (2037)    24 Hours Vitals Range:  Temp:  [96.5 °F (35.8 °C)-97.6 °F (36.4 °C)]   Pulse:  []   Resp:  [18-36]   BP: (108-138)/(50-62)   SpO2:  [95 %-100 %]     Labs:  Recent Labs     22  0424 22  0524   WBC 20.46* 15.93* 18.17*   HGB 12.0 12.2 11.4*   HCT 33.9* 36.2* 33.1*   * 125* 127*       Recent Labs     22  2037 22  0424 22  0011 22  0524 22  1829   * 127* 128* 127* 128*  128*   K 6.0* 5.2*  5.6* 5.4* 5.7*  5.7*   CL 98 98 101 99 101  101   CO2 17* 17* 15* 18* 9*  9*   CREATININE 1.9* 1.9* 2.4* 2.5* 3.0*  3.0*   * 146* 116* 99 82  82   PHOS  --  4.5  --  5.8* 6.8*   MG 2.1 2.0  --  2.1  --         Recent Labs     08/22/22 2045   PH 7.113*   PCO2 21.8*   PO2 61*   HCO3 7.0*   POCSATURATED 83*   BE -22        ASSESSMENT    Physical Exam  Constitutional:       Appearance: She is ill-appearing.   Eyes:      Comments: Scleral jaundice   Cardiovascular:      Pulses: Decreased pulses.   Pulmonary:      Effort: Tachypnea and accessory muscle usage present.      Breath sounds: Decreased air movement present.   Musculoskeletal:      Right lower leg: Edema present.      Left lower leg: Edema present.   Skin:     General: Skin is dry.      Capillary Refill: Capillary refill takes more than 3 seconds.      Coloration: Skin is jaundiced, mottled and pale.   Neurological:      Mental Status: She is alert.     HR 99  /50 (72)  RR 36, SpO2 98% on room air    Patient answers questions appropriately but is delayed    INTERVENTIONS    The patient was seen for Medical problem. Staff concerns included critical lab abnormality. The following interventions were performed: lactic acid and critical care consulted.     BG checked  POCT venous blood gas drawn with lactate    PH 7.11  PCO2 21  PO2 61  HCO3 7  Lactate 13.8    Blood glucose 53    Patient has gotten ~3L of fluid in the last 24 hours with less than 400cc urine out to newsome    RECOMMENDATIONS    We recommend: transfer to ICU for higher level of care    PROVIDER ESCALATION    Orders received and case discussed with Dr. Blackmon with hosptial medicine, Dr Good and Dr Hunter with critical care    Primary team arrival time: 2030    Disposition: Tx in ICU bed 6090.   Patient transferred to MICU with RRN x2, MTSU bedside RN  Patient transferred on 2L NC, with continuous cardiac monitoring and D10 bolus running for hypoglycemia  Handoff given to Claudia  MICU RN  [place] will sign off care to critical care at this time    FOLLOW UP    charge RNJyoti, bedside RN  updated on plan of care. Instructed to call the Rapid Response Nurse, Cici Kramer RN at [phone] for additional questions or concerns.

## 2022-08-23 NOTE — PROCEDURES
"Yana Orellana is a 77 y.o. female patient.    Temp: 96.5 °F (35.8 °C) (08/22/22 2000)  Pulse: (!) 24 (08/22/22 2335)  Resp: (!) 21 (08/22/22 2335)  BP: (!) 98/39 (08/22/22 2335)  SpO2: (!) 81 % (08/22/22 2335)  Weight: 136.1 kg (300 lb) (08/21/22 0611)  Height: 5' 10" (177.8 cm) (08/21/22 0611)       Central Line    Date/Time: 8/23/2022 12:37 AM  Performed by: Mary Hunter MD  Authorized by: Mary Hunter MD     Location procedure was performed:  [facility]INE  Indications:  Hemodialysis and vascular access  Anesthesia:  Local infiltration  Local anesthetic:  Lidocaine 1% without epinephrine  Preparation:  Skin prepped with ChloraPrep  Skin prep agent dried: Skin prep agent completely dried prior to procedure    Sterile barriers: All five maximal sterile barriers used - gloves, gown, cap, mask and large sterile sheet    Location:  Right internal jugular  Catheter type:  Trialysis  Catheter size:  7 Fr  Inserted Catheter Length (cm):  15  Ultrasound guidance: Yes    Vessel Caliber:  Medium  Comprressibility:  Normal  Guidewire confirmed in vessel.    Steril sheath on probe.    Sterile gel used.  Manometry: Yes    Number of attempts:  2  Securement:  Line sutured, chlorhexidine patch, sterile dressing applied and blood return through all ports  XRay:  Placement verified by x-ray  Adverse Events:  NoneTermination Site: superior vena cava        8/23/2022  "

## 2022-08-23 NOTE — H&P
[facility]  Critical Care Medicine  History & Physical    Patient Name: Yana Orellana  MRN: [id number]  Admission Date: 8/20/2022  Hospital Length of Stay: 3 days  Code Status: Full Code  Attending Physician: Maribell Sorenson MD   Primary Care Provider: Caitlyn Graf MD   Principal Problem: Sepsis    Subjective:     HPI:  Yana Orellana is a 77 y.o. F w/PMH of HTN, non-alcoholic cirrhosis, anxiety, lung cancer s/p Left lower lobectomy and chemotherapy (gem/cis), polyneuropathy, post-polio syndrome and fibromyalgia, who presents to the ED with profound generalized weakness and abdominal pain. History was difficult to obtain from patient due to significant dysarthria. Pt reports that she has been progressively weaker since being discharged from the hospital last month. She was hospitalized from 7/17 to 7/24 due to altered mentation secondary to hepatic encephalopathy and hyperammonemia, and her mentation improved after receiving lactulose and rifaximin. She was discharged to acute rehab, but says she was not able to tolerate the physical therapy there and was sent home with home health nursing. Pt says now she is bedbound and can barely move or talk without significant effort, and that she was much more functional and independent prior to the last hospitalization. She also complains of generalized myalgias and pain, worse in her right leg and her abdomen. She denies any recent fever, headache, chest pain, SOB, or n/v/d.     ED Course: On presentation to the ED, pt was hemodynamically stable and initial workup showed labs notable for WBC 20.46, lactate 4.2, procal 1.09, Na 127, K 6.0, Bicarb 17, BUN/Cr 68/1.9, T-bili 8.0, AST 59. CXR showed opacity in LLL, possibly reflective of inflammation or infection. Xray pelvis and CT head were unrevealing. In the ED, pt received insulin to shift K, dextrose 250cc, LR 500cc, and NS 1.0L. She was started on vanc and zosyn, and she was given morphine 2mg  and Tanya. Pt was admitted to Hospital Medicine.    08/21/22  Following admission, labs showed uptrending T-bili from 8.0 to 8.5. Lactate trending up to 4.4, Cr unchanged from 1.9, and pt was given another 500ccs of NS. NG tube placed for medication and nutrition.  08/22/2022  Pt had midline placed and NG tube placed day prior. Pt passed swallow test and NG was removed. Cultures came back GNR so vanc d/c. 1L NS bolus recommended by Nephrology and was given. Lactate down to 4.1 from 4.8. Phos 5.8 with tums given as a binder. POA updated on status of patient. Goals of care discussed.     Critical care medicine consulted for worsening lactic acid, decreased urine output with worsening kidney function and increased work of breathing. Pt with new mottling of bilateral lower extremities. Will be admitted to MICU      Hospital/ICU Course:  No notes on file     Past Medical History:   Diagnosis Date    Anemia     Anxiety     Cataract     Colon polyps 2012    Fibromyalgia 10/15/2012    Lung cancer     Pneumonia due to infectious organism 7/19/2019    Post-polio syndrome     Rheumatoid arthritis(714.0) 7/16/2012    2009 onset with mod positive CCP and RF MTX 2011 - present     Thyroid disease     Vertigo     postural vertigo       Past Surgical History:   Procedure Laterality Date    BLADDER SURGERY  1959    CHOLECYSTECTOMY      HYSTERECTOMY  1988    LUNG REMOVAL, PARTIAL  2009    left lower lobectomy    OVARIAN CYST REMOVAL  1970    TONSILLECTOMY  1952       Review of patient's allergies indicates:   Allergen Reactions    Ibuprofen Nausea Only    Demerol [meperidine]      Euphoria    Gabapentin Other (See Comments)     Hallucinations    Mellaril-s Nausea Only    Versed [midazolam]      Excessive talking, hyper    Vicodin [hydrocodone-acetaminophen]      Joint pain, muscle pain    Xanax [alprazolam]      fatigue       Family History       Problem Relation (Age of Onset)    Arrhythmia Mother    Colon  cancer Maternal Grandfather    Diabetes Maternal Grandmother    Heart attack Son (45)    Lung cancer Father, Maternal Aunt, Maternal Uncle    Parkinsonism Paternal Grandmother          Tobacco Use    Smoking status: Former Smoker     Packs/day: 1.50     Years: 40.00     Pack years: 60.00     Types: Cigarettes     Quit date: 2009     Years since quittin.1    Smokeless tobacco: Never Used   Substance and Sexual Activity    Alcohol use: Not Currently    Drug use: No    Sexual activity: Not Currently      Review of Systems   Unable to perform ROS: Mental status change   Objective:     Vital Signs (Most Recent):  Temp: 96.5 °F (35.8 °C) (22)  Pulse: 102 (22)  Resp: (!) 34 (22)  BP: 129/60 (22)  SpO2: 95 % (22)   Vital Signs (24h Range):  Temp:  [96.5 °F (35.8 °C)-97.6 °F (36.4 °C)] 96.5 °F (35.8 °C)  Pulse:  [] 102  Resp:  [18-36] 34  SpO2:  [95 %-100 %] 95 %  BP: (108-138)/(50-62) 129/60   Weight: 136.1 kg (300 lb)  Body mass index is 43.05 kg/m².      Intake/Output Summary (Last 24 hours) at 2022  Last data filed at 2022 1800  Gross per 24 hour   Intake 50 ml   Output 230 ml   Net -180 ml       Physical Exam  Vitals and nursing note reviewed.   Constitutional:       Appearance: She is obese. She is ill-appearing.   Eyes:      Pupils: Pupils are equal, round, and reactive to light.   Cardiovascular:      Rate and Rhythm: Regular rhythm. Tachycardia present.      Pulses: Normal pulses.      Heart sounds: Normal heart sounds.   Pulmonary:      Comments: Decreased air movement  Abdominal:      General: Abdomen is flat. Bowel sounds are normal. There is distension.      Palpations: Abdomen is soft.      Tenderness: There is no guarding.   Musculoskeletal:         General: Normal range of motion.   Skin:     General: Skin is warm and dry.      Comments: Bruising to BUE. Mottling to BLE   Neurological:      Mental Status: She is alert.  She is disoriented.       Vents:     Lines/Drains/Airways       Drain  Duration                  Urethral Catheter 08/21/22 1350 Silicone;Straight-tip 16 Fr. 1 day              Peripheral Intravenous Line  Duration                  Peripheral IV - Single Lumen 08/20/22 2057 22 G Left Shoulder 2 days         Peripheral IV - Single Lumen 08/20/22 2058 22 G Right Forearm 2 days         Midline Catheter Insertion/Assessment  - Single Lumen 08/21/22 2005 Right brachial vein other (see comments) 1 day                  Significant Labs:    CBC/Anemia Profile:  Recent Labs   Lab 08/21/22 0424 08/22/22 0524   WBC 15.93* 18.17*   HGB 12.2 11.4*   HCT 36.2* 33.1*   * 127*   * 105*   RDW 15.8* 16.4*        Chemistries:  Recent Labs   Lab 08/21/22 0424 08/22/22  0011 08/22/22 0524 08/22/22  1829   * 128* 127* 128*  128*   K 5.2* 5.6* 5.4* 5.7*  5.7*   CL 98 101 99 101  101   CO2 17* 15* 18* 9*  9*   BUN 69* 73* 77* 77*  77*   CREATININE 1.9* 2.4* 2.5* 3.0*  3.0*   CALCIUM 8.5* 8.2* 8.2* 8.1*  8.1*   ALBUMIN 1.7*  --  1.5* 1.5*   PROT 6.1  --  5.8*  --    BILITOT 8.5*  --  7.6*  --    ALKPHOS 143*  --  140*  --    ALT 35  --  33  --    AST 53*  --  50*  --    MG 2.0  --  2.1  --    PHOS 4.5  --  5.8* 6.8*       All pertinent labs within the past 24 hours have been reviewed.    Significant Imaging: I have reviewed all pertinent imaging results/findings within the past 24 hours.    Assessment/Plan:     Neuro  Polyneuropathy  Hx of lung cancer s/p lobectomy with polyneuropathy, post-polio syndrome and fibromyalgia, following chemotherapy      - Neuro exam non-suggestive of etiology; possibly progression of her polyneuropathy or possibly 2/2 to sepsis and infectious etiology  - Follows with neurology at [facility]; work-up to explain polyneuropathy and weakness has been unrevealing thus far   - Consult PT/OT for physical debility  - Consult SLP for severe dysarthria and evaluation of  "swallowing    Cardiac/Vascular  Essential hypertension  Holding home antihypertensives in setting of worsening shock    Renal/  Hyperkalemia  K 5.7    -Lokelma  -Albuterol nebs  -Lasix 120 mg once  -BMP    SONYA (acute kidney injury)  Pt with worseining kidney function and decreased urine output. Likely sepsis vs. HRS.     -Nephrology following  -Sodium bicarb tabs  -Lasix 120 mg once  -s/p 2LNS fluid resucitation  -Plan for emergent CRRT tonight  -Renally dose all medications  -Avoid nephrotoxic agents  -Strict intake and ouput  -Daily weight.     Lactic acidosis  -see sepsis    ID  * Sepsis  This patient does have evidence of infective focus  My overall impression is sepsis. Vital signs were reviewed and noted in progress note.  Antibiotics given-   Antibiotics (From admission, onward)            Start     Stop Route Frequency Ordered    08/22/22 2216  vancomycin - pharmacy to dose  (vancomycin IVPB)        "And" Linked Group Details    -- IV pharmacy to manage frequency 08/22/22 2116    08/21/22 1600  piperacillin-tazobactam 4.5 g in sodium chloride 0.9% 100 mL IVPB (ready to mix system)         -- IV Every 8 hours (non-standard times) 08/21/22 1451    08/21/22 0900  rifAXIMin tablet 550 mg         -- Oral 2 times daily 08/21/22 0110        Cultures were taken-   Microbiology Results (last 7 days)     Procedure Component Value Units Date/Time    Blood culture [[id number]]     Order Status: Sent Specimen: Blood     Blood culture [[id number]]     Order Status: Sent Specimen: Blood     Blood culture x two cultures. Draw prior to antibiotics. [[id number]]  (Abnormal) Collected: 08/20/22 2038    Order Status: Completed Specimen: Blood from Peripheral, Antecubital, Right Updated: 08/22/22 1434     Blood Culture, Routine Gram stain aer bottle: Gram negative rods       Results called to and read back by:DANIELA PICHARDO RN 08/21/2022  10:44      KLEBSIELLA PNEUMONIAE  Susceptibility pending      Narrative:      Aerobic and " anaerobic    Urine culture [[id number]]  (Abnormal) Collected: 08/21/22 1434    Order Status: Completed Specimen: Urine Updated: 08/22/22 1303     Urine Culture, Routine GRAM NEGATIVE MINGO  >100,000 cfu/ml  Identification and susceptibility pending      Narrative:      Specimen Source->Urine    Blood culture x two cultures. Draw prior to antibiotics. [[id number]] Collected: 08/20/22 2038    Order Status: Completed Specimen: Blood from Peripheral, Antecubital, Right Updated: 08/21/22 2212     Blood Culture, Routine No Growth to date      No Growth to date    Narrative:      Aerobic and anaerobic    Blood culture [[id number]]     Order Status: Canceled Specimen: Blood     Blood culture [[id number]]     Order Status: Canceled Specimen: Blood     Blood culture [[id number]]     Order Status: Canceled Specimen: Blood     Blood culture [[id number]]     Order Status: Canceled Specimen: Blood     Culture, Respiratory with Gram Stain [[id number]]     Order Status: No result Specimen: Sputum, Expectorated         Latest lactate reviewed, they are-  Recent Labs   Lab 08/21/22  1112 08/22/22  0524 08/22/22  1616   LACTATE 4.8* 4.1* 5.7*       Organ dysfunction indicated by Acute kidney injury and Encephalopathy   Source- Urine, blood    Source control Achieved by- Antibiotics    -Continue Zostyn  -Lactic acid q4hrs  -Start Vanc  -Levophed for MAP > 65  -Blood cultures x 2  -Repeat UA, curine culture      Immunology/Multi System  Rheumatoid arthritis involving multiple sites with positive rheumatoid factor  - Continue home med of prednisone 5mg  - Follows with Rheumatology outpatient    GI  Portal vein thrombosis  Hx of portal vein thrombosis. Pt with worsening mottling to BLE and acute rise of lactic acid. Unable to anticoagulate due to liver function. INR 1.9.     -CTA Chest, Abdomen pelvis.     Hepatic encephalopathy  Pt with history of non alcoholic cirrhosis and elevated ammonia    -Continue Rifaximin and Lactulose  -Delirium  precautions    Cirrhosis, non-alcoholic  -see hepatic encephalopathy    Hyperbilirubinemia  Hx of non-alcoholic cirrhosis. T-bili elevated at 8.0 with mildly elevated AST of 59 on admission.     - Trend CMP and LFTs  -Limited abdo US: Occlusion of the main and right branches of the main portal vein with slow, reversed flow in the left branch.  - Continue IVF hydration    Orthopedic  Fibromyalgia  - Follows with Rheumatology outpatient  - Pain control PRN        Critical Care Daily Checklist:    A: Awake: RASS Goal/Actual Goal:    Actual: [place] Agitation Sedation Scale (RASS): Drowsy   B: Spontaneous Breathing Trial Performed?     C: SAT & SBT Coordinated?  n/a                      D: Delirium: CAM-ICU     E: Early Mobility Performed? No   F: Feeding Goal:    Status:     Current Diet Order   Procedures    Diet NPO    Diet NPO      AS: Analgesia/Sedation nONE   T: Thromboembolic Prophylaxis nONE   H: HOB > 300 Yes   U: Stress Ulcer Prophylaxis (if needed) Protonix   G: Glucose Control 140-180   B: Bowel Function Stool Occurrence: 0   I: Indwelling Catheter (Lines & Goodrich) Necessity Goodrich, Trialysis   D: De-escalation of Antimicrobials/Pharmacotherapies Vanc/Zosyn    Plan for the day/ETD CTCAP, Wean pressors if tolerated    Code Status:  Family/Goals of Care: Full Code  Ongoing     Critical Care Time: 75 minutes  Critical secondary to Patient has a condition that poses threat to life and bodily function: Shock, SONYA     Critical care was time spent personally by me on the following activities: development of treatment plan with patient or surrogate and bedside caregivers, discussions with consultants, evaluation of patient's response to treatment, examination of patient, ordering and performing treatments and interventions, ordering and review of laboratory studies, ordering and review of radiographic studies, pulse oximetry, re-evaluation of patient's condition. This critical care time did not overlap with that of  any other provider or involve time for any procedures.     Love Mckeon DNP  Critical Care Medicine  [facility]

## 2022-08-23 NOTE — SUBJECTIVE & OBJECTIVE
No current facility-administered medications on file prior to encounter.     Current Outpatient Medications on File Prior to Encounter   Medication Sig    acetaminophen (TYLENOL) 500 MG tablet Take 500 mg by mouth 2 (two) times daily as needed for Pain.    amLODIPine (NORVASC) 5 MG tablet Take 5 mg by mouth once daily.    diclofenac sodium (VOLTAREN) 1 % Gel Apply 2 g topically 3 (three) times daily. (Patient taking differently: Apply 2 g topically 3 (three) times daily as needed (PAIN).)    ergocalciferol (VITAMIN D2) 50,000 unit Cap Take 50,000 Units by mouth every Thursday.    lactulose (CHRONULAC) 10 gram/15 mL solution Take 45 mLs (30 g total) by mouth 2 (two) times daily. May adjust dose every 1 to 2 days to achieve 2 to 3 soft stools/day    lactulose (KRISTALOSE) 20 gram Pack Take 1 packet (20 g total) by mouth daily as needed (constipation).    LIDOcaine (LIDODERM) 5 % Place 1 patch onto the skin every 24 hours. Remove & Discard patch within 12 hours or as directed by MD    miconazole NITRATE 2 % (MICOTIN) 2 % top powder Apply topically 2 (two) times daily.    multivitamin with minerals tablet Take 1 tablet by mouth once daily.    predniSONE (DELTASONE) 5 MG tablet Take 1-2 tablets (5-10 mg total) by mouth 2 (two) times daily as needed (arthritis). (Patient taking differently: Take 5 mg by mouth daily as needed (arthritis pain).)    rifAXIMin (XIFAXAN) 550 mg Tab Take 1 tablet (550 mg total) by mouth 2 (two) times daily.    sodium chloride (OCEAN) 0.65 % nasal spray 1 spray by Nasal route as needed for Congestion.    spironolactone (ALDACTONE) 50 MG tablet Take 1 tablet (50 mg total) by mouth once daily.    [DISCONTINUED] cholestyramine (QUESTRAN) 4 gram packet Take 1 packet (4 g total) by mouth once daily.    [DISCONTINUED] oxyCODONE-acetaminophen (PERCOCET) 5-325 mg per tablet Take 1 tablet by mouth every 8 (eight) hours as needed for Pain. (Patient taking differently: Take 0.5 tablets by mouth every 8  (eight) hours as needed for Pain.)       Review of patient's allergies indicates:   Allergen Reactions    Ibuprofen Nausea Only    Demerol [meperidine]      Euphoria    Gabapentin Other (See Comments)     Hallucinations    Mellaril-s Nausea Only    Versed [midazolam]      Excessive talking, hyper    Vicodin [hydrocodone-acetaminophen]      Joint pain, muscle pain    Xanax [alprazolam]      fatigue       Past Medical History:   Diagnosis Date    Anemia     Anxiety     Cataract     Colon polyps     Fibromyalgia 10/15/2012    Lung cancer     Pneumonia due to infectious organism 2019    Post-polio syndrome     Rheumatoid arthritis(714.0) 2012    2009 onset with mod positive CCP and RF MTX 2011 - present     Thyroid disease     Vertigo     postural vertigo     Past Surgical History:   Procedure Laterality Date    BLADDER SURGERY      CHOLECYSTECTOMY      HYSTERECTOMY      LUNG REMOVAL, PARTIAL  2009    left lower lobectomy    OVARIAN CYST REMOVAL  1970    TONSILLECTOMY       Family History       Problem Relation (Age of Onset)    Arrhythmia Mother    Colon cancer Maternal Grandfather    Diabetes Maternal Grandmother    Heart attack Son (45)    Lung cancer Father, Maternal Aunt, Maternal Uncle    Parkinsonism Paternal Grandmother          Tobacco Use    Smoking status: Former Smoker     Packs/day: 1.50     Years: 40.00     Pack years: 60.00     Types: Cigarettes     Quit date: 2009     Years since quittin.1    Smokeless tobacco: Never Used   Substance and Sexual Activity    Alcohol use: Not Currently    Drug use: No    Sexual activity: Not Currently     Review of Systems   Unable to perform ROS: Acuity of condition   Objective:     Vital Signs (Most Recent):  Temp: 96.5 °F (35.8 °C) (22)  Pulse: 100 (22)  Resp: 20 (22)  BP: (!) 84/57 (22)  SpO2: (!) 80 % (22)   Vital Signs (24h Range):  Temp:  [96.5 °F (35.8 °C)-97.6 °F (36.4 °C)]  96.5 °F (35.8 °C)  Pulse:  [] 100  Resp:  [18-36] 20  SpO2:  [63 %-100 %] 80 %  BP: ()/(39-62) 84/57  Arterial Line BP: (77-93)/(41-43) 77/43     Weight: 136.1 kg (300 lb)  Body mass index is 43.05 kg/m².    Physical Exam  Vitals and nursing note reviewed.   Constitutional:       Appearance: She is obese. She is ill-appearing and toxic-appearing.      Comments: Patient is alert and able to nod that her abdomen is hurting, but not much else.    HENT:      Head: Normocephalic and atraumatic.      Right Ear: External ear normal.      Left Ear: External ear normal.      Mouth/Throat:      Mouth: Mucous membranes are moist.   Eyes:      Conjunctiva/sclera: Conjunctivae normal.   Cardiovascular:      Rate and Rhythm: Regular rhythm. Tachycardia present.   Pulmonary:      Effort: Respiratory distress present.      Comments: BiPAP  Abdominal:      General: There is no distension.      Tenderness: There is abdominal tenderness. There is guarding and rebound.      Comments: Abdomen is globally tender with peritonitis. Midline laparotomy incision well healed.    Musculoskeletal:      Cervical back: Normal range of motion.      Right lower leg: Edema present.      Left lower leg: Edema present.      Comments: Some mottling   Neurological:      Mental Status: She is alert.      Comments: Unable to assess   Psychiatric:      Comments: Unable to assess       Significant Labs:  I have reviewed all pertinent lab results within the past 24 hours.  CBC:   Recent Labs   Lab 08/22/22 0524   WBC 18.17*   RBC 3.15*   HGB 11.4*   HCT 33.1*   *   *   MCH 36.2*   MCHC 34.4     CMP:   Recent Labs   Lab 08/22/22 0524 08/22/22  1829   GLU 99 82  82   CALCIUM 8.2* 8.1*  8.1*   ALBUMIN 1.5* 1.5*   PROT 5.8*  --    * 128*  128*   K 5.4* 5.7*  5.7*   CO2 18* 9*  9*   CL 99 101  101   BUN 77* 77*  77*   CREATININE 2.5* 3.0*  3.0*   ALKPHOS 140*  --    ALT 33  --    AST 50*  --    BILITOT 7.6*  --      LFTs:    Recent Labs   Lab 08/22/22 0524 08/22/22  1829   ALT 33  --    AST 50*  --    ALKPHOS 140*  --    BILITOT 7.6*  --    PROT 5.8*  --    ALBUMIN 1.5* 1.5*     Coagulation:   Recent Labs   Lab 08/22/22 0524   LABPROT 19.4*   INR 1.9*     Cardiac markers:   Recent Labs   Lab 08/20/22 2037   TROPONINI 0.009     ABGs:   Recent Labs   Lab 08/22/22  2329   PH 7.085*   PCO2 18.2*   PO2 126*   HCO3 5.4*   POCSATURATED 97   BE -24       Significant Diagnostics:  CT abdomen/pelvis with contrast shows JANETT and SMA are occluded, which appears acute. Not much flow into small bowel with ascites. Concern for ischemic bowel.

## 2022-08-25 LAB — BACTERIA BLD CULT: NORMAL

## 2022-08-26 ENCOUNTER — TELEPHONE (OUTPATIENT)
Dept: INTERNAL MEDICINE | Facility: CLINIC | Age: 77
End: 2022-08-26
Payer: MEDICARE

## 2022-08-29 ENCOUNTER — EXTERNAL HOME HEALTH (OUTPATIENT)
Dept: HOME HEALTH SERVICES | Facility: HOSPITAL | Age: 77
End: 2022-08-29
Payer: MEDICARE

## 2022-09-16 ENCOUNTER — DOCUMENT SCAN (OUTPATIENT)
Dept: HOME HEALTH SERVICES | Facility: HOSPITAL | Age: 77
End: 2022-09-16
Payer: MEDICARE

## 2022-09-18 ENCOUNTER — DOCUMENT SCAN (OUTPATIENT)
Dept: HOME HEALTH SERVICES | Facility: HOSPITAL | Age: 77
End: 2022-09-18
Payer: MEDICARE

## 2023-05-19 NOTE — SUBJECTIVE & OBJECTIVE
Past Medical History:   Diagnosis Date    Anemia     Anxiety     Cataract     Colon polyps 2012    Fibromyalgia 10/15/2012    Lung cancer     Pneumonia due to infectious organism 7/19/2019    Post-polio syndrome     Rheumatoid arthritis(714.0) 7/16/2012    2009 onset with mod positive CCP and RF MTX 2011 - present     Thyroid disease     Vertigo     postural vertigo       Past Surgical History:   Procedure Laterality Date    BLADDER SURGERY  1959    CHOLECYSTECTOMY      HYSTERECTOMY  1988    LUNG REMOVAL, PARTIAL  2009    left lower lobectomy    OVARIAN CYST REMOVAL  1970    TONSILLECTOMY  1952       Review of patient's allergies indicates:   Allergen Reactions    Ibuprofen Nausea Only    Demerol [meperidine]      Euphoria    Gabapentin Other (See Comments)     Hallucinations    Mellaril-s Nausea Only    Versed [midazolam]      Excessive talking, hyper    Vicodin [hydrocodone-acetaminophen]      Joint pain, muscle pain    Xanax [alprazolam]      fatigue     Current Facility-Administered Medications   Medication Frequency    dextrose 10% bolus 125 mL PRN    dextrose 10% bolus 250 mL PRN    [START ON 8/25/2022] ergocalciferol capsule 50,000 Units Every Thurs    glucagon (human recombinant) injection 1 mg PRN    glucose chewable tablet 16 g PRN    glucose chewable tablet 24 g PRN    heparin (porcine) injection 5,000 Units Q8H    lactulose 20 gram/30 mL solution Soln 20 g Daily    melatonin tablet 6 mg Nightly PRN    naloxone 0.4 mg/mL injection 0.02 mg PRN    oxyCODONE 5 mg/5 mL solution 5 mg Q6H PRN    piperacillin-tazobactam 4.5 g in sodium chloride 0.9% 100 mL IVPB (ready to mix system) Q8H    predniSONE tablet 5 mg Daily PRN    rifAXIMin tablet 550 mg BID    senna-docusate 8.6-50 mg per tablet 1 tablet BID PRN    sodium chloride 0.9% flush 10 mL Q12H PRN     Family History       Problem Relation (Age of Onset)    Arrhythmia Mother    Colon cancer Maternal Grandfather    Diabetes Maternal Grandmother    Heart attack  negative Son (45)    Lung cancer Father, Maternal Aunt, Maternal Uncle    Parkinsonism Paternal Grandmother          Tobacco Use    Smoking status: Former Smoker     Packs/day: 1.50     Years: 40.00     Pack years: 60.00     Types: Cigarettes     Quit date: 2009     Years since quittin.1    Smokeless tobacco: Never Used   Substance and Sexual Activity    Alcohol use: Not Currently    Drug use: No    Sexual activity: Not Currently     Review of Systems   Unable to perform ROS: Acuity of condition   Objective:     Vital Signs (Most Recent):  Temp: 97.1 °F (36.2 °C) (22 1306)  Pulse: 89 (22 1536)  Resp: 18 (22 1306)  BP: (!) 131/58 (22 1306)  SpO2: 97 % (22 1306)  O2 Device (Oxygen Therapy): room air (22 0545)   Vital Signs (24h Range):  Temp:  [96.2 °F (35.7 °C)-97.5 °F (36.4 °C)] 97.1 °F (36.2 °C)  Pulse:  [] 89  Resp:  [10-20] 18  SpO2:  [93 %-100 %] 97 %  BP: (116-150)/(55-88) 131/58     Weight: 136.1 kg (300 lb) (22 0611)  Body mass index is 43.05 kg/m².  Body surface area is 2.59 meters squared.    No intake/output data recorded.    Physical Exam  Constitutional:       General: She is not in acute distress.     Appearance: She is obese. She is ill-appearing.   HENT:      Head: Normocephalic.      Nose: Nose normal. No congestion.      Mouth/Throat:      Mouth: Mucous membranes are dry.   Cardiovascular:      Rate and Rhythm: Normal rate and regular rhythm.      Pulses: Normal pulses.      Heart sounds: Normal heart sounds.   Pulmonary:      Effort: Pulmonary effort is normal.      Breath sounds: Normal breath sounds.   Abdominal:      General: Abdomen is flat.      Palpations: Abdomen is soft.   Musculoskeletal:      Cervical back: Normal range of motion.   Neurological:      Mental Status: She is alert.      Sensory: No sensory deficit.      Motor: Weakness present.       Significant Labs:  CBC:   Recent Labs   Lab 22  0424   WBC 15.93*   RBC 3.45*   HGB  12.2   HCT 36.2*   *   *   MCH 35.4*   MCHC 33.7     CMP:   Recent Labs   Lab 08/21/22  0424   *   CALCIUM 8.5*   ALBUMIN 1.7*   PROT 6.1   *   K 5.2*   CO2 17*   CL 98   BUN 69*   CREATININE 1.9*   ALKPHOS 143*   ALT 35   AST 53*   BILITOT 8.5*     All labs within the past 24 hours have been reviewed.    Significant Imaging:  Labs: Reviewed   no gross abnormalities

## 2024-03-13 NOTE — ASSESSMENT & PLAN NOTE
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  She is likely deconditioned some with her acute illness and would likely benefit from PT evaluation.     Yes